# Patient Record
Sex: MALE | Race: WHITE | Employment: OTHER | URBAN - METROPOLITAN AREA
[De-identification: names, ages, dates, MRNs, and addresses within clinical notes are randomized per-mention and may not be internally consistent; named-entity substitution may affect disease eponyms.]

---

## 2017-01-04 ENCOUNTER — TELEPHONE (OUTPATIENT)
Dept: FAMILY MEDICINE CLINIC | Age: 82
End: 2017-01-04

## 2017-01-04 DIAGNOSIS — N30.00 ACUTE CYSTITIS WITHOUT HEMATURIA: Primary | ICD-10-CM

## 2017-01-04 NOTE — TELEPHONE ENCOUNTER
Patient's daughter, Mare Giang is calling. Mare Giang states that she believes that her father may have another UTI. Mare Giang states that her father is showing symptoms like before when he had a UTI. Mare Giang states that he also called this morning and stated that he thought he was somewhere else. Mare Giang states that her father currently is living in a assistant living facility ([address] [place] [address]) due to his wheel chair. aMre Giang is requesting that a urinalysis order be sent to the assistant living facility so that her father can get some antibiotics.     Best call back # for Mare Giang: [phone]

## 2017-01-05 ENCOUNTER — TELEPHONE (OUTPATIENT)
Dept: FAMILY MEDICINE CLINIC | Age: 82
End: 2017-01-05

## 2017-01-05 NOTE — TELEPHONE ENCOUNTER
Luis Manuel Rabago   -    [phone]    -  Daughter stated Assisted Living did not receive the fax for Urinalysis Order-  Please use fax number [phone]

## 2017-01-09 ENCOUNTER — OFFICE VISIT (OUTPATIENT)
Dept: FAMILY MEDICINE CLINIC | Age: 82
End: 2017-01-09

## 2017-01-09 VITALS
WEIGHT: 200 LBS | SYSTOLIC BLOOD PRESSURE: 141 MMHG | DIASTOLIC BLOOD PRESSURE: 90 MMHG | HEART RATE: 84 BPM | OXYGEN SATURATION: 96 % | HEIGHT: 74 IN | BODY MASS INDEX: 25.67 KG/M2 | RESPIRATION RATE: 16 BRPM | TEMPERATURE: 96.1 F

## 2017-01-09 DIAGNOSIS — R13.10 DYSPHAGIA, UNSPECIFIED TYPE: ICD-10-CM

## 2017-01-09 DIAGNOSIS — R05.3 CHRONIC COUGH: Primary | ICD-10-CM

## 2017-01-09 DIAGNOSIS — G20 PARKINSON'S DISEASE (HCC): ICD-10-CM

## 2017-01-09 RX ORDER — ALBUTEROL SULFATE 90 UG/1
2 AEROSOL, METERED RESPIRATORY (INHALATION)
Qty: 1 INHALER | Refills: 0 | Status: SHIPPED | OUTPATIENT
Start: 2017-01-09 | End: 2018-01-03 | Stop reason: ALTCHOICE

## 2017-01-09 NOTE — PROGRESS NOTES
Chief Complaint   Patient presents with    Cough     X 3 months     Blurred Vision     X 1 year      1. Have you been to the ER, urgent care clinic since your last visit? Hospitalized since your last visit? No    2. Have you seen or consulted any other health care providers outside of the [address] [place] [address] since your last visit? Include any pap smears or colon screening.  No

## 2017-01-09 NOTE — PATIENT INSTRUCTIONS
Chronic Cough: Care Instructions  Your Care Instructions    A cough is your body's response to something that bothers your throat or airways. Many things can cause a cough. You might cough because of a cold or the flu, bronchitis, or asthma. Smoking, postnasal drip, allergies, and stomach acid that backs up into your throat also can cause a cough. A cough can be short-term (acute) or long-term (chronic). A chronic cough lasts more than 3 weeks. A chronic cough is often caused by a long-term problem, such as asthma. Another cause might be a medicine, such as an ACE inhibitor. A cough is a symptom, not a disease. To treat a chronic cough, you may need to treat the problem that causes it. You can take a few steps at home to cough less and feel better. Some people cough or clear their throat out of habit for no clear reason. Follow-up care is a key part of your treatment and safety. Be sure to make and go to all appointments, and call your doctor if you are having problems. It's also a good idea to know your test results and keep a list of the medicines you take. How can you care for yourself at home? · Drink plenty of water and other fluids. This may help soothe a dry or sore throat. Honey or lemon juice in hot water or tea may ease a dry cough. · Prop up your head on pillows to help you breathe and ease a cough. · Do not smoke or allow others to smoke around you. Smoke can make a cough worse. If you need help quitting, talk to your doctor about stop-smoking programs and medicines. These can increase your chances of quitting for good. · Avoid exposure to smoke, dust, or other pollutants, or wear a face mask. Check with your doctor or pharmacist to find out which type of face mask will give you the most benefit. · Take cough medicine as directed by your doctor. · Try cough drops to soothe a dry or sore throat. Cough drops don't stop a cough.  Medicine-flavored cough drops are no better than candy-flavored drops or hard candy. Throat clearing  When you have a chronic cough or a disease that may cause this type of cough, you may often feel like you want to clear your throat. This helps bring up mucus. But throat clearing does not always have a cause. Throat clearing can become a habit. The more you do it, the more you feel like you need to do it. But frequent throat clearing can be hard on your vocal cords. It's like slamming them together. To help lessen throat clearing, you can try:  · Taking small sips of water. · Not clearing your throat when you feel you need to. · Swallowing hard when you want to clear your throat. You may want to ask your doctor if a medicine that thins mucus would help. When should you call for help? Call 911 anytime you think you may need emergency care. For example, call if:  · You have severe trouble breathing. Call your doctor now or seek immediate medical care if:  · You cough up blood. · You have new or worse trouble breathing. · You have a new or higher fever. Watch closely for changes in your health, and be sure to contact your doctor if:  · You cough more deeply or more often, especially if you notice more mucus or a change in the color of your mucus. · You do not get better as expected. Where can you learn more? Go to http://rodo-lukas.info/. Enter P533 in the search box to learn more about \"Chronic Cough: Care Instructions. \"  Current as of: May 23, 2016  Content Version: 11.1  © 8084-3759 [employer]. Care instructions adapted under license by [employer] (which disclaims liability or warranty for this information). If you have questions about a medical condition or this instruction, always ask your healthcare professional. [address] any warranty or liability for your use of this information.

## 2017-01-09 NOTE — MR AVS SNAPSHOT
Visit Information Date & Time Provider Department Dept. Phone Encounter #  
 1/9/2017  2:15 PM Ivette Patel  [facility] [phone] [id number] Follow-up Instructions Return if symptoms worsen or fail to improve. Your Appointments 4/6/2017  9:15 AM  
PACEMAKER with TIFFANI BURNETT [facility] ([place] SCHEDULING) Appt Note: med threshold/rc/Church 1 yr new CL; med threshold/rc/Church 1 yr new CL r/s from 06/02 to 04/06 per Dr Nagi Corral letter mailed [address]  
[phone]  
  
   
 [address]  
  
    
 4/6/2017  9:20 AM  
ESTABLISHED PATIENT with Wendy Whiting MD  
[facility] ([facility]) Appt Note: med threshold/rc/Church 1 yr new CL r/s from 06/02 to 04/06 per Dr Nagi Corral letter mailed [address] [phone]  
[address] [address]  
  
    
  
 3/27/2017  9:30 AM  
REMOTE OFFICE VISIT with Sebastian Cid [facility] ([place] SCHEDULING) Appt Note:  [address]  
[address] [address] Upcoming Health Maintenance Date Due DTaP/Tdap/Td series (1 - Tdap) 5/4/2012 Pneumococcal 65+ High/Highest Risk (2 of 2 - PPSV23) 4/20/2015 MEDICARE YEARLY EXAM 6/17/2016 INFLUENZA AGE 9 TO ADULT 8/1/2016 GLAUCOMA SCREENING Q2Y 8/26/2018 Allergies as of 1/9/2017  Review Complete On: 1/9/2017 By: Gai Castro No Known Allergies Current Immunizations  Reviewed on 7/26/2016 Name Date Influenza High Dose Vaccine PF 10/27/2015 Influenza Vaccine Split 11/17/2011 Pneumococcal Vaccine (Unspecified Type) 4/20/2010 TB Skin Test (PPD) Intradermal 3/25/2013 TD Vaccine 5/3/2012 Not reviewed this visit You Were Diagnosed With   
  
 Codes Comments Chronic cough    -  Primary ICD-10-CM: P16 ICD-9-CM: 786.2 [facility]'s disease ([facility]     ICD-10-CM: G20 
ICD-9-CM: 332.0 Dysphagia, unspecified type     ICD-10-CM: R13.10 ICD-9-CM: 787.20 Vitals BP Pulse Temp Resp Height(growth percentile) Weight(growth percentile) 141/90 (BP 1 Location: Left arm, BP Patient Position: Sitting) 84 96.1 °F (35.6 °C) (Oral) 16 6' 2\" (1.88 m) 200 lb (90.7 kg) SpO2 BMI Smoking Status 96% 25.68 kg/m2 Former Smoker Vitals History BMI and BSA Data Body Mass Index Body Surface Area  
 25.68 kg/m 2 2.18 m 2 Preferred Pharmacy Pharmacy Name Phone [facility] [address] [place] [address] [phone] Your Updated Medication List  
  
   
This list is accurate as of: 1/9/17  2:48 PM.  Always use your most recent med list.  
  
  
  
  
 acetaminophen 500 mg tablet Commonly known as:  MAPAP EXTRA STRENGTH Take 1 Tab by mouth every six (6) hours as needed for Pain. albuterol 90 mcg/actuation inhaler Commonly known as:  PROVENTIL HFA, VENTOLIN HFA, PROAIR HFA Take 2 Puffs by inhalation every six (6) hours as needed for Wheezing. aspirin 325 mg tablet Commonly known as:  ASPIRIN Take 1 Tab by mouth two (2) times a day. Indications: PREVENTION OF TRANSIENT ISCHEMIC ATTACKS * ATIVAN 0.5 mg tablet Generic drug:  LORazepam  
Take 0.5 mg by mouth every eight (8) hours as needed for Anxiety. * LORazepam 0.5 mg tablet Commonly known as:  ATIVAN Take 0.5 mg by mouth nightly. carbidopa-levodopa  mg per tablet Commonly known as:  SINEMET Take 2 Tabs by mouth four (4) times daily. (This is given at 8:00, 11:00, 14:00, and 21:00). CLARITIN 10 mg tablet Generic drug:  loratadine Take 10 mg by mouth. cloNIDine HCl 0.1 mg tablet Commonly known as:  CATAPRES Take 1 Tab by mouth every six (6) hours as needed (SBP > 180 mmHg). cycloSPORINE 0.05 % ophthalmic emulsion Commonly known as:  RESTASIS Administer 1 Drop to both eyes two (2) times a day. docusate sodium 100 mg capsule Commonly known as:  Lucetta Bruno Take 1 Cap by mouth daily as needed for Constipation. droxidopa 100 mg Cap capsule Commonly known as:  Gaamelie Flash Take 100 mg by mouth three (3) times daily. Indications: SYMPTOMATIC ORTHOSTATIC HYPOTENSION  
  
 escitalopram oxalate 20 mg tablet Commonly known as:  Sangeetha Gale Take 1 Tab by mouth daily. esomeprazole 40 mg capsule Commonly known as:  NexIUM Take 1 Cap by mouth daily. HYDROcodone-acetaminophen 5-325 mg per tablet Commonly known as:  Say Dolphin Take  by mouth. inhalational spacing device 1 Each by Does Not Apply route as needed. * pindolol 5 mg tablet Commonly known as:  VISKIN Take 0.5 Tabs by mouth daily (after lunch). 2.5 mg (1/2 tab) by mouth at 1400 * pindolol 5 mg tablet Commonly known as:  VISKIN Take 1 Tab by mouth nightly. polyethylene glycol 17 gram packet Commonly known as:  Rl Javier Take 1 Packet by mouth daily. Indications: CONSTIPATION  
  
 PYRIDIUM 200 mg tablet Generic drug:  phenazopyridine Take 200 mg by mouth three (3) times daily as needed for Pain. * QUEtiapine 50 mg tablet Commonly known as:  SEROquel Take 1 Tab by mouth nightly. * QUEtiapine 25 mg tablet Commonly known as:  SEROquel TAKE 1 TABLET NIGHTLY  
  
 rivastigmine 4.6 mg/24 hr patch Commonly known as:  EXELON  
1 Patch by TransDERmal route daily. therapeutic multivitamin tablet Commonly known as:  [facility] Take 1 Tab by mouth daily. tolterodine ER 2 mg ER capsule Commonly known as:  DETROL-LA Take 1 Cap by mouth daily. For bladder frequency  
  
 traMADol 50 mg tablet Commonly known as:  ULTRAM  
Take 1 Tab by mouth every eight (8) hours as needed for Pain. Max Daily Amount: 150 mg. VOLTAREN 1 % Gel Generic drug:  diclofenac Apply  to affected area four (4) times daily. * Notice: This list has 6 medication(s) that are the same as other medications prescribed for you. Read the directions carefully, and ask your doctor or other care provider to review them with you. Prescriptions Sent to Pharmacy Refills  
 albuterol (PROVENTIL HFA, VENTOLIN HFA, PROAIR HFA) 90 mcg/actuation inhaler 0 Sig: Take 2 Puffs by inhalation every six (6) hours as needed for Wheezing. Class: Normal  
 Pharmacy: [facility] [address] [place] [address] Ph #: [phone] Route: Inhalation  
 inhalational spacing device 0 Si Each by Does Not Apply route as needed. Class: Normal  
 Pharmacy: [facility] [address] [place] [address] Ph #: [phone] Route: Does Not Apply We Performed the Following REFERRAL TO SPEECH THERAPY [ICD774 Custom] Comments:  
 Please evaluate patient for dysphagia. Follow-up Instructions Return if symptoms worsen or fail to improve. Referral Information Referral ID Referred By Referred To  
  
 [id number] Veda NGUYEN Not Available Visits Status Start Date End Date 1 New Request 17 If your referral has a status of pending review or denied, additional information will be sent to support the outcome of this decision. Patient Instructions Chronic Cough: Care Instructions Your Care Instructions A cough is your body's response to something that bothers your throat or airways. Many things can cause a cough. You might cough because of a cold or the flu, bronchitis, or asthma.  Smoking, postnasal drip, allergies, and stomach acid that backs up into your throat also can cause a cough. A cough can be short-term (acute) or long-term (chronic). A chronic cough lasts more than 3 weeks. A chronic cough is often caused by a long-term problem, such as asthma. Another cause might be a medicine, such as an ACE inhibitor. A cough is a symptom, not a disease. To treat a chronic cough, you may need to treat the problem that causes it. You can take a few steps at home to cough less and feel better. Some people cough or clear their throat out of habit for no clear reason. Follow-up care is a key part of your treatment and safety. Be sure to make and go to all appointments, and call your doctor if you are having problems. It's also a good idea to know your test results and keep a list of the medicines you take. How can you care for yourself at home? · Drink plenty of water and other fluids. This may help soothe a dry or sore throat. Honey or lemon juice in hot water or tea may ease a dry cough. · Prop up your head on pillows to help you breathe and ease a cough. · Do not smoke or allow others to smoke around you. Smoke can make a cough worse. If you need help quitting, talk to your doctor about stop-smoking programs and medicines. These can increase your chances of quitting for good. · Avoid exposure to smoke, dust, or other pollutants, or wear a face mask. Check with your doctor or pharmacist to find out which type of face mask will give you the most benefit. · Take cough medicine as directed by your doctor. · Try cough drops to soothe a dry or sore throat. Cough drops don't stop a cough. Medicine-flavored cough drops are no better than candy-flavored drops or hard candy. Throat clearing When you have a chronic cough or a disease that may cause this type of cough, you may often feel like you want to clear your throat. This helps bring up mucus. But throat clearing does not always have a cause. Throat clearing can become a habit. The more you do it, the more you feel like you need to do it. But frequent throat clearing can be hard on your vocal cords. It's like slamming them together. To help lessen throat clearing, you can try: · Taking small sips of water. · Not clearing your throat when you feel you need to. · Swallowing hard when you want to clear your throat. You may want to ask your doctor if a medicine that thins mucus would help. When should you call for help? Call 911 anytime you think you may need emergency care. For example, call if: 
· You have severe trouble breathing. Call your doctor now or seek immediate medical care if: 
· You cough up blood. · You have new or worse trouble breathing. · You have a new or higher fever. Watch closely for changes in your health, and be sure to contact your doctor if: 
· You cough more deeply or more often, especially if you notice more mucus or a change in the color of your mucus. · You do not get better as expected. Where can you learn more? Go to http://rodo-lukas.info/. Enter E681 in the search box to learn more about \"Chronic Cough: Care Instructions. \" Current as of: May 23, 2016 Content Version: 11.1 © 7372-2065 [employer]. Care instructions adapted under license by [employer] (which disclaims liability or warranty for this information). If you have questions about a medical condition or this instruction, always ask your healthcare professional. [place] [address] any warranty or liability for your use of this information. Introducing [facility]! Dear Daria Malin: Thank you for requesting a [employer] account. Our records indicate that you already have an active [employer] account. You can access your account anytime at [email or web address] Did you know that you can access your hospital and ER discharge instructions at any time in [employer]? You can also review all of your test results from your hospital stay or ER visit. Additional Information If you have questions, please visit the Frequently Asked Questions section of the [employer] website at [email or web address]/. Remember, [employer] App is NOT to be used for urgent needs. For medical emergencies, dial 911. Now available from your iPhone and Android! Please provide this summary of care documentation to your next provider. Your primary care clinician is listed as [address] [address], [facility]/s . If you have any questions after today's visit, please call [phone].

## 2017-01-10 NOTE — PROGRESS NOTES
Subjective:      Newton Daniels is a 80 y.o. male who presents for dry cough for 3 months. He reports symptoms are daily. He denies aggravation with meals, although he admits towards coughing with meals at times. He reports phlegm in the throat. He denies associated change in appetite, weakness, fever, chills, body aches, wheezing, or shortness of breath. He denies symptoms of reflux. He generally feels in good health today. Current Outpatient Prescriptions   Medication Sig Dispense Refill    albuterol (PROVENTIL HFA, VENTOLIN HFA, PROAIR HFA) 90 mcg/actuation inhaler Take 2 Puffs by inhalation every six (6) hours as needed for Wheezing. 1 Inhaler 0    inhalational spacing device 1 Each by Does Not Apply route as needed. 1 Device 0    QUEtiapine (SEROQUEL) 25 mg tablet TAKE 1 TABLET NIGHTLY 90 Tab 1    LORazepam (ATIVAN) 0.5 mg tablet Take 0.5 mg by mouth nightly.  LORazepam (ATIVAN) 0.5 mg tablet Take 0.5 mg by mouth every eight (8) hours as needed for Anxiety.  phenazopyridine (PYRIDIUM) 200 mg tablet Take 200 mg by mouth three (3) times daily as needed for Pain.  diclofenac (VOLTAREN) 1 % gel Apply  to affected area four (4) times daily.  HYDROcodone-acetaminophen (NORCO) 5-325 mg per tablet Take  by mouth.  loratadine (CLARITIN) 10 mg tablet Take 10 mg by mouth.  aspirin (ASPIRIN) 325 mg tablet Take 1 Tab by mouth two (2) times a day. Indications: PREVENTION OF TRANSIENT ISCHEMIC ATTACKS 60 Tab 0    carbidopa-levodopa (SINEMET)  mg per tablet Take 2 Tabs by mouth four (4) times daily. (This is given at 8:00, 11:00, 14:00, and 21:00). 240 Tab 0    polyethylene glycol (MIRALAX) 17 gram packet Take 1 Packet by mouth daily. Indications: CONSTIPATION 30 Packet 0    therapeutic multivitamin (THERAGRAN) tablet Take 1 Tab by mouth daily. 30 Tab 0    tolterodine ER (DETROL-LA) 2 mg ER capsule Take 1 Cap by mouth daily.  For bladder frequency 30 Cap 0    traMADol (ULTRAM) 50 mg tablet Take 1 Tab by mouth every eight (8) hours as needed for Pain. Max Daily Amount: 150 mg. 30 Tab 1    cloNIDine HCl (CATAPRES) 0.1 mg tablet Take 1 Tab by mouth every six (6) hours as needed (SBP > 180 mmHg). 20 Tab 0    droxidopa (NORTHERA) 100 mg cap Take 100 mg by mouth three (3) times daily. Indications: SYMPTOMATIC ORTHOSTATIC HYPOTENSION 90 Tab 1    pindolol (VISKIN) 5 mg tablet Take 0.5 Tabs by mouth daily (after lunch). 2.5 mg (1/2 tab) by mouth at 1400 30 Tab 0    docusate sodium (COLACE) 100 mg capsule Take 1 Cap by mouth daily as needed for Constipation. (Patient taking differently: Take 50 mg by mouth daily as needed.) 30 Cap 0    cycloSPORINE (RESTASIS) 0.05 % ophthalmic emulsion Administer 1 Drop to both eyes two (2) times a day. 60 Each 0    escitalopram oxalate (LEXAPRO) 20 mg tablet Take 1 Tab by mouth daily. 30 Tab 0    rivastigmine (EXELON) 4.6 mg/24 hr patch 1 Patch by TransDERmal route daily. 30 Patch 0    esomeprazole (NEXIUM) 40 mg capsule Take 1 Cap by mouth daily. 30 Cap 0    QUEtiapine (SEROQUEL) 50 mg tablet Take 1 Tab by mouth nightly. 30 Tab 0    acetaminophen (MAPAP EXTRA STRENGTH) 500 mg tablet Take 1 Tab by mouth every six (6) hours as needed for Pain. 120 Tab 0    pindolol (VISKIN) 5 mg tablet Take 1 Tab by mouth nightly. 30 Tab 0     No Known Allergies    ROS:   Complete review of systems was reviewed with pertinent information listed in HPI. Objective:     Visit Vitals    /90 (BP 1 Location: Left arm, BP Patient Position: Sitting)    Pulse 84    Temp 96.1 °F (35.6 °C) (Oral)    Resp 16    Ht 6' 2\" (1.88 m)    Wt 200 lb (90.7 kg)    SpO2 96%    BMI 25.68 kg/m2       Vitals and Nurse Documentation reviewed. General:  alert, cooperative, no distress   Neck: supple, symmetrical, trachea midline, no adenopathy, thyroid: not enlarged, symmetric, no tenderness/mass/nodules, no carotid bruit and no JVD. CV S1,S2.  No murmur, gallop, or rub. RRR. Lungs: clear to auscultation bilaterally. Cough is dry and produces mild wheeze. Assessment/Plan:     Nataliya Orozco was seen today for cough and blurred vision. Diagnoses and all orders for this visit:    Chronic cough  -     albuterol (PROVENTIL HFA, VENTOLIN HFA, PROAIR HFA) 90 mcg/actuation inhaler; Take 2 Puffs by inhalation every six (6) hours as needed for Wheezing.  -     inhalational spacing device; 1 Each by Does Not Apply route as needed. -     REFERRAL TO SPEECH THERAPY  Add Albuterol at this time. Speech evaluation as above. Return if no improvement. Consider updating chest xray at that time. Parkinson's disease ([facility]  -     REFERRAL TO SPEECH THERAPY    Dysphagia, unspecified type  -     REFERRAL TO SPEECH THERAPY for evaluation. Discussed expected course/resolution/complications of diagnosis in detail with patient.    Medication risks/benefits/costs/interactions/alternatives discussed with patient.    Pt was given an after visit summary which includes diagnoses, current medications & vitals.    Pt expressed understanding with the diagnosis and plan    Follow-up Disposition:  Return if symptoms worsen or fail to improve.

## 2017-01-12 ENCOUNTER — TELEPHONE (OUTPATIENT)
Dept: FAMILY MEDICINE CLINIC | Age: 82
End: 2017-01-12

## 2017-01-12 NOTE — LETTER
2017 10:39 AM 
 
Mr. Phil Childress [address] [phone] To Whom It May Concern My patient, Ayana Long,  1933 is permanently confined to a wheelchair due to a ruptured quadricep tendon bilaterally in , leaving him unable to walk. His wheelchair is in need of repair. Please let us know if there is anything else that you need from us Sincerely, 
 
 
Ulysses Davey MD

## 2017-01-12 NOTE — TELEPHONE ENCOUNTER
Mali Cherry  [phone]      Fax # [phone]   [address] [place] [address]    Mr. Lanie Browne daughter, Zach Burch, states that he is permanently wheelchair bound and his wheelchair needs repairs. She states that in order  for Medicare to pay for the repairs they need a letter from his PCP stating that he is permanently in a wheelchair and it needs repairs. Please fax to the # above.

## 2017-01-12 NOTE — TELEPHONE ENCOUNTER
[facility] patient got wheelchair from [facility] and to fax letter to them at [phone]

## 2017-01-17 RX ORDER — LORAZEPAM 0.5 MG/1
0.5 TABLET ORAL
Qty: 90 TAB | Refills: 1 | Status: SHIPPED | OUTPATIENT
Start: 2017-01-17 | End: 2017-03-03 | Stop reason: DRUGHIGH

## 2017-01-17 NOTE — TELEPHONE ENCOUNTER
Contact # [phone]    Patient's daughter, Ayanna Ku, is requesting a refill on patient's medication:  Requested Prescriptions     Pending Prescriptions Disp Refills    LORazepam (ATIVAN) 0.5 mg tablet       Sig: Take 1 Tab by mouth nightly. Max Daily Amount: 0.5 mg. Pharmacy updated.  She is requesting a call once its been sent to [facility]

## 2017-01-23 RX ORDER — LOSARTAN POTASSIUM 25 MG/1
25 TABLET ORAL DAILY
Qty: 90 TAB | Refills: 1 | Status: SHIPPED | OUTPATIENT
Start: 2017-01-23 | End: 2017-02-11

## 2017-01-24 RX ORDER — PINDOLOL 5 MG/1
2.5 TABLET ORAL
Qty: 30 TAB | Refills: 0 | Status: SHIPPED | OUTPATIENT
Start: 2017-01-24 | End: 2017-01-25 | Stop reason: DRUGHIGH

## 2017-01-24 RX ORDER — PINDOLOL 5 MG/1
5 TABLET ORAL
Qty: 30 TAB | Refills: 0 | Status: SHIPPED | OUTPATIENT
Start: 2017-01-24 | End: 2017-01-25 | Stop reason: DRUGHIGH

## 2017-01-24 RX ORDER — PINDOLOL 5 MG/1
5 TABLET ORAL
Qty: 90 TAB | Refills: 3 | Status: SHIPPED | OUTPATIENT
Start: 2017-01-24 | End: 2017-01-25 | Stop reason: DRUGHIGH

## 2017-01-24 RX ORDER — PINDOLOL 5 MG/1
2.5 TABLET ORAL
Qty: 45 TAB | Refills: 3 | Status: SHIPPED | OUTPATIENT
Start: 2017-01-24 | End: 2017-01-25 | Stop reason: DRUGHIGH

## 2017-01-24 NOTE — TELEPHONE ENCOUNTER
Daughter request a refill on his pindolol. VO per Dr Sonia Lorenzo ok to refill. States that he is almost out.   30 day sent to [facility] and [facility] daughter request.

## 2017-01-25 ENCOUNTER — TELEPHONE (OUTPATIENT)
Dept: CARDIOLOGY CLINIC | Age: 82
End: 2017-01-25

## 2017-01-25 RX ORDER — PINDOLOL 5 MG/1
TABLET ORAL
Qty: 370 TAB | Refills: 0
Start: 2017-01-25 | End: 2017-02-11 | Stop reason: SDUPTHER

## 2017-01-25 NOTE — TELEPHONE ENCOUNTER
[facility] called stating that they do not take two orders for the same medication as the insurance will not cover the medications. Order for medication changed to one order and called [facility].

## 2017-02-11 ENCOUNTER — APPOINTMENT (OUTPATIENT)
Dept: GENERAL RADIOLOGY | Age: 82
DRG: 193 | End: 2017-02-11
Attending: EMERGENCY MEDICINE
Payer: MEDICARE

## 2017-02-11 ENCOUNTER — HOSPITAL ENCOUNTER (INPATIENT)
Age: 82
LOS: 4 days | Discharge: HOME | DRG: 193 | End: 2017-02-15
Attending: EMERGENCY MEDICINE | Admitting: FAMILY MEDICINE
Payer: MEDICARE

## 2017-02-11 DIAGNOSIS — J18.9 PNEUMONIA OF RIGHT MIDDLE LOBE DUE TO INFECTIOUS ORGANISM: Primary | ICD-10-CM

## 2017-02-11 LAB
ALBUMIN SERPL BCP-MCNC: 3.5 G/DL (ref 3.5–5)
ALBUMIN/GLOB SERPL: 1.1 {RATIO} (ref 1.1–2.2)
ALP SERPL-CCNC: 61 U/L (ref 45–117)
ALT SERPL-CCNC: 9 U/L (ref 12–78)
ANION GAP BLD CALC-SCNC: 9 MMOL/L (ref 5–15)
ARTERIAL PATENCY WRIST A: ABNORMAL
AST SERPL W P-5'-P-CCNC: 27 U/L (ref 15–37)
BASE DEFICIT BLD-SCNC: 3 MMOL/L
BASOPHILS # BLD AUTO: 0 K/UL (ref 0–0.1)
BASOPHILS # BLD: 0 % (ref 0–1)
BDY SITE: ABNORMAL
BILIRUB SERPL-MCNC: 1.1 MG/DL (ref 0.2–1)
BUN SERPL-MCNC: 31 MG/DL (ref 6–20)
BUN/CREAT SERPL: 23 (ref 12–20)
CALCIUM SERPL-MCNC: 8.7 MG/DL (ref 8.5–10.1)
CHLORIDE SERPL-SCNC: 100 MMOL/L (ref 97–108)
CK MB CFR SERPL CALC: 2.1 % (ref 0–2.5)
CK MB SERPL-MCNC: 3 NG/ML (ref 5–25)
CK SERPL-CCNC: 141 U/L (ref 39–308)
CO2 SERPL-SCNC: 24 MMOL/L (ref 21–32)
CREAT SERPL-MCNC: 1.35 MG/DL (ref 0.7–1.3)
DIFFERENTIAL METHOD BLD: ABNORMAL
EOSINOPHIL # BLD: 0 K/UL (ref 0–0.4)
EOSINOPHIL NFR BLD: 0 % (ref 0–7)
ERYTHROCYTE [DISTWIDTH] IN BLOOD BY AUTOMATED COUNT: 14.9 % (ref 11.5–14.5)
GAS FLOW.O2 O2 DELIVERY SYS: ABNORMAL L/MIN
GLOBULIN SER CALC-MCNC: 3.1 G/DL (ref 2–4)
GLUCOSE SERPL-MCNC: 125 MG/DL (ref 65–100)
HCO3 BLD-SCNC: 20.4 MMOL/L (ref 22–26)
HCT VFR BLD AUTO: 32.6 % (ref 36.6–50.3)
HGB BLD-MCNC: 10.5 G/DL (ref 12.1–17)
LACTATE SERPL-SCNC: 1.4 MMOL/L (ref 0.4–2)
LYMPHOCYTES # BLD AUTO: 9 % (ref 12–49)
LYMPHOCYTES # BLD: 0.8 K/UL (ref 0.8–3.5)
MCH RBC QN AUTO: 29.9 PG (ref 26–34)
MCHC RBC AUTO-ENTMCNC: 32.2 G/DL (ref 30–36.5)
MCV RBC AUTO: 92.9 FL (ref 80–99)
MONOCYTES # BLD: 0.6 K/UL (ref 0–1)
MONOCYTES NFR BLD AUTO: 7 % (ref 5–13)
NEUTS SEG # BLD: 7.3 K/UL (ref 1.8–8)
NEUTS SEG NFR BLD AUTO: 84 % (ref 32–75)
PCO2 BLD: 27.2 MMHG (ref 35–45)
PH BLD: 7.48 [PH] (ref 7.35–7.45)
PLATELET # BLD AUTO: 165 K/UL (ref 150–400)
PO2 BLD: 61 MMHG (ref 80–100)
POTASSIUM SERPL-SCNC: 4.4 MMOL/L (ref 3.5–5.1)
PROT SERPL-MCNC: 6.6 G/DL (ref 6.4–8.2)
RBC # BLD AUTO: 3.51 M/UL (ref 4.1–5.7)
RBC MORPH BLD: ABNORMAL
SAO2 % BLD: 93 % (ref 92–97)
SODIUM SERPL-SCNC: 133 MMOL/L (ref 136–145)
SPECIMEN TYPE: ABNORMAL
TROPONIN I SERPL-MCNC: <0.04 NG/ML
WBC # BLD AUTO: 8.7 K/UL (ref 4.1–11.1)

## 2017-02-11 PROCEDURE — 65660000000 HC RM CCU STEPDOWN

## 2017-02-11 PROCEDURE — 74011250636 HC RX REV CODE- 250/636: Performed by: FAMILY MEDICINE

## 2017-02-11 PROCEDURE — 74011250636 HC RX REV CODE- 250/636: Performed by: EMERGENCY MEDICINE

## 2017-02-11 PROCEDURE — 36415 COLL VENOUS BLD VENIPUNCTURE: CPT | Performed by: EMERGENCY MEDICINE

## 2017-02-11 PROCEDURE — 99285 EMERGENCY DEPT VISIT HI MDM: CPT

## 2017-02-11 PROCEDURE — 74011000258 HC RX REV CODE- 258: Performed by: EMERGENCY MEDICINE

## 2017-02-11 PROCEDURE — 71010 XR CHEST PORT: CPT

## 2017-02-11 PROCEDURE — 87040 BLOOD CULTURE FOR BACTERIA: CPT | Performed by: EMERGENCY MEDICINE

## 2017-02-11 PROCEDURE — 82803 BLOOD GASES ANY COMBINATION: CPT

## 2017-02-11 PROCEDURE — 80053 COMPREHEN METABOLIC PANEL: CPT | Performed by: EMERGENCY MEDICINE

## 2017-02-11 PROCEDURE — 94640 AIRWAY INHALATION TREATMENT: CPT

## 2017-02-11 PROCEDURE — 96365 THER/PROPH/DIAG IV INF INIT: CPT

## 2017-02-11 PROCEDURE — 82550 ASSAY OF CK (CPK): CPT | Performed by: FAMILY MEDICINE

## 2017-02-11 PROCEDURE — 74011000250 HC RX REV CODE- 250: Performed by: EMERGENCY MEDICINE

## 2017-02-11 PROCEDURE — 83605 ASSAY OF LACTIC ACID: CPT | Performed by: EMERGENCY MEDICINE

## 2017-02-11 PROCEDURE — 93005 ELECTROCARDIOGRAM TRACING: CPT

## 2017-02-11 PROCEDURE — 77030029684 HC NEB SM VOL KT MONA -A

## 2017-02-11 PROCEDURE — 85025 COMPLETE CBC W/AUTO DIFF WBC: CPT | Performed by: EMERGENCY MEDICINE

## 2017-02-11 PROCEDURE — 84484 ASSAY OF TROPONIN QUANT: CPT | Performed by: FAMILY MEDICINE

## 2017-02-11 PROCEDURE — 74011250637 HC RX REV CODE- 250/637: Performed by: FAMILY MEDICINE

## 2017-02-11 PROCEDURE — 36600 WITHDRAWAL OF ARTERIAL BLOOD: CPT

## 2017-02-11 RX ORDER — ASPIRIN 325 MG
325 TABLET ORAL 2 TIMES DAILY
Status: DISCONTINUED | OUTPATIENT
Start: 2017-02-12 | End: 2017-02-15 | Stop reason: HOSPADM

## 2017-02-11 RX ORDER — SODIUM CHLORIDE 0.9 % (FLUSH) 0.9 %
5-10 SYRINGE (ML) INJECTION AS NEEDED
Status: DISCONTINUED | OUTPATIENT
Start: 2017-02-11 | End: 2017-02-15 | Stop reason: HOSPADM

## 2017-02-11 RX ORDER — SODIUM CHLORIDE 0.9 % (FLUSH) 0.9 %
5-10 SYRINGE (ML) INJECTION EVERY 8 HOURS
Status: DISCONTINUED | OUTPATIENT
Start: 2017-02-12 | End: 2017-02-15 | Stop reason: HOSPADM

## 2017-02-11 RX ORDER — ESCITALOPRAM OXALATE 10 MG/1
20 TABLET ORAL DAILY
Status: DISCONTINUED | OUTPATIENT
Start: 2017-02-12 | End: 2017-02-15 | Stop reason: HOSPADM

## 2017-02-11 RX ORDER — PANTOPRAZOLE SODIUM 40 MG/1
40 TABLET, DELAYED RELEASE ORAL DAILY
Status: DISCONTINUED | OUTPATIENT
Start: 2017-02-12 | End: 2017-02-15 | Stop reason: HOSPADM

## 2017-02-11 RX ORDER — LEVOFLOXACIN 5 MG/ML
750 INJECTION, SOLUTION INTRAVENOUS
Status: DISCONTINUED | OUTPATIENT
Start: 2017-02-13 | End: 2017-02-12

## 2017-02-11 RX ORDER — QUETIAPINE FUMARATE 25 MG/1
25 TABLET, FILM COATED ORAL
Status: DISCONTINUED | OUTPATIENT
Start: 2017-02-12 | End: 2017-02-15 | Stop reason: HOSPADM

## 2017-02-11 RX ORDER — SODIUM CHLORIDE 9 MG/ML
75 INJECTION, SOLUTION INTRAVENOUS CONTINUOUS
Status: DISCONTINUED | OUTPATIENT
Start: 2017-02-12 | End: 2017-02-12

## 2017-02-11 RX ORDER — POLYETHYLENE GLYCOL 3350 17 G/17G
17 POWDER, FOR SOLUTION ORAL DAILY
Status: DISCONTINUED | OUTPATIENT
Start: 2017-02-12 | End: 2017-02-15 | Stop reason: HOSPADM

## 2017-02-11 RX ORDER — LEVOFLOXACIN 5 MG/ML
750 INJECTION, SOLUTION INTRAVENOUS EVERY 24 HOURS
Status: DISCONTINUED | OUTPATIENT
Start: 2017-02-12 | End: 2017-02-11

## 2017-02-11 RX ORDER — RIVASTIGMINE 4.6 MG/24H
1 PATCH, EXTENDED RELEASE TRANSDERMAL DAILY
Status: DISCONTINUED | OUTPATIENT
Start: 2017-02-12 | End: 2017-02-15 | Stop reason: HOSPADM

## 2017-02-11 RX ORDER — OXYBUTYNIN CHLORIDE 5 MG/1
5 TABLET, EXTENDED RELEASE ORAL DAILY
Status: DISCONTINUED | OUTPATIENT
Start: 2017-02-12 | End: 2017-02-15 | Stop reason: HOSPADM

## 2017-02-11 RX ORDER — PINDOLOL 5 MG/1
2.5 TABLET ORAL 2 TIMES DAILY
COMMUNITY

## 2017-02-11 RX ORDER — PINDOLOL 5 MG/1
5 TABLET ORAL
COMMUNITY
End: 2017-04-28 | Stop reason: SDUPTHER

## 2017-02-11 RX ORDER — CLONIDINE HYDROCHLORIDE 0.1 MG/1
0.1 TABLET ORAL
Status: DISCONTINUED | OUTPATIENT
Start: 2017-02-11 | End: 2017-02-15 | Stop reason: HOSPADM

## 2017-02-11 RX ORDER — DROXIDOPA 100 MG/1
100 CAPSULE ORAL 3 TIMES DAILY
Status: DISCONTINUED | OUTPATIENT
Start: 2017-02-12 | End: 2017-02-12

## 2017-02-11 RX ORDER — CARBIDOPA AND LEVODOPA 25; 100 MG/1; MG/1
2 TABLET ORAL 4 TIMES DAILY
Status: DISCONTINUED | OUTPATIENT
Start: 2017-02-12 | End: 2017-02-15 | Stop reason: HOSPADM

## 2017-02-11 RX ORDER — VANCOMYCIN 2 GRAM/500 ML IN 0.9 % SODIUM CHLORIDE INTRAVENOUS
2000 ONCE
Status: COMPLETED | OUTPATIENT
Start: 2017-02-11 | End: 2017-02-13

## 2017-02-11 RX ORDER — SODIUM CHLORIDE 9 MG/ML
75 INJECTION, SOLUTION INTRAVENOUS CONTINUOUS
Status: DISCONTINUED | OUTPATIENT
Start: 2017-02-11 | End: 2017-02-12

## 2017-02-11 RX ORDER — TRAMADOL HYDROCHLORIDE 50 MG/1
50 TABLET ORAL
Status: DISCONTINUED | OUTPATIENT
Start: 2017-02-11 | End: 2017-02-15 | Stop reason: HOSPADM

## 2017-02-11 RX ORDER — MECLIZINE HYDROCHLORIDE 25 MG/1
25 TABLET ORAL
COMMUNITY
End: 2017-04-30 | Stop reason: ALTCHOICE

## 2017-02-11 RX ORDER — LEVOFLOXACIN 5 MG/ML
750 INJECTION, SOLUTION INTRAVENOUS ONCE
Status: COMPLETED | OUTPATIENT
Start: 2017-02-11 | End: 2017-02-13

## 2017-02-11 RX ORDER — PINDOLOL 5 MG/1
2.5 TABLET ORAL
Status: DISCONTINUED | OUTPATIENT
Start: 2017-02-12 | End: 2017-02-15 | Stop reason: HOSPADM

## 2017-02-11 RX ORDER — DOCUSATE SODIUM 100 MG/1
100 CAPSULE, LIQUID FILLED ORAL
Status: DISCONTINUED | OUTPATIENT
Start: 2017-02-11 | End: 2017-02-15 | Stop reason: HOSPADM

## 2017-02-11 RX ADMIN — LEVOFLOXACIN 750 MG: 5 INJECTION, SOLUTION INTRAVENOUS at 21:57

## 2017-02-11 RX ADMIN — ALBUTEROL SULFATE 1 DOSE: 2.5 SOLUTION RESPIRATORY (INHALATION) at 20:37

## 2017-02-11 RX ADMIN — VANCOMYCIN HYDROCHLORIDE 2000 MG: 10 INJECTION, POWDER, LYOPHILIZED, FOR SOLUTION INTRAVENOUS at 22:43

## 2017-02-11 RX ADMIN — QUETIAPINE FUMARATE 25 MG: 25 TABLET, FILM COATED ORAL at 23:34

## 2017-02-11 RX ADMIN — SODIUM CHLORIDE 1000 ML: 900 INJECTION, SOLUTION INTRAVENOUS at 21:56

## 2017-02-11 RX ADMIN — SODIUM CHLORIDE 75 ML/HR: 900 INJECTION, SOLUTION INTRAVENOUS at 22:35

## 2017-02-11 RX ADMIN — Medication 10 ML: at 23:35

## 2017-02-11 RX ADMIN — PIPERACILLIN SODIUM,TAZOBACTAM SODIUM 3.38 G: 3; .375 INJECTION, POWDER, FOR SOLUTION INTRAVENOUS at 20:32

## 2017-02-11 NOTE — ED TRIAGE NOTES
Triage Note: [facility] for shortness of breath that began Monday and has progressively worsened. Upon EMS arrival pt's O2 Sats were 94% and tachypneic, was given a Jack-Neb en route. Pt is currently alert and oriented x3, audible wheezing noticed. Pt has also had a cough x8 days.

## 2017-02-11 NOTE — IP AVS SNAPSHOT
Current Discharge Medication List  
  
Take these medications at their scheduled times Dose & Instructions Dispensing Information Comments Morning Noon Evening Bedtime  
 amoxicillin-clavulanate 875-125 mg per tablet Commonly known as:  AUGMENTIN Your next dose is: Today, Tomorrow Other:  ____________ Dose:  1 Tab Take 1 Tab by mouth every twelve (12) hours for 6 days. Quantity:  12 Tab Refills:  0  
     
   
   
   
  
 aspirin 325 mg tablet Commonly known as:  ASPIRIN Your next dose is: Today, Tomorrow Other:  ____________ Dose:  325 mg Take 1 Tab by mouth two (2) times a day. Indications: PREVENTION OF TRANSIENT ISCHEMIC ATTACKS Quantity:  60 Tab Refills:  0  
     
   
   
   
  
 carbidopa-levodopa  mg per tablet Commonly known as:  SINEMET Your next dose is: Today, Tomorrow Other:  ____________ Dose:  2 Tab Take 2 Tabs by mouth four (4) times daily. (This is given at 8:00, 11:00, 14:00, and 21:00). Quantity:  240 Tab Refills:  0  
     
   
   
   
  
 cycloSPORINE 0.05 % ophthalmic emulsion Commonly known as:  RESTASIS Your next dose is: Today, Tomorrow Other:  ____________ Dose:  1 Drop Administer 1 Drop to both eyes two (2) times a day. Quantity:  60 Each Refills:  0  
     
   
   
   
  
 droxidopa 100 mg Cap capsule Commonly known as:  Roise Beaufort Your next dose is: Today, Tomorrow Other:  ____________ Dose:  100 mg Take 100 mg by mouth three (3) times daily. Indications: SYMPTOMATIC ORTHOSTATIC HYPOTENSION Quantity:  90 Tab Refills:  1  
     
   
   
   
  
 escitalopram oxalate 20 mg tablet Commonly known as:  Ean Emerald Your next dose is: Today, Tomorrow Other:  ____________ Dose:  20 mg Take 1 Tab by mouth daily. Quantity:  30 Tab Refills:  0 esomeprazole 40 mg capsule Commonly known as:  NexIUM Your next dose is: Today, Tomorrow Other:  ____________ Dose:  40 mg Take 1 Cap by mouth daily. Quantity:  30 Cap Refills:  0  
     
   
   
   
  
 * LORazepam 0.5 mg tablet Commonly known as:  ATIVAN Your next dose is: Today, Tomorrow Other:  ____________ Dose:  0.5 mg Take 1 Tab by mouth nightly. Max Daily Amount: 0.5 mg.  
 Quantity:  90 Tab Refills:  1  
     
   
   
   
  
 * pindolol 5 mg tablet Commonly known as:  VISKIN Your next dose is: Today, Tomorrow Other:  ____________ Dose:  5 mg Take 5 mg by mouth nightly. Refills:  0  
     
   
   
   
  
 * pindolol 5 mg tablet Commonly known as:  VISKIN Your next dose is: Today, Tomorrow Other:  ____________ Dose:  2.5 mg Take 2.5 mg by mouth daily (after lunch). Refills:  0  
     
   
   
   
  
 polyethylene glycol 17 gram packet Commonly known as:  [place] Ben Your next dose is: Today, Tomorrow Other:  ____________ Dose:  17 g Take 1 Packet by mouth daily. Indications: CONSTIPATION Quantity:  30 Packet Refills:  0  
     
   
   
   
  
 rivastigmine 4.6 mg/24 hr patch Commonly known as:  EXELON Your next dose is: Today, Tomorrow Other:  ____________ Dose:  1 Patch 1 Patch by TransDERmal route daily. Quantity:  30 Patch Refills:  0  
     
   
   
   
  
 therapeutic multivitamin tablet Commonly known as:  [facility] Your next dose is: Today, Tomorrow Other:  ____________ Dose:  1 Tab Take 1 Tab by mouth daily. Quantity:  30 Tab Refills:  0  
     
   
   
   
  
 * tolterodine ER 2 mg ER capsule Commonly known as:  DETROL-LA Your next dose is: Today, Tomorrow Other:  ____________ Dose:  2 mg Take 1 Cap by mouth daily. For bladder frequency Quantity:  30 Cap Refills:  0  
     
   
   
   
  
 * Notice: This list has 4 medication(s) that are the same as other medications prescribed for you. Read the directions carefully, and ask your doctor or other care provider to review them with you. Take these medications as needed Dose & Instructions Dispensing Information Comments Morning Noon Evening Bedtime  
 acetaminophen 500 mg tablet Commonly known as:  MAPAP EXTRA STRENGTH Your next dose is: Today, Tomorrow Other:  ____________ Dose:  500 mg Take 1 Tab by mouth every six (6) hours as needed for Pain. Quantity:  120 Tab Refills:  0  
     
   
   
   
  
 albuterol 90 mcg/actuation inhaler Commonly known as:  PROVENTIL HFA, VENTOLIN HFA, PROAIR HFA Your next dose is: Today, Tomorrow Other:  ____________ Dose:  2 Puff Take 2 Puffs by inhalation every six (6) hours as needed for Wheezing. Quantity:  1 Inhaler Refills:  0  
     
   
   
   
  
 cloNIDine HCl 0.1 mg tablet Commonly known as:  CATAPRES Your next dose is: Today, Tomorrow Other:  ____________ Dose:  0.1 mg Take 1 Tab by mouth every six (6) hours as needed (SBP > 180 mmHg). Quantity:  20 Tab Refills:  0  
     
   
   
   
  
 docusate sodium 100 mg capsule Commonly known as:  Melvenia Clipper Your next dose is: Today, Tomorrow Other:  ____________ Dose:  100 mg Take 1 Cap by mouth daily as needed for Constipation. Quantity:  30 Cap Refills:  0  
     
   
   
   
  
 * ATIVAN 0.5 mg tablet Generic drug:  LORazepam  
   
Your next dose is: Today, Tomorrow Other:  ____________ Dose:  0.5 mg Take 0.5 mg by mouth every six (6) hours as needed for Anxiety. Refills:  0  
     
   
   
   
  
 meclizine 25 mg tablet Commonly known as:  ANTIVERT Your next dose is: Today, Tomorrow Other:  ____________  Dose:  25 mg  
 Take 25 mg by mouth three (3) times daily as needed for Dizziness. Refills:  0  
     
   
   
   
  
 traMADol 50 mg tablet Commonly known as:  ULTRAM  
   
Your next dose is: Today, Tomorrow Other:  ____________ Dose:  50 mg Take 1 Tab by mouth every eight (8) hours as needed for Pain. Max Daily Amount: 150 mg.  
 Quantity:  30 Tab Refills:  1  
     
   
   
   
  
 * Notice: This list has 1 medication(s) that are the same as other medications prescribed for you. Read the directions carefully, and ask your doctor or other care provider to review them with you. Take these medications as directed Dose & Instructions Dispensing Information Comments Morning Noon Evening Bedtime QUEtiapine 25 mg tablet Commonly known as:  SEROquel Your next dose is: Today, Tomorrow Other:  ____________ TAKE 1 TABLET NIGHTLY Quantity:  90 Tab Refills:  1  
     
   
   
   
  
 * DETROL LA 2 mg ER capsule Generic drug:  tolterodine ER Your next dose is: Today, Tomorrow Other:  ____________ TAKE 1 CAPSULE DAILY FOR BLADDER FREQUENCY Quantity:  90 Cap Refills:  3  
     
   
   
   
  
 * Notice: This list has 1 medication(s) that are the same as other medications prescribed for you. Read the directions carefully, and ask your doctor or other care provider to review them with you. Where to Get Your Medications These medications were sent to [address], [address] [place] [address] Phone:  [phone] DETROL LA 2 mg ER capsule Information about where to get these medications is not yet available ! Ask your nurse or doctor about these medications  
  amoxicillin-clavulanate 875-125 mg per tablet

## 2017-02-11 NOTE — IP AVS SNAPSHOT
Summary of Care Report The Summary of Care report has been created to help improve care coordination. Users with access to [employer] or [address] (Web-based application) may access additional patient information including the Discharge Summary. If you are not currently a [address] user and need more information, please call the number listed below in the [place] [address] section and ask to be connected with Medical Records. Facility Information Name Address Phone [address] [facility] 7 [phone] [phone] Patient Information Patient Name Sex  Lanie Dial ([id number]) Male 1933 Discharge Information Admitting Provider Service Area Unit Martha Thomas MD / Yasmeen Arechiga [facility] Intensive Care / [phone] Discharge Provider Discharge Date/Time Discharge Disposition Destination (none) 2/15/2017 (Pending) DANIA (none) Patient Language Language ENGLISH [13] Problem List as of 2/15/2017  Date Reviewed: 2017 Codes Priority Class Noted - Resolved Hyperlipemia ICD-10-CM: E78.5 ICD-9-CM: 272.4   2010 - Present DU (duodenal ulcer) ICD-10-CM: K26.9 ICD-9-CM: 532.90   1990 - Present Reflux esophagitis ICD-10-CM: K21.0 ICD-9-CM: 530.11   2010 - Present DDD (degenerative disc disease), lumbar ICD-10-CM: M51.36 
ICD-9-CM: 722.52   2010 - Present DJD (degenerative joint disease) of cervical spine ICD-10-CM: U34.677 ICD-9-CM: 721.0   2010 - Present RESOLVED: Paralysis agitans ([address] ICD-10-CM: G20 
ICD-9-CM: 332.0   2010 - 10/28/2011 RESOLVED: Premature atrial beats ICD-10-CM: I49.1 ICD-9-CM: 427.61   Unknown - 2012 Parkinsonian syndrome- Dr. Martha Gustafson ICD-10-CM: G20 
ICD-9-CM: 332.0   2011 - Present Essential hypertension, difficult to control due to orthostatic hypotension ICD-10-CM: I10 
ICD-9-CM: 401.1   2011 - Present BPH (benign prostatic hyperplasia)- Dr. Steele Ksenia: N40.0 ICD-9-CM: 600.00   10/3/2011 - Present Sick sinus syndrome-pacemaker- ICD-10-CM: I49.5 ICD-9-CM: 427.81   10/28/2011 - Present RESOLVED: PAT (paroxysmal atrial tachycardia) (HCC) ICD-10-CM: I47.1 ICD-9-CM: 427.0   2012 - 2012 PAT (paroxysmal atrial tachycardia) (HCC) ICD-10-CM: I47.1 ICD-9-CM: 427.0   2012 - Present RESOLVED: Pneumonia, organism unspecified ICD-10-CM: J18.9 ICD-9-CM: 751   2012 - 2016 RESOLVED: Weakness generalized ICD-10-CM: R53.1 ICD-9-CM: 780.79   2012 - 2012 NSVT (nonsustained ventricular tachycardia) (HCC) ICD-10-CM: I47.2 ICD-9-CM: 427.1   2012 - Present Overview Signed 2012  9:56 AM by Betsy Blizzard, MD  
  2. 2. Blurry vision-chronic, no change- neg w/u ICD-10-CM: H53.8 ICD-9-CM: 368.8   2013 - Present Orthostatic hypotension ICD-10-CM: I95.1 ICD-9-CM: 458.0   2013 - Present Pacemaker ICD-10-CM: Z95.0 ICD-9-CM: V45.01   10/21/2013 - Present RESOLVED: Mental status change ICD-10-CM: R41.82 
ICD-9-CM: 780.97   10/30/2013 - 2016 Mild cognitive impairment w/o memory loss ICD-10-CM: G31.84 ICD-9-CM: 331.83   2014 - Present Major depressive disorder, single episode, unspecified ICD-10-CM: F32.9 ICD-9-CM: 296.20   2014 - Present Generalized anxiety disorder ICD-10-CM: F41.1 ICD-9-CM: 300.02   2014 - Present Rupture quadriceps tendon- bilat--2014- UNABLE TO WALK AFTER THAT. (Chronic) ICD-10-CM: Q24.626W 
ICD-9-CM: 844.8   2014 - Present Overactive bladder- Dr. Steele Ksenia: L29.89 ICD-9-CM: 596.51   2015 - Present Grief- wife of 61 years  2015 ICD-10-CM: F43.20 ICD-9-CM: 309.0   2015 - Present Primary osteoarthritis of both knees ICD-10-CM: M17.0 ICD-9-CM: 715.16   11/8/2015 - Present History of duodenal ulcer ICD-10-CM: Z87.19 ICD-9-CM: V12.79   9/1/1990 - Present RESOLVED: Catheter-associated urinary tract infection (HCC) ICD-10-CM: T83.511A, N39.0 ICD-9-CM: 996.64, 599.0   4/15/2016 - 4/18/2016 RESOLVED: Metabolic encephalopathy CGU-21-MV: G93.41 
ICD-9-CM: 348.31   4/15/2016 - 4/18/2016 RESOLVED: GABI (acute kidney injury) ([facility] ICD-10-CM: N17.9 ICD-9-CM: 584.9   4/15/2016 - 4/18/2016 RESOLVED: Hyponatremia ICD-10-CM: E87.1 ICD-9-CM: 276.1   4/15/2016 - 4/18/2016 RESOLVED: Sepsis ([facility] ICD-10-CM: A41.9 ICD-9-CM: 038.9, 995.91   6/10/2016 - 7/28/2016 Aneurysm of iliac artery (HCC)- left 3.9 cm 2016 CT scan- no change ICD-10-CM: I72.3 ICD-9-CM: 442.2   6/16/2016 - Present * (Principal)Pneumonia ICD-10-CM: J18.9 ICD-9-CM: 899   2/11/2017 - Present You are allergic to the following No active allergies Current Discharge Medication List  
  
START taking these medications Dose & Instructions Dispensing Information Comments  
 amoxicillin-clavulanate 875-125 mg per tablet Commonly known as:  AUGMENTIN Dose:  1 Tab Take 1 Tab by mouth every twelve (12) hours for 6 days. Quantity:  12 Tab Refills:  0 CONTINUE these medications which have CHANGED Dose & Instructions Dispensing Information Comments * pindolol 5 mg tablet Commonly known as:  VISKIN What changed:  Another medication with the same name was removed. Continue taking this medication, and follow the directions you see here. Dose:  5 mg Take 5 mg by mouth nightly. Refills:  0  
   
 * pindolol 5 mg tablet Commonly known as:  VISKIN What changed:  Another medication with the same name was removed. Continue taking this medication, and follow the directions you see here. Dose:  2.5 mg Take 2.5 mg by mouth daily (after lunch). Refills:  0 QUEtiapine 25 mg tablet Commonly known as:  SEROquel What changed:  Another medication with the same name was removed. Continue taking this medication, and follow the directions you see here. TAKE 1 TABLET NIGHTLY Quantity:  90 Tab Refills:  1  
   
 * tolterodine ER 2 mg ER capsule Commonly known as:  DETROL-LA What changed:  Another medication with the same name was added. Make sure you understand how and when to take each. Dose:  2 mg Take 1 Cap by mouth daily. For bladder frequency Quantity:  30 Cap Refills:  0  
   
 * DETROL LA 2 mg ER capsule Generic drug:  tolterodine ER What changed: You were already taking a medication with the same name, and this prescription was added. Make sure you understand how and when to take each. TAKE 1 CAPSULE DAILY FOR BLADDER FREQUENCY Quantity:  90 Cap Refills:  3  
   
 * Notice: This list has 4 medication(s) that are the same as other medications prescribed for you. Read the directions carefully, and ask your doctor or other care provider to review them with you. CONTINUE these medications which have NOT CHANGED Dose & Instructions Dispensing Information Comments  
 acetaminophen 500 mg tablet Commonly known as:  MAPAP EXTRA STRENGTH Dose:  500 mg Take 1 Tab by mouth every six (6) hours as needed for Pain. Quantity:  120 Tab Refills:  0  
   
 albuterol 90 mcg/actuation inhaler Commonly known as:  PROVENTIL HFA, VENTOLIN HFA, PROAIR HFA Dose:  2 Puff Take 2 Puffs by inhalation every six (6) hours as needed for Wheezing. Quantity:  1 Inhaler Refills:  0  
   
 aspirin 325 mg tablet Commonly known as:  ASPIRIN Dose:  325 mg Take 1 Tab by mouth two (2) times a day. Indications: PREVENTION OF TRANSIENT ISCHEMIC ATTACKS Quantity:  60 Tab Refills:  0  
   
 * ATIVAN 0.5 mg tablet Generic drug:  LORazepam  
 Dose:  0.5 mg Take 0.5 mg by mouth every six (6) hours as needed for Anxiety. Refills:  0  
   
 * LORazepam 0.5 mg tablet Commonly known as:  ATIVAN Dose:  0.5 mg Take 1 Tab by mouth nightly. Max Daily Amount: 0.5 mg.  
 Quantity:  90 Tab Refills:  1  
   
 carbidopa-levodopa  mg per tablet Commonly known as:  SINEMET Dose:  2 Tab Take 2 Tabs by mouth four (4) times daily. (This is given at 8:00, 11:00, 14:00, and 21:00). Quantity:  240 Tab Refills:  0  
   
 cloNIDine HCl 0.1 mg tablet Commonly known as:  CATAPRES Dose:  0.1 mg Take 1 Tab by mouth every six (6) hours as needed (SBP > 180 mmHg). Quantity:  20 Tab Refills:  0  
   
 cycloSPORINE 0.05 % ophthalmic emulsion Commonly known as:  RESTASIS Dose:  1 Drop Administer 1 Drop to both eyes two (2) times a day. Quantity:  60 Each Refills:  0  
   
 docusate sodium 100 mg capsule Commonly known as:  [place] Chris Dose:  100 mg Take 1 Cap by mouth daily as needed for Constipation. Quantity:  30 Cap Refills:  0  
   
 droxidopa 100 mg Cap capsule Commonly known as:  Roise Oats Dose:  100 mg Take 100 mg by mouth three (3) times daily. Indications: SYMPTOMATIC ORTHOSTATIC HYPOTENSION Quantity:  90 Tab Refills:  1  
   
 escitalopram oxalate 20 mg tablet Commonly known as:  Gradie Kil Dose:  20 mg Take 1 Tab by mouth daily. Quantity:  30 Tab Refills:  0  
   
 esomeprazole 40 mg capsule Commonly known as:  NexIUM Dose:  40 mg Take 1 Cap by mouth daily. Quantity:  30 Cap Refills:  0  
   
 meclizine 25 mg tablet Commonly known as:  ANTIVERT Dose:  25 mg Take 25 mg by mouth three (3) times daily as needed for Dizziness. Refills:  0  
   
 polyethylene glycol 17 gram packet Commonly known as:  Ana Valeri Dose:  17 g Take 1 Packet by mouth daily. Indications: CONSTIPATION Quantity:  30 Packet Refills:  0  
   
 rivastigmine 4.6 mg/24 hr patch Commonly known as:  EXELON Dose:  1 Patch 1 Patch by TransDERmal route daily. Quantity:  30 Patch Refills:  0  
   
 therapeutic multivitamin tablet Commonly known as:  [facility] Dose:  1 Tab Take 1 Tab by mouth daily. Quantity:  30 Tab Refills:  0  
   
 traMADol 50 mg tablet Commonly known as:  ULTRAM  
 Dose:  50 mg Take 1 Tab by mouth every eight (8) hours as needed for Pain. Max Daily Amount: 150 mg.  
 Quantity:  30 Tab Refills:  1  
   
 * Notice: This list has 2 medication(s) that are the same as other medications prescribed for you. Read the directions carefully, and ask your doctor or other care provider to review them with you. Current Immunizations Name Date Influenza High Dose Vaccine PF 10/27/2015 Influenza Vaccine Split 11/17/2011 Pneumococcal Vaccine (Unspecified Type) 4/20/2010 TB Skin Test (PPD) Intradermal 3/25/2013 TD Vaccine 5/3/2012 Follow-up Information Follow up With Details Comments Contact Info Lynette Welch MD In 1 week  [address] [facility] 
[phone] Discharge Instructions Discharge SNF/Rehab Instructions/LTAC  
 
 
PATIENT ID: Severo Leigh MRN: [id number] YOB: 1933 DATE OF ADMISSION: 2/11/2017  6:49 PM   
DATE OF DISCHARGE: 2/15/2017 PRIMARY CARE PROVIDER: Lynette Welch MD  
 
 
ATTENDING PHYSICIAN: Chilo Tyson MD 
DISCHARGING PROVIDER: Chilo Tyson MD    
To contact this individual call [phone] and ask the  to page. If unavailable ask to be transferred the Adult Hospitalist Department. CONSULTATIONS: IP CONSULT TO HOSPITALIST 
IP CONSULT TO INTENSIVIST 
 
PROCEDURES/SURGERIES: * No surgery found * [address] COURSE:  
Community Acquire Pneumonia 
-CXR 2/11 There is focal area of airspace disease right midlung and right base 
may represent pneumonia CXR 2/12 no interval change 
-Blood culture no growth 
-Sputum culture normal respiratory samuel/candida -On Vanc/Zosyn/LVQ, now Vanc discontinued 
-Continues to do fine on Zosyn/LVQ 
-Will discharge the patient today on Augmentin 
  
Acute hypoxic respiratory failure 
-Duonebs/ now on room air 
-looks much better 
-Appreciate Pulmonary 
  
S/p PM placement 
-Followed by Dr Nita Quiros note, PM needs replacement 
-Spoke to Dr Tirso Alfaro, outpatient changing of the pacemaker 
  
Hypotension 
-sec to above 
-resolved 
  
Dehydration 
-On IV fluids 
-resolved 
  
Anemia 
-follow work up 
  
Parkinson's disease 
-on home meds 
  
Generalized debility 
-PT/OT, would likely need snf. Reportedly according to the daughter the patient does not do fine when goes to SNF. Instead the family has been able to put some extra services in at [facility]. Will do 
  
Code status: DNR 
DVT prophylaxis: Lovenox 
  
Plan: Discharge today back to [facility]. Spoke to [employer] DISCHARGE DIAGNOSES / PLAN:   
 
1. Community Acquired Pneumonia PENDING TEST RESULTS:  
At the time of discharge the following test results are still pending: none FOLLOW UP APPOINTMENTS:   
Follow-up Information Follow up With Details Comments Contact Louise Aguilar MD In 1 week  [address] [facility] 
[phone] ADDITIONAL CARE RECOMMENDATIONS:  
Fall precautions Aspiration precautions DIET: Cardiac Diet OXYGEN / BiPAP SETTINGS: 2l of NC continuous, wean as tolerated ACTIVITY: Activity as tolerated DISCHARGE MEDICATIONS: 
 See Medication Reconciliation Form NOTIFY YOUR PHYSICIAN FOR ANY OF THE FOLLOWING:  
Fever over 101 degrees for 24 hours. Chest pain, shortness of breath, fever, chills, nausea, vomiting, diarrhea, change in mentation, falling, weakness, bleeding. Severe pain or pain not relieved by medications. Or, any other signs or symptoms that you may have questions about.  
 
DISPOSITION: 
  Home With: 
 OT  PT  HH  RN  
  
x SNF/Inpatient Rehab/LTAC  
 Independent/assisted living Hospice Other:  
 
 
PATIENT CONDITION AT DISCHARGE:  
 
Functional status Poor   
x Deconditioned Independent Cognition  
 x Lucid Forgetful Dementia Catheters/lines (plus indication) Vance PICC   
 PEG   
x Oxygen through nasal cannula 2l Code status Full code   
x DNR PHYSICAL EXAMINATION AT DISCHARGE: 
Please see progress note CHRONIC MEDICAL DIAGNOSES: 
Problem List as of 2/15/2017  Date Reviewed: 2017 Codes Class Noted - Resolved * (Principal)Pneumonia ICD-10-CM: J18.9 ICD-9-CM: 310  2017 - Present Aneurysm of iliac artery (HCC)- left 3.9 cm  CT scan- no change ICD-10-CM: I72.3 ICD-9-CM: 442.2  2016 - Present Grief- wife of 61 years  2015 ICD-10-CM: F43.20 ICD-9-CM: 309.0  2015 - Present Primary osteoarthritis of both knees ICD-10-CM: M17.0 ICD-9-CM: 715.16  2015 - Present Overactive bladder- Dr. Arabella Mott: E18.40 ICD-9-CM: 596.51  2015 - Present Rupture quadriceps tendon- bilat--2014- UNABLE TO WALK AFTER THAT. (Chronic) ICD-10-CM: V99.194D 
ICD-9-CM: 844.8  2014 - Present Mild cognitive impairment w/o memory loss ICD-10-CM: G31.84 ICD-9-CM: 331.83  2014 - Present Major depressive disorder, single episode, unspecified ICD-10-CM: F32.9 ICD-9-CM: 296.20  2014 - Present Generalized anxiety disorder ICD-10-CM: F41.1 ICD-9-CM: 300.02  2014 - Present Pacemaker ICD-10-CM: Z95.0 ICD-9-CM: V45.01  10/21/2013 - Present Orthostatic hypotension ICD-10-CM: I95.1 ICD-9-CM: 458.0  2013 - Present Blurry vision-chronic, no change- neg w/u ICD-10-CM: H53.8 ICD-9-CM: 368.8  2013 - Present NSVT (nonsustained ventricular tachycardia) (HCC) ICD-10-CM: I47.2 ICD-9-CM: 427.1  2012 - Present  Overview Signed 2012  9:56 AM by Bala Campbell MD  
 2. 2.2012 PAT (paroxysmal atrial tachycardia) (HCC) ICD-10-CM: I47.1 ICD-9-CM: 427.0  1/19/2012 - Present Sick sinus syndrome-pacemaker-2008 ICD-10-CM: I49.5 ICD-9-CM: 427.81  10/28/2011 - Present BPH (benign prostatic hyperplasia)- Dr. Madan Morrow: N40.0 ICD-9-CM: 600.00  10/3/2011 - Present Essential hypertension, difficult to control due to orthostatic hypotension ICD-10-CM: I10 
ICD-9-CM: 401.1  4/18/2011 - Present Parkinsonian syndrome- Dr. Singh Dacosta ICD-10-CM: G20 
ICD-9-CM: 332.0  4/8/2011 - Present Hyperlipemia ICD-10-CM: E78.5 ICD-9-CM: 272.4  2/19/2010 - Present Reflux esophagitis ICD-10-CM: K21.0 ICD-9-CM: 530.11  2/19/2010 - Present DDD (degenerative disc disease), lumbar ICD-10-CM: M51.36 
ICD-9-CM: 722.52  2/19/2010 - Present DJD (degenerative joint disease) of cervical spine ICD-10-CM: E75.699 ICD-9-CM: 721.0  2/19/2010 - Present DU (duodenal ulcer) ICD-10-CM: K26.9 ICD-9-CM: 532.90  9/1/1990 - Present History of duodenal ulcer ICD-10-CM: Z87.19 ICD-9-CM: V12.79  9/1/1990 - Present RESOLVED: Sepsis ([address] ICD-10-CM: A41.9 ICD-9-CM: 038.9, 995.91  6/10/2016 - 7/28/2016 RESOLVED: Catheter-associated urinary tract infection (HCC) ICD-10-CM: T83.511A, N39.0 ICD-9-CM: 996.64, 599.0  4/15/2016 - 4/18/2016 RESOLVED: Metabolic encephalopathy JQB-26-HM: G93.41 
ICD-9-CM: 348.31  4/15/2016 - 4/18/2016 RESOLVED: GABI (acute kidney injury) ([facility] ICD-10-CM: N17.9 ICD-9-CM: 584.9  4/15/2016 - 4/18/2016 RESOLVED: Hyponatremia ICD-10-CM: E87.1 ICD-9-CM: 276.1  4/15/2016 - 4/18/2016 RESOLVED: Mental status change ICD-10-CM: R41.82 
ICD-9-CM: 780.97  10/30/2013 - 1/5/2016 RESOLVED: Pneumonia, organism unspecified ICD-10-CM: J18.9 ICD-9-CM: 673  2/19/2012 - 1/5/2016 RESOLVED: Weakness generalized ICD-10-CM: R53.1 ICD-9-CM: 780.79  2/19/2012 - 2/22/2012 RESOLVED: PAT (paroxysmal atrial tachycardia) ([facility]) ICD-10-CM: I47.1 ICD-9-CM: 427.0  1/19/2012 - 2/29/2012 RESOLVED: Premature atrial beats ICD-10-CM: I49.1 ICD-9-CM: 427.61  Unknown - 2/29/2012 RESOLVED: Paralysis agitans ([address] ICD-10-CM: G20 
ICD-9-CM: 332.0  2/19/2010 - 10/28/2011 CDMP Checked:  
Yes x PROBLEM LIST Updated: 
Yes x Signed:  
Jodee Perea MD 
2/15/2017 
1:50 PM 
 
  
 
Chart Review Routing History Recipient Method Report Sent By Mark Suh MD  
Phone: [phone] In [facility] office visit [facility] w/hx Betsy Blizzard, MD [[id number]] 1/23/2012  8:52 AM 01/19/2012 Mitchell Hinds MD  
Phone: [phone] In [facility] office visit [facility] w/hx Betsy Blizzard, MD [[id number]] 1/23/2012  8:52 AM 01/19/2012 Richa Suh MD  
Phone: [phone] In [facility] Routed Tanja Up [[id number]] 2/27/2012  5:42 PM 02/27/2012 Richa Suh MD  
Phone: [phone] In [facility] Note Review Betsy Blizzard, MD [[id number]] 7/23/2012 10:11 PM 07/20/2012 Richa Suh MD  
Phone: [phone] In [facility] amb office visit [facility] w/hx Betsy Blizzard, MD [[id number]] 1/18/2013 11:44 AM 01/18/2013 Richa Suh MD  
Phone: [phone] In [facility] Note Review Betsy Blizzard, MD [[id number]] 3/2/2013  6:55 AM 02/15/2013 Rito Vann MD  
Phone: [phone] In [facility] Note Review Betsy Blizzard, MD [[id number]] 3/2/2013  6:55 AM 02/15/2013 Richa Suh MD  
Phone: [phone] In Basket Note Review Betsy Blizzard, MD [[id number]] 9/30/2013  6:37 PM 09/30/2013 Rito Vann MD  
Phone: [phone] In Basket Note Review Betsy Blizzard, MD [[id number]] 9/30/2013  6:37 PM 09/30/2013 Cholo Hager MD  
Phone: [phone] In Basket [facility] amb office visit [facility] w/hx Betsy Blizzard, MD [[id number]] 9/30/2013  6:38 PM 09/30/2013 Richa Suh MD  
Phone: [phone] In Basket [facility] amb office visit [facility] w/hx Hassel Collet, MD [[id number]] 10/22/2013  8:44 PM 10/21/2013 Ziggy Ge MD  
Phone: [phone] In [facility] amb office visit [facility] w/hx Hassel Collet, MD [[id number]] 10/22/2013  8:44 PM 10/21/2013 Pamela Garcia MD  
Phone: [phone] In Basket Note Review Hassel Collet, MD [[id number]] 12/2/2013  9:25 PM 12/02/2013 Sugey Park MD  
Phone: [phone] In Basket Note Review Hassel Collet, MD [[id number]] 12/2/2013  9:25 PM 12/02/2013 Pamela Garcia MD  
Phone: [phone] In Basket Note Review Hassel Collet, MD [[id number]] 1/10/2014 11:18 AM 01/10/2014 Pamela Garcia MD  
Phone: [phone] In Basket [facility] amb office visit [facility] w/hx Hassel Collet, MD [[id number]] 9/2/2015  2:46 PM 09/02/2015 Latasha Mcmahan MD  
Phone: [phone] In [facility] amb office visit [facility] w/hx Hassel Collet, MD [[id number]] 9/2/2015  2:47 PM 09/02/2015 Pamela Garcia MD  
Phone: [phone] In Basket IP Auto Routed Notes Carmen Almazan MD [[id number]] 4/15/2016 12:35 PM 04/15/2016 Pamela Garcia MD  
Phone: [phone] In Basket IP Auto Routed Notes Carmen Almazan MD [[id number]] 4/15/2016 12:36 PM 04/15/2016 Pamela Garcia MD  
Phone: [phone] In Basket IP Auto Routed Notes Sapphire Quiroz MD [[id number]] 4/18/2016  8:35 AM 04/18/2016 Pamela Garcia MD  
Phone: [phone] In Basket IP Auto Routed Notes Sapphire Quiroz MD [[id number]] 4/19/2016  8:45 AM 04/19/2016 Pamela Garcia MD  
Phone: [phone] In Basket IP Auto Routed Notes Sapphire Quiroz MD [[id number]] 4/19/2016  8:47 AM 04/19/2016 Pamela Garcia MD  
Phone: [phone] In Basket IP Auto Routed [employer], MD [[id number]] 4/20/2016  8:06 AM 04/20/2016 Pamela Garcia MD  
Phone: [phone] In Basket Note Review Hassel Collet, MD [[id number]] 5/25/2016  9:54 PM 05/20/2016 Pamela Garcia MD  
Phone: [phone]  In [employer] Routed MD Harvey [[id number]] 6/10/2016 10:43 PM 06/10/2016 Ji Hdz MD  
Phone: [phone] In Basket IP Auto Routed Notes Kerri Bruce MD [[id number]] 6/17/2016  9:42 AM 06/17/2016 Ji Hdz MD  
Phone: [phone] In Basket IP Auto Routed Notes Rey Gonzalez MD [[id number]] 7/26/2016  2:51 AM 07/26/2016 Ji Hdz MD  
Phone: [phone] In Basket IP Auto Routed Notes Wende Dakin, MD [[id number]] 8/1/2016 11:12 AM 08/01/2016 Ji Hdz MD  
Phone: [phone] In Basket Notes Report Darinel Stephens MD [[id number]] 9/9/2016  9:50 PM 9/9/2016 Lissette Gaytan MD  
Fax: [phone] Phone: [phone] Fax Notes Report Darinel Stephens MD [[id number]] 9/9/2016  9:50 PM 9/9/2016 Ji Hdz MD  
Phone: [phone] In Basket IP Auto Routed Notes Carmelina Lemons MD [[id number]] 2/11/2017 10:09 PM 02/11/2017 Ji Hdz MD  
Phone: [phone] In [employer] IP Auto Routed Eleni Yancey MD [[id number]] 2/12/2017  5:14 AM 02/12/2017 Ji Hdz MD  
Phone: [phone] In [employer] IP Auto Routed Eleni Yancey MD [[id number]] 2/12/2017  5:14 AM 02/12/2017 Ji Hdz MD  
Phone: [phone] In Basket IP Auto Routed Notes Vonda Dockery MD [[id number]] 2/15/2017  1:53 PM 02/15/2017 Ji Hdz MD  
Phone: [phone] In Basket IP Auto Routed Notes Vonda Dockery MD [[id number]] 2/15/2017  2:21 PM 02/15/2017

## 2017-02-11 NOTE — IP AVS SNAPSHOT
[facility] [address] [place] [address] 
[phone] Patient: Angeline Bergeron MRN: [id number] HNW:[id number]/[id number] You are allergic to the following No active allergies Recent Documentation Height Weight BMI Smoking Status 1.88 m 99.6 kg 28.19 kg/m2 Former Smoker Unresulted Labs Order Current Status CULTURE, BLOOD, PAIRED Preliminary result Emergency Contacts Name Discharge Info Relation Home Work Mobile Quin Lang  Child [2] [phone] About your hospitalization You were admitted on:  February 11, 2017 You last received care in the:  [facility] You were discharged on:  February 15, 2017 Unit phone number:  [phone] Why you were hospitalized Your primary diagnosis was:  Pneumonia Providers Seen During Your Hospitalizations Provider Role Specialty Primary office phone Sandra Disla. Donya Rene MD Attending Provider Emergency Medicine [phone] Carlie Barcenas MD Attending Provider [facility] [phone] Malou Tovar MD Attending Provider Internal Medicine [phone] Sabina Haddad MD Attending Provider Internal Medicine [phone] Your Primary Care Physician (PCP) Primary Care Physician Office Phone Office Fax MICHEL Trivedi [phone] ** None ** Follow-up Information Follow up With Details Comments Contact Info Jose Juan Magaña MD In 1 week  [address] [facility] 
[phone] Your Appointments Friday February 24, 2017  3:30 PM EST  
ROUTINE CARE with Jose Juan Magaña MD  
[address] [place] [address]) [address] [place] [address]  
[phone] Monday March 27, 2017  9:30 AM EDT  
REMOTE OFFICE VISIT with Rashad Griffiths [facility] ([facility] SCHEDULING) [address]  [address] [facility]  
[phone] Current Discharge Medication List  
  
START taking these medications Dose & Instructions Dispensing Information Comments Morning Noon Evening Bedtime  
 amoxicillin-clavulanate 875-125 mg per tablet Commonly known as:  AUGMENTIN Your next dose is: Today, Tomorrow Other:  _________ Dose:  1 Tab Take 1 Tab by mouth every twelve (12) hours for 6 days. Quantity:  12 Tab Refills:  0 CONTINUE these medications which have CHANGED Dose & Instructions Dispensing Information Comments Morning Noon Evening Bedtime * pindolol 5 mg tablet Commonly known as:  VISKIN What changed:  Another medication with the same name was removed. Continue taking this medication, and follow the directions you see here. Your next dose is: Today, Tomorrow Other:  _________ Dose:  5 mg Take 5 mg by mouth nightly. Refills:  0  
     
   
   
   
  
 * pindolol 5 mg tablet Commonly known as:  VISKIN What changed:  Another medication with the same name was removed. Continue taking this medication, and follow the directions you see here. Your next dose is: Today, Tomorrow Other:  _________ Dose:  2.5 mg Take 2.5 mg by mouth daily (after lunch). Refills:  0 QUEtiapine 25 mg tablet Commonly known as:  SEROquel What changed:  Another medication with the same name was removed. Continue taking this medication, and follow the directions you see here. Your next dose is: Today, Tomorrow Other:  _________ TAKE 1 TABLET NIGHTLY Quantity:  90 Tab Refills:  1  
     
   
   
   
  
 * tolterodine ER 2 mg ER capsule Commonly known as:  DETROL-LA What changed:  Another medication with the same name was added. Make sure you understand how and when to take each. Your next dose is: Today, Tomorrow Other:  _________ Dose:  2 mg Take 1 Cap by mouth daily. For bladder frequency Quantity:  30 Cap Refills:  0  
     
   
   
   
  
 * DETROL LA 2 mg ER capsule Generic drug:  tolterodine ER What changed: You were already taking a medication with the same name, and this prescription was added. Make sure you understand how and when to take each. Your next dose is: Today, Tomorrow Other:  _________ TAKE 1 CAPSULE DAILY FOR BLADDER FREQUENCY Quantity:  90 Cap Refills:  3  
     
   
   
   
  
 * Notice: This list has 4 medication(s) that are the same as other medications prescribed for you. Read the directions carefully, and ask your doctor or other care provider to review them with you. CONTINUE these medications which have NOT CHANGED Dose & Instructions Dispensing Information Comments Morning Noon Evening Bedtime  
 acetaminophen 500 mg tablet Commonly known as:  MAPAP EXTRA STRENGTH Your next dose is: Today, Tomorrow Other:  _________ Dose:  500 mg Take 1 Tab by mouth every six (6) hours as needed for Pain. Quantity:  120 Tab Refills:  0  
     
   
   
   
  
 albuterol 90 mcg/actuation inhaler Commonly known as:  PROVENTIL HFA, VENTOLIN HFA, PROAIR HFA Your next dose is: Today, Tomorrow Other:  _________ Dose:  2 Puff Take 2 Puffs by inhalation every six (6) hours as needed for Wheezing. Quantity:  1 Inhaler Refills:  0  
     
   
   
   
  
 aspirin 325 mg tablet Commonly known as:  ASPIRIN Your next dose is: Today, Tomorrow Other:  _________ Dose:  325 mg Take 1 Tab by mouth two (2) times a day. Indications: PREVENTION OF TRANSIENT ISCHEMIC ATTACKS Quantity:  60 Tab Refills:  0  
     
   
   
   
  
 * ATIVAN 0.5 mg tablet Generic drug:  LORazepam  
   
Your next dose is: Today, Tomorrow Other:  _________  Dose:  0.5 mg  
 Take 0.5 mg by mouth every six (6) hours as needed for Anxiety. Refills:  0  
     
   
   
   
  
 * LORazepam 0.5 mg tablet Commonly known as:  ATIVAN Your next dose is: Today, Tomorrow Other:  _________ Dose:  0.5 mg Take 1 Tab by mouth nightly. Max Daily Amount: 0.5 mg.  
 Quantity:  90 Tab Refills:  1  
     
   
   
   
  
 carbidopa-levodopa  mg per tablet Commonly known as:  SINEMET Your next dose is: Today, Tomorrow Other:  _________ Dose:  2 Tab Take 2 Tabs by mouth four (4) times daily. (This is given at 8:00, 11:00, 14:00, and 21:00). Quantity:  240 Tab Refills:  0  
     
   
   
   
  
 cloNIDine HCl 0.1 mg tablet Commonly known as:  CATAPRES Your next dose is: Today, Tomorrow Other:  _________ Dose:  0.1 mg Take 1 Tab by mouth every six (6) hours as needed (SBP > 180 mmHg). Quantity:  20 Tab Refills:  0  
     
   
   
   
  
 cycloSPORINE 0.05 % ophthalmic emulsion Commonly known as:  RESTASIS Your next dose is: Today, Tomorrow Other:  _________ Dose:  1 Drop Administer 1 Drop to both eyes two (2) times a day. Quantity:  60 Each Refills:  0  
     
   
   
   
  
 docusate sodium 100 mg capsule Commonly known as:  Abel Chris Your next dose is: Today, Tomorrow Other:  _________ Dose:  100 mg Take 1 Cap by mouth daily as needed for Constipation. Quantity:  30 Cap Refills:  0  
     
   
   
   
  
 droxidopa 100 mg Cap capsule Commonly known as:  Roise Oats Your next dose is: Today, Tomorrow Other:  _________ Dose:  100 mg Take 100 mg by mouth three (3) times daily. Indications: SYMPTOMATIC ORTHOSTATIC HYPOTENSION Quantity:  90 Tab Refills:  1  
     
   
   
   
  
 escitalopram oxalate 20 mg tablet Commonly known as:  Gradie Kil Your next dose is: Today, Tomorrow Other:  _________ Dose:  20 mg Take 1 Tab by mouth daily. Quantity:  30 Tab Refills:  0  
     
   
   
   
  
 esomeprazole 40 mg capsule Commonly known as:  NexIUM Your next dose is: Today, Tomorrow Other:  _________ Dose:  40 mg Take 1 Cap by mouth daily. Quantity:  30 Cap Refills:  0  
     
   
   
   
  
 meclizine 25 mg tablet Commonly known as:  ANTIVERT Your next dose is: Today, Tomorrow Other:  _________ Dose:  25 mg Take 25 mg by mouth three (3) times daily as needed for Dizziness. Refills:  0  
     
   
   
   
  
 polyethylene glycol 17 gram packet Commonly known as:  Reda [place] Your next dose is: Today, Tomorrow Other:  _________ Dose:  17 g Take 1 Packet by mouth daily. Indications: CONSTIPATION Quantity:  30 Packet Refills:  0  
     
   
   
   
  
 rivastigmine 4.6 mg/24 hr patch Commonly known as:  EXELON Your next dose is: Today, Tomorrow Other:  _________ Dose:  1 Patch 1 Patch by TransDERmal route daily. Quantity:  30 Patch Refills:  0  
     
   
   
   
  
 therapeutic multivitamin tablet Commonly known as:  [facility] Your next dose is: Today, Tomorrow Other:  _________ Dose:  1 Tab Take 1 Tab by mouth daily. Quantity:  30 Tab Refills:  0  
     
   
   
   
  
 traMADol 50 mg tablet Commonly known as:  ULTRAM  
   
Your next dose is: Today, Tomorrow Other:  _________ Dose:  50 mg Take 1 Tab by mouth every eight (8) hours as needed for Pain. Max Daily Amount: 150 mg.  
 Quantity:  30 Tab Refills:  1  
     
   
   
   
  
 * Notice: This list has 2 medication(s) that are the same as other medications prescribed for you. Read the directions carefully, and ask your doctor or other care provider to review them with you. Where to Get Your Medications These medications were sent to [address], [address] [id number] Phone:  [phone] DETROL LA 2 mg ER capsule Information on where to get these meds will be given to you by the nurse or doctor. ! Ask your nurse or doctor about these medications  
  amoxicillin-clavulanate 875-125 mg per tablet Discharge Instructions Discharge SNF/Rehab Instructions/LTAC  
 
 
PATIENT ID: Shoaib Santos MRN: [id number] YOB: 1933 DATE OF ADMISSION: 2/11/2017  6:49 PM   
DATE OF DISCHARGE: 2/15/2017 PRIMARY CARE PROVIDER: Chanelle Crawford MD  
 
 
ATTENDING PHYSICIAN: Yordan Nicholas MD 
DISCHARGING PROVIDER: Yordan Nicholas MD    
To contact this individual call [phone] and ask the  to page. If unavailable ask to be transferred the Adult Hospitalist Department. CONSULTATIONS: IP CONSULT TO HOSPITALIST 
IP CONSULT TO INTENSIVIST 
 
PROCEDURES/SURGERIES: * No surgery found * [address] COURSE:  
Community Acquire Pneumonia 
-CXR 2/11 There is focal area of airspace disease right midlung and right base 
may represent pneumonia CXR 2/12 no interval change 
-Blood culture no growth 
-Sputum culture normal respiratory samuel/candida 
-On Vanc/Zosyn/LVQ, now Vanc discontinued 
-Continues to do fine on Zosyn/LVQ 
-Will discharge the patient today on Augmentin 
  
Acute hypoxic respiratory failure 
-Duonebs/ now on room air 
-looks much better 
-Appreciate Pulmonary 
  
S/p PM placement 
-Followed by Dr Nancy Charlton note, PM needs replacement 
-Spoke to Dr Salina Sellers, outpatient changing of the pacemaker 
  
Hypotension 
-sec to above 
-resolved 
  
Dehydration 
-On IV fluids 
-resolved 
  
Anemia 
-follow work up 
  
Parkinson's disease 
-on home meds 
  
Generalized debility 
-PT/OT, would likely need snf.  Reportedly according to the daughter the patient does not do fine when goes to SNF. Instead the family has been able to put some extra services in at [facility]. Will do 
  
Code status: DNR 
DVT prophylaxis: Lovenox 
  
Plan: Discharge today back to [facility]. Spoke to [email or web address] [facility] DISCHARGE DIAGNOSES / PLAN:   
 
1. Community Acquired Pneumonia PENDING TEST RESULTS:  
At the time of discharge the following test results are still pending: none FOLLOW UP APPOINTMENTS:   
Follow-up Information Follow up With Details Comments Contact Info Chico Infante MD In 1 week  [address] 
[phone] ADDITIONAL CARE RECOMMENDATIONS:  
Fall precautions Aspiration precautions DIET: Cardiac Diet OXYGEN / BiPAP SETTINGS: 2l of NC continuous, wean as tolerated ACTIVITY: Activity as tolerated DISCHARGE MEDICATIONS: 
 See Medication Reconciliation Form NOTIFY YOUR PHYSICIAN FOR ANY OF THE FOLLOWING:  
Fever over 101 degrees for 24 hours. Chest pain, shortness of breath, fever, chills, nausea, vomiting, diarrhea, change in mentation, falling, weakness, bleeding. Severe pain or pain not relieved by medications. Or, any other signs or symptoms that you may have questions about. DISPOSITION: 
  Home With: 
 OT  PT  HH  RN  
  
x SNF/Inpatient Rehab/LTAC Independent/assisted living Hospice Other:  
 
 
PATIENT CONDITION AT DISCHARGE:  
 
Functional status Poor   
x Deconditioned Independent Cognition  
 x Lucid Forgetful Dementia Catheters/lines (plus indication) Vance PICC   
 PEG   
x Oxygen through nasal cannula 2l Code status Full code   
x DNR PHYSICAL EXAMINATION AT DISCHARGE: 
Please see progress note CHRONIC MEDICAL DIAGNOSES: 
Problem List as of 2/15/2017  Date Reviewed: 2/11/2017 Codes Class Noted - Resolved * (Principal)Pneumonia ICD-10-CM: J18.9 ICD-9-CM: 614  2/11/2017 - Present Aneurysm of iliac artery (HCC)- left 3.9 cm  CT scan- no change ICD-10-CM: I72.3 ICD-9-CM: 442.2  2016 - Present Grief- wife of 61 years  2015 ICD-10-CM: F43.20 ICD-9-CM: 309.0  2015 - Present Primary osteoarthritis of both knees ICD-10-CM: M17.0 ICD-9-CM: 715.16  2015 - Present Overactive bladder- Dr. Moraes [place]: A08.44 ICD-9-CM: 596.51  2015 - Present Rupture quadriceps tendon- bilat--2014- UNABLE TO WALK AFTER THAT. (Chronic) ICD-10-CM: Q70.944H 
ICD-9-CM: 844.8  2014 - Present Mild cognitive impairment w/o memory loss ICD-10-CM: G31.84 ICD-9-CM: 331.83  2014 - Present Major depressive disorder, single episode, unspecified ICD-10-CM: F32.9 ICD-9-CM: 296.20  2014 - Present Generalized anxiety disorder ICD-10-CM: F41.1 ICD-9-CM: 300.02  2014 - Present Pacemaker ICD-10-CM: Z95.0 ICD-9-CM: V45.01  10/21/2013 - Present Orthostatic hypotension ICD-10-CM: I95.1 ICD-9-CM: 458.0  2013 - Present Blurry vision-chronic, no change- neg w/u ICD-10-CM: H53.8 ICD-9-CM: 368.8  2013 - Present NSVT (nonsustained ventricular tachycardia) (HCC) ICD-10-CM: I47.2 ICD-9-CM: 427.1  2012 - Present Overview Signed 2012  9:56 AM by Zack Trejo MD  
  2. 2. PAT (paroxysmal atrial tachycardia) (HCC) ICD-10-CM: I47.1 ICD-9-CM: 427.0  2012 - Present Sick sinus syndrome-pacemaker- ICD-10-CM: I49.5 ICD-9-CM: 427.81  10/28/2011 - Present BPH (benign prostatic hyperplasia)- Dr. Moraes [place]: N40.0 ICD-9-CM: 600.00  10/3/2011 - Present Essential hypertension, difficult to control due to orthostatic hypotension ICD-10-CM: I10 
ICD-9-CM: 401.1  2011 - Present Parkinsonian syndrome- Dr. Norah Estrella ICD-10-CM: G20 
ICD-9-CM: 332.0  2011 - Present Hyperlipemia ICD-10-CM: E78.5 ICD-9-CM: 272.4  2/19/2010 - Present Reflux esophagitis ICD-10-CM: K21.0 ICD-9-CM: 530.11  2/19/2010 - Present DDD (degenerative disc disease), lumbar ICD-10-CM: M51.36 
ICD-9-CM: 722.52  2/19/2010 - Present DJD (degenerative joint disease) of cervical spine ICD-10-CM: S04.469 ICD-9-CM: 721.0  2/19/2010 - Present DU (duodenal ulcer) ICD-10-CM: K26.9 ICD-9-CM: 532.90  9/1/1990 - Present History of duodenal ulcer ICD-10-CM: Z87.19 ICD-9-CM: V12.79  9/1/1990 - Present RESOLVED: Sepsis ([facility] ICD-10-CM: A41.9 ICD-9-CM: 038.9, 995.91  6/10/2016 - 7/28/2016 RESOLVED: Catheter-associated urinary tract infection (HCC) ICD-10-CM: T83.511A, N39.0 ICD-9-CM: 996.64, 599.0  4/15/2016 - 4/18/2016 RESOLVED: Metabolic encephalopathy [facility]-24-WM: G93.41 
ICD-9-CM: 348.31  4/15/2016 - 4/18/2016 RESOLVED: GABI (acute kidney injury) ([facility].) ICD-10-CM: N17.9 ICD-9-CM: 584.9  4/15/2016 - 4/18/2016 RESOLVED: Hyponatremia ICD-10-CM: E87.1 ICD-9-CM: 276.1  4/15/2016 - 4/18/2016 RESOLVED: Mental status change ICD-10-CM: R41.82 
ICD-9-CM: 780.97  10/30/2013 - 1/5/2016 RESOLVED: Pneumonia, organism unspecified ICD-10-CM: J18.9 ICD-9-CM: 314  2/19/2012 - 1/5/2016 RESOLVED: Weakness generalized ICD-10-CM: R53.1 ICD-9-CM: 780.79  2/19/2012 - 2/22/2012 RESOLVED: PAT (paroxysmal atrial tachycardia) (HCC) ICD-10-CM: I47.1 ICD-9-CM: 427.0  1/19/2012 - 2/29/2012 RESOLVED: Premature atrial beats ICD-10-CM: I49.1 ICD-9-CM: 427.61  Unknown - 2/29/2012 RESOLVED: Paralysis agitans ([address] ICD-10-CM: G20 
ICD-9-CM: 332.0  2/19/2010 - 10/28/2011 CDMP Checked:  
Yes x PROBLEM LIST Updated: 
Yes x Signed:  
Yordan Nicholas MD 
2/15/2017 
1:50 PM 
 
  
 
Discharge Orders None MyChart Announcement  We are excited to announce that we are making your provider's discharge notes available to you in [employer]. You will see these notes when they are completed and signed by the physician that discharged you from your recent hospital stay. If you have any questions or concerns about any information you see in [employer], please call the Health Information Department where you were seen or reach out to your Primary Care Provider for more information about your plan of care. Introducing [facility]! Dear Kaila Ennis: Thank you for requesting a [employer] account. Our records indicate that you already have an active [employer] account. You can access your account anytime at [email or web address]. [email or web address] Did you know that you can access your hospital and ER discharge instructions at any time in [employer]? You can also review all of your test results from your hospital stay or ER visit. Additional Information If you have questions, please visit the Frequently Asked Questions section of the [employer] website at [email or web address] Remember, [employer] is NOT to be used for urgent needs. For medical emergencies, dial 911. Now available from your iPhone and Android! General Information Please provide this summary of care documentation to your next provider. Patient Signature:  ____________________________________________________________ Date:  ____________________________________________________________  
  
Belinda Vance Provider Signature:  ____________________________________________________________ Date:  ____________________________________________________________

## 2017-02-12 ENCOUNTER — APPOINTMENT (OUTPATIENT)
Dept: GENERAL RADIOLOGY | Age: 82
DRG: 193 | End: 2017-02-12
Attending: FAMILY MEDICINE
Payer: MEDICARE

## 2017-02-12 LAB
ANION GAP BLD CALC-SCNC: 13 MMOL/L (ref 5–15)
ARTERIAL PATENCY WRIST A: YES
ATRIAL RATE: 68 BPM
BASE DEFICIT BLD-SCNC: 3 MMOL/L
BASE EXCESS BLD CALC-SCNC: 1 MMOL/L
BASE EXCESS BLDV CALC-SCNC: 1 MMOL/L
BASOPHILS # BLD AUTO: 0 K/UL (ref 0–0.1)
BASOPHILS # BLD: 0 % (ref 0–1)
BDY SITE: ABNORMAL
BNP SERPL-MCNC: 785 PG/ML (ref 0–100)
BUN SERPL-MCNC: 29 MG/DL (ref 6–20)
BUN/CREAT SERPL: 25 (ref 12–20)
CALCIUM SERPL-MCNC: 8.3 MG/DL (ref 8.5–10.1)
CALCULATED R AXIS, ECG10: -66 DEGREES
CALCULATED T AXIS, ECG11: 71 DEGREES
CHLORIDE SERPL-SCNC: 102 MMOL/L (ref 97–108)
CO2 SERPL-SCNC: 20 MMOL/L (ref 21–32)
CREAT SERPL-MCNC: 1.18 MG/DL (ref 0.7–1.3)
DIAGNOSIS, 93000: NORMAL
DIFFERENTIAL METHOD BLD: ABNORMAL
EOSINOPHIL # BLD: 0 K/UL (ref 0–0.4)
EOSINOPHIL NFR BLD: 0 % (ref 0–7)
ERYTHROCYTE [DISTWIDTH] IN BLOOD BY AUTOMATED COUNT: 14.7 % (ref 11.5–14.5)
GAS FLOW.O2 O2 DELIVERY SYS: ABNORMAL L/MIN
GAS FLOW.O2 SETTING OXYMISER: 6 L/M
GAS FLOW.O2 SETTING OXYMISER: 6 L/M
GLUCOSE SERPL-MCNC: 113 MG/DL (ref 65–100)
HCO3 BLD-SCNC: 20.8 MMOL/L (ref 22–26)
HCO3 BLD-SCNC: 23.3 MMOL/L (ref 22–26)
HCO3 BLDV-SCNC: 25.2 MMOL/L (ref 23–28)
HCT VFR BLD AUTO: 30.5 % (ref 36.6–50.3)
HGB BLD-MCNC: 9.9 G/DL (ref 12.1–17)
LYMPHOCYTES # BLD AUTO: 7 % (ref 12–49)
LYMPHOCYTES # BLD: 0.7 K/UL (ref 0.8–3.5)
MCH RBC QN AUTO: 29.6 PG (ref 26–34)
MCHC RBC AUTO-ENTMCNC: 32.5 G/DL (ref 30–36.5)
MCV RBC AUTO: 91.3 FL (ref 80–99)
MONOCYTES # BLD: 0.7 K/UL (ref 0–1)
MONOCYTES NFR BLD AUTO: 7 % (ref 5–13)
NEUTS SEG # BLD: 8.4 K/UL (ref 1.8–8)
NEUTS SEG NFR BLD AUTO: 86 % (ref 32–75)
O2/TOTAL GAS SETTING VFR VENT: 44 %
O2/TOTAL GAS SETTING VFR VENT: 44 %
O2/TOTAL GAS SETTING VFR VENT: 50 %
PCO2 BLD: 28.3 MMHG (ref 35–45)
PCO2 BLD: 30.8 MMHG (ref 35–45)
PCO2 BLDV: 37.7 MMHG (ref 41–51)
PEEP RESPIRATORY: 7 CMH2O
PH BLD: 7.44 [PH] (ref 7.35–7.45)
PH BLD: 7.53 [PH] (ref 7.35–7.45)
PH BLDV: 7.43 [PH] (ref 7.32–7.42)
PIP ISTAT,IPIP: 14
PLATELET # BLD AUTO: 157 K/UL (ref 150–400)
PO2 BLD: 197 MMHG (ref 80–100)
PO2 BLDV: 39 MMHG (ref 25–40)
POTASSIUM SERPL-SCNC: 4.4 MMOL/L (ref 3.5–5.1)
PRESSURE SUPPORT SETTING VENT: 7 CMH2O
Q-T INTERVAL, ECG07: 484 MS
QRS DURATION, ECG06: 184 MS
QTC CALCULATION (BEZET), ECG08: 503 MS
RBC # BLD AUTO: 3.34 M/UL (ref 4.1–5.7)
RBC MORPH BLD: ABNORMAL
SAO2 % BLD: 100 % (ref 92–97)
SAO2 % BLDV: 75 % (ref 65–88)
SODIUM SERPL-SCNC: 135 MMOL/L (ref 136–145)
SPECIMEN TYPE: ABNORMAL
TOTAL RESP. RATE, ITRR: 19
TOTAL RESP. RATE, ITRR: 20
TOTAL RESP. RATE, ITRR: 20
VENTRICULAR RATE, ECG03: 65 BPM
WBC # BLD AUTO: 9.8 K/UL (ref 4.1–11.1)

## 2017-02-12 PROCEDURE — 74011000250 HC RX REV CODE- 250: Performed by: FAMILY MEDICINE

## 2017-02-12 PROCEDURE — 74011250637 HC RX REV CODE- 250/637: Performed by: FAMILY MEDICINE

## 2017-02-12 PROCEDURE — 80048 BASIC METABOLIC PNL TOTAL CA: CPT | Performed by: FAMILY MEDICINE

## 2017-02-12 PROCEDURE — 74011000250 HC RX REV CODE- 250: Performed by: INTERNAL MEDICINE

## 2017-02-12 PROCEDURE — 36600 WITHDRAWAL OF ARTERIAL BLOOD: CPT

## 2017-02-12 PROCEDURE — 74011000258 HC RX REV CODE- 258: Performed by: FAMILY MEDICINE

## 2017-02-12 PROCEDURE — 94640 AIRWAY INHALATION TREATMENT: CPT

## 2017-02-12 PROCEDURE — 83880 ASSAY OF NATRIURETIC PEPTIDE: CPT | Performed by: FAMILY MEDICINE

## 2017-02-12 PROCEDURE — 77030029684 HC NEB SM VOL KT MONA -A

## 2017-02-12 PROCEDURE — 82803 BLOOD GASES ANY COMBINATION: CPT

## 2017-02-12 PROCEDURE — 74011250636 HC RX REV CODE- 250/636: Performed by: INTERNAL MEDICINE

## 2017-02-12 PROCEDURE — 77030027138 HC INCENT SPIROMETER -A

## 2017-02-12 PROCEDURE — 71010 XR CHEST PORT: CPT

## 2017-02-12 PROCEDURE — 77010033678 HC OXYGEN DAILY

## 2017-02-12 PROCEDURE — 74011250636 HC RX REV CODE- 250/636: Performed by: FAMILY MEDICINE

## 2017-02-12 PROCEDURE — 85025 COMPLETE CBC W/AUTO DIFF WBC: CPT | Performed by: FAMILY MEDICINE

## 2017-02-12 PROCEDURE — 65610000006 HC RM INTENSIVE CARE

## 2017-02-12 PROCEDURE — 74011250637 HC RX REV CODE- 250/637: Performed by: INTERNAL MEDICINE

## 2017-02-12 PROCEDURE — 36415 COLL VENOUS BLD VENIPUNCTURE: CPT | Performed by: FAMILY MEDICINE

## 2017-02-12 RX ORDER — ENOXAPARIN SODIUM 100 MG/ML
40 INJECTION SUBCUTANEOUS EVERY 24 HOURS
Status: DISCONTINUED | OUTPATIENT
Start: 2017-02-12 | End: 2017-02-15 | Stop reason: HOSPADM

## 2017-02-12 RX ORDER — FUROSEMIDE 10 MG/ML
40 INJECTION INTRAMUSCULAR; INTRAVENOUS ONCE
Status: COMPLETED | OUTPATIENT
Start: 2017-02-12 | End: 2017-02-12

## 2017-02-12 RX ORDER — LEVOFLOXACIN 5 MG/ML
750 INJECTION, SOLUTION INTRAVENOUS EVERY 24 HOURS
Status: DISCONTINUED | OUTPATIENT
Start: 2017-02-12 | End: 2017-02-15 | Stop reason: HOSPADM

## 2017-02-12 RX ORDER — LABETALOL HYDROCHLORIDE 5 MG/ML
20 INJECTION, SOLUTION INTRAVENOUS ONCE
Status: COMPLETED | OUTPATIENT
Start: 2017-02-12 | End: 2017-02-12

## 2017-02-12 RX ORDER — ACETAMINOPHEN 325 MG/1
650 TABLET ORAL
Status: DISCONTINUED | OUTPATIENT
Start: 2017-02-12 | End: 2017-02-15 | Stop reason: HOSPADM

## 2017-02-12 RX ORDER — IPRATROPIUM BROMIDE AND ALBUTEROL SULFATE 2.5; .5 MG/3ML; MG/3ML
3 SOLUTION RESPIRATORY (INHALATION)
Status: DISCONTINUED | OUTPATIENT
Start: 2017-02-12 | End: 2017-02-15 | Stop reason: HOSPADM

## 2017-02-12 RX ADMIN — OXYBUTYNIN CHLORIDE 5 MG: 5 TABLET, EXTENDED RELEASE ORAL at 08:55

## 2017-02-12 RX ADMIN — IPRATROPIUM BROMIDE AND ALBUTEROL SULFATE 3 ML: .5; 3 SOLUTION RESPIRATORY (INHALATION) at 09:02

## 2017-02-12 RX ADMIN — CARBIDOPA AND LEVODOPA 2 TABLET: 25; 100 TABLET ORAL at 18:09

## 2017-02-12 RX ADMIN — LABETALOL HYDROCHLORIDE 20 MG: 5 INJECTION, SOLUTION INTRAVENOUS at 18:06

## 2017-02-12 RX ADMIN — PIPERACILLIN SODIUM,TAZOBACTAM SODIUM 3.38 G: 3; .375 INJECTION, POWDER, FOR SOLUTION INTRAVENOUS at 13:13

## 2017-02-12 RX ADMIN — IPRATROPIUM BROMIDE AND ALBUTEROL SULFATE 3 ML: .5; 3 SOLUTION RESPIRATORY (INHALATION) at 18:24

## 2017-02-12 RX ADMIN — Medication 325 MG: at 18:00

## 2017-02-12 RX ADMIN — FUROSEMIDE 40 MG: 10 INJECTION, SOLUTION INTRAMUSCULAR; INTRAVENOUS at 04:24

## 2017-02-12 RX ADMIN — CARBIDOPA AND LEVODOPA 2 TABLET: 25; 100 TABLET ORAL at 13:13

## 2017-02-12 RX ADMIN — PIPERACILLIN SODIUM,TAZOBACTAM SODIUM 3.38 G: 3; .375 INJECTION, POWDER, FOR SOLUTION INTRAVENOUS at 22:08

## 2017-02-12 RX ADMIN — PINDOLOL 2.5 MG: 5 TABLET ORAL at 13:13

## 2017-02-12 RX ADMIN — Medication 325 MG: at 08:55

## 2017-02-12 RX ADMIN — LEVOFLOXACIN 750 MG: 5 INJECTION, SOLUTION INTRAVENOUS at 20:24

## 2017-02-12 RX ADMIN — PANTOPRAZOLE SODIUM 40 MG: 40 TABLET, DELAYED RELEASE ORAL at 08:55

## 2017-02-12 RX ADMIN — CARBIDOPA AND LEVODOPA 2 TABLET: 25; 100 TABLET ORAL at 08:55

## 2017-02-12 RX ADMIN — Medication 30 ML: at 05:29

## 2017-02-12 RX ADMIN — Medication 20 ML: at 22:09

## 2017-02-12 RX ADMIN — TRAMADOL HYDROCHLORIDE 50 MG: 50 TABLET, FILM COATED ORAL at 20:22

## 2017-02-12 RX ADMIN — ACETAMINOPHEN 650 MG: 325 TABLET, FILM COATED ORAL at 18:09

## 2017-02-12 RX ADMIN — Medication 10 ML: at 13:14

## 2017-02-12 RX ADMIN — ESCITALOPRAM OXALATE 20 MG: 10 TABLET ORAL at 08:55

## 2017-02-12 RX ADMIN — IPRATROPIUM BROMIDE AND ALBUTEROL SULFATE 3 ML: .5; 3 SOLUTION RESPIRATORY (INHALATION) at 02:26

## 2017-02-12 RX ADMIN — PIPERACILLIN SODIUM,TAZOBACTAM SODIUM 3.38 G: 3; .375 INJECTION, POWDER, FOR SOLUTION INTRAVENOUS at 05:28

## 2017-02-12 RX ADMIN — IPRATROPIUM BROMIDE AND ALBUTEROL SULFATE 3 ML: .5; 3 SOLUTION RESPIRATORY (INHALATION) at 20:30

## 2017-02-12 RX ADMIN — CARBIDOPA AND LEVODOPA 2 TABLET: 25; 100 TABLET ORAL at 20:22

## 2017-02-12 RX ADMIN — ENOXAPARIN SODIUM 40 MG: 40 INJECTION SUBCUTANEOUS at 13:13

## 2017-02-12 RX ADMIN — QUETIAPINE FUMARATE 25 MG: 25 TABLET, FILM COATED ORAL at 20:22

## 2017-02-12 NOTE — PROGRESS NOTES
TRANSFER - IN REPORT:    Verbal report received from carolyn Momin(name) on Elmira Michael  being received from ED(unit) for routine progression of care      Report consisted of patients Situation, Background, Assessment and   Recommendations(SBAR). Information from the following report(s) SBAR, Kardex, Intake/Output, Recent Results, Med Rec Status and Cardiac Rhythm nsr was reviewed with the receiving nurse. Opportunity for questions and clarification was provided. Assessment completed upon patients arrival to unit and care assumed.          Primary Nurse Jesus Ramsay RN and CAROLYN naqvi performed a dual skin assessment on this patient No impairment noted

## 2017-02-12 NOTE — ROUTINE PROCESS
HR sustained tachycardia, 99.3 temp, MD aware and placing orders      6:31 PM tylenol and labetalol administered, prn treatment requested.

## 2017-02-12 NOTE — ROUTINE PROCESS
Patient saturated with urine and stool, bedding changed, prevelon boots applied, blanchable heels noted but beefy red, WOC consult placed.   Aman sacrum noted but blanchable, protective ointment applied

## 2017-02-12 NOTE — PROGRESS NOTES
Day #1 of Zosyn  Indication:  CAP  Current regimen:  3.375 gm Q6hr  Abx regimen:  zosyn  ID Following ?: NO  Recent Labs      17   1907   WBC  8.7   CREA  1.35*   BUN  31*     Est CrCl: 48 ml/min;    Temp (24hrs), Av.8 °F (36.6 °C), Min:97.8 °F (36.6 °C), Max:97.8 °F (36.6 °C)    Cultures:    bcx - pending    Plan: Change to 3.375 gm Q8hr      Opal Mcallister, PharmD

## 2017-02-12 NOTE — PROGRESS NOTES
0450-received pt into ICU 14 on bipap/cardiac monitor  0600-pt removed bipap and refuses to wear/NC 2L placed  0730-bedside report given to [facility] guest using SBAR format

## 2017-02-12 NOTE — PROGRESS NOTES
TRANSFER - IN REPORT:    Verbal report received from [facility] (name) on John Kaur  being received from [facility] (unit) for change in patient condition(needs bipap)      Report consisted of patients Situation, Background, Assessment and   Recommendations(SBAR). Information from the following report(s) SBAR, Kardex, ED Summary, Intake/Output, MAR and Recent Results was reviewed with the receiving nurse. Opportunity for questions and clarification was provided. Assessment completed upon patients arrival to unit and care assumed.

## 2017-02-12 NOTE — PROGRESS NOTES
Assessemnt/Plan:   Daily Progress Note: 2017    Admit date: 2017  6:49 PM    Hospitalist Progress Note   Lena Fonghiamelie Graf [phone]; Call physician on-call through the  7pm-7am          PCP: Ruth Hinkle MD   In Hospital Procedure:   * No surgery found *  Consultants this Hospitalization:   IP CONSULT TO HOSPITALIST  IP CONSULT TO INTENSIVIST    In Hospital Procedure:   * No surgery found *              Date of Service:  2017   NAME:  [unfilled]  :  1962  MRN:  [id number]      Admission Summary: This is an 80-year-old white male   with past medical history of Parkinson's disease, gait abnormality,   general debility, reflux esophagitis, hypertension, hyperlipidemia,   status post cardiac pacemaker implantation, degenerative disk   disease, degenerative joint disease, and duodenal ulcer.      He presented to the emergency department via EMS from [facility] with reported chief complaint of shortness of breath   and cough. Symptoms onset reportedly began approximately a week   ago. Cough is now productive of sputum. Complains of shortness of     Interval history / Subjective:   Better today and anticipated moving to [facility] but by late afternoon the bp was up and pt tachycardia 120's iv labetalol given. Assessment & Plan:     Multilobar PNA, POA,  Slowly improving; on abx. Levaquin Karolina Ohm  PD. On Sinemet  Acute hypoxic  resp failure requiring. NIVD early am . Now off stable on 2L/min nc    PPM satus,  Not on [facility]    Use of major antipsychotic and antidepressant. Con t home dose Seroquel  And Lexapro.      Code status: DNR  DVT prophylaxis: Lovenox     Care Plan discussed with: Patient/Family and Nurse  Disposition: SNF/LTC and TBD     Subjective:          Review of Systems:    ·  constitutional neg  ·  genitourinary neg    ·  eyes neg  ·  musculoskeletal neg    ·  ENT: --ears neg  ·  neurologic  neg     --nose/sinuses neg  ·  psychiatric neg     --mouth/thorat neg  ·  endocrine neg     --neck neg  ·  peripheral vascular neg    ·  respiratory neg  ·  Skin/breast neg    ·  cardiac neg  ·  Hematologic/lymph neg    ·  gastroentestinal neg  ·  Allergy/immunologic neg          Could NOT obtain due to:      Objective:     VITALS:   Last 24hrs VS reviewed since prior progress note. Most recent are:  Patient Vitals for the past 24 hrs:   Temp Pulse Resp BP SpO2   02/12/17 1718 99.3 °F (37.4 °C) (!) 120 20 (!) 144/108 -   02/12/17 1300 - 63 21 139/83 97 %   02/12/17 1242 97.8 °F (36.6 °C) - - - -   02/12/17 1000 - 65 17 114/61 93 %   02/12/17 0903 98.1 °F (36.7 °C) - - - 96 %   02/12/17 0715 - 65 17 111/66 95 %   02/12/17 0530 - 65 21 119/78 99 %   02/12/17 0515 - 65 20 119/75 100 %   02/12/17 0500 - - 20 123/84 99 %   02/12/17 0424 - 65 - - (!) 85 %   02/12/17 0311 98.7 °F (37.1 °C) 65 26 117/67 92 %   02/12/17 0256 - - - - (!) 89 %   02/12/17 0226 - - - - 93 %   02/11/17 2235 99.5 °F (37.5 °C) 70 26 (!) 146/91 98 %   02/11/17 2203 98.6 °F (37 °C) 68 25 - 98 %   02/11/17 2130 - 65 21 123/76 -   02/11/17 2100 - 65 19 124/89 90 %   02/11/17 2040 - - - - 93 %   02/11/17 2000 - 65 19 (!) 145/93 95 %   02/11/17 1930 - 65 22 (!) 135/97 93 %   02/11/17 1900 97.8 °F (36.6 °C) 65 21 (!) 133/105 95 %       Intake/Output Summary (Last 24 hours) at 02/12/17 1803  Last data filed at 02/11/17 2235   Gross per 24 hour   Intake                0 ml   Output                0 ml   Net                0 ml        PHYSICAL EXAM:  General appearance: no acute distress  ,  Alert ;  conversant ;  Eyes: anicteric sclerae, moist conjunctivae; no lid-lag; PERRLA;  HENT: Atraumatic; oropharynx clear with moist mucous membranes and no mucosal ulcerations; normal hard and soft palate;  Neck: Trachea midline; FROM, supple, no thyromegaly or lymphadenopathy;  Lungs: CTA, with normal respiratory effort and no intercostal retractions  CV: RRR, no MRGs ;   Abdomen: Soft, non-tender; Extremities: trace peripheral edema or extremity lymphadenopathy;  Skin: Normal temperature ; turgor is fair ; the texture  nl ; no rash, ulcers or subcutaneous nodules; Neuro: CN 2- 12 intact reviewed. genearlized weakness ,    Psych: Appropriate to depressed  affect, alert  and oriented to person, place and time;          Reviewed most current radiology test results   yes  Review and summation of old records today   yes  Reviewed patient's current orders and MAR   yes  PMH/SH reviewed - no change compared to H&P  ______________________________________________________________________  Medicines:    Current Facility-Administered Medications:     albuterol-ipratropium (DUO-NEB) 2.5 MG-0.5 MG/3 ML, 3 mL, Nebulization, Q4H PRN, Meka Acevedo MD, 3 mL at 02/12/17 0902    levoFLOXacin (LEVAQUIN) 750 mg in D5W IVPB, 750 mg, IntraVENous, Q24H, Meka Acevedo MD    enoxaparin (LOVENOX) injection 40 mg, 40 mg, SubCUTAneous, Q24H, Tonja Sever, MD, 40 mg at 02/12/17 1313    labetalol (NORMODYNE;TRANDATE) injection 20 mg, 20 mg, IntraVENous, ONCE, Keesha Wilson MD    acetaminophen (TYLENOL) tablet 650 mg, 650 mg, Oral, Q6H PRN, Keesha Wilson MD    aspirin (ASPIRIN) tablet 325 mg, 325 mg, Oral, BID, Meka Acevedo MD, 325 mg at 02/12/17 0855    carbidopa-levodopa (SINEMET)  mg per tablet 2 Tab, 2 Tab, Oral, QID, Meka Acevedo MD, 2 Tab at 02/12/17 1313    cloNIDine HCl (CATAPRES) tablet 0.1 mg, 0.1 mg, Oral, Q6H PRN, Meka Acevedo MD    docusate sodium (COLACE) capsule 100 mg, 100 mg, Oral, DAILY PRN, Meka Acevedo MD    escitalopram oxalate (LEXAPRO) tablet 20 mg, 20 mg, Oral, DAILY, Meka Acevedo MD, 20 mg at 02/12/17 0855    pantoprazole (PROTONIX) tablet 40 mg, 40 mg, Oral, DAILY, Meka Acevedo MD, 40 mg at 02/12/17 0855    pindolol (VISKIN) tablet 2.5 mg, 2.5 mg, Oral, PCL, Meka Acevedo MD, 2.5 mg at 02/12/17 1313    polyethylene glycol (MIRALAX) packet 17 g, 17 g, Oral, DAILY, Carlie Barcenas MD, Stopped at 02/12/17 0900    QUEtiapine (SEROquel) tablet 25 mg, 25 mg, Oral, QHS, Carlie Barcenas MD, 25 mg at 02/11/17 2334    oxybutynin chloride XL (DITROPAN XL) tablet 5 mg, 5 mg, Oral, DAILY, Carlie Barcenas MD, 5 mg at 02/12/17 0855    rivastigmine (EXELON) 4.6 mg/24 hr patch 1 Patch, 1 Patch, TransDERmal, DAILY, Carlie Barcenas MD, 1 Patch at 02/12/17 1520    traMADol (ULTRAM) tablet 50 mg, 50 mg, Oral, Q8H PRN, Carlie Barcenas MD    sodium chloride (NS) flush 5-10 mL, 5-10 mL, IntraVENous, Q8H, Carlie Barcenas MD, 10 mL at 02/12/17 1314    sodium chloride (NS) flush 5-10 mL, 5-10 mL, IntraVENous, PRN, Carlie Barcenas MD    piperacillin-tazobactam (ZOSYN) 3.375 g in 0.9% sodium chloride (MBP/ADV) 100 mL, 3.375 g, IntraVENous, Q8H, Carlie Barcenas MD, Last Rate: 25 mL/hr at 02/12/17 1313, 3.375 g at 02/12/17 1313  Procedures: see electronic medical records for all procedures/Xrays and details which were not copied into this note but were reviewed prior to creation of Plan. LABS:  Recent Labs      02/12/17   0202  02/11/17 1907   WBC  9.8  8.7   HGB  9.9*  10.5*   HCT  30.5*  32.6*   PLT  157  165     Recent Labs      02/12/17   0202  02/11/17 1907   NA  135*  133*   K  4.4  4.4   CL  102  100   CO2  20*  24   BUN  29*  31*   CREA  1.18  1. 35*   GLU  113*  125*   CA  8.3*  8.7     Recent Labs      02/11/17 1907   SGOT  27   ALT  9*   AP  61   TBILI  1.1*   TP  6.6   ALB  3.5   GLOB  3.1     No results for input(s): INR, PTP, APTT in the last 72 hours. No lab exists for component: INREXT   No results for input(s): FE, TIBC, PSAT, FERR in the last 72 hours. Lab Results   Component Value Date/Time    Folate 32.6 10/30/2013 10:30 AM      No results for input(s): PH, PCO2, PO2 in the last 72 hours.   Recent Labs      02/12/17   0433  02/12/17   0408  02/11/17   2140   PHI  7.439  7.525*  7.484*   PO2I  197*   --   61*   PCO2I  30.8*  28.3*  27.2* Recent Labs      02/11/17   1907   CPK  141   CKNDX  2.1   TROIQ  <0.04     Lab Results   Component Value Date/Time    Cholesterol, total 144 11/06/2015 12:05 PM    HDL Cholesterol 45 11/06/2015 12:05 PM    LDL, calculated 88 11/06/2015 12:05 PM    Triglyceride 57 11/06/2015 12:05 PM    CHOL/HDL Ratio 3.0 09/29/2010 11:12 AM     Lab Results   Component Value Date/Time    Glucose (POC) 97 08/23/2016 02:04 PM    Glucose (POC) 125 08/23/2016 01:20 PM    Glucose (POC) 109 06/15/2016 09:23 PM    Glucose (POC) 108 06/15/2016 05:28 PM    Glucose (POC) 116 06/15/2016 06:11 AM     Lab Results   Component Value Date/Time    Color YELLOW/STRAW 08/23/2016 11:32 AM    Appearance CLEAR 08/23/2016 11:32 AM    Specific gravity 1.008 08/23/2016 11:32 AM    pH (UA) 6.0 08/23/2016 11:32 AM    Protein NEGATIVE  08/23/2016 11:32 AM    Glucose NEGATIVE  08/23/2016 11:32 AM    Ketone NEGATIVE  08/23/2016 11:32 AM    Bilirubin NEGATIVE  08/23/2016 11:32 AM    Urobilinogen 0.2 08/23/2016 11:32 AM    Nitrites NEGATIVE  08/23/2016 11:32 AM    Leukocyte Esterase TRACE 08/23/2016 11:32 AM    Epithelial cells FEW 08/23/2016 11:32 AM    Bacteria NEGATIVE  08/23/2016 11:32 AM    WBC 0-4 08/23/2016 11:32 AM    RBC 0-5 08/23/2016 11:32 AM           CULTURES:    Lab Results   Component Value Date/Time    Specimen Description: KNEE FLUID 02/09/2014 08:56 AM    Specimen Description: BLOOD 02/19/2012 08:50 AM    Specimen Description: URINE 10/27/2010 09:25 AM    Specimen Description: URINE 10/06/2010 02:42 PM    Lab Results   Component Value Date/Time    Culture result: MRSA NOT PRESENT  AT 12 HOURS   02/11/2017 10:48 PM    Culture result: NO GROWTH AFTER 8 HOURS 02/11/2017 08:26 PM    Culture result: MRSA NOT PRESENT 07/26/2016 03:36 AM    Culture result:  07/26/2016 03:36 AM         Screening of patient nares for MRSA is for surveillance purposes and, if positive, to facilitate isolation considerations in high risk settings.  It is not intended for automatic decolonization interventions per se as regimens are not sufficiently effective to warrant routine use. Culture result: NO GROWTH 5 DAYS 07/25/2016 10:42 PM    Culture result: PSEUDOMONAS AERUGINOSA 07/25/2016 10:42 PM        CT Results (most recent):    Results from [place] encounter on 08/23/16   CT HEAD WO CONT   Narrative EXAM:  CT HEAD WO CONT    INDICATION:   headache    COMPARISON: 3/23/2015. TECHNIQUE: Unenhanced CT of the head was performed using 5 mm images. Brain and  bone windows were generated. CT dose reduction was achieved through use of a  standardized protocol tailored for this examination and automatic exposure  control for dose modulation. Adaptive statistical iterative reconstruction  (ASIR) was utilized. FINDINGS:  The ventricles and sulci are normal in size, shape and configuration and  midline. There is no significant white matter disease. There is no intracranial  hemorrhage, extra-axial collection, mass, mass effect or midline shift. The  basilar cisterns are open. No acute infarct is identified. The bone windows  demonstrate no abnormalities. The visualized portions of the paranasal sinuses  and mastoid air cells are clear.          Impression IMPRESSION: No acute process or change compared to the prior exam.          Vinicius Carrillo MD

## 2017-02-12 NOTE — PROGRESS NOTES
Admission Medication Reconciliation:    Information obtained from:  [facility] from [facility]/Patient    Significant PMH/Disease States:   Past Medical History   Diagnosis Date    Blurry vision 1/19/2012    DDD (degenerative disc disease), lumbar 2/19/2010    DJD (degenerative joint disease) of cervical spine 2/19/2010    DU (duodenal ulcer) 9/1/1990    Hypertension     Other and unspecified hyperlipidemia 2/19/2010    Pacemaker     Paralysis agitans ([facility] [address] 2/19/2010    Parkinson disease ([facility] [address]     Premature atrial beats     Reflux esophagitis 2/19/2010     Chief Complaint for this Admission:    Chief Complaint   Patient presents with    Shortness of Breath     Allergies:  Review of patient's allergies indicates no known allergies. Prior to Admission Medications:   Prior to Admission Medications   Prescriptions Last Dose Informant Patient Reported? Taking? LORazepam (ATIVAN) 0.5 mg tablet   Yes Yes   Sig: Take 0.5 mg by mouth every six (6) hours as needed for Anxiety. LORazepam (ATIVAN) 0.5 mg tablet 2/10/2017 at Unknown time  No Yes   Sig: Take 1 Tab by mouth nightly. Max Daily Amount: 0.5 mg.   QUEtiapine (SEROQUEL) 25 mg tablet 2/10/2017 at Unknown time  No Yes   Sig: TAKE 1 TABLET NIGHTLY   acetaminophen (MAPAP EXTRA STRENGTH) 500 mg tablet   No Yes   Sig: Take 1 Tab by mouth every six (6) hours as needed for Pain. albuterol (PROVENTIL HFA, VENTOLIN HFA, PROAIR HFA) 90 mcg/actuation inhaler   No Yes   Sig: Take 2 Puffs by inhalation every six (6) hours as needed for Wheezing. aspirin (ASPIRIN) 325 mg tablet 2/11/2017 at Unknown time  No Yes   Sig: Take 1 Tab by mouth two (2) times a day. Indications: PREVENTION OF TRANSIENT ISCHEMIC ATTACKS   carbidopa-levodopa (SINEMET)  mg per tablet 2/11/2017 at 1400  No Yes   Sig: Take 2 Tabs by mouth four (4) times daily. (This is given at 8:00, 11:00, 14:00, and 21:00).    cloNIDine HCl (CATAPRES) 0.1 mg tablet   No Yes   Sig: Take 1 Tab by mouth every six (6) hours as needed (SBP > 180 mmHg). cycloSPORINE (RESTASIS) 0.05 % ophthalmic emulsion 2017 at Unknown time  No Yes   Sig: Administer 1 Drop to both eyes two (2) times a day. docusate sodium (COLACE) 100 mg capsule   No Yes   Sig: Take 1 Cap by mouth daily as needed for Constipation. droxidopa (NORTHERA) 100 mg cap 2017 at 1300  No Yes   Sig: Take 100 mg by mouth three (3) times daily. Indications: SYMPTOMATIC ORTHOSTATIC HYPOTENSION   escitalopram oxalate (LEXAPRO) 20 mg tablet 2017 at Unknown time  No Yes   Sig: Take 1 Tab by mouth daily. esomeprazole (NEXIUM) 40 mg capsule 2017 at Unknown time  No Yes   Sig: Take 1 Cap by mouth daily. meclizine (ANTIVERT) 25 mg tablet   Yes Yes   Sig: Take 25 mg by mouth three (3) times daily as needed for Dizziness. pindolol (VISKIN) 5 mg tablet 2/10/2017 at Unknown time  Yes Yes   Sig: Take 5 mg by mouth nightly. pindolol (VISKIN) 5 mg tablet 2017 at 1400  Yes Yes   Sig: Take 2.5 mg by mouth daily (after lunch). polyethylene glycol (MIRALAX) 17 gram packet 2017 at Unknown time  No Yes   Sig: Take 1 Packet by mouth daily. Indications: CONSTIPATION   rivastigmine (EXELON) 4.6 mg/24 hr patch 2017 at Unknown time  No Yes   Si Patch by TransDERmal route daily. therapeutic multivitamin (THERAGRAN) tablet 2017 at Unknown time  No Yes   Sig: Take 1 Tab by mouth daily. tolterodine ER (DETROL-LA) 2 mg ER capsule 2017 at Unknown time  No Yes   Sig: Take 1 Cap by mouth daily. For bladder frequency   traMADol (ULTRAM) 50 mg tablet   No Yes   Sig: Take 1 Tab by mouth every eight (8) hours as needed for Pain. Max Daily Amount: 150 mg. Facility-Administered Medications: None     Comments/Recommendations: Updated PTA meds/reviewed patient's allergies. 1)  Removed Losartan, Pyridium, Voltaren gel, and Claritin. These do not appear on the full MAR at [facility].    2)  Last doses of medications this Afternoon per patient   3)  Split pindolol prescription into 2 orders as patient takes 0.5 tablet after lunch and one tablet at bedtime    It was my pleasure to participate in the care of this [address] who served in the [employer] during the [address] [facility].  thanked for his service to our country.

## 2017-02-12 NOTE — ED PROVIDER NOTES
HPI Comments: 80 y.o. male with past medical history significant for DJD, paralysis agitans, pacemaker, Parkinson disease, and HTN who presents via EMS with chief complaint of SOB. Patient arrives from [facility] complaining of SOB and productive cough for one week that has progressively worsened. Patient states he is only able to cough a minimal amount of \"light colored\" sputum. Patient states his voice has had worsening hoarseness. Patient also reports rib pain with coughing and wheezing. Patient denies being on home oxygen, hx of COPD/emphysema, chest pain, abdominal pain, nausea, vomiting, diarrhea. There are no other acute medical concerns at this time. Smoke hx: Non smoker  PCP: Vania Snyder MD    Note written by Imtiaz Kennedy, as dictated by Ruben Duke. Basil Wesley MD 7:59 PM      The history is provided by the patient. Past Medical History:   Diagnosis Date    Blurry vision 1/19/2012    DDD (degenerative disc disease), lumbar 2/19/2010    DJD (degenerative joint disease) of cervical spine 2/19/2010    DU (duodenal ulcer) 9/1/1990    Hypertension     Other and unspecified hyperlipidemia 2/19/2010    Pacemaker     Paralysis agitans ([address] 2/19/2010    Parkinson disease ([address]     Premature atrial beats     Reflux esophagitis 2/19/2010       Past Surgical History:   Procedure Laterality Date    Hx pacemaker  2008     bradycardia    Hx vitrectomy  11/2011    Hx cataract removal  6/2012 and 7/2012     Both eyes    Hx orthopaedic  04/2015     tendon repair both knees         Family History:   Problem Relation Age of Onset    Asthma Mother     Heart Disease Father        Social History     Social History    Marital status:      Spouse name: N/A    Number of children: N/A    Years of education: N/A     Occupational History    Not on file.      Social History Main Topics    Smoking status: Former Smoker     Types: Pipe     Quit date: 7/14/1945    Smokeless tobacco: Never Used    Alcohol use 2.0 oz/week     4 Cans of beer per week      Comment: glass of wine every now and then    Drug use: No    Sexual activity: Not on file     Other Topics Concern     Service Yes    Blood Transfusions No    Caffeine Concern No    Occupational Exposure No    Hobby Hazards No    Sleep Concern No    Stress Concern No    Weight Concern No    Special Diet No    Back Care No    Exercise No    Bike Helmet No    Seat Belt Yes    Self-Exams No     Social History Narrative         ALLERGIES: Review of patient's allergies indicates no known allergies. Review of Systems   Constitutional: Negative for chills and fever. HENT: Negative for ear pain and sore throat. Eyes: Negative for pain. Respiratory: Positive for cough, shortness of breath and wheezing. Negative for chest tightness. Cardiovascular: Negative for chest pain and leg swelling. Gastrointestinal: Negative for abdominal pain, diarrhea, nausea and vomiting. Genitourinary: Negative for dysuria and flank pain. Musculoskeletal: Negative for back pain. Skin: Negative for rash. Neurological: Negative for headaches. All other systems reviewed and are negative. Vitals:    02/11/17 1900 02/11/17 1930   BP: (!) 133/105 (!) 135/97   Pulse: 65 65   Resp: 21 22   Temp: 97.8 °F (36.6 °C)    SpO2: 95% 93%   Weight: 90.7 kg (200 lb)    Height: 6' 2\" (1.88 m)             Physical Exam   Constitutional: He appears well-developed and well-nourished. No distress. HENT:   Head: Normocephalic and atraumatic. Mouth/Throat: Oropharynx is clear and moist.   Eyes: Conjunctivae are normal. Pupils are equal, round, and reactive to light. No scleral icterus. Neck: Normal range of motion. Neck supple. No tracheal deviation present. Cardiovascular: Normal rate, regular rhythm and normal heart sounds. Exam reveals no gallop and no friction rub. No murmur heard.   Pulmonary/Chest: Effort normal. No respiratory distress. He has wheezes (on L and R). He has rhonchi in the left upper field, the left middle field and the left lower field. He has rales in the right upper field, the right middle field and the right lower field. Audible wheezing without stethoscope. Abdominal: Soft. He exhibits no distension. There is no tenderness. There is no rebound and no guarding. Musculoskeletal: He exhibits no edema. Neurological: He is alert. Radial pulse 2+ and equal.    Skin: Skin is warm and dry. Psychiatric: He has a normal mood and affect. Nursing note and vitals reviewed. MDM  Number of Diagnoses or Management Options  Pneumonia of right middle lobe due to infectious organism:   Diagnosis management comments: 80year old male p/w cough and wheeze. No history of COPD or asthma. Diff dx: pneumonia, bronchitis, new onset COPD  CXR shows RML pneumonia  Cultures sent. EKG shows Ventricular paced rhythm at 65, wide QRS pattern    Impression: Pneumonia  Plan: admit for antibiotics    ED Course       Procedures      CONSULT NOTE:  8:54 PM Kurt Mae MD spoke with Dr. Magan Granados, Consult for Hospitalist.  Discussed available diagnostic tests and clinical findings. He is in agreement with care plans as outlined. Dr. Magan Granados will see patient for admission evaluation.

## 2017-02-12 NOTE — ROUTINE PROCESS
TRANSFER - OUT REPORT:    Verbal report given to Shmuel RN (name) on [facility]  being transferred to PBSU (unit) for routine progression of care       Report consisted of patients Situation, Background, Assessment and   Recommendations(SBAR). Information from the following report(s) SBAR, ED Summary, [facility] and Recent Results was reviewed with the receiving nurse. Lines:   Peripheral IV 02/11/17 Left Antecubital (Active)   Site Assessment Clean, dry, & intact 2/11/2017  7:04 PM   Phlebitis Assessment 0 2/11/2017  7:04 PM   Infiltration Assessment 0 2/11/2017  7:04 PM   Dressing Status Clean, dry, & intact 2/11/2017  7:04 PM   Hub Color/Line Status Pink 2/11/2017  7:04 PM       Peripheral IV 02/11/17 Right Antecubital (Active)   Site Assessment Clean, dry, & intact 2/11/2017  8:38 PM   Phlebitis Assessment 0 2/11/2017  8:38 PM   Infiltration Assessment 0 2/11/2017  8:38 PM   Dressing Status Clean, dry, & intact 2/11/2017  8:38 PM   Hub Color/Line Status Pink 2/11/2017  8:38 PM        Opportunity for questions and clarification was provided. Patient transported with:   Monitor     UPDATE: Pt assessment unchanged, no acute episodes of shortness of breath, VSS, waiting on transport to inpatient unit.      Visit Vitals    /76    Pulse 68    Temp 98.6 °F (37 °C)    Resp 25    Ht 6' 2\" (1.88 m)    Wt 90.7 kg (200 lb)    SpO2 98%    BMI 25.68 kg/m2

## 2017-02-12 NOTE — CONSULTS
Pulmonology Consult - [facility]    Subjective:   Consult Note: 2/12/2017 11:59 AM    This patient has been seen and evaluated at the request of Dr. Ashwin Carter for multilobar pneumonia and respiratory failure. Patient is a 80 y.o. male   Developed non productive cough about a week ago  Progressive dyspnea gradual onset  Worse with activity  No change in appetite  No documented weight loss  No hemoptysis, chest pain   Some wheeze   Life long non smoker  No prior hx lung disease  Last pneumonia 4 years ago  Denies choking or coughing with eating or drinking    Asthma and COPD in multiple smoking family members    Admitted to telemetry last evening but developed increased respiratory distress and falling O2 sats  Transferred to ICU   Placed on BiPAP but did not tolerated well    Now on no distress on NC O2    Past Medical History   Diagnosis Date    Blurry vision 1/19/2012    DDD (degenerative disc disease), lumbar 2/19/2010    DJD (degenerative joint disease) of cervical spine 2/19/2010    DU (duodenal ulcer) 9/1/1990    Hypertension     Other and unspecified hyperlipidemia 2/19/2010    Pacemaker     Paralysis agitans ([address] 2/19/2010    Parkinson disease ([address]     Premature atrial beats     Reflux esophagitis 2/19/2010      Past Surgical History   Procedure Laterality Date    Hx pacemaker  2008     bradycardia    Hx vitrectomy  11/2011    Hx cataract removal  6/2012 and 7/2012     Both eyes    Hx orthopaedic  04/2015     tendon repair both knees      Prior to Admission medications    Medication Sig Start Date End Date Taking? Authorizing Provider   pindolol (VISKIN) 5 mg tablet Take 5 mg by mouth nightly. Yes Historical Provider   pindolol (VISKIN) 5 mg tablet Take 2.5 mg by mouth daily (after lunch). Yes Historical Provider   meclizine (ANTIVERT) 25 mg tablet Take 25 mg by mouth three (3) times daily as needed for Dizziness.    Yes Historical Provider   LORazepam (ATIVAN) 0.5 mg tablet Take 1 Tab by mouth nightly. Max Daily Amount: 0.5 mg. 1/17/17  Yes Sunitha Virgen MD   albuterol (PROVENTIL HFA, VENTOLIN HFA, PROAIR HFA) 90 mcg/actuation inhaler Take 2 Puffs by inhalation every six (6) hours as needed for Wheezing. 1/9/17  Yes Phylicia Chatman NP   QUEtiapine (SEROQUEL) 25 mg tablet TAKE 1 TABLET NIGHTLY 11/25/16  Yes Sunitha Virgen MD   LORazepam (ATIVAN) 0.5 mg tablet Take 0.5 mg by mouth every six (6) hours as needed for Anxiety. Yes Historical Provider   aspirin (ASPIRIN) 325 mg tablet Take 1 Tab by mouth two (2) times a day. Indications: PREVENTION OF TRANSIENT ISCHEMIC ATTACKS 7/28/16  Yes Slava Jarquin NP   carbidopa-levodopa (SINEMET)  mg per tablet Take 2 Tabs by mouth four (4) times daily. (This is given at 8:00, 11:00, 14:00, and 21:00). 7/28/16  Yes Slava Jarquin NP   polyethylene glycol (MIRALAX) 17 gram packet Take 1 Packet by mouth daily. Indications: CONSTIPATION 7/28/16  Yes Slava Jarquin NP   therapeutic multivitamin [facility]) tablet Take 1 Tab by mouth daily. 7/28/16  Yes Slava Jarquin NP   tolterodine ER (DETROL-LA) 2 mg ER capsule Take 1 Cap by mouth daily. For bladder frequency 7/28/16  Yes Slava Jarquin NP   traMADol (ULTRAM) 50 mg tablet Take 1 Tab by mouth every eight (8) hours as needed for Pain. Max Daily Amount: 150 mg. 7/28/16  Yes Slava Jarquin NP   cloNIDine HCl (CATAPRES) 0.1 mg tablet Take 1 Tab by mouth every six (6) hours as needed (SBP > 180 mmHg). 7/28/16  Yes Slava Jarquin NP   droxidopa (NORTHERA) 100 mg cap Take 100 mg by mouth three (3) times daily. Indications: SYMPTOMATIC ORTHOSTATIC HYPOTENSION 7/28/16  Yes Slava Jarquin NP   docusate sodium (COLACE) 100 mg capsule Take 1 Cap by mouth daily as needed for Constipation. 7/28/16  Yes Slava Jarquin NP   cycloSPORINE (RESTASIS) 0.05 % ophthalmic emulsion Administer 1 Drop to both eyes two (2) times a day.  7/28/16  Yes Slava Jarquin NP   escitalopram oxalate (LEXAPRO) 20 mg tablet Take 1 Tab by mouth daily. 7/28/16  Yes Tejas San NP   rivastigmine (EXELON) 4.6 mg/24 hr patch 1 Patch by TransDERmal route daily. 7/28/16  Yes Tejas San NP   esomeprazole (NEXIUM) 40 mg capsule Take 1 Cap by mouth daily. 7/28/16  Yes Tejas San NP   acetaminophen (MAPAP EXTRA STRENGTH) 500 mg tablet Take 1 Tab by mouth every six (6) hours as needed for Pain.  7/28/16  Yes Tejas San NP     No Known Allergies   Social History   Substance Use Topics    Smoking status: Former Smoker     Types: Pipe     Quit date: 7/14/1945    Smokeless tobacco: Never Used    Alcohol use 2.0 oz/week     4 Cans of beer per week      Comment: glass of wine every now and then      Family History   Problem Relation Age of Onset    Asthma Mother     Heart Disease Father         Current Facility-Administered Medications   Medication Dose Route Frequency    albuterol-ipratropium (DUO-NEB) 2.5 MG-0.5 MG/3 ML  3 mL Nebulization Q4H PRN    levoFLOXacin (LEVAQUIN) 750 mg in D5W IVPB  750 mg IntraVENous Q24H    aspirin (ASPIRIN) tablet 325 mg  325 mg Oral BID    carbidopa-levodopa (SINEMET)  mg per tablet 2 Tab  2 Tab Oral QID    cloNIDine HCl (CATAPRES) tablet 0.1 mg  0.1 mg Oral Q6H PRN    docusate sodium (COLACE) capsule 100 mg  100 mg Oral DAILY PRN    escitalopram oxalate (LEXAPRO) tablet 20 mg  20 mg Oral DAILY    pantoprazole (PROTONIX) tablet 40 mg  40 mg Oral DAILY    pindolol (VISKIN) tablet 2.5 mg  2.5 mg Oral PCL    polyethylene glycol (MIRALAX) packet 17 g  17 g Oral DAILY    QUEtiapine (SEROquel) tablet 25 mg  25 mg Oral QHS    oxybutynin chloride XL (DITROPAN XL) tablet 5 mg  5 mg Oral DAILY    rivastigmine (EXELON) 4.6 mg/24 hr patch 1 Patch  1 Patch TransDERmal DAILY    traMADol (ULTRAM) tablet 50 mg  50 mg Oral Q8H PRN    sodium chloride (NS) flush 5-10 mL  5-10 mL IntraVENous Q8H    sodium chloride (NS) flush 5-10 mL  5-10 mL IntraVENous PRN    piperacillin-tazobactam (ZOSYN) 3.375 g in 0.9% sodium chloride (MBP/ADV) 100 mL  3.375 g IntraVENous Q8H       Review of Systems:  Constitutional HEENT Cardiovascular Pulmonary Gastrointestinal Genitourinary Musculoskeletal Integument Neurologic Endocrinologic Hematologic ROS unremarkable except for above    Objective:   Vital Signs:    Visit Vitals    /66    Pulse 65    Temp 98.7 °F (37.1 °C)    Resp 17    Ht 6' 2\" (1.88 m)    Wt 98.5 kg (217 lb 3.2 oz)    SpO2 96%    BMI 27.89 kg/m2       O2 Device: Nasal cannula   O2 Flow Rate (L/min): 2 l/min   Temp (24hrs), Av.7 °F (37.1 °C), Min:97.8 °F (36.6 °C), Max:99.5 °F (37.5 °C)       Intake/Output:   Last shift:         Last 3 shifts:      Intake/Output Summary (Last 24 hours) at 17 1159  Last data filed at 17 2235   Gross per 24 hour   Intake                0 ml   Output                0 ml   Net                0 ml       Physical Exam:   General:  Alert, cooperative, no distress, . Head:  Normocephalic, atraumatic without obvious abnormality   Eyes:  Sclera non injected Conjunctivae. PERRL, EOMs intact. Nose: Nares normal. Septum midline. Mucosa normal. No drainage or sinus tenderness. Mouth: Moist mucus membranes  Good Dentition             Neck: Supple, symmetrical, trachea midline, no adenopathy,   No stridor   Back:   Without CVA or spinal tenderness   Lungs:   No acessory muscle use, + wheeze and  Rhonchi right greater than left occ rales       Heart:  Regular rate and rhythm, S1, S2 normal, no murmur, click, rub or gallop. Abdomen:   Soft, non-tender. Bowel sounds normal. No masses, tenderness, guarding rebound No organomegaly. Extremities: no cyanosis  edema clubbing       Skin: Warm dry.  No rashes or lesions   Lymph nodes: Cervical, supraclavicular, and axillary nodes normal.   Neurologic: Alert and oriented x 3 moves all extremities       Data Review     Recent Results (from the past 24 hour(s))   EKG, 12 LEAD, INITIAL    Collection Time: 02/11/17  6:58 PM   Result Value Ref Range    Ventricular Rate 65 BPM    Atrial Rate 68 BPM    QRS Duration 184 ms    Q-T Interval 484 ms    QTC Calculation (Bezet) 503 ms    Calculated R Axis -66 degrees    Calculated T Axis 71 degrees    Diagnosis       Ventricular-paced rhythm  When compared with ECG of 23-AUG-2016 11:32,  Electronic ventricular pacemaker has replaced Electronic atrial pacemaker     CBC WITH AUTOMATED DIFF    Collection Time: 02/11/17  7:07 PM   Result Value Ref Range    WBC 8.7 4.1 - 11.1 K/uL    RBC 3.51 (L) 4.10 - 5.70 M/uL    HGB 10.5 (L) 12.1 - 17.0 g/dL    HCT 32.6 (L) 36.6 - 50.3 %    MCV 92.9 80.0 - 99.0 FL    MCH 29.9 26.0 - 34.0 PG    MCHC 32.2 30.0 - 36.5 g/dL    RDW 14.9 (H) 11.5 - 14.5 %    PLATELET 221 185 - 512 K/uL    NEUTROPHILS 84 (H) 32 - 75 %    LYMPHOCYTES 9 (L) 12 - 49 %    MONOCYTES 7 5 - 13 %    EOSINOPHILS 0 0 - 7 %    BASOPHILS 0 0 - 1 %    ABS. NEUTROPHILS 7.3 1.8 - 8.0 K/UL    ABS. LYMPHOCYTES 0.8 0.8 - 3.5 K/UL    ABS. MONOCYTES 0.6 0.0 - 1.0 K/UL    ABS. EOSINOPHILS 0.0 0.0 - 0.4 K/UL    ABS. BASOPHILS 0.0 0.0 - 0.1 K/UL    DF SMEAR SCANNED      RBC COMMENTS PEYTON CELLS  PRESENT        RBC COMMENTS OVALOCYTES  PRESENT        RBC COMMENTS POLYCHROMASIA  PRESENT       METABOLIC PANEL, COMPREHENSIVE    Collection Time: 02/11/17  7:07 PM   Result Value Ref Range    Sodium 133 (L) 136 - 145 mmol/L    Potassium 4.4 3.5 - 5.1 mmol/L    Chloride 100 97 - 108 mmol/L    CO2 24 21 - 32 mmol/L    Anion gap 9 5 - 15 mmol/L    Glucose 125 (H) 65 - 100 mg/dL    BUN 31 (H) 6 - 20 MG/DL    Creatinine 1.35 (H) 0.70 - 1.30 MG/DL    BUN/Creatinine ratio 23 (H) 12 - 20      GFR est AA >60 >60 ml/min/1.73m2    GFR est non-AA 50 (L) >60 ml/min/1.73m2    Calcium 8.7 8.5 - 10.1 MG/DL    Bilirubin, total 1.1 (H) 0.2 - 1.0 MG/DL    ALT (SGPT) 9 (L) 12 - 78 U/L    AST (SGOT) 27 15 - 37 U/L    Alk.  phosphatase 61 45 - 117 U/L    Protein, total 6.6 6.4 - 8.2 g/dL    Albumin 3.5 3.5 - 5.0 g/dL Globulin 3.1 2.0 - 4.0 g/dL    A-G Ratio 1.1 1.1 - 2.2     TROPONIN I    Collection Time: 02/11/17  7:07 PM   Result Value Ref Range    Troponin-I, Qt. <0.04 <0.05 ng/mL   CK W/ CKMB & INDEX    Collection Time: 02/11/17  7:07 PM   Result Value Ref Range     39 - 308 U/L    CK - MB 3.0 <3.6 NG/ML    CK-MB Index 2.1 0 - 2.5     CULTURE, BLOOD, PAIRED    Collection Time: 02/11/17  8:26 PM   Result Value Ref Range    Special Requests: NO SPECIAL REQUESTS      Culture result: NO GROWTH AFTER 8 HOURS     LACTIC ACID, PLASMA    Collection Time: 02/11/17  8:26 PM   Result Value Ref Range    Lactic acid 1.4 0.4 - 2.0 MMOL/L   POC G3 - PUL    Collection Time: 02/11/17  9:40 PM   Result Value Ref Range    pH (POC) 7.484 (H) 7.35 - 7.45      pCO2 (POC) 27.2 (L) 35.0 - 45.0 MMHG    pO2 (POC) 61 (L) 80 - 100 MMHG    HCO3 (POC) 20.4 (L) 22 - 26 MMOL/L    sO2 (POC) 93 92 - 97 %    Base deficit (POC) 3 mmol/L    Site LEFT BRACHIAL      Device: ROOM AIR      Allens test (POC) N/A      Specimen type (POC) ARTERIAL     CULTURE, MRSA    Collection Time: 02/11/17 10:48 PM   Result Value Ref Range    Special Requests: NO SPECIAL REQUESTS      Culture result: MRSA NOT PRESENT  AT 12 HOURS       METABOLIC PANEL, BASIC    Collection Time: 02/12/17  2:02 AM   Result Value Ref Range    Sodium 135 (L) 136 - 145 mmol/L    Potassium 4.4 3.5 - 5.1 mmol/L    Chloride 102 97 - 108 mmol/L    CO2 20 (L) 21 - 32 mmol/L    Anion gap 13 5 - 15 mmol/L    Glucose 113 (H) 65 - 100 mg/dL    BUN 29 (H) 6 - 20 MG/DL    Creatinine 1.18 0.70 - 1.30 MG/DL    BUN/Creatinine ratio 25 (H) 12 - 20      GFR est AA >60 >60 ml/min/1.73m2    GFR est non-AA 59 (L) >60 ml/min/1.73m2    Calcium 8.3 (L) 8.5 - 10.1 MG/DL   CBC WITH AUTOMATED DIFF    Collection Time: 02/12/17  2:02 AM   Result Value Ref Range    WBC 9.8 4.1 - 11.1 K/uL    RBC 3.34 (L) 4.10 - 5.70 M/uL    HGB 9.9 (L) 12.1 - 17.0 g/dL    HCT 30.5 (L) 36.6 - 50.3 %    MCV 91.3 80.0 - 99.0 FL    MCH 29.6 26.0 - 34.0 PG    MCHC 32.5 30.0 - 36.5 g/dL    RDW 14.7 (H) 11.5 - 14.5 %    PLATELET 298 742 - 468 K/uL    NEUTROPHILS 86 (H) 32 - 75 %    LYMPHOCYTES 7 (L) 12 - 49 %    MONOCYTES 7 5 - 13 %    EOSINOPHILS 0 0 - 7 %    BASOPHILS 0 0 - 1 %    ABS. NEUTROPHILS 8.4 (H) 1.8 - 8.0 K/UL    ABS. LYMPHOCYTES 0.7 (L) 0.8 - 3.5 K/UL    ABS. MONOCYTES 0.7 0.0 - 1.0 K/UL    ABS. EOSINOPHILS 0.0 0.0 - 0.4 K/UL    ABS. BASOPHILS 0.0 0.0 - 0.1 K/UL    DF SMEAR SCANNED      RBC COMMENTS POLYCHROMASIA  PRESENT        RBC COMMENTS OVALOCYTES  PRESENT        RBC COMMENTS PEYTON CELLS  PRESENT       BNP    Collection Time: 02/12/17  2:14 AM   Result Value Ref Range     (H) 0 - 100 pg/mL   POC G3 - PUL    Collection Time: 02/12/17  4:08 AM   Result Value Ref Range    FIO2 (POC) 44 %    pH (POC) 7.525 (H) 7.35 - 7.45      pCO2 (POC) 28.3 (L) 35.0 - 45.0 MMHG    HCO3 (POC) 23.3 22 - 26 MMOL/L    Base excess (POC) 1 mmol/L    Site LEFT RADIAL      Device: NASAL CANNULA      Flow rate (POC) 6 L/M    Allens test (POC) YES      Specimen type (POC) ARTERIAL      Total resp. rate 20     POC G3 - PUL    Collection Time: 02/12/17  4:33 AM   Result Value Ref Range    FIO2 (POC) 44 %    pH (POC) 7.439 7.35 - 7.45      pCO2 (POC) 30.8 (L) 35.0 - 45.0 MMHG    pO2 (POC) 197 (H) 80 - 100 MMHG    HCO3 (POC) 20.8 (L) 22 - 26 MMOL/L    sO2 (POC) 100 (H) 92 - 97 %    Base deficit (POC) 3 mmol/L    Site RIGHT RADIAL      Device: NASAL CANNULA      Flow rate (POC) 6 L/M    Allens test (POC) YES      Specimen type (POC) ARTERIAL      Total resp.  rate 20     POC VENOUS BLOOD GAS    Collection Time: 02/12/17  5:57 AM   Result Value Ref Range    Device: BIPAP      FIO2 (POC) 50 %    pH, venous (POC) 7.434 (H) 7.32 - 7.42      pCO2, venous (POC) 37.7 (L) 41 - 51 MMHG    pO2, venous (POC) 39 25 - 40 mmHg    HCO3, venous (POC) 25.2 23.0 - 28.0 MMOL/L    sO2, venous (POC) 75 65 - 88 %    Base excess, venous (POC) 1 mmol/L    PEEP/CPAP (POC) 7 cmH2O    PIP (POC) 14      Pressure support 7 cmH2O    Allens test (POC) YES      Total resp. rate 19      Site LEFT RADIAL      Specimen type (POC) VENOUS BLOOD         Chest X-Ray:film and report reviewed  RUL and right base infiltrate    Assessment:   Multilobar pneumonia  Respiratory failure  Brief NIPPV support  Medical problems as above        ·     Recommendations:     1. Continue Zosyn and Levaquin  2. Bronchodilators  3. O2  4. Prn BiPAP  5. Follow up cultures  6. DVT ppx  7. Mobilize  8.  Low suspicion aspiration but consider if recurrent infection or failure to resolve    Transfer back to telemetry    Dave Cash MD

## 2017-02-12 NOTE — PROGRESS NOTES
Pt seen and re-evaluated. Nurse had called reporting pt being hypoxic refractory to even increased supplemental oxygen; increased from 4, now to 6 liters with O2sats still in the 80s. On arrival at bedside, pt notes no difference/ worsening of breathing. Airway patent but breathing labored and circulation intact. GCS 15. VS:  BP= 117/67 HR= 65  RR=30   O2sat= 80% on 6 liters O2 NC  PE:  GEN- elderly male in acute respiratory distress with audible rales  PSYCH-A&O  NEURO-GCS 15. Moves all extremities x 4. No slurred speech. No facial droop. Sensation grossly intact. HEENT-NCAT/pupils 2 mm reactive bilateral. Oropharynx clear. NECK-supple, trachea midline  LUNGS- B rales  HEART-RRR  ABD-soft, NT,ND,  +BS. No R/G/R  VASCULAR-2+ radial/1+DP pulses bilateral.   SKIN-warm/dry  MS-no calf tenderness    A/P:  Acute hypoxic respiratory failure. Continue O2 via NC, pending transfer to ICU and once there, then placed on BIPAP. Ordered stat ABG and still awaiting portable chest xray to evaluate for possible volume overload/ acute pulmonary edema. In interim, discontinued IV fluids and ordered Lasix 40 mg IV x 1 dose stat. Continue pulse oximetry monitoring.

## 2017-02-12 NOTE — PROGRESS NOTES
Day #1 of Levaquin  Indication:  CAP  Current regimen:  750 mg Q24hr  Abx regimen:  levaquin, zosyn  ID Following ?: NO  Recent Labs      17   1907   WBC  8.7   CREA  1.35*   BUN  31*     Est CrCl: 48 ml/min;   Temp (24hrs), Av.8 °F (36.6 °C), Min:97.8 °F (36.6 °C), Max:97.8 °F (36.6 °C)    Cultures:    bcx - pending    Plan: Change to 750 mg Q48hr      Norm Age, PharmD

## 2017-02-12 NOTE — H&P
[address] [facility] [address]   HISTORY AND PHYSICAL       Name:  Seven Lacy   MR#:  [id number]   :  1933   Account #:  [de-identified]        Date of Adm:  2017       CHIEF COMPLAINT: Shortness of breath. HISTORY OF PRESENT ILLNESS: This is an 80-year-old white male   with past medical history of Parkinson's disease, gait abnormality,   general debility, reflux esophagitis, hypertension, hyperlipidemia,   status post cardiac pacemaker implantation, degenerative disk   disease, degenerative joint disease, and duodenal ulcer. He presented to the emergency department via EMS from [facility]   senior living facility with reported chief complaint of shortness of breath   and cough. Symptoms onset reportedly began approximately a week   ago. Cough is now productive of sputum. Complains of shortness of   breath and has had some chest pain associated with the same. His symptoms notably have been constant without specific alleviating   factors, present at rest, and worsened with exertion. At baseline, the   patient uses a wheelchair. He notes he is nonambulatory. He is a   limited historian. He also reportedly has hoarseness that worsened,   along rib pain and wheezing. He reportedly is not on any supplemental   oxygen at home. He has no prior history of COPD, asthma, or other   chronic lung conditions. On arrival in the emergency department, initial recorded vital signs   were blood pressure 133/105, heart rate 65, respiratory rate 21,   O2 saturation 95% on room air. O2 saturations decreased to 93%,   according to the chart records. Workup in the emergency department   included chest x-ray, portable, which showed a focal area of airspace   disease in the right mid lung and right base breath, representing   pneumonia with slight atelectasis of the left base. A 12-lead EKG   showed a ventricular paced rhythm at 65 beats per minute.  His labs   showed elevated BUN of 31, a creatinine of 1.35. Per the ED, the patient was started on Zosyn 3.375 grams IV,   Levaquin 750 mg IV, vancomycin 2000 grams IV, and was given   DuoNeb nebulizer treatments. He is now seen for admission to our   hospitalist service for continued evaluation and treatments. At the time of arrival in the patient's room, he is actively receiving   nebulizer treatment. He complains of urinary or bowel incontinence. He   does not complain of any dizziness, lightheadedness, focal weakness,   numbness, paresthesias, slurred speech, facial droop, neck pain, back   pain, abdominal pain, nausea, vomiting, diarrhea, melena, hematuria,   calf pain, swelling, or edema. He complains of hip pain. He also   complains of a headache. PAST MEDICAL HISTORY    1. Degenerative disk disease. 2. Degenerative joint disease. 3. Duodenal ulcer. 4. Hypertension. 5. Hyperlipidemia. 6. Status post cardiac pacemaker implantation. 7. Parkinson's disease. 8. Paralysis agitans. 9. Premature atrial beats, per chart record. 10. Sepsis. He was hospitalized 07/26 to 07/28/2016. 11. UTI. 12. Reflux esophagitis. 13. Gait abnormality. 14. General debility. PAST SURGICAL HISTORY:   1. Status post cardiac pacemaker implantation in 2008. 2. Vitrectomy, 11/2011.   4. Bilateral cataract extraction with intraocular lens implant, 07/2012.   5. Bilateral tendon repair, 04/2015. MEDICATIONS    1. Aspirin Extra Strength 500 mg 1 tablet p.o. q.6h. p.r.n.   2. Albuterol HFA 90 mcg per actuation 2 puffs q.6h. p.r.n.   3. Aspirin 325 mg p.o. b.i.d.   4. Carbidopa/levodopa 25/100 mg 2 tablets p.o. q.i.d.   5. Clonidine 0.1 mg p.o. q.6h. p.r.n.   6. Restasis 0.005% ophthalmic emulsion 1 drop both eyes b.i.d.   7. Colace 100 mg p.o. daily p.r.n.   8. Droxidopa 100 mg 1 tablet p.o. t.i.d.   9. Lexapro 20 mg p.o. daily. 10. Nexium 40 mg p.o. daily. 11. Ativan 0.5 mg p.o. q.6h. p.r.n.    12. Ativan 0.5 mg p.o. at bedtime. 13. Meclizine 25 mg p.o. t.i.d.   14. Pindolol 5 mg p.o. at bedtime. 15. Pindolol 2.5 mg p.o. after lunch daily. 16. MiraLAX 17 grams 1 packet p.o. daily. 17. Seroquel 25 mg p.o. at bedtime. 18. Exelon 4.6 mg per 24 hour patch 1 patch transdermal daily. 19. Therapeutic multivitamin 1 tablet p.o. daily. 20. Detrol-LA 2 mg 1 capsule p.o. daily. 21. Tramadol 50 mg p.o. q.8h. p.r.n. ALLERGIES: NO KNOWN DRUG ALLERGIES. SOCIAL HISTORY: Former smoker of cigarettes, reportedly quit in   [address] [place] [address]. Positive occasional alcohol. Denies daily consumption. Negative   for illicit drugs. Uses a wheelchair. Lives at [facility]. FAMILY HISTORY: Asthma in mother. Heart disease in father. REVIEW OF SYSTEMS: Eleven systems reviewed. Pertinent positives   as in HPI; otherwise, negative. PHYSICAL EXAMINATION   VITAL SIGNS: Temperature of 97.8 degrees Fahrenheit, blood   pressure 124/89, heart rate 65, respiratory rate 18, O2 saturation 90%   on room air. Recorded weight of 200 pounds (90.7 kg), recorded height   of 6 feet 2 inches tall. GENERAL: Elderly male in acute respiratory distress. PSYCHIATRIC: The patient is awake, alert, oriented x3, and slightly   anxious. NEUROLOGIC: GCS of 15. Moves extremities x4 with generalized   weakness and chronic flexion contracture, bilateral lower extremities. Sensation is grossly intact, withdraws to tactile stimuli. Without slurred   speech or facial droop. HEENT: Normocephalic, atraumatic. PERRLA. EOM's intact. Sclerae   are anicteric. Conjunctivae clear. Nares are patent. Oropharynx clear. Tongue is midline, nonedematous. Oral mucosa is dry. NECK: Supple, without lymphadenopathy, JVD, carotid bruits,   thyromegaly. LYMPH: Negative for cervical, supraclavicular adenopathy. RESPIRATORY: Lungs with bilateral rales with scattered wheezes. CARDIOVASCULAR: Heart is a regular rate and rhythm.  Normal S1,   S2, without murmurs, rubs or gallops. GI: Abdomen soft, nontender, nondistended. Normoactive bowel   sounds. No rebound, guarding or rigidity. No auscultated abdominal   bruits. No palpable mass. BACK: No CVA tenderness. No step-off deformity. MUSCULOSKELETAL: No acute palpable bony deformity. Negative for   calf tenderness and flexion contracture of bilateral lower extremities. SKIN: Warm and dry. Some erythema noted on the right lateral   malleolus. Slight erythema on bilateral heels. LABORATORY DATA: I reviewed as follows: Sodium 133, potassium   4.4, chloride 100, CO2 24, BUN 31, creatinine 1.35, glucose 125. Anion gap of 9. Calcium 8.7. GFR 50. Total bilirubin is 1.1, total protein   6.6, albumin is 3.5, ALT of 9, AST of 27, alkaline phosphatase 61. Lactic acid 1.4. CK total 141, CK-MB of 3.0, troponin I less than 0.04. WBC of 8.7, hemoglobin 10.5, hematocrit 32.6, and platelets are 957,   neutrophils 84%. CHEST X-RAY: Chest x-ray portable results noted in HPI. ELECTROCARDIOGRAM: The 12-lead EKG results are as noted in   HPI. REVIEW OF ECHOCARDIOGRAM: Last echocardiogram from   07/26/2016 showed left ventricular ejection fraction of 60% to 65%. IMPRESSION AND PLAN: An 80-year-old male with noted past   medical history, now admitted with community-acquired pneumonia,   acute hypoxic respiratory failure, and dehydration. 1. Community-acquired pneumonia: Admit the patient to telemetry. Blood cultures already drawn. The patient has already been started on   IV antibiotics of Zosyn, Levaquin and vancomycin. The patient will be   continued on vancomycin and Levaquin. Concern for possible   aspiration, given the location of airspace disease noted on chest x-ray. Will order dysphagia screen. Place the patient on oxygen therapy,   pulse oximetry, have ordered a stat ABG. 2. Acute hypoxic respiratory failure: As mentioned, awaiting results   of ABG.  The patient will be placed on continuous pulse oximetry. Continue with nebulizer treatments. 3. Dehydration with notably elevated BUN and creatinine: Will order   0.9% normal saline--1 liter IV fluid bolus, and then 0.9% normal saline   IV fluid infusion. Repeat renal panel in the morning, order urinalysis   microscopy. Placed on strict I's and O's and daily weights. 4. Anemia: Repeat CBC. Check stool occult blood test.   5. Parkinson's disease: Continue home medicines for the same. 6. Acute chest pain: May be associated with noted coughing. Possible   acute on underlying bronchitis. Will order repeat troponin levels. Continue telemetry monitoring. 7. Status post cardiac pacemaker: Continue telemetry monitoring. 8. General debility/gait abnormality: Placed on fall precautions. 9. Hypertension: Continue home medications for the same. VENOUS THROMBOEMBOLISM PROPHYLAXIS: SCD's, bilateral   lower extremities. CODE STATUS: I discussed code status with the patient. At this time,   he has the medical capacity to make his own decisions. The patient   requests DO NOT RESUSCITATE / NO CODE STATUS. The patient   has also signed durable DNR (DDNR) tonight, which I have also discussed with   the patient's daughter, Erin Tamez via telephone, regarding the same. SANDRINE Junior MD MP / [facility]   D:  02/11/2017   21:50   T:  02/12/2017   00:08   Job #:  [id number]

## 2017-02-12 NOTE — PROGRESS NOTES
TRANSFER - OUT REPORT:    Verbal report given to carolyn ramírez(name) on Telma Sy  being transferred to ICU(unit) for change in patient condition(low sats and increasing SOB)       Report consisted of patients Situation, Background, Assessment and   Recommendations(SBAR). Information from the following report(s) SBAR, Kardex, Intake/Output, Recent Results, Med Rec Status and Cardiac Rhythm paced was reviewed with the receiving nurse. Lines:   Peripheral IV 02/11/17 Left Antecubital (Active)   Site Assessment Clean, dry, & intact 2/11/2017  7:04 PM   Phlebitis Assessment 0 2/11/2017  7:04 PM   Infiltration Assessment 0 2/11/2017  7:04 PM   Dressing Status Clean, dry, & intact 2/11/2017  7:04 PM   Hub Color/Line Status Pink 2/11/2017  7:04 PM       Peripheral IV 02/11/17 Right Antecubital (Active)   Site Assessment Clean, dry, & intact 2/11/2017  8:38 PM   Phlebitis Assessment 0 2/11/2017  8:38 PM   Infiltration Assessment 0 2/11/2017  8:38 PM   Dressing Status Clean, dry, & intact 2/11/2017  8:38 PM   Hub Color/Line Status Pink 2/11/2017  8:38 PM        Opportunity for questions and clarification was provided.       Patient transported with:   bipesvin  Registered nurse

## 2017-02-13 ENCOUNTER — DOCUMENTATION ONLY (OUTPATIENT)
Dept: CARDIOLOGY CLINIC | Age: 82
End: 2017-02-13

## 2017-02-13 DIAGNOSIS — N32.81 OVERACTIVE BLADDER: ICD-10-CM

## 2017-02-13 LAB
APPEARANCE UR: CLEAR
BACTERIA SPEC CULT: ABNORMAL
BACTERIA SPEC CULT: ABNORMAL
BACTERIA URNS QL MICRO: NEGATIVE /HPF
BILIRUB UR QL CFM: NEGATIVE
COLOR UR: ABNORMAL
EPITH CASTS URNS QL MICRO: ABNORMAL /LPF
GLUCOSE UR STRIP.AUTO-MCNC: NEGATIVE MG/DL
HGB UR QL STRIP: NEGATIVE
KETONES UR QL STRIP.AUTO: ABNORMAL MG/DL
LEUKOCYTE ESTERASE UR QL STRIP.AUTO: NEGATIVE
NITRITE UR QL STRIP.AUTO: NEGATIVE
PH UR STRIP: 5.5 [PH] (ref 5–8)
PROT UR STRIP-MCNC: ABNORMAL MG/DL
RBC #/AREA URNS HPF: ABNORMAL /HPF (ref 0–5)
SERVICE CMNT-IMP: ABNORMAL
SP GR UR REFRACTOMETRY: 1.03 (ref 1–1.03)
UA: UC IF INDICATED,UAUC: ABNORMAL
UROBILINOGEN UR QL STRIP.AUTO: 1 EU/DL (ref 0.2–1)
WBC URNS QL MICRO: ABNORMAL /HPF (ref 0–4)

## 2017-02-13 PROCEDURE — 94640 AIRWAY INHALATION TREATMENT: CPT

## 2017-02-13 PROCEDURE — 74011250637 HC RX REV CODE- 250/637: Performed by: HOSPITALIST

## 2017-02-13 PROCEDURE — 74011000258 HC RX REV CODE- 258: Performed by: FAMILY MEDICINE

## 2017-02-13 PROCEDURE — 74011250636 HC RX REV CODE- 250/636: Performed by: INTERNAL MEDICINE

## 2017-02-13 PROCEDURE — 74011250637 HC RX REV CODE- 250/637: Performed by: INTERNAL MEDICINE

## 2017-02-13 PROCEDURE — 74011000250 HC RX REV CODE- 250: Performed by: PHYSICIAN ASSISTANT

## 2017-02-13 PROCEDURE — 77030029684 HC NEB SM VOL KT MONA -A

## 2017-02-13 PROCEDURE — 51798 US URINE CAPACITY MEASURE: CPT

## 2017-02-13 PROCEDURE — 65660000000 HC RM CCU STEPDOWN

## 2017-02-13 PROCEDURE — 81001 URINALYSIS AUTO W/SCOPE: CPT | Performed by: HOSPITALIST

## 2017-02-13 PROCEDURE — 87070 CULTURE OTHR SPECIMN AEROBIC: CPT | Performed by: PHYSICIAN ASSISTANT

## 2017-02-13 PROCEDURE — 74011250637 HC RX REV CODE- 250/637: Performed by: FAMILY MEDICINE

## 2017-02-13 PROCEDURE — 74011000250 HC RX REV CODE- 250: Performed by: FAMILY MEDICINE

## 2017-02-13 PROCEDURE — 74011250636 HC RX REV CODE- 250/636: Performed by: FAMILY MEDICINE

## 2017-02-13 RX ORDER — ACETYLCYSTEINE 200 MG/ML
200 SOLUTION ORAL; RESPIRATORY (INHALATION)
Status: DISCONTINUED | OUTPATIENT
Start: 2017-02-13 | End: 2017-02-15 | Stop reason: HOSPADM

## 2017-02-13 RX ORDER — TOLTERODINE TARTRATE 2 MG/1
CAPSULE, EXTENDED RELEASE ORAL
Qty: 90 CAP | Refills: 3 | Status: SHIPPED | OUTPATIENT
Start: 2017-02-13 | End: 2018-05-09 | Stop reason: SDUPTHER

## 2017-02-13 RX ORDER — IPRATROPIUM BROMIDE AND ALBUTEROL SULFATE 2.5; .5 MG/3ML; MG/3ML
3 SOLUTION RESPIRATORY (INHALATION)
Status: COMPLETED | OUTPATIENT
Start: 2017-02-13 | End: 2017-02-13

## 2017-02-13 RX ADMIN — CARBIDOPA AND LEVODOPA 2 TABLET: 25; 100 TABLET ORAL at 21:41

## 2017-02-13 RX ADMIN — ACETYLCYSTEINE 200 MG: 200 SOLUTION ORAL; RESPIRATORY (INHALATION) at 19:18

## 2017-02-13 RX ADMIN — IPRATROPIUM BROMIDE AND ALBUTEROL SULFATE 3 ML: .5; 3 SOLUTION RESPIRATORY (INHALATION) at 11:53

## 2017-02-13 RX ADMIN — CARBIDOPA AND LEVODOPA 2 TABLET: 25; 100 TABLET ORAL at 17:13

## 2017-02-13 RX ADMIN — CARBIDOPA AND LEVODOPA 2 TABLET: 25; 100 TABLET ORAL at 12:30

## 2017-02-13 RX ADMIN — PIPERACILLIN SODIUM,TAZOBACTAM SODIUM 3.38 G: 3; .375 INJECTION, POWDER, FOR SOLUTION INTRAVENOUS at 05:35

## 2017-02-13 RX ADMIN — Medication 20 ML: at 05:37

## 2017-02-13 RX ADMIN — QUETIAPINE FUMARATE 25 MG: 25 TABLET, FILM COATED ORAL at 21:41

## 2017-02-13 RX ADMIN — PANTOPRAZOLE SODIUM 40 MG: 40 TABLET, DELAYED RELEASE ORAL at 08:07

## 2017-02-13 RX ADMIN — LEVOFLOXACIN 750 MG: 5 INJECTION, SOLUTION INTRAVENOUS at 20:51

## 2017-02-13 RX ADMIN — ACETYLCYSTEINE 200 MG: 200 SOLUTION ORAL; RESPIRATORY (INHALATION) at 11:53

## 2017-02-13 RX ADMIN — POLYETHYLENE GLYCOL 3350 17 G: 17 POWDER, FOR SOLUTION ORAL at 08:08

## 2017-02-13 RX ADMIN — IPRATROPIUM BROMIDE AND ALBUTEROL SULFATE 3 ML: .5; 3 SOLUTION RESPIRATORY (INHALATION) at 05:45

## 2017-02-13 RX ADMIN — PINDOLOL 2.5 MG: 5 TABLET ORAL at 12:30

## 2017-02-13 RX ADMIN — CASTOR OIL AND BALSAM, PERU: 788; 87 OINTMENT TOPICAL at 17:14

## 2017-02-13 RX ADMIN — IPRATROPIUM BROMIDE AND ALBUTEROL SULFATE 3 ML: .5; 3 SOLUTION RESPIRATORY (INHALATION) at 17:57

## 2017-02-13 RX ADMIN — CASTOR OIL AND BALSAM, PERU: 788; 87 OINTMENT TOPICAL at 11:00

## 2017-02-13 RX ADMIN — ACETAMINOPHEN 650 MG: 325 TABLET, FILM COATED ORAL at 20:43

## 2017-02-13 RX ADMIN — Medication 325 MG: at 08:07

## 2017-02-13 RX ADMIN — PIPERACILLIN SODIUM,TAZOBACTAM SODIUM 3.38 G: 3; .375 INJECTION, POWDER, FOR SOLUTION INTRAVENOUS at 21:42

## 2017-02-13 RX ADMIN — CARBIDOPA AND LEVODOPA 2 TABLET: 25; 100 TABLET ORAL at 08:07

## 2017-02-13 RX ADMIN — IPRATROPIUM BROMIDE AND ALBUTEROL SULFATE 3 ML: .5; 3 SOLUTION RESPIRATORY (INHALATION) at 19:18

## 2017-02-13 RX ADMIN — PIPERACILLIN SODIUM,TAZOBACTAM SODIUM 3.38 G: 3; .375 INJECTION, POWDER, FOR SOLUTION INTRAVENOUS at 14:26

## 2017-02-13 RX ADMIN — ESCITALOPRAM OXALATE 20 MG: 10 TABLET ORAL at 08:07

## 2017-02-13 RX ADMIN — Medication 325 MG: at 17:14

## 2017-02-13 RX ADMIN — ENOXAPARIN SODIUM 40 MG: 40 INJECTION SUBCUTANEOUS at 12:30

## 2017-02-13 RX ADMIN — OXYBUTYNIN CHLORIDE 5 MG: 5 TABLET, EXTENDED RELEASE ORAL at 08:07

## 2017-02-13 RX ADMIN — Medication 10 ML: at 21:42

## 2017-02-13 NOTE — PROGRESS NOTES
Hospitalist Progress Note  Macrina Morris MD  Office: [phone]  Cell: [phone]      Date of Service:  2017  NAME:  Elmo Ch  :  1933  MRN:  [id number]      Admission Summary:   70-year-old white male   with past medical history of Parkinson's disease, gait abnormality,   general debility, reflux esophagitis, hypertension, hyperlipidemia,   status post cardiac pacemaker implantation, degenerative disk   disease, degenerative joint disease, and duodenal ulcer comes over here with SOB    Interval history / Subjective:     No new issues  Looks better     Assessment & Plan:     Community Acquire Pneumonia  -CXR  There is focal area of airspace disease right midlung and right base  may represent pneumonia  CXR  no interval change  -Follow cultures, negative so far  -On Vanc/Zosyn/LVQ, now Vanc discontinued    Acute hypoxic respiratory failure  -Duonebs/NC  -looks much better  -Appreciate Pulmonary    S/p PM placement  -Followed by Dr Melissa Bates  Hypotension  -sec to above    Dehydration  -On IV fluids  -resolved    Anemia  -follow work up    Parkinson's disease  -on home meds    Generalized debility  -PT/OT, would likely need snf    Code status: DNR  DVT prophylaxis: Lovenox    Plan: Follow cultures    Care Plan discussed with: Patient/Family  Disposition: TBD     Hospital Problems  Date Reviewed: 2017          Codes Class Noted POA    * (Principal)Pneumonia ICD-10-CM: J18.9  ICD-9-CM: 654  2017 Unknown                Review of Systems:   A comprehensive review of systems was negative except for that written in the HPI. Vital Signs:    Last 24hrs VS reviewed since prior progress note.  Most recent are:  Visit Vitals    /73    Pulse 65    Temp 98.5 °F (36.9 °C)    Resp 19    Ht 6' 2\" (1.88 m)    Wt 100 kg (220 lb 7.4 oz)    SpO2 93%    BMI 28.31 kg/m2         Intake/Output Summary (Last 24 hours) at 02/13/17 1442  Last data filed at 02/13/17 1200   Gross per 24 hour   Intake              570 ml   Output                0 ml   Net              570 ml        Physical Examination:             Constitutional:  No acute distress, cooperative, pleasant    ENT:  Oral mucous moist, oropharynx benign. Neck supple,    Resp:  CTA bilaterally. No wheezing/rhonchi/rales. No accessory muscle use   CV:  Regular rhythm, normal rate, no murmurs, gallops, rubs    GI:  Soft, non distended, non tender. normoactive bowel sounds, no hepatosplenomegaly     Musculoskeletal:  No edema, warm, 2+ pulses throughout    Neurologic:  Moves all extremities. AAOx3, CN II-XII reviewed     Skin:  Good turgor, no rashes or ulcers       Data Review:    Review and/or order of clinical lab test      Labs:     Recent Labs      02/12/17 0202 02/11/17 1907   WBC  9.8  8.7   HGB  9.9*  10.5*   HCT  30.5*  32.6*   PLT  157  165     Recent Labs      02/12/17 0202 02/11/17 1907   NA  135*  133*   K  4.4  4.4   CL  102  100   CO2  20*  24   BUN  29*  31*   CREA  1.18  1. 35*   GLU  113*  125*   CA  8.3*  8.7     Recent Labs      02/11/17 1907   SGOT  27   ALT  9*   AP  61   TBILI  1.1*   TP  6.6   ALB  3.5   GLOB  3.1     No results for input(s): INR, PTP, APTT in the last 72 hours. No lab exists for component: INREXT   No results for input(s): FE, TIBC, PSAT, FERR in the last 72 hours. Lab Results   Component Value Date/Time    Folate 32.6 10/30/2013 10:30 AM      No results for input(s): PH, PCO2, PO2 in the last 72 hours.   Recent Labs      02/11/17 1907   CPK  141   CKNDX  2.1   TROIQ  <0.04     Lab Results   Component Value Date/Time    Cholesterol, total 144 11/06/2015 12:05 PM    HDL Cholesterol 45 11/06/2015 12:05 PM    LDL, calculated 88 11/06/2015 12:05 PM    Triglyceride 57 11/06/2015 12:05 PM    CHOL/HDL Ratio 3.0 09/29/2010 11:12 AM     Lab Results   Component Value Date/Time    Glucose (POC) 97 08/23/2016 02:04 PM    Glucose (POC) 125 08/23/2016 01:20 PM    Glucose (POC) 109 06/15/2016 09:23 PM    Glucose (POC) 108 06/15/2016 05:28 PM    Glucose (POC) 116 06/15/2016 06:11 AM     Lab Results   Component Value Date/Time    Color YELLOW/STRAW 08/23/2016 11:32 AM    Appearance CLEAR 08/23/2016 11:32 AM    Specific gravity 1.008 08/23/2016 11:32 AM    pH (UA) 6.0 08/23/2016 11:32 AM    Protein NEGATIVE  08/23/2016 11:32 AM    Glucose NEGATIVE  08/23/2016 11:32 AM    Ketone NEGATIVE  08/23/2016 11:32 AM    Bilirubin NEGATIVE  08/23/2016 11:32 AM    Urobilinogen 0.2 08/23/2016 11:32 AM    Nitrites NEGATIVE  08/23/2016 11:32 AM    Leukocyte Esterase TRACE 08/23/2016 11:32 AM    Epithelial cells FEW 08/23/2016 11:32 AM    Bacteria NEGATIVE  08/23/2016 11:32 AM    WBC 0-4 08/23/2016 11:32 AM    RBC 0-5 08/23/2016 11:32 AM         Medications Reviewed:     Current Facility-Administered Medications   Medication Dose Route Frequency    albuterol-ipratropium (DUO-NEB) 2.5 MG-0.5 MG/3 ML  3 mL Nebulization QID RT    acetylcysteine (MUCOMYST) 200 mg/mL (20 %) solution 200 mg  200 mg Nebulization TID RT    balsam peru-castor oil (VENELEX)  mg/gram ointment   Topical BID    albuterol-ipratropium (DUO-NEB) 2.5 MG-0.5 MG/3 ML  3 mL Nebulization Q4H PRN    levoFLOXacin (LEVAQUIN) 750 mg in D5W IVPB  750 mg IntraVENous Q24H    enoxaparin (LOVENOX) injection 40 mg  40 mg SubCUTAneous Q24H    acetaminophen (TYLENOL) tablet 650 mg  650 mg Oral Q6H PRN    aspirin (ASPIRIN) tablet 325 mg  325 mg Oral BID    carbidopa-levodopa (SINEMET)  mg per tablet 2 Tab  2 Tab Oral QID    cloNIDine HCl (CATAPRES) tablet 0.1 mg  0.1 mg Oral Q6H PRN    docusate sodium (COLACE) capsule 100 mg  100 mg Oral DAILY PRN    escitalopram oxalate (LEXAPRO) tablet 20 mg  20 mg Oral DAILY    pantoprazole (PROTONIX) tablet 40 mg  40 mg Oral DAILY    pindolol (VISKIN) tablet 2.5 mg  2.5 mg Oral PCL    polyethylene glycol (MIRALAX) packet 17 g  17 g Oral DAILY    QUEtiapine (SEROquel) tablet 25 mg  25 mg Oral QHS    oxybutynin chloride XL (DITROPAN XL) tablet 5 mg  5 mg Oral DAILY    rivastigmine (EXELON) 4.6 mg/24 hr patch 1 Patch  1 Patch TransDERmal DAILY    traMADol (ULTRAM) tablet 50 mg  50 mg Oral Q8H PRN    sodium chloride (NS) flush 5-10 mL  5-10 mL IntraVENous Q8H    sodium chloride (NS) flush 5-10 mL  5-10 mL IntraVENous PRN    piperacillin-tazobactam (ZOSYN) 3.375 g in 0.9% sodium chloride (MBP/ADV) 100 mL  3.375 g IntraVENous Q8H     ______________________________________________________________________  EXPECTED LENGTH OF STAY: 4d 14h  ACTUAL LENGTH OF STAY:          2                 Saundra Mederos MD

## 2017-02-13 NOTE — PROGRESS NOTES
Pulmonary, Critical Care, and Sleep Medicine~Progress Note    Name: Shoaib Santos MRN: [id number]   : 1933 Hospital: [facility]   Date: 2017 8:49 AM Admission: 2017     IMPRESSION:   · Acute hypoxic resp failure secondary to PNA; off NIV   · Multilobar PNA  · Parkinson's   · S/ PPM placement; followed by Dr Salina Sellers  · Hx of GERD   · Cannot move LE more that 45 degrees. Had tendon surgery several years ago and is now wheelchair bound  · DNR      PLAN:   · Vanc/zosyn/levaquin  · Lovenox/protonix   · Narrow abx with cultures  · O2 titration above 90%  · PT/OT   · Nebs and bronchial hygiene   · Can move out of ICU now off bipap  · PRN on Tuesday, will check back on wed     Daily Progression:    Breathing better, + congestion     OBJECTIVE:     Vital Signs:       Visit Vitals    BP (!) 111/95    Pulse 65    Temp 98.5 °F (36.9 °C)    Resp 16    Ht 6' 2\" (1.88 m)    Wt 100 kg (220 lb 7.4 oz)    SpO2 94%    BMI 28.31 kg/m2      Temp (24hrs), Av.5 °F (36.9 °C), Min:97.8 °F (36.6 °C), Max:99.3 °F (37.4 °C)     Intake/Output:     Last shift:      Last 3 shifts:  1901 -  0700  In: 470 [P.O.:220;  I.V.:250]  Out: -         Intake/Output Summary (Last 24 hours) at 17 0849  Last data filed at 17 0208   Gross per 24 hour   Intake              470 ml   Output                0 ml   Net              470 ml       Imaging:  I have personally reviewed these radiographic films and reports:     I have personally reviewed PFTs:   n/a       Ventilation:   Mode Rate Tidal Volume Pressure Suppport FiO2/LPM PEEP Other   NC            Peak airway pressure:      Minute ventilation:      Trilogy:        Physical Exam:                                        Exam Findings Other   General: No resp distress noted, appears stated age    HEENT:  No ulcers, JVD not elevated, no cervical LAD    Chest: No pectus deformity, normal chest rise b/l    HEART:  RRR, no murmurs/rubs/gallops    Lungs:  Mild faint wheeze     ABD: Soft/NT, non rigid mildly distended    EXT: No cyanosis/clubbing/edema, normal peripheral pulses    Skin: No rashes or ulcers, no mottling    Neuro: A/O x 3        Medications:  Current Facility-Administered Medications   Medication Dose Route Frequency    albuterol-ipratropium (DUO-NEB) 2.5 MG-0.5 MG/3 ML  3 mL Nebulization QID RT    acetylcysteine (MUCOMYST) 100 mg/mL (10 %) nebulizer solution 200 mg  2 mL Nebulization TID RT    albuterol-ipratropium (DUO-NEB) 2.5 MG-0.5 MG/3 ML  3 mL Nebulization Q4H PRN    levoFLOXacin (LEVAQUIN) 750 mg in D5W IVPB  750 mg IntraVENous Q24H    enoxaparin (LOVENOX) injection 40 mg  40 mg SubCUTAneous Q24H    acetaminophen (TYLENOL) tablet 650 mg  650 mg Oral Q6H PRN    aspirin (ASPIRIN) tablet 325 mg  325 mg Oral BID    carbidopa-levodopa (SINEMET)  mg per tablet 2 Tab  2 Tab Oral QID    cloNIDine HCl (CATAPRES) tablet 0.1 mg  0.1 mg Oral Q6H PRN    docusate sodium (COLACE) capsule 100 mg  100 mg Oral DAILY PRN    escitalopram oxalate (LEXAPRO) tablet 20 mg  20 mg Oral DAILY    pantoprazole (PROTONIX) tablet 40 mg  40 mg Oral DAILY    pindolol (VISKIN) tablet 2.5 mg  2.5 mg Oral PCL    polyethylene glycol (MIRALAX) packet 17 g  17 g Oral DAILY    QUEtiapine (SEROquel) tablet 25 mg  25 mg Oral QHS    oxybutynin chloride XL (DITROPAN XL) tablet 5 mg  5 mg Oral DAILY    rivastigmine (EXELON) 4.6 mg/24 hr patch 1 Patch  1 Patch TransDERmal DAILY    traMADol (ULTRAM) tablet 50 mg  50 mg Oral Q8H PRN    sodium chloride (NS) flush 5-10 mL  5-10 mL IntraVENous Q8H    sodium chloride (NS) flush 5-10 mL  5-10 mL IntraVENous PRN    piperacillin-tazobactam (ZOSYN) 3.375 g in 0.9% sodium chloride (MBP/ADV) 100 mL  3.375 g IntraVENous Q8H       Labs:  ABG Recent Labs      02/12/17   0557  02/12/17   0433  02/12/17   0408  02/11/17   2140   PHI   --   7.439  7.525*  7.484* PCO2I   --   30.8*  28.3*  27.2*   PO2I   --   197*   --   61*   HCO3I   --   20.8*  23.3  20.4*   SO2I   --   100*   --   93   FIO2I  50  44  44   --         CBC Recent Labs      02/12/17   0202  02/11/17 1907   WBC  9.8  8.7   HGB  9.9*  10.5*   HCT  30.5*  32.6*   PLT  157  165   MCV  91.3  92.9   MCH  29.6  38.8        Metabolic  Panel Recent Labs      02/12/17   0202  02/11/17 1907   NA  135*  133*   K  4.4  4.4   CL  102  100   CO2  20*  24   GLU  113*  125*   BUN  29*  31*   CREA  1.18  1.35*   CA  8.3*  8.7   ALB   --   3.5   SGOT   --   27   ALT   --   9*        Pertinent Labs n/a               Mario Alberto [place]  2/13/2017

## 2017-02-13 NOTE — PROGRESS NOTES
1930-Bedside and Verbal shift change report given to Gayle (oncoming nurse) by Benji Henriquez (offgoing nurse). Report included the following information Kardex, Procedure Summary, Intake/Output, MAR, Med Rec Status and Cardiac Rhythm Afib/remains hypertensive( PAT/NSVT/SSS,supine hypertension/orthostatic hypotension with difficult management per cardiology notes by Dr. Markus Seals)    [phone] transferred to room ICU 10 with all belongings  2030-JN given for wheezing  2052-remains in afib with rate ranging form 101-120/pt asymptomatic/no pacing-strip posted -hospitalist paged to update  2057-Leon NP returned page/updated- per NP Fariha Mayo continue to watch rhythm and rate and have pt's pacemaker interrogated in the am-NP to place order/pt no longer wheezing  2315-rhythm now paced/strip posted      0730-Bedside and Verbal shift change report given to [address] (oncoming nurse) by Darlin Villar RN (offgoing nurse). Report included the following information Kardex, ED Summary, Intake/Output, MAR, Recent Results and Cardiac Rhythm paced.

## 2017-02-13 NOTE — WOUND CARE
Wound and Skin Consult / New Consult for bilateral heel assessment and reddened sacrum- reviewed chart, assessed patient and spoke with nurse, Marino Felton. Patient alert and oriented just finished breakfast. Assisted with turning ( minimally) and has no complaints of pain. Dr. Federico Stuart approving wound recommendations and redness to skin POA. Assessment-supine in bed, turning side to side. 1. Removed Prevalon Boots and both heels very pink and blanching slowly. No opened areas and ankles remain clear. Recommend venelex ointment BID and continue with Prevalon Boots. 2. Loosened clean brief to assess sacrum and buttocks. Patient is incontinent urine and stool. Sacrum is pink and       Blanches, buttocks and gluteal cleft are pink secondary to moisture and incontinence. Recommendations-    1. Patient is on a [address] [facility] Bed for pressure redistribution. Use only Air Flow chuxs and draw sheet under patient. 2. Reposition:  Continue to turn every 1-2hrs to reposition for pressure relief, with pillows to protect bony prominences/float heels/ 2 assist needed for turning and moving up in bed. 3.Continue every 2hr continence checks; keep clean and dry and apply ointments/creams as appropriate for prevention of incontinence dermatitis; barrier cream q8h and PRN incontinence. ( Sensicare Barrier Cream). 4. Continue to monitor pain, nutrition and skin assessment. 5. Wound Care-      - venelex ointment BID to heels , then apply dry 4x4 and Prevalon Boots. 4x4 keeping Venelex ointment from getting         All over Vinnie Bro. - Sensicare Barrier Cream q8h and PRN incontinence.     Kirsten Castillo RN/WOCN

## 2017-02-13 NOTE — PROGRESS NOTES
Bedside, Verbal and Written shift change report given to Audra (oncoming nurse) by Mariano Gao (offgoing nurse). Report included the following information SBAR, Kardex, MAR and Recent Results. Shift Summary- VSS. Uneventful shift. Awaiting Remote tele bed. Bedside, Verbal and Written shift change report given to Sadie Mcintosh (oncoming nurse) by Hiren Fried (offgoing nurse). Report included the following information SBAR, Kardex and MAR.

## 2017-02-14 PROCEDURE — 74011000250 HC RX REV CODE- 250: Performed by: FAMILY MEDICINE

## 2017-02-14 PROCEDURE — 77010033678 HC OXYGEN DAILY

## 2017-02-14 PROCEDURE — 74011250637 HC RX REV CODE- 250/637: Performed by: FAMILY MEDICINE

## 2017-02-14 PROCEDURE — 65660000000 HC RM CCU STEPDOWN

## 2017-02-14 PROCEDURE — 74011000250 HC RX REV CODE- 250: Performed by: PHYSICIAN ASSISTANT

## 2017-02-14 PROCEDURE — 74011000258 HC RX REV CODE- 258: Performed by: FAMILY MEDICINE

## 2017-02-14 PROCEDURE — 74011250636 HC RX REV CODE- 250/636: Performed by: FAMILY MEDICINE

## 2017-02-14 PROCEDURE — 94640 AIRWAY INHALATION TREATMENT: CPT

## 2017-02-14 PROCEDURE — 74011250636 HC RX REV CODE- 250/636: Performed by: INTERNAL MEDICINE

## 2017-02-14 PROCEDURE — 74011250637 HC RX REV CODE- 250/637: Performed by: INTERNAL MEDICINE

## 2017-02-14 RX ADMIN — Medication 10 ML: at 23:00

## 2017-02-14 RX ADMIN — CARBIDOPA AND LEVODOPA 2 TABLET: 25; 100 TABLET ORAL at 17:13

## 2017-02-14 RX ADMIN — CASTOR OIL AND BALSAM, PERU: 788; 87 OINTMENT TOPICAL at 08:42

## 2017-02-14 RX ADMIN — PIPERACILLIN SODIUM,TAZOBACTAM SODIUM 3.38 G: 3; .375 INJECTION, POWDER, FOR SOLUTION INTRAVENOUS at 13:20

## 2017-02-14 RX ADMIN — IPRATROPIUM BROMIDE AND ALBUTEROL SULFATE 3 ML: .5; 3 SOLUTION RESPIRATORY (INHALATION) at 12:16

## 2017-02-14 RX ADMIN — CASTOR OIL AND BALSAM, PERU: 788; 87 OINTMENT TOPICAL at 17:13

## 2017-02-14 RX ADMIN — CARBIDOPA AND LEVODOPA 2 TABLET: 25; 100 TABLET ORAL at 21:43

## 2017-02-14 RX ADMIN — PIPERACILLIN SODIUM,TAZOBACTAM SODIUM 3.38 G: 3; .375 INJECTION, POWDER, FOR SOLUTION INTRAVENOUS at 23:00

## 2017-02-14 RX ADMIN — PINDOLOL 2.5 MG: 5 TABLET ORAL at 12:00

## 2017-02-14 RX ADMIN — PIPERACILLIN SODIUM,TAZOBACTAM SODIUM 3.38 G: 3; .375 INJECTION, POWDER, FOR SOLUTION INTRAVENOUS at 05:37

## 2017-02-14 RX ADMIN — LEVOFLOXACIN 750 MG: 5 INJECTION, SOLUTION INTRAVENOUS at 21:42

## 2017-02-14 RX ADMIN — Medication 10 ML: at 05:39

## 2017-02-14 RX ADMIN — ENOXAPARIN SODIUM 40 MG: 40 INJECTION SUBCUTANEOUS at 12:00

## 2017-02-14 RX ADMIN — ACETYLCYSTEINE 200 MG: 200 SOLUTION ORAL; RESPIRATORY (INHALATION) at 08:45

## 2017-02-14 RX ADMIN — TRAMADOL HYDROCHLORIDE 50 MG: 50 TABLET, FILM COATED ORAL at 17:13

## 2017-02-14 RX ADMIN — CARBIDOPA AND LEVODOPA 2 TABLET: 25; 100 TABLET ORAL at 12:00

## 2017-02-14 RX ADMIN — ACETYLCYSTEINE 200 MG: 200 SOLUTION ORAL; RESPIRATORY (INHALATION) at 16:22

## 2017-02-14 RX ADMIN — CARBIDOPA AND LEVODOPA 2 TABLET: 25; 100 TABLET ORAL at 08:43

## 2017-02-14 RX ADMIN — ACETYLCYSTEINE 200 MG: 200 SOLUTION ORAL; RESPIRATORY (INHALATION) at 20:42

## 2017-02-14 RX ADMIN — OXYBUTYNIN CHLORIDE 5 MG: 5 TABLET, EXTENDED RELEASE ORAL at 08:44

## 2017-02-14 RX ADMIN — TRAMADOL HYDROCHLORIDE 50 MG: 50 TABLET, FILM COATED ORAL at 08:43

## 2017-02-14 RX ADMIN — IPRATROPIUM BROMIDE AND ALBUTEROL SULFATE 3 ML: .5; 3 SOLUTION RESPIRATORY (INHALATION) at 20:42

## 2017-02-14 RX ADMIN — IPRATROPIUM BROMIDE AND ALBUTEROL SULFATE 3 ML: .5; 3 SOLUTION RESPIRATORY (INHALATION) at 08:45

## 2017-02-14 RX ADMIN — Medication 325 MG: at 08:43

## 2017-02-14 RX ADMIN — POLYETHYLENE GLYCOL 3350 17 G: 17 POWDER, FOR SOLUTION ORAL at 08:43

## 2017-02-14 RX ADMIN — ESCITALOPRAM OXALATE 20 MG: 10 TABLET ORAL at 08:44

## 2017-02-14 RX ADMIN — Medication 325 MG: at 17:13

## 2017-02-14 RX ADMIN — IPRATROPIUM BROMIDE AND ALBUTEROL SULFATE 3 ML: .5; 3 SOLUTION RESPIRATORY (INHALATION) at 16:23

## 2017-02-14 RX ADMIN — QUETIAPINE FUMARATE 25 MG: 25 TABLET, FILM COATED ORAL at 21:43

## 2017-02-14 RX ADMIN — Medication 10 ML: at 13:20

## 2017-02-14 RX ADMIN — PANTOPRAZOLE SODIUM 40 MG: 40 TABLET, DELAYED RELEASE ORAL at 08:44

## 2017-02-14 RX ADMIN — ACETAMINOPHEN 650 MG: 325 TABLET, FILM COATED ORAL at 03:29

## 2017-02-14 NOTE — PROGRESS NOTES
Hospitalist Progress Note  Jcarlos Caban MD  Office: [phone]  Cell: [phone]      Date of Service:  2017  NAME:  Alee Odonnell  :  1933  MRN:  [id number]      Admission Summary:   80-year-old white male   with past medical history of Parkinson's disease, gait abnormality,   general debility, reflux esophagitis, hypertension, hyperlipidemia,   status post cardiac pacemaker implantation, degenerative disk   disease, degenerative joint disease, and duodenal ulcer comes over here with SOB    Interval history / Subjective:     No new issues  Looks better     Assessment & Plan:     Community Acquire Pneumonia  -CXR  There is focal area of airspace disease right midlung and right base  may represent pneumonia  CXR  no interval change  -Follow cultures, negative so far  -On Vanc/Zosyn/LVQ, now Vanc discontinued  -Continues to do fine on Zosyn/LVQ  -Will consider discharge tomorrow     Acute hypoxic respiratory failure  -Duonebs/ now on room air  -looks much better  -Appreciate Pulmonary    S/p PM placement  -Followed by Dr Vinita Lara note, PM needs replacement  -Spoke to Dr Nagi Corral, outpatient changing of the pacemaker    Hypotension  -sec to above  -resolved    Dehydration  -On IV fluids  -resolved    Anemia  -follow work up    Parkinson's disease  -on home meds    Generalized debility  -PT/OT, would likely need snf    Code status: DNR  DVT prophylaxis: Lovenox    Plan: If ok with Dr Nagi Corral, will discharge the patient today     Care Plan discussed with: Patient/Family  Disposition: as above     Hospital Problems  Date Reviewed: 2017          Codes Class Noted POA    * (Principal)Pneumonia ICD-10-CM: J18.9  ICD-9-CM: 358  2017 Unknown                Review of Systems:   A comprehensive review of systems was negative except for that written in the HPI. Vital Signs:    Last 24hrs VS reviewed since prior progress note. Most recent are:  Visit Vitals    BP (!) 145/95 (BP 1 Location: Right arm, BP Patient Position: At rest)    Pulse 67    Temp 98.1 °F (36.7 °C)    Resp 18    Ht 6' 2\" (1.88 m)    Wt 96.9 kg (213 lb 11.2 oz)    SpO2 95%    BMI 27.44 kg/m2         Intake/Output Summary (Last 24 hours) at 02/14/17 1235  Last data filed at 02/14/17 1200   Gross per 24 hour   Intake              735 ml   Output              350 ml   Net              385 ml        Physical Examination:             Constitutional:  No acute distress, cooperative, pleasant    ENT:  Oral mucous moist, oropharynx benign. Neck supple,    Resp:  CTA bilaterally. No wheezing/rhonchi/rales. No accessory muscle use   CV:  Regular rhythm, normal rate, no murmurs, gallops, rubs    GI:  Soft, non distended, non tender. normoactive bowel sounds, no hepatosplenomegaly     Musculoskeletal:  No edema, warm, 2+ pulses throughout    Neurologic:  Moves all extremities. AAOx3, CN II-XII reviewed     Skin:  Good turgor, no rashes or ulcers       Data Review:    Review and/or order of clinical lab test      Labs:     Recent Labs      02/12/17   0202  02/11/17 1907   WBC  9.8  8.7   HGB  9.9*  10.5*   HCT  30.5*  32.6*   PLT  157  165     Recent Labs      02/12/17   0202  02/11/17 1907   NA  135*  133*   K  4.4  4.4   CL  102  100   CO2  20*  24   BUN  29*  31*   CREA  1.18  1. 35*   GLU  113*  125*   CA  8.3*  8.7     Recent Labs      02/11/17 1907   SGOT  27   ALT  9*   AP  61   TBILI  1.1*   TP  6.6   ALB  3.5   GLOB  3.1     No results for input(s): INR, PTP, APTT in the last 72 hours. No lab exists for component: INREXT, INREXT   No results for input(s): FE, TIBC, PSAT, FERR in the last 72 hours. Lab Results   Component Value Date/Time    Folate 32.6 10/30/2013 10:30 AM      No results for input(s): PH, PCO2, PO2 in the last 72 hours.   Recent Labs      02/11/17   1907   CPK  141   CKNDX  2.1   TROIQ  <0.04     Lab Results   Component Value Date/Time Cholesterol, total 144 11/06/2015 12:05 PM    HDL Cholesterol 45 11/06/2015 12:05 PM    LDL, calculated 88 11/06/2015 12:05 PM    Triglyceride 57 11/06/2015 12:05 PM    CHOL/HDL Ratio 3.0 09/29/2010 11:12 AM     Lab Results   Component Value Date/Time    Glucose (POC) 97 08/23/2016 02:04 PM    Glucose (POC) 125 08/23/2016 01:20 PM    Glucose (POC) 109 06/15/2016 09:23 PM    Glucose (POC) 108 06/15/2016 05:28 PM    Glucose (POC) 116 06/15/2016 06:11 AM     Lab Results   Component Value Date/Time    Color YELLOW/STRAW 02/13/2017 05:19 PM    Appearance CLEAR 02/13/2017 05:19 PM    Specific gravity 1.029 02/13/2017 05:19 PM    pH (UA) 5.5 02/13/2017 05:19 PM    Protein TRACE 02/13/2017 05:19 PM    Glucose NEGATIVE  02/13/2017 05:19 PM    Ketone TRACE 02/13/2017 05:19 PM    Bilirubin NEGATIVE  08/23/2016 11:32 AM    Urobilinogen 1.0 02/13/2017 05:19 PM    Nitrites NEGATIVE  02/13/2017 05:19 PM    Leukocyte Esterase NEGATIVE  02/13/2017 05:19 PM    Epithelial cells FEW 02/13/2017 05:19 PM    Bacteria NEGATIVE  02/13/2017 05:19 PM    WBC 0-4 02/13/2017 05:19 PM    RBC 0-5 02/13/2017 05:19 PM         Medications Reviewed:     Current Facility-Administered Medications   Medication Dose Route Frequency    acetylcysteine (MUCOMYST) 200 mg/mL (20 %) solution 200 mg  200 mg Nebulization TID RT    balsam peru-castor oil (VENELEX)  mg/gram ointment   Topical BID    albuterol-ipratropium (DUO-NEB) 2.5 MG-0.5 MG/3 ML  3 mL Nebulization Q4H PRN    levoFLOXacin (LEVAQUIN) 750 mg in D5W IVPB  750 mg IntraVENous Q24H    enoxaparin (LOVENOX) injection 40 mg  40 mg SubCUTAneous Q24H    acetaminophen (TYLENOL) tablet 650 mg  650 mg Oral Q6H PRN    aspirin (ASPIRIN) tablet 325 mg  325 mg Oral BID    carbidopa-levodopa (SINEMET)  mg per tablet 2 Tab  2 Tab Oral QID    cloNIDine HCl (CATAPRES) tablet 0.1 mg  0.1 mg Oral Q6H PRN    docusate sodium (COLACE) capsule 100 mg  100 mg Oral DAILY PRN    escitalopram oxalate (LEXAPRO) tablet 20 mg  20 mg Oral DAILY    pantoprazole (PROTONIX) tablet 40 mg  40 mg Oral DAILY    pindolol (VISKIN) tablet 2.5 mg  2.5 mg Oral PCL    polyethylene glycol (MIRALAX) packet 17 g  17 g Oral DAILY    QUEtiapine (SEROquel) tablet 25 mg  25 mg Oral QHS    oxybutynin chloride XL (DITROPAN XL) tablet 5 mg  5 mg Oral DAILY    rivastigmine (EXELON) 4.6 mg/24 hr patch 1 Patch  1 Patch TransDERmal DAILY    traMADol (ULTRAM) tablet 50 mg  50 mg Oral Q8H PRN    sodium chloride (NS) flush 5-10 mL  5-10 mL IntraVENous Q8H    sodium chloride (NS) flush 5-10 mL  5-10 mL IntraVENous PRN    piperacillin-tazobactam (ZOSYN) 3.375 g in 0.9% sodium chloride (MBP/ADV) 100 mL  3.375 g IntraVENous Q8H     ______________________________________________________________________  EXPECTED LENGTH OF STAY: 4d 14h  ACTUAL LENGTH OF STAY:          Yfn Oneal MD

## 2017-02-14 NOTE — PROGRESS NOTES
Care Management: Chart reviewed. Patient admitted from [facility] via EMS for shortness of breath. Admitted for Community Acquired Pneumonia to ICU on BIPAP. PMH: Pacemaker, DJD, paralysis agitans, Parkinson's, HTN, esophagitis, duodenal ulcer, sepsis. I spoke to patient at bedside & to his daughter Alberto Max via phone (Work Cell: [phone] & Home Cell: [phone]) and explained role of Care Management in transitions of care. Confirmed address, phone, PCP, & insurance. Patient has an Advanced Directive, naming his 3 children as medical POA, noting his daughter Alberto Max is the only in-town child. Medications are provided by the facility. Living Situation/Disposition Planning: Patient lives in a 2 bedroom apartment at [facility], where they provide assistance with medications, bathing, meals, toileting, dressing for the day & for bed, etc.  The family has also hired a Caregiver 3 times/week for 4-5 hours to assist patient as well. Note that the patient is wheelchair bound. DME also includes a shower chair & chair lift. Patient's daughter, Ralph Nava, shared that she is reluctant to agree to SNF placement, as patient has been in a SNF and he seems to worsen in that enviroment, because he is away from his routine. Per his daughter, they use non-emergent stretcher ambulance or a wheelchair [facility] with it's own wheelchair. Await PT/OT recommendations for level of care. Care Management will continue to follow for disposition planning. LIZABETH PillaiN CCM     Addendum at 2:38 PM: Notified Polina Swartz, Nurse Navigator, regarding patient's admission. LIZABETH Pillai CCM     Care Management Interventions  PCP Verified by CM:  Yes (PCP: Dr. Christopher Cheung at [phone].)  Last Visit to PCP: 01/31/17  Palliative Care Consult (Criteria: CHF and RRAT>21): No  Reason for No Palliative Care Consult:  (Does not meet medical criteria. )  Mode of Transport at Discharge:  ([facility] vs. wheelchair [place].  )  Transition of Care Consult (CM Consult):  (TBD.)  Dawoodt Signup: Yes  Discharge Durable Medical Equipment: No  Physical Therapy Consult: Yes  Occupational Therapy Consult: Yes  Speech Therapy Consult: Yes  Current Support Network: Assisted Living (Patient lives in 2 bedroom apartment at [facility].  )  Confirm Follow Up Transport:  (TBD.)  Discharge Location  Discharge Placement:  (TBD.)

## 2017-02-14 NOTE — PROGRESS NOTES
Occupational Therapy Screening:  Services maybe indicated at this time. [facility] for [facility] therapy based on results obtained during the nursing admission assessment. The patients chart was reviewed . Please order a consult for occupational therapy if patient has had a decline in function from baseline and you would like an evaluation to be completed. Thank you.    7/2016 Prior Level of Function/Home Situation: Pt reports he resides at [facility], has been at [facility]. Set-up to Gina for UB ADLs, maxA for LB ADLs, has assistance for lateral/sliding board transfers to wheelchair<>BSC<>shower.   Home Situation  Home Environment: [address] [place] [address] Name: [facility], was living at [facility] prior  One/Two Story Residence: One story  Living Alone: No  Support Systems: Assisted living, Skilled nursing facility, Family member(s)  Patient Expects to be Discharged to[de-identified] Skilled nursing facility  Current DME Used/Available at Home: Wheelchair, [address] [facility] chair, Grab bars  Tub or Shower Type: Shower

## 2017-02-14 NOTE — PROGRESS NOTES
0730- Bedside report received from Jayce Virgen RN, using [employer]. Pt resting quietly at this time. Pt oriented to self, year, and hospital.  Unsure of what month and exactly which hospital.  Confused conversation noted at times. PERRL. SAMUEL with BLE weakness. VSS. Awaiting bed on Remote tele unit. 1930- Bedside report given to Yumiko Whalen RN, using [employer]. Pt resting quietly at this time. Pain controlled with Tramadol PRN. Pt still has periods of confusion at times.

## 2017-02-14 NOTE — PROGRESS NOTES
2000 Bedside and Verbal shift change report given to Carmella Lundberg RN (oncoming nurse) by Demetris Contreras RN (offgoing nurse). Report included the following information SBAR, Kardex, ED Summary, Procedure Summary, Intake/Output, MAR and Recent Results. 0800 Bedside and Verbal shift change report given to Samreen Cervantes RN (oncoming nurse) by Carmella Lundberg RN (offgoing nurse). Report included the following information SBAR, Kardex, ED Summary, Procedure Summary, Intake/Output, MAR and Recent Results.      Problem: Pneumonia: Day 4  Goal: Activity/Safety  Outcome: Not Progressing Towards Goal  Limited mobility in bed; wheelchair bound baseline  Goal: Nutrition/Diet  Outcome: Progressing Towards Goal  Tolerating cardiac diet  Goal: Respiratory  Outcome: Progressing Towards Goal  RA

## 2017-02-14 NOTE — PROGRESS NOTES
Attended interdisciplinary rounds in ICU. : Rev. Shannan Brito.  Dominga Arriaza; to contact [phone] [address] call: [phone]

## 2017-02-15 ENCOUNTER — TELEPHONE (OUTPATIENT)
Dept: FAMILY MEDICINE CLINIC | Age: 82
End: 2017-02-15

## 2017-02-15 VITALS
TEMPERATURE: 98.2 F | HEART RATE: 65 BPM | WEIGHT: 219.58 LBS | HEIGHT: 74 IN | BODY MASS INDEX: 28.18 KG/M2 | OXYGEN SATURATION: 98 % | SYSTOLIC BLOOD PRESSURE: 150 MMHG | RESPIRATION RATE: 20 BRPM | DIASTOLIC BLOOD PRESSURE: 94 MMHG

## 2017-02-15 LAB
ANION GAP BLD CALC-SCNC: 11 MMOL/L (ref 5–15)
BACTERIA SPEC CULT: NORMAL
BACTERIA SPEC CULT: NORMAL
BUN SERPL-MCNC: 16 MG/DL (ref 6–20)
BUN/CREAT SERPL: 15 (ref 12–20)
CALCIUM SERPL-MCNC: 8.4 MG/DL (ref 8.5–10.1)
CHLORIDE SERPL-SCNC: 102 MMOL/L (ref 97–108)
CO2 SERPL-SCNC: 22 MMOL/L (ref 21–32)
CREAT SERPL-MCNC: 1.08 MG/DL (ref 0.7–1.3)
ERYTHROCYTE [DISTWIDTH] IN BLOOD BY AUTOMATED COUNT: 14.4 % (ref 11.5–14.5)
GLUCOSE SERPL-MCNC: 97 MG/DL (ref 65–100)
GRAM STN SPEC: NORMAL
HCT VFR BLD AUTO: 32.2 % (ref 36.6–50.3)
HGB BLD-MCNC: 10.5 G/DL (ref 12.1–17)
MCH RBC QN AUTO: 30.2 PG (ref 26–34)
MCHC RBC AUTO-ENTMCNC: 32.6 G/DL (ref 30–36.5)
MCV RBC AUTO: 92.5 FL (ref 80–99)
PLATELET # BLD AUTO: 169 K/UL (ref 150–400)
POTASSIUM SERPL-SCNC: 4.1 MMOL/L (ref 3.5–5.1)
RBC # BLD AUTO: 3.48 M/UL (ref 4.1–5.7)
SERVICE CMNT-IMP: NORMAL
SODIUM SERPL-SCNC: 135 MMOL/L (ref 136–145)
WBC # BLD AUTO: 6.1 K/UL (ref 4.1–11.1)

## 2017-02-15 PROCEDURE — 74011000250 HC RX REV CODE- 250: Performed by: PHYSICIAN ASSISTANT

## 2017-02-15 PROCEDURE — 74011250636 HC RX REV CODE- 250/636: Performed by: FAMILY MEDICINE

## 2017-02-15 PROCEDURE — 74011250636 HC RX REV CODE- 250/636: Performed by: INTERNAL MEDICINE

## 2017-02-15 PROCEDURE — 97165 OT EVAL LOW COMPLEX 30 MIN: CPT

## 2017-02-15 PROCEDURE — 74011000250 HC RX REV CODE- 250: Performed by: FAMILY MEDICINE

## 2017-02-15 PROCEDURE — 94640 AIRWAY INHALATION TREATMENT: CPT

## 2017-02-15 PROCEDURE — 77010033678 HC OXYGEN DAILY

## 2017-02-15 PROCEDURE — 36415 COLL VENOUS BLD VENIPUNCTURE: CPT | Performed by: HOSPITALIST

## 2017-02-15 PROCEDURE — 80048 BASIC METABOLIC PNL TOTAL CA: CPT | Performed by: HOSPITALIST

## 2017-02-15 PROCEDURE — G8987 SELF CARE CURRENT STATUS: HCPCS

## 2017-02-15 PROCEDURE — 85027 COMPLETE CBC AUTOMATED: CPT | Performed by: HOSPITALIST

## 2017-02-15 PROCEDURE — G8988 SELF CARE GOAL STATUS: HCPCS

## 2017-02-15 PROCEDURE — 74011250637 HC RX REV CODE- 250/637: Performed by: FAMILY MEDICINE

## 2017-02-15 PROCEDURE — 74011000258 HC RX REV CODE- 258: Performed by: FAMILY MEDICINE

## 2017-02-15 RX ORDER — AMOXICILLIN AND CLAVULANATE POTASSIUM 875; 125 MG/1; MG/1
1 TABLET, FILM COATED ORAL EVERY 12 HOURS
Qty: 12 TAB | Refills: 0 | Status: SHIPPED | OUTPATIENT
Start: 2017-02-15 | End: 2017-02-21

## 2017-02-15 RX ORDER — AMOXICILLIN AND CLAVULANATE POTASSIUM 875; 125 MG/1; MG/1
1 TABLET, FILM COATED ORAL EVERY 12 HOURS
Qty: 12 TAB | Refills: 0 | Status: SHIPPED | OUTPATIENT
Start: 2017-02-15 | End: 2017-02-15

## 2017-02-15 RX ADMIN — CASTOR OIL AND BALSAM, PERU: 788; 87 OINTMENT TOPICAL at 09:05

## 2017-02-15 RX ADMIN — Medication 10 ML: at 13:26

## 2017-02-15 RX ADMIN — CARBIDOPA AND LEVODOPA 2 TABLET: 25; 100 TABLET ORAL at 18:21

## 2017-02-15 RX ADMIN — PINDOLOL 2.5 MG: 5 TABLET ORAL at 13:25

## 2017-02-15 RX ADMIN — POLYETHYLENE GLYCOL 3350 17 G: 17 POWDER, FOR SOLUTION ORAL at 08:57

## 2017-02-15 RX ADMIN — ACETYLCYSTEINE 200 MG: 200 SOLUTION ORAL; RESPIRATORY (INHALATION) at 08:10

## 2017-02-15 RX ADMIN — ACETYLCYSTEINE 200 MG: 200 SOLUTION ORAL; RESPIRATORY (INHALATION) at 15:00

## 2017-02-15 RX ADMIN — CARBIDOPA AND LEVODOPA 2 TABLET: 25; 100 TABLET ORAL at 13:25

## 2017-02-15 RX ADMIN — OXYBUTYNIN CHLORIDE 5 MG: 5 TABLET, EXTENDED RELEASE ORAL at 08:57

## 2017-02-15 RX ADMIN — CARBIDOPA AND LEVODOPA 2 TABLET: 25; 100 TABLET ORAL at 08:57

## 2017-02-15 RX ADMIN — TRAMADOL HYDROCHLORIDE 50 MG: 50 TABLET, FILM COATED ORAL at 07:07

## 2017-02-15 RX ADMIN — Medication 10 ML: at 06:08

## 2017-02-15 RX ADMIN — Medication 325 MG: at 18:21

## 2017-02-15 RX ADMIN — IPRATROPIUM BROMIDE AND ALBUTEROL SULFATE 3 ML: .5; 3 SOLUTION RESPIRATORY (INHALATION) at 15:00

## 2017-02-15 RX ADMIN — ESCITALOPRAM OXALATE 20 MG: 10 TABLET ORAL at 08:57

## 2017-02-15 RX ADMIN — ENOXAPARIN SODIUM 40 MG: 40 INJECTION SUBCUTANEOUS at 13:25

## 2017-02-15 RX ADMIN — PANTOPRAZOLE SODIUM 40 MG: 40 TABLET, DELAYED RELEASE ORAL at 08:57

## 2017-02-15 RX ADMIN — PIPERACILLIN SODIUM,TAZOBACTAM SODIUM 3.38 G: 3; .375 INJECTION, POWDER, FOR SOLUTION INTRAVENOUS at 06:08

## 2017-02-15 RX ADMIN — IPRATROPIUM BROMIDE AND ALBUTEROL SULFATE 3 ML: .5; 3 SOLUTION RESPIRATORY (INHALATION) at 08:10

## 2017-02-15 RX ADMIN — Medication 325 MG: at 08:57

## 2017-02-15 NOTE — DISCHARGE SUMMARY
Discharge Summary       PATIENT ID: Newton Daniels  MRN: [id number]   YOB: 1933    DATE OF ADMISSION: 2/11/2017  6:49 PM    DATE OF DISCHARGE: 2/15/2017   PRIMARY CARE PROVIDER: Kay Oneal MD     ATTENDING PHYSICIAN: Dr Ruth Li  DISCHARGING PROVIDER: Ruth Li MD    To contact this individual call [phone] and ask the  to page. If unavailable ask to be transferred the Adult Hospitalist Department. CONSULTATIONS: IP CONSULT TO HOSPITALIST  IP CONSULT TO INTENSIVIST    PROCEDURES/SURGERIES: * No surgery found *    ADMITTING DIAGNOSES & HOSPITAL COURSE:   Community Acquire Pneumonia  -CXR 2/11 There is focal area of airspace disease right midlung and right base  may represent pneumonia  CXR 2/12 no interval change  -Blood culture no growth  -Sputum culture normal respiratory samuel/candida  -On Vanc/Zosyn/LVQ, now Vanc discontinued  -Continues to do fine on Zosyn/LVQ  -Will discharge the patient today on Augmentin     Acute hypoxic respiratory failure  -Duonebs/ now back on 2l of oxygen. The patient is hypoxic to 86% on room air and needs 2l of oxygen continuous for now  -looks much better  -Appreciate Pulmonary     S/p PM placement  -Followed by Dr Davida Griffiths note, PM needs replacement  -Spoke to Dr Shantell Ham, outpatient changing of the pacemaker     Hypotension  -sec to above  -resolved     Dehydration  -On IV fluids  -resolved     Anemia  -follow work up     Parkinson's disease  -on home meds     Generalized debility  -PT/OT, would likely need snf. Reportedly according to the daughter the patient does not do fine when goes to SNF. Instead the family has been able to put some extra services in at [facility]. Will do     Code status: DNR  DVT prophylaxis: Lovenox     Plan: Discharge today back to [facility]. Spoke to         DISCHARGE DIAGNOSES / PLAN:      1.   Community Acquired Pneumonia       PENDING TEST RESULTS:   At the time of discharge the following test results are still pending: none    FOLLOW UP APPOINTMENTS:    Follow-up Information     Follow up With Details Comments Contact Karley Rachel MD In 1 week  [address] [facility] [phone]             ADDITIONAL CARE RECOMMENDATIONS:   Fall precautions  Aspiration precautions    DIET: Cardiac Diet      OXYGEN / BiPAP SETTINGS: 2l of NC continuous, wean as tolerated    ACTIVITY: Activity as tolerated      DISCHARGE MEDICATIONS:   See Medication Reconciliation Form      NOTIFY YOUR PHYSICIAN FOR ANY OF THE FOLLOWING:   Fever over 101 degrees for 24 hours. Chest pain, shortness of breath, fever, chills, nausea, vomiting, diarrhea, change in mentation, falling, weakness, bleeding. Severe pain or pain not relieved by medications. Or, any other signs or symptoms that you may have questions about.     DISPOSITION:    Home With:   OT  PT  HH  RN      x SNF/Inpatient Rehab/LTAC    Independent/assisted living    Hospice    Other:       PATIENT CONDITION AT DISCHARGE:     Functional status    Poor    x Deconditioned     Independent      Cognition    x Lucid     Forgetful     Dementia      Catheters/lines (plus indication)    Vance     PICC     PEG    x Oxygen through nasal cannula 2l     Code status     Full code    x DNR      PHYSICAL EXAMINATION AT DISCHARGE:  Please see progress note        CHRONIC MEDICAL DIAGNOSES:  Problem List as of 2/15/2017  Date Reviewed: 2017          Codes Class Noted - Resolved    * (Principal)Pneumonia ICD-10-CM: J18.9  ICD-9-CM: 010  2017 - Present        Aneurysm of iliac artery ([address]- left 3.9 cm  CT scan- no change ICD-10-CM: I72.3  ICD-9-CM: 442.2  2016 - Present        Grief- wife of 61 years  2015 ICD-10-CM: F43.20  ICD-9-CM: 309.0  2015 - Present        Primary osteoarthritis of both knees ICD-10-CM: M17.0  ICD-9-CM: 715.16  2015 - Present        Overactive bladder- Dr. 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 (Chronic) ICD-10-CM: R17.777H  ICD-9-CM: 844.8  12/31/2014 - Present        Mild cognitive impairment w/o memory loss ICD-10-CM: G31.84  ICD-9-CM: 331.83  11/19/2014 - Present        Major depressive disorder, single episode, unspecified ICD-10-CM: F32.9  ICD-9-CM: 296.20  11/19/2014 - Present        Generalized anxiety disorder ICD-10-CM: F41.1  ICD-9-CM: 300.02  11/19/2014 - Present        Pacemaker ICD-10-CM: Z95.0  ICD-9-CM: V45.01  10/21/2013 - Present        Orthostatic hypotension ICD-10-CM: I95.1  ICD-9-CM: 458.0  9/28/2013 - Present        Blurry vision-chronic, no change- neg w/u ICD-10-CM: H53.8  ICD-9-CM: 368.8  7/9/2013 - Present        NSVT (nonsustained ventricular tachycardia) (HCC) ICD-10-CM: I47.2  ICD-9-CM: 427.1  7/20/2012 - Present    Overview Signed 7/20/2012  9:56 AM by Edward Contreras MD     2. 2.2012             PAT (paroxysmal atrial tachycardia) (HCC) ICD-10-CM: I47.1  ICD-9-CM: 427.0  1/19/2012 - Present        Sick sinus syndrome-pacemaker-2008 ICD-10-CM: I49.5  ICD-9-CM: 427.81  10/28/2011 - Present        BPH (benign prostatic hyperplasia)- Dr. Faustino Santo: N40.0  ICD-9-CM: 600.00  10/3/2011 - Present        Essential hypertension, difficult to control due to orthostatic hypotension ICD-10-CM: I10  ICD-9-CM: 401.1  4/18/2011 - Present        Parkinsonian syndrome- Dr. Chris Miguel ICD-10-CM: Lavella Metro  ICD-9-CM: 332.0  4/8/2011 - Present        Hyperlipemia ICD-10-CM: E78.5  ICD-9-CM: 272.4  2/19/2010 - Present        Reflux esophagitis ICD-10-CM: K21.0  ICD-9-CM: 530.11  2/19/2010 - Present        DDD (degenerative disc disease), lumbar ICD-10-CM: M51.36  ICD-9-CM: 722.52  2/19/2010 - Present        DJD (degenerative joint disease) of cervical spine ICD-10-CM: M47.812  ICD-9-CM: 721.0  2/19/2010 - Present        DU (duodenal ulcer) ICD-10-CM: K26.9  ICD-9-CM: 532.90  9/1/1990 - Present History of duodenal ulcer ICD-10-CM: Z87.19  ICD-9-CM: V12.79  9/1/1990 - Present        RESOLVED: Sepsis ([facility] ICD-10-CM: A41.9  ICD-9-CM: 038.9, 995.91  6/10/2016 - 7/28/2016        RESOLVED: Catheter-associated urinary tract infection ([facility] ICD-10-CM: T83.511A, N39.0  ICD-9-CM: 996.64, 599.0  4/15/2016 - 4/18/2016        RESOLVED: Metabolic encephalopathy DVS-22-OU: G93.41  ICD-9-CM: 348.31  4/15/2016 - 4/18/2016        RESOLVED: GABI (acute kidney injury) ([facility] ICD-10-CM: N17.9  ICD-9-CM: 584.9  4/15/2016 - 4/18/2016        RESOLVED: Hyponatremia ICD-10-CM: E87.1  ICD-9-CM: 276.1  4/15/2016 - 4/18/2016        RESOLVED: Mental status change ICD-10-CM: R41.82  ICD-9-CM: 780.97  10/30/2013 - 1/5/2016        RESOLVED: Pneumonia, organism unspecified ICD-10-CM: J18.9  ICD-9-CM: 529  2/19/2012 - 1/5/2016        RESOLVED: Weakness generalized ICD-10-CM: R53.1  ICD-9-CM: 780.79  2/19/2012 - 2/22/2012        RESOLVED: PAT (paroxysmal atrial tachycardia) ([facility] ICD-10-CM: I47.1  ICD-9-CM: 427.0  1/19/2012 - 2/29/2012        RESOLVED: Premature atrial beats ICD-10-CM: I49.1  ICD-9-CM: 427.61  Unknown - 2/29/2012        RESOLVED: Paralysis agitans ([facility] ICD-10-CM: Opal Langley  ICD-9-CM: 332.0  2/19/2010 - 10/28/2011              Greater than 39 minutes were spent with the patient on counseling and coordination of care    Signed:   Chilo Tyson MD  2/15/2017  1:52 PM

## 2017-02-15 NOTE — PROGRESS NOTES
Bedside shift change report received from Nina Ewing, [address] [place] [address]. Report included the following information SBAR, Kardex, Procedure Summary, Intake/Output, MAR and Recent Results. 2000: VSS, on RA. CAM negative. 0400: No changes. Shift Summary: Uneventful shift, VSS, on RA. CAM negative.

## 2017-02-15 NOTE — DISCHARGE INSTRUCTIONS
Discharge SNF/Rehab Instructions/LTAC       PATIENT ID: Viktoria Hernandez  MRN: [id number]   YOB: 1933    DATE OF ADMISSION: 2/11/2017  6:49 PM    DATE OF DISCHARGE: 2/15/2017    PRIMARY CARE PROVIDER: David Wilson MD       ATTENDING PHYSICIAN: Kentrell Spain MD  DISCHARGING PROVIDER: Kentrell Spain MD     To contact this individual call [phone] and ask the  to page. If unavailable ask to be transferred the Adult Hospitalist Department. CONSULTATIONS: IP CONSULT TO HOSPITALIST  IP CONSULT TO INTENSIVIST    PROCEDURES/SURGERIES: * No surgery found *    ADMITTING [address] [place] [address] COURSE:   Community Acquire Pneumonia  -CXR 2/11 There is focal area of airspace disease right midlung and right base  may represent pneumonia  CXR 2/12 no interval change  -Blood culture no growth  -Sputum culture normal respiratory samuel/candida  -On Vanc/Zosyn/LVQ, now Vanc discontinued  -Continues to do fine on Zosyn/LVQ  -Will discharge the patient today on Augmentin     Acute hypoxic respiratory failure  -Duonebs/ now on room air  -looks much better  -Appreciate Pulmonary     S/p PM placement  -Followed by Dr Fausto Gardner note, PM needs replacement  -Spoke to Dr Bridgette Aguilar, outpatient changing of the pacemaker     Hypotension  -sec to above  -resolved     Dehydration  -On IV fluids  -resolved     Anemia  -follow work up     Parkinson's disease  -on home meds     Generalized debility  -PT/OT, would likely need snf. Reportedly according to the daughter the patient does not do fine when goes to SNF. Instead the family has been able to put some extra services in at [facility]. Will do     Code status: DNR  DVT prophylaxis: Lovenox     Plan: Discharge today back to [facility]. Spoke to         DISCHARGE DIAGNOSES / PLAN:      1.   Community Acquired Pneumonia       PENDING TEST RESULTS:   At the time of discharge the following test results are still pending: none    FOLLOW UP APPOINTMENTS:    Follow-up Information     Follow up With Details Comments Contact Inderjit Llanos MD In 1 week  [address]  [phone]             ADDITIONAL CARE RECOMMENDATIONS:   Fall precautions  Aspiration precautions    DIET: Cardiac Diet      OXYGEN / BiPAP SETTINGS: 2l of NC continuous, wean as tolerated    ACTIVITY: Activity as tolerated      DISCHARGE MEDICATIONS:   See Medication Reconciliation Form      NOTIFY YOUR PHYSICIAN FOR ANY OF THE FOLLOWING:   Fever over 101 degrees for 24 hours. Chest pain, shortness of breath, fever, chills, nausea, vomiting, diarrhea, change in mentation, falling, weakness, bleeding. Severe pain or pain not relieved by medications. Or, any other signs or symptoms that you may have questions about.     DISPOSITION:    Home With:   OT  PT  HH  RN      x SNF/Inpatient Rehab/LTAC    Independent/assisted living    Hospice    Other:       PATIENT CONDITION AT DISCHARGE:     Functional status    Poor    x Deconditioned     Independent      Cognition    x Lucid     Forgetful     Dementia      Catheters/lines (plus indication)    Vance     PICC     PEG    x Oxygen through nasal cannula 2l     Code status     Full code    x DNR      PHYSICAL EXAMINATION AT DISCHARGE:  Please see progress note      CHRONIC MEDICAL DIAGNOSES:  Problem List as of 2/15/2017  Date Reviewed: 2017          Codes Class Noted - Resolved    * (Principal)Pneumonia ICD-10-CM: J18.9  ICD-9-CM: 148  2017 - Present        Aneurysm of iliac artery ([address]- left 3.9 cm  CT scan- no change ICD-10-CM: I72.3  ICD-9-CM: 442.2  2016 - Present        Grief- wife of 61 years  2015 ICD-10-CM: F43.20  ICD-9-CM: 309.0  2015 - Present        Primary osteoarthritis of both knees ICD-10-CM: M17.0  ICD-9-CM: 715.16  2015 - Present        Overactive bladder- Dr. 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) ICD-10-CM: I33.001A  ICD-9-CM: 844.8  12/31/2014 - Present        Mild cognitive impairment w/o memory loss ICD-10-CM: G31.84  ICD-9-CM: 331.83  11/19/2014 - Present        Major depressive disorder, single episode, unspecified ICD-10-CM: F32.9  ICD-9-CM: 296.20  11/19/2014 - Present        Generalized anxiety disorder ICD-10-CM: F41.1  ICD-9-CM: 300.02  11/19/2014 - Present        Pacemaker ICD-10-CM: Z95.0  ICD-9-CM: V45.01  10/21/2013 - Present        Orthostatic hypotension ICD-10-CM: I95.1  ICD-9-CM: 458.0  9/28/2013 - Present        Blurry vision-chronic, no change- neg w/u ICD-10-CM: H53.8  ICD-9-CM: 368.8  7/9/2013 - Present        NSVT (nonsustained ventricular tachycardia) (HCC) ICD-10-CM: I47.2  ICD-9-CM: 427.1  7/20/2012 - Present    Overview Signed 7/20/2012  9:56 AM by Michelle Quintana MD     2. 2.2012             PAT (paroxysmal atrial tachycardia) (HCC) ICD-10-CM: I47.1  ICD-9-CM: 427.0  1/19/2012 - Present        Sick sinus syndrome-pacemaker-2008 ICD-10-CM: I49.5  ICD-9-CM: 427.81  10/28/2011 - Present        BPH (benign prostatic hyperplasia)- Dr. Radha Alcaraz: N40.0  ICD-9-CM: 600.00  10/3/2011 - Present        Essential hypertension, difficult to control due to orthostatic hypotension ICD-10-CM: I10  ICD-9-CM: 401.1  4/18/2011 - Present        Parkinsonian syndrome- Dr. Ofelia Day ICD-10-CM: Sydnie Noriega  ICD-9-CM: 332.0  4/8/2011 - Present        Hyperlipemia ICD-10-CM: E78.5  ICD-9-CM: 272.4  2/19/2010 - Present        Reflux esophagitis ICD-10-CM: K21.0  ICD-9-CM: 530.11  2/19/2010 - Present        DDD (degenerative disc disease), lumbar ICD-10-CM: M51.36  ICD-9-CM: 722.52  2/19/2010 - Present        DJD (degenerative joint disease) of cervical spine ICD-10-CM: M47.812  ICD-9-CM: 721.0  2/19/2010 - Present        DU (duodenal ulcer) ICD-10-CM: K26.9  ICD-9-CM: 532.90  9/1/1990 - Present        History of duodenal ulcer ICD-10-CM: Z87.19  ICD-9-CM: V12.79  9/1/1990 - Present RESOLVED: Sepsis ([facility] ICD-10-CM: A41.9  ICD-9-CM: 038.9, 995.91  6/10/2016 - 7/28/2016        RESOLVED: Catheter-associated urinary tract infection ([facility] ICD-10-CM: T83.511A, N39.0  ICD-9-CM: 996.64, 599.0  4/15/2016 - 4/18/2016        RESOLVED: Metabolic encephalopathy QHI-70-[place]: G93.41  ICD-9-CM: 348.31  4/15/2016 - 4/18/2016        RESOLVED: GABI (acute kidney injury) ([facility] ICD-10-CM: N17.9  ICD-9-CM: 584.9  4/15/2016 - 4/18/2016        RESOLVED: Hyponatremia ICD-10-CM: E87.1  ICD-9-CM: 276.1  4/15/2016 - 4/18/2016        RESOLVED: Mental status change ICD-10-CM: R41.82  ICD-9-CM: 780.97  10/30/2013 - 1/5/2016        RESOLVED: Pneumonia, organism unspecified ICD-10-CM: J18.9  ICD-9-CM: 486  2/19/2012 - 1/5/2016        RESOLVED: Weakness generalized ICD-10-CM: R53.1  ICD-9-CM: 780.79  2/19/2012 - 2/22/2012        RESOLVED: PAT (paroxysmal atrial tachycardia) ([facility] ICD-10-CM: I47.1  ICD-9-CM: 427.0  1/19/2012 - 2/29/2012        RESOLVED: Premature atrial beats ICD-10-CM: I49.1  ICD-9-CM: 427.61  Unknown - 2/29/2012        RESOLVED: Paralysis agitans ([facility] ICD-10-CM: Yancy Campanile  ICD-9-CM: 332.0  2/19/2010 - 10/28/2011                CDMP Checked:   Yes x     PROBLEM LIST Updated:  Yes x         Signed:   Ping Wilson MD  2/15/2017  1:50 PM

## 2017-02-15 NOTE — PROGRESS NOTES
0800: Bedside report received from Elvis Pearce RN.    0900: Primary Nurse Flores Almazan, LIZABETH and Jcarlos Pham, RN performed a dual skin assessment on this patient No impairment noted  Joey score is 15    1330: Hospitalist at bedside to determine patient eligibility for going back to [facility]. 1400: Dr. Mohini Chacko discharging patient. Case management arranging discharge with [facility]. Patient must qualify for oxygen prior to discharge - nasal cannula removed, will wait for qualifying criteria of 88% on RA while awake. 1410: Patient dropped to 86% on room air while at rest. Case management informed. Oxygen reapplied, recovered oxygen saturations 97%. 1745: Telephone report given to Jcarlos Pham LPN at [facility]. 1825: [facility] transport arrived to take patient back to [address] IV's removed, patient given PM medications. Discharge packet given to transport team. Patient's belongings sent back to facility with patient.

## 2017-02-15 NOTE — PROGRESS NOTES
Hospitalist Progress Note  Maxine Lowe MD  Office: [phone]  Cell: [phone]      Date of Service:  2/15/2017  NAME:  Alexandra Ewing  :  1933  MRN:  [id number]      Admission Summary:   28-year-old white male   with past medical history of Parkinson's disease, gait abnormality,   general debility, reflux esophagitis, hypertension, hyperlipidemia,   status post cardiac pacemaker implantation, degenerative disk   disease, degenerative joint disease, and duodenal ulcer comes over here with SOB    Interval history / Subjective:     No new issues  Looks better     Assessment & Plan:     Community Acquire Pneumonia  -CXR  There is focal area of airspace disease right midlung and right base  may represent pneumonia  CXR  no interval change  -Blood culture no growth  -Sputum culture normal respiratory samuel/candida  -On Vanc/Zosyn/LVQ, now Vanc discontinued  -Continues to do fine on Zosyn/LVQ  -Will discharge the patient today on Augmentin    Acute hypoxic respiratory failure  -Duonebs/ now back on 2l of oxygen. The patient is hypoxic to 86% on room air and needs 2l of oxygen continuous for now  -looks much better  -Appreciate Pulmonary    S/p PM placement  -Followed by Dr Rafael Diaz note, PM needs replacement  -Spoke to Dr Adriel Rodriguez, outpatient changing of the pacemaker    Hypotension  -sec to above  -resolved    Dehydration  -On IV fluids  -resolved    Anemia  -follow work up    Parkinson's disease  -on home meds    Generalized debility  -PT/OT, would likely need snf. Reportedly according to the daughter the patient does not do fine when goes to SNF. Instead the family has been able to put some extra services in at [facility]. Will do    Code status: DNR  DVT prophylaxis: Lovenox    Plan: Discharge today back to [facility].  Spoke to Courtney Noble discussed with: Patient/Family  Disposition: as above     Hospital Problems Date Reviewed: 2/11/2017          Codes Class Noted POA    * (Principal)Pneumonia ICD-10-CM: J18.9  ICD-9-CM: 567  2/11/2017 Unknown                Review of Systems:   A comprehensive review of systems was negative except for that written in the HPI. Vital Signs:    Last 24hrs VS reviewed since prior progress note. Most recent are:  Visit Vitals    BP (!) 149/94    Pulse 65    Temp 98.1 °F (36.7 °C)    Resp 19    Ht 6' 2\" (1.88 m)    Wt 99.6 kg (219 lb 9.3 oz)    SpO2 93%    BMI 28.19 kg/m2         Intake/Output Summary (Last 24 hours) at 02/15/17 1342  Last data filed at 02/15/17 0908   Gross per 24 hour   Intake              910 ml   Output               50 ml   Net              860 ml        Physical Examination:             Constitutional:  No acute distress, cooperative, pleasant    ENT:  Oral mucous moist, oropharynx benign. Neck supple,    Resp:  CTA bilaterally. No wheezing/rhonchi/rales. No accessory muscle use   CV:  Regular rhythm, normal rate, no murmurs, gallops, rubs    GI:  Soft, non distended, non tender. normoactive bowel sounds, no hepatosplenomegaly     Musculoskeletal:  No edema, warm, 2+ pulses throughout    Neurologic:  Moves all extremities. AAOx3, CN II-XII reviewed     Skin:  Good turgor, no rashes or ulcers       Data Review:    Review and/or order of clinical lab test      Labs:     Recent Labs      02/15/17   0448   WBC  6.1   HGB  10.5*   HCT  32.2*   PLT  169     Recent Labs      02/15/17   0448   NA  135*   K  4.1   CL  102   CO2  22   BUN  16   CREA  1.08   GLU  97   CA  8.4*     No results for input(s): SGOT, GPT, ALT, AP, TBIL, TBILI, TP, ALB, GLOB, GGT, AML, LPSE in the last 72 hours. No lab exists for component: AMYP, HLPSE  No results for input(s): INR, PTP, APTT in the last 72 hours. No lab exists for component: INREXT, INREXT   No results for input(s): FE, TIBC, PSAT, FERR in the last 72 hours.    Lab Results   Component Value Date/Time    Folate 32.6 10/30/2013 10:30 AM      No results for input(s): PH, PCO2, PO2 in the last 72 hours. No results for input(s): CPK, CKNDX, TROIQ in the last 72 hours.     No lab exists for component: CPKMB  Lab Results   Component Value Date/Time    Cholesterol, total 144 11/06/2015 12:05 PM    HDL Cholesterol 45 11/06/2015 12:05 PM    LDL, calculated 88 11/06/2015 12:05 PM    Triglyceride 57 11/06/2015 12:05 PM    CHOL/HDL Ratio 3.0 09/29/2010 11:12 AM     Lab Results   Component Value Date/Time    Glucose (POC) 97 08/23/2016 02:04 PM    Glucose (POC) 125 08/23/2016 01:20 PM    Glucose (POC) 109 06/15/2016 09:23 PM    Glucose (POC) 108 06/15/2016 05:28 PM    Glucose (POC) 116 06/15/2016 06:11 AM     Lab Results   Component Value Date/Time    Color YELLOW/STRAW 02/13/2017 05:19 PM    Appearance CLEAR 02/13/2017 05:19 PM    Specific gravity 1.029 02/13/2017 05:19 PM    pH (UA) 5.5 02/13/2017 05:19 PM    Protein TRACE 02/13/2017 05:19 PM    Glucose NEGATIVE  02/13/2017 05:19 PM    Ketone TRACE 02/13/2017 05:19 PM    Bilirubin NEGATIVE  08/23/2016 11:32 AM    Urobilinogen 1.0 02/13/2017 05:19 PM    Nitrites NEGATIVE  02/13/2017 05:19 PM    Leukocyte Esterase NEGATIVE  02/13/2017 05:19 PM    Epithelial cells FEW 02/13/2017 05:19 PM    Bacteria NEGATIVE  02/13/2017 05:19 PM    WBC 0-4 02/13/2017 05:19 PM    RBC 0-5 02/13/2017 05:19 PM         Medications Reviewed:     Current Facility-Administered Medications   Medication Dose Route Frequency    acetylcysteine (MUCOMYST) 200 mg/mL (20 %) solution 200 mg  200 mg Nebulization TID RT    balsam peru-castor oil (VENELEX)  mg/gram ointment   Topical BID    albuterol-ipratropium (DUO-NEB) 2.5 MG-0.5 MG/3 ML  3 mL Nebulization Q4H PRN    levoFLOXacin (LEVAQUIN) 750 mg in D5W IVPB  750 mg IntraVENous Q24H    enoxaparin (LOVENOX) injection 40 mg  40 mg SubCUTAneous Q24H    acetaminophen (TYLENOL) tablet 650 mg  650 mg Oral Q6H PRN    aspirin (ASPIRIN) tablet 325 mg  325 mg Oral BID  carbidopa-levodopa (SINEMET)  mg per tablet 2 Tab  2 Tab Oral QID    cloNIDine HCl (CATAPRES) tablet 0.1 mg  0.1 mg Oral Q6H PRN    docusate sodium (COLACE) capsule 100 mg  100 mg Oral DAILY PRN    escitalopram oxalate (LEXAPRO) tablet 20 mg  20 mg Oral DAILY    pantoprazole (PROTONIX) tablet 40 mg  40 mg Oral DAILY    pindolol (VISKIN) tablet 2.5 mg  2.5 mg Oral PCL    polyethylene glycol (MIRALAX) packet 17 g  17 g Oral DAILY    QUEtiapine (SEROquel) tablet 25 mg  25 mg Oral QHS    oxybutynin chloride XL (DITROPAN XL) tablet 5 mg  5 mg Oral DAILY    rivastigmine (EXELON) 4.6 mg/24 hr patch 1 Patch  1 Patch TransDERmal DAILY    traMADol (ULTRAM) tablet 50 mg  50 mg Oral Q8H PRN    sodium chloride (NS) flush 5-10 mL  5-10 mL IntraVENous Q8H    sodium chloride (NS) flush 5-10 mL  5-10 mL IntraVENous PRN     ______________________________________________________________________  EXPECTED LENGTH OF STAY: 4d 14h  ACTUAL LENGTH OF STAY:          4                 Colt Knapp MD

## 2017-02-15 NOTE — PROGRESS NOTES
Pulmonary, Critical Care, and Sleep Medicine~Progress Note    Name: Poncho Langford MRN: [id number]   : 1933 Hospital: [facility]   Date: 2/15/2017 8:49 AM Admission: 2017     IMPRESSION:   · Acute hypoxic resp failure secondary to PNA; off NIV   · Multilobar PNA  · Parkinson's   · S/ PPM placement; followed by Dr Rosalba Jules  · Hx of GERD   · Cannot move LE more that 45 degrees.  Had tendon surgery several years ago and is now wheelchair bound  · DNR      PLAN:   · Narrow to Levaquin  · Lovenox/protonix   · Cx ngtd  · O2 as needed for SpO2 above 90%; currently on RA  · PT/OT   · Continue nebs and bronchial hygiene   · Awaiting tele bed   · CM following for discharge planning  · Will be available as needed moving forward     Daily Progression:    Breathing better remains the same,  On RA with continued congestion and dry cough    OBJECTIVE:     Vital Signs:       Visit Vitals    /88    Pulse 65    Temp 97.6 °F (36.4 °C)    Resp 21    Ht 6' 2\" (1.88 m)    Wt 98.2 kg (216 lb 8 oz)    SpO2 100%    BMI 27.8 kg/m2      Temp (24hrs), Av °F (36.7 °C), Min:97.6 °F (36.4 °C), Max:98.4 °F (36.9 °C)     Intake/Output:     Last shift:      Last 3 shifts:  1901 - 02/15 0700  In: 8202 [P.O.:1080; I.V.:705]  Out: 200 [Urine:200]          Intake/Output Summary (Last 24 hours) at 02/15/17 5673  Last data filed at 02/15/17 6603   Gross per 24 hour   Intake             1630 ml   Output                0 ml   Net             1630 ml       Imaging:  I have personally reviewed these radiographic films and reports:     I have personally reviewed PFTs:   n/a       Ventilation:   Mode Rate Tidal Volume Pressure Suppport FiO2/LPM PEEP Other   RA            Peak airway pressure:      Minute ventilation:      Trilogy:        Physical Exam:                                        Exam Findings Other   General: No resp distress noted, appears stated age    [de-identified]:  No ulcers, JVD not elevated, no cervical LAD    Chest: No pectus deformity, normal chest rise b/l    HEART:  RRR, no murmurs/rubs/gallops    Lungs:  Coarse BS bilaterally, diminished bibasalar    ABD: Soft/NT, non rigid mildly distended    EXT: No cyanosis/clubbing/edema, normal peripheral pulses    Skin: No rashes or ulcers, no mottling    Neuro: A/O x 3        Medications:  Current Facility-Administered Medications   Medication Dose Route Frequency    acetylcysteine (MUCOMYST) 200 mg/mL (20 %) solution 200 mg  200 mg Nebulization TID RT    balsam peru-castor oil (VENELEX)  mg/gram ointment   Topical BID    albuterol-ipratropium (DUO-NEB) 2.5 MG-0.5 MG/3 ML  3 mL Nebulization Q4H PRN    levoFLOXacin (LEVAQUIN) 750 mg in D5W IVPB  750 mg IntraVENous Q24H    enoxaparin (LOVENOX) injection 40 mg  40 mg SubCUTAneous Q24H    acetaminophen (TYLENOL) tablet 650 mg  650 mg Oral Q6H PRN    aspirin (ASPIRIN) tablet 325 mg  325 mg Oral BID    carbidopa-levodopa (SINEMET)  mg per tablet 2 Tab  2 Tab Oral QID    cloNIDine HCl (CATAPRES) tablet 0.1 mg  0.1 mg Oral Q6H PRN    docusate sodium (COLACE) capsule 100 mg  100 mg Oral DAILY PRN    escitalopram oxalate (LEXAPRO) tablet 20 mg  20 mg Oral DAILY    pantoprazole (PROTONIX) tablet 40 mg  40 mg Oral DAILY    pindolol (VISKIN) tablet 2.5 mg  2.5 mg Oral PCL    polyethylene glycol (MIRALAX) packet 17 g  17 g Oral DAILY    QUEtiapine (SEROquel) tablet 25 mg  25 mg Oral QHS    oxybutynin chloride XL (DITROPAN XL) tablet 5 mg  5 mg Oral DAILY    rivastigmine (EXELON) 4.6 mg/24 hr patch 1 Patch  1 Patch TransDERmal DAILY    traMADol (ULTRAM) tablet 50 mg  50 mg Oral Q8H PRN    sodium chloride (NS) flush 5-10 mL  5-10 mL IntraVENous Q8H    sodium chloride (NS) flush 5-10 mL  5-10 mL IntraVENous PRN    piperacillin-tazobactam (ZOSYN) 3.375 g in 0.9% sodium chloride (MBP/ADV) 100 mL  3.375 g IntraVENous Q8H       Labs:  ABG No results for input(s): PHI, PCO2I, PO2I, HCO3I, SO2I, FIO2I in the last 72 hours. CBC Recent Labs      02/15/17   0448   WBC  6.1   HGB  10.5*   HCT  32.2*   PLT  169   MCV  92.5   MCH  15.4        Metabolic  Panel Recent Labs      02/15/17   0448   NA  135*   K  4.1   CL  102   CO2  22   GLU  97   BUN  16   CREA  1.08   CA  8.4*        Pertinent Labs n/a               Amelie Serna, NP  2/15/2017

## 2017-02-15 NOTE — PROGRESS NOTES
Physical Therapy  2/15/17    Orders received. Patient with d/c orders for today. Ambulance tx scheduled for 1800. Patient with script for PT eval and treat to be complete upon return to facility. Emma Connell, PT, DPT

## 2017-02-15 NOTE — CONSULTS
Cardiac Electrophysiology Consultation Note     Subjective:   I spoke with Dr Alyssa Rose, his attending   Denny Homans is a 80 y.o. patient who is seen for evaluation of pacemaker that reached ANSHU  His daughter Ayanna Ku is with him  I had placed it for sick sinus syndrome  While he is here for pneumonia treatment he had pacer checked by [employer]  He is weak, not able to walk or stand and his daughter is considering SNF placement first    Patient Active Problem List   Diagnosis Code    Hyperlipemia E78.5    DU (duodenal ulcer) K26.9    Reflux esophagitis K21.0    DDD (degenerative disc disease), lumbar M51.36    DJD (degenerative joint disease) of cervical spine M47.812    Parkinsonian syndrome- Dr. Nelli House hypertension, difficult to control due to orthostatic hypotension I10    BPH (benign prostatic hyperplasia)- Dr. Mayra Chavez N40.0    Sick sinus syndrome-pacemaker- I49.5    PAT (paroxysmal atrial tachycardia) ([facility]) I47.1    NSVT (nonsustained ventricular tachycardia) ([facility] I47.2    Blurry vision-chronic, no change- neg w/u H53.8    Orthostatic hypotension I95.1    Pacemaker Z95.0    Mild cognitive impairment w/o memory loss G31.84    Major depressive disorder, single episode, unspecified F32.9    Generalized anxiety disorder F41.1    Rupture quadriceps tendon- bilat--2014- UNABLE TO WALK AFTER THAT.  A10.994W    Overactive bladder- Dr. Mayra Chavez N32.81    Grief- wife of 61 years  2015 F43.20    Primary osteoarthritis of both knees M17.0    History of duodenal ulcer Z87.19    Aneurysm of iliac artery ([facility]- left 3.9 cm  CT scan- no change I72.3    Pneumonia J18.9     Current Facility-Administered Medications   Medication Dose Route Frequency Provider Last Rate Last Dose    acetylcysteine (MUCOMYST) 200 mg/mL (20 %) solution 200 mg  200 mg Nebulization TID RT CARRIE Perkins   200 mg at 02/15/17 0810    balsam peru-castor oil (VENELEX)  mg/gram ointment   Topical BID Chilo , MD        albuterol-ipratropium (DUO-NEB) 2.5 MG-0.5 MG/3 ML  3 mL Nebulization Q4H PRN Pradip Ramirez MD   3 mL at 02/15/17 0810    levoFLOXacin (LEVAQUIN) 750 mg in D5W IVPB  750 mg IntraVENous Q24H Pradip Ramirez  mL/hr at 02/14/17 2142 750 mg at 02/14/17 2142    enoxaparin (LOVENOX) injection 40 mg  40 mg SubCUTAneous Q24H Therese Del Real MD   40 mg at 02/14/17 1200    acetaminophen (TYLENOL) tablet 650 mg  650 mg Oral Q6H PRN Swapna Salas MD   650 mg at 02/14/17 0329    aspirin (ASPIRIN) tablet 325 mg  325 mg Oral BID Pradip Ramirez MD   325 mg at 02/15/17 0857    carbidopa-levodopa (SINEMET)  mg per tablet 2 Tab  2 Tab Oral QID Pradip Ramirez MD   2 Tab at 02/15/17 0857    cloNIDine HCl (CATAPRES) tablet 0.1 mg  0.1 mg Oral Q6H PRN Pradip Ramirez MD        docusate sodium (COLACE) capsule 100 mg  100 mg Oral DAILY PRN Pradip Ramirez MD        escitalopram oxalate (LEXAPRO) tablet 20 mg  20 mg Oral DAILY Pradip Ramirez MD   20 mg at 02/15/17 0857    pantoprazole (PROTONIX) tablet 40 mg  40 mg Oral DAILY Pradip Ramirez MD   40 mg at 02/15/17 0857    pindolol (VISKIN) tablet 2.5 mg  2.5 mg Oral PCL Pradip Ramirez MD   2.5 mg at 02/14/17 1200    polyethylene glycol (MIRALAX) packet 17 g  17 g Oral DAILY Pradip Ramirez MD   17 g at 02/15/17 0857    QUEtiapine (SEROquel) tablet 25 mg  25 mg Oral QHS Pradip Ramirez MD   25 mg at 02/14/17 2143    oxybutynin chloride XL (DITROPAN XL) tablet 5 mg  5 mg Oral DAILY Pradip Ramirez MD   5 mg at 02/15/17 0857    rivastigmine (EXELON) 4.6 mg/24 hr patch 1 Patch  1 Patch TransDERmal DAILY Pradip Ramirez MD   1 Patch at 02/14/17 0847    traMADol (ULTRAM) tablet 50 mg  50 mg Oral Q8H PRN Pradip Ramirez MD   50 mg at 02/15/17 0707    sodium chloride (NS) flush 5-10 mL  5-10 mL IntraVENous Q8H Pradip Ramirez MD   10 mL at 02/15/17 0608    sodium chloride (NS) flush 5-10 mL  5-10 mL IntraVENous PRN Phuc Johnson MD         No Known Allergies  Past Medical History   Diagnosis Date    Blurry vision 1/19/2012    DDD (degenerative disc disease), lumbar 2/19/2010    DJD (degenerative joint disease) of cervical spine 2/19/2010    DU (duodenal ulcer) 9/1/1990    Hypertension     Other and unspecified hyperlipidemia 2/19/2010    Pacemaker     Paralysis agitans ([address] 2/19/2010    Parkinson disease ([address]     Premature atrial beats     Reflux esophagitis 2/19/2010     Past Surgical History   Procedure Laterality Date    Hx pacemaker  2008     bradycardia    Hx vitrectomy  11/2011    Hx cataract removal  6/2012 and 7/2012     Both eyes    Hx orthopaedic  04/2015     tendon repair both knees     Family History   Problem Relation Age of Onset    Asthma Mother     Heart Disease Father      Social History   Substance Use Topics    Smoking status: Former Smoker     Types: Pipe     Quit date: 7/14/1945    Smokeless tobacco: Never Used    Alcohol use 2.0 oz/week     4 Cans of beer per week      Comment: glass of wine every now and then        Review of Systems:   Constitutional: Negative for fever, chills, weight loss, + malaise/fatigue. HEENT: Negative for nosebleeds, vision changes. Respiratory: Negative for cough, hemoptysis  Cardiovascular: Negative for chest pain, palpitations, orthopnea, claudication, leg swelling, syncope, and PND. Gastrointestinal: Negative for nausea, vomiting, diarrhea, blood in stool and melena. Genitourinary: Negative for dysuria, and hematuria. Musculoskeletal: Negative for myalgias, + arthralgia. Skin: Negative for rash. Heme: Does not bleed or bruise easily.    Neurological: Negative for speech change and focal weakness  + moment of confusion in ICU     Objective:     Visit Vitals    BP (!) 138/103 (BP 1 Location: Right arm, BP Patient Position: At rest)    Pulse 67    Temp 98.1 °F (36.7 °C)    Resp 18    Ht 6' 2\" (1.88 m)    Wt 216 lb 8 oz (98.2 kg)    SpO2 100%    BMI 27.8 kg/m2      Physical Exam:   Constitutional: well-developed and well-nourished. No respiratory distress. Head: Normocephalic and atraumatic. Eyes: Pupils are equal, round  ENT: hearing normal  Neck: supple. No JVD present. Cardiovascular: Normal rate, regular rhythm. Exam reveals no gallop and no friction rub. No murmur heard. Pulmonary/Chest: Effort normal No wheezes. Abdominal: Soft, no tenderness. Musculoskeletal: no edema. Neurological: alert  Skin: Skin is warm and dry left sided pacer site  Psychiatric: normal mood           Assessment/Plan:   I came to see him today since his daughter Lana Rawls is present and she would like to know how long we can wait before generator change  I told her that he is pacing mainly in atrium and not dependent  We might have till end of March  If he cannot be discharged by Friday, we agree that I will replace generator Friday  Otherwise her brother will fly in and we do outpatient gen change end of March     Thank you for involving me in this patient's care and please call with further concerns or questions. Shyanne Mendez M.D.   Electrophysiology/Cardiology  [facility] [place]  [address] , [address]  [phone]

## 2017-02-15 NOTE — PROGRESS NOTES
Problem: Self Care Deficits Care Plan (Adult)  Goal: *Acute Goals and Plan of Care (Insert Text)  Occupational Therapy Goals  Initiated 2/15/2017  1. Patient will perform toilet transfer to drop arm BSC with minimal assistance/contact guard assist within 7 day(s). 2. Patient will perform all aspects of toileting with maximal assistance within 7 day(s). 6. Patient will participate in upper extremity therapeutic exercise/activities with supervision/set-up within 7 day(s). OCCUPATIONAL THERAPY EVALUATION  Patient: Radha Ureña (8 y.o. male)  Date: 2/15/2017  Primary Diagnosis: Pneumonia        Precautions:    (hx of Parkinsons and LEs contractures; BP < 140)      ASSESSMENT :  Based on the objective data described below, the patient presents with hx of parkinsons and bilateral LE contractures with baseline need for assist to complete LB dressing/bathing and toileting tasks. Pt is able to complete feeding/grooming tasks with setup but it is quite difficult to obtain optimal setup in bed even with use of the chair position given pain and severity of LE contractures. Pt reported he is normally MOD IND with a lateral squat type pivot transfer for bed <-> manual wc and prior notes indicate he has a drop arm BSC and shower chair. Prior notes stated he used a sliding board however pt denied needing one now. Pt left in chair position to finish his lunch. Verified his current level of function with daughter who also stated she feels he is more confused than usual but this may due to the unfamiliar environment. Did not assess transfers today; recommend trying a lateral transfer to wc or drop arm BSC next session. .     Patient will benefit from skilled intervention to address the above impairments.   Patients rehabilitation potential is considered to be Good  Factors which may influence rehabilitation potential include:   [ ]             None noted  [ ]             Mental ability/status  [X]             Medical condition  [ ]             Home/family situation and support systems  [ ]             Safety awareness  [ ]             Pain tolerance/management  [ ]             Other:        PLAN :  Recommendations and Planned Interventions:  [X]               Self Care Training                  [X]        Therapeutic Activities  [X]               Functional Mobility Training    [ ]        Cognitive Retraining  [X]               Therapeutic Exercises           [X]        Endurance Activities  [ ]               Balance Training                   [ ]        Neuromuscular Re-Education  [ ]               Visual/Perceptual Training     [X]   Home Safety Training  [X]               Patient Education                 [X]        Family Training/Education  [ ]               Other (comment):     Frequency/Duration: Patient will be followed by occupational therapy 3 times a week to address goals. Discharge Recommendations: anticipate back home to [address] [place] [address] with increased assist for transfers (daughter reports pt can call for any assist needed) and [facility] PT/OT  Further Equipment Recommendations for Discharge: none; has all DME needed       SUBJECTIVE:   Patient stated I have a certain maneuver I do (when transferring).       OBJECTIVE DATA SUMMARY:   HISTORY:   Past Medical History   Diagnosis Date    Blurry vision 1/19/2012    DDD (degenerative disc disease), lumbar 2/19/2010    DJD (degenerative joint disease) of cervical spine 2/19/2010    DU (duodenal ulcer) 9/1/1990    Hypertension      Other and unspecified hyperlipidemia 2/19/2010    Pacemaker      Paralysis agitans ([address] 2/19/2010    Parkinson disease ([address]      Premature atrial beats      Reflux esophagitis 2/19/2010     Past Surgical History   Procedure Laterality Date    Hx pacemaker   2008       bradycardia    Hx vitrectomy   11/2011    Hx cataract removal   6/2012 and 7/2012       Both eyes    Hx orthopaedic   04/2015       tendon repair both knees        Prior Level of Function/Home Situation: hx obtained from pt, chart and daughter. Pt lives in [facility] and has assist for bathing/dressing tasks. PT is typically MOD IND for a lateral squat/scoot pivot type transfer for bed to  and wc to lift chair/EZ chair. Pt can call for assist whenever needed and daughter report pt has been needing more assistance the past few weeks. Pt [facility] seen by OT at [facility] in July of 2017 and was completing sliding board transfers with assist and assist for LB ADLs  Expanded or extensive additional review of patient history: expanded; hx of Parkinsons and LE contractures/COPD. [facility] [address] [facility]  One/Two Story Residence: One story  Living Alone: No  Support Systems: Skilled nursing facility  Patient Expects to be Discharged to[de-identified] Skilled nursing facility  Current DME Used/Available at Home: Commode, bedside, Lift chair, Shower chair, Wheelchair  [X]  Right hand dominant             [ ]  Left hand dominant     EXAMINATION OF PERFORMANCE DEFICITS:  Cognitive/Behavioral Status:  Neurologic State: Alert;Confused  Orientation Level: Oriented to person;Disoriented to place; Disoriented to situation  Cognition: Follows commands  Perception: Appears intact  Perseveration: No perseveration noted  Safety/Judgement: Fall prevention  Skin: intact  Edema: None noted   Vision/Perceptual:         Range of Motion:  AROM: Generally decreased, functional (UEs; B LEs contracted)     Strength:  Strength: Generally decreased, functional (UEs; LEs contracted)        Coordination:  Coordination: Generally decreased, functional (UEs)  Fine Motor Skills-Upper: Left Intact; Right Intact    Gross Motor Skills-Upper: Left Intact; Right Intact  Tone & Sensation:  Tone: Abnormal        Functional Mobility and Transfers for ADLs:  Bed Mobility:        Transfers:        ADL Assessment:  Feeding: Setup     Oral Facial Hygiene/Grooming: Setup     Bathing: Moderate assistance     Upper Body Dressing: Minimum assistance     Lower Body Dressing: Total assistance     Toileting: Total assistance     ADL Intervention and task modifications:  Feeding  Feeding Assistance: Supervision/set-up  Container Management: Supervision/set-up  Food to Mouth: Supervision/set-up  Drink to Mouth: Supervision/set-up     Cognitive Retraining  Safety/Judgement: Fall prevention     Functional Measure:  Barthel Index:      Bathin  Bladder: 5  Bowels: 5  Groomin  Dressin  Feedin  Mobility: 0  Stairs: 0  Toilet Use: 0  Transfer (Bed to Chair and Back): 0  Total: 20         Barthel and G-code impairment scale:  Percentage of impairment CH  0% CI  1-19% CJ  20-39% CK  40-59% CL  60-79% CM  80-99% CN  100%   Barthel Score 0-100 100 99-80 79-60 59-40 20-39 1-19    0   Barthel Score 0-20 20 17-19 13-16 9-12 5-8 1-4 0      The Barthel ADL Index: Guidelines  1. The index should be used as a record of what a patient does, not as a record of what a patient could do. 2. The main aim is to establish degree of independence from any help, physical or verbal, however minor and for whatever reason. 3. The need for supervision renders the patient not independent. 4. A patient's performance should be established using the best available evidence. Asking the patient, friends/relatives and nurses are the usual sources, but direct observation and common sense are also important. However direct testing is not needed. 5. Usually the patient's performance over the preceding 24-48 hours is important, but occasionally longer periods will be relevant. 6. Middle categories imply that the patient supplies over 50 per cent of the effort. 7. Use of aids to be independent is allowed. Belle Douglas., Barthel, D.W. ([id number]). Functional evaluation: the Barthel Index. [address] [facility] (14)2. JULIAN Archer, Angeles Art., Stephen Keaau., Elli, [address] ().  Measuring the change indisability after inpatient rehabilitation; comparison of the responsiveness of the Barthel Index and Functional [place] Measure. Journal of Neurology, Neurosurgery, and Psychiatry, 66(4), 673-607. PRAVEEN Mock, RANDY Rodriguez, & Ekaterina Cherry M.A. (2004.) Assessment of post-stroke quality of life in cost-effectiveness studies: The usefulness of the Barthel Index and the EuroQoL-5D. Quality of Life Research, 13, 230-35         G codes: In compliance with CMSs Claims Based Outcome Reporting, the following G-code set was chosen for this patient based on their primary functional limitation being treated: The outcome measure chosen to determine the severity of the functional limitation was the Barthel index with a score of 20/100 which was correlated with the impairment scale. · Self Care:               - CURRENT STATUS:    CL - 60%-79% impaired, limited or restricted               - GOAL STATUS:           CJ - 20%-39% impaired, limited or restricted               - D/C STATUS:                       ---------------To be determined---------------      Occupational Therapy Evaluation Charge Determination   History Examination Decision-Making   MEDIUM Complexity : Expanded review of history including physical, cognitive and psychosocial  history  MEDIUM Complexity : 3-5 performance deficits relating to physical, cognitive , or psychosocial skils that result in activity limitations and / or participation restrictions MEDIUM Complexity : Patient may present with comorbidities that affect occupational performnce.  Miniml to moderate modification of tasks or assistance (eg, physical or verbal ) with assesment(s) is necessary to enable patient to complete evaluation       Based on the above components, the patient evaluation is determined to be of the following complexity level: MEDIUM  Pain:  Pain Scale 1: Numeric (0 - 10)  Pain Intensity 1: 0  Pain Location 1: Hip  Pain Orientation 1: Left  Pain Description 1: Aching  Pain Intervention(s) 1: Medication (see MAR)  Activity Tolerance:   No s/s of distress  Please refer to the flowsheet for vital signs taken during this treatment. After treatment:   [ ] Patient left in no apparent distress sitting up in chair  [X] Patient left in no apparent distress in bed/chair position (partial)  [X] Call bell left within reach  [X] Nursing notified  [X] Caregiver present  [ ] Bed alarm activated      COMMUNICATION/EDUCATION:   The patients plan of care was discussed with: Physical Therapist and Registered Nurse.  [X] Home safety education was provided and the patient/caregiver indicated understanding. [X] Patient/family have participated as able in goal setting and plan of care. [X] Patient/family agree to work toward stated goals and plan of care. [ ] Patient understands intent and goals of therapy, but is neutral about his/her participation. [ ] Patient is unable to participate in goal setting and plan of care. This patients plan of care is appropriate for delegation to [facility].      Thank you for this referral.  Barak Hampton OT  Time Calculation: 10 mins

## 2017-02-15 NOTE — PROGRESS NOTES
Care Management: Orders received for Home PT, which were faxed to [facility], where they will provide in-house PT for patient. Home O2 orders received & documentation sent to [address] via [employer]. Kadie Randall from [address] reviewed the information & had everything needed to set up O2 for patient at [facility] O2 orders, PT orders, D/c Summary, List of Medications sent to [facility] to the [facility] for continuation of care via fax at [phone]. Nini Peters in the [facility] has accepted patient back to [facility] and noted that information was received, in order to order meds. Aftercare Summary & MAR will be included in envelope that will be sent with patient. I called patient's daughter & she was agreeable with discharge plan back to [facility] today. Ambulance transportation set up with [facility] for 6 PM transport via ALS, with O2.  RN Shobha Almendarez advised of implementation of orders & plan for patient to return to [facility] RN to call report to [phone]. Original DNR to accompany patient. Care Management available to assist, if any further questions.   LIZABETH Pillai BSN CCM

## 2017-02-15 NOTE — TELEPHONE ENCOUNTER
Sonja Salasde     -     [phone]     -  Patient dis-charged from [facility] today and needs a Follow Up w/in a week-  Patient takes public transportation which runs 9am to 4:30pm can come in on Monday, Tuesday, or Friday-  Need to know a couple of days in advance -

## 2017-02-16 ENCOUNTER — PATIENT OUTREACH (OUTPATIENT)
Dept: FAMILY MEDICINE CLINIC | Age: 82
End: 2017-02-16

## 2017-02-16 LAB
BACTERIA SPEC CULT: NORMAL
SERVICE CMNT-IMP: NORMAL

## 2017-02-16 NOTE — PROGRESS NOTES
NNTOCIP    [facility]. Ad[facility] to [facility] 2/11 and discharged 2/15 dx-pneumonia and acute hypoxic respiratory failure. Pacemaker due for replacement- not urgent,  aware. Returned to [facility] with Home Health- PT and OT. Also has Christine taking care of his O2. NN called [facility], spoke to nurse Gema Members- she reports he is making a slow adjustment- weaker than before hospitalization. But says OT and PT are both working with him on strengthening. O2 on continuously. Says meds are same as in hospital. Urged to call if any needs  Or any change in condition. She agreed-NN contact information provided.  Has JAMI appt with  on 2/24 at 3:30pm.

## 2017-02-16 NOTE — PATIENT INSTRUCTIONS
Pneumonia: Care Instructions  Your Care Instructions    Pneumonia is an infection of the lungs. Most cases are caused by infections from bacteria or viruses. Pneumonia may be mild or very severe. If it is caused by bacteria, you will be treated with antibiotics. It may take a few weeks to a few months to recover fully from pneumonia, depending on how sick you were and whether your overall health is good. Follow-up care is a key part of your treatment and safety. Be sure to make and go to all appointments, and call your doctor if you are having problems. Its also a good idea to know your test results and keep a list of the medicines you take. How can you care for yourself at home? · Take your antibiotics exactly as directed. Do not stop taking the medicine just because you are feeling better. You need to take the full course of antibiotics. · Take your medicines exactly as prescribed. Call your doctor if you think you are having a problem with your medicine. · Get plenty of rest and sleep. You may feel weak and tired for a while, but your energy level will improve with time. · To prevent dehydration, drink plenty of fluids, enough so that your urine is light yellow or clear like water. Choose water and other caffeine-free clear liquids until you feel better. If you have kidney, heart, or liver disease and have to limit fluids, talk with your doctor before you increase the amount of fluids you drink. · Take care of your cough so you can rest. A cough that brings up mucus from your lungs is common with pneumonia. It is one way your body gets rid of the infection. But if coughing keeps you from resting or causes severe fatigue and chest-wall pain, talk to your doctor. He or she may suggest that you take a medicine to reduce the cough. · Use a vaporizer or humidifier to add moisture to your bedroom. Follow the directions for cleaning the machine. · Do not smoke or allow others to smoke around you.  Smoke will make your cough last longer. If you need help quitting, talk to your doctor about stop-smoking programs and medicines. These can increase your chances of quitting for good. · Take an over-the-counter pain medicine, such as acetaminophen (Tylenol), ibuprofen (Advil, Motrin), or naproxen (Aleve). Read and follow all instructions on the label. · Do not take two or more pain medicines at the same time unless the doctor told you to. Many pain medicines have acetaminophen, which is Tylenol. Too much acetaminophen (Tylenol) can be harmful. · If you were given a spirometer to measure how well your lungs are working, use it as instructed. This can help your doctor tell how your recovery is going. · To prevent pneumonia in the future, talk to your doctor about getting a flu vaccine (once a year) and a pneumococcal vaccine (one time only for most people). When should you call for help? Call 911 anytime you think you may need emergency care. For example, call if:  · You have severe trouble breathing. Call your doctor now or seek immediate medical care if:  · You cough up dark brown or bloody mucus (sputum). · You have new or worse trouble breathing. · You are dizzy or lightheaded, or you feel like you may faint. Watch closely for changes in your health, and be sure to contact your doctor if:  · You have a new or higher fever. · You are coughing more deeply or more often. · You are not getting better after 2 days (48 hours). · You do not get better as expected. Where can you learn more? Go to http://rodo-lukas.info/. Enter [id number] in the search box to learn more about \"Pneumonia: Care Instructions. \"  Current as of: May 23, 2016  Content Version: 11.1  © 6674-0980 for[MD], Incorporated. Care instructions adapted under license by [employer] (which disclaims liability or warranty for this information).  If you have questions about a medical condition or this instruction, always ask your healthcare professional. [place] [address] any warranty or liability for your use of this information.

## 2017-02-16 NOTE — PROGRESS NOTES
Care Management (Late Entry for 2/15/17): I spoke to patient's daughter & requested that she scheduled follow-up appointment with Dr. Leeanna Snow. She advised that she routinely schedules his follow-up appointments & arranges for his transportation in conjunction with [facility].   Freya RN BSN CCM

## 2017-02-17 ENCOUNTER — DOCUMENTATION ONLY (OUTPATIENT)
Dept: CARDIOLOGY CLINIC | Age: 82
End: 2017-02-17

## 2017-02-17 NOTE — PROGRESS NOTES
Spoke with the patient's daughter, [place]. She was advised of date/time for upcoming pacemaker generator change. The following instructions were shared with the patient's daughter:    Your pacemaker generator (battery) change procedure has been scheduled for 3/13/2017 at 10:45am, at [facility].    Please report to Admitting Department by 8:45am, or 2 hours prior to your scheduled procedure. Please bring a list of your current medications and medication bottles, if able, to the hospital on this day. You will need to have nothing to eat or drink after midnight, the night prior to your procedure. You may have small sips of water, if needed, to take with your medication. You should not stop your medications prior to your scheduled procedure. After your procedure, you will need to follow up with Dr. Shantell Ham. Your follow-up appointment has been scheduled for 3/27/2017 at 2:00pm.     She was advised a letter to be mailed to patient's address with this information. She requests mailed to: [employer] [place] [address]. The patient's daughter verbalized understanding and will call our office with any further questions or concerns.

## 2017-02-17 NOTE — PROGRESS NOTES
Patient was discharged to [facility] with Home Health- PT and OT  Need to be scheduled for [employer] pacer replacement in 2-3 weeks  Daughter can be reached

## 2017-02-21 ENCOUNTER — TELEPHONE (OUTPATIENT)
Dept: FAMILY MEDICINE CLINIC | Age: 82
End: 2017-02-21

## 2017-02-21 NOTE — TELEPHONE ENCOUNTER
[address] [facility]        [phone]   -   Need to discuss Plan of Care and Inhalers for patient-

## 2017-02-21 NOTE — TELEPHONE ENCOUNTER
Home health started today. Now on oxygen at 2 liters. On amox. And is doing better.  Will fax orders

## 2017-02-24 ENCOUNTER — OFFICE VISIT (OUTPATIENT)
Dept: FAMILY MEDICINE CLINIC | Age: 82
End: 2017-02-24

## 2017-02-24 VITALS
OXYGEN SATURATION: 98 % | SYSTOLIC BLOOD PRESSURE: 130 MMHG | DIASTOLIC BLOOD PRESSURE: 88 MMHG | HEART RATE: 65 BPM | RESPIRATION RATE: 18 BRPM | TEMPERATURE: 98 F | HEIGHT: 74 IN

## 2017-02-24 DIAGNOSIS — K21.00 REFLUX ESOPHAGITIS: ICD-10-CM

## 2017-02-24 DIAGNOSIS — E78.2 MIXED HYPERLIPIDEMIA: ICD-10-CM

## 2017-02-24 DIAGNOSIS — F32.9 MAJOR DEPRESSIVE DISORDER, SINGLE EPISODE, UNSPECIFIED: ICD-10-CM

## 2017-02-24 DIAGNOSIS — M47.812 SPONDYLOSIS OF CERVICAL REGION WITHOUT MYELOPATHY OR RADICULOPATHY: ICD-10-CM

## 2017-02-24 DIAGNOSIS — J15.9 BACTERIAL PNEUMONIA: Primary | ICD-10-CM

## 2017-02-24 DIAGNOSIS — F43.21 GRIEF: ICD-10-CM

## 2017-02-24 DIAGNOSIS — M51.36 DDD (DEGENERATIVE DISC DISEASE), LUMBAR: ICD-10-CM

## 2017-02-24 DIAGNOSIS — I10 ESSENTIAL HYPERTENSION, BENIGN: ICD-10-CM

## 2017-02-24 DIAGNOSIS — M17.0 PRIMARY OSTEOARTHRITIS OF BOTH KNEES: ICD-10-CM

## 2017-02-24 DIAGNOSIS — S76.119S: Chronic | ICD-10-CM

## 2017-02-24 DIAGNOSIS — F41.1 GENERALIZED ANXIETY DISORDER: ICD-10-CM

## 2017-02-24 DIAGNOSIS — H53.8 BLURRY VISION: ICD-10-CM

## 2017-02-24 DIAGNOSIS — Z87.19 HISTORY OF DUODENAL ULCER: ICD-10-CM

## 2017-02-24 DIAGNOSIS — G20 PARKINSON'S DISEASE (HCC): ICD-10-CM

## 2017-02-24 NOTE — MR AVS SNAPSHOT
Visit Information Date & Time Provider Department Dept. Phone Encounter #  
 2/24/2017  3:30 PM Jose Alberto Carr  [facility] [address] [phone] [id number] Your Appointments 3/27/2017  2:00 PM  
PACEMAKER with TIFFANI BURNETT [facility] ([facility] SCHEDULING) Appt Note: 2 wk f/u pacer gen change wound check [address] [place]  
[address] [facility]  
  
    
 3/27/2017  2:20 PM  
ESTABLISHED PATIENT with Jesusita Chase MD  
[facility] ([facility]) Appt Note: 2 wk f/u pacer gen change wound check [address] [place]  
[address] [facility] [address]  
  
    
 4/6/2017  9:15 AM  
PACEMAKER with Julianne Damon [facility] ([facility] SCHEDULING) Appt Note: med threshold/rc/Church 1 yr new CL; med threshold/rc/Church 1 yr new CL r/s from 06/02 to 04/06 per Dr Radha Langford letter mailed [address] [address]  
[phone]  
  
    
 4/6/2017  9:20 AM  
ESTABLISHED PATIENT with Jesusita Chase MD  
[facility] ([facility]) Appt Note: med threshold/rc/Church 1 yr new CL r/s from 06/02 to 04/06 per Dr Radha Langford letter mailed [address] [place]  
[phone]  
  
    
  
 3/27/2017  9:30 AM  
REMOTE OFFICE VISIT with Ronald Morales [facility] ([facility] SCHEDULING) Appt Note:  [address] [place]  
[address] [phone] Upcoming Health Maintenance Date Due DTaP/Tdap/Td series (1 - Tdap) 5/4/2012 Pneumococcal 65+ High/Highest Risk (2 of 2 - PPSV23) 4/20/2015 MEDICARE YEARLY EXAM 6/17/2016 GLAUCOMA SCREENING Q2Y 8/26/2018 Allergies as of 2/24/2017  Review Complete On: 2/24/2017 By: Audra Hernandez LPN No Known Allergies Current Immunizations  Reviewed on 7/26/2016 Name Date Influenza High Dose Vaccine PF 10/27/2015 Influenza Vaccine Split 11/17/2011 Pneumococcal Vaccine (Unspecified Type) 4/20/2010 TB Skin Test (PPD) Intradermal 3/25/2013 TD Vaccine 5/3/2012 Not reviewed this visit Vitals BP  
  
  
  
  
  
 130/88 (BP 1 Location: Right arm, BP Patient Position: Sitting) Preferred Pharmacy Pharmacy Name Phone [address] [facility] [phone] Your Updated Medication List  
  
   
This list is accurate as of: 2/24/17  4:31 PM.  Always use your most recent med list.  
  
  
  
  
 acetaminophen 500 mg tablet Commonly known as:  MAPAP EXTRA STRENGTH Take 1 Tab by mouth every six (6) hours as needed for Pain. albuterol 90 mcg/actuation inhaler Commonly known as:  PROVENTIL HFA, VENTOLIN HFA, PROAIR HFA Take 2 Puffs by inhalation every six (6) hours as needed for Wheezing. aspirin 325 mg tablet Commonly known as:  ASPIRIN Take 1 Tab by mouth two (2) times a day. Indications: PREVENTION OF TRANSIENT ISCHEMIC ATTACKS  
  
 carbidopa-levodopa  mg per tablet Commonly known as:  SINEMET Take 2 Tabs by mouth four (4) times daily. (This is given at 8:00, 11:00, 14:00, and 21:00). cloNIDine HCl 0.1 mg tablet Commonly known as:  CATAPRES Take 1 Tab by mouth every six (6) hours as needed (SBP > 180 mmHg). cycloSPORINE 0.05 % ophthalmic emulsion Commonly known as:  RESTASIS Administer 1 Drop to both eyes two (2) times a day. docusate sodium 100 mg capsule Commonly known as:  Joey Pata Take 1 Cap by mouth daily as needed for Constipation. droxidopa 100 mg Cap capsule Commonly known as:  Madelon Stair Take 100 mg by mouth three (3) times daily.  Indications: SYMPTOMATIC ORTHOSTATIC HYPOTENSION  
  
 escitalopram oxalate 20 mg tablet Commonly known as:  Arther Putty Take 1 Tab by mouth daily. esomeprazole 40 mg capsule Commonly known as:  NexIUM Take 1 Cap by mouth daily. LORazepam 0.5 mg tablet Commonly known as:  ATIVAN Take 1 Tab by mouth nightly. Max Daily Amount: 0.5 mg.  
  
 meclizine 25 mg tablet Commonly known as:  ANTIVERT Take 25 mg by mouth three (3) times daily as needed for Dizziness. * pindolol 5 mg tablet Commonly known as:  VISKIN Take 5 mg by mouth nightly. * pindolol 5 mg tablet Commonly known as:  VISKIN Take 2.5 mg by mouth daily (after lunch). polyethylene glycol 17 gram packet Commonly known as:  Beola Wibaux Take 1 Packet by mouth daily. Indications: CONSTIPATION  
  
 QUEtiapine 25 mg tablet Commonly known as:  SEROquel TAKE 1 TABLET NIGHTLY  
  
 rivastigmine 4.6 mg/24 hr patch Commonly known as:  EXELON  
1 Patch by TransDERmal route daily. therapeutic multivitamin tablet Commonly known as:  [facility] Take 1 Tab by mouth daily. * tolterodine ER 2 mg ER capsule Commonly known as:  DETROL-LA Take 1 Cap by mouth daily. For bladder frequency * DETROL LA 2 mg ER capsule Generic drug:  tolterodine ER  
TAKE 1 CAPSULE DAILY FOR BLADDER FREQUENCY  
  
 traMADol 50 mg tablet Commonly known as:  ULTRAM  
Take 1 Tab by mouth every eight (8) hours as needed for Pain. Max Daily Amount: 150 mg.  
  
 * Notice: This list has 4 medication(s) that are the same as other medications prescribed for you. Read the directions carefully, and ask your doctor or other care provider to review them with you. To-Do List   
 03/13/2017 10:45 AM  
  Appointment with [facility] ROOM 3 [address] [place] [address] at [address] [place] [address] ([phone]) NPO AFTER MIDNIGHT! ROUTINE CASES:  Please arrive 2 hour prior to your scheduled appointment time.   If your scheduled appointment is for 0730, 0800, 0815, please arrive by 0645. NON ROUTINE CASES:  PATIENTS WHO REQUIRE LABS, X-RAY, EKG, or MEDS:  PLEASE ARRIVE 3 HOURS PRIOR TO YOUR SCHEDULED APPOINTMENT. If you require hydration prior to your procedure, PLEASE ARRIVE 4 HOURS PRIOR TO YOUR APPOINTMENT  **** IT IS THE OFFICE SCHEDULARS RESPONSBILITY TO NOTIFY THE CATH LAB SCHEDULAR IF THE PATIENT WILL REQUIRE ANY ADDITIONAL TIME FOR PREP FROM ROUTINE CASE ***** Patient Instructions Learning About COPD and How to Prevent Lung Infections How do lung infections affect COPD? Lung infections like pneumonia and acute bronchitis are common causes of COPD flare-ups. And people who have COPD are more likely to get these lung infections, especially if they smoke. When you have COPD, it is important to know the symptoms of pneumonia and acute bronchitis and call your doctor if you have them. Symptoms include: · A cough that brings up more mucus than usual. 
· Fever. · Shortness of breath. What can you do to prevent these infections? Stay healthy · Get a flu shot every year. · Get a pneumococcal vaccine shot. If you have had one before, ask your doctor whether you need another dose. Two different types of pneumococcal vaccines are recommended for people ages 72 and older. · If you must be around people with colds or the flu, wash your hands often. · Do not smoke. This is the most important step you can take to prevent more damage to your lungs. If you need help quitting, talk to your doctor about stop-smoking programs and medicines. These can increase your chances of quitting for good. · Avoid secondhand smoke, air pollution, and high altitudes. Also avoid cold, dry air and hot, humid air. Stay at home with your windows closed when air pollution is bad. Exercise and eat well · If your doctor recommends it, get more exercise. Walking is a good choice. Bit by bit, increase the amount you walk every day.  Try for at least 30 minutes on most days of the week. · Eat regular, well-balanced meals. Eating right keeps your energy levels up and helps your body fight infection. · Get plenty of rest and sleep. Follow-up care is a key part of your treatment and safety. Be sure to make and go to all appointments, and call your doctor if you are having problems. It's also a good idea to know your test results and keep a list of the medicines you take. Where can you learn more? Go to http://rodo-lukas.info/. Enter I282 in the search box to learn more about \"Learning About COPD and How to Prevent Lung Infections. \" Current as of: May 23, 2016 Content Version: 11.1 © 8787-4232 [employer] Incorporated. Care instructions adapted under license by [employer] (which disclaims liability or warranty for this information). If you have questions about a medical condition or this instruction, always ask your healthcare professional. [place] [address] any warranty or liability for your use of this information. Introducing [facility]! Dear Whitney Hong: Thank you for requesting a Level account. Our records indicate that you already have an active Level account. You can access your account anytime at [email or web address]. Notice Kiosk/[employer] Did you know that you can access your hospital and ER discharge instructions at any time in Level? You can also review all of your test results from your hospital stay or ER visit. Additional Information If you have questions, please visit the Frequently Asked Questions section of the Level website at [email or web address] Remember, Level is NOT to be used for urgent needs. For medical emergencies, dial 911. Now available from your iPhone and Android! Please provide this summary of care documentation to your next provider. Your primary care clinician is listed as [place] [address], [facility]/s   If you have any questions after today's visit, please call [phone].

## 2017-02-24 NOTE — PROGRESS NOTES
HISTORY OF PRESENT ILLNESS  HPI- Mr. Apoorva Hampton is a 80y.o. year old male, he is seen today for Transition of Care services following a hospital discharge for right sided pneumonia on 2/15/2017. Our office Nurse Navigator performed an outreach to Mr. Negrito Mary on 2/16/2017 (within 2 business days of discharge) to complete medication reconciliation and a telephonic assessment of his condition. From Ronda Eisenberg 2/16 note: Patient on [facility] report. Admitted to [facility] 2/11 and discharged 2/15 dx-pneumonia and acute hypoxic respiratory failure. Pacemaker due for replacement- not urgent, Dr.Ngo lowe. Returned to [facility] with Home Health- PT and OT. Also has [facility] taking care of his O2. NN called [facility], spoke to nurse Prakash Menchaca- she reports he is making a slow adjustment- weaker than before hospitalization. But says OT and PT are both working with him on strengthening. O2 on continuously. Westley Leblanc is a 80 y.o. Male with history of hyperlipidemia, HTN, depression, and anxiety who presents to office today in a wheelchair with his daughter for hospital follow-up. Pt was admitted to [facility] from 2/11/17 - 2/15/17 for pneumonia in the right lung and prescribed Augmentin; he has not yet finished the regimen. Pt was also given oxygen upon discharge; he states that he had briefly taken oxygen before but has not taken it for the past few years. Pt states that he believes his symptoms to have slightly resolved since being discharged, but notes that he still has a productive cough. Pt denies fever, chills, and any other new symptoms since being discharged. Pt complains of pain and stiffness in his knees; he continues to receive treatment from an orthopedist without relief as he has bilateral knee contractures related to bilat Quad tendon repair surgery x2 that was unsuccessful due to pt being unable to cooperate much during rehab. Pt also complains of vision problems.  He has visited several opthamologists, none of whom have been able to determine a possible cause. Past Medical History:   Diagnosis Date    Blurry vision 1/19/2012    DDD (degenerative disc disease), lumbar 2/19/2010    DJD (degenerative joint disease) of cervical spine 2/19/2010    DU (duodenal ulcer) 9/1/1990    Hypertension     Other and unspecified hyperlipidemia 2/19/2010    Pacemaker     Paralysis agitans ([address] 2/19/2010    Parkinson disease ([address]     Premature atrial beats     Reflux esophagitis 2/19/2010     Past Surgical History:   Procedure Laterality Date    HX CATARACT REMOVAL  6/2012 and 7/2012    Both eyes    HX ORTHOPAEDIC  04/2015    tendon repair both knees    HX PACEMAKER  2008    bradycardia    HX VITRECTOMY  11/2011     Current Outpatient Prescriptions on File Prior to Visit   Medication Sig Dispense Refill    DETROL LA 2 mg ER capsule TAKE 1 CAPSULE DAILY FOR BLADDER FREQUENCY 90 Cap 3    pindolol (VISKIN) 5 mg tablet Take 5 mg by mouth nightly.  pindolol (VISKIN) 5 mg tablet Take 2.5 mg by mouth daily (after lunch).  meclizine (ANTIVERT) 25 mg tablet Take 25 mg by mouth three (3) times daily as needed for Dizziness.  LORazepam (ATIVAN) 0.5 mg tablet Take 1 Tab by mouth nightly. Max Daily Amount: 0.5 mg. 90 Tab 1    albuterol (PROVENTIL HFA, VENTOLIN HFA, PROAIR HFA) 90 mcg/actuation inhaler Take 2 Puffs by inhalation every six (6) hours as needed for Wheezing. 1 Inhaler 0    aspirin (ASPIRIN) 325 mg tablet Take 1 Tab by mouth two (2) times a day. Indications: PREVENTION OF TRANSIENT ISCHEMIC ATTACKS 60 Tab 0    carbidopa-levodopa (SINEMET)  mg per tablet Take 2 Tabs by mouth four (4) times daily. (This is given at 8:00, 11:00, 14:00, and 21:00). 240 Tab 0    polyethylene glycol (MIRALAX) 17 gram packet Take 1 Packet by mouth daily. Indications: CONSTIPATION 30 Packet 0    therapeutic multivitamin (THERAGRAN) tablet Take 1 Tab by mouth daily.  30 Tab 0    tolterodine ER (DETROL-LA) 2 mg ER capsule Take 1 Cap by mouth daily. For bladder frequency 30 Cap 0    traMADol (ULTRAM) 50 mg tablet Take 1 Tab by mouth every eight (8) hours as needed for Pain. Max Daily Amount: 150 mg. 30 Tab 1    cloNIDine HCl (CATAPRES) 0.1 mg tablet Take 1 Tab by mouth every six (6) hours as needed (SBP > 180 mmHg). 20 Tab 0    droxidopa (NORTHERA) 100 mg cap Take 100 mg by mouth three (3) times daily. Indications: SYMPTOMATIC ORTHOSTATIC HYPOTENSION 90 Tab 1    docusate sodium (COLACE) 100 mg capsule Take 1 Cap by mouth daily as needed for Constipation. 30 Cap 0    cycloSPORINE (RESTASIS) 0.05 % ophthalmic emulsion Administer 1 Drop to both eyes two (2) times a day. 60 Each 0    escitalopram oxalate (LEXAPRO) 20 mg tablet Take 1 Tab by mouth daily. 30 Tab 0    rivastigmine (EXELON) 4.6 mg/24 hr patch 1 Patch by TransDERmal route daily. 30 Patch 0    esomeprazole (NEXIUM) 40 mg capsule Take 1 Cap by mouth daily. 30 Cap 0    acetaminophen (MAPAP EXTRA STRENGTH) 500 mg tablet Take 1 Tab by mouth every six (6) hours as needed for Pain. 120 Tab 0     No current facility-administered medications on file prior to visit.       No Known Allergies  Family History   Problem Relation Age of Onset    Asthma Mother     Heart Disease Father      Social History     Social History    Marital status:      Spouse name: N/A    Number of children: N/A    Years of education: N/A     Social History Main Topics    Smoking status: Former Smoker     Types: Pipe     Quit date: 7/14/1945    Smokeless tobacco: Never Used    Alcohol use 2.0 oz/week     4 Cans of beer per week      Comment: glass of wine every now and then    Drug use: No    Sexual activity: Not Asked     Other Topics Concern     Service Yes    Blood Transfusions No    Caffeine Concern No    Occupational Exposure No    Hobby Hazards No    Sleep Concern No    Stress Concern No    Weight Concern No    Special Diet No    Back Care No    Exercise No    Bike Helmet No    Seat Belt Yes    Self-Exams No     Social History Narrative           Review of Systems   Constitutional: Negative for chills, diaphoresis, fever, malaise/fatigue and weight loss. Eyes: Negative for blurred vision, double vision, pain and redness. Respiratory: Positive for cough and sputum production. Negative for shortness of breath and wheezing. Cardiovascular: Negative for chest pain, palpitations, orthopnea, claudication, leg swelling and PND. Musculoskeletal: Positive for joint pain (knee pain & stiffness). Skin: Negative for itching and rash. Neurological: Negative for dizziness, tingling, tremors, sensory change, speech change, focal weakness, seizures, loss of consciousness, weakness and headaches. Psychiatric/Behavioral: Positive for depression. Results for orders placed or performed during the hospital encounter of 02/11/17   CULTURE, BLOOD, PAIRED   Result Value Ref Range    Special Requests: NO SPECIAL REQUESTS      Culture result: NO GROWTH 5 DAYS     CULTURE, MRSA   Result Value Ref Range    Special Requests: NO SPECIAL REQUESTS      Culture result: MRSA PRESENT (A)      Culture result:            Screening of patient nares for MRSA is for surveillance purposes and, if positive, to facilitate isolation considerations in high risk settings. It is not intended for automatic decolonization interventions per se as regimens are not sufficiently effective to warrant routine use.    CULTURE, RESPIRATORY/SPUTUM/BRONCH W GRAM STAIN   Result Value Ref Range    Special Requests: NO SPECIAL REQUESTS      GRAM STAIN 1+  WBCS SEEN        GRAM STAIN 1+  EPITHELIAL CELLS SEEN        GRAM STAIN 1+  GRAM NEGATIVE DIPLOCOCCI        GRAM STAIN FEW  GRAM POSITIVE RODS        GRAM STAIN OCCASIONAL  GRAM NEGATIVE RODS        GRAM STAIN OCCASIONAL  PSEUDOHYPHAE        GRAM STAIN OCCASIONAL  GRAM POSITIVE COCCI  IN CHAINS        GRAM STAIN OCCASIONAL  GRAM POSITIVE COCCI  IN CLUSTERS        Culture result: MODERATE  APPARENT ELENA ALBICANS        Culture result: MODERATE  NORMAL RESPIRATORY JUAN JOSÉ       CBC WITH AUTOMATED DIFF   Result Value Ref Range    WBC 8.7 4.1 - 11.1 K/uL    RBC 3.51 (L) 4.10 - 5.70 M/uL    HGB 10.5 (L) 12.1 - 17.0 g/dL    HCT 32.6 (L) 36.6 - 50.3 %    MCV 92.9 80.0 - 99.0 FL    MCH 29.9 26.0 - 34.0 PG    MCHC 32.2 30.0 - 36.5 g/dL    RDW 14.9 (H) 11.5 - 14.5 %    PLATELET 968 156 - 843 K/uL    NEUTROPHILS 84 (H) 32 - 75 %    LYMPHOCYTES 9 (L) 12 - 49 %    MONOCYTES 7 5 - 13 %    EOSINOPHILS 0 0 - 7 %    BASOPHILS 0 0 - 1 %    ABS. NEUTROPHILS 7.3 1.8 - 8.0 K/UL    ABS. LYMPHOCYTES 0.8 0.8 - 3.5 K/UL    ABS. MONOCYTES 0.6 0.0 - 1.0 K/UL    ABS. EOSINOPHILS 0.0 0.0 - 0.4 K/UL    ABS. BASOPHILS 0.0 0.0 - 0.1 K/UL    DF SMEAR SCANNED      RBC COMMENTS PEYTON CELLS  PRESENT        RBC COMMENTS OVALOCYTES  PRESENT        RBC COMMENTS POLYCHROMASIA  PRESENT       METABOLIC PANEL, COMPREHENSIVE   Result Value Ref Range    Sodium 133 (L) 136 - 145 mmol/L    Potassium 4.4 3.5 - 5.1 mmol/L    Chloride 100 97 - 108 mmol/L    CO2 24 21 - 32 mmol/L    Anion gap 9 5 - 15 mmol/L    Glucose 125 (H) 65 - 100 mg/dL    BUN 31 (H) 6 - 20 MG/DL    Creatinine 1.35 (H) 0.70 - 1.30 MG/DL    BUN/Creatinine ratio 23 (H) 12 - 20      GFR est AA >60 >60 ml/min/1.73m2    GFR est non-AA 50 (L) >60 ml/min/1.73m2    Calcium 8.7 8.5 - 10.1 MG/DL    Bilirubin, total 1.1 (H) 0.2 - 1.0 MG/DL    ALT (SGPT) 9 (L) 12 - 78 U/L    AST (SGOT) 27 15 - 37 U/L    Alk.  phosphatase 61 45 - 117 U/L    Protein, total 6.6 6.4 - 8.2 g/dL    Albumin 3.5 3.5 - 5.0 g/dL    Globulin 3.1 2.0 - 4.0 g/dL    A-G Ratio 1.1 1.1 - 2.2     LACTIC ACID, PLASMA   Result Value Ref Range    Lactic acid 1.4 0.4 - 2.0 MMOL/L   TROPONIN I   Result Value Ref Range    Troponin-I, Qt. <0.04 <0.05 ng/mL   CK W/ CKMB & INDEX   Result Value Ref Range     39 - 308 U/L    CK - MB 3.0 <3.6 NG/ML CK-MB Index 2.1 0 - 2.5     POC G3 - PUL   Result Value Ref Range    pH (POC) 7.484 (H) 7.35 - 7.45      pCO2 (POC) 27.2 (L) 35.0 - 45.0 MMHG    pO2 (POC) 61 (L) 80 - 100 MMHG    HCO3 (POC) 20.4 (L) 22 - 26 MMOL/L    sO2 (POC) 93 92 - 97 %    Base deficit (POC) 3 mmol/L    Site LEFT BRACHIAL      Device: ROOM AIR      Allens test (POC) N/A      Specimen type (POC) ARTERIAL     METABOLIC PANEL, BASIC   Result Value Ref Range    Sodium 135 (L) 136 - 145 mmol/L    Potassium 4.4 3.5 - 5.1 mmol/L    Chloride 102 97 - 108 mmol/L    CO2 20 (L) 21 - 32 mmol/L    Anion gap 13 5 - 15 mmol/L    Glucose 113 (H) 65 - 100 mg/dL    BUN 29 (H) 6 - 20 MG/DL    Creatinine 1.18 0.70 - 1.30 MG/DL    BUN/Creatinine ratio 25 (H) 12 - 20      GFR est AA >60 >60 ml/min/1.73m2    GFR est non-AA 59 (L) >60 ml/min/1.73m2    Calcium 8.3 (L) 8.5 - 10.1 MG/DL   CBC WITH AUTOMATED DIFF   Result Value Ref Range    WBC 9.8 4.1 - 11.1 K/uL    RBC 3.34 (L) 4.10 - 5.70 M/uL    HGB 9.9 (L) 12.1 - 17.0 g/dL    HCT 30.5 (L) 36.6 - 50.3 %    MCV 91.3 80.0 - 99.0 FL    MCH 29.6 26.0 - 34.0 PG    MCHC 32.5 30.0 - 36.5 g/dL    RDW 14.7 (H) 11.5 - 14.5 %    PLATELET 607 700 - 103 K/uL    NEUTROPHILS 86 (H) 32 - 75 %    LYMPHOCYTES 7 (L) 12 - 49 %    MONOCYTES 7 5 - 13 %    EOSINOPHILS 0 0 - 7 %    BASOPHILS 0 0 - 1 %    ABS. NEUTROPHILS 8.4 (H) 1.8 - 8.0 K/UL    ABS. LYMPHOCYTES 0.7 (L) 0.8 - 3.5 K/UL    ABS. MONOCYTES 0.7 0.0 - 1.0 K/UL    ABS. EOSINOPHILS 0.0 0.0 - 0.4 K/UL    ABS.  BASOPHILS 0.0 0.0 - 0.1 K/UL    DF SMEAR SCANNED      RBC COMMENTS POLYCHROMASIA  PRESENT        RBC COMMENTS OVALOCYTES  PRESENT        RBC COMMENTS PEYTON CELLS  PRESENT       BNP   Result Value Ref Range     (H) 0 - 100 pg/mL   POC G3 - PUL   Result Value Ref Range    FIO2 (POC) 44 %    pH (POC) 7.525 (H) 7.35 - 7.45      pCO2 (POC) 28.3 (L) 35.0 - 45.0 MMHG    HCO3 (POC) 23.3 22 - 26 MMOL/L    Base excess (POC) 1 mmol/L    Site LEFT RADIAL      Device: NASAL CANNULA Flow rate (POC) 6 L/M    Allens test (POC) YES      Specimen type (POC) ARTERIAL      Total resp. rate 20     POC G3 - PUL   Result Value Ref Range    FIO2 (POC) 44 %    pH (POC) 7.439 7.35 - 7.45      pCO2 (POC) 30.8 (L) 35.0 - 45.0 MMHG    pO2 (POC) 197 (H) 80 - 100 MMHG    HCO3 (POC) 20.8 (L) 22 - 26 MMOL/L    sO2 (POC) 100 (H) 92 - 97 %    Base deficit (POC) 3 mmol/L    Site RIGHT RADIAL      Device: NASAL CANNULA      Flow rate (POC) 6 L/M    Allens test (POC) YES      Specimen type (POC) ARTERIAL      Total resp.  rate 20     URINALYSIS W/ REFLEX CULTURE   Result Value Ref Range    Color YELLOW/STRAW      Appearance CLEAR CLEAR      Specific gravity 1.029 1.003 - 1.030      pH (UA) 5.5 5.0 - 8.0      Protein TRACE (A) NEG mg/dL    Glucose NEGATIVE  NEG mg/dL    Ketone TRACE (A) NEG mg/dL    Blood NEGATIVE  NEG      Urobilinogen 1.0 0.2 - 1.0 EU/dL    Nitrites NEGATIVE  NEG      Leukocyte Esterase NEGATIVE  NEG      WBC 0-4 0 - 4 /hpf    RBC 0-5 0 - 5 /hpf    Epithelial cells FEW FEW /lpf    Bacteria NEGATIVE  NEG /hpf    UA:UC IF INDICATED CULTURE NOT INDICATED BY UA RESULT CNI     BILIRUBIN, CONFIRM   Result Value Ref Range    Bilirubin UA, confirm NEGATIVE  NEG     CBC W/O DIFF   Result Value Ref Range    WBC 6.1 4.1 - 11.1 K/uL    RBC 3.48 (L) 4.10 - 5.70 M/uL    HGB 10.5 (L) 12.1 - 17.0 g/dL    HCT 32.2 (L) 36.6 - 50.3 %    MCV 92.5 80.0 - 99.0 FL    MCH 30.2 26.0 - 34.0 PG    MCHC 32.6 30.0 - 36.5 g/dL    RDW 14.4 11.5 - 14.5 %    PLATELET 165 550 - 033 K/uL   METABOLIC PANEL, BASIC   Result Value Ref Range    Sodium 135 (L) 136 - 145 mmol/L    Potassium 4.1 3.5 - 5.1 mmol/L    Chloride 102 97 - 108 mmol/L    CO2 22 21 - 32 mmol/L    Anion gap 11 5 - 15 mmol/L    Glucose 97 65 - 100 mg/dL    BUN 16 6 - 20 MG/DL    Creatinine 1.08 0.70 - 1.30 MG/DL    BUN/Creatinine ratio 15 12 - 20      GFR est AA >60 >60 ml/min/1.73m2    GFR est non-AA >60 >60 ml/min/1.73m2    Calcium 8.4 (L) 8.5 - 10.1 MG/DL   POC VENOUS BLOOD GAS   Result Value Ref Range    Device: BIPAP      FIO2 (POC) 50 %    pH, venous (POC) 7.434 (H) 7.32 - 7.42      pCO2, venous (POC) 37.7 (L) 41 - 51 MMHG    pO2, venous (POC) 39 25 - 40 mmHg    HCO3, venous (POC) 25.2 23.0 - 28.0 MMOL/L    sO2, venous (POC) 75 65 - 88 %    Base excess, venous (POC) 1 mmol/L    PEEP/CPAP (POC) 7 cmH2O    PIP (POC) 14      Pressure support 7 cmH2O    Allens test (POC) YES      Total resp. rate 19      Site LEFT RADIAL      Specimen type (POC) VENOUS BLOOD     EKG, 12 LEAD, INITIAL   Result Value Ref Range    Ventricular Rate 65 BPM    Atrial Rate 68 BPM    QRS Duration 184 ms    Q-T Interval 484 ms    QTC Calculation (Bezet) 503 ms    Calculated R Axis -66 degrees    Calculated T Axis 71 degrees    Diagnosis       Ventricular-paced rhythm  When compared with ECG of 23-AUG-2016 11:32,  Electronic ventricular pacemaker has replaced Electronic atrial pacemaker  Confirmed by Nancy Cartagena MD, Ana Maria Perez ([id number]) on 2/12/2017 3:15:08 PM             Physical Exam  Visit Vitals    /88 (BP 1 Location: Right arm, BP Patient Position: Sitting)    Pulse 65    Temp 98 °F (36.7 °C) (Oral)    Resp 18    Ht 6' 2\" (1.88 m)    SpO2 98%      Head: Normocephalic, without obvious abnormality, atraumatic  Eyes: conjunctivae/corneas clear. PERRL, EOM's intact.    Neck: supple, symmetrical, trachea midline, no adenopathy, thyroid: not enlarged, symmetric, no tenderness/mass/nodules, no carotid bruit and no JVD  Lungs: few expiratory wheezes heard with normal respiration but accentuated with forced expiration, no rales but occasional rhonchi with coughing  Heart: regular rate and rhythm, S1, S2 normal, no murmur, click, rub or gallop  Extremities:  chronic contractures unchanged, pt is in a wheelchair but is able to put weight on his feet and shift his weight in the chair  Pulses: 2+ and symmetric  Lymph nodes: Cervical, supraclavicular, and axillary nodes normal.  Neurologic: Grossly normal          ASSESSMENT and PLAN    ICD-10-CM ICD-9-CM    1. Bacterial pneumonia J15.9 482.9    2. Parkinson's disease ([facility] G20 332.0    3. Grief- wife of 61 years  2015 F43.20 309.0    4. Rupture quadriceps tendon, unspecified laterality, sequela- 2014 S76.119S 905.8    5. Primary osteoarthritis of both knees M17.0 715.16    6. Major depressive disorder, single episode, unspecified F32.9 296.20 QUEtiapine (SEROQUEL) 25 mg tablet   7. Generalized anxiety disorder F41.1 300.02 QUEtiapine (SEROQUEL) 25 mg tablet   8. Blurry vision-chronic, no change- neg w/u H53.8 368.8    9. Essential hypertension, difficult to control due to orthostatic hypotension I10 401.1    10. Mixed hyperlipidemia E78.2 272.2    11. Reflux esophagitis K21.0 530.11    12. DDD (degenerative disc disease), lumbar M51.36 722.52    13. Spondylosis of cervical region without myelopathy or radiculopathy M47.812 721.0    14. History of duodenal ulcer Z87.19 V12.79      Ashley Deleon was seen today for follow-up. Diagnoses and all orders for this visit:    Bacterial pneumonia    Parkinson's disease ([facility]    Grief- wife of 61 years  2015    Rupture quadriceps tendon, unspecified laterality, sequela- 2014    Primary osteoarthritis of both knees    Major depressive disorder, single episode, unspecified  -     QUEtiapine (SEROQUEL) 25 mg tablet; Take 1 Tab by mouth two (2) times a day. Generalized anxiety disorder  -     QUEtiapine (SEROQUEL) 25 mg tablet; Take 1 Tab by mouth two (2) times a day.     Blurry vision-chronic, no change- neg w/u    Essential hypertension, difficult to control due to orthostatic hypotension    Mixed hyperlipidemia    Reflux esophagitis    DDD (degenerative disc disease), lumbar    Spondylosis of cervical region without myelopathy or radiculopathy    History of duodenal ulcer    Follow-up Disposition:  Return in about 1 month (around 3/24/2017), or if symptoms worsen or fail to improve, for f/u ondepression, increased dose of Seroquel and F/U Chest x ray for RML pneumonia. reviewed medications and side effects in detail  Please call my office if there are any questions- [phone]. Discussed expected course/resolution/complications of diagnosis in detail with patient. Medication risks/benefits/costs/interactions/alternatives discussed with patient. Pt was given an after visit summary which includes diagnoses, current medications & vitals. Pt expressed understanding with the diagnosis and plan. Patient to call if no better in 3 -4 days and prn new problems. We will have him follow-up if he has any worsening of his symptoms, but as long as he is improving each day we will see him back in 3 weeks for so for chest xray. In the meantime, we will call out to [facility] where he lives and have them check his oxygen level in the nighttime; here in the office it was 98% saturation, which probably means he'll be able to get off the oxygen at least in the daytime hours, which he is hoping to do due to the nuisance of transporting it. We talked about his depression; he notes that it has gotten a little bit worse since hospitalization, so we decided to increase his Seroquel. He is on Lexapro 20mg and has been on that for several years; he had no history of depression or anxiety until the past few years. Reviewed in detail the proper technique of checking the blood pressure- check it on an average day only, not on a stressful day, sitting, no exercise for at least 1 hour and not experiencing any new pain( chronic pain is OK). Patient encouraged to check BP sitting and standing at least once a month and to report these readings to me if > 140/ 90 on average , or if the standing BP is >  15 points lower than the sitting.    Encouraged aggressive PT and OT to return to his pre hospital function, etc       This document was written by Kamryn Jack, as dictated by Howard Che MD.  I have reviewed and agree with the above note and have made corrections where appropriate To Biggs M.D.

## 2017-02-24 NOTE — PATIENT INSTRUCTIONS
Learning About COPD and How to Prevent Lung Infections  How do lung infections affect COPD? Lung infections like pneumonia and acute bronchitis are common causes of COPD flare-ups. And people who have COPD are more likely to get these lung infections, especially if they smoke. When you have COPD, it is important to know the symptoms of pneumonia and acute bronchitis and call your doctor if you have them. Symptoms include:  · A cough that brings up more mucus than usual.  · Fever. · Shortness of breath. What can you do to prevent these infections? Stay healthy  · Get a flu shot every year. · Get a pneumococcal vaccine shot. If you have had one before, ask your doctor whether you need another dose. Two different types of pneumococcal vaccines are recommended for people ages 72 and older. · If you must be around people with colds or the flu, wash your hands often. · Do not smoke. This is the most important step you can take to prevent more damage to your lungs. If you need help quitting, talk to your doctor about stop-smoking programs and medicines. These can increase your chances of quitting for good. · Avoid secondhand smoke, air pollution, and high altitudes. Also avoid cold, dry air and hot, humid air. Stay at home with your windows closed when air pollution is bad. Exercise and eat well  · If your doctor recommends it, get more exercise. Walking is a good choice. Bit by bit, increase the amount you walk every day. Try for at least 30 minutes on most days of the week. · Eat regular, well-balanced meals. Eating right keeps your energy levels up and helps your body fight infection. · Get plenty of rest and sleep. Follow-up care is a key part of your treatment and safety. Be sure to make and go to all appointments, and call your doctor if you are having problems. It's also a good idea to know your test results and keep a list of the medicines you take. Where can you learn more?   Go to http://gabriel.info/. Enter I625 in the search box to learn more about \"Learning About COPD and How to Prevent Lung Infections. \"  Current as of: May 23, 2016  Content Version: 11.1  © 9146-1519 [employer]. Care instructions adapted under license by [employer] (which disclaims liability or warranty for this information). If you have questions about a medical condition or this instruction, always ask your healthcare professional. [address] any warranty or liability for your use of this information.

## 2017-02-24 NOTE — PROGRESS NOTES
Chief Complaint   Patient presents with    Follow-up     Follow up from er visit to [facility] on 2/15/17     1. Have you been to the ER, urgent care clinic since your last visit? Hospitalized since your last visit? Yes, See Encounter    2. Have you seen or consulted any other health care providers outside of the [address] [place] [address] since your last visit? Include any pap smears or colon screening.  No

## 2017-02-25 RX ORDER — QUETIAPINE FUMARATE 25 MG/1
25 TABLET, FILM COATED ORAL 2 TIMES DAILY
Qty: 180 TAB | Refills: 3 | Status: SHIPPED | OUTPATIENT
Start: 2017-02-25 | End: 2017-03-03 | Stop reason: DRUGHIGH

## 2017-03-03 ENCOUNTER — HOSPITAL ENCOUNTER (EMERGENCY)
Age: 82
Discharge: OTHER INSTITUTION | End: 2017-03-03
Attending: EMERGENCY MEDICINE
Payer: MEDICARE

## 2017-03-03 ENCOUNTER — APPOINTMENT (OUTPATIENT)
Dept: GENERAL RADIOLOGY | Age: 82
End: 2017-03-03
Attending: NURSE PRACTITIONER
Payer: MEDICARE

## 2017-03-03 VITALS
HEART RATE: 65 BPM | WEIGHT: 200 LBS | BODY MASS INDEX: 25.67 KG/M2 | RESPIRATION RATE: 16 BRPM | TEMPERATURE: 97.9 F | OXYGEN SATURATION: 97 % | DIASTOLIC BLOOD PRESSURE: 90 MMHG | SYSTOLIC BLOOD PRESSURE: 130 MMHG | HEIGHT: 74 IN
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
## 2017-03-03 DIAGNOSIS — M25.551 RIGHT HIP PAIN: Primary | ICD-10-CM

## 2017-03-03 PROCEDURE — 99283 EMERGENCY DEPT VISIT LOW MDM: CPT

## 2017-03-03 PROCEDURE — 73502 X-RAY EXAM HIP UNI 2-3 VIEWS: CPT

## 2017-03-03 RX ORDER — LORAZEPAM 0.5 MG/1
TABLET ORAL
COMMUNITY
End: 2017-09-18 | Stop reason: SDUPTHER

## 2017-03-03 RX ORDER — LORAZEPAM 0.5 MG/1
0.5 TABLET ORAL EVERY EVENING
COMMUNITY
End: 2017-04-28 | Stop reason: SDUPTHER

## 2017-03-03 RX ORDER — QUETIAPINE FUMARATE 25 MG/1
25 TABLET, FILM COATED ORAL EVERY EVENING
COMMUNITY
End: 2018-02-23

## 2017-03-04 NOTE — ED TRIAGE NOTES
Pt states that he has had the right hip pain off and on prior to a fall today, pt fell around 3pm at [address] where he lives, pt states that he was transitioning from bed to wheelchair and forgot to lock the wheelchair causing him to fall to the ground. Pt denies hitting his head and LOC. Pt states that he landed on his right shoulder along with his left hip. Pt is A&Ox4.

## 2017-03-04 NOTE — ED PROVIDER NOTES
HPI Comments: Alee Odonnell is a 80 y.o. male with Hx of Parkison disease, HLD, HTN, DDD, DJD who presents via EMS to [facility] ED with cc of R hip pain. Pt reports that he had R hip pain intermittently for the last 2 weeks. He reports that he fell today transferring from bed to [facility] and landed on his bottom and his R hip pain became worse. He states that he also had R shoulder pain at time of the injury but this has since resolved. He reports no hip pain currently. He thinks that he was provided with a Tramadol PTA which relieved his pain. He normally ambulates with the assistance of a WC. He states he has been able to bear weight after the fall. He resides at [facility]. He denies any neck/back pain. He denies LOC or hitting his head. No concern for a syncopal episode and no seizure like activity. No recent history of F/C, N/V/D, cough, congestion, CP, SOB, dysuria, urinary frequency/hesitancy, flank pain. PCP: Ishaan Quintanilla MD    There are no other complaints, changes or physical findings at this time. The history is provided by the patient and the EMS personnel.         Past Medical History:   Diagnosis Date    Blurry vision 1/19/2012    BPH (benign prostatic hyperplasia)     DDD (degenerative disc disease), lumbar 2/19/2010    DJD (degenerative joint disease) of cervical spine 2/19/2010    DU (duodenal ulcer) 9/1/1990    Hyperlipidemia     Hypertension     Other and unspecified hyperlipidemia 2/19/2010    Pacemaker     Paralysis agitans ([address] 2/19/2010    Parkinson disease ([address]     PAT [facility]ox[facility]     Premature atrial beats     Reflux esophagitis 2/19/2010    Sick sinus syndrome [facility])        Past Surgical History:   Procedure Laterality Date    HX CATARACT REMOVAL  6/2012 and 7/2012    Both eyes    HX ORTHOPAEDIC  04/2015    tendon repair both knees    HX PACEMAKER  2008    bradycardia    HX VITRECTOMY  11/2011         Family History:   Problem Relation Age of Onset    Asthma Mother     Heart Disease Father        Social History     Social History    Marital status:      Spouse name: N/A    Number of children: N/A    Years of education: N/A     Occupational History    Not on file. Social History Main Topics    Smoking status: Former Smoker     Types: Pipe     Quit date: 7/14/1945    Smokeless tobacco: Never Used    Alcohol use 2.0 oz/week     4 Cans of beer per week      Comment: glass of wine every now and then    Drug use: No    Sexual activity: Not on file     Other Topics Concern     Service Yes    Blood Transfusions No    Caffeine Concern No    Occupational Exposure No    Hobby Hazards No    Sleep Concern No    Stress Concern No    Weight Concern No    Special Diet No    Back Care No    Exercise No    Bike Helmet No    Seat Belt Yes    Self-Exams No     Social History Narrative         ALLERGIES: Review of patient's allergies indicates no known allergies. Review of Systems   Constitutional: Negative for activity change, appetite change, chills and fever. HENT: Negative for congestion, rhinorrhea, sinus pressure, sneezing and sore throat. Eyes: Negative for pain, discharge and visual disturbance. Respiratory: Negative for cough and shortness of breath. Cardiovascular: Negative for chest pain. Gastrointestinal: Negative for abdominal pain, diarrhea, nausea and vomiting. Genitourinary: Negative for dysuria, flank pain, frequency and urgency. Musculoskeletal: Positive for arthralgias. Negative for back pain, gait problem, joint swelling, myalgias and neck pain. Skin: Negative for color change and rash. Neurological: Negative for dizziness, speech difficulty, weakness, light-headedness, numbness and headaches. Psychiatric/Behavioral: Negative for agitation, behavioral problems and confusion. All other systems reviewed and are negative.       Vitals:    03/03/17 2023 03/03/17 2210   BP: 142/89 130/90 Pulse: 68 65   Resp: 16 16   Temp: 98 °F (36.7 °C) 97.9 °F (36.6 °C)   SpO2: 97% 97%   Weight: 90.7 kg (200 lb)    Height: 6' 2\" (1.88 m)             Physical Exam   Constitutional: He is oriented to person, place, and time. He appears well-developed and well-nourished. No distress. HENT:   Head: Normocephalic and atraumatic. Right Ear: External ear normal.   Left Ear: External ear normal.   Nose: Nose normal.   Mouth/Throat: Oropharynx is clear and moist. No oropharyngeal exudate. Eyes: Conjunctivae and EOM are normal. Pupils are equal, round, and reactive to light. Neck: Normal range of motion. Neck supple. Cardiovascular: Normal rate, regular rhythm, normal heart sounds and intact distal pulses. Pulmonary/Chest: Effort normal and breath sounds normal.   Abdominal: Soft. Bowel sounds are normal. There is no tenderness. There is no rebound and no guarding. Musculoskeletal:        Right shoulder: He exhibits normal range of motion, no tenderness, no bony tenderness, no swelling, no crepitus, no deformity, no pain and no spasm. Right hip: He exhibits normal range of motion, normal strength, no tenderness, no bony tenderness, no swelling and no deformity. Neurological: He is alert and oriented to person, place, and time. Skin: Skin is warm and dry. Psychiatric: He has a normal mood and affect. His behavior is normal. Judgment and thought content normal.   Nursing note and vitals reviewed. MDM  Number of Diagnoses or Management Options  Fall, initial encounter:   Right hip pain:   Diagnosis management comments: DDx: Sprain/Strain/Contusion/Fx     79 yo M presents with R hip pain post fall. (-) xray. The patient was provided with education on the use of NSAIDS/ ice Therapy/ avoidance of strenuous activities to assist with relief of current symptoms. Patient has been instructed to f/u with PCP for further tx of symptoms.  Reasons to return to the ED have been reviewed at time of discharge. Amount and/or Complexity of Data Reviewed  Tests in the radiology section of CPT®: ordered and reviewed  Review and summarize past medical records: yes  Discuss the patient with other providers: yes      ED Course       Procedures      LABORATORY TESTS:  No results found for this or any previous visit (from the past 12 hour(s)). IMAGING RESULTS:  XR HIP RT W OR WO PELV 2-3 VWS   Final Result      EXAM: XR HIP RT W OR WO PELV 2-3 VWS     INDICATION: Worsening right hip pain after fall.     COMPARISON: 12/12/2013.     FINDINGS: An AP view of the pelvis and a frogleg lateral view of the right hip  demonstrate no fracture, dislocation or other acute abnormality. There is some  heterotopic ossification adjacent to the lesser trochanter.     IMPRESSION  IMPRESSION: No acute abnormality.       MEDICATIONS GIVEN:  Medications - No data to display    IMPRESSION:  1. Right hip pain    2. Fall, initial encounter        PLAN:  1. Discharge Medication List as of 3/3/2017  9:42 PM      CONTINUE these medications which have NOT CHANGED    Details   !! LORazepam (ATIVAN) 0.5 mg tablet Take  by mouth every six (6) hours as needed for Anxiety. , Historical Med      !! LORazepam (ATIVAN) 0.5 mg tablet Take 0.5 mg by mouth every evening., Historical Med      QUEtiapine (SEROQUEL) 25 mg tablet Take 25 mg by mouth every evening., Historical Med      !! DETROL LA 2 mg ER capsule TAKE 1 CAPSULE DAILY FOR BLADDER FREQUENCY, Normal, Disp-90 Cap, R-3      !! pindolol (VISKIN) 5 mg tablet Take 5 mg by mouth nightly., Historical Med      !! pindolol (VISKIN) 5 mg tablet Take 2.5 mg by mouth daily (after lunch). , Historical Med      meclizine (ANTIVERT) 25 mg tablet Take 25 mg by mouth three (3) times daily as needed for Dizziness. , Historical Med      albuterol (PROVENTIL HFA, VENTOLIN HFA, PROAIR HFA) 90 mcg/actuation inhaler Take 2 Puffs by inhalation every six (6) hours as needed for Wheezing., Normal, Disp-1 Inhaler, R-0      aspirin (ASPIRIN) 325 mg tablet Take 1 Tab by mouth two (2) times a day. Indications: PREVENTION OF TRANSIENT ISCHEMIC ATTACKS, Print, Disp-60 Tab, R-0      carbidopa-levodopa (SINEMET)  mg per tablet Take 2 Tabs by mouth four (4) times daily. (This is given at 8:00, 11:00, 14:00, and 21:00). , Print, Disp-240 Tab, R-0      polyethylene glycol (MIRALAX) 17 gram packet Take 1 Packet by mouth daily. Indications: CONSTIPATION, Print, Disp-30 Packet, R-0      therapeutic multivitamin (THERAGRAN) tablet Take 1 Tab by mouth daily. , Print, Disp-30 Tab, R-0      !! tolterodine ER (DETROL-LA) 2 mg ER capsule Take 1 Cap by mouth daily. For bladder frequency, Print, Disp-30 Cap, R-0      traMADol (ULTRAM) 50 mg tablet Take 1 Tab by mouth every eight (8) hours as needed for Pain. Max Daily Amount: 150 mg., Print, Disp-30 Tab, R-1      cloNIDine HCl (CATAPRES) 0.1 mg tablet Take 1 Tab by mouth every six (6) hours as needed (SBP > 180 mmHg). , Print, Disp-20 Tab, R-0      droxidopa (NORTHERA) 100 mg cap Take 100 mg by mouth three (3) times daily. Indications: SYMPTOMATIC ORTHOSTATIC HYPOTENSION, Print, Disp-90 Tab, R-1      docusate sodium (COLACE) 100 mg capsule Take 1 Cap by mouth daily as needed for Constipation. , Print, Disp-30 Cap, R-0      cycloSPORINE (RESTASIS) 0.05 % ophthalmic emulsion Administer 1 Drop to both eyes two (2) times a day., Print, Disp-60 Each, R-0      escitalopram oxalate (LEXAPRO) 20 mg tablet Take 1 Tab by mouth daily. , Print, Disp-30 Tab, R-0      rivastigmine (EXELON) 4.6 mg/24 hr patch 1 Patch by TransDERmal route daily. , Print, Disp-30 Patch, R-0      esomeprazole (NEXIUM) 40 mg capsule Take 1 Cap by mouth daily. , Print, Disp-30 Cap, R-0      acetaminophen (MAPAP EXTRA STRENGTH) 500 mg tablet Take 1 Tab by mouth every six (6) hours as needed for Pain., Print, Disp-120 Tab, R-0       !! - Potential duplicate medications found. Please discuss with provider.         2.   Follow-up Information     Follow up With Details Comments Αμαλίας MD Edward Schedule an appointment as soon as possible for a visit for ongoing management of hip pain  [address] [id number] [phone]      [address] DEP Go to As needed, If symptoms worsen [address] [facility]  [phone]        3. Return to ED if worse     Discharge Note:    The patient is ready for discharge. The patient's signs, symptoms, diagnosis, and discharge instruction have been discussed and the patient has conveyed their understanding. The patient is to follow up as recommended or return to the ER should their symptoms worsen. Plan has been discussed and the patient is in agreement.     Delia Kay NP

## 2017-03-04 NOTE — PROGRESS NOTES
Admission Medication Reconciliation:    Information obtained from: [facility]don[facility]    Significant PMH/Disease States:   Past Medical History:   Diagnosis Date    Blurry vision 1/19/2012    BPH (benign prostatic hyperplasia)     DDD (degenerative disc disease), lumbar 2/19/2010    DJD (degenerative joint disease) of cervical spine 2/19/2010    DU (duodenal ulcer) 9/1/1990    Hyperlipidemia     Hypertension     Other and unspecified hyperlipidemia 2/19/2010    Pacemaker     [facility] agitans [facility] [address] 2/19/2010    Parkinson disease ([facility]     PAT [facility]ox[facility]     Premature atrial beats     Reflux esophagitis 2/19/2010    Sick sinus syndrome [facility])        Chief Complaint for this Admission:    Chief Complaint   Patient presents with    Hip Pain         Allergies:  Review of patient's allergies indicates no known allergies. Prior to Admission Medications:   Prior to Admission Medications   Prescriptions Last Dose Informant Patient Reported? Taking? DETROL LA 2 mg ER capsule   No Yes   Sig: TAKE 1 CAPSULE DAILY FOR BLADDER FREQUENCY   LORazepam (ATIVAN) 0.5 mg tablet   Yes Yes   Sig: Take  by mouth every six (6) hours as needed for Anxiety. LORazepam (ATIVAN) 0.5 mg tablet   Yes Yes   Sig: Take 0.5 mg by mouth every evening. QUEtiapine (SEROQUEL) 25 mg tablet   Yes Yes   Sig: Take 25 mg by mouth every evening. acetaminophen (MAPAP EXTRA STRENGTH) 500 mg tablet   No Yes   Sig: Take 1 Tab by mouth every six (6) hours as needed for Pain. albuterol (PROVENTIL HFA, VENTOLIN HFA, PROAIR HFA) 90 mcg/actuation inhaler   No Yes   Sig: Take 2 Puffs by inhalation every six (6) hours as needed for Wheezing. aspirin (ASPIRIN) 325 mg tablet   No Yes   Sig: Take 1 Tab by mouth two (2) times a day. Indications: PREVENTION OF TRANSIENT ISCHEMIC ATTACKS   carbidopa-levodopa (SINEMET)  mg per tablet   No Yes   Sig: Take 2 Tabs by mouth four (4) times daily.  (This is given at 8:00, 11:00, 14:00, and 21:00). cloNIDine HCl (CATAPRES) 0.1 mg tablet   No Yes   Sig: Take 1 Tab by mouth every six (6) hours as needed (SBP > 180 mmHg). cycloSPORINE (RESTASIS) 0.05 % ophthalmic emulsion   No Yes   Sig: Administer 1 Drop to both eyes two (2) times a day. docusate sodium (COLACE) 100 mg capsule   No Yes   Sig: Take 1 Cap by mouth daily as needed for Constipation. droxidopa (NORTHERA) 100 mg cap   No Yes   Sig: Take 100 mg by mouth three (3) times daily. Indications: SYMPTOMATIC ORTHOSTATIC HYPOTENSION   escitalopram oxalate (LEXAPRO) 20 mg tablet   No Yes   Sig: Take 1 Tab by mouth daily. esomeprazole (NEXIUM) 40 mg capsule   No Yes   Sig: Take 1 Cap by mouth daily. meclizine (ANTIVERT) 25 mg tablet   Yes Yes   Sig: Take 25 mg by mouth three (3) times daily as needed for Dizziness. pindolol (VISKIN) 5 mg tablet   Yes Yes   Sig: Take 5 mg by mouth nightly. pindolol (VISKIN) 5 mg tablet   Yes Yes   Sig: Take 2.5 mg by mouth daily (after lunch). polyethylene glycol (MIRALAX) 17 gram packet   No Yes   Sig: Take 1 Packet by mouth daily. Indications: CONSTIPATION   rivastigmine (EXELON) 4.6 mg/24 hr patch   No Yes   Si Patch by TransDERmal route daily. therapeutic multivitamin (THERAGRAN) tablet   No Yes   Sig: Take 1 Tab by mouth daily. tolterodine ER (DETROL-LA) 2 mg ER capsule   No Yes   Sig: Take 1 Cap by mouth daily. For bladder frequency   traMADol (ULTRAM) 50 mg tablet   No Yes   Sig: Take 1 Tab by mouth every eight (8) hours as needed for Pain. Max Daily Amount: 150 mg. Facility-Administered Medications: None         Comments/Recommendations: Reviewed MAR from facility.  Changes made to previous list include:    -Added PRN Ativan  -Updated dose of Kathy Fernandez, PharmD

## 2017-03-04 NOTE — DISCHARGE INSTRUCTIONS
We hope that we have addressed all of your medical concerns. The examination and treatment you received in the Emergency Department were for an emergent problem and were not intended as complete care. It is important that you follow up with your healthcare provider(s) for ongoing care. If your symptoms worsen or do not improve as expected, and you are unable to reach your usual health care provider(s), you should return to the Emergency Department. Today's healthcare is undergoing tremendous change, and patient satisfaction surveys are one of the many tools to assess the quality of medical care. You may receive a survey from the [facility] regarding your experience in the Emergency Department. I hope that your experience has been completely positive, particularly the medical care that I provided. As such, please participate in the survey; anything less than excellent does not meet my expectations or intentions. [facility] and [address] [place] [address] participate in nationally recognized quality of care measures. If your blood pressure is greater than 120/80, as reported below, we urge that you seek medical care to address the potential of high blood pressure, commonly known as hypertension. Hypertension can be hereditary or can be caused by certain medical conditions, pain, stress, or \"white coat syndrome. \"       Please make an appointment with your health care provider(s) for follow up of your Emergency Department visit. VITALS:   Patient Vitals for the past 8 hrs:   Temp Pulse Resp BP SpO2   03/03/17 2023 98 °F (36.7 °C) 68 16 142/89 97 %          Thank you for allowing us to provide you with medical care today. We realize that you have many choices for your emergency care needs. Please choose us in the future for any continued health care needs. Berna Thomas, [facility].   Office: [phone]            No results found for this or any previous visit (from the past 24 hour(s)). Xr Hip Rt W Or Wo Pelv 2-3 Vws    Result Date: 3/3/2017  EXAM:  XR HIP RT W OR WO PELV 2-3 VWS INDICATION:   Worsening right hip pain after fall. COMPARISON: 12/12/2013. FINDINGS: An AP view of the pelvis and a frogleg lateral view of the right hip demonstrate no fracture, dislocation or other acute abnormality. There is some heterotopic ossification adjacent to the lesser trochanter. IMPRESSION:  No acute abnormality. Hip Pain: Care Instructions  Your Care Instructions  Hip pain may be caused by many things, including overuse, a fall, or a twisting movement. Another cause of hip pain is arthritis. Your pain may increase when you stand up, walk, or squat. The pain may come and go or may be constant. Home treatment can help relieve hip pain, swelling, and stiffness. If your pain is ongoing, you may need more tests and treatment. Follow-up care is a key part of your treatment and safety. Be sure to make and go to all appointments, and call your doctor if you are having problems. Its also a good idea to know your test results and keep a list of the medicines you take. How can you care for yourself at home? · Take pain medicines exactly as directed. ¨ If the doctor gave you a prescription medicine for pain, take it as prescribed. ¨ If you are not taking a prescription pain medicine, ask your doctor if you can take an over-the-counter medicine. · Rest and protect your hip. Take a break from any activity, including standing or walking, that may cause pain. · Put ice or a cold pack against your hip for 10 to 20 minutes at a time. Try to do this every 1 to 2 hours for the next 3 days (when you are awake) or until the swelling goes down. Put a thin cloth between the ice and your skin.   · Sleep on your healthy side with a pillow between your knees, or sleep on your back with pillows under your knees.  · If there is no swelling, you can put moist heat, a heating pad, or a warm cloth on your hip. Do gentle stretching exercises to help keep your hip flexible. · Learn how to prevent falls. Have your vision and hearing checked regularly. Wear slippers or shoes with a nonskid sole. · Stay at a healthy weight. · Wear comfortable shoes. When should you call for help? Call 911 anytime you think you may need emergency care. For example, call if:  · You have sudden chest pain and shortness of breath, or you cough up blood. · You are not able to stand or walk or bear weight. · Your buttocks, legs, or feet feel numb or tingly. · Your leg or foot is cool or pale or changes color. · You have severe pain. Call your doctor now or seek immediate medical care if:  · You have signs of infection, such as:  ¨ Increased pain, swelling, warmth, or redness in the hip area. ¨ Red streaks leading from the hip area. ¨ Pus draining from the hip area. ¨ A fever. · You have signs of a blood clot, such as:  ¨ Pain in your calf, back of the knee, thigh, or groin. ¨ Redness and swelling in your leg or groin. · You are not able to bend, straighten, or move your leg normally. · You have trouble urinating or having bowel movements. Watch closely for changes in your health, and be sure to contact your doctor if:  · You do not get better as expected. Where can you learn more? Go to http://rodo-lukas.info/. Enter P722 in the search box to learn more about \"Hip Pain: Care Instructions. \"  Current as of: May 27, 2016  Content Version: 11.1  © 1218-5554 [employer]. Care instructions adapted under license by [employer] (which disclaims liability or warranty for this information).  If you have questions about a medical condition or this instruction, always ask your healthcare professional. [address] any warranty or liability for your use of this information. Preventing Falls: Care Instructions  Your Care Instructions  Getting around your home safely can be a challenge if you have injuries or health problems that make it easy for you to fall. Loose rugs and furniture in walkways are among the dangers for many older people who have problems walking or who have poor eyesight. People who have conditions such as arthritis, osteoporosis, or dementia also have to be careful not to fall. You can make your home safer with a few simple measures. Follow-up care is a key part of your treatment and safety. Be sure to make and go to all appointments, and call your doctor if you are having problems. It's also a good idea to know your test results and keep a list of the medicines you take. How can you care for yourself at home? Taking care of yourself  · You may get dizzy if you do not drink enough water. To prevent dehydration, drink plenty of fluids, enough so that your urine is light yellow or clear like water. Choose water and other caffeine-free clear liquids. If you have kidney, heart, or liver disease and have to limit fluids, talk with your doctor before you increase the amount of fluids you drink. · Exercise regularly to improve your strength, muscle tone, and balance. Walk if you can. Swimming may be a good choice if you cannot walk easily. · Have your vision and hearing checked each year or any time you notice a change. If you have trouble seeing and hearing, you might not be able to avoid objects and could lose your balance. · Know the side effects of the medicines you take. Ask your doctor or pharmacist whether the medicines you take can affect your balance. Sleeping pills or sedatives can affect your balance. · Limit the amount of alcohol you drink. Alcohol can impair your balance and other senses. · Ask your doctor whether calluses or corns on your feet need to be removed.  If you wear loose-fitting shoes because of calluses or corns, you can lose your balance and fall. · Talk to your doctor if you have numbness in your feet. Preventing falls at home  · Remove raised doorway thresholds, throw rugs, and clutter. Repair loose carpet or raised areas in the floor. · Move furniture and electrical cords to keep them out of walking paths. · Use nonskid floor wax, and wipe up spills right away, especially on ceramic tile floors. · If you use a walker or cane, put rubber tips on it. If you use crutches, clean the bottoms of them regularly with an abrasive pad, such as steel wool. · Keep your house well lit, especially [place], and outside walkways. Use night-lights in areas such as hallways and bathrooms. Add extra light switches or use remote switches (such as switches that go on or off when you clap your hands) to make it easier to turn lights on if you have to get up during the night. · Install sturdy handrails on stairways. · Move items in your cabinets so that the things you use a lot are on the lower shelves (about waist level). · Keep a cordless phone and a flashlight with new batteries by your bed. If possible, put a phone in each of the main rooms of your house, or carry a cell phone in case you fall and cannot reach a phone. Or, you can wear a device around your neck or wrist. You push a button that sends a signal for help. · Wear low-heeled shoes that fit well and give your feet good support. Use footwear with nonskid soles. Check the heels and soles of your shoes for wear. Repair or replace worn heels or soles. · Do not wear socks without shoes on wood floors. · Walk on the grass when the sidewalks are slippery. If you live in an area that gets snow and ice in the winter, sprinkle salt on slippery steps and sidewalks. Preventing falls in the bath  · Install grab bars and nonskid mats inside and outside your shower or tub and near the toilet and sinks. · Use shower chairs and bath benches.   · Use a hand-held shower head that will allow you to sit while showering. · Get into a tub or shower by putting the weaker leg in first. Get out of a tub or shower with your strong side first.  · Repair loose toilet seats and consider installing a raised toilet seat to make getting on and off the toilet easier. · Keep your bathroom door unlocked while you are in the shower. Where can you learn more? Go to http://rodo-lukas.info/. Enter 0476 [id number] in the search box to learn more about \"Preventing Falls: Care Instructions. \"  Current as of: August 4, 2016  Content Version: 11.1  © 1915-9856 [employer]. Care instructions adapted under license by [employer] (which disclaims liability or warranty for this information). If you have questions about a medical condition or this instruction, always ask your healthcare professional. [address] any warranty or liability for your use of this information.

## 2017-03-04 NOTE — ED NOTES
[facility] contacted for patients ride home eta 30-45min, [facility] contacted and is aware of patients expected arrival.

## 2017-03-05 ENCOUNTER — TELEPHONE (OUTPATIENT)
Dept: FAMILY MEDICINE CLINIC | Age: 82
End: 2017-03-05

## 2017-03-05 NOTE — TELEPHONE ENCOUNTER
Nurse from [place] called informing me that pt has a urinary tract infection  Pt has E coli and is sensitive to bactrim DS    Advised bactrim DS 1 tab BID x 10 days    If symptoms progress, has been advised to go to the ER

## 2017-03-06 ENCOUNTER — PATIENT OUTREACH (OUTPATIENT)
Dept: FAMILY MEDICINE CLINIC | Age: 82
End: 2017-03-06

## 2017-03-06 NOTE — PROGRESS NOTES
NNTOCIP     Patient on [place] report. Seen in [facility] on 3/3/17 for right hip pain. and discharged. Reference note by:   by Eloisa Marin RN at 17      Pt states that he has had the right hip pain off and on prior to a fall today, pt fell around 3pm at  where he lives, pt states that he was transitioning from bed to wheelchair and forgot to lock the wheelchair causing him to fall to the ground. Pt denies hitting his head and LOC. Pt states that he landed on his right shoulder along with his left hip. Pt is A&Ox4. Patient has history of:  Sick sinus syndrome-pacemaker-2008 10/28/2011   Rupture quadriceps tendon- bilat--2014- UNABLE TO WALK AFTER THAT. 2014   Reflux esophagitis 2010   Primary osteoarthritis of both knees 2015   Pneumonia 2017   PAT (paroxysmal atrial tachycardia) ([facility] [address] 2012   Parkinsonian syndrome- Dr. Singh Dacosta 2011   Pacemaker 10/21/2013   Overactive bladder- Dr. Danay Menendez 2015   Orthostatic hypotension 2013   NSVT (nonsustained ventricular tachycardia) ([facility] [address] 2012   Overview   2. 2.2012   Mild cognitive impairment w/o memory loss 2014   Major depressive disorder, single episode, unspecified 2014   Hyperlipemia 2010   History of duodenal ulcer 1990   Grief- wife of 61 years  2015   Generalized anxiety disorder 2014   Essential hypertension, difficult to control due to orthostatic hypotension 2011   DU (duodenal ulcer) 1990   DJD (degenerative joint disease) of cervical spine 2010   DDD (degenerative disc disease), lumbar 2010   BPH (benign prostatic hyperplasia)- Dr. Danay Menendez 10/3/2011   Blurry vision-chronic, no change- neg w/u 2013   Aneurysm of iliac artery ([facility] [address]- left 3.9 cm  CT scan- no change        Patient is a resident of [facility] with [facility]- PT and OT. Also has Christine taking care of his O2.    NN called [facility], spoke to nurse GISSEL Ray today- she reports the patient has been up for breakfast today and had no problems. She will call our office if she notices any changes. Raj Duarte says all meds are same as before visit. She was unable to do a medication reconciliation at this time. Urged to call if any needs, or any change in condition. She agreed-NN contact information provided. Patient has JAMI appt with  on 3/29/17 at 4:00 pm.     ED course:  Patient had multiple testing and X-Rays done. EXAM: XR HIP RT W OR WO PELV 2-3 VWS      INDICATION: Worsening right hip pain after fall.      COMPARISON: 12/12/2013.      FINDINGS: An AP view of the pelvis and a frogleg lateral view of the right hip  demonstrate no fracture, dislocation or other acute abnormality. There is some  heterotopic ossification adjacent to the lesser trochanter.      IMPRESSION  IMPRESSION: No acute abnormality. Facility given NN contact information for any questions or concerns.

## 2017-03-07 ENCOUNTER — TELEPHONE (OUTPATIENT)
Dept: CARDIOLOGY CLINIC | Age: 82
End: 2017-03-07

## 2017-03-07 NOTE — TELEPHONE ENCOUNTER
Patient's daughter Lana Rawls calling in regards to patient's procedure next week. States that the patient is currently taking an antibiotic for a uti. Wants to be sure that it is still ok to proceed with surgery. Unaware of whether it is a 7 or 10 day antibiotic. Requests a call back at [phone]. Thanks!

## 2017-03-07 NOTE — TELEPHONE ENCOUNTER
Spoke with patient's daughter, Ginger Styles. She was advised if patient tolerating antibiotic and afebrile, then may proceed with generator change. If develops fever, or symptoms worsen, to call our office back. Patient began 10 day Bactrim DS on 3/5 per Dr. Thiago Silva. The patient's daughter verbalized understanding and will call our office with any further questions or concerns.

## 2017-03-09 ENCOUNTER — PATIENT OUTREACH (OUTPATIENT)
Dept: FAMILY MEDICINE CLINIC | Age: 82
End: 2017-03-09

## 2017-03-10 RX ORDER — SODIUM CHLORIDE 0.9 % (FLUSH) 0.9 %
5-10 SYRINGE (ML) INJECTION AS NEEDED
Status: CANCELLED | OUTPATIENT
Start: 2017-03-10

## 2017-03-10 RX ORDER — SODIUM CHLORIDE 0.9 % (FLUSH) 0.9 %
5-10 SYRINGE (ML) INJECTION EVERY 8 HOURS
Status: CANCELLED | OUTPATIENT
Start: 2017-03-10

## 2017-03-10 RX ORDER — CEFAZOLIN SODIUM IN 0.9 % NACL 2 G/50 ML
2 INTRAVENOUS SOLUTION, PIGGYBACK (ML) INTRAVENOUS ONCE
Status: CANCELLED | OUTPATIENT
Start: 2017-03-10 | End: 2017-03-10

## 2017-03-13 ENCOUNTER — HOSPITAL ENCOUNTER (OUTPATIENT)
Dept: NON INVASIVE DIAGNOSTICS | Age: 82
Setting detail: OBSERVATION
Discharge: HOME | End: 2017-03-14
Attending: INTERNAL MEDICINE | Admitting: INTERNAL MEDICINE
Payer: MEDICARE

## 2017-03-13 PROBLEM — Z45.018 PACEMAKER END OF LIFE: Status: ACTIVE | Noted: 2017-03-13

## 2017-03-13 PROCEDURE — 94640 AIRWAY INHALATION TREATMENT: CPT

## 2017-03-13 PROCEDURE — 74011250636 HC RX REV CODE- 250/636: Performed by: INTERNAL MEDICINE

## 2017-03-13 PROCEDURE — 77010033678 HC OXYGEN DAILY

## 2017-03-13 PROCEDURE — 74011250637 HC RX REV CODE- 250/637: Performed by: INTERNAL MEDICINE

## 2017-03-13 PROCEDURE — 33228 REMV&REPLC PM GEN DUAL LEAD: CPT

## 2017-03-13 PROCEDURE — 77030018729 HC ELECTRD DEFIB PAD CARD -B

## 2017-03-13 PROCEDURE — 77030018547 HC SUT ETHBND1 J&J -B

## 2017-03-13 PROCEDURE — 77030031139 HC SUT VCRL2 J&J -A

## 2017-03-13 PROCEDURE — 74011000250 HC RX REV CODE- 250: Performed by: INTERNAL MEDICINE

## 2017-03-13 PROCEDURE — 99218 HC RM OBSERVATION: CPT

## 2017-03-13 PROCEDURE — 77030012935 HC DRSG AQUACEL BMS -B

## 2017-03-13 PROCEDURE — 74011250637 HC RX REV CODE- 250/637: Performed by: NURSE PRACTITIONER

## 2017-03-13 PROCEDURE — 74011000272 HC RX REV CODE- 272: Performed by: INTERNAL MEDICINE

## 2017-03-13 PROCEDURE — C1785 PMKR, DUAL, RATE-RESP: HCPCS

## 2017-03-13 RX ORDER — ESCITALOPRAM OXALATE 10 MG/1
20 TABLET ORAL DAILY
Status: DISCONTINUED | OUTPATIENT
Start: 2017-03-14 | End: 2017-03-14 | Stop reason: HOSPADM

## 2017-03-13 RX ORDER — SODIUM CHLORIDE 0.9 % (FLUSH) 0.9 %
5-10 SYRINGE (ML) INJECTION EVERY 8 HOURS
Status: DISCONTINUED | OUTPATIENT
Start: 2017-03-13 | End: 2017-03-14 | Stop reason: HOSPADM

## 2017-03-13 RX ORDER — SODIUM CHLORIDE 9 MG/ML
500 INJECTION, SOLUTION INTRAVENOUS ONCE
Status: COMPLETED | OUTPATIENT
Start: 2017-03-13 | End: 2017-03-13

## 2017-03-13 RX ORDER — SODIUM CHLORIDE 0.9 % (FLUSH) 0.9 %
5-10 SYRINGE (ML) INJECTION AS NEEDED
Status: DISCONTINUED | OUTPATIENT
Start: 2017-03-13 | End: 2017-03-14 | Stop reason: HOSPADM

## 2017-03-13 RX ORDER — TOLTERODINE 2 MG/1
2 CAPSULE, EXTENDED RELEASE ORAL DAILY
Status: DISCONTINUED | OUTPATIENT
Start: 2017-03-14 | End: 2017-03-13 | Stop reason: SDUPTHER

## 2017-03-13 RX ORDER — VANCOMYCIN HYDROCHLORIDE 1 G/20ML
1000 INJECTION, POWDER, LYOPHILIZED, FOR SOLUTION INTRAVENOUS ONCE
Status: COMPLETED | OUTPATIENT
Start: 2017-03-13 | End: 2017-03-13

## 2017-03-13 RX ORDER — MIDAZOLAM HYDROCHLORIDE 1 MG/ML
1-10 INJECTION, SOLUTION INTRAMUSCULAR; INTRAVENOUS
Status: DISCONTINUED | OUTPATIENT
Start: 2017-03-13 | End: 2017-03-13

## 2017-03-13 RX ORDER — CARBIDOPA AND LEVODOPA 25; 100 MG/1; MG/1
2 TABLET ORAL 4 TIMES DAILY
Status: DISCONTINUED | OUTPATIENT
Start: 2017-03-14 | End: 2017-03-14 | Stop reason: HOSPADM

## 2017-03-13 RX ORDER — PINDOLOL 5 MG/1
5 TABLET ORAL
Status: COMPLETED | OUTPATIENT
Start: 2017-03-13 | End: 2017-03-13

## 2017-03-13 RX ORDER — ATROPINE SULFATE 0.1 MG/ML
1 INJECTION INTRAVENOUS ONCE
Status: DISCONTINUED | OUTPATIENT
Start: 2017-03-13 | End: 2017-03-13

## 2017-03-13 RX ORDER — TOLTERODINE 2 MG/1
2 CAPSULE, EXTENDED RELEASE ORAL DAILY
Status: DISCONTINUED | OUTPATIENT
Start: 2017-03-14 | End: 2017-03-14 | Stop reason: CLARIF

## 2017-03-13 RX ORDER — DROXIDOPA 100 MG/1
100 CAPSULE ORAL 3 TIMES DAILY
Status: DISCONTINUED | OUTPATIENT
Start: 2017-03-14 | End: 2017-03-14 | Stop reason: HOSPADM

## 2017-03-13 RX ORDER — ESOMEPRAZOLE MAGNESIUM 40 MG/1
40 CAPSULE, DELAYED RELEASE ORAL DAILY
Status: DISCONTINUED | OUTPATIENT
Start: 2017-03-14 | End: 2017-03-13 | Stop reason: CLARIF

## 2017-03-13 RX ORDER — TRAMADOL HYDROCHLORIDE 50 MG/1
50 TABLET ORAL
Status: DISCONTINUED | OUTPATIENT
Start: 2017-03-13 | End: 2017-03-14 | Stop reason: HOSPADM

## 2017-03-13 RX ORDER — LABETALOL HYDROCHLORIDE 5 MG/ML
20 INJECTION, SOLUTION INTRAVENOUS ONCE
Status: DISCONTINUED | OUTPATIENT
Start: 2017-03-13 | End: 2017-03-13

## 2017-03-13 RX ORDER — PANTOPRAZOLE SODIUM 40 MG/1
40 TABLET, DELAYED RELEASE ORAL DAILY
Status: DISCONTINUED | OUTPATIENT
Start: 2017-03-14 | End: 2017-03-14 | Stop reason: HOSPADM

## 2017-03-13 RX ORDER — ACETAMINOPHEN 325 MG/1
650 TABLET ORAL
Status: DISCONTINUED | OUTPATIENT
Start: 2017-03-13 | End: 2017-03-14 | Stop reason: HOSPADM

## 2017-03-13 RX ORDER — PINDOLOL 5 MG/1
2.5 TABLET ORAL
Status: DISCONTINUED | OUTPATIENT
Start: 2017-03-14 | End: 2017-03-14 | Stop reason: HOSPADM

## 2017-03-13 RX ORDER — QUETIAPINE FUMARATE 25 MG/1
25 TABLET, FILM COATED ORAL EVERY EVENING
Status: DISCONTINUED | OUTPATIENT
Start: 2017-03-14 | End: 2017-03-14 | Stop reason: HOSPADM

## 2017-03-13 RX ORDER — HYDRALAZINE HYDROCHLORIDE 20 MG/ML
20 INJECTION INTRAMUSCULAR; INTRAVENOUS
Status: DISCONTINUED | OUTPATIENT
Start: 2017-03-13 | End: 2017-03-14 | Stop reason: HOSPADM

## 2017-03-13 RX ORDER — CEFAZOLIN SODIUM IN 0.9 % NACL 2 G/50 ML
2 INTRAVENOUS SOLUTION, PIGGYBACK (ML) INTRAVENOUS ONCE
Status: COMPLETED | OUTPATIENT
Start: 2017-03-13 | End: 2017-03-13

## 2017-03-13 RX ORDER — DOCUSATE SODIUM 100 MG/1
100 CAPSULE, LIQUID FILLED ORAL
Status: DISCONTINUED | OUTPATIENT
Start: 2017-03-13 | End: 2017-03-14 | Stop reason: HOSPADM

## 2017-03-13 RX ORDER — POLYETHYLENE GLYCOL 3350 17 G/17G
17 POWDER, FOR SOLUTION ORAL DAILY
Status: DISCONTINUED | OUTPATIENT
Start: 2017-03-14 | End: 2017-03-14 | Stop reason: HOSPADM

## 2017-03-13 RX ORDER — PINDOLOL 5 MG/1
5 TABLET ORAL
Status: DISCONTINUED | OUTPATIENT
Start: 2017-03-14 | End: 2017-03-14 | Stop reason: HOSPADM

## 2017-03-13 RX ORDER — LORAZEPAM 0.5 MG/1
0.5 TABLET ORAL EVERY EVENING
Status: DISCONTINUED | OUTPATIENT
Start: 2017-03-14 | End: 2017-03-14 | Stop reason: HOSPADM

## 2017-03-13 RX ORDER — HYDRALAZINE HYDROCHLORIDE 20 MG/ML
20 INJECTION INTRAMUSCULAR; INTRAVENOUS ONCE
Status: ACTIVE | OUTPATIENT
Start: 2017-03-13 | End: 2017-03-14

## 2017-03-13 RX ORDER — ASPIRIN 325 MG
325 TABLET ORAL 2 TIMES DAILY
Status: DISCONTINUED | OUTPATIENT
Start: 2017-03-14 | End: 2017-03-14 | Stop reason: HOSPADM

## 2017-03-13 RX ORDER — FENTANYL CITRATE 50 UG/ML
25-50 INJECTION, SOLUTION INTRAMUSCULAR; INTRAVENOUS
Status: DISCONTINUED | OUTPATIENT
Start: 2017-03-13 | End: 2017-03-13

## 2017-03-13 RX ORDER — CEPHALEXIN 250 MG/1
250 CAPSULE ORAL 3 TIMES DAILY
Qty: 15 CAP | Refills: 0 | Status: SHIPPED | OUTPATIENT
Start: 2017-03-13 | End: 2017-03-21

## 2017-03-13 RX ORDER — RIVASTIGMINE 4.6 MG/24H
1 PATCH, EXTENDED RELEASE TRANSDERMAL DAILY
Status: DISCONTINUED | OUTPATIENT
Start: 2017-03-14 | End: 2017-03-14 | Stop reason: HOSPADM

## 2017-03-13 RX ORDER — THERA TABS 400 MCG
1 TAB ORAL DAILY
Status: DISCONTINUED | OUTPATIENT
Start: 2017-03-14 | End: 2017-03-14 | Stop reason: HOSPADM

## 2017-03-13 RX ORDER — NALOXONE HYDROCHLORIDE 1 MG/ML
0.4 INJECTION INTRAMUSCULAR; INTRAVENOUS; SUBCUTANEOUS AS NEEDED
Status: DISCONTINUED | OUTPATIENT
Start: 2017-03-13 | End: 2017-03-14 | Stop reason: HOSPADM

## 2017-03-13 RX ORDER — CEPHALEXIN 250 MG/1
250 CAPSULE ORAL 3 TIMES DAILY
Qty: 15 CAP | Refills: 0 | Status: SHIPPED | OUTPATIENT
Start: 2017-03-13 | End: 2017-03-13

## 2017-03-13 RX ORDER — LIDOCAINE HYDROCHLORIDE 10 MG/ML
30 INJECTION INFILTRATION; PERINEURAL
Status: DISCONTINUED | OUTPATIENT
Start: 2017-03-13 | End: 2017-03-13

## 2017-03-13 RX ORDER — MECLIZINE HCL 12.5 MG 12.5 MG/1
25 TABLET ORAL
Status: DISCONTINUED | OUTPATIENT
Start: 2017-03-13 | End: 2017-03-14 | Stop reason: HOSPADM

## 2017-03-13 RX ORDER — IPRATROPIUM BROMIDE AND ALBUTEROL SULFATE 2.5; .5 MG/3ML; MG/3ML
3 SOLUTION RESPIRATORY (INHALATION)
Status: DISCONTINUED | OUTPATIENT
Start: 2017-03-13 | End: 2017-03-14 | Stop reason: HOSPADM

## 2017-03-13 RX ORDER — SODIUM CHLORIDE 0.9 % (FLUSH) 0.9 %
5-10 SYRINGE (ML) INJECTION AS NEEDED
Status: DISCONTINUED | OUTPATIENT
Start: 2017-03-13 | End: 2017-03-13

## 2017-03-13 RX ORDER — CEFAZOLIN SODIUM IN 0.9 % NACL 2 G/50 ML
2 INTRAVENOUS SOLUTION, PIGGYBACK (ML) INTRAVENOUS ONCE
Status: DISCONTINUED | OUTPATIENT
Start: 2017-03-13 | End: 2017-03-13

## 2017-03-13 RX ADMIN — SODIUM CHLORIDE 500 ML: 900 INJECTION, SOLUTION INTRAVENOUS at 11:57

## 2017-03-13 RX ADMIN — Medication 10 ML: at 20:53

## 2017-03-13 RX ADMIN — TRAMADOL HYDROCHLORIDE 50 MG: 50 TABLET, FILM COATED ORAL at 18:00

## 2017-03-13 RX ADMIN — MIDAZOLAM HYDROCHLORIDE 1 MG: 1 INJECTION, SOLUTION INTRAMUSCULAR; INTRAVENOUS at 12:24

## 2017-03-13 RX ADMIN — PINDOLOL 5 MG: 5 TABLET ORAL at 15:03

## 2017-03-13 RX ADMIN — VANCOMYCIN HYDROCHLORIDE 1000 MG: 1 INJECTION, POWDER, LYOPHILIZED, FOR SOLUTION INTRAVENOUS at 12:38

## 2017-03-13 RX ADMIN — IPRATROPIUM BROMIDE AND ALBUTEROL SULFATE 3 ML: .5; 3 SOLUTION RESPIRATORY (INHALATION) at 23:10

## 2017-03-13 RX ADMIN — LORAZEPAM 0.5 MG: 0.5 TABLET ORAL at 23:48

## 2017-03-13 RX ADMIN — FENTANYL CITRATE 25 MCG: 50 INJECTION, SOLUTION INTRAMUSCULAR; INTRAVENOUS at 12:25

## 2017-03-13 RX ADMIN — LIDOCAINE HYDROCHLORIDE 25 ML: 10 INJECTION, SOLUTION INFILTRATION; PERINEURAL at 12:23

## 2017-03-13 RX ADMIN — Medication 10 ML: at 11:56

## 2017-03-13 RX ADMIN — HYDRALAZINE HYDROCHLORIDE 20 MG: 20 INJECTION INTRAMUSCULAR; INTRAVENOUS at 20:53

## 2017-03-13 RX ADMIN — CEFAZOLIN 2 G: 1 INJECTION, POWDER, FOR SOLUTION INTRAMUSCULAR; INTRAVENOUS; PARENTERAL at 11:56

## 2017-03-13 RX ADMIN — QUETIAPINE FUMARATE 25 MG: 25 TABLET, FILM COATED ORAL at 23:48

## 2017-03-13 RX ADMIN — IPRATROPIUM BROMIDE AND ALBUTEROL SULFATE 3 ML: .5; 3 SOLUTION RESPIRATORY (INHALATION) at 16:24

## 2017-03-13 RX ADMIN — MIDAZOLAM HYDROCHLORIDE 2 MG: 1 INJECTION, SOLUTION INTRAMUSCULAR; INTRAVENOUS at 12:23

## 2017-03-13 RX ADMIN — FENTANYL CITRATE 25 MCG: 50 INJECTION, SOLUTION INTRAMUSCULAR; INTRAVENOUS at 12:23

## 2017-03-13 RX ADMIN — SODIUM CHLORIDE: 900 IRRIGANT IRRIGATION at 12:04

## 2017-03-13 NOTE — IP AVS SNAPSHOT
[address] 
[phone] Patient: Severo Leigh MRN: [id number] [facility]:7[birth year]70 You are allergic to the following No active allergies Recent Documentation Height Weight BMI Smoking Status 1.88 m 90.4 kg 25.6 kg/m2 Former Smoker Emergency Contacts Name Discharge Info Relation Home Work Mobile Radha Oneil  Child [2] [phone] Allison Romero  Child [2] [phone] About your hospitalization You were admitted on:  March 13, 2017 You last received care in the:  [facility] 4 IMCU You were discharged on:  March 14, 2017 Unit phone number:  [phone] Why you were hospitalized Your primary diagnosis was:  Pacemaker End Of Life Providers Seen During Your Hospitalizations Provider Role Specialty Primary office phone Grayson Sheldon MD Attending Provider Cardiology [phone] Your Primary Care Physician (PCP) Primary Care Physician Office Phone Office Fax MICHEL Bowden [phone] ** None ** Follow-up Information Follow up With Details Comments Contact Info Grayson Sheldon MD On 3/27/2017 [address] [facility] [address] [place] 
[phone] Lynette Welch MD On 3/29/2017 3/29/17 at [address] [place] 
[phone] Your Appointments Monday March 27, 2017  9:30 AM EDT  
REMOTE OFFICE VISIT with Dipika Castro [facility] ([facility] SCHEDULING) [address] [place]  
[phone] Monday March 27, 2017  2:00 PM EDT  
PACEMAKER with Geni Vilchis [facility] ([facility] SCHEDULING) [address] [place]  
[phone]  Monday March 27, 2017  2:20 PM EDT  
ESTABLISHED PATIENT with Grayson Sheldon MD  
 [facility] ([facility] SCHEDULING) [address] [facility] 57  
[phone] Wednesday March 29, 2017  4:00 PM EDT ROUTINE CARE with Dora Owens MD  
[facility]) [address] [facility]  
[phone] Thursday April 06, 2017  9:15 AM EDT  
PACEMAKER with HUSSEINQ0, Odetta Schirmer [facility] ([facility]KS SCHEDULING) [address] [facility] 57  
[phone] Thursday April 06, 2017  9:20 AM EDT  
ESTABLISHED PATIENT with Kamille Felipe MD  
[facility] ([address]) [address] [facility] 57  
[phone] Current Discharge Medication List  
  
START taking these medications Dose & Instructions Dispensing Information Comments Morning Noon Evening Bedtime  
 cephALEXin 250 mg capsule Commonly known as:  Molly Slot Your last dose was: Your next dose is:    
   
   
 Dose:  250 mg Take 1 Cap by mouth three (3) times daily. Quantity:  15 Cap Refills:  0 CONTINUE these medications which have NOT CHANGED Dose & Instructions Dispensing Information Comments Morning Noon Evening Bedtime  
 acetaminophen 500 mg tablet Commonly known as:  MAPAP EXTRA STRENGTH Your last dose was: Your next dose is:    
   
   
 Dose:  500 mg Take 1 Tab by mouth every six (6) hours as needed for Pain. Quantity:  120 Tab Refills:  0  
     
   
   
   
  
 albuterol 90 mcg/actuation inhaler Commonly known as:  PROVENTIL HFA, VENTOLIN HFA, PROAIR HFA Your last dose was: Your next dose is:    
   
   
 Dose:  2 Puff Take 2 Puffs by inhalation every six (6) hours as needed for Wheezing. Quantity:  1 Inhaler Refills:  0 carbidopa-levodopa  mg per tablet Commonly known as:  SINEMET Your last dose was: Your next dose is:    
   
   
 Dose:  2 Tab Take 2 Tabs by mouth four (4) times daily. (This is given at 8:00, 11:00, 14:00, and 21:00). Quantity:  240 Tab Refills:  0  
     
   
   
   
  
 cloNIDine HCl 0.1 mg tablet Commonly known as:  CATAPRES Your last dose was: Your next dose is:    
   
   
 Dose:  0.1 mg Take 1 Tab by mouth every six (6) hours as needed (SBP > 180 mmHg). Quantity:  20 Tab Refills:  0  
     
   
   
   
  
 cycloSPORINE 0.05 % ophthalmic emulsion Commonly known as:  RESTASIS Your last dose was: Your next dose is:    
   
   
 Dose:  1 Drop Administer 1 Drop to both eyes two (2) times a day. Quantity:  60 Each Refills:  0  
     
   
   
   
  
 docusate sodium 100 mg capsule Commonly known as:  Charline Antis Your last dose was: Your next dose is:    
   
   
 Dose:  100 mg Take 1 Cap by mouth daily as needed for Constipation. Quantity:  30 Cap Refills:  0  
     
   
   
   
  
 droxidopa 100 mg Cap capsule Commonly known as:  Orville Bosworth Your last dose was: Your next dose is:    
   
   
 Dose:  100 mg Take 100 mg by mouth three (3) times daily. Indications: SYMPTOMATIC ORTHOSTATIC HYPOTENSION Quantity:  90 Tab Refills:  1  
     
   
   
   
  
 escitalopram oxalate 20 mg tablet Commonly known as:  Gerald Fabian Your last dose was: Your next dose is:    
   
   
 Dose:  20 mg Take 1 Tab by mouth daily. Quantity:  30 Tab Refills:  0  
     
   
   
   
  
 esomeprazole 40 mg capsule Commonly known as:  NexIUM Your last dose was: Your next dose is:    
   
   
 Dose:  40 mg Take 1 Cap by mouth daily. Quantity:  30 Cap Refills:  0  
     
   
   
   
  
 * LORazepam 0.5 mg tablet Commonly known as:  ATIVAN Your last dose was: Your next dose is: Take  by mouth every six (6) hours as needed for Anxiety. Refills:  0  
     
   
   
   
  
 * LORazepam 0.5 mg tablet Commonly known as:  ATIVAN Your last dose was: Your next dose is:    
   
   
 Dose:  0.5 mg Take 0.5 mg by mouth every evening. Refills:  0  
     
   
   
   
  
 meclizine 25 mg tablet Commonly known as:  ANTIVERT Your last dose was: Your next dose is:    
   
   
 Dose:  25 mg Take 25 mg by mouth three (3) times daily as needed for Dizziness. Refills:  0  
     
   
   
   
  
 * pindolol 5 mg tablet Commonly known as:  VISKIN Your last dose was: Your next dose is:    
   
   
 Dose:  5 mg Take 5 mg by mouth nightly. Refills:  0  
     
   
   
   
  
 * pindolol 5 mg tablet Commonly known as:  VISKIN Your last dose was: Your next dose is:    
   
   
 Dose:  2.5 mg Take 2.5 mg by mouth daily (after lunch). Refills:  0  
     
   
   
   
  
 polyethylene glycol 17 gram packet Commonly known as:  Kreg Patron Your last dose was: Your next dose is:    
   
   
 Dose:  17 g Take 1 Packet by mouth daily. Indications: CONSTIPATION Quantity:  30 Packet Refills:  0 QUEtiapine 25 mg tablet Commonly known as:  SEROquel Your last dose was: Your next dose is:    
   
   
 Dose:  25 mg Take 25 mg by mouth every evening. Refills:  0  
     
   
   
   
  
 rivastigmine 4.6 mg/24 hr patch Commonly known as:  EXELON Your last dose was: Your next dose is:    
   
   
 Dose:  1 Patch 1 Patch by TransDERmal route daily. Quantity:  30 Patch Refills:  0  
     
   
   
   
  
 therapeutic multivitamin tablet Commonly known as:  [facility] Your last dose was: Your next dose is:    
   
   
 Dose:  1 Tab Take 1 Tab by mouth daily. Quantity:  30 Tab Refills:  0 * tolterodine ER 2 mg ER capsule Commonly known as:  DETROL-LA Your last dose was: Your next dose is:    
   
   
 Dose:  2 mg Take 1 Cap by mouth daily. For bladder frequency Quantity:  30 Cap Refills:  0  
     
   
   
   
  
 * DETROL LA 2 mg ER capsule Generic drug:  tolterodine ER Your last dose was: Your next dose is: TAKE 1 CAPSULE DAILY FOR BLADDER FREQUENCY Quantity:  90 Cap Refills:  3  
     
   
   
   
  
 traMADol 50 mg tablet Commonly known as:  ULTRAM  
   
Your last dose was: Your next dose is:    
   
   
 Dose:  50 mg Take 1 Tab by mouth every eight (8) hours as needed for Pain. Max Daily Amount: 150 mg.  
 Quantity:  30 Tab Refills:  1  
     
   
   
   
  
 * Notice: This list has 6 medication(s) that are the same as other medications prescribed for you. Read the directions carefully, and ask your doctor or other care provider to review them with you. STOP taking these medications   
 aspirin 325 mg tablet Commonly known as:  ASPIRIN Where to Get Your Medications Information on where to get these meds will be given to you by the nurse or doctor. ! Ask your nurse or doctor about these medications  
  cephALEXin 250 mg capsule Discharge Instructions PATIENT INSTRUCTIONS POST-PACEMAKER GENERATOR CHANGE 1. Keep dressing on for 7 days and remove 3/20/17 May shower or clean around Remove dressing in a week and do not need another dressing No more aspirin until follow up due to risk of bleeding inside pocket 2. Call Dr. Luis Dunham at [phone] if you experience any of the following symptoms: 1. Redness at the pacemaker site 2. Swelling at or around the pacemaker 3. Pain around the pacemaker 4. Dizziness, lightheadedness, fainting spells 5. Lack of energy 6. Shortness of breath 7. Rapid heart rate 8. Chest or muscle twitches 3.  Follow-up with Dr. Joe Moore as scheduled Future Appointments Date Time Provider Clementine Roca 3/27/2017 9:30 AM REMOTE1, [address]  
3/27/2017 2:00 PM PACEMAKER3, [address]  
3/27/2017 2:20 PM Grant Metcalf  [address]  
3/29/2017 4:00 PM Maria Victoria Scales MD [facility] [address] [facility] [address]  
4/6/2017 9:15 AM Phill Johnson [facility] SCHED  
4/6/2017 9:20 AM Grant Metcalf  [address]  
 
 
4. You may use extra strength Tylenol and ice pack for pain relief as needed. Felicia Dc M.D. [facility] Electrophysiology/Cardiology [facility] [place] [address], [facility] [address] [place] [address] 
[phone]                                        [phone] Discharge Orders None ACO Transitions of Care Introducing [place] [address] Yvonne Funes offers a voluntary care coordination program to provide high quality service and care to [facility] fee-for-service beneficiaries. Bayron Iqbal was designed to help you enhance your health and well-being through the following services: ? Transitions of Care  support for individuals who are transitioning from one care setting to another (example: Hospital to home). ? Chronic and Complex Care Coordination  support for individuals and caregivers of those with serious or chronic illnesses or with more than one chronic (ongoing) condition and those who take a number of different medications. If you meet specific medical criteria, a [address] [place] [address] Rd may call you directly to coordinate your care with your primary care physician and your other care providers. For questions about the [facility] programs, please, contact your physicians office. For general questions or additional information about Accountable Care Organizations: 
Please visit www.medicare.gov/acos. html or call 1-800-MEDICARE (2-268.245.2350) TTY users should call 9-601.268.8271. [employer] Announcement We are excited to announce that we are making your provider's discharge notes available to you in [employer]. You will see these notes when they are completed and signed by the physician that discharged you from your recent hospital stay. If you have any questions or concerns about any information you see in [employer], please call the Health Information Department where you were seen or reach out to your Primary Care Provider for more information about your plan of care. Introducing [facility] & HEALTH SERVICES! Dear Mark Gaonas: Thank you for requesting a [employer] account. Our records indicate that you already have an active [employer] account. You can access your account anytime at [email or web address] Did you know that you can access your hospital and ER discharge instructions at any time in [employer]? You can also review all of your test results from your hospital stay or ER visit. Additional Information If you have questions, please visit the Frequently Asked Questions section of the [employer] website at [email or web address] Remember, [employer] is NOT to be used for urgent needs. For medical emergencies, dial 911. Now available from your iPhone and Android! General Information Please provide this summary of care documentation to your next provider. Patient Signature:  ____________________________________________________________ Date:  ____________________________________________________________  
  
Stefanie Cheema Provider Signature:  ____________________________________________________________ Date:  ____________________________________________________________

## 2017-03-13 NOTE — ROUTINE PROCESS
Patient's blood pressure is elevated and patient pulled out PIV. Norma Aranda NP made aware. Orders to be received. JOY Bello also made aware of the patient having audible crackles. Patient is confused and the daughter is not in the hospital.  1537: The patient is about to be discharged but is having lots of audible wheezing and crackles noted. JOY Bello made aware. Orders to be received.

## 2017-03-13 NOTE — H&P
Cardiac Electrophysiology H/P Note     Subjective:      Liliane Trinidad is a 80 y.o. patient who is seen for pacemaker generator change  He had this for sick sinus syndrome  It has reached ANSHU when he was in hospital last month  He has been at [facility]    Patient Active Problem List   Diagnosis Code    Hyperlipemia E78.5    DU (duodenal ulcer) K26.9    Reflux esophagitis K21.0    DDD (degenerative disc disease), lumbar M51.36    DJD (degenerative joint disease) of cervical spine M47.812    Parkinsonian syndrome- Dr. Emmie Good hypertension, difficult to control due to orthostatic hypotension I10    BPH (benign prostatic hyperplasia)- Dr. Gabriel Or N40.0    Sick sinus syndrome-pacemaker- I49.5    PAT (paroxysmal atrial tachycardia) ([facility]) I47.1    NSVT (nonsustained ventricular tachycardia) ([facility]) I47.2    Blurry vision-chronic, no change- neg w/u H53.8    Orthostatic hypotension I95.1    Pacemaker Z95.0    Mild cognitive impairment w/o memory loss G31.84    Major depressive disorder, single episode, unspecified F32.9    Generalized anxiety disorder F41.1    Rupture quadriceps tendon- bilat--2014- UNABLE TO WALK AFTER THAT.  H20.219P    Overactive bladder- Dr. Gabriel Or N32.81    Grief- wife of 61 years  2015 F43.20    Primary osteoarthritis of both knees M17.0    History of duodenal ulcer Z87.19    Aneurysm of iliac artery ([facility]- left 3.9 cm  CT scan- no change I72.3    Pneumonia J18.9    Pacemaker end of life Z45.018       No Known Allergies  Past Medical History:   Diagnosis Date    Blurry vision 2012    BPH (benign prostatic hyperplasia)     DDD (degenerative disc disease), lumbar 2010    DJD (degenerative joint disease) of cervical spine 2010    DU (duodenal ulcer) 1990    Hyperlipidemia     Hypertension     Other and unspecified hyperlipidemia 2010    Pacemaker     Paralysis agitans ([address] 2010    Parkinson disease ([facility]     PAT (paroxysmal atrial tachycardia) (HCC)     Premature atrial beats     Reflux esophagitis 2/19/2010    Sick sinus syndrome [facility])      Past Surgical History:   Procedure Laterality Date    HX CATARACT REMOVAL  6/2012 and 7/2012    Both eyes    HX ORTHOPAEDIC  04/2015    tendon repair both knees    HX PACEMAKER  2008    bradycardia    HX VITRECTOMY  11/2011     Family History   Problem Relation Age of Onset    Asthma Mother     Heart Disease Father    No Known Allergies  No current facility-administered medications on file prior to encounter. Current Outpatient Prescriptions on File Prior to Encounter   Medication Sig Dispense Refill    LORazepam (ATIVAN) 0.5 mg tablet Take  by mouth every six (6) hours as needed for Anxiety.  LORazepam (ATIVAN) 0.5 mg tablet Take 0.5 mg by mouth every evening.  QUEtiapine (SEROQUEL) 25 mg tablet Take 25 mg by mouth every evening.  DETROL LA 2 mg ER capsule TAKE 1 CAPSULE DAILY FOR BLADDER FREQUENCY 90 Cap 3    pindolol (VISKIN) 5 mg tablet Take 5 mg by mouth nightly.  pindolol (VISKIN) 5 mg tablet Take 2.5 mg by mouth daily (after lunch).  meclizine (ANTIVERT) 25 mg tablet Take 25 mg by mouth three (3) times daily as needed for Dizziness.  albuterol (PROVENTIL HFA, VENTOLIN HFA, PROAIR HFA) 90 mcg/actuation inhaler Take 2 Puffs by inhalation every six (6) hours as needed for Wheezing. 1 Inhaler 0    aspirin (ASPIRIN) 325 mg tablet Take 1 Tab by mouth two (2) times a day. Indications: PREVENTION OF TRANSIENT ISCHEMIC ATTACKS 60 Tab 0    carbidopa-levodopa (SINEMET)  mg per tablet Take 2 Tabs by mouth four (4) times daily. (This is given at 8:00, 11:00, 14:00, and 21:00). 240 Tab 0    polyethylene glycol (MIRALAX) 17 gram packet Take 1 Packet by mouth daily. Indications: CONSTIPATION 30 Packet 0    therapeutic multivitamin (THERAGRAN) tablet Take 1 Tab by mouth daily.  30 Tab 0    tolterodine ER (DETROL-LA) 2 mg ER capsule Take 1 Cap by mouth daily. For bladder frequency 30 Cap 0    traMADol (ULTRAM) 50 mg tablet Take 1 Tab by mouth every eight (8) hours as needed for Pain. Max Daily Amount: 150 mg. 30 Tab 1    cloNIDine HCl (CATAPRES) 0.1 mg tablet Take 1 Tab by mouth every six (6) hours as needed (SBP > 180 mmHg). 20 Tab 0    droxidopa (NORTHERA) 100 mg cap Take 100 mg by mouth three (3) times daily. Indications: SYMPTOMATIC ORTHOSTATIC HYPOTENSION 90 Tab 1    docusate sodium (COLACE) 100 mg capsule Take 1 Cap by mouth daily as needed for Constipation. 30 Cap 0    cycloSPORINE (RESTASIS) 0.05 % ophthalmic emulsion Administer 1 Drop to both eyes two (2) times a day. 60 Each 0    escitalopram oxalate (LEXAPRO) 20 mg tablet Take 1 Tab by mouth daily. 30 Tab 0    rivastigmine (EXELON) 4.6 mg/24 hr patch 1 Patch by TransDERmal route daily. 30 Patch 0    esomeprazole (NEXIUM) 40 mg capsule Take 1 Cap by mouth daily. 30 Cap 0    acetaminophen (MAPAP EXTRA STRENGTH) 500 mg tablet Take 1 Tab by mouth every six (6) hours as needed for Pain. 120 Tab 0       Social History   Substance Use Topics    Smoking status: Former Smoker     Types: Pipe     Quit date: 7/14/1945    Smokeless tobacco: Never Used    Alcohol use 2.0 oz/week     4 Cans of beer per week      Comment: glass of wine every now and then        Review of Systems:   Constitutional: Negative for fever, chills, weight loss, + malaise/fatigue. HEENT: Negative for nosebleeds, vision changes. Respiratory: Negative for cough, hemoptysis  Cardiovascular: Negative for chest pain, palpitations, orthopnea, claudication, leg swelling, syncope, and PND. Gastrointestinal: Negative for nausea, vomiting, diarrhea, blood in stool and melena. Genitourinary: Negative for dysuria, and hematuria. Musculoskeletal: Negative for myalgias, + arthralgia. Skin: Negative for rash. Heme: Does not bleed or bruise easily.    Neurological: Negative for speech change and focal weakness     Objective:        Physical Exam:   Constitutional: well-developed and well-nourished. No respiratory distress. Head: Normocephalic and atraumatic. Eyes: Pupils are equal, round  ENT: hearing normal  Neck: supple. No JVD present. Cardiovascular: Normal rate, regular rhythm. Exam reveals no gallop and no friction rub. No murmur heard. Pulmonary/Chest: Effort normal and breath sounds normal. No wheezes. Abdominal: Soft, no tenderness. Musculoskeletal: no edema. Neurological: alert,oriented. Skin: Skin is warm and dry left sided pacemaker  Psychiatric: normal mood and affect. Behavior is normal. Judgment and thought content normal.           Assessment/Plan:   Pacemaker at end of life  Sick sinus syndrome   Orthostatic hypotension  Supine hypertension    He and his daughter agree with pacemaker generator change  Risks involve device implant include but are not limited to bleeding, infection death. Thank you for involving me in this patient's care and please call with further concerns or questions. Reddy Calvert M.D.   Electrophysiology/Cardiology  [facility] [place]  [facility], [place] [address] [place] [address], [address] [place] [address] Avenue                             [address] [place] [address]  [phone]

## 2017-03-13 NOTE — PROGRESS NOTES
Transfer to [facility] RR from Procedure Area    Verbal report given to [facility] RN on Oden Cheese being transferred to Cardiac Cath Lab RR for routine post - op   Patient is post Pacemaker gen change procedure. Patient stable upon transfer to . Report consisted of patients Situation, Background, Assessment and   Recommendations(SBAR). Information from the following report(s) Procedure Summary, Intake/Output, MAR and Cardiac Rhythm Paced was reviewed with the receiving nurse. Opportunity for questions and clarification was provided. Patient medicated during procedure with orders obtained and verified by Dr. Tirso Alfaro. Refer to patient PROCEDURE REPORT for vital signs, assessment, status, and response during procedure.

## 2017-03-13 NOTE — DISCHARGE INSTRUCTIONS
PATIENT INSTRUCTIONS POST-PACEMAKER GENERATOR CHANGE    1. Keep dressing on for 7 days and remove 3/20/17  May shower or clean around  Remove dressing in a week and do not need another dressing  No more aspirin until follow up due to risk of bleeding inside pocket    2. Call Dr. Markus Seals at [phone] if you experience any of the following symptoms:  1. Redness at the pacemaker site  2. Swelling at or around the pacemaker    3. Pain around the pacemaker  4. Dizziness, lightheadedness, fainting spells  5. Lack of energy  6. Shortness of breath  7. Rapid heart rate  8. Chest or muscle twitches    3. Follow-up with Dr. Markus Seals as scheduled  Future Appointments  Date Time Provider Clementine Chanelle   3/27/2017 9:30 AM REMOTE1, [address]   3/27/2017 2:00  [facility], [address]   3/27/2017 2:20 PM Barbra Cárdenas  [address]   3/29/2017 4:00 PM Myla Faria MD [address]   4/6/2017 9:15  [place], [address]   4/6/2017 9:20 AM Barbra Cárdenas  [address]       4. You may use extra strength Tylenol and ice pack for pain relief as needed. Peace Carlin M.D.  [facility]  Electrophysiology/Cardiology  [facility] [place]  [address] [address], [place] [address], Kash Zuleta [address] [place] [address]  [phone]

## 2017-03-13 NOTE — IP AVS SNAPSHOT
Current Discharge Medication List  
  
START taking these medications Dose & Instructions Dispensing Information Comments Morning Noon Evening Bedtime  
 cephALEXin 250 mg capsule Commonly known as:  Molly Slot Your last dose was: Your next dose is:    
   
   
 Dose:  250 mg Take 1 Cap by mouth three (3) times daily. Quantity:  15 Cap Refills:  0 CONTINUE these medications which have NOT CHANGED Dose & Instructions Dispensing Information Comments Morning Noon Evening Bedtime  
 acetaminophen 500 mg tablet Commonly known as:  MAPAP EXTRA STRENGTH Your last dose was: Your next dose is:    
   
   
 Dose:  500 mg Take 1 Tab by mouth every six (6) hours as needed for Pain. Quantity:  120 Tab Refills:  0  
     
   
   
   
  
 albuterol 90 mcg/actuation inhaler Commonly known as:  PROVENTIL HFA, VENTOLIN HFA, PROAIR HFA Your last dose was: Your next dose is:    
   
   
 Dose:  2 Puff Take 2 Puffs by inhalation every six (6) hours as needed for Wheezing. Quantity:  1 Inhaler Refills:  0  
     
   
   
   
  
 carbidopa-levodopa  mg per tablet Commonly known as:  SINEMET Your last dose was: Your next dose is:    
   
   
 Dose:  2 Tab Take 2 Tabs by mouth four (4) times daily. (This is given at 8:00, 11:00, 14:00, and 21:00). Quantity:  240 Tab Refills:  0  
     
   
   
   
  
 cloNIDine HCl 0.1 mg tablet Commonly known as:  CATAPRES Your last dose was: Your next dose is:    
   
   
 Dose:  0.1 mg Take 1 Tab by mouth every six (6) hours as needed (SBP > 180 mmHg). Quantity:  20 Tab Refills:  0  
     
   
   
   
  
 cycloSPORINE 0.05 % ophthalmic emulsion Commonly known as:  RESTASIS Your last dose was: Your next dose is:    
   
   
 Dose:  1 Drop Administer 1 Drop to both eyes two (2) times a day. Quantity:  60 Each Refills:  0  
     
   
   
   
  
 docusate sodium 100 mg capsule Commonly known as:  Danya Garcia Your last dose was: Your next dose is:    
   
   
 Dose:  100 mg Take 1 Cap by mouth daily as needed for Constipation. Quantity:  30 Cap Refills:  0  
     
   
   
   
  
 droxidopa 100 mg Cap capsule Commonly known as:  Grazyna Calderon Your last dose was: Your next dose is:    
   
   
 Dose:  100 mg Take 100 mg by mouth three (3) times daily. Indications: SYMPTOMATIC ORTHOSTATIC HYPOTENSION Quantity:  90 Tab Refills:  1  
     
   
   
   
  
 escitalopram oxalate 20 mg tablet Commonly known as:  Iva Levo Your last dose was: Your next dose is:    
   
   
 Dose:  20 mg Take 1 Tab by mouth daily. Quantity:  30 Tab Refills:  0  
     
   
   
   
  
 esomeprazole 40 mg capsule Commonly known as:  NexIUM Your last dose was: Your next dose is:    
   
   
 Dose:  40 mg Take 1 Cap by mouth daily. Quantity:  30 Cap Refills:  0  
     
   
   
   
  
 * LORazepam 0.5 mg tablet Commonly known as:  ATIVAN Your last dose was: Your next dose is: Take  by mouth every six (6) hours as needed for Anxiety. Refills:  0  
     
   
   
   
  
 * LORazepam 0.5 mg tablet Commonly known as:  ATIVAN Your last dose was: Your next dose is:    
   
   
 Dose:  0.5 mg Take 0.5 mg by mouth every evening. Refills:  0  
     
   
   
   
  
 meclizine 25 mg tablet Commonly known as:  ANTIVERT Your last dose was: Your next dose is:    
   
   
 Dose:  25 mg Take 25 mg by mouth three (3) times daily as needed for Dizziness. Refills:  0  
     
   
   
   
  
 * pindolol 5 mg tablet Commonly known as:  VISKIN Your last dose was: Your next dose is:    
   
   
 Dose:  5 mg Take 5 mg by mouth nightly. Refills:  0  
     
   
   
   
  
 * pindolol 5 mg tablet Commonly known as:  VISKIN Your last dose was: Your next dose is:    
   
   
 Dose:  2.5 mg Take 2.5 mg by mouth daily (after lunch). Refills:  0  
     
   
   
   
  
 polyethylene glycol 17 gram packet Commonly known as:  [place] Armor Your last dose was: Your next dose is:    
   
   
 Dose:  17 g Take 1 Packet by mouth daily. Indications: CONSTIPATION Quantity:  30 Packet Refills:  0 QUEtiapine 25 mg tablet Commonly known as:  SEROquel Your last dose was: Your next dose is:    
   
   
 Dose:  25 mg Take 25 mg by mouth every evening. Refills:  0  
     
   
   
   
  
 rivastigmine 4.6 mg/24 hr patch Commonly known as:  EXELON Your last dose was: Your next dose is:    
   
   
 Dose:  1 Patch 1 Patch by TransDERmal route daily. Quantity:  30 Patch Refills:  0  
     
   
   
   
  
 therapeutic multivitamin tablet Commonly known as:  [facility] Your last dose was: Your next dose is:    
   
   
 Dose:  1 Tab Take 1 Tab by mouth daily. Quantity:  30 Tab Refills:  0  
     
   
   
   
  
 * tolterodine ER 2 mg ER capsule Commonly known as:  DETROL-LA Your last dose was: Your next dose is:    
   
   
 Dose:  2 mg Take 1 Cap by mouth daily. For bladder frequency Quantity:  30 Cap Refills:  0  
     
   
   
   
  
 * DETROL LA 2 mg ER capsule Generic drug:  tolterodine ER Your last dose was: Your next dose is: TAKE 1 CAPSULE DAILY FOR BLADDER FREQUENCY Quantity:  90 Cap Refills:  3  
     
   
   
   
  
 traMADol 50 mg tablet Commonly known as:  ULTRAM  
   
Your last dose was: Your next dose is:    
   
   
 Dose:  50 mg Take 1 Tab by mouth every eight (8) hours as needed for Pain. Max Daily Amount: 150 mg.  
 Quantity:  30 Tab Refills:  1 * Notice: This list has 6 medication(s) that are the same as other medications prescribed for you. Read the directions carefully, and ask your doctor or other care provider to review them with you. STOP taking these medications   
 aspirin 325 mg tablet Commonly known as:  ASPIRIN Where to Get Your Medications Information on where to get these meds will be given to you by the nurse or doctor. ! Ask your nurse or doctor about these medications  
  cephALEXin 250 mg capsule

## 2017-03-13 NOTE — ROUTINE PROCESS
Cardiac Cath Lab Procedure Area Arrival Note:    Judson Knowles arrived to Cardiac Cath Lab, Procedure Area. Patient identifiers verified with NAME and DATE OF BIRTH. Procedure verified with patient. Consent forms verified. Allergies verified. Patient informed of procedure and plan of care. Questions answered with review. Patient voiced understanding of procedure and plan of care. Patient on cardiac monitor, non-invasive blood pressure, SPO2 monitor. On O2 @ 2 lpm via nasal cannula. IV of normal saline on pump at 25 ml/hr. Patient status doing well without problems. Patient is A&Ox 4. Patient reports no pain. Patient medicated during procedure with orders obtained and verified by Dr. Xiomara Bradshaw. Refer to patients Cardiac Cath Lab PROCEDURE REPORT for vital signs, assessment, status, and response during procedure, printed at end of case. Printed report on chart or scanned into chart.

## 2017-03-13 NOTE — ROUTINE PROCESS
1257:  TRANSFER - IN REPORT:    Verbal report received from Woo Malone on Jose Restrepo , from the Cardiac Cath lab, for routine progression of care. Report consisted of patients Situation, Background, Assessment and Recommendations(SBAR). Information from the following report(s) Procedure Summary, Intake/Output, MAR and Recent Results was reviewed with the receiving clinician. Opportunity for questions and clarification was provided. Assessment completed upon patients arrival to [address] [place] [address] and care assumed. Cardiac Cath Lab Recovery Arrival Note:     Jose Restrepo arrived to [facility] recovery area. Patient procedure= Generator change. Patient on cardiac monitor, non-invasive blood pressure, Patient status doing well without problems. Patient is A&Ox 4. Patient reports no complaints. Procedure site without any bleeding and no hematoma.   Ice pack placed over left chest.

## 2017-03-13 NOTE — PROGRESS NOTES
TRANSFER - IN REPORT:    Verbal report received from [address], RN (name) on Faisal Hogan  being received from Cath lab (unit) for routine post - op      Report consisted of patients Situation, Background, Assessment and   Recommendations(SBAR). Information from the following report(s) SBAR, ED Summary, Procedure Summary, Intake/Output, MAR, Recent Results and Cardiac Rhythm NSR was reviewed with the receiving nurse. Opportunity for questions and clarification was provided. Assessment completed upon patients arrival to unit and care assumed. Primary Nurse Alexei Stephens RN and Conrad Bach RN performed a dual skin assessment on this patient Impairment noted- see wound doc flow sheet  Joey score is noted on doc flow sheet. Bedside, Verbal and Written shift change report given to LIZABETH Smith (oncoming nurse) by Romel Hodge (offgoing nurse). Report included the following information SBAR, ED Summary, OR Summary, Procedure Summary, Intake/Output, MAR, Recent Results and Cardiac Rhythm NSR.

## 2017-03-13 NOTE — ROUTINE PROCESS
Cardiac Cath Lab Recovery Arrival Note:      Newton Daniels arrived to Cardiac Cath Lab, Recovery Area. Staff introduced to patient. Patient identifiers verified with NAME and DATE OF BIRTH. Procedure verified with patient. Consent forms reviewed and signed by patient or authorized representative and verified. Allergies verified. Patient informed of procedure and plan of care. Questions answered with review. Patient prepped for procedure, per orders from physician, prior to arrival.    Patient on cardiac monitor, non-invasive blood pressure, SPO2 monitor. Patient is A&Ox 4. Patient reports no complaints. Patient in stretcher, in low position, with side rails up, call bell within reach, patient instructed to call of assistance as needed. Patient prep in: [facility] Recovery Area, Bed# 7. Family in: waiting room. Prep by: ZIGGY Rodriguez RN  The patient nor the patient's daughter has a medication list.  The patient lives at [facility]. RN attempted to call [facility], but unable to speak to a nurse at this time. Daughter states that the local pharmacy the patient uses is [facility] but she is unsure which one.

## 2017-03-13 NOTE — PROCEDURES
Cardiac Procedure Note   Patient: Jesse Mcfarland  MRN: [id number]  SSN: [id number]   YOB: 1933 Age: 80 y.o.   Sex: male    Date of Procedure: 3/13/2017   Pre-procedure Diagnosis: pacer end of life, sick sinus syndrome  Post-procedure Diagnosis: same  Procedure: pacemaker generator change  :  Dr. Sharmin Trinidad MD    Assistant(s):  None  Anesthesia: Moderate Sedation   Estimated Blood Loss: Less than 10 mL   Specimens Removed:  Medtronic pacer   Findings: sinus bradycardia  Complications: None   Implants:  Dual chamber Medtronic pacer  Signed by:  Sharmin Trinidad MD  3/13/2017  12:49 PM

## 2017-03-13 NOTE — PROGRESS NOTES
Patient's nurse called that he lost IV and he wheezing, high Bp  He is from nursing home  Will keep overnight for bronchodilator and BP control

## 2017-03-13 NOTE — PROGRESS NOTES
Outbound call [facility]  [facility] to follow up on status of patient seen in Ed on 3/2/17 for a fall. NN unable to speak with patient. Spoke to staff, John Hernandez LPN, regarding patient's status. According to staff the patient has a follow-up visit scheduled with cardiology on 3/13/17 for a Medtronic pacer replacement. Patient also scheduled to follow up with Dr. Sintia Wilder, PCP on 3/29/17. NN contact information given for questions and concerns.

## 2017-03-14 VITALS
OXYGEN SATURATION: 97 % | HEIGHT: 74 IN | DIASTOLIC BLOOD PRESSURE: 91 MMHG | SYSTOLIC BLOOD PRESSURE: 160 MMHG | HEART RATE: 85 BPM | WEIGHT: 199.4 LBS | BODY MASS INDEX: 25.59 KG/M2 | RESPIRATION RATE: 16 BRPM | TEMPERATURE: 98.1 F

## 2017-03-14 PROCEDURE — 74011250637 HC RX REV CODE- 250/637: Performed by: INTERNAL MEDICINE

## 2017-03-14 PROCEDURE — 74011000250 HC RX REV CODE- 250: Performed by: INTERNAL MEDICINE

## 2017-03-14 PROCEDURE — 94640 AIRWAY INHALATION TREATMENT: CPT

## 2017-03-14 PROCEDURE — 77010033678 HC OXYGEN DAILY

## 2017-03-14 PROCEDURE — 74011250636 HC RX REV CODE- 250/636: Performed by: INTERNAL MEDICINE

## 2017-03-14 PROCEDURE — 99218 HC RM OBSERVATION: CPT

## 2017-03-14 RX ORDER — OXYBUTYNIN CHLORIDE 5 MG/1
5 TABLET, EXTENDED RELEASE ORAL DAILY
Status: DISCONTINUED | OUTPATIENT
Start: 2017-03-14 | End: 2017-03-14 | Stop reason: HOSPADM

## 2017-03-14 RX ADMIN — POLYETHYLENE GLYCOL 3350 17 G: 17 POWDER, FOR SOLUTION ORAL at 09:14

## 2017-03-14 RX ADMIN — THERA TABS 1 TABLET: TAB at 09:14

## 2017-03-14 RX ADMIN — IPRATROPIUM BROMIDE AND ALBUTEROL SULFATE 3 ML: .5; 3 SOLUTION RESPIRATORY (INHALATION) at 09:22

## 2017-03-14 RX ADMIN — DROXIDOPA 100 MG: 100 CAPSULE ORAL at 09:15

## 2017-03-14 RX ADMIN — CARBIDOPA AND LEVODOPA 2 TABLET: 25; 100 TABLET ORAL at 09:14

## 2017-03-14 RX ADMIN — PANTOPRAZOLE SODIUM 40 MG: 40 TABLET, DELAYED RELEASE ORAL at 09:14

## 2017-03-14 RX ADMIN — DROXIDOPA 100 MG: 100 CAPSULE ORAL at 11:38

## 2017-03-14 RX ADMIN — ASPIRIN 325 MG: 325 TABLET ORAL at 09:14

## 2017-03-14 RX ADMIN — OXYBUTYNIN CHLORIDE 5 MG: 5 TABLET, EXTENDED RELEASE ORAL at 09:14

## 2017-03-14 RX ADMIN — CARBIDOPA AND LEVODOPA 2 TABLET: 25; 100 TABLET ORAL at 11:38

## 2017-03-14 RX ADMIN — PINDOLOL 5 MG: 5 TABLET ORAL at 00:46

## 2017-03-14 RX ADMIN — TRAMADOL HYDROCHLORIDE 50 MG: 50 TABLET, FILM COATED ORAL at 09:19

## 2017-03-14 RX ADMIN — HYDRALAZINE HYDROCHLORIDE 20 MG: 20 INJECTION INTRAMUSCULAR; INTRAVENOUS at 09:14

## 2017-03-14 RX ADMIN — IPRATROPIUM BROMIDE AND ALBUTEROL SULFATE 3 ML: .5; 3 SOLUTION RESPIRATORY (INHALATION) at 01:14

## 2017-03-14 RX ADMIN — ESCITALOPRAM OXALATE 20 MG: 10 TABLET ORAL at 09:14

## 2017-03-14 NOTE — PROCEDURES
PERMANENT PACEMAKER GENERATOR CHANGE    DATE OF PROCEDURE: 3/13/2017     PROCEDURE:   Replacement of dual-chamber pacemaker. HISTORY:  This is an 80 y. o.man with chronic pacemaker that has reached [facility]. The patient has sinus node dysfunction. The risks and benefits were discussed with the patient and his daughter and he agreed to proceed. PROCEDURE IN DETAIL:  After the informed written consent had been obtained, the patient was brought to the Electrophysiology Suite where she was prepped and draped in the usual sterile fashion. Conscious sedation was initiated and maintain with intravenous Versed and fentanyl. Lidocaine was given in the left infraclavicular area over the preexisting scar. The #10 blade scalpel was then used to make a 3 cm incision below the scar. The incision was taken down to the capsule layer using Bovie cautery and blunt dissection. Careful dissection of the leads and of the device were performed and the leads were detached from the device and then connected to the new pacemaker generator after the leads were analyzed. The pocket is now irrigated with antibiotic solution and the leads with the generator were inserted inside the pocket. Vancomycin powder is placed inside the pocket  The pocket is now closed in three consecutive layers using 2-0 Vicryl sutures in continuous fashion. Steri strip is now applied over the surgical wound. COMPLICATIONS:  None. ESTIMATED BLOOD LOSS: less than 10 mL. Explanted pacemaker is Medtronic model number [id number] serial number PWB [id number], DOI 2/ 28/ 2008    IMPLANTED HARDWARE:  The pacemaker is a Medtronic Adapta  model [id number], serial # [id number]. ATRIAL LEAD:  Medtronic model # [id number], serial # [id number] DOI 2/ 28/ 2008    VENTRICULAR LEAD:  Medtronic model # [id number] and serial # [id number] DOI 2/ 28/ 2008    DATA:  The P wave is 1.4 mV, pacing threshold 0.75 V at 0.4 ms pacing impedance 436 ohms.      The ventricular lead had the R wave sensing is more than 11 mV and the pacing impedance 577 ohms and pacing threshold 1.25 volts at 0.4 ms. PROGRAMMED PARAMETER:  The device is programmed to MVPR pacing mode with mode switch with the low rate of 60 beats per minute and upper tracking rate of 120 beats per minute.         ASSESSMENT AND PLAN:  The patient will be discharged home and see me in office 2 weeks

## 2017-03-14 NOTE — DISCHARGE SUMMARY
Cardiology Discharge Summary     Patient ID:  Arabella Mejía  [id number]  55 y.o.  5/18/1933    Admit Date: 3/13/2017    Discharge Date: 3/14/2017     Admitting Physician: Zack Trejo MD     Discharge Physician: Zack Trejo MD/ Westley Briceno NP    Admission Diagnoses: cc  bronchospasm    Discharge Diagnoses: Principal Problem:    Pacemaker end of life (3/13/2017)      Overview: Generator change 3/13/2017        Discharge Condition: Good    Cardiology Procedures this Admission:  pacemaker generator change     Consults: None    Discharge Exam:     Visit Vitals    BP (!) 160/91 (BP 1 Location: Right arm, BP Patient Position: At rest)    Pulse 85    Temp 98.1 °F (36.7 °C)    Resp 16    Ht 6' 2\" (1.88 m)    Wt 199 lb 6.4 oz (90.4 kg)    SpO2 97%    BMI 25.6 kg/m2     General Appearance:  Well nourished,alert and oriented x 3, and individual in no acute distress. Ears/Nose/Mouth/Throat:   Hearing grossly normal.         Neck: Supple. Chest:   Lungs clear to auscultation bilaterally. Cardiovascular:  Regular rate and rhythm, no murmur. Abdomen:   Soft, non-tender, bowel sounds are active. Extremities: No edema bilaterally. Knees contracted. Skin: Warm and dry. Left sided pacemaker incision covered aquacel dressing- clean and dry. Unable to visualize incision covered with bandage. HOSPITAL COURSE: Arabella Mejía is 80 y.o. who came [facility] for pacemaker generator. He was kept over night for observation d/t wheezing and hypertension (hx of liable BP d/t Parkinson's & recent PNA).    Discussed with the patient and family:  - Post procedure wound care   - 2 week follow up - wound and device check   - Prophylactic antibiotic for 5 days post procedure  - Tylenol extra strength and ice pack for pain relief         Disposition: home    Patient Instructions:   Current Discharge Medication List      START taking these medications    Details   cephALEXin (KEFLEX) 250 mg capsule Take 1 Cap by mouth three (3) times daily. Qty: 15 Cap, Refills: 0         CONTINUE these medications which have NOT CHANGED    Details   !! LORazepam (ATIVAN) 0.5 mg tablet Take  by mouth every six (6) hours as needed for Anxiety.      !! LORazepam (ATIVAN) 0.5 mg tablet Take 0.5 mg by mouth every evening. QUEtiapine (SEROQUEL) 25 mg tablet Take 25 mg by mouth every evening. !! DETROL LA 2 mg ER capsule TAKE 1 CAPSULE DAILY FOR BLADDER FREQUENCY  Qty: 90 Cap, Refills: 3    Associated Diagnoses: Overactive bladder      !! pindolol (VISKIN) 5 mg tablet Take 5 mg by mouth nightly. !! pindolol (VISKIN) 5 mg tablet Take 2.5 mg by mouth daily (after lunch). meclizine (ANTIVERT) 25 mg tablet Take 25 mg by mouth three (3) times daily as needed for Dizziness. albuterol (PROVENTIL HFA, VENTOLIN HFA, PROAIR HFA) 90 mcg/actuation inhaler Take 2 Puffs by inhalation every six (6) hours as needed for Wheezing. Qty: 1 Inhaler, Refills: 0    Associated Diagnoses: Chronic cough      carbidopa-levodopa (SINEMET)  mg per tablet Take 2 Tabs by mouth four (4) times daily. (This is given at 8:00, 11:00, 14:00, and 21:00). Qty: 240 Tab, Refills: 0      polyethylene glycol (MIRALAX) 17 gram packet Take 1 Packet by mouth daily. Indications: CONSTIPATION  Qty: 30 Packet, Refills: 0      therapeutic multivitamin (THERAGRAN) tablet Take 1 Tab by mouth daily. Qty: 30 Tab, Refills: 0      !! tolterodine ER (DETROL-LA) 2 mg ER capsule Take 1 Cap by mouth daily. For bladder frequency  Qty: 30 Cap, Refills: 0    Associated Diagnoses: Overactive bladder      traMADol (ULTRAM) 50 mg tablet Take 1 Tab by mouth every eight (8) hours as needed for Pain. Max Daily Amount: 150 mg.  Qty: 30 Tab, Refills: 1      cloNIDine HCl (CATAPRES) 0.1 mg tablet Take 1 Tab by mouth every six (6) hours as needed (SBP > 180 mmHg). Qty: 20 Tab, Refills: 0      droxidopa (NORTHERA) 100 mg cap Take 100 mg by mouth three (3) times daily.  Indications: SYMPTOMATIC ORTHOSTATIC HYPOTENSION  Qty: 90 Tab, Refills: 1      docusate sodium (COLACE) 100 mg capsule Take 1 Cap by mouth daily as needed for Constipation. Qty: 30 Cap, Refills: 0      cycloSPORINE (RESTASIS) 0.05 % ophthalmic emulsion Administer 1 Drop to both eyes two (2) times a day. Qty: 60 Each, Refills: 0      escitalopram oxalate (LEXAPRO) 20 mg tablet Take 1 Tab by mouth daily. Qty: 30 Tab, Refills: 0      rivastigmine (EXELON) 4.6 mg/24 hr patch 1 Patch by TransDERmal route daily. Qty: 30 Patch, Refills: 0    Associated Diagnoses: Mild cognitive impairment      esomeprazole (NEXIUM) 40 mg capsule Take 1 Cap by mouth daily. Qty: 30 Cap, Refills: 0      acetaminophen (MAPAP EXTRA STRENGTH) 500 mg tablet Take 1 Tab by mouth every six (6) hours as needed for Pain. Qty: 120 Tab, Refills: 0       !! - Potential duplicate medications found. Please discuss with provider. STOP taking these medications       aspirin (ASPIRIN) 325 mg tablet Comments:   Reason for Stopping:               Referenced discharge instructions provided by nursing for diet and activity. Follow-up with   Future Appointments  Date Time Provider Clementine Roca   3/27/2017 9:30 AM [facility], [address] [facility]   3/27/2017 2:00 PM [address] [place] [address] [facility]   3/27/2017 2:20 PM Patric Baumgarten,  [address]   3/29/2017 4:00 PM Xiomara Saldana MD [facility] 10.   4/6/2017 9:15 AM [address] [place] [address], [address] [facility]   4/6/2017 9:20 AM Patric Baumgarten,  [address]          Signed:  Ramonia Holstein, NP  3/14/2017  8:55 AM      Addendum from EP attending:  I have seen, examined patient, and discussed with nurse practitioner, registered nurse, reviewed, updated note and agree with the assessment and plan    I have talked to him and his daughter  Vital signs are stable  Exam shows regular rhythm and no rub  Chest wall without swelling in pacer pocket.   There is no redness  Assessment and Plan:  Continue to monitor in office 2 weeks  Daughter will remove dressing in a week  His BP is a little high but he has problem with dizziness and orthostatic hypotension so will avoid tight BP control

## 2017-03-14 NOTE — PROGRESS NOTES
1200: TRANSFER - OUT REPORT:    Verbal report given to GISSEL Carlos(name) on Angeline Bergeron  being transferred to [facility]) for routine progression of care       Report consisted of patients Situation, Background, Assessment and   Recommendations(SBAR). Information from the following report(s) SBAR, Kardex, Procedure Summary, Intake/Output, MAR, Accordion, Recent Results, Med Rec Status and Cardiac Rhythm Paced was reviewed with the receiving nurse. Lines:   Peripheral IV 03/13/17 Left Antecubital (Active)   Site Assessment Clean, dry, & intact 3/14/2017  9:03 AM   Phlebitis Assessment 0 3/14/2017  9:03 AM   Infiltration Assessment 0 3/14/2017  9:03 AM   Dressing Status Clean, dry, & intact 3/14/2017  9:03 AM   Dressing Type Transparent 3/14/2017  9:03 AM   Hub Color/Line Status Blue;Capped 3/14/2017  9:03 AM   Action Taken Open ports on tubing capped 3/14/2017  9:03 AM   Alcohol Cap Used Yes 3/14/2017  9:03 AM        Opportunity for questions and clarification was provided.       Patient transported with:  2L O2 (Pt home oxygen), pt wheelchair, belongings via wheelchair van    Hourly rounds completed throughout shift

## 2017-03-14 NOTE — PROGRESS NOTES
Problem: Falls - Risk of  Goal: *Absence of falls  Outcome: Progressing Towards Goal  Patient in bed with wheels locked and bed in low position. Patient instructed not to get out of bed without assistance. Call bell and frequently used items within reach. Non slip footwear available at bedside. Door remains open when unattended by family and staff. Frequent purposeful rounding performed by staff. Goal: *Knowledge of fall prevention  Outcome: Not Progressing Towards Goal  Mental status prevents appropriate knowledge of fall prevention. Bed alarm in place and turned on and frequent purposeful rounding initiated by staff.          0700  Purposeful hourly rounds performed throughout shift      Bedside shift change report given to Miguel Mead (oncoming nurse) by Jason Mcmanus (offgoing nurse). Report included the following information SBAR, Kardex and Cardiac Rhythm paced.

## 2017-03-15 ENCOUNTER — PATIENT OUTREACH (OUTPATIENT)
Dept: CARDIOLOGY CLINIC | Age: 82
End: 2017-03-15

## 2017-03-15 NOTE — PROGRESS NOTES
Transition of Care Cardiology NN note: scheduled admission-3/13-3/14/17 at [facility]. 80 y. o.man with chronic pacemaker that has reached ANSHU. The patient has sinus node dysfunction. 3/13/17: Replacement of dual-chamber pacemaker- [employer] Adapta. Spoke with Mr. Raphael Cooper- he said that he is a \"little sore\" but had a good night's sleep. He asked me what things he was allowed to do- asked him to avoid carrying-lifting with arms until he sees Whitney Done in office in two weeks. Asked him not to raise left arm above head for next two weeks as well. He does not give his meds- he asked me to check with the nurses. He said that his daughter Dago Briscoe takes him to his appointments. Later spoke with Edwina Ryan, nurse- she checked with the med passing staff and he has his antibiotic and pain med- he has not taken any pain med at this time. Started his antibiotic this morning. Relayed above restrictions and that he has follow up appointment in two weeks and I would be checking in with daughter as well. Called and left daughterDago VM relaying below appointments: told her that they most likely will not be doing the remote check 3/27 am since he being seen in the clinic same day. I updated pacer clinic staff at [facility]. Future Appointments  Date Time Provider Clementine Roca   [facility]27[facility]2017 9:30 [facility]   3/27/2017 2:00  [facility], [address] [facility]   3/27/2017 2:20 PM Beth Mcneli  [address]   3/29/2017 4:00 PM Ashley Haywood MD [facility] ALEKS SCHED   4/6/2017 9:15  [facility], [address] [facility]   4/6/2017 9:20 AM Beth Mcneil  [address]     Later received phone call from daughter, Dago Briscoe- she said she did the transmission of the pacemaker this morning per Dr. Quoc Godinez request.  She understands that she will not have to do the remote check scheduled for that same morning on 3/27- they will give her a new schedule thereafter.   She knows about appointment and has scheduled his transportation. Clarified her last name- recently  so removed Александр Ames. , she is Dieudonne.

## 2017-03-16 ENCOUNTER — TELEPHONE (OUTPATIENT)
Dept: FAMILY MEDICINE CLINIC | Age: 82
End: 2017-03-16

## 2017-03-16 NOTE — TELEPHONE ENCOUNTER
[address]    [phone]    Paola Rios is reporting Mr. Bala Archer oxygen levels as requested by Dr. Ciara Almazan. His oxygen saturation on 2 liters of oxygen is at 95%     When the oxygen is removed he stays at 95%     When he transfers from his wheelchair to his recliner without     Oxygen it drops to 86%.

## 2017-03-18 ENCOUNTER — APPOINTMENT (OUTPATIENT)
Dept: GENERAL RADIOLOGY | Age: 82
End: 2017-03-18
Attending: EMERGENCY MEDICINE
Payer: MEDICARE

## 2017-03-18 LAB
ALBUMIN SERPL BCP-MCNC: 3.6 G/DL (ref 3.5–5)
ALBUMIN/GLOB SERPL: 1 {RATIO} (ref 1.1–2.2)
ALP SERPL-CCNC: 70 U/L (ref 45–117)
ALT SERPL-CCNC: 10 U/L (ref 12–78)
ANION GAP BLD CALC-SCNC: 8 MMOL/L (ref 5–15)
APPEARANCE UR: CLEAR
APPEARANCE UR: CLEAR
AST SERPL W P-5'-P-CCNC: 22 U/L (ref 15–37)
BACTERIA URNS QL MICRO: NEGATIVE /HPF
BACTERIA URNS QL MICRO: NEGATIVE /HPF
BASOPHILS # BLD AUTO: 0 K/UL (ref 0–0.1)
BASOPHILS # BLD: 0 % (ref 0–1)
BILIRUB SERPL-MCNC: 1 MG/DL (ref 0.2–1)
BILIRUB UR QL CFM: NEGATIVE
BILIRUB UR QL: NEGATIVE
BUN SERPL-MCNC: 23 MG/DL (ref 6–20)
BUN/CREAT SERPL: 21 (ref 12–20)
CALCIUM SERPL-MCNC: 8.6 MG/DL (ref 8.5–10.1)
CHLORIDE SERPL-SCNC: 97 MMOL/L (ref 97–108)
CO2 SERPL-SCNC: 26 MMOL/L (ref 21–32)
COLOR UR: ABNORMAL
COLOR UR: ABNORMAL
CREAT SERPL-MCNC: 1.1 MG/DL (ref 0.7–1.3)
DIFFERENTIAL METHOD BLD: ABNORMAL
EOSINOPHIL # BLD: 0.2 K/UL (ref 0–0.4)
EOSINOPHIL NFR BLD: 3 % (ref 0–7)
EPITH CASTS URNS QL MICRO: ABNORMAL /LPF
EPITH CASTS URNS QL MICRO: ABNORMAL /LPF
ERYTHROCYTE [DISTWIDTH] IN BLOOD BY AUTOMATED COUNT: 14.8 % (ref 11.5–14.5)
FLUAV AG NPH QL IA: POSITIVE
FLUBV AG NOSE QL IA: NEGATIVE
GLOBULIN SER CALC-MCNC: 3.5 G/DL (ref 2–4)
GLUCOSE SERPL-MCNC: 108 MG/DL (ref 65–100)
GLUCOSE UR STRIP.AUTO-MCNC: NEGATIVE MG/DL
GLUCOSE UR STRIP.AUTO-MCNC: NEGATIVE MG/DL
HCT VFR BLD AUTO: 33.3 % (ref 36.6–50.3)
HGB BLD-MCNC: 10.7 G/DL (ref 12.1–17)
HGB UR QL STRIP: ABNORMAL
HGB UR QL STRIP: ABNORMAL
KETONES UR QL STRIP.AUTO: ABNORMAL MG/DL
KETONES UR QL STRIP.AUTO: ABNORMAL MG/DL
LACTATE SERPL-SCNC: 0.8 MMOL/L (ref 0.4–2)
LEUKOCYTE ESTERASE UR QL STRIP.AUTO: ABNORMAL
LEUKOCYTE ESTERASE UR QL STRIP.AUTO: ABNORMAL
LYMPHOCYTES # BLD AUTO: 9 % (ref 12–49)
LYMPHOCYTES # BLD: 0.5 K/UL (ref 0.8–3.5)
MCH RBC QN AUTO: 29.6 PG (ref 26–34)
MCHC RBC AUTO-ENTMCNC: 32.1 G/DL (ref 30–36.5)
MCV RBC AUTO: 92 FL (ref 80–99)
MONOCYTES # BLD: 0.5 K/UL (ref 0–1)
MONOCYTES NFR BLD AUTO: 8 % (ref 5–13)
NEUTS SEG # BLD: 4.8 K/UL (ref 1.8–8)
NEUTS SEG NFR BLD AUTO: 80 % (ref 32–75)
NITRITE UR QL STRIP.AUTO: NEGATIVE
NITRITE UR QL STRIP.AUTO: NEGATIVE
PH UR STRIP: 5.5 [PH] (ref 5–8)
PH UR STRIP: 5.5 [PH] (ref 5–8)
PLATELET # BLD AUTO: 175 K/UL (ref 150–400)
POTASSIUM SERPL-SCNC: 4.2 MMOL/L (ref 3.5–5.1)
PROT SERPL-MCNC: 7.1 G/DL (ref 6.4–8.2)
PROT UR STRIP-MCNC: ABNORMAL MG/DL
PROT UR STRIP-MCNC: ABNORMAL MG/DL
RBC # BLD AUTO: 3.62 M/UL (ref 4.1–5.7)
RBC #/AREA URNS HPF: ABNORMAL /HPF (ref 0–5)
RBC #/AREA URNS HPF: ABNORMAL /HPF (ref 0–5)
RBC MORPH BLD: ABNORMAL
SODIUM SERPL-SCNC: 131 MMOL/L (ref 136–145)
SP GR UR REFRACTOMETRY: 1.02 (ref 1–1.03)
SP GR UR REFRACTOMETRY: 1.03 (ref 1–1.03)
UA: UC IF INDICATED,UAUC: ABNORMAL
UROBILINOGEN UR QL STRIP.AUTO: 1 EU/DL (ref 0.2–1)
UROBILINOGEN UR QL STRIP.AUTO: 1 EU/DL (ref 0.2–1)
WBC # BLD AUTO: 6 K/UL (ref 4.1–11.1)
WBC URNS QL MICRO: ABNORMAL /HPF (ref 0–4)
WBC URNS QL MICRO: ABNORMAL /HPF (ref 0–4)

## 2017-03-18 PROCEDURE — 81001 URINALYSIS AUTO W/SCOPE: CPT | Performed by: EMERGENCY MEDICINE

## 2017-03-18 PROCEDURE — 71010 XR CHEST PORT: CPT

## 2017-03-18 PROCEDURE — 85025 COMPLETE CBC W/AUTO DIFF WBC: CPT | Performed by: EMERGENCY MEDICINE

## 2017-03-18 PROCEDURE — 87804 INFLUENZA ASSAY W/OPTIC: CPT | Performed by: EMERGENCY MEDICINE

## 2017-03-18 PROCEDURE — 36415 COLL VENOUS BLD VENIPUNCTURE: CPT | Performed by: EMERGENCY MEDICINE

## 2017-03-18 PROCEDURE — 80053 COMPREHEN METABOLIC PANEL: CPT | Performed by: EMERGENCY MEDICINE

## 2017-03-18 PROCEDURE — 93005 ELECTROCARDIOGRAM TRACING: CPT

## 2017-03-18 PROCEDURE — 99285 EMERGENCY DEPT VISIT HI MDM: CPT

## 2017-03-18 PROCEDURE — 87040 BLOOD CULTURE FOR BACTERIA: CPT | Performed by: EMERGENCY MEDICINE

## 2017-03-18 PROCEDURE — 83605 ASSAY OF LACTIC ACID: CPT | Performed by: EMERGENCY MEDICINE

## 2017-03-19 ENCOUNTER — HOSPITAL ENCOUNTER (OUTPATIENT)
Age: 82
Setting detail: OBSERVATION
Discharge: NURSING, REHAB OR LONG-TERM CARE | End: 2017-03-21
Attending: EMERGENCY MEDICINE | Admitting: HOSPITALIST
Payer: MEDICARE

## 2017-03-19 DIAGNOSIS — J10.1 INFLUENZA A: ICD-10-CM

## 2017-03-19 DIAGNOSIS — R41.82 ALTERED MENTAL STATUS, UNSPECIFIED ALTERED MENTAL STATUS TYPE: Primary | ICD-10-CM

## 2017-03-19 LAB
ATRIAL RATE: 81 BPM
CALCULATED P AXIS, ECG09: -56 DEGREES
CALCULATED R AXIS, ECG10: -4 DEGREES
CALCULATED T AXIS, ECG11: 17 DEGREES
DIAGNOSIS, 93000: NORMAL
P-R INTERVAL, ECG05: 258 MS
Q-T INTERVAL, ECG07: 366 MS
QRS DURATION, ECG06: 86 MS
QTC CALCULATION (BEZET), ECG08: 425 MS
VENTRICULAR RATE, ECG03: 81 BPM

## 2017-03-19 PROCEDURE — 74011000250 HC RX REV CODE- 250: Performed by: INTERNAL MEDICINE

## 2017-03-19 PROCEDURE — 74011250637 HC RX REV CODE- 250/637: Performed by: INTERNAL MEDICINE

## 2017-03-19 PROCEDURE — 74011250636 HC RX REV CODE- 250/636: Performed by: INTERNAL MEDICINE

## 2017-03-19 PROCEDURE — 94640 AIRWAY INHALATION TREATMENT: CPT

## 2017-03-19 PROCEDURE — 77010033678 HC OXYGEN DAILY

## 2017-03-19 PROCEDURE — 77030029684 HC NEB SM VOL KT MONA -A

## 2017-03-19 PROCEDURE — 65390000012 HC CONDITION CODE 44 OBSERVATION

## 2017-03-19 PROCEDURE — 74011250637 HC RX REV CODE- 250/637: Performed by: HOSPITALIST

## 2017-03-19 PROCEDURE — 99218 HC RM OBSERVATION: CPT

## 2017-03-19 PROCEDURE — 96372 THER/PROPH/DIAG INJ SC/IM: CPT

## 2017-03-19 RX ORDER — OXYBUTYNIN CHLORIDE 5 MG/1
5 TABLET, EXTENDED RELEASE ORAL DAILY
Status: DISCONTINUED | OUTPATIENT
Start: 2017-03-19 | End: 2017-03-20 | Stop reason: ALTCHOICE

## 2017-03-19 RX ORDER — SODIUM CHLORIDE 0.9 % (FLUSH) 0.9 %
5-10 SYRINGE (ML) INJECTION AS NEEDED
Status: DISCONTINUED | OUTPATIENT
Start: 2017-03-19 | End: 2017-03-21 | Stop reason: HOSPADM

## 2017-03-19 RX ORDER — ACETAMINOPHEN 325 MG/1
650 TABLET ORAL
Status: DISCONTINUED | OUTPATIENT
Start: 2017-03-19 | End: 2017-03-21 | Stop reason: HOSPADM

## 2017-03-19 RX ORDER — ENOXAPARIN SODIUM 100 MG/ML
40 INJECTION SUBCUTANEOUS EVERY 24 HOURS
Status: DISCONTINUED | OUTPATIENT
Start: 2017-03-19 | End: 2017-03-21 | Stop reason: HOSPADM

## 2017-03-19 RX ORDER — CARBIDOPA AND LEVODOPA 25; 100 MG/1; MG/1
2 TABLET ORAL 4 TIMES DAILY
Status: DISCONTINUED | OUTPATIENT
Start: 2017-03-19 | End: 2017-03-21 | Stop reason: HOSPADM

## 2017-03-19 RX ORDER — OSELTAMIVIR PHOSPHATE 75 MG/1
75 CAPSULE ORAL 2 TIMES DAILY
Status: DISCONTINUED | OUTPATIENT
Start: 2017-03-19 | End: 2017-03-21 | Stop reason: HOSPADM

## 2017-03-19 RX ORDER — CEPHALEXIN 250 MG/1
250 CAPSULE ORAL EVERY 8 HOURS
Status: COMPLETED | OUTPATIENT
Start: 2017-03-19 | End: 2017-03-20

## 2017-03-19 RX ORDER — THERA TABS 400 MCG
1 TAB ORAL DAILY
Status: DISCONTINUED | OUTPATIENT
Start: 2017-03-19 | End: 2017-03-21 | Stop reason: HOSPADM

## 2017-03-19 RX ORDER — SODIUM CHLORIDE 0.9 % (FLUSH) 0.9 %
5-10 SYRINGE (ML) INJECTION EVERY 8 HOURS
Status: DISCONTINUED | OUTPATIENT
Start: 2017-03-19 | End: 2017-03-21 | Stop reason: HOSPADM

## 2017-03-19 RX ORDER — CEPHALEXIN 500 MG/1
500 CAPSULE ORAL EVERY 8 HOURS
Status: DISCONTINUED | OUTPATIENT
Start: 2017-03-19 | End: 2017-03-19

## 2017-03-19 RX ORDER — PANTOPRAZOLE SODIUM 40 MG/1
40 TABLET, DELAYED RELEASE ORAL
Status: DISCONTINUED | OUTPATIENT
Start: 2017-03-19 | End: 2017-03-21 | Stop reason: HOSPADM

## 2017-03-19 RX ORDER — IPRATROPIUM BROMIDE AND ALBUTEROL SULFATE 2.5; .5 MG/3ML; MG/3ML
3 SOLUTION RESPIRATORY (INHALATION)
Status: DISCONTINUED | OUTPATIENT
Start: 2017-03-19 | End: 2017-03-20

## 2017-03-19 RX ORDER — RIVASTIGMINE 4.6 MG/24H
1 PATCH, EXTENDED RELEASE TRANSDERMAL DAILY
Status: DISCONTINUED | OUTPATIENT
Start: 2017-03-19 | End: 2017-03-21 | Stop reason: HOSPADM

## 2017-03-19 RX ORDER — LORAZEPAM 0.5 MG/1
0.5 TABLET ORAL
Status: DISCONTINUED | OUTPATIENT
Start: 2017-03-19 | End: 2017-03-21 | Stop reason: HOSPADM

## 2017-03-19 RX ORDER — QUETIAPINE FUMARATE 25 MG/1
25 TABLET, FILM COATED ORAL EVERY EVENING
Status: DISCONTINUED | OUTPATIENT
Start: 2017-03-19 | End: 2017-03-21 | Stop reason: HOSPADM

## 2017-03-19 RX ORDER — DOCUSATE SODIUM 100 MG/1
100 CAPSULE, LIQUID FILLED ORAL
Status: DISCONTINUED | OUTPATIENT
Start: 2017-03-19 | End: 2017-03-21 | Stop reason: HOSPADM

## 2017-03-19 RX ORDER — PINDOLOL 5 MG/1
5 TABLET ORAL
Status: DISCONTINUED | OUTPATIENT
Start: 2017-03-19 | End: 2017-03-21 | Stop reason: HOSPADM

## 2017-03-19 RX ORDER — ESCITALOPRAM OXALATE 10 MG/1
20 TABLET ORAL DAILY
Status: DISCONTINUED | OUTPATIENT
Start: 2017-03-19 | End: 2017-03-21 | Stop reason: HOSPADM

## 2017-03-19 RX ORDER — DROXIDOPA 100 MG/1
100 CAPSULE ORAL 3 TIMES DAILY
Status: DISCONTINUED | OUTPATIENT
Start: 2017-03-19 | End: 2017-03-20

## 2017-03-19 RX ORDER — PINDOLOL 5 MG/1
2.5 TABLET ORAL
Status: DISCONTINUED | OUTPATIENT
Start: 2017-03-19 | End: 2017-03-21 | Stop reason: HOSPADM

## 2017-03-19 RX ORDER — TRAMADOL HYDROCHLORIDE 50 MG/1
50 TABLET ORAL
Status: DISCONTINUED | OUTPATIENT
Start: 2017-03-19 | End: 2017-03-21 | Stop reason: HOSPADM

## 2017-03-19 RX ORDER — ONDANSETRON 2 MG/ML
4 INJECTION INTRAMUSCULAR; INTRAVENOUS
Status: DISCONTINUED | OUTPATIENT
Start: 2017-03-19 | End: 2017-03-21 | Stop reason: HOSPADM

## 2017-03-19 RX ADMIN — ENOXAPARIN SODIUM 40 MG: 40 INJECTION SUBCUTANEOUS at 10:03

## 2017-03-19 RX ADMIN — IPRATROPIUM BROMIDE AND ALBUTEROL SULFATE 3 ML: .5; 3 SOLUTION RESPIRATORY (INHALATION) at 22:28

## 2017-03-19 RX ADMIN — CARBIDOPA AND LEVODOPA 2 TABLET: 25; 100 TABLET ORAL at 12:24

## 2017-03-19 RX ADMIN — ESCITALOPRAM OXALATE 20 MG: 10 TABLET ORAL at 10:03

## 2017-03-19 RX ADMIN — THERA TABS 1 TABLET: TAB at 10:03

## 2017-03-19 RX ADMIN — PANTOPRAZOLE SODIUM 40 MG: 40 TABLET, DELAYED RELEASE ORAL at 07:10

## 2017-03-19 RX ADMIN — Medication 10 ML: at 07:10

## 2017-03-19 RX ADMIN — Medication 10 ML: at 15:20

## 2017-03-19 RX ADMIN — CARBIDOPA AND LEVODOPA 2 TABLET: 25; 100 TABLET ORAL at 10:03

## 2017-03-19 RX ADMIN — OSELTAMIVIR PHOSPHATE 75 MG: 75 CAPSULE ORAL at 10:05

## 2017-03-19 RX ADMIN — Medication 10 ML: at 03:00

## 2017-03-19 RX ADMIN — PINDOLOL 5 MG: 5 TABLET ORAL at 23:26

## 2017-03-19 RX ADMIN — IPRATROPIUM BROMIDE AND ALBUTEROL SULFATE 3 ML: .5; 3 SOLUTION RESPIRATORY (INHALATION) at 16:12

## 2017-03-19 RX ADMIN — OSELTAMIVIR PHOSPHATE 75 MG: 75 CAPSULE ORAL at 18:18

## 2017-03-19 RX ADMIN — CARBIDOPA AND LEVODOPA 2 TABLET: 25; 100 TABLET ORAL at 18:18

## 2017-03-19 RX ADMIN — CARBIDOPA AND LEVODOPA 2 TABLET: 25; 100 TABLET ORAL at 23:26

## 2017-03-19 RX ADMIN — ACETAMINOPHEN 650 MG: 325 TABLET, FILM COATED ORAL at 01:18

## 2017-03-19 RX ADMIN — PINDOLOL 5 MG: 5 TABLET ORAL at 03:00

## 2017-03-19 RX ADMIN — DROXIDOPA 100 MG: 100 CAPSULE ORAL at 22:27

## 2017-03-19 RX ADMIN — Medication 10 ML: at 22:27

## 2017-03-19 RX ADMIN — QUETIAPINE FUMARATE 25 MG: 25 TABLET, FILM COATED ORAL at 18:26

## 2017-03-19 RX ADMIN — DROXIDOPA 100 MG: 100 CAPSULE ORAL at 10:05

## 2017-03-19 RX ADMIN — QUETIAPINE FUMARATE 25 MG: 25 TABLET, FILM COATED ORAL at 03:00

## 2017-03-19 RX ADMIN — CEPHALEXIN 250 MG: 250 CAPSULE ORAL at 22:26

## 2017-03-19 NOTE — PROGRESS NOTES
TRANSFER - IN REPORT:    Verbal report received from [facility], [facility] (name) on Severo Leigh  being received from ED (unit) for routine progression of care      Report consisted of patients Situation, Background, Assessment and   Recommendations(SBAR). Information from the following report(s) SBAR, MAR and Recent Results was reviewed with the receiving nurse. Opportunity for questions and clarification was provided. Assessment completed upon patients arrival to unit and care assumed.

## 2017-03-19 NOTE — H&P
Subjective:       Patient is a 80-year-old white male who presents with complaints of congestion, cough productive with small amounts of white phlegm for the last few days. Patient reports increasing shortness of breath with any kind of exertion. While emergency department records indicate that the nursing home where he is a resident noticed altered mental status on date of admission, this is not appreciated on interview and exam. Although he does have a history of dementia, patient is verbal and a fairly good historian. He does have a history of COPD, and is noted to have wheezing on exam. Patient has tested positive for influenza A.    1.       Past Medical History:   Diagnosis Date    Neurogenic orthostatic hypotension 1/19/2012    BPH (benign prostatic hyperplasia)     DDD (degenerative disc disease), lumbar 2/19/2010    DJD (degenerative joint disease) of cervical spine 2/19/2010    DU (duodenal ulcer) 9/1/1990    Hyperlipidemia     Essential Hypertension     Paralysis agitans [facility] [address] 2/19/2010    Parkinson disease ([facility] [address]     [facility]parox[facility]cardia[facility]     Premature atrial beats     Reflux esophagitis 2/19/2010    Sick sinus syndrome [facility])       Past Surgical History:   Procedure Laterality Date    HX CATARACT REMOVAL  6/2012 and 7/2012    tendon repair both knees    HX PACEMAKER  2008    HX VITRECTOMY  11/2011             Social History Main Topics    Smoking status: Former Smoker     Types: Pipe     Quit date: 7/14/1945    Smokeless tobacco: Never Used    Alcohol use 2.0 oz/week     4 Cans of beer per week      Comment: glass of wine every now and then   [employer]cre[employer] use: Never          Family History   Problem Relation Age of Onset    COPD, cigarette smoker Mother     Heart Disease, COPD, cigarette smoker Father       Prior to Admission medications    Medication Sig Start Date End Date Taking?  Authorizing Provider   cephALEXin (KEFLEX) 250 mg capsule Take 1 Cap by mouth three (3) times daily. 3/13/17   Jn Noland NP   LORazepam (ATIVAN) 0.5 mg tablet Take  by mouth every six (6) hours as needed for Anxiety. Historical Provider   LORazepam (ATIVAN) 0.5 mg tablet Take 0.5 mg by mouth every evening. Historical Provider   QUEtiapine (SEROQUEL) 25 mg tablet Take 25 mg by mouth every evening. Historical Provider   DETROL LA 2 mg ER capsule TAKE 1 CAPSULE DAILY FOR BLADDER FREQUENCY 2/13/17   Violeta Patel MD   pindolol (VISKIN) 5 mg tablet Take 5 mg by mouth nightly. Historical Provider   pindolol (VISKIN) 5 mg tablet Take 2.5 mg by mouth daily (after lunch). Historical Provider   meclizine (ANTIVERT) 25 mg tablet Take 25 mg by mouth three (3) times daily as needed for Dizziness. Historical Provider   albuterol (PROVENTIL HFA, VENTOLIN HFA, PROAIR HFA) 90 mcg/actuation inhaler Take 2 Puffs by inhalation every six (6) hours as needed for Wheezing. 1/9/17   Kiara Chatman NP   carbidopa-levodopa (SINEMET)  mg per tablet Take 2 Tabs by mouth four (4) times daily. (This is given at 8:00, 11:00, 14:00, and 21:00). 7/28/16   Hung Carlos NP   polyethylene glycol (MIRALAX) 17 gram packet Take 1 Packet by mouth daily. Indications: CONSTIPATION 7/28/16   Hung Carlos NP   therapeutic multivitamin [facility]) tablet Take 1 Tab by mouth daily. 7/28/16   Hung Carlos NP   tolterodine ER (DETROL-LA) 2 mg ER capsule Take 1 Cap by mouth daily. For bladder frequency 7/28/16   Hung Carlos NP   traMADol (ULTRAM) 50 mg tablet Take 1 Tab by mouth every eight (8) hours as needed for Pain. Max Daily Amount: 150 mg. 7/28/16   Hung Carlos NP   cloNIDine HCl (CATAPRES) 0.1 mg tablet Take 1 Tab by mouth every six (6) hours as needed (SBP > 180 mmHg). 7/28/16   Hung Carlos NP   droxidopa (NORTHERA) 100 mg cap Take 100 mg by mouth three (3) times daily.  Indications: SYMPTOMATIC ORTHOSTATIC HYPOTENSION 7/28/16   Hung Carlos NP   docusate sodium (COLACE) 100 mg capsule Take 1 Cap by mouth daily as needed for Constipation. 16   Luann Prima, NP   cycloSPORINE (RESTASIS) 0.05 % ophthalmic emulsion Administer 1 Drop to both eyes two (2) times a day. 16   Luann Prima NP   escitalopram oxalate (LEXAPRO) 20 mg tablet Take 1 Tab by mouth daily. 16   Luann Prima, NP   rivastigmine (EXELON) 4.6 mg/24 hr patch 1 Patch by TransDERmal route daily. 16   Luann Prima, NP   esomeprazole (NEXIUM) 40 mg capsule Take 1 Cap by mouth daily. 16   Luann Prima, NP   acetaminophen (MAPAP EXTRA STRENGTH) 500 mg tablet Take 1 Tab by mouth every six (6) hours as needed for Pain. 16   Luann Schaffer NP     No Known Allergies    Review of Systems  A comprehensive review of systems was negative except for that written in the HPI. Objective:     Blood pressure 164/80, pulse 82, temperature 99.8 °F (37.7 °C), resp. rate 19, height 6' 2\" (1.88 m), weight 90.3 kg (199 lb), SpO2 95 %. Temp (24hrs), Av.1 °F (37.8 °C), Min:99.8 °F (37.7 °C), Max:100.3 °F (37.9 °C)      Visit Vitals    /78 (BP 1 Location: Left arm, BP Patient Position: At rest)    Pulse 83    Temp 99.3 °F (37.4 °C)    Resp 20    Ht 6' 2\" (1.88 m)    Wt 90.3 kg (199 lb)    SpO2 94%    BMI 25.55 kg/m2     General:  Alert, cooperative, no distress, appears stated age. Head:  Normocephalic, without obvious abnormality, atraumatic. Eyes:  Conjunctivae/corneas clear. PERRL, EOMs intact. Ears:  Normal TMs and external ear canals both ears. Nose: Nares normal. Septum midline. Mucosa boggy. Throat: Lips, tongue normal. Oral mucosa moist.   Neck: Supple, symmetrical, trachea midline, no adenopathy, thyroid: no enlargement/tenderness/nodules, no carotid bruit and no JVD. Back:   Symmetric, no curvature. ROM normal. No CVA tenderness. Lungs:   Clear to auscultation bilaterally. Chest wall:  No tenderness or deformity.    Heart:  Regular rate and rhythm, S1, S2 normal, no murmur, click, rub or gallop. Abdomen:   Soft, non-tender. Bowel sounds normal.            Extremities: Extremities normal, atraumatic, no cyanosis or edema. Pulses: 2+ and symmetric all extremities. Skin: Skin color, texture, turgor normal. No rashes or lesions   Lymph nodes: Cervical, supraclavicular, and axillary nodes normal.   Neurologic: CNII-XII intact. 5/5 strength in all 4 extremities. Mild tremor in upper extremities. Chest X-Ray: normal. No acute cardiopulmonary process. EKG: paced rhythm. Data Review    Recent Results (from the past 24 hour(s))   CBC WITH AUTOMATED DIFF    Collection Time: 03/18/17  9:35 PM   Result Value Ref Range    WBC 6.0 4.1 - 11.1 K/uL    RBC 3.62 (L) 4.10 - 5.70 M/uL    HGB 10.7 (L) 12.1 - 17.0 g/dL    HCT 33.3 (L) 36.6 - 50.3 %    MCV 92.0 80.0 - 99.0 FL    MCH 29.6 26.0 - 34.0 PG    MCHC 32.1 30.0 - 36.5 g/dL    RDW 14.8 (H) 11.5 - 14.5 %    PLATELET 077 375 - 853 K/uL    NEUTROPHILS 80 (H) 32 - 75 %    LYMPHOCYTES 9 (L) 12 - 49 %    MONOCYTES 8 5 - 13 %    EOSINOPHILS 3 0 - 7 %    BASOPHILS 0 0 - 1 %    ABS. NEUTROPHILS 4.8 1.8 - 8.0 K/UL    ABS. LYMPHOCYTES 0.5 (L) 0.8 - 3.5 K/UL    ABS. MONOCYTES 0.5 0.0 - 1.0 K/UL    ABS. EOSINOPHILS 0.2 0.0 - 0.4 K/UL    ABS. BASOPHILS 0.0 0.0 - 0.1 K/UL    DF SMEAR SCANNED      RBC COMMENTS NORMOCYTIC, NORMOCHROMIC     METABOLIC PANEL, COMPREHENSIVE    Collection Time: 03/18/17  9:35 PM   Result Value Ref Range    Sodium 131 (L) 136 - 145 mmol/L    Potassium 4.2 3.5 - 5.1 mmol/L    Chloride 97 97 - 108 mmol/L    CO2 26 21 - 32 mmol/L    Anion gap 8 5 - 15 mmol/L    Glucose 108 (H) 65 - 100 mg/dL    BUN 23 (H) 6 - 20 MG/DL    Creatinine 1.10 0.70 - 1.30 MG/DL    BUN/Creatinine ratio 21 (H) 12 - 20      GFR est AA >60 >60 ml/min/1.73m2    GFR est non-AA >60 >60 ml/min/1.73m2    Calcium 8.6 8.5 - 10.1 MG/DL    Bilirubin, total 1.0 0.2 - 1.0 MG/DL    ALT (SGPT) 10 (L) 12 - 78 U/L    AST (SGOT) 22 15 - 37 U/L    Alk. phosphatase 70 45 - 117 U/L    Protein, total 7.1 6.4 - 8.2 g/dL    Albumin 3.6 3.5 - 5.0 g/dL    Globulin 3.5 2.0 - 4.0 g/dL    A-G Ratio 1.0 (L) 1.1 - 2.2     URINALYSIS W/ REFLEX CULTURE    Collection Time: 03/18/17  9:45 PM   Result Value Ref Range    Color DARK YELLOW      Appearance CLEAR CLEAR      Specific gravity 1.026 1.003 - 1.030      pH (UA) 5.5 5.0 - 8.0      Protein TRACE (A) NEG mg/dL    Glucose NEGATIVE  NEG mg/dL    Ketone TRACE (A) NEG mg/dL    Blood MODERATE (A) NEG      Urobilinogen 1.0 0.2 - 1.0 EU/dL    Nitrites NEGATIVE  NEG      Leukocyte Esterase TRACE (A) NEG      WBC 5-10 0 - 4 /hpf    RBC 5-10 0 - 5 /hpf    Epithelial cells FEW FEW /lpf    Bacteria NEGATIVE  NEG /hpf    UA:UC IF INDICATED URINE CULTURE ORDERED (A) CNI     BILIRUBIN, CONFIRM    Collection Time: 03/18/17  9:45 PM   Result Value Ref Range    Bilirubin UA, confirm NEGATIVE  NEG     LACTIC ACID, PLASMA    Collection Time: 03/18/17 10:09 PM   Result Value Ref Range    Lactic acid 0.8 0.4 - 2.0 MMOL/L   URINALYSIS W/ RFLX MICROSCOPIC    Collection Time: 03/18/17 10:09 PM   Result Value Ref Range    Color DARK YELLOW      Appearance CLEAR CLEAR      Specific gravity 1.025 1.003 - 1.030      pH (UA) 5.5 5.0 - 8.0      Protein TRACE (A) NEG mg/dL    Glucose NEGATIVE  NEG mg/dL    Ketone TRACE (A) NEG mg/dL    Bilirubin NEGATIVE  NEG      Blood MODERATE (A) NEG      Urobilinogen 1.0 0.2 - 1.0 EU/dL    Nitrites NEGATIVE  NEG      Leukocyte Esterase TRACE (A) NEG      WBC 5-10 0 - 4 /hpf    RBC 0-5 0 - 5 /hpf    Epithelial cells FEW FEW /lpf    Bacteria NEGATIVE  NEG /hpf   INFLUENZA A & B AG (RAPID TEST)    Collection Time: 03/18/17 11:18 PM   Result Value Ref Range    Influenza A Antigen POSITIVE (A) NEG      Influenza B Antigen NEGATIVE  NEG           Assessment:     Active Problems:    Influenza A (3/19/2017)        Port Score (calculated):      Plan:     Assessment/plan:  1. Influenza A. Tamiflu. 2. COPD exacerbation.  Alin Garden Q4 hours scheduled x 18 doses. 3. Dementia. Continue Exelon patch. 4. Parkinsons disease. Continue carbidopa-levodopa. Continue pindolol for tremor. 5. Sick sinus syndrome. S/p pacemaker. 6. GERD history of reflux esophagitis. Protonix 40 mg PO daily. 7. Essential hypertension. Continue home medication of clonidine.   Patient also has a history of neurogenic orthostatic hypotension for which hes on droxidopa

## 2017-03-19 NOTE — IP AVS SNAPSHOT
Current Discharge Medication List  
  
START taking these medications Dose & Instructions Dispensing Information Comments Morning Noon Evening Bedtime  
 oseltamivir 75 mg capsule Commonly known as:  TAMIFLU Your last dose was: Your next dose is:    
   
   
 Dose:  75 mg Take 1 Cap by mouth two (2) times a day for 5 doses. Quantity:  5 Cap Refills:  0 CONTINUE these medications which have CHANGED Dose & Instructions Dispensing Information Comments Morning Noon Evening Bedtime DETROL LA 2 mg ER capsule Generic drug:  tolterodine ER What changed:  Another medication with the same name was removed. Continue taking this medication, and follow the directions you see here. Your last dose was: Your next dose is: TAKE 1 CAPSULE DAILY FOR BLADDER FREQUENCY Quantity:  90 Cap Refills:  3 CONTINUE these medications which have NOT CHANGED Dose & Instructions Dispensing Information Comments Morning Noon Evening Bedtime  
 acetaminophen 500 mg tablet Commonly known as:  MAPAP EXTRA STRENGTH Your last dose was: Your next dose is:    
   
   
 Dose:  500 mg Take 1 Tab by mouth every six (6) hours as needed for Pain. Quantity:  120 Tab Refills:  0  
     
   
   
   
  
 albuterol 90 mcg/actuation inhaler Commonly known as:  PROVENTIL HFA, VENTOLIN HFA, PROAIR HFA Your last dose was: Your next dose is:    
   
   
 Dose:  2 Puff Take 2 Puffs by inhalation every six (6) hours as needed for Wheezing. Quantity:  1 Inhaler Refills:  0  
     
   
   
   
  
 carbidopa-levodopa  mg per tablet Commonly known as:  SINEMET Your last dose was: Your next dose is:    
   
   
 Dose:  2 Tab Take 2 Tabs by mouth four (4) times daily. (This is given at 8:00, 11:00, 14:00, and 21:00). Quantity:  240 Tab Refills:  0 cloNIDine HCl 0.1 mg tablet Commonly known as:  CATAPRES Your last dose was: Your next dose is:    
   
   
 Dose:  0.1 mg Take 1 Tab by mouth every six (6) hours as needed (SBP > 180 mmHg). Quantity:  20 Tab Refills:  0  
     
   
   
   
  
 cycloSPORINE 0.05 % ophthalmic emulsion Commonly known as:  RESTASIS Your last dose was: Your next dose is:    
   
   
 Dose:  1 Drop Administer 1 Drop to both eyes two (2) times a day. Quantity:  60 Each Refills:  0  
     
   
   
   
  
 docusate sodium 100 mg capsule Commonly known as:  Carson Matsneisha Your last dose was: Your next dose is:    
   
   
 Dose:  100 mg Take 1 Cap by mouth daily as needed for Constipation. Quantity:  30 Cap Refills:  0  
     
   
   
   
  
 droxidopa 100 mg Cap capsule Commonly known as:  Faye Fitzgerald Your last dose was: Your next dose is:    
   
   
 Dose:  100 mg Take 100 mg by mouth three (3) times daily. Indications: SYMPTOMATIC ORTHOSTATIC HYPOTENSION Quantity:  90 Tab Refills:  1  
     
   
   
   
  
 escitalopram oxalate 20 mg tablet Commonly known as:  Félix Jim Your last dose was: Your next dose is:    
   
   
 Dose:  20 mg Take 1 Tab by mouth daily. Quantity:  30 Tab Refills:  0  
     
   
   
   
  
 esomeprazole 40 mg capsule Commonly known as:  NexIUM Your last dose was: Your next dose is:    
   
   
 Dose:  40 mg Take 1 Cap by mouth daily. Quantity:  30 Cap Refills:  0  
     
   
   
   
  
 * LORazepam 0.5 mg tablet Commonly known as:  ATIVAN Your last dose was: Your next dose is: Take  by mouth every six (6) hours as needed for Anxiety. Refills:  0  
     
   
   
   
  
 * LORazepam 0.5 mg tablet Commonly known as:  ATIVAN Your last dose was: Your next dose is:    
   
   
 Dose:  0.5 mg Take 0.5 mg by mouth every evening. Refills:  0  
     
   
   
   
  
 meclizine 25 mg tablet Commonly known as:  ANTIVERT Your last dose was: Your next dose is:    
   
   
 Dose:  25 mg Take 25 mg by mouth three (3) times daily as needed for Dizziness. Refills:  0  
     
   
   
   
  
 * pindolol 5 mg tablet Commonly known as:  VISKIN Your last dose was: Your next dose is:    
   
   
 Dose:  5 mg Take 5 mg by mouth nightly. Refills:  0  
     
   
   
   
  
 * pindolol 5 mg tablet Commonly known as:  VISKIN Your last dose was: Your next dose is:    
   
   
 Dose:  2.5 mg Take 2.5 mg by mouth daily (after lunch). Refills:  0  
     
   
   
   
  
 polyethylene glycol 17 gram packet Commonly known as:  Maxine Clos Your last dose was: Your next dose is:    
   
   
 Dose:  17 g Take 1 Packet by mouth daily. Indications: CONSTIPATION Quantity:  30 Packet Refills:  0 QUEtiapine 25 mg tablet Commonly known as:  SEROquel Your last dose was: Your next dose is:    
   
   
 Dose:  25 mg Take 25 mg by mouth every evening. Refills:  0  
     
   
   
   
  
 rivastigmine 4.6 mg/24 hr patch Commonly known as:  EXELON Your last dose was: Your next dose is:    
   
   
 Dose:  1 Patch 1 Patch by TransDERmal route daily. Quantity:  30 Patch Refills:  0  
     
   
   
   
  
 therapeutic multivitamin tablet Commonly known as:  [facility] Your last dose was: Your next dose is:    
   
   
 Dose:  1 Tab Take 1 Tab by mouth daily. Quantity:  30 Tab Refills:  0  
     
   
   
   
  
 traMADol 50 mg tablet Commonly known as:  ULTRAM  
   
Your last dose was: Your next dose is:    
   
   
 Dose:  50 mg Take 1 Tab by mouth every eight (8) hours as needed for Pain. Max Daily Amount: 150 mg.  
 Quantity:  30 Tab Refills:  1 * Notice: This list has 4 medication(s) that are the same as other medications prescribed for you. Read the directions carefully, and ask your doctor or other care provider to review them with you. STOP taking these medications   
 cephALEXin 250 mg capsule Commonly known as:  Fran Fiore Where to Get Your Medications Information on where to get these meds will be given to you by the nurse or doctor. ! Ask your nurse or doctor about these medications  
  oseltamivir 75 mg capsule

## 2017-03-19 NOTE — PROGRESS NOTES
Bedside shift change report given to Gerardo Cody (oncoming nurse) by Richad Riedel (offgoing nurse). Report included the following information SBAR, Kardex and MAR.

## 2017-03-19 NOTE — ED PROVIDER NOTES
HPI Comments: 80 y.o. male with past medical history significant for hyperlipidemia, duodenal ulcer, acid reflux, DDD, DJD, paralysis agitans, blurry vision, HTN, benign prostatic hyperplasia, paroxysmal atrial tachycardia, and Parkinson's disease who presents from [facility] via EMS with chief complaint of AMS. Pt presents from assisted living for altered mental status that began around 1500 today, per EMS. Pt is disoriented to place and time. He states that he is in no pain but complains of some SOB. He denies any diarrhea or dysuria. Pt denies appetite change. Full history, physical exam, and ROS unable to be obtained due to:  Confusion. No family is here with patient. There are no other acute medical concerns at this time. Social hx: former smoker, EtOH use (2 oz/wk), no drug use   PCP: Jose Juan Magaña MD    Note written by Imtiaz Saucedo, as dictated by Victoriano Valdez MD 9:32 PM      The history is provided by the patient and the EMS personnel. No  was used.         Past Medical History:   Diagnosis Date    Blurry vision 1/19/2012    BPH (benign prostatic hyperplasia)     DDD (degenerative disc disease), lumbar 2/19/2010    DJD (degenerative joint disease) of cervical spine 2/19/2010    DU (duodenal ulcer) 9/1/1990    Hyperlipidemia     Hypertension     Other and unspecified hyperlipidemia 2/19/2010    Pacemaker     Paralysis agitans ([facility] [address] 2/19/2010    Parkinson disease ([facility] [address]     [facility]     Premature atrial beats     Reflux esophagitis 2/19/2010    Sick sinus syndrome [facility])        Past Surgical History:   Procedure Laterality Date    HX CATARACT REMOVAL  6/2012 and 7/2012    Both eyes    HX ORTHOPAEDIC  04/2015    tendon repair both knees    HX PACEMAKER  2008    bradycardia    HX VITRECTOMY  11/2011    WA REMVL PERM PM PLS GEN W/REPL PLSE GEN 2 LEAD SYS  3/13/2017              Family History:   Problem Relation Age of Onset    Asthma Mother     Heart Disease Father        Social History     Social History    Marital status:      Spouse name: N/A    Number of children: N/A    Years of education: N/A     Occupational History    Not on file. Social History Main Topics    Smoking status: Former Smoker     Types: Pipe     Quit date: 7/14/1945    Smokeless tobacco: Never Used    Alcohol use 2.0 oz/week     4 Cans of beer per week      Comment: glass of wine every now and then    Drug use: No    Sexual activity: Not on file     Other Topics Concern     Service Yes    Blood Transfusions No    Caffeine Concern No    Occupational Exposure No    Hobby Hazards No    Sleep Concern No    Stress Concern No    Weight Concern No    Special Diet No    Back Care No    Exercise No    Bike Helmet No    Seat Belt Yes    Self-Exams No     Social History Narrative         ALLERGIES: Review of patient's allergies indicates no known allergies. Review of Systems   Unable to perform ROS: Mental status change       Vitals:    03/18/17 2110   BP: 153/79   Pulse: 84   Resp: 18   Temp: 100.3 °F (37.9 °C)   SpO2: 97%   Weight: 90.3 kg (199 lb)   Height: 6' 2\" (1.88 m)            Physical Exam   Constitutional: He appears well-developed and well-nourished. No distress. HENT:   Head: Normocephalic and atraumatic. Nose: Nose normal.   Mouth/Throat: Oropharynx is clear and moist. Mucous membranes are pale. Eyes: Conjunctivae and EOM are normal. Pupils are equal, round, and reactive to light. No scleral icterus. Neck: Normal range of motion. Neck supple. No JVD present. No tracheal deviation present. No thyromegaly present. No carotid bruits noted. Cardiovascular: Normal rate, regular rhythm, normal heart sounds and intact distal pulses. Exam reveals no gallop and no friction rub. No murmur heard. Pulmonary/Chest: Effort normal. No respiratory distress. He has no wheezes. He has rhonchi. He has no rales. He exhibits no tenderness. Frequent cough   Abdominal: Soft. Bowel sounds are normal. He exhibits no distension and no mass. There is no tenderness. There is no rebound and no guarding. Musculoskeletal: Normal range of motion. He exhibits no edema or tenderness. Lymphadenopathy:     He has no cervical adenopathy. Neurological: He is alert. He has normal reflexes. He is disoriented. No cranial nerve deficit. Coordination normal.   Skin: Skin is warm and dry. No rash noted. No erythema. There is pallor. Psychiatric: He has a normal mood and affect. His behavior is normal.   Nursing note and vitals reviewed. Note written by Imtiaz Peraza, as dictated by Ronna Wang MD 9:32 PM      MDM  Number of Diagnoses or Management Options  Altered mental status, unspecified altered mental status type: new and requires workup  Influenza A: new and requires workup     Amount and/or Complexity of Data Reviewed  Clinical lab tests: ordered and reviewed  Tests in the radiology section of CPT®: ordered and reviewed  Decide to obtain previous medical records or to obtain history from someone other than the patient: yes  Review and summarize past medical records: yes  Discuss the patient with other providers: yes  Independent visualization of images, tracings, or specimens: yes    Risk of Complications, Morbidity, and/or Mortality  Presenting problems: high  Diagnostic procedures: high  Management options: high    Patient Progress  Patient progress: stable    ED Course       Procedures   ED MD EKG interpretation: Normal sinus room with the rate 81 beats a minute. There is a leftward axis. Intervals are normal. Poor or wave progression is noted. No active P is noted. No significant acute changes are noted. Ronna Wang MD      The patient's labs are unremarkable. His temp was 100. 3. XR chest is clear. Urine is clear. No definitive source of infection. The flu swab was noted to be positive.  Patient will be treated for influenza. I have discussed the patient with the hospitalist and they will see and admit for further evaluation and treatment. Given this patient's age, altered mental status, and report temperature, it is felt most appropriate to admit.

## 2017-03-19 NOTE — PROGRESS NOTES
Primary Nurse Jamel Brown and Evette Kelly, LIZABETH performed a dual skin assessment on this patient No impairment noted  Joey score is 15    Pt has abrasion on R mid back.

## 2017-03-19 NOTE — ED TRIAGE NOTES
Pt presents from [facility] for altered mental status that began at approximately 3pm today. Pt disoriented to place and time.

## 2017-03-20 PROCEDURE — 74011250637 HC RX REV CODE- 250/637: Performed by: HOSPITALIST

## 2017-03-20 PROCEDURE — 96372 THER/PROPH/DIAG INJ SC/IM: CPT

## 2017-03-20 PROCEDURE — 94640 AIRWAY INHALATION TREATMENT: CPT

## 2017-03-20 PROCEDURE — 99218 HC RM OBSERVATION: CPT

## 2017-03-20 PROCEDURE — 74011000250 HC RX REV CODE- 250: Performed by: INTERNAL MEDICINE

## 2017-03-20 PROCEDURE — 74011250637 HC RX REV CODE- 250/637: Performed by: INTERNAL MEDICINE

## 2017-03-20 PROCEDURE — 74011250636 HC RX REV CODE- 250/636: Performed by: INTERNAL MEDICINE

## 2017-03-20 RX ORDER — POLYVINYL ALCOHOL 14 MG/ML
1 SOLUTION/ DROPS OPHTHALMIC AS NEEDED
Status: DISCONTINUED | OUTPATIENT
Start: 2017-03-20 | End: 2017-03-21 | Stop reason: HOSPADM

## 2017-03-20 RX ORDER — TOLTERODINE 2 MG/1
2 CAPSULE, EXTENDED RELEASE ORAL DAILY
Status: DISCONTINUED | OUTPATIENT
Start: 2017-03-21 | End: 2017-03-20

## 2017-03-20 RX ORDER — TOLTERODINE 2 MG/1
2 CAPSULE, EXTENDED RELEASE ORAL DAILY
Status: DISCONTINUED | OUTPATIENT
Start: 2017-03-20 | End: 2017-03-21 | Stop reason: HOSPADM

## 2017-03-20 RX ORDER — IPRATROPIUM BROMIDE AND ALBUTEROL SULFATE 2.5; .5 MG/3ML; MG/3ML
3 SOLUTION RESPIRATORY (INHALATION)
Status: DISCONTINUED | OUTPATIENT
Start: 2017-03-21 | End: 2017-03-21 | Stop reason: HOSPADM

## 2017-03-20 RX ORDER — ALBUTEROL SULFATE 0.83 MG/ML
2.5 SOLUTION RESPIRATORY (INHALATION)
Status: DISCONTINUED | OUTPATIENT
Start: 2017-03-20 | End: 2017-03-21 | Stop reason: HOSPADM

## 2017-03-20 RX ADMIN — TOLTERODINE 2 MG: 2 CAPSULE, EXTENDED RELEASE ORAL at 14:55

## 2017-03-20 RX ADMIN — Medication 10 ML: at 09:53

## 2017-03-20 RX ADMIN — ESCITALOPRAM OXALATE 20 MG: 10 TABLET ORAL at 09:41

## 2017-03-20 RX ADMIN — PINDOLOL 5 MG: 5 TABLET ORAL at 22:36

## 2017-03-20 RX ADMIN — OSELTAMIVIR PHOSPHATE 75 MG: 75 CAPSULE ORAL at 09:42

## 2017-03-20 RX ADMIN — IPRATROPIUM BROMIDE AND ALBUTEROL SULFATE 3 ML: .5; 3 SOLUTION RESPIRATORY (INHALATION) at 20:17

## 2017-03-20 RX ADMIN — CEPHALEXIN 250 MG: 250 CAPSULE ORAL at 06:00

## 2017-03-20 RX ADMIN — CEPHALEXIN 250 MG: 250 CAPSULE ORAL at 12:23

## 2017-03-20 RX ADMIN — OSELTAMIVIR PHOSPHATE 75 MG: 75 CAPSULE ORAL at 17:37

## 2017-03-20 RX ADMIN — CARBIDOPA AND LEVODOPA 2 TABLET: 25; 100 TABLET ORAL at 12:19

## 2017-03-20 RX ADMIN — PINDOLOL 2.5 MG: 5 TABLET ORAL at 12:20

## 2017-03-20 RX ADMIN — QUETIAPINE FUMARATE 25 MG: 25 TABLET, FILM COATED ORAL at 17:37

## 2017-03-20 RX ADMIN — ENOXAPARIN SODIUM 40 MG: 40 INJECTION SUBCUTANEOUS at 09:51

## 2017-03-20 RX ADMIN — Medication 10 ML: at 22:36

## 2017-03-20 RX ADMIN — THERA TABS 1 TABLET: TAB at 09:41

## 2017-03-20 RX ADMIN — IPRATROPIUM BROMIDE AND ALBUTEROL SULFATE 3 ML: .5; 3 SOLUTION RESPIRATORY (INHALATION) at 08:34

## 2017-03-20 RX ADMIN — CARBIDOPA AND LEVODOPA 2 TABLET: 25; 100 TABLET ORAL at 09:41

## 2017-03-20 RX ADMIN — TRAMADOL HYDROCHLORIDE 50 MG: 50 TABLET, FILM COATED ORAL at 09:52

## 2017-03-20 RX ADMIN — CARBIDOPA AND LEVODOPA 2 TABLET: 25; 100 TABLET ORAL at 17:37

## 2017-03-20 RX ADMIN — PANTOPRAZOLE SODIUM 40 MG: 40 TABLET, DELAYED RELEASE ORAL at 07:30

## 2017-03-20 RX ADMIN — CARBIDOPA AND LEVODOPA 2 TABLET: 25; 100 TABLET ORAL at 22:35

## 2017-03-20 RX ADMIN — IPRATROPIUM BROMIDE AND ALBUTEROL SULFATE 3 ML: .5; 3 SOLUTION RESPIRATORY (INHALATION) at 16:26

## 2017-03-20 NOTE — PROGRESS NOTES
Bedside shift change report given to Juanita Friedman RN (oncoming nurse) by LIZABETH Moreira (offgoing nurse). Report included the following information SBAR, Kardex and MAR.

## 2017-03-20 NOTE — PROGRESS NOTES
03/20/17 1505   Vital Signs   BP (!) 154/98   MAP (Calculated) 117   pt bp 151/101 at 1215. BP med given. Currently 154/98. MD informed. NNO.

## 2017-03-20 NOTE — PROGRESS NOTES
Hospitalist Progress Note  Solo Cast MD  Office: [phone]        Date of Service:  3/20/2017  NAME:  Liliane Trinidad  :  1933  MRN:  [id number]      Admission Summary:   80-year-old white male who presents with complaints of congestion, cough productive with small amounts of white phlegm for the last few days. Patient reports increasing shortness of breath with any kind of exertion. While emergency department records indicate that the nursing home where he is a resident noticed altered mental status on date of admission, this is not appreciated on interview and exam. Although he does have a history of dementia, patient is verbal and a fairly good historian. He does have a history of COPD, and is noted to have wheezing on exam. Patient has tested positive for influenza A. Interval history / Subjective:   Pt seen and examined this am. Slightly confused. States he has no complaints today. Assessment & Plan:     1. Influenza A:   -Stable. Continue Tamiflu  -Continue symptomatic mgt. 2. COPD exacerbation:   -Improving. Continue Duonebs     3. Dementia:   -Continue Exelon patch. 4. Parkinsons disease:   -Continue carbidopa-levodopa. Continue pindolol for tremor    5. Sick sinus syndrome:   -S/p pacemaker. Stable    6. GERD history of reflux esophagitis:   -Continue Protonix     7. Essential hypertension:   -Continue home medication of clonidine. Patient also has a history of neurogenic orthostatic hypotension for which hes on droxidopa    Code status: Full    DVT prophylaxis: Lovenox    Care Plan discussed with: Patient/Family, Nurse and   Disposition: SNF/LTC and TBD     Hospital Problems  Date Reviewed: 3/13/2017          Codes Class Noted POA    Influenza A ICD-10-CM: J10.1  ICD-9-CM: 487.1  3/19/2017 Unknown                Review of Systems:   Pertinent items are noted in HPI.        Vital Signs: Last 24hrs VS reviewed since prior progress note. Most recent are:  Visit Vitals    /81 (BP 1 Location: Right arm, BP Patient Position: At rest)    Pulse 80    Temp 97.7 °F (36.5 °C)    Resp 18    Ht 6' 2\" (1.88 m)    Wt 90.3 kg (199 lb)    SpO2 94%    BMI 25.55 kg/m2         Intake/Output Summary (Last 24 hours) at 03/20/17 1941  Last data filed at 03/20/17 1118   Gross per 24 hour   Intake              360 ml   Output                1 ml   Net              359 ml        Physical Examination:      General:  Alert, cooperative, no distress, appears stated age. Head:  Normocephalic, without obvious abnormality, atraumatic. Eyes:  Conjunctivae/corneas clear. PERRL, EOMs intact. Ears:  Normal TMs and external ear canals both ears. Nose: Nares normal. Septum midline. Mucosa boggy. Throat: Lips, tongue normal. Oral mucosa moist.   Neck: Supple, symmetrical, trachea midline, no adenopathy, thyroid: no enlargement/tenderness/nodules, no carotid bruit and no JVD. Back:  Symmetric, no curvature. ROM normal. No CVA tenderness. Lungs:  Clear to auscultation bilaterally. Chest wall:  No tenderness or deformity. Heart:  Regular rate and rhythm, S1, S2 normal, no murmur, click, rub or gallop. Abdomen:  Soft, non-tender. Bowel sounds normal.                Extremities: Extremities normal, atraumatic, no cyanosis or edema. Pulses: 2+ and symmetric all extremities. Skin: Skin color, texture, turgor normal. No rashes or lesions   Lymph nodes: Cervical, supraclavicular, and axillary nodes normal.   Neurologic: CNII-XII intact. 5/5 strength in all 4 extremities. Mild tremor in upper extremities.             Data Review:    Review and/or order of clinical lab test      Labs:     Recent Labs      03/18/17   2135   WBC  6.0   HGB  10.7*   HCT  33.3*   PLT  175     Recent Labs      03/18/17 2135   NA  131*   K  4.2   CL  97   CO2  26   BUN  23*   CREA  1.10   GLU  108*   CA  8.6     Recent Labs 03/18/17   2135   SGOT  22   ALT  10*   AP  70   TBILI  1.0   TP  7.1   ALB  3.6   GLOB  3.5        Lab Results   Component Value Date/Time    Folate 32.6 10/30/2013 10:30 AM          Lab Results   Component Value Date/Time    Cholesterol, total 144 11/06/2015 12:05 PM    HDL Cholesterol 45 11/06/2015 12:05 PM    LDL, calculated 88 11/06/2015 12:05 PM    Triglyceride 57 11/06/2015 12:05 PM    CHOL/HDL Ratio 3.0 09/29/2010 11:12 AM     Lab Results   Component Value Date/Time    Glucose (POC) 97 08/23/2016 02:04 PM    Glucose (POC) 125 08/23/2016 01:20 PM    Glucose (POC) 109 06/15/2016 09:23 PM    Glucose (POC) 108 06/15/2016 05:28 PM    Glucose (POC) 116 06/15/2016 06:11 AM     Lab Results   Component Value Date/Time    Color DARK YELLOW 03/18/2017 10:09 PM    Appearance CLEAR 03/18/2017 10:09 PM    Specific gravity 1.025 03/18/2017 10:09 PM    pH (UA) 5.5 03/18/2017 10:09 PM    Protein TRACE 03/18/2017 10:09 PM    Glucose NEGATIVE  03/18/2017 10:09 PM    Ketone TRACE 03/18/2017 10:09 PM    Bilirubin NEGATIVE  03/18/2017 10:09 PM    Urobilinogen 1.0 03/18/2017 10:09 PM    Nitrites NEGATIVE  03/18/2017 10:09 PM    Leukocyte Esterase TRACE 03/18/2017 10:09 PM    Epithelial cells FEW 03/18/2017 10:09 PM    Bacteria NEGATIVE  03/18/2017 10:09 PM    WBC 5-10 03/18/2017 10:09 PM    RBC 0-5 03/18/2017 10:09 PM         ______________________________________________________________________  EXPECTED LENGTH OF STAY: - - -  ACTUAL LENGTH OF STAY:   1 Day               Solo Cast MD

## 2017-03-20 NOTE — PROGRESS NOTES
Bedside shift change report given to Piedad Fyre (oncoming nurse) by Bouchra Felix (offgoing nurse). Report included the following information SBAR, Kardex and MAR.

## 2017-03-21 VITALS
TEMPERATURE: 97.4 F | WEIGHT: 195.33 LBS | HEIGHT: 74 IN | RESPIRATION RATE: 18 BRPM | DIASTOLIC BLOOD PRESSURE: 79 MMHG | BODY MASS INDEX: 25.07 KG/M2 | SYSTOLIC BLOOD PRESSURE: 112 MMHG | OXYGEN SATURATION: 94 % | HEART RATE: 81 BPM

## 2017-03-21 LAB
ANION GAP BLD CALC-SCNC: 9 MMOL/L (ref 5–15)
BUN SERPL-MCNC: 19 MG/DL (ref 6–20)
BUN/CREAT SERPL: 24 (ref 12–20)
CALCIUM SERPL-MCNC: 8.6 MG/DL (ref 8.5–10.1)
CHLORIDE SERPL-SCNC: 97 MMOL/L (ref 97–108)
CO2 SERPL-SCNC: 24 MMOL/L (ref 21–32)
CREAT SERPL-MCNC: 0.8 MG/DL (ref 0.7–1.3)
ERYTHROCYTE [DISTWIDTH] IN BLOOD BY AUTOMATED COUNT: 14.4 % (ref 11.5–14.5)
GLUCOSE SERPL-MCNC: 85 MG/DL (ref 65–100)
HCT VFR BLD AUTO: 36.3 % (ref 36.6–50.3)
HGB BLD-MCNC: 11.8 G/DL (ref 12.1–17)
MCH RBC QN AUTO: 28.8 PG (ref 26–34)
MCHC RBC AUTO-ENTMCNC: 32.5 G/DL (ref 30–36.5)
MCV RBC AUTO: 88.5 FL (ref 80–99)
PLATELET # BLD AUTO: 182 K/UL (ref 150–400)
POTASSIUM SERPL-SCNC: 4.2 MMOL/L (ref 3.5–5.1)
RBC # BLD AUTO: 4.1 M/UL (ref 4.1–5.7)
SODIUM SERPL-SCNC: 130 MMOL/L (ref 136–145)
WBC # BLD AUTO: 3.4 K/UL (ref 4.1–11.1)

## 2017-03-21 PROCEDURE — 74011000250 HC RX REV CODE- 250: Performed by: INTERNAL MEDICINE

## 2017-03-21 PROCEDURE — 36415 COLL VENOUS BLD VENIPUNCTURE: CPT | Performed by: INTERNAL MEDICINE

## 2017-03-21 PROCEDURE — 96372 THER/PROPH/DIAG INJ SC/IM: CPT

## 2017-03-21 PROCEDURE — 74011250636 HC RX REV CODE- 250/636: Performed by: INTERNAL MEDICINE

## 2017-03-21 PROCEDURE — 94640 AIRWAY INHALATION TREATMENT: CPT

## 2017-03-21 PROCEDURE — 85027 COMPLETE CBC AUTOMATED: CPT | Performed by: INTERNAL MEDICINE

## 2017-03-21 PROCEDURE — 99218 HC RM OBSERVATION: CPT

## 2017-03-21 PROCEDURE — 74011250637 HC RX REV CODE- 250/637: Performed by: INTERNAL MEDICINE

## 2017-03-21 PROCEDURE — 80048 BASIC METABOLIC PNL TOTAL CA: CPT | Performed by: INTERNAL MEDICINE

## 2017-03-21 RX ORDER — OSELTAMIVIR PHOSPHATE 75 MG/1
75 CAPSULE ORAL 2 TIMES DAILY
Qty: 5 CAP | Refills: 0 | Status: SHIPPED | OUTPATIENT
Start: 2017-03-21 | End: 2017-03-24

## 2017-03-21 RX ADMIN — OSELTAMIVIR PHOSPHATE 75 MG: 75 CAPSULE ORAL at 11:19

## 2017-03-21 RX ADMIN — THERA TABS 1 TABLET: TAB at 09:43

## 2017-03-21 RX ADMIN — ENOXAPARIN SODIUM 40 MG: 40 INJECTION SUBCUTANEOUS at 09:43

## 2017-03-21 RX ADMIN — PANTOPRAZOLE SODIUM 40 MG: 40 TABLET, DELAYED RELEASE ORAL at 06:31

## 2017-03-21 RX ADMIN — TRAMADOL HYDROCHLORIDE 50 MG: 50 TABLET, FILM COATED ORAL at 09:56

## 2017-03-21 RX ADMIN — TOLTERODINE 2 MG: 2 CAPSULE, EXTENDED RELEASE ORAL at 09:41

## 2017-03-21 RX ADMIN — Medication 10 ML: at 06:32

## 2017-03-21 RX ADMIN — ACETAMINOPHEN 650 MG: 325 TABLET, FILM COATED ORAL at 12:10

## 2017-03-21 RX ADMIN — IPRATROPIUM BROMIDE AND ALBUTEROL SULFATE 3 ML: .5; 3 SOLUTION RESPIRATORY (INHALATION) at 07:34

## 2017-03-21 RX ADMIN — CARBIDOPA AND LEVODOPA 2 TABLET: 25; 100 TABLET ORAL at 09:41

## 2017-03-21 RX ADMIN — ESCITALOPRAM OXALATE 20 MG: 10 TABLET ORAL at 09:42

## 2017-03-21 NOTE — PROGRESS NOTES
ADULT PROTOCOL: JET AEROSOL ASSESSMENT    Patient  Miguel Collier     80 y.o.   male     3/20/2017  10:44 PM    Breath Sounds Pre Procedure: Right Breath Sounds: Diminished                               Left Breath Sounds: Diminished    Breath Sounds Post Procedure: Right Breath Sounds: Diminished                                 Left Breath Sounds: Diminished    Breathing pattern: Pre procedure Breathing Pattern: Regular          Post procedure Breathing Pattern: Regular    Heart Rate: Pre procedure Pulse: 84           Post procedure Pulse: 82    Resp Rate: Pre procedure Respirations: 17           Post procedure Respirations: 18    Peak Flow: Pre bronchodilator             Post bronchodilator       FVC/FEV1:  n/a    Incentive Spirometry:             Cough: Pre procedure Cough: Non-productive               Post procedure      Suctioned: NO    Sputum: Pre procedure                   Post procedure      Oxygen: O2 Device: Room air   FiO2 (%) 21     Changed: NO    SpO2: Pre procedure SpO2: 94 %   without oxygen              Post procedure SpO2: 91 %  without oxygen    Nebulizer Therapy: Current medications Aerosolized Medications: DuoNeb      Changed: YES    Smoking History:     Problem List:   Patient Active Problem List   Diagnosis Code    Hyperlipemia E78.5    DU (duodenal ulcer) K26.9    Reflux esophagitis K21.0    DDD (degenerative disc disease), lumbar M51.36    DJD (degenerative joint disease) of cervical spine M47.812    Parkinsonian syndrome- Dr. Sophia Hernandez hypertension, difficult to control due to orthostatic hypotension I10    BPH (benign prostatic hyperplasia)- Dr. Saman Claudio N40.0    Sick sinus syndrome-pacemaker-2008 I49.5    PAT [facility]paroxysmal [facility] tachycardia) ([facility] I47.1    NSVT (nonsustained ventricular tachycardia) ([facility]) I47.2    Blurry vision-chronic, no change- neg w/u H53.8    Orthostatic hypotension I95.1    Pacemaker Z95.0    Mild cognitive impairment w/o memory loss G31.84    Major depressive disorder, single episode, unspecified F32.9    Generalized anxiety disorder F41.1    Rupture quadriceps tendon- bilat--2014- UNABLE TO WALK AFTER THAT.  D80.505P    Overactive bladder- Dr. Elaine Kiser N32.81    Grief- wife of 61 years  2015 F43.20    Primary osteoarthritis of both knees M17.0    History of duodenal ulcer Z87.19    Aneurysm of iliac artery ([address]- left 3.9 cm 2016 CT scan- no change I72.3    Pneumonia J18.9    Pacemaker end of life Z38.26    Influenza A J10.1       Respiratory Therapist: Debby Santiago RT

## 2017-03-21 NOTE — DISCHARGE SUMMARY
Discharge Summary       PATIENT ID: Elmira Michael  MRN: [id number]   YOB: 1933    DATE OF ADMISSION: 3/19/2017 12:30 AM    DATE OF DISCHARGE: 03/21/2017  PRIMARY CARE PROVIDER: Chan Veronica MD     ATTENDING PHYSICIAN: Curtis Lucero. MD Serene  DISCHARGING PROVIDER: Curtis Cast MD    To contact this individual call [phone] and ask the  to page. If unavailable ask to be transferred the Adult Hospitalist Department. CONSULTATIONS: IP CONSULT TO HOSPITALIST    PROCEDURES/SURGERIES: * No surgery found *    ADMITTING 01 [facility] COURSE:   Patient is a 25-year-old white male who presents with complaints of congestion, cough productive with small amounts of white phlegm for the last few days. Patient reports increasing shortness of breath with any kind of exertion. While emergency department records indicate that the nursing home where he is a resident noticed altered mental status on date of admission, this is not appreciated on interview and exam. Although he does have a history of dementia, patient is verbal and a fairly good historian. He does have a history of COPD, and is noted to have wheezing on exam. Patient has tested positive for influenza A. DISCHARGE DIAGNOSES / PLAN:      1. Influenza A:   -Pt has remained clinically and hemodynamically stable. Afebrile.   -Pt was treated with Tamiflu. Received 5 doses in the hospital. Will complete 5 more doses on discharge to complete 10 doses. -   2. COPD exacerbation:   -Very mild. Pt was treated with Duonebs prn.     3. Dementia:   -Pt was continued on Exelon patch.     4. Parkinsons disease:   -Stable. Pt was continued on carbidopa-levodopa. -Pindolol was continued for tremor     5. Sick sinus syndrome:   -S/p pacemaker. Stable     6.  GERD history of reflux esophagitis:   -Continued on Protonix      7. Essential hypertension:   -BP was well controlled on Clonidine.   -Patient also has a history of neurogenic orthostatic hypotension for which hes on droxidopa             PENDING TEST RESULTS:   At the time of discharge the following test results are still pending: None    FOLLOW UP APPOINTMENTS:    Follow-up Information     Follow up With Details Comments Αμαλίαcolleen Leon MD   [address] [place] [address] Avenue  [phone]             ADDITIONAL CARE RECOMMENDATIONS: None    DIET: Cardiac Diet    ACTIVITY: Activity as tolerated    WOUND CARE: None    EQUIPMENT needed: None      DISCHARGE MEDICATIONS:  Current Discharge Medication List      START taking these medications    Details   oseltamivir (TAMIFLU) 75 mg capsule Take 1 Cap by mouth two (2) times a day for 5 doses. Qty: 5 Cap, Refills: 0         CONTINUE these medications which have NOT CHANGED    Details   !! LORazepam (ATIVAN) 0.5 mg tablet Take  by mouth every six (6) hours as needed for Anxiety.      !! LORazepam (ATIVAN) 0.5 mg tablet Take 0.5 mg by mouth every evening. QUEtiapine (SEROQUEL) 25 mg tablet Take 25 mg by mouth every evening. DETROL LA 2 mg ER capsule TAKE 1 CAPSULE DAILY FOR BLADDER FREQUENCY  Qty: 90 Cap, Refills: 3    Associated Diagnoses: Overactive bladder      !! pindolol (VISKIN) 5 mg tablet Take 5 mg by mouth nightly. !! pindolol (VISKIN) 5 mg tablet Take 2.5 mg by mouth daily (after lunch). meclizine (ANTIVERT) 25 mg tablet Take 25 mg by mouth three (3) times daily as needed for Dizziness. albuterol (PROVENTIL HFA, VENTOLIN HFA, PROAIR HFA) 90 mcg/actuation inhaler Take 2 Puffs by inhalation every six (6) hours as needed for Wheezing. Qty: 1 Inhaler, Refills: 0    Associated Diagnoses: Chronic cough      carbidopa-levodopa (SINEMET)  mg per tablet Take 2 Tabs by mouth four (4) times daily. (This is given at 8:00, 11:00, 14:00, and 21:00). Qty: 240 Tab, Refills: 0      polyethylene glycol (MIRALAX) 17 gram packet Take 1 Packet by mouth daily.  Indications: CONSTIPATION  Qty: 30 Packet, Refills: 0      therapeutic multivitamin (THERAGRAN) tablet Take 1 Tab by mouth daily. Qty: 30 Tab, Refills: 0      traMADol (ULTRAM) 50 mg tablet Take 1 Tab by mouth every eight (8) hours as needed for Pain. Max Daily Amount: 150 mg.  Qty: 30 Tab, Refills: 1      cloNIDine HCl (CATAPRES) 0.1 mg tablet Take 1 Tab by mouth every six (6) hours as needed (SBP > 180 mmHg). Qty: 20 Tab, Refills: 0      droxidopa (NORTHERA) 100 mg cap Take 100 mg by mouth three (3) times daily. Indications: SYMPTOMATIC ORTHOSTATIC HYPOTENSION  Qty: 90 Tab, Refills: 1      docusate sodium (COLACE) 100 mg capsule Take 1 Cap by mouth daily as needed for Constipation. Qty: 30 Cap, Refills: 0      cycloSPORINE (RESTASIS) 0.05 % ophthalmic emulsion Administer 1 Drop to both eyes two (2) times a day. Qty: 60 Each, Refills: 0      escitalopram oxalate (LEXAPRO) 20 mg tablet Take 1 Tab by mouth daily. Qty: 30 Tab, Refills: 0      rivastigmine (EXELON) 4.6 mg/24 hr patch 1 Patch by TransDERmal route daily. Qty: 30 Patch, Refills: 0    Associated Diagnoses: Mild cognitive impairment      esomeprazole (NEXIUM) 40 mg capsule Take 1 Cap by mouth daily. Qty: 30 Cap, Refills: 0      acetaminophen (MAPAP EXTRA STRENGTH) 500 mg tablet Take 1 Tab by mouth every six (6) hours as needed for Pain. Qty: 120 Tab, Refills: 0       !! - Potential duplicate medications found. Please discuss with provider. STOP taking these medications       cephALEXin (KEFLEX) 250 mg capsule Comments:   Reason for Stopping:                 NOTIFY YOUR PHYSICIAN FOR ANY OF THE FOLLOWING:   Fever over 101 degrees for 24 hours. Chest pain, shortness of breath, fever, chills, nausea, vomiting, diarrhea, change in mentation, falling, weakness, bleeding. Severe pain or pain not relieved by medications. Or, any other signs or symptoms that you may have questions about.     DISPOSITION:    Home With:   OT  PT  HH  RN       Long term SNF/Inpatient Rehab   X Independent/assisted living    Hospice    Other:       PATIENT CONDITION AT DISCHARGE:     Functional status    Poor    X Deconditioned     Independent      Cognition     Lucid     Forgetful    X Dementia      Catheters/lines (plus indication)    Vance     PICC     PEG    X None      Code status   X  Full code     DNR      PHYSICAL EXAMINATION AT DISCHARGE:    General:  Alert, cooperative, no distress, appears stated age. Head:  Normocephalic, without obvious abnormality, atraumatic. Eyes:  Conjunctivae/corneas clear. PERRL, EOMs intact. Ears:  Normal TMs and external ear canals both ears. Nose: Nares normal. Septum midline. Mucosa boggy. Throat: Lips, tongue normal. Oral mucosa moist.   Neck: Supple, symmetrical, trachea midline, no adenopathy, thyroid: no enlargement/tenderness/nodules, no carotid bruit and no JVD. Back:  Symmetric, no curvature. ROM normal. No CVA tenderness. Lungs:  Clear to auscultation bilaterally. Chest wall:  No tenderness or deformity. Heart:  Regular rate and rhythm, S1, S2 normal, no murmur, click, rub or gallop. Abdomen:  Soft, non-tender. Bowel sounds normal.                    Extremities: Extremities normal, atraumatic, no cyanosis or edema. Pulses: 2+ and symmetric all extremities. Skin: Skin color, texture, turgor normal. No rashes or lesions   Lymph nodes: Cervical, supraclavicular, and axillary nodes normal.   Neurologic: CNII-XII intact. 5/5 strength in all 4 extremities. Mild tremor in upper extremities.                      CHRONIC MEDICAL DIAGNOSES:  Problem List as of 3/21/2017  Date Reviewed: 3/13/2017          Codes Class Noted - Resolved    Influenza A ICD-10-CM: J10.1  ICD-9-CM: 487.1  3/19/2017 - Present        Pacemaker end of life ICD-10-CM: O28.089  ICD-9-CM: V53.31  3/13/2017 - Present    Overview Signed 3/13/2017  9:49 AM by Ada Kang MD     Generator change 3/13/2017             Pneumonia ICD-10-CM: Z10.3  ICD-9-CM: 789  2/11/2017 - Present        Aneurysm of iliac artery (HCC)- left 3.9 cm  CT scan- no change ICD-10-CM: I72.3  ICD-9-CM: 442.2  2016 - Present        Grief- wife of 61 years  2015 ICD-10-CM: F43.20  ICD-9-CM: 309.0  2015 - Present        Primary osteoarthritis of both knees ICD-10-CM: M17.0  ICD-9-CM: 715.16  2015 - Present        Overactive bladder- Dr. Maryellen Schaumann: E34.75  ICD-9-CM: 596.51  2015 - Present        Rupture quadriceps tendon- bilat--2014- UNABLE TO WALK AFTER THAT. (Chronic) ICD-10-CM: C26.745S  ICD-9-CM: 844.8  2014 - Present        Mild cognitive impairment w/o memory loss ICD-10-CM: G31.84  ICD-9-CM: 331.83  2014 - Present        Major depressive disorder, single episode, unspecified ICD-10-CM: F32.9  ICD-9-CM: 296.20  2014 - Present        Generalized anxiety disorder ICD-10-CM: F41.1  ICD-9-CM: 300.02  2014 - Present        Pacemaker ICD-10-CM: Z95.0  ICD-9-CM: V45.01  10/21/2013 - Present        Orthostatic hypotension ICD-10-CM: I95.1  ICD-9-CM: 458.0  2013 - Present        Blurry vision-chronic, no change- neg w/u ICD-10-CM: H53.8  ICD-9-CM: 368.8  2013 - Present        NSVT (nonsustained ventricular tachycardia) (HCC) ICD-10-CM: I47.2  ICD-9-CM: 427.1  2012 - Present    Overview Signed 2012  9:56 AM by Pippa Amezcua MD     2. 2.             PAT (paroxysmal atrial tachycardia) (HCC) ICD-10-CM: I47.1  ICD-9-CM: 427.0  2012 - Present        Sick sinus syndrome-pacemaker- ICD-10-CM: I49.5  ICD-9-CM: 427.81  10/28/2011 - Present        BPH (benign prostatic hyperplasia)- Dr. Maryellen Schaumann: N40.0  ICD-9-CM: 600.00  10/3/2011 - Present        Essential hypertension, difficult to control due to orthostatic hypotension ICD-10-CM: I10  ICD-9-CM: 401.1  2011 - Present        Parkinsonian syndrome- Dr. Roderick Patricia ICD-10-CM: Kennedi Johnson  ICD-9-CM: 332.0  2011 - Present        Hyperlipemia ICD-10-CM: E78.5  ICD-9-CM: 272.4  2/19/2010 - Present        Reflux esophagitis ICD-10-CM: K21.0  ICD-9-CM: 530.11  2/19/2010 - Present        DDD (degenerative disc disease), lumbar ICD-10-CM: M51.36  ICD-9-CM: 722.52  2/19/2010 - Present        DJD (degenerative joint disease) of cervical spine ICD-10-CM: M47.812  ICD-9-CM: 721.0  2/19/2010 - Present        DU (duodenal ulcer) ICD-10-CM: K26.9  ICD-9-CM: 532.90  9/1/1990 - Present        History of duodenal ulcer ICD-10-CM: Z87.19  ICD-9-CM: V12.79  9/1/1990 - Present        RESOLVED: Sepsis ([facility] ICD-10-CM: A41.9  ICD-9-CM: 038.9, 995.91  6/10/2016 - 7/28/2016        RESOLVED: Catheter-associated urinary tract infection ([facility].) ICD-10-CM: T83.511A, N39.0  ICD-9-CM: 996.64, 599.0  4/15/2016 - 4/18/2016        RESOLVED: Metabolic encephalopathy GYE-25-IT: G93.41  ICD-9-CM: 348.31  4/15/2016 - 4/18/2016        RESOLVED: GABI (acute kidney injury) ([facility] ICD-10-CM: N17.9  ICD-9-CM: 584.9  4/15/2016 - 4/18/2016        RESOLVED: Hyponatremia ICD-10-CM: E87.1  ICD-9-CM: 276.1  4/15/2016 - 4/18/2016        RESOLVED: Mental status change ICD-10-CM: R41.82  ICD-9-CM: 780.97  10/30/2013 - 1/5/2016        RESOLVED: Pneumonia, organism unspecified ICD-10-CM: J18.9  ICD-9-CM: 961  2/19/2012 - 1/5/2016        RESOLVED: Weakness generalized ICD-10-CM: R53.1  ICD-9-CM: 780.79  2/19/2012 - 2/22/2012        RESOLVED: PAT (paroxysmal atrial tachycardia) ([facility] ICD-10-CM: I47.1  ICD-9-CM: 427.0  1/19/2012 - 2/29/2012        RESOLVED: Premature atrial beats ICD-10-CM: I49.1  ICD-9-CM: 427.61  Unknown - 2/29/2012        RESOLVED: Paralysis agitans ([facility] ICD-10-CM: G20  ICD-9-CM: 332.0  2/19/2010 - 10/28/2011              Less than 30 minutes were spent with the patient on counseling and coordination of care    Signed:   Moe Anderson.  MD Serene  3/21/2017  11:45 AM

## 2017-03-21 NOTE — PROGRESS NOTES
Bedside and Verbal shift change report given to Christiane (oncoming nurse) by Arminda Corral (offgoing nurse). Report included the following information SBAR, Kardex and MAR.

## 2017-03-21 NOTE — DISCHARGE INSTRUCTIONS
Discharge Instructions       PATIENT ID: Inez Galindo  MRN: [id number]   YOB: 1933    DATE OF ADMISSION: 3/19/2017 12:30 AM    DATE OF DISCHARGE: 3/21/2017    PRIMARY CARE PROVIDER: Richa Suh MD     ATTENDING PHYSICIAN: Rafael Bonilla. MD Serene  DISCHARGING PROVIDER: Rafael Cast MD    To contact this individual call [phone] and ask the  to page. If unavailable ask to be transferred the Adult Hospitalist Department. DISCHARGE DIAGNOSES:  1. Influenza A  2. COPD exacerbation    3. Dementia  4. Parkinsons disease  5. Sick sinus syndrome    6. GERD history of reflux esophagitis    7. Essential hypertension        CONSULTATIONS: IP CONSULT TO HOSPITALIST    PROCEDURES/SURGERIES: * No surgery found *    PENDING TEST RESULTS:   At the time of discharge the following test results are still pending: None    FOLLOW UP APPOINTMENTS:   Follow-up Information     Follow up With Details Comments Αμαλίας MD Edward   [address] [facility] [phone]       In 1 week             ADDITIONAL CARE RECOMMENDATIONS: Pt was on home oxygen prior to admission but has done very well without oxygen in the hospital. Will recommend pt to continue home oxygen only as needed. DIET: Cardiac Diet    ACTIVITY: Activity as tolerated    WOUND CARE: None    EQUIPMENT needed: None      DISCHARGE MEDICATIONS:   See Medication Reconciliation Form    · It is important that you take the medication exactly as they are prescribed. · Keep your medication in the bottles provided by the pharmacist and keep a list of the medication names, dosages, and times to be taken in your wallet. · Do not take other medications without consulting your doctor. NOTIFY YOUR PHYSICIAN FOR ANY OF THE FOLLOWING:   Fever over 101 degrees for 24 hours. Chest pain, shortness of breath, fever, chills, nausea, vomiting, diarrhea, change in mentation, falling, weakness, bleeding.  Severe pain or pain not relieved by medications. Or, any other signs or symptoms that you may have questions about. DISPOSITION:    Home With:   OT  PT  HH  RN       SNF/Inpatient Rehab/LTAC   X Independent/assisted living    Hospice    Other:     CDMP Checked: Yes X     PROBLEM LIST Updated: Yes X       Signed:   Gabriela Cast MD  3/21/2017  12:07 PM

## 2017-03-21 NOTE — PROGRESS NOTES
Patient discharged from [facility] to [facility], no report needed to be called, patient's belongings and medication packed and returned with patient. IV removed. No questions at this time.

## 2017-03-21 NOTE — PROGRESS NOTES
Bedside shift change report given to [address] (oncoming nurse) by Ronda Kaur RN (offgoing nurse). Report included the following information SBAR, Kardex, MAR and Recent Results.

## 2017-03-21 NOTE — PROGRESS NOTES
Care Management Interventions  Mode of Transport at Discharge: BLS ([facility])  Transition of Care Consult (CM Consult): Assisted Living  Discharge Durable Medical Equipment: No  Physical Therapy Consult: No  Occupational Therapy Consult: No  Speech Therapy Consult: No  Current Support Network: Assisted Living [facility])  Confirm Follow Up Transport: [address] discussed with Pt/Family/Caregiver: Yes  Freedom of Choice Offered: Yes  Discharge Location  Discharge Placement: Assisted Living [facility])    CM reviewed chart and received discharge orders. CM called and spoke with [facility] who confirmed that pt is a resident in their [place] [address] and can accept pt back today. CM met with pt and called and to speak with daughter, to confirm discharge plan back to [facility]. Pt usually transports by wheelchair Sherry Leek when he is at his baseline, however he usually transports by non-emergency ambulance when he is discharged from the hospital due to weakness. CM set up ambulance with [facility] for 1pm.  CM faxed [facility] After Visit Summary. No need to call report. Told Hospitalist that daughter would like for him to call. Daughter would also like to make sure that all of patient belongings are packed up and sent with him, including prescription.   Joel Hogan, BSW, ACM

## 2017-03-23 ENCOUNTER — PATIENT OUTREACH (OUTPATIENT)
Dept: FAMILY MEDICINE CLINIC | Age: 82
End: 2017-03-23

## 2017-03-23 ENCOUNTER — TELEPHONE (OUTPATIENT)
Dept: FAMILY MEDICINE CLINIC | Age: 82
End: 2017-03-23

## 2017-03-23 LAB
BACTERIA SPEC CULT: NORMAL
SERVICE CMNT-IMP: NORMAL

## 2017-03-23 NOTE — TELEPHONE ENCOUNTER
Basil Jain @ [facility] Replaced by [facility]  [phone]    Basil Jain states that she needs to speak to Dr. Soy ambrose before they can provide any further home health for the patient.

## 2017-03-23 NOTE — PROGRESS NOTES
NNTOCIP    Patient admitted to [facility] 3/19 with complaints of congestion, productive cough and sob with exertion. Wheezing noted on admission and tested postive to Influenza A. Treated with Tamiflu for 5 days and will continue 5 more days after discharge. COPD-mild exacerbation- treated with Duonebs. Recent pacemaker, stable during hospitalization. NN called nurse at [facility] today, spoke with Francesca December- she reports he has been very confused since discharge from hospital. Speaks of his wife and mother living with him(both are .)  Continues with cough and congestion. Aides have also noted loose stools-odorous. No fever. Continues on the Tamiflu. Advised will report to  and check with him for recs.

## 2017-03-24 NOTE — TELEPHONE ENCOUNTER
In hospital the oxygen was changed to prn. Doing well but does seem more confused.  Home health will monitor

## 2017-03-27 ENCOUNTER — OFFICE VISIT (OUTPATIENT)
Dept: CARDIOLOGY CLINIC | Age: 82
End: 2017-03-27

## 2017-03-27 ENCOUNTER — CLINICAL SUPPORT (OUTPATIENT)
Dept: CARDIOLOGY CLINIC | Age: 82
End: 2017-03-27

## 2017-03-27 DIAGNOSIS — Z95.0 CARDIAC PACEMAKER IN SITU: Primary | ICD-10-CM

## 2017-03-27 DIAGNOSIS — Z95.0 CARDIAC PACEMAKER IN SITU: ICD-10-CM

## 2017-03-27 DIAGNOSIS — Z51.89 VISIT FOR WOUND CHECK: Primary | ICD-10-CM

## 2017-03-27 NOTE — MR AVS SNAPSHOT
Visit Information Date & Time Provider Department Dept. Phone Encounter #  
 3/27/2017  2:20 PM Beth Mcneil MD [facility] [phone] [id number] Your Appointments 4/28/2017  2:00 PM  
ROUTINE CARE with Ashley Haywood MD  
[facility]) Appt Note: FOLLOW UP VISIT  
 [address] [facility] [address]  
[phone]  
  
   
 [address] [address]  
  
    
 4/23/2018  1:45 PM  
PACEMAKER with Haley Kim [facility] ([facility] SCHEDUL[facility]) Appt Note: lachelle callaway, annual thresh/CL, see gilman  
 [address] [place] [address]  
[address] [facility]  
  
    
 4/23/2018  2:00 PM  
ESTABLISHED PATIENT with Beth Mcneil MD  
[facility] ([address]) Appt Note: lachelle callaway, annual thresh/CL, see gilman  
 [address] [place]  
[address] [address]  
  
    
  
 7/3/2017 10:45 AM  
REMOTE OFFICE VISIT with Paloma Crowell [facility] ([facility] SCHEDULING) Appt Note: lachelle callaway b 3/27/17  
 [address] [place]  
[address] [address]  
  
    
 10/9/2017 10:00 AM  
REMOTE OFFICE VISIT with Paloma [facility] ([facility] SCHEDULING) Appt Note: lachelle callaway  
 [address] [facility] [address] [place]  
[phone]  
  
    
 1/15/2018  9:45 AM  
REMOTE OFFICE VISIT with Paloma [facility] ([facility] SCHEDULING) Appt Note: lachelle callaway  
 [address] [facility] [address] [place]  
[phone] Upcoming Health Maintenance Date Due DTaP/Tdap/Td series (1 - Tdap) 5/4/2012 MEDICARE YEARLY EXAM 6/17/2016 GLAUCOMA SCREENING Q2Y 8/26/2018 Allergies as of 3/27/2017  Review Complete On: 3/27/2017 By: Jaret Jaquez MD  
 No Known Allergies Current Immunizations  Reviewed on 3/14/2017 Name Date Influenza High Dose Vaccine PF 10/27/2015 Influenza Vaccine Split 11/17/2011 Pneumococcal Vaccine (Unspecified Type) 4/20/2010 TB Skin Test (PPD) Intradermal 3/25/2013 TD Vaccine 5/3/2012 Not reviewed this visit You Were Diagnosed With   
  
 Codes Comments Cardiac pacemaker in situ    -  Primary ICD-10-CM: Z95.0 ICD-9-CM: V45.01 Vitals Smoking Status Former Smoker Preferred Pharmacy Pharmacy Name Phone [address] [facility] [phone] Your Updated Medication List  
  
   
This list is accurate as of: 3/27/17  2:31 PM.  Always use your most recent med list.  
  
  
  
  
 acetaminophen 500 mg tablet Commonly known as:  MAPAP EXTRA STRENGTH Take 1 Tab by mouth every six (6) hours as needed for Pain. albuterol 90 mcg/actuation inhaler Commonly known as:  PROVENTIL HFA, VENTOLIN HFA, PROAIR HFA Take 2 Puffs by inhalation every six (6) hours as needed for Wheezing. carbidopa-levodopa  mg per tablet Commonly known as:  SINEMET Take 2 Tabs by mouth four (4) times daily. (This is given at 8:00, 11:00, 14:00, and 21:00). cloNIDine HCl 0.1 mg tablet Commonly known as:  CATAPRES Take 1 Tab by mouth every six (6) hours as needed (SBP > 180 mmHg). cycloSPORINE 0.05 % ophthalmic emulsion Commonly known as:  RESTASIS Administer 1 Drop to both eyes two (2) times a day. DETROL LA 2 mg ER capsule Generic drug:  tolterodine ER  
TAKE 1 CAPSULE DAILY FOR BLADDER FREQUENCY  
  
 docusate sodium 100 mg capsule Commonly known as:  [place] Take 1 Cap by mouth daily as needed for Constipation. droxidopa 100 mg Cap capsule Commonly known as:  [place]  Take 100 mg by mouth three (3) times daily. Indications: SYMPTOMATIC ORTHOSTATIC HYPOTENSION  
  
 escitalopram oxalate 20 mg tablet Commonly known as:  Colan Big Take 1 Tab by mouth daily. esomeprazole 40 mg capsule Commonly known as:  NexIUM Take 1 Cap by mouth daily. * LORazepam 0.5 mg tablet Commonly known as:  ATIVAN Take  by mouth every six (6) hours as needed for Anxiety. * LORazepam 0.5 mg tablet Commonly known as:  ATIVAN Take 0.5 mg by mouth every evening. meclizine 25 mg tablet Commonly known as:  ANTIVERT Take 25 mg by mouth three (3) times daily as needed for Dizziness. * pindolol 5 mg tablet Commonly known as:  VISKIN Take 5 mg by mouth nightly. * pindolol 5 mg tablet Commonly known as:  VISKIN Take 2.5 mg by mouth daily (after lunch). polyethylene glycol 17 gram packet Commonly known as:  Mari Oniel Take 1 Packet by mouth daily. Indications: CONSTIPATION  
  
 QUEtiapine 25 mg tablet Commonly known as:  SEROquel Take 25 mg by mouth every evening. rivastigmine 4.6 mg/24 hr patch Commonly known as:  EXELON  
1 Patch by TransDERmal route daily. therapeutic multivitamin tablet Commonly known as:  [facility] Take 1 Tab by mouth daily. traMADol 50 mg tablet Commonly known as:  ULTRAM  
Take 1 Tab by mouth every eight (8) hours as needed for Pain. Max Daily Amount: 150 mg.  
  
 * Notice: This list has 4 medication(s) that are the same as other medications prescribed for you. Read the directions carefully, and ask your doctor or other care provider to review them with you. Patient Instructions Keep follow up appt. Future Appointments Date Time Provider Clementine Roca 4/28/2017 2:00 PM MD ELISSA Escalera  
7/3/2017 10:45 AM Saman Coronel, [address] [facility] [place]  
 10/9/2017 10:00 AM [email or web address] SCHED  
1/15/2018 9:45 AM REMOTE1, [address]  
4/23/2018 1:45 PM PACEMAKER3, [address]  
4/23/2018 2:00 PM Felipe Castro  [address] Introducing [address]! Dear Cameron Springer: Thank you for requesting a [employer] account. Our records indicate that you already have an active [employer] account. You can access your account anytime at [email or web address] Did you know that you can access your hospital and ER discharge instructions at any time in [employer]? You can also review all of your test results from your hospital stay or ER visit. Additional Information If you have questions, please visit the Frequently Asked Questions section of the [employer] website at [email or web address] Remember, [employer] is NOT to be used for urgent needs. For medical emergencies, dial 911. Now available from your iPhone and Android! Please provide this summary of care documentation to your next provider. Your primary care clinician is listed as [address] [place] [address], [facility]/s . If you have any questions after today's visit, please call [phone].

## 2017-03-27 NOTE — PROGRESS NOTES
Chief Complaint   Patient presents with    Wound Check     Verified patient with two types of identifiers.

## 2017-03-27 NOTE — PATIENT INSTRUCTIONS
Keep follow up appt.    Future Appointments  Date Time Provider Clementine Jarretti   4/28/2017 2:00 PM Loly Aguilar MD [facility] SCHED   7/3/2017 10:45 AM REMOTE1, [address]   10/9/2017 10:00 AM Sander Cowan [facility] SCHED   1/15/2018 9:45 AM REMOTE1, [facility] [facility] SCHED   4/23/2018 1:45 PM PACEMAKER3, [address]   4/23/2018 2:00 PM Almaz Haq  [address]

## 2017-03-27 NOTE — PROGRESS NOTES
Wound Check   Patient is here for wound check. No fever, drainage   Physical Exam   Constitutional: well-developed and well-nourished. Skin: Left side pocket is healed without redness, drainage, hematoma       ASSESSMENT and PLAN       ICD-10-CM ICD-9-CM    1. Visit for wound check Z51.89 V58.89    2.  Cardiac pacemaker in situ Z95.0 V45.01        current treatment plan is effective, no change in therapy   Device check shows proper lead and generator functions  Follow-up Disposition:  Return 3 months I will check via device clinic or remote monitoring in the future

## 2017-03-28 ENCOUNTER — TELEPHONE (OUTPATIENT)
Dept: FAMILY MEDICINE CLINIC | Age: 82
End: 2017-03-28

## 2017-03-28 NOTE — TELEPHONE ENCOUNTER
Contact # is [phone]    Reginal Sacks, with [facility], is calling to report patient's abnormal blood pressure reading of 154/98. She states patient is complaining of headaches.

## 2017-03-29 ENCOUNTER — PATIENT OUTREACH (OUTPATIENT)
Dept: FAMILY MEDICINE CLINIC | Age: 82
End: 2017-03-29

## 2017-03-29 NOTE — PROGRESS NOTES
[facility] Note For:  Emma Alston, 80 y.o., 05/18/1933    Patient Lauro Michelle is a 80 y.o. male who is a resident of [address] [place] [address]. This patient was received as a referral from [facility] for  risk levet of #4. Summary of patients top  medical problems:    Problem 1: COPD- treated with Duonebs. This is a chronic problem and patient is stable with nebs. O2 PRN    Problem 2: Depression - Depression screening not done with patient, but this NN spoke to daughter today and was able to perform. PHQ9 score=14. Patient lost his wife of 61 years on 11/5/15 has major depressive disorder as a result, taking ativan and Seroquel. Problem 3: HTN - Patient is currently taking Clonidine as directed by PCP. Essential hypertension is difficult to control due to orthostatic hypotension which patient is taking Northera to control. Problem  4: Parkinson's Syndrome: On [place]. Received  Home Health- PT and OT., also, has [address] taking care of his O2, which he uses only PRN    Patient's challenges to self management identified: depression, Patient has some vision problems and is limited to wheelchair. Unable to walk or be transported by car. Dependent on wheelchair transportation. Daughter says she often has to schedule appointment around her ability to set up transportation. Patients motivational level on a scale of 0-10: Is at level 5, NN spoke to daughter Shelley Every) today who says her father is not combative, just very depresses at times and do not talk much. Medication Management:  Good adherence, medication management by facility. Advance Care Planning: Patient is a DNR, form on file. NN spoke to daughter today and this is known at the hospital and has been updated in EMR. Does patient have an Advance Directive: On file     [employer], Referrals, and Durable Medical Equipment:  Patient has O2 set-up at facility and is managed by [address].     Follow up appointments:   Future Appointments      Provider Clementine Roca   4/28/2017 2:00 PM Maria Victoria Scales MD [address]   7/3/2017 10:45 AM REMOTE1, [facility]   10/9/2017 10:00 AM REMOTE1, [facility]   1/15/2018 9:45 AM REMOTE1, [facility]   4/23/2018 1:45 PM PACEMAKER3, [facility]   4/23/2018 2:00 PM Grant Metcalf MD [facility]IOV[facility]ULAR [facility]     . Goals      Identification of barriers to adherence to a plan of care such as inability to afford medications, lack of insurance, lack of transportation, etc. (pt-stated)            3/17/17- Family/patient verbalized understanding of plan of care, when to contact NN or provider, for assistance with providing resources. PK       Knowledge and adherence of prescribed medication (ie. action, side effects, missed dose, etc.).  Prevent complications post hospitalization. 3/39/17-will attend follow-ups as scheduled and notify providers if changes. PK       Supportive resources in place to maintain patient in the community (ie. Home Health, DME equipment, refer to, medication assistant plan, etc.)            3/29/17-patient/family verbalizes understanding of treatment plan and agrees to compliance. PK            Case management Plan:  NN will continue to attempt contacts with patient by telephone or during office visit . Will continue to follow as necessary. NN will reassess for case management needs on or about 4/5/17. Patient/family has this Nurse Navigators contact information for any further questions, concerns, or needs.     After review of chart and patients chronic conditions, this patient will benefit by CCM monitoring through communication and available resources to promote quality cost effective outcomes. .    A written copy of this care plan was mailed to the patient/family.

## 2017-03-29 NOTE — TELEPHONE ENCOUNTER
Sariah Pappas are his last few BP readings with dates and times; Extra Strength Tylenol 500 mg 1-2 every 6 hours as needed for his headache.  (Routing comment)

## 2017-03-29 NOTE — PATIENT INSTRUCTIONS
Parkinson's Disease: Care Instructions  Your Care Instructions  Parkinson's disease can cause tremors, stiffness, and problems with movement. Severe or advanced cases can also cause problems with thinking. In Parkinson's disease, part of the brain cannot make enough dopamine, a chemical that helps control movement. Taking your medicines correctly and getting regular exercise may help you maintain your quality of life. There are many things that can cause Parkinson's disease symptoms, including some medicine, some toxins, and trauma to the head. The cause in most cases is not known. Follow-up care is a key part of your treatment and safety. Be sure to make and go to all appointments, and call your doctor if you are having problems. Its also a good idea to know your test results and keep a list of the medicines you take. How can you care for yourself at home? General care  · Take your medicines exactly as prescribed. Call your doctor if you think you are having a problem with your medicine. · Make sure your home is safe:  ¨ Place furniture so that you have something to hold on to as you walk around the house. ¨ Use chairs that make it easier to sit down and stand up. ¨ Group the things you use most, such as reading glasses, keys, and the telephone, in one easy-to-reach place. ¨ Tack down rugs so that you do not trip. ¨ Put no-slip tape and handrails in the tub to prevent falls. · Use a cane, walker, or scooter if your doctor suggests it. · Keep up your normal activities as much as you can. · Find ways to manage stress, which can make symptoms worse. · Spend time with family and friends. Join a support group for people with Parkinson's disease if you want extra help. · Depression is common with this condition. Tell your doctor if you have trouble sleeping, are eating too much or are not hungry, or feel sad or tearful all the time. Depression can be treated with medicine and counseling.   Diet and exercise  · Eat a balanced diet. · If you are taking levodopa, do not eat protein at the same time you take your medicine. Levodopa may not work as well if you take it at the same time you eat protein. You can eat normal amounts of protein. Talk to your doctor if you have questions. · If you have problems swallowing, change how and what you eat:  ¨ Try thick drinks, such as milk shakes. They are easier to swallow than other fluids. ¨ Do not eat foods that crumble easily. These can cause choking. ¨ Use a  to prepare food. Soft foods need less chewing. ¨ Eat small meals often so that you do not get tired from eating heavy meals. · Drink plenty of water and eat a high-fiber diet to prevent constipation. Parkinson'sand the medicines that treat itmay slow your intestines. · Get exercise on most days. Work with your doctor to set up a program of walking, swimming, or other exercise you are able to do. When should you call for help? Call your doctor now or seek immediate medical care if:  · You have a change in your symptoms. · You develop other problems from your condition, such as:  ¨ Injury from a fall. ¨ Thinking or memory problems. ¨ A urinary tract infection (burning pain when urinating). Watch closely for changes in your health, and be sure to contact your doctor if:  · You lose weight because of problems with eating. · You want more information about your condition or your medicines. Where can you learn more? Go to http://rodo-lukas.info/. Enter U117 in the search box to learn more about \"Parkinson's Disease: Care Instructions. \"  Current as of: October 14, 2016  Content Version: 11.2  © 6924-5522 [employer]. Care instructions adapted under license by [employer] (which disclaims liability or warranty for this information).  If you have questions about a medical condition or this instruction, always ask your healthcare professional. Carly Calle, [facility] disclaims any warranty or liability for your use of this information. Learning About Depression Screening  What is depression screening? Depression screening is a way to see if you have depression symptoms. It may be done by a doctor or counselor. This screening is often part of a routine checkup. That's because your mental health is just as important as your physical health. Depression is a medical illness that affects how you feel, think, and act. You may:  · Have less energy. · Lose interest in your daily activities. · Feel sad and grouchy for a long time. Depression is very common. It affects men and women of all ages. Many things can trigger depression. Some people become depressed after they have a stroke or find out they have a major illness like cancer or heart disease. The death of a loved one, a breakup, or changes in the natural brain chemicals may lead to depression. It can run in families. Most experts believe that a combination of family history (a person's genes) and stressful life events can cause depression. What happens during screening? Your doctor may ask about your feelings, any changes in eating habits, your energy level, and your interest in your daily tasks. He or she may ask other questions, such as how well you are sleeping and if you can focus on the things you do. This may be an informal talk between the two of you. Or your doctor may ask you to fill out a quick form and then talk about your answers. Some diseases or changes in your body can cause symptoms that look like depression. So your doctor may do blood tests to help rule out other problems, such as hormone changes, a low thyroid level, or anemia. What happens after screening? If you have signs of depression, your doctor will talk to you about your options. Doctors usually treat depression with medicines or counseling.  Often, combining the two works best. Many people don't get help because they think that they'll get over the depression on their own. But people with depression may not get better unless they get treatment. Many people feel embarrassed or ashamed about having depression. But it isn't a sign of personal weakness. It's not a character flaw. A person who is depressed is not \"crazy. \" Depression is caused by changes in the brain. A serious symptom of depression is thinking about death or suicide. If you or someone you care about talks about this or about feeling hopeless, get help right away. It's important to know that depression can be treated. The first step toward feeling better is often just seeing that the problem exists. Where can you learn more? Go to http://rodo-lukas.info/. Enter T185 in the search box to learn more about \"Learning About Depression Screening. \"  Current as of: October 28, 2016  Content Version: 11.2  © 1758-4203 [employer], [employer]. Care instructions adapted under license by [employer] (which disclaims liability or warranty for this information). If you have questions about a medical condition or this instruction, always ask your healthcare professional. [place] [address] any warranty or liability for your use of this information.

## 2017-03-31 ENCOUNTER — HOSPITAL ENCOUNTER (EMERGENCY)
Age: 82
Discharge: NURSING, REHAB OR LONG-TERM CARE | End: 2017-03-31
Attending: EMERGENCY MEDICINE
Payer: MEDICARE

## 2017-03-31 ENCOUNTER — APPOINTMENT (OUTPATIENT)
Dept: CT IMAGING | Age: 82
End: 2017-03-31
Attending: EMERGENCY MEDICINE
Payer: MEDICARE

## 2017-03-31 VITALS
HEIGHT: 74 IN | DIASTOLIC BLOOD PRESSURE: 86 MMHG | OXYGEN SATURATION: 95 % | TEMPERATURE: 97.8 F | RESPIRATION RATE: 15 BRPM | SYSTOLIC BLOOD PRESSURE: 143 MMHG | HEART RATE: 66 BPM | WEIGHT: 205 LBS | BODY MASS INDEX: 26.31 KG/M2

## 2017-03-31 DIAGNOSIS — R10.32 ABDOMINAL PAIN, LLQ (LEFT LOWER QUADRANT): ICD-10-CM

## 2017-03-31 DIAGNOSIS — N30.00 ACUTE CYSTITIS WITHOUT HEMATURIA: Primary | ICD-10-CM

## 2017-03-31 LAB
ALBUMIN SERPL BCP-MCNC: 3.4 G/DL (ref 3.5–5)
ALBUMIN/GLOB SERPL: 0.9 {RATIO} (ref 1.1–2.2)
ALP SERPL-CCNC: 74 U/L (ref 45–117)
ALT SERPL-CCNC: 8 U/L (ref 12–78)
ANION GAP BLD CALC-SCNC: 8 MMOL/L (ref 5–15)
APPEARANCE UR: CLEAR
AST SERPL W P-5'-P-CCNC: 24 U/L (ref 15–37)
BACTERIA URNS QL MICRO: NEGATIVE /HPF
BASOPHILS # BLD AUTO: 0 K/UL (ref 0–0.1)
BASOPHILS # BLD: 0 % (ref 0–1)
BILIRUB SERPL-MCNC: 0.6 MG/DL (ref 0.2–1)
BILIRUB UR QL: NEGATIVE
BUN SERPL-MCNC: 22 MG/DL (ref 6–20)
BUN/CREAT SERPL: 24 (ref 12–20)
CALCIUM SERPL-MCNC: 8.9 MG/DL (ref 8.5–10.1)
CHLORIDE SERPL-SCNC: 101 MMOL/L (ref 97–108)
CO2 SERPL-SCNC: 29 MMOL/L (ref 21–32)
COLOR UR: ABNORMAL
CREAT SERPL-MCNC: 0.91 MG/DL (ref 0.7–1.3)
EOSINOPHIL # BLD: 0.4 K/UL (ref 0–0.4)
EOSINOPHIL NFR BLD: 7 % (ref 0–7)
EPITH CASTS URNS QL MICRO: ABNORMAL /LPF
ERYTHROCYTE [DISTWIDTH] IN BLOOD BY AUTOMATED COUNT: 14.2 % (ref 11.5–14.5)
GLOBULIN SER CALC-MCNC: 3.8 G/DL (ref 2–4)
GLUCOSE SERPL-MCNC: 97 MG/DL (ref 65–100)
GLUCOSE UR STRIP.AUTO-MCNC: NEGATIVE MG/DL
HCT VFR BLD AUTO: 37.4 % (ref 36.6–50.3)
HGB BLD-MCNC: 12.1 G/DL (ref 12.1–17)
HGB UR QL STRIP: NEGATIVE
HYALINE CASTS URNS QL MICRO: ABNORMAL /LPF (ref 0–5)
KETONES UR QL STRIP.AUTO: NEGATIVE MG/DL
LACTATE SERPL-SCNC: 0.6 MMOL/L (ref 0.4–2)
LEUKOCYTE ESTERASE UR QL STRIP.AUTO: ABNORMAL
LIPASE SERPL-CCNC: 285 U/L (ref 73–393)
LYMPHOCYTES # BLD AUTO: 27 % (ref 12–49)
LYMPHOCYTES # BLD: 1.5 K/UL (ref 0.8–3.5)
MCH RBC QN AUTO: 29.1 PG (ref 26–34)
MCHC RBC AUTO-ENTMCNC: 32.4 G/DL (ref 30–36.5)
MCV RBC AUTO: 89.9 FL (ref 80–99)
MONOCYTES # BLD: 0.5 K/UL (ref 0–1)
MONOCYTES NFR BLD AUTO: 9 % (ref 5–13)
NEUTS SEG # BLD: 3.2 K/UL (ref 1.8–8)
NEUTS SEG NFR BLD AUTO: 57 % (ref 32–75)
NITRITE UR QL STRIP.AUTO: NEGATIVE
PH UR STRIP: 6 [PH] (ref 5–8)
PLATELET # BLD AUTO: 255 K/UL (ref 150–400)
POTASSIUM SERPL-SCNC: 4.4 MMOL/L (ref 3.5–5.1)
PROT SERPL-MCNC: 7.2 G/DL (ref 6.4–8.2)
PROT UR STRIP-MCNC: NEGATIVE MG/DL
RBC # BLD AUTO: 4.16 M/UL (ref 4.1–5.7)
RBC #/AREA URNS HPF: ABNORMAL /HPF (ref 0–5)
SODIUM SERPL-SCNC: 138 MMOL/L (ref 136–145)
SP GR UR REFRACTOMETRY: 1.01 (ref 1–1.03)
UROBILINOGEN UR QL STRIP.AUTO: 1 EU/DL (ref 0.2–1)
WBC # BLD AUTO: 5.6 K/UL (ref 4.1–11.1)
WBC URNS QL MICRO: ABNORMAL /HPF (ref 0–4)

## 2017-03-31 PROCEDURE — 74011000258 HC RX REV CODE- 258: Performed by: EMERGENCY MEDICINE

## 2017-03-31 PROCEDURE — 99285 EMERGENCY DEPT VISIT HI MDM: CPT

## 2017-03-31 PROCEDURE — 74011250637 HC RX REV CODE- 250/637: Performed by: EMERGENCY MEDICINE

## 2017-03-31 PROCEDURE — 81001 URINALYSIS AUTO W/SCOPE: CPT | Performed by: EMERGENCY MEDICINE

## 2017-03-31 PROCEDURE — 80053 COMPREHEN METABOLIC PANEL: CPT | Performed by: EMERGENCY MEDICINE

## 2017-03-31 PROCEDURE — 36415 COLL VENOUS BLD VENIPUNCTURE: CPT | Performed by: EMERGENCY MEDICINE

## 2017-03-31 PROCEDURE — 85025 COMPLETE CBC W/AUTO DIFF WBC: CPT | Performed by: EMERGENCY MEDICINE

## 2017-03-31 PROCEDURE — 83690 ASSAY OF LIPASE: CPT | Performed by: EMERGENCY MEDICINE

## 2017-03-31 PROCEDURE — 87086 URINE CULTURE/COLONY COUNT: CPT | Performed by: EMERGENCY MEDICINE

## 2017-03-31 PROCEDURE — 74011636320 HC RX REV CODE- 636/320: Performed by: EMERGENCY MEDICINE

## 2017-03-31 PROCEDURE — 83605 ASSAY OF LACTIC ACID: CPT | Performed by: EMERGENCY MEDICINE

## 2017-03-31 PROCEDURE — 74177 CT ABD & PELVIS W/CONTRAST: CPT

## 2017-03-31 RX ORDER — LEVOFLOXACIN 750 MG/1
750 TABLET ORAL DAILY
Qty: 5 TAB | Refills: 0 | Status: SHIPPED | OUTPATIENT
Start: 2017-03-31 | End: 2017-04-05

## 2017-03-31 RX ORDER — SODIUM CHLORIDE 0.9 % (FLUSH) 0.9 %
10 SYRINGE (ML) INJECTION
Status: COMPLETED | OUTPATIENT
Start: 2017-03-31 | End: 2017-03-31

## 2017-03-31 RX ORDER — LEVOFLOXACIN 500 MG/1
500 TABLET, FILM COATED ORAL
Status: COMPLETED | OUTPATIENT
Start: 2017-03-31 | End: 2017-03-31

## 2017-03-31 RX ADMIN — LEVOFLOXACIN 500 MG: 500 TABLET, FILM COATED ORAL at 03:51

## 2017-03-31 RX ADMIN — IOPAMIDOL 100 ML: 755 INJECTION, SOLUTION INTRAVENOUS at 02:02

## 2017-03-31 RX ADMIN — Medication 10 ML: at 02:02

## 2017-03-31 RX ADMIN — SODIUM CHLORIDE 100 ML: 900 INJECTION, SOLUTION INTRAVENOUS at 02:02

## 2017-03-31 NOTE — ED NOTES
Multiple attempts have been made to contact receiving facility. RN will continue to attempt calling report to transfer care.

## 2017-03-31 NOTE — DISCHARGE INSTRUCTIONS
We hope that we have addressed all of your medical concerns. The examination and treatment you received in the Emergency Department were for an emergent problem and were not intended as complete care. It is important that you follow up with your healthcare provider(s) for ongoing care. If your symptoms worsen or do not improve as expected, and you are unable to reach your usual health care provider(s), you should return to the Emergency Department. Today's healthcare is undergoing tremendous change, and patient satisfaction surveys are one of the many tools to assess the quality of medical care. You may receive a survey from the [employer] regarding your experience in the Emergency Department. I hope that your experience has been completely positive, particularly the medical care that I provided. As such, please participate in the survey; anything less than excellent does not meet my expectations or intentions. [facility] and 8 [facility] participate in nationally recognized quality of care measures. If your blood pressure is greater than 120/80, as reported below, we urge that you seek medical care to address the potential of high blood pressure, commonly known as hypertension. Hypertension can be hereditary or can be caused by certain medical conditions, pain, stress, or \"white coat syndrome. \"       Please make an appointment with your health care provider(s) for follow up of your Emergency Department visit.        VITALS:   Patient Vitals for the past 8 hrs:   Temp Pulse Resp BP SpO2   03/31/17 0345 - - - 116/75 94 %   03/31/17 0330 - - - 116/71 94 %   03/31/17 0315 - - - 104/90 94 %   03/31/17 0300 - - - 154/81 94 %   03/31/17 0245 - - - (!) 144/95 94 %   03/31/17 0130 - - - 129/85 94 %   03/31/17 0115 - - - 119/76 92 %   03/31/17 0100 - - - 113/84 94 %   03/31/17 0045 - - - 105/74 93 %   03/31/17 0030 - - - 117/87 94 %   03/31/17 0013 97.6 °F (36.4 °C) 66 15 126/68 96 %          Thank you for allowing us to provide you with medical care today. We realize that you have many choices for your emergency care needs. Please choose us in the future for any continued health care needs. Falguni Newsome Gaurang, [address] [place], [place] [address].   Office: [phone]            Recent Results (from the past 24 hour(s))   CBC WITH AUTOMATED DIFF    Collection Time: 03/31/17 12:25 AM   Result Value Ref Range    WBC 5.6 4.1 - 11.1 K/uL    RBC 4.16 4.10 - 5.70 M/uL    HGB 12.1 12.1 - 17.0 g/dL    HCT 37.4 36.6 - 50.3 %    MCV 89.9 80.0 - 99.0 FL    MCH 29.1 26.0 - 34.0 PG    MCHC 32.4 30.0 - 36.5 g/dL    RDW 14.2 11.5 - 14.5 %    PLATELET 579 239 - 031 K/uL    NEUTROPHILS 57 32 - 75 %    LYMPHOCYTES 27 12 - 49 %    MONOCYTES 9 5 - 13 %    EOSINOPHILS 7 0 - 7 %    BASOPHILS 0 0 - 1 %    ABS. NEUTROPHILS 3.2 1.8 - 8.0 K/UL    ABS. LYMPHOCYTES 1.5 0.8 - 3.5 K/UL    ABS. MONOCYTES 0.5 0.0 - 1.0 K/UL    ABS. EOSINOPHILS 0.4 0.0 - 0.4 K/UL    ABS. BASOPHILS 0.0 0.0 - 0.1 K/UL   METABOLIC PANEL, COMPREHENSIVE    Collection Time: 03/31/17 12:25 AM   Result Value Ref Range    Sodium 138 136 - 145 mmol/L    Potassium 4.4 3.5 - 5.1 mmol/L    Chloride 101 97 - 108 mmol/L    CO2 29 21 - 32 mmol/L    Anion gap 8 5 - 15 mmol/L    Glucose 97 65 - 100 mg/dL    BUN 22 (H) 6 - 20 MG/DL    Creatinine 0.91 0.70 - 1.30 MG/DL    BUN/Creatinine ratio 24 (H) 12 - 20      GFR est AA >60 >60 ml/min/1.73m2    GFR est non-AA >60 >60 ml/min/1.73m2    Calcium 8.9 8.5 - 10.1 MG/DL    Bilirubin, total 0.6 0.2 - 1.0 MG/DL    ALT (SGPT) 8 (L) 12 - 78 U/L    AST (SGOT) 24 15 - 37 U/L    Alk.  phosphatase 74 45 - 117 U/L    Protein, total 7.2 6.4 - 8.2 g/dL    Albumin 3.4 (L) 3.5 - 5.0 g/dL    Globulin 3.8 2.0 - 4.0 g/dL    A-G Ratio 0.9 (L) 1.1 - 2.2     LIPASE    Collection Time: 03/31/17 12:25 AM   Result Value Ref Range    Lipase 285 73 - 393 U/L   LACTIC ACID, PLASMA    Collection Time: 03/31/17 12:37 AM   Result Value Ref Range    Lactic acid 0.6 0.4 - 2.0 MMOL/L   URINALYSIS W/ RFLX MICROSCOPIC    Collection Time: 03/31/17  1:11 AM   Result Value Ref Range    Color YELLOW/STRAW      Appearance CLEAR CLEAR      Specific gravity 1.013 1.003 - 1.030      pH (UA) 6.0 5.0 - 8.0      Protein NEGATIVE  NEG mg/dL    Glucose NEGATIVE  NEG mg/dL    Ketone NEGATIVE  NEG mg/dL    Bilirubin NEGATIVE  NEG      Blood NEGATIVE  NEG      Urobilinogen 1.0 0.2 - 1.0 EU/dL    Nitrites NEGATIVE  NEG      Leukocyte Esterase MODERATE (A) NEG      WBC 20-50 0 - 4 /hpf    RBC 0-5 0 - 5 /hpf    Epithelial cells FEW FEW /lpf    Bacteria NEGATIVE  NEG /hpf    Hyaline cast 0-2 0 - 5 /lpf       Ct Abd Pelv W Cont    Result Date: 3/31/2017  INDICATION: L sided abd pain, hx aneurysm  left lower abdominal pain for about an hour. COMPARISON: 7/25/2016 TECHNIQUE: Following the uneventful intravenous administration of 100 cc Isovue-370, thin axial images were obtained through the abdomen and pelvis. Coronal and sagittal reconstructions were generated. Oral contrast was not administered. CT dose reduction was achieved through use of a standardized protocol tailored for this examination and automatic exposure control for dose modulation. FINDINGS: LUNG BASES: There is a moderately thick walled bullous lesion in the base of the right lower lobe posteriorly which is similar in appearance to a previous study of 6/10/2016. This does not represent a new acute finding. Ratna Scherer LIVER: No mass or biliary dilatation. GALLBLADDER: Unremarkable. SPLEEN: No mass. PANCREAS: No mass or ductal dilatation. ADRENALS: Unremarkable. KIDNEYS: No mass, calculus, or hydronephrosis. GI: Although there is gaseous distention of the long gated loop of sigmoid colon, there is no obstruction or definite evidence of volvulus. The appearance is similar to the previous exam. There is no inflammatory change of the bowel.  APPENDIX: Not identified PERITONEUM: No ascites or pneumoperitoneum. RETROPERITONEUM: No adenopathy is identified. The abdominal aorta demonstrates ectasia distally similar to the previous exam. 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 It is mostly filled with bronchus. The size measurements are 4.3 x 4.2 cm. This is similar to the previous exam. There is no evidence of leak. URINARY BLADDER: No mass or calculus. PELVIS: There is no adenopathy. Prostatic enlargement is seen. There is a small right inguinal hernia containing fat only. BONES: Degenerative changes of the lumbar spine similar to the previous examination noted. ADDITIONAL COMMENTS: N/A     IMPRESSION: 1. No significant change in the size of the left iliac artery aneurysm. There is no evidence of leak. 2. Elongated gaseous distended sigmoid colon without obstruction or volvulus and similar to the previous exam. 3. Prostatic enlargement             Abdominal Pain: Care Instructions  Your Care Instructions    Abdominal pain has many possible causes. Some aren't serious and get better on their own in a few days. Others need more testing and treatment. If your pain continues or gets worse, you need to be rechecked and may need more tests to find out what is wrong. You may need surgery to correct the problem. Don't ignore new symptoms, such as fever, nausea and vomiting, urination problems, pain that gets worse, and dizziness. These may be signs of a more serious problem. Your doctor may have recommended a follow-up visit in the next 8 to 12 hours. If you are not getting better, you may need more tests or treatment. The doctor has checked you carefully, but problems can develop later. If you notice any problems or new symptoms, get medical treatment right away. Follow-up care is a key part of your treatment and safety. Be sure to make and go to all appointments, and call your doctor if you are having problems.  It's also a good idea to know your test results and keep a list of the medicines you take. How can you care for yourself at home? · Rest until you feel better. · To prevent dehydration, drink plenty of fluids, enough so that your urine is light yellow or clear like water. Choose water and other caffeine-free clear liquids until you feel better. If you have kidney, heart, or liver disease and have to limit fluids, talk with your doctor before you increase the amount of fluids you drink. · If your stomach is upset, eat mild foods, such as rice, dry toast or crackers, bananas, and applesauce. Try eating several small meals instead of two or three large ones. · Wait until 48 hours after all symptoms have gone away before you have spicy foods, alcohol, and drinks that contain caffeine. · Do not eat foods that are high in fat. · Avoid anti-inflammatory medicines such as aspirin, ibuprofen (Advil, Motrin), and naproxen (Aleve). These can cause stomach upset. Talk to your doctor if you take daily aspirin for another health problem. When should you call for help? Call 911 anytime you think you may need emergency care. For example, call if:  · You passed out (lost consciousness). · You pass maroon or very bloody stools. · You vomit blood or what looks like coffee grounds. · You have new, severe belly pain. Call your doctor now or seek immediate medical care if:  · Your pain gets worse, especially if it becomes focused in one area of your belly. · You have a new or higher fever. · Your stools are black and look like tar, or they have streaks of blood. · You have unexpected vaginal bleeding. · You have symptoms of a urinary tract infection. These may include:  ¨ Pain when you urinate. ¨ Urinating more often than usual.  ¨ Blood in your urine. · You are dizzy or lightheaded, or you feel like you may faint. Watch closely for changes in your health, and be sure to contact your doctor if:  · You are not getting better after 1 day (24 hours).   Where can you learn more?  Go to http://rodo-lukas.info/. Enter F660 in the search box to learn more about \"Abdominal Pain: Care Instructions. \"  Current as of: May 27, 2016  Content Version: 11.2  © 7386-2337 [employer]. Care instructions adapted under license by [employer] (which disclaims liability or warranty for this information). If you have questions about a medical condition or this instruction, always ask your healthcare professional. [address] any warranty or liability for your use of this information. Urinary Tract Infections in Men: Care Instructions  Your Care Instructions    A urinary tract infection, or UTI, is a general term for an infection anywhere between the kidneys and the tip of the penis. UTIs can also be a result of a prostate problem. Most cause pain or burning when you urinate. Most UTIs are caused by bacteria and can be cured with antibiotics. It is important to complete your treatment so that the infection does not get worse. Follow-up care is a key part of your treatment and safety. Be sure to make and go to all appointments, and call your doctor if you are having problems. It's also a good idea to know your test results and keep a list of the medicines you take. How can you care for yourself at home? · Take your antibiotics as prescribed. Do not stop taking them just because you feel better. You need to take the full course of antibiotics. · Take your medicines exactly as prescribed. Your doctor may have prescribed a medicine, such as phenazopyridine (Pyridium), to help relieve pain when you urinate. This turns your urine orange. You may stop taking it when your symptoms get better. But be sure to take all of your antibiotics, which treat the infection. · Drink extra water for the next day or two. This will help make the urine less concentrated and help wash out the bacteria causing the infection.  (If you have kidney, heart, or liver disease and have to limit your fluids, talk with your doctor before you increase your fluid intake.)  · Avoid drinks that are carbonated or have caffeine. They can irritate the bladder. · Urinate often. Try to empty your bladder each time. · To relieve pain, take a hot bath or lay a heating pad (set on low) over your lower belly or genital area. Never go to sleep with a heating pad in place. To help prevent UTIs  · Drink plenty of fluids, enough so that your urine is light yellow or clear like water. If you have kidney, heart, or liver disease and have to limit fluids, talk with your doctor before you increase the amount of fluids you drink. · Urinate when you have the urge. Do not hold your urine for a long time. Urinate before you go to sleep. · Keep your penis clean. Catheter care  If you have a drainage tube (catheter) in place, the following steps will help you care for it. · Always wash your hands before and after touching your catheter. · Check the area around the urethra for inflammation or signs of infection. Signs of infection include irritated, swollen, red, or tender skin, or pus around the catheter. · Clean the area around the catheter with soap and water two times a day. Dry with a clean towel afterward. · Do not apply powder or lotion to the skin around the catheter. To empty the urine collection bag  · Wash your hands with soap and water. · Without touching the drain spout, remove the spout from its sleeve at the bottom of the collection bag. Open the valve on the spout. · Let the urine flow out of the bag and into the toilet or a container. Do not let the tubing or drain spout touch anything. · After you empty the bag, clean the end of the drain spout with tissue and water. Close the valve and put the drain spout back into its sleeve at the bottom of the collection bag. · Wash your hands with soap and water. When should you call for help?   Call your doctor now or seek immediate medical care if:  · Symptoms such as a fever, chills, nausea, or vomiting get worse or happen for the first time. · You have new pain in your back just below your rib cage. This is called flank pain. · There is new blood or pus in your urine. · You are not able to take or keep down your antibiotics. Watch closely for changes in your health, and be sure to contact your doctor if:  · You are not getting better after taking an antibiotic for 2 days. · Your symptoms go away but then come back. Where can you learn more? Go to http://rodo-lukas.info/. Enter O854 in the search box to learn more about \"Urinary Tract Infections in Men: Care Instructions. \"  Current as of: November 28, 2016  Content Version: 11.2  © 9709-9265 [employer]. Care instructions adapted under license by [employer] (which disclaims liability or warranty for this information). If you have questions about a medical condition or this instruction, always ask your healthcare professional. [place] [address] any warranty or liability for your use of this information. [employer] Activation    Thank you for requesting access to [employer]. Please follow the instructions below to securely access and download your online medical record. [employer] allows you to send messages to your doctor, view your test results, renew your prescriptions, schedule appointments, and more. How Do I Sign Up? 1. In your internet browser, go to [email or web address]  2. Click on the First Time User? Click Here link in the Sign In box. You will be redirect to the New Member Sign Up page. 3. Enter your [employer] Access Code exactly as it appears below. You will not need to use this code after youve completed the sign-up process. If you do not sign up before the expiration date, you must request a new code. [employer] Access Code:  Activation code not generated  Current [employer] Status: Active (This is the date your GoN[employer] access code will )    4. Enter the last four digits of your Social Security Number (xxxx) and Date of Birth (mm/dd/yyyy) as indicated and click Submit. You will be taken to the next sign-up page. 5. Create a [employer] Inct ID. This will be your GoNabit login ID and cannot be changed, so think of one that is secure and easy to remember. 6. Create a GoNabit password. You can change your password at any time. 7. Enter your Password Reset Question and Answer. This can be used at a later time if you forget your password. 8. Enter your e-mail address. You will receive e-mail notification when new information is available in [address] 9. Click Sign Up. You can now view and download portions of your medical record. 10. Click the Download Summary menu link to download a portable copy of your medical information. Additional Information    If you have questions, please visit the Frequently Asked Questions section of the Go[employer] website at [email or web address] Remember, GoNabit is NOT to be used for urgent needs. For medical emergencies, dial 911.

## 2017-03-31 NOTE — ED NOTES
TRANSFER - OUT REPORT:    Verbal report given to nurse on Renita Garza  being transferred to [facility] (unit) for routine progression of care       Report consisted of patients Situation, Background, Assessment and   Recommendations(SBAR). Information from the following report(s) ED Summary was reviewed with the receiving nurse. Lines:       Opportunity for questions and clarification was provided.       Patient transported with:  SARA

## 2017-03-31 NOTE — ED PROVIDER NOTES
HPI Comments: 80 y.o. male with extensive past medical history, please see list, significant for hyperlipidemia, DU, DDD, DJD, pacemaker, premature atrial beats, parkinson disease, HTN, PAT, and vitrectomy who presents from [facility] with chief complaint of abdominal pain. Pt complains of sharp LLQ abdominal pain that started at 11AM yesterday. Pt also complains of \"a little\" dysuria and a headache currently. Pt states that his last bowel movement was yesterday afternoon. Pt had his battery replaced for his pacemaker recently. Pt denies any history of kidney stones. There are no other acute medical concerns at this time. He declines any pain meds at this time. PCP: Malorie Talamantes MD    Note written by Husam Das, as dictated by Bob Robledo MD 1:05 AM        The history is provided by the patient. No  was used.         Past Medical History:   Diagnosis Date    Blurry vision 1/19/2012    BPH (benign prostatic hyperplasia)     DDD (degenerative disc disease), lumbar 2/19/2010    DJD (degenerative joint disease) of cervical spine 2/19/2010    DU (duodenal ulcer) 9/1/1990    Hyperlipidemia     Hypertension     Other and unspecified hyperlipidemia 2/19/2010    Pacemaker     Paralysis agitans ([address] 2/19/2010    Parkinson disease ([address]     PAT [facility]oxysmal atrial tachycardia) [facility])     Premature atrial beats     Reflux esophagitis 2/19/2010    Sick sinus syndrome [facility])        Past Surgical History:   Procedure Laterality Date    HX CATARACT REMOVAL  6/2012 and 7/2012    Both eyes    HX ORTHOPAEDIC  04/2015    tendon repair both knees    HX PACEMAKER  2008    bradycardia    HX VITRECTOMY  11/2011    KS REMVL PERM PM PLS GEN W/REPL PLSE GEN 2 LEAD SYS  3/13/2017              Family History:   Problem Relation Age of Onset    Asthma Mother     Heart Disease Father        Social History     Social History    Marital status:      Spouse name: N/A   Marcos Chakraborty Number of children: N/A    Years of education: N/A     Occupational History    Not on file. Social History Main Topics    Smoking status: Former Smoker     Types: Pipe     Quit date: 7/14/1945    Smokeless tobacco: Never Used    Alcohol use 2.0 oz/week     4 Cans of beer per week      Comment: glass of wine every now and then    Drug use: No    Sexual activity: Not on file     Other Topics Concern     Service Yes    Blood Transfusions No    Caffeine Concern No    Occupational Exposure No    Hobby Hazards No    Sleep Concern No    Stress Concern No    Weight Concern No    Special Diet No    Back Care No    Exercise No    Bike Helmet No    Seat Belt Yes    Self-Exams No     Social History Narrative         ALLERGIES: Review of patient's allergies indicates no known allergies. Review of Systems   Constitutional: Negative for appetite change and fever. HENT: Negative for congestion, nosebleeds and sore throat. Eyes: Negative for discharge. Respiratory: Negative for cough and shortness of breath. Cardiovascular: Negative for chest pain. Gastrointestinal: Positive for abdominal pain. Negative for diarrhea, nausea and vomiting. Genitourinary: Positive for dysuria. Musculoskeletal: Negative. Skin: Negative for rash. Neurological: Positive for headaches. Negative for weakness. Hematological: Negative for adenopathy. Psychiatric/Behavioral: Negative. All other systems reviewed and are negative. Vitals:    03/31/17 0013   BP: 126/68   Pulse: 66   Resp: 15   Temp: 97.6 °F (36.4 °C)   SpO2: 96%   Weight: 93 kg (205 lb)   Height: 6' 2\" (1.88 m)            Physical Exam   Constitutional: He is oriented to person, place, and time. He appears well-developed and well-nourished. HENT:   Head: Normocephalic and atraumatic. Mouth/Throat: Oropharynx is clear and moist.   Eyes: Conjunctivae are normal.   Neck: Normal range of motion. Neck supple.    Cardiovascular: Normal rate, regular rhythm and normal heart sounds. Pulmonary/Chest: Effort normal and breath sounds normal.   Abdominal: Soft. Bowel sounds are normal. There is tenderness (mild LLQ tenderness). Musculoskeletal: Normal range of motion. He exhibits no edema or tenderness. Neurological: He is alert and oriented to person, place, and time. Skin: Skin is warm and dry. Nearly healed incision in left chest from pacemaker surgery. Psychiatric: He has a normal mood and affect. His behavior is normal.   Nursing note and vitals reviewed. Note written by Husam Enriquez, as dictated by Ronni Tracey MD 1:05 AM        [facility]  ED Course       Procedures      A/P:  1. UTI - previous culture sensitive to Levofloxacin. Will treat with levofloxacin. 2. LLQ abd pain - improved. 3. HA - improved. Patient's results have been reviewed with them. Patient and/or family have verbally conveyed their understanding and agreement of the patient's signs, symptoms, diagnosis, treatment and prognosis and additionally agree to follow up as recommended or return to the Emergency Room should their condition change prior to follow-up. Discharge instructions have also been provided to the patient with some educational information regarding their diagnosis as well a list of reasons why they would want to return to the ER prior to their follow-up appointment should their condition change.

## 2017-03-31 NOTE — ED NOTES
SARA called for transportation back to [facility]. A one hour ETA was provided.

## 2017-03-31 NOTE — ED TRIAGE NOTES
Patient arrived via EMS from [facility] reporting left lower abdominal pain for about an hour. Patient reports pain intensifies and weakens. Upon arrival his pain is 2/10. Patient reports a normal bowel movement today.

## 2017-03-31 NOTE — ED NOTES
Many attempts have been made to reach staff at [facility]. RN will continue to attempt to contact staff.

## 2017-04-01 LAB
BACTERIA SPEC CULT: NORMAL
CC UR VC: NORMAL
SERVICE CMNT-IMP: NORMAL

## 2017-04-03 ENCOUNTER — PATIENT OUTREACH (OUTPATIENT)
Dept: FAMILY MEDICINE CLINIC | Age: 82
End: 2017-04-03

## 2017-04-03 NOTE — TELEPHONE ENCOUNTER
Went to the ER 3/31 with complaint of .abd pain and almost all BP readings- about 10 total, were normal.

## 2017-04-03 NOTE — PROGRESS NOTES
NNTO-ED    Patient listed on discharge (MSSP) report dated 4/2/17. Patient discharged from [facility] on 3/31/17 for abdominal pain. Attempted to contact patient to perform post ED/UC discharge assessment. Unable to reach patient. This writer left message on voicemail with direct contact information. Will attempt to contact again. Need to complete post-discharge assessment.  Patient has the following appointment(s) scheduled: Future Appointments  Date Time Provider Clementine Roca   4/28/2017 2:00 PM Harley Anderson MD PA[facility] ALEKS SCHED   7/3/2017 10:45 AM REMOTE1, [address]   10/9/2017 10:00 AM REMOTE1, TIFFANI REYNOLDS ALEKS SCHED   1/15/2018 9:45 AM REMOTE1, TIFFANI REYNOLDS ALEKS SCHED   4/23/2018 1:45 PM PACEMAKER3, [address]   4/23/2018 2:00 PM Pipe Lopez  [address]

## 2017-04-10 ENCOUNTER — PATIENT OUTREACH (OUTPATIENT)
Dept: FAMILY MEDICINE CLINIC | Age: 82
End: 2017-04-10

## 2017-04-10 NOTE — PROGRESS NOTES
NNCCM Note for:  Jair Carlos, 80 y.o., 05/18/1933    Patient had recent Ed visit on 3/31/17 For:   Acute cystitis without hematuria    Abdominal pain, LLQ (left lower quadrant)      Patient has follow up scheduled visit with PCP on 4/28/17 with Dr. Sonya North. Patient is listed on the MSSP list as a patient with a Risk Score of 4, and has 2 or more chronic conditions that qualifies him for Complex Case Management. NN will continue to observe and reach out to patient for needs. Patient has NN contact information. Patient is an  80 y.o. male with extensive past medical history, please see list, significant for hyperlipidemia, DU, DDD, DJD, pacemaker, premature atrial beats, parkinson disease, HTN, PAT, and vitrectomy. Presented from [facility] where he is presently a resident.     Future Appointments  Date Time Provider Clementine Roca   4/28/2017 2:00 PM Vega Garcia MD PA[facility] ALEKS SCHED   7/3/2017 10:45 AM REMOTE1, [address]   10/9/2017 10:00 AM Jessica Gonzalez ALEKS SCHED   1/15/2018 9:45 AM REMOTE1, TIFFANI REYNOLDS ALEKS SCHED   4/23/2018 1:45 PM [email or web address], [address]   4/23/2018 2:00 PM Nita Gooden  [address]

## 2017-04-11 ENCOUNTER — TELEPHONE (OUTPATIENT)
Dept: FAMILY MEDICINE CLINIC | Age: 82
End: 2017-04-11

## 2017-04-14 ENCOUNTER — TELEPHONE (OUTPATIENT)
Dept: FAMILY MEDICINE CLINIC | Age: 82
End: 2017-04-14

## 2017-04-19 ENCOUNTER — APPOINTMENT (OUTPATIENT)
Dept: CT IMAGING | Age: 82
End: 2017-04-19
Attending: EMERGENCY MEDICINE
Payer: MEDICARE

## 2017-04-19 ENCOUNTER — HOSPITAL ENCOUNTER (EMERGENCY)
Age: 82
Discharge: HOME | End: 2017-04-19
Attending: EMERGENCY MEDICINE
Payer: MEDICARE

## 2017-04-19 VITALS
HEIGHT: 74 IN | HEART RATE: 91 BPM | DIASTOLIC BLOOD PRESSURE: 100 MMHG | TEMPERATURE: 98 F | SYSTOLIC BLOOD PRESSURE: 182 MMHG | OXYGEN SATURATION: 93 % | BODY MASS INDEX: 26.31 KG/M2 | RESPIRATION RATE: 16 BRPM | WEIGHT: 205 LBS

## 2017-04-19 DIAGNOSIS — R10.32 ABDOMINAL PAIN, LLQ (LEFT LOWER QUADRANT): Primary | ICD-10-CM

## 2017-04-19 DIAGNOSIS — I72.3 ILIAC ARTERY ANEURYSM (HCC): ICD-10-CM

## 2017-04-19 DIAGNOSIS — N30.00 ACUTE CYSTITIS WITHOUT HEMATURIA: ICD-10-CM

## 2017-04-19 LAB
ALBUMIN SERPL BCP-MCNC: 3.5 G/DL (ref 3.5–5)
ALBUMIN/GLOB SERPL: 0.9 {RATIO} (ref 1.1–2.2)
ALP SERPL-CCNC: 80 U/L (ref 45–117)
ALT SERPL-CCNC: 9 U/L (ref 12–78)
ANION GAP BLD CALC-SCNC: 5 MMOL/L (ref 5–15)
APPEARANCE UR: ABNORMAL
AST SERPL W P-5'-P-CCNC: 15 U/L (ref 15–37)
BACTERIA URNS QL MICRO: ABNORMAL /HPF
BASOPHILS # BLD AUTO: 0 K/UL (ref 0–0.1)
BASOPHILS # BLD: 0 % (ref 0–1)
BILIRUB SERPL-MCNC: 0.5 MG/DL (ref 0.2–1)
BILIRUB UR QL: NEGATIVE
BUN SERPL-MCNC: 19 MG/DL (ref 6–20)
BUN/CREAT SERPL: 19 (ref 12–20)
CALCIUM SERPL-MCNC: 8.3 MG/DL (ref 8.5–10.1)
CHLORIDE SERPL-SCNC: 100 MMOL/L (ref 97–108)
CO2 SERPL-SCNC: 32 MMOL/L (ref 21–32)
COLOR UR: ABNORMAL
CREAT SERPL-MCNC: 0.98 MG/DL (ref 0.7–1.3)
EOSINOPHIL # BLD: 0.4 K/UL (ref 0–0.4)
EOSINOPHIL NFR BLD: 4 % (ref 0–7)
EPITH CASTS URNS QL MICRO: ABNORMAL /LPF
ERYTHROCYTE [DISTWIDTH] IN BLOOD BY AUTOMATED COUNT: 15 % (ref 11.5–14.5)
GLOBULIN SER CALC-MCNC: 4 G/DL (ref 2–4)
GLUCOSE SERPL-MCNC: 102 MG/DL (ref 65–100)
GLUCOSE UR STRIP.AUTO-MCNC: NEGATIVE MG/DL
HCT VFR BLD AUTO: 37.3 % (ref 36.6–50.3)
HGB BLD-MCNC: 11.9 G/DL (ref 12.1–17)
HGB UR QL STRIP: ABNORMAL
KETONES UR QL STRIP.AUTO: NEGATIVE MG/DL
LEUKOCYTE ESTERASE UR QL STRIP.AUTO: ABNORMAL
LYMPHOCYTES # BLD AUTO: 18 % (ref 12–49)
LYMPHOCYTES # BLD: 1.5 K/UL (ref 0.8–3.5)
MCH RBC QN AUTO: 28.7 PG (ref 26–34)
MCHC RBC AUTO-ENTMCNC: 31.9 G/DL (ref 30–36.5)
MCV RBC AUTO: 90.1 FL (ref 80–99)
MONOCYTES # BLD: 0.5 K/UL (ref 0–1)
MONOCYTES NFR BLD AUTO: 6 % (ref 5–13)
NEUTS SEG # BLD: 6.2 K/UL (ref 1.8–8)
NEUTS SEG NFR BLD AUTO: 72 % (ref 32–75)
NITRITE UR QL STRIP.AUTO: POSITIVE
PH UR STRIP: 5.5 [PH] (ref 5–8)
PLATELET # BLD AUTO: 265 K/UL (ref 150–400)
POTASSIUM SERPL-SCNC: 3.9 MMOL/L (ref 3.5–5.1)
PROT SERPL-MCNC: 7.5 G/DL (ref 6.4–8.2)
PROT UR STRIP-MCNC: 30 MG/DL
RBC # BLD AUTO: 4.14 M/UL (ref 4.1–5.7)
RBC #/AREA URNS HPF: ABNORMAL /HPF (ref 0–5)
SODIUM SERPL-SCNC: 137 MMOL/L (ref 136–145)
SP GR UR REFRACTOMETRY: 1.02 (ref 1–1.03)
UROBILINOGEN UR QL STRIP.AUTO: 0.2 EU/DL (ref 0.2–1)
WBC # BLD AUTO: 8.6 K/UL (ref 4.1–11.1)
WBC URNS QL MICRO: >100 /HPF (ref 0–4)

## 2017-04-19 PROCEDURE — 99284 EMERGENCY DEPT VISIT MOD MDM: CPT

## 2017-04-19 PROCEDURE — 81001 URINALYSIS AUTO W/SCOPE: CPT | Performed by: EMERGENCY MEDICINE

## 2017-04-19 PROCEDURE — 87186 SC STD MICRODIL/AGAR DIL: CPT | Performed by: EMERGENCY MEDICINE

## 2017-04-19 PROCEDURE — 74174 CTA ABD&PLVS W/CONTRAST: CPT

## 2017-04-19 PROCEDURE — 74011636320 HC RX REV CODE- 636/320: Performed by: EMERGENCY MEDICINE

## 2017-04-19 PROCEDURE — 36415 COLL VENOUS BLD VENIPUNCTURE: CPT | Performed by: EMERGENCY MEDICINE

## 2017-04-19 PROCEDURE — 87077 CULTURE AEROBIC IDENTIFY: CPT | Performed by: EMERGENCY MEDICINE

## 2017-04-19 PROCEDURE — 85025 COMPLETE CBC W/AUTO DIFF WBC: CPT | Performed by: EMERGENCY MEDICINE

## 2017-04-19 PROCEDURE — 80053 COMPREHEN METABOLIC PANEL: CPT | Performed by: EMERGENCY MEDICINE

## 2017-04-19 PROCEDURE — 87086 URINE CULTURE/COLONY COUNT: CPT | Performed by: EMERGENCY MEDICINE

## 2017-04-19 PROCEDURE — 74011250637 HC RX REV CODE- 250/637: Performed by: EMERGENCY MEDICINE

## 2017-04-19 PROCEDURE — 74011000258 HC RX REV CODE- 258: Performed by: EMERGENCY MEDICINE

## 2017-04-19 RX ORDER — CEFDINIR 300 MG/1
300 CAPSULE ORAL
Status: COMPLETED | OUTPATIENT
Start: 2017-04-19 | End: 2017-04-19

## 2017-04-19 RX ORDER — SODIUM CHLORIDE 0.9 % (FLUSH) 0.9 %
10 SYRINGE (ML) INJECTION
Status: COMPLETED | OUTPATIENT
Start: 2017-04-19 | End: 2017-04-19

## 2017-04-19 RX ORDER — CEPHALEXIN 500 MG/1
500 CAPSULE ORAL 4 TIMES DAILY
Qty: 28 CAP | Refills: 0 | Status: SHIPPED | OUTPATIENT
Start: 2017-04-19 | End: 2017-04-26

## 2017-04-19 RX ADMIN — CEFDINIR 300 MG: 300 CAPSULE ORAL at 21:22

## 2017-04-19 RX ADMIN — Medication 10 ML: at 19:23

## 2017-04-19 RX ADMIN — SODIUM CHLORIDE 100 ML: 900 INJECTION, SOLUTION INTRAVENOUS at 19:23

## 2017-04-19 RX ADMIN — IOPAMIDOL 100 ML: 755 INJECTION, SOLUTION INTRAVENOUS at 19:23

## 2017-04-19 NOTE — ED TRIAGE NOTES
Triage Note: pt arrives via EMS from [facility] assisted living for lower abd pain. Pt was seen today at the dentist office, pain started after the appointment. Denies N/V, SOB or chest pain.

## 2017-04-19 NOTE — ED PROVIDER NOTES
HPI Comments: 80 y.o. male with extensive past medical history, please see list, significant for parkison disease, HTN, DDD, BPH, pacemaker, PAT, and vitrectomy who presents via EMS with chief complaint of abd. pain. Pt c/o LLQ abd pain that comes and goes that feels like he was punched which onset 3 hours ago. Pt claims that the pain alsts for several minutes at a time when it comes, and goes away for a short period of time. Pt denies he's in much pain currently, stating it's mild. Pt had a similar pain 3 weeks ago at night that lasted about an hour, went away, and didn't return. Pt did take tylenol for the pain earlier with no relief. Pt had last BM 16 hours ago. Pt reports that he is incontinent nightly in bed. Pt denies any dysuria, nausea, vomiting, fever, or chills. Pt was seen at the dentist prior to abd. Pain onset, denies taking abx prior to dental visit, and states it was routine check up with no cavities. There are no other acute medical concerns at this time. PCP: Darnell Knapp MD    Note written by Imtiaz Andrade, as dictated by Chante Bridges DO 6:11 PM     Chart review: 3/31/17 treated for UTI and LLQ abd pain. CT showed L iliac artery aneursym, 4.3X4.2cm with no leak. Elongated distended sigmoid colon without obstruction. Pt had mixed urogenital samuel. The history is provided by the patient. No  was used.         Past Medical History:   Diagnosis Date    Blurry vision 1/19/2012    BPH (benign prostatic hyperplasia)     DDD (degenerative disc disease), lumbar 2/19/2010    DJD (degenerative joint disease) of cervical spine 2/19/2010    DU (duodenal ulcer) 9/1/1990    Hyperlipidemia     Hypertension     Other and unspecified hyperlipidemia 2/19/2010    Pacemaker     Paralysis agitans ([address] 2/19/2010    Parkinson disease ([address]     PAT (paroxysmal atrial tachycardia) (HCC)     Premature atrial beats     Reflux esophagitis 2/19/2010    Sick sinus syndrome ([address] Past Surgical History:   Procedure Laterality Date    HX CATARACT REMOVAL  6/2012 and 7/2012    Both eyes    HX ORTHOPAEDIC  04/2015    tendon repair both knees    HX PACEMAKER  2008    bradycardia    HX VITRECTOMY  11/2011    [place] REMVL PERM PM PLS GEN W/REPL PLSE GEN 2 LEAD SYS  3/13/2017              Family History:   Problem Relation Age of Onset    Asthma Mother     Heart Disease Father        Social History     Social History    Marital status:      Spouse name: N/A    Number of children: N/A    Years of education: N/A     Occupational History    Not on file. Social History Main Topics    Smoking status: Former Smoker     Types: Pipe     Quit date: 7/14/1945    Smokeless tobacco: Never Used    Alcohol use 2.0 oz/week     4 Cans of beer per week      Comment: glass of wine every now and then    Drug use: No    Sexual activity: Not on file     Other Topics Concern     Service Yes    Blood Transfusions No    Caffeine Concern No    Occupational Exposure No    Hobby Hazards No    Sleep Concern No    Stress Concern No    Weight Concern No    Special Diet No    Back Care No    Exercise No    Bike Helmet No    Seat Belt Yes    Self-Exams No     Social History Narrative         ALLERGIES: Review of patient's allergies indicates no known allergies. Review of Systems   Constitutional: Negative for chills and fever. Gastrointestinal: Positive for abdominal pain (LLQ). Negative for nausea and vomiting. Genitourinary: Negative for dysuria. All other systems reviewed and are negative. Vitals:    04/19/17 1721   BP: (!) 159/103   Pulse: 83   Resp: 16   Temp: 98.3 °F (36.8 °C)   SpO2: 98%   Weight: 93 kg (205 lb)   Height: 6' 2\" (1.88 m)            Physical Exam   Nursing note and vitals reviewed. Constitutional: Pt is awake and alert. Pt appears well-developed and well-nourished. NAD. HENT:   Head: Normocephalic and atraumatic.    Nose: Nose normal. Mouth/Throat: Oropharynx is clear and moist. No oropharyngeal exudate. Eyes: Conjunctivae and extraocular motions are normal. Pupils are equal, round, and reactive to light. Right eye exhibits no discharge. Left eye exhibits no discharge. No scleral icterus. Neck: No tracheal deviation present. Supple neck. Cardiovascular: Normal rate, regular rhythm, normal heart sounds and intact distal pulses. Exam reveals no gallop and no friction rub. No murmur heard. Pulmonary/Chest: Effort normal and breath sounds normal.  Pt  has no wheezes. Pt  has no rales. Abdominal: Soft. Pt  exhibits no distension and no mass. Mild LLQ tenderness . Pt  has no rebound and no guarding. Musculoskeletal:  Pt  exhibits no edema and no tenderness. Ext: Normal ROM in all four extremities; not tender to palpation; distal pulses are normal, no edema. Neurological:  Pt is alert. nonfocal neuro exam.  Skin: Skin is warm and dry. Pt  is not diaphoretic. Psychiatric:  Pt  has a normal mood and affect.  Behavior is normal.   Note written by Imtiaz Vu, as dictated by Zulema Guerra DO 6:17 PM              [facility]  ED Course       Procedures         CT noted    Labs Reviewed   CULTURE, URINE - Abnormal; Notable for the following:        Result Value    Culture result: GRAM NEGATIVE RODS (*)     All other components within normal limits   CBC WITH AUTOMATED DIFF - Abnormal; Notable for the following:     HGB 11.9 (*)     RDW 15.0 (*)     All other components within normal limits   METABOLIC PANEL, COMPREHENSIVE - Abnormal; Notable for the following:     Glucose 102 (*)     Calcium 8.3 (*)     ALT (SGPT) 9 (*)     A-G Ratio 0.9 (*)     All other components within normal limits   URINALYSIS W/MICROSCOPIC - Abnormal; Notable for the following:     Appearance TURBID (*)     Protein 30 (*)     Blood MODERATE (*)     Nitrites POSITIVE (*)     Leukocyte Esterase LARGE (*)     WBC >100 (*)     Bacteria 4+ (*)     All other components within normal limits   SAMPLES BEING HELD     Plan  Dc home  abx

## 2017-04-20 ENCOUNTER — PATIENT OUTREACH (OUTPATIENT)
Dept: FAMILY MEDICINE CLINIC | Age: 82
End: 2017-04-20

## 2017-04-20 NOTE — CALL BACK NOTE
[facility] Emergency Department Follow Up Call Record    Discharged to : Home/Family Home/Home Health/Skilled Facility/Rehab/Assisted Living/Other__[facility]_____  1) Did you receive your discharge instructions? Yes        2) Do you understand them? Yes    Spoke with nursing in Wellness who report they have received the discharge instructions and have no further concerns or questions. 3) Are you able to follow them? Yes          If NO, what can I clarify for you? 4) Do you understand your diagnosis? Yes         5) Do you know which symptoms should prompt you to call the doctor? No     6) Were you able to fill and  any medications that were prescribed? Yes     7) You were prescribed __keflex_________for ___cystitis_________________. Common side effects of this medication are___rash_________________. This is not a complete list so please review the forms given from the pharmacy for a complete list.      8) Are there any questions about your medications? No            Have you scheduled any recommended doctors appointments (specialty, PCP) YES  If NO, what barriers are you encountering (transportation/lost contact info/cost/  didnt think necessary/no PCP  9) If discharged with Home Health, has the agency contacted you to schedule visit? Not applicable  10) Is there anyone available to help you at home (meals, errands, transportation    monitoring) (adult children, neighbors, private duty companions) Yes    11) Are you on a special diet? No         If YES, do you understand the requirements for this diet? Education provided? 12) If presented with cough, bronchitis, COPD, asthma, is it ok to ask that the   respiratory disease management educator call you? Not applicable      13)  A) If presented with fall, were you issued an assistive device in the ED    Are you using? Not applicable  B) If given RX for device, have you obtained? Not applicable       If NO, barriers? C) Therapist recommended:   Are you able to implement the suggestions? Not applicable        If NO, barriers to implementation? D) Are you having any difficulties with mobility inside your home?     (steps, bed, tub)No   If YES, ask if the [facility] PT can contact patient and good time and number?  14)  At the end of your discharge instructions, there is information about accessing [facility], have you had a chance to review those? No         Do you have any questions about signing up for this service? NO   We encourage our patients to be active participants in their healthcare and this site is one of the ways to do that. It will allow you to access parts of your medical record, email your doctors office, schedule appointments, and request medications refills . 15) Are there any other questions that I can answer for you regarding    your Emergency department visit?  NO             Estimated Call Time:____11:19 AM  _______________ Date/Time:_______________

## 2017-04-20 NOTE — DISCHARGE INSTRUCTIONS
We hope that we have addressed all of your medical concerns. The examination and treatment you received in the Emergency Department were for an emergent problem and were not intended as complete care. It is important that you follow up with your healthcare provider(s) for ongoing care. If your symptoms worsen or do not improve as expected, and you are unable to reach your usual health care provider(s), you should return to the Emergency Department. Today's healthcare is undergoing tremendous change, and patient satisfaction surveys are one of the many tools to assess the quality of medical care. You may receive a survey from the [employer] regarding your experience in the Emergency Department. I hope that your experience has been completely positive, particularly the medical care that I provided. As such, please participate in the survey; anything less than excellent does not meet my expectations or intentions. Formerly [facility] and [facility] participate in nationally recognized quality of care measures. If your blood pressure is greater than 120/80, as reported below, we urge that you seek medical care to address the potential of high blood pressure, commonly known as hypertension. Hypertension can be hereditary or can be caused by certain medical conditions, pain, stress, or \"white coat syndrome. \"       Please make an appointment with your health care provider(s) for follow up of your Emergency Department visit. VITALS:   Patient Vitals for the past 8 hrs:   Temp Pulse Resp BP SpO2   04/19/17 2000 - - - (!) 166/103 96 %   04/19/17 1845 - - - 139/74 98 %   04/19/17 1815 - - - 122/87 97 %   04/19/17 1721 98.3 °F (36.8 °C) 83 16 (!) 159/103 98 %          Thank you for allowing us to provide you with medical care today. We realize that you have many choices for your emergency care needs.   Please choose us in the future for any continued health care needs. Regards,           Saravanan Rodriguez DO    [facility] [address]   Office: [phone]            Recent Results (from the past 24 hour(s))   CBC WITH AUTOMATED DIFF    Collection Time: 04/19/17  5:23 PM   Result Value Ref Range    WBC 8.6 4.1 - 11.1 K/uL    RBC 4.14 4.10 - 5.70 M/uL    HGB 11.9 (L) 12.1 - 17.0 g/dL    HCT 37.3 36.6 - 50.3 %    MCV 90.1 80.0 - 99.0 FL    MCH 28.7 26.0 - 34.0 PG    MCHC 31.9 30.0 - 36.5 g/dL    RDW 15.0 (H) 11.5 - 14.5 %    PLATELET 558 400 - 152 K/uL    NEUTROPHILS 72 32 - 75 %    LYMPHOCYTES 18 12 - 49 %    MONOCYTES 6 5 - 13 %    EOSINOPHILS 4 0 - 7 %    BASOPHILS 0 0 - 1 %    ABS. NEUTROPHILS 6.2 1.8 - 8.0 K/UL    ABS. LYMPHOCYTES 1.5 0.8 - 3.5 K/UL    ABS. MONOCYTES 0.5 0.0 - 1.0 K/UL    ABS. EOSINOPHILS 0.4 0.0 - 0.4 K/UL    ABS. BASOPHILS 0.0 0.0 - 0.1 K/UL   METABOLIC PANEL, COMPREHENSIVE    Collection Time: 04/19/17  5:23 PM   Result Value Ref Range    Sodium 137 136 - 145 mmol/L    Potassium 3.9 3.5 - 5.1 mmol/L    Chloride 100 97 - 108 mmol/L    CO2 32 21 - 32 mmol/L    Anion gap 5 5 - 15 mmol/L    Glucose 102 (H) 65 - 100 mg/dL    BUN 19 6 - 20 MG/DL    Creatinine 0.98 0.70 - 1.30 MG/DL    BUN/Creatinine ratio 19 12 - 20      GFR est AA >60 >60 ml/min/1.73m2    GFR est non-AA >60 >60 ml/min/1.73m2    Calcium 8.3 (L) 8.5 - 10.1 MG/DL    Bilirubin, total 0.5 0.2 - 1.0 MG/DL    ALT (SGPT) 9 (L) 12 - 78 U/L    AST (SGOT) 15 15 - 37 U/L    Alk.  phosphatase 80 45 - 117 U/L    Protein, total 7.5 6.4 - 8.2 g/dL    Albumin 3.5 3.5 - 5.0 g/dL    Globulin 4.0 2.0 - 4.0 g/dL    A-G Ratio 0.9 (L) 1.1 - 2.2     URINALYSIS W/MICROSCOPIC    Collection Time: 04/19/17  5:23 PM   Result Value Ref Range    Color YELLOW/STRAW      Appearance TURBID (A) CLEAR      Specific gravity 1.022 1.003 - 1.030      pH (UA) 5.5 5.0 - 8.0      Protein 30 (A) NEG mg/dL    Glucose NEGATIVE  NEG mg/dL    Ketone NEGATIVE  NEG mg/dL    Bilirubin NEGATIVE  NEG      Blood MODERATE (A) NEG      Urobilinogen 0.2 0.2 - 1.0 EU/dL    Nitrites POSITIVE (A) NEG      Leukocyte Esterase LARGE (A) NEG      WBC >100 (H) 0 - 4 /hpf    RBC 0-5 0 - 5 /hpf    Epithelial cells FEW FEW /lpf    Bacteria 4+ (A) NEG /hpf       Cta Abdomen Pelv W Cont    Result Date: 4/19/2017  INDICATION: Left lower quadrant abdominal pain. Iliac artery aneurysm. COMPARISON: March 31, 2017 TECHNIQUE: Following the uneventful intravenous administration of 100 cc Isovue-370, 2.5 mm axial images were obtained through the abdomen and pelvis. Delayed images were also obtained through the abdomen and pelvis. 3D image post processing was performed. CT dose reduction was achieved through use of a standardized protocol tailored for this examination and automatic exposure control for dose modulation. Adaptive statistical iterative reconstruction (ASIR) was utilized. FINDINGS: The abdominal aorta is normal in caliber proximally. There is ectasia of the distal abdominal aorta to a diameter of 2.9 cm. The mesenteric and renal arteries are patent. There is a 4.3 x 4.2 cm left common iliac artery aneurysm, which is unchanged in appearance since the prior study. . A bleb is again noted in the right lower lobe, with adjacent atelectasis; the bladder contains a small amount of fluid. The heart size is normal. The liver is normal. The spleen demonstrates no abnormality. The pancreas has a normal appearance. The gallbladder demonstrates no evidence of wall thickening or cholelithiasis. The kidneys are normal in size and demonstrate no evidence of mass or hydronephrosis. The adrenal glands are normal. There is no retroperitoneal or mesenteric lymphadenopathy. No intraperitoneal free air or free fluid is seen. The stomach, small bowel, and colon are unremarkable. The appendix is not visualized. Imaging of the pelvis demonstrates an enlarged prostate gland. The urinary bladder demonstrates no filling defect.  No pelvic mass or free fluid is seen. There are small bilateral fat-containing inguinal hernias. Review of the bone windows demonstrates no evidence of destructive bone lesion. There is an old L1 compression deformity. 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 Urinary Tract Infections in Men: Care Instructions  Your Care Instructions    A urinary tract infection, or UTI, is a general term for an infection anywhere between the kidneys and the tip of the penis. UTIs can also be a result of a prostate problem. Most cause pain or burning when you urinate. Most UTIs are caused by bacteria and can be cured with antibiotics. It is important to complete your treatment so that the infection does not get worse. Follow-up care is a key part of your treatment and safety. Be sure to make and go to all appointments, and call your doctor if you are having problems. It's also a good idea to know your test results and keep a list of the medicines you take. How can you care for yourself at home? · Take your antibiotics as prescribed. Do not stop taking them just because you feel better. You need to take the full course of antibiotics. · Take your medicines exactly as prescribed. Your doctor may have prescribed a medicine, such as phenazopyridine (Pyridium), to help relieve pain when you urinate. This turns your urine orange. You may stop taking it when your symptoms get better. But be sure to take all of your antibiotics, which treat the infection. · Drink extra water for the next day or two. This will help make the urine less concentrated and help wash out the bacteria causing the infection. (If you have kidney, heart, or liver disease and have to limit your fluids, talk with your doctor before you increase your fluid intake.)  · Avoid drinks that are carbonated or have caffeine. They can irritate the bladder. · Urinate often. Try to empty your bladder each time.   · To relieve pain, take a hot bath or lay a heating pad (set on low) over your lower belly or genital area. Never go to sleep with a heating pad in place. To help prevent UTIs  · Drink plenty of fluids, enough so that your urine is light yellow or clear like water. If you have kidney, heart, or liver disease and have to limit fluids, talk with your doctor before you increase the amount of fluids you drink. · Urinate when you have the urge. Do not hold your urine for a long time. Urinate before you go to sleep. · Keep your penis clean. Catheter care  If you have a drainage tube (catheter) in place, the following steps will help you care for it. · Always wash your hands before and after touching your catheter. · Check the area around the urethra for inflammation or signs of infection. Signs of infection include irritated, swollen, red, or tender skin, or pus around the catheter. · Clean the area around the catheter with soap and water two times a day. Dry with a clean towel afterward. · Do not apply powder or lotion to the skin around the catheter. To empty the urine collection bag  · Wash your hands with soap and water. · Without touching the drain spout, remove the spout from its sleeve at the bottom of the collection bag. Open the valve on the spout. · Let the urine flow out of the bag and into the toilet or a container. Do not let the tubing or drain spout touch anything. · After you empty the bag, clean the end of the drain spout with tissue and water. Close the valve and put the drain spout back into its sleeve at the bottom of the collection bag. · Wash your hands with soap and water. When should you call for help? Call your doctor now or seek immediate medical care if:  · Symptoms such as a fever, chills, nausea, or vomiting get worse or happen for the first time. · You have new pain in your back just below your rib cage. This is called flank pain. · There is new blood or pus in your urine.   · You are not able to take or keep down your antibiotics. Watch closely for changes in your health, and be sure to contact your doctor if:  · You are not getting better after taking an antibiotic for 2 days. · Your symptoms go away but then come back. Where can you learn more? Go to http://rodo-lukas.info/. Enter Y763 in the search box to learn more about \"Urinary Tract Infections in Men: Care Instructions. \"  Current as of: November 28, 2016  Content Version: 11.2  © 3760-6532 [employer]. Care instructions adapted under license by [employer] (which disclaims liability or warranty for this information). If you have questions about a medical condition or this instruction, always ask your healthcare professional. [address] any warranty or liability for your use of this information.

## 2017-04-21 ENCOUNTER — TELEPHONE (OUTPATIENT)
Dept: FAMILY MEDICINE CLINIC | Age: 82
End: 2017-04-21

## 2017-04-21 NOTE — TELEPHONE ENCOUNTER
[facility]  [phone]    -  Need an order today for continued Home Health-  Really needs to speak with the nurse today-

## 2017-04-21 NOTE — TELEPHONE ENCOUNTER
[address] [facility]     -       [phone]    -   Need an order for Neftaly Valero is also requesting to speak with the nurse again-

## 2017-04-21 NOTE — TELEPHONE ENCOUNTER
O2 prn at 2 liter ok given.  Order was not renewed with discharge from hospital  Order faxed [phone]

## 2017-04-23 LAB
BACTERIA SPEC CULT: ABNORMAL
CC UR VC: ABNORMAL
SERVICE CMNT-IMP: ABNORMAL

## 2017-04-28 ENCOUNTER — OFFICE VISIT (OUTPATIENT)
Dept: FAMILY MEDICINE CLINIC | Age: 82
End: 2017-04-28

## 2017-04-28 VITALS
OXYGEN SATURATION: 93 % | DIASTOLIC BLOOD PRESSURE: 80 MMHG | TEMPERATURE: 97.9 F | HEART RATE: 81 BPM | HEIGHT: 74 IN | RESPIRATION RATE: 18 BRPM | SYSTOLIC BLOOD PRESSURE: 108 MMHG

## 2017-04-28 DIAGNOSIS — Z00.00 MEDICARE ANNUAL WELLNESS VISIT, SUBSEQUENT: ICD-10-CM

## 2017-04-28 DIAGNOSIS — Z87.19 HISTORY OF DUODENAL ULCER: ICD-10-CM

## 2017-04-28 DIAGNOSIS — S76.119S: Chronic | ICD-10-CM

## 2017-04-28 DIAGNOSIS — H53.8 BLURRY VISION: ICD-10-CM

## 2017-04-28 DIAGNOSIS — I10 ESSENTIAL HYPERTENSION, BENIGN: ICD-10-CM

## 2017-04-28 DIAGNOSIS — E78.5 HYPERLIPIDEMIA WITH TARGET LDL LESS THAN 130: ICD-10-CM

## 2017-04-28 DIAGNOSIS — K21.00 REFLUX ESOPHAGITIS: ICD-10-CM

## 2017-04-28 DIAGNOSIS — N30.01 ACUTE CYSTITIS WITH HEMATURIA: Primary | ICD-10-CM

## 2017-04-28 DIAGNOSIS — M17.0 PRIMARY OSTEOARTHRITIS OF BOTH KNEES: ICD-10-CM

## 2017-04-28 DIAGNOSIS — Z71.89 ACP (ADVANCE CARE PLANNING): ICD-10-CM

## 2017-04-28 DIAGNOSIS — F41.1 GENERALIZED ANXIETY DISORDER: ICD-10-CM

## 2017-04-28 DIAGNOSIS — F43.21 GRIEF: ICD-10-CM

## 2017-04-28 NOTE — MR AVS SNAPSHOT
Visit Information Date & Time Provider Department Dept. Phone Encounter #  
 4/28/2017  2:00 PM Gi Eduardo  W [facility] [phone] [id number] Your Appointments 4/23/2018  1:45 PM  
PACEMAKER with [id number]) Appt Note: lachelle callaway, annual thresh/CL, see gilman  
 [address] [place]  
[address] [place]  
  
    
 4/23/2018  2:00 PM  
ESTABLISHED PATIENT with Eliseo Wilson MD  
[facility] ([facility]) Appt Note: lachelle callaway, annual thresh/CL, see gilman  
 [address] [place]  
[address] [facility] [address]  
  
    
  
 7/3/2017 10:45 AM  
REMOTE OFFICE VISIT with Seth Bath [facility] ([facility] SCHEDULING) Appt Note: lachelle callaway b 3/27/17  
 [address] [place]  
[address] [facility] [address]  
  
    
 10/9/2017 10:00 AM  
REMOTE OFFICE VISIT with Seth Bath [facility] ([facility] SCHEDULING) Appt Note: lachelle callaway  
 [address] [facility] [address] [place]  
[phone]  
  
    
 1/15/2018  9:45 AM  
REMOTE OFFICE VISIT with Seth Bath [facility] ([facility] SCHEDULING) Appt Note: lachelle callaway  
 [address] [facility] [address] [place]  
[phone] Upcoming Health Maintenance Date Due DTaP/Tdap/Td series (1 - Tdap) 5/4/2012 Pneumococcal 65+ Low/Medium Risk (2 of 2 - PPSV23) 4/20/2015 MEDICARE YEARLY EXAM 6/17/2016 GLAUCOMA SCREENING Q2Y 8/26/2018 Allergies as of 4/28/2017  Review Complete On: 4/28/2017 By: Gregory Barker LPN No Known Allergies Current Immunizations  Reviewed on 3/14/2017 Name Date Influenza High Dose Vaccine PF 10/27/2015 Influenza Vaccine Split 11/17/2011 Pneumococcal Vaccine (Unspecified Type) 4/20/2010 TB Skin Test (PPD) Intradermal 3/25/2013 TD Vaccine 5/3/2012 Not reviewed this visit You Were Diagnosed With   
  
 Codes Comments Acute cystitis with hematuria    -  Primary ICD-10-CM: N30.01 
ICD-9-CM: 595.0 Vitals BP Pulse Temp Resp Height(growth percentile) SpO2  
 108/80 (BP 1 Location: Right arm, BP Patient Position: Sitting) 81 97.9 °F (36.6 °C) (Oral) 18 6' 2\" (1.88 m) 93% Smoking Status Former Smoker Preferred Pharmacy Pharmacy Name Phone [address], [facility] [phone] Your Updated Medication List  
  
   
This list is accurate as of: 4/28/17  2:56 PM.  Always use your most recent med list.  
  
  
  
  
 acetaminophen 500 mg tablet Commonly known as:  MAPAP EXTRA STRENGTH Take 1 Tab by mouth every six (6) hours as needed for Pain. albuterol 90 mcg/actuation inhaler Commonly known as:  PROVENTIL HFA, VENTOLIN HFA, PROAIR HFA Take 2 Puffs by inhalation every six (6) hours as needed for Wheezing. carbidopa-levodopa  mg per tablet Commonly known as:  SINEMET Take 2 Tabs by mouth four (4) times daily. (This is given at 8:00, 11:00, 14:00, and 21:00). cloNIDine HCl 0.1 mg tablet Commonly known as:  CATAPRES Take 1 Tab by mouth every six (6) hours as needed (SBP > 180 mmHg). cycloSPORINE 0.05 % ophthalmic emulsion Commonly known as:  RESTASIS Administer 1 Drop to both eyes two (2) times a day. DETROL LA 2 mg ER capsule Generic drug:  tolterodine ER  
TAKE 1 CAPSULE DAILY FOR BLADDER FREQUENCY  
  
 docusate sodium 100 mg capsule Commonly known as:  [place] Take 1 Cap by mouth daily as needed for Constipation. droxidopa 100 mg Cap capsule Commonly known as:  Aicha Sow  
 Take 100 mg by mouth three (3) times daily. Indications: SYMPTOMATIC ORTHOSTATIC HYPOTENSION  
  
 escitalopram oxalate 20 mg tablet Commonly known as:  Jade Triana Take 1 Tab by mouth daily. esomeprazole 40 mg capsule Commonly known as:  NexIUM Take 1 Cap by mouth daily. LORazepam 0.5 mg tablet Commonly known as:  ATIVAN Take  by mouth every six (6) hours as needed for Anxiety. meclizine 25 mg tablet Commonly known as:  ANTIVERT Take 25 mg by mouth three (3) times daily as needed for Dizziness. pindolol 5 mg tablet Commonly known as:  VISKIN Take 2.5 mg by mouth daily (after lunch). polyethylene glycol 17 gram packet Commonly known as:  Meridee Rouge Take 1 Packet by mouth daily. Indications: CONSTIPATION  
  
 QUEtiapine 25 mg tablet Commonly known as:  SEROquel Take 25 mg by mouth every evening. rivastigmine 4.6 mg/24 hr patch Commonly known as:  EXELON  
1 Patch by TransDERmal route daily. therapeutic multivitamin tablet Commonly known as:  [facility] Take 1 Tab by mouth daily. traMADol 50 mg tablet Commonly known as:  ULTRAM  
Take 1 Tab by mouth every eight (8) hours as needed for Pain. Max Daily Amount: 150 mg. We Performed the Following CULTURE, URINE [id number] CPT(R)] URINALYSIS W/ RFLX MICROSCOPIC [98021 CPT(R)] Patient Instructions Urinary Tract Infections in Men: Care Instructions Your Care Instructions A urinary tract infection, or UTI, is a general term for an infection anywhere between the kidneys and the tip of the penis. UTIs can also be a result of a prostate problem. Most cause pain or burning when you urinate. Most UTIs are caused by bacteria and can be cured with antibiotics. It is important to complete your treatment so that the infection does not get worse. Follow-up care is a key part of your treatment and safety.  Be sure to make and go to all appointments, and call your doctor if you are having problems. It's also a good idea to know your test results and keep a list of the medicines you take. How can you care for yourself at home? · Take your antibiotics as prescribed. Do not stop taking them just because you feel better. You need to take the full course of antibiotics. · Take your medicines exactly as prescribed. Your doctor may have prescribed a medicine, such as phenazopyridine (Pyridium), to help relieve pain when you urinate. This turns your urine orange. You may stop taking it when your symptoms get better. But be sure to take all of your antibiotics, which treat the infection. · Drink extra water for the next day or two. This will help make the urine less concentrated and help wash out the bacteria causing the infection. (If you have kidney, heart, or liver disease and have to limit your fluids, talk with your doctor before you increase your fluid intake.) · Avoid drinks that are carbonated or have caffeine. They can irritate the bladder. · Urinate often. Try to empty your bladder each time. · To relieve pain, take a hot bath or lay a heating pad (set on low) over your lower belly or genital area. Never go to sleep with a heating pad in place. To help prevent UTIs · Drink plenty of fluids, enough so that your urine is light yellow or clear like water. If you have kidney, heart, or liver disease and have to limit fluids, talk with your doctor before you increase the amount of fluids you drink. · Urinate when you have the urge. Do not hold your urine for a long time. Urinate before you go to sleep. · Keep your penis clean. Catheter care If you have a drainage tube (catheter) in place, the following steps will help you care for it. · Always wash your hands before and after touching your catheter.  
· Check the area around the urethra for inflammation or signs of infection. Signs of infection include irritated, swollen, red, or tender skin, or pus around the catheter. · Clean the area around the catheter with soap and water two times a day. Dry with a clean towel afterward. · Do not apply powder or lotion to the skin around the catheter. To empty the urine collection bag · Wash your hands with soap and water. · Without touching the drain spout, remove the spout from its sleeve at the bottom of the collection bag. Open the valve on the spout. · Let the urine flow out of the bag and into the toilet or a container. Do not let the tubing or drain spout touch anything. · After you empty the bag, clean the end of the drain spout with tissue and water. Close the valve and put the drain spout back into its sleeve at the bottom of the collection bag. · Wash your hands with soap and water. When should you call for help? Call your doctor now or seek immediate medical care if: · Symptoms such as a fever, chills, nausea, or vomiting get worse or happen for the first time. · You have new pain in your back just below your rib cage. This is called flank pain. · There is new blood or pus in your urine. · You are not able to take or keep down your antibiotics. Watch closely for changes in your health, and be sure to contact your doctor if: 
· You are not getting better after taking an antibiotic for 2 days. · Your symptoms go away but then come back. Where can you learn more? Go to http://rodo-lukas.info/. Enter A383 in the search box to learn more about \"Urinary Tract Infections in Men: Care Instructions. \" Current as of: November 28, 2016 Content Version: 11.2 © 6413-5357 [employer]. Care instructions adapted under license by [employer] (which disclaims liability or warranty for this information).  If you have questions about a medical condition or this instruction, always ask your healthcare professional. [employer]ia [employer] disclaims any warranty or liability for your use of this information. Introducing [facility]! Dear Soledad Aguilar: Thank you for requesting a [employer] account. Our records indicate that you already have an active Squ[employer]ka account. You can access your account anytime at [email or web address]. [email or web address] Did you know that you can access your hospital and ER discharge instructions at any time in [employer]ka? You can also review all of your test results from your hospital stay or ER visit. Additional Information If you have questions, please visit the Frequently Asked Questions section of the [employer] website at [email or web address] Remember, [employer]ka is NOT to be used for urgent needs. For medical emergencies, dial 911. Now available from your iPhone and Android! Please provide this summary of care documentation to your next provider. Your primary care clinician is listed as [address] [address], [facility]/s . If you have any questions after today's visit, please call [phone].

## 2017-04-28 NOTE — PROGRESS NOTES
Chief Complaint   Patient presents with   [facility] Follow Up     went to ER for UTI    Annual Wellness Visit     Eligible for        1. Have you been to the ER, urgent care clinic since your last visit? Hospitalized since your last visit? Yes When: [facility] ER, couple weeks ago for UTI    2. Have you seen or consulted any other health care providers outside of the [address] [place] [address] since your last visit? Include any pap smears or colon screening.  No

## 2017-04-28 NOTE — PROGRESS NOTES
HISTORY OF PRESENT ILLNESS  HPI  Arnulfo Gallagher is a 80 y.o. Male with history of HTN, hyperlipidemia, Parkinson's, HEAVEN, and depression who presents to office today in a wheelchair for follow-up. Pt went to [facility] on 4/19/17 for abdominal pain. Pt was given Keflex, and a urine culture revealed a UTI of E coli. Pt states that his abdominal pain has resolved. Pt states that he fell one week ago and injured his right upper arm, which still hurts. He has been taking Tylenol and has not seen a doctor. Pt notes that he was recently prescribed new glasses but is still having issues reading. Past Medical History:   Diagnosis Date    Blurry vision 1/19/2012    BPH (benign prostatic hyperplasia)     DDD (degenerative disc disease), lumbar 2/19/2010    DJD (degenerative joint disease) of cervical spine 2/19/2010    DU (duodenal ulcer) 9/1/1990    Hyperlipidemia     Hypertension     Other and unspecified hyperlipidemia 2/19/2010    Pacemaker     Paralysis agitans ([address] 2/19/2010    Parkinson disease ([address]     PAT [facility])     Premature atrial beats     Reflux esophagitis 2/19/2010    Sick sinus syndrome [facility])      Past Surgical History:   Procedure Laterality Date    HX CATARACT REMOVAL  6/2012 and 7/2012    Both eyes    HX ORTHOPAEDIC  04/2015    tendon repair both knees    HX PACEMAKER  2008    bradycardia    HX VITRECTOMY  11/2011    SD REMVL PERM PM PLS GEN W/REPL PLSE GEN 2 LEAD SYS  3/13/2017          Current Outpatient Prescriptions on File Prior to Visit   Medication Sig Dispense Refill    LORazepam (ATIVAN) 0.5 mg tablet Take  by mouth every six (6) hours as needed for Anxiety.  QUEtiapine (SEROQUEL) 25 mg tablet Take 25 mg by mouth every evening.  pindolol (VISKIN) 5 mg tablet Take 2.5 mg by mouth daily (after lunch).       albuterol (PROVENTIL HFA, VENTOLIN HFA, PROAIR HFA) 90 mcg/actuation inhaler Take 2 Puffs by inhalation every six (6) hours as needed for Wheezing. 1 Inhaler 0    carbidopa-levodopa (SINEMET)  mg per tablet Take 2 Tabs by mouth four (4) times daily. (This is given at 8:00, 11:00, 14:00, and 21:00). 240 Tab 0    polyethylene glycol (MIRALAX) 17 gram packet Take 1 Packet by mouth daily. Indications: CONSTIPATION 30 Packet 0    therapeutic multivitamin (THERAGRAN) tablet Take 1 Tab by mouth daily. 30 Tab 0    traMADol (ULTRAM) 50 mg tablet Take 1 Tab by mouth every eight (8) hours as needed for Pain. Max Daily Amount: 150 mg. 30 Tab 1    cloNIDine HCl (CATAPRES) 0.1 mg tablet Take 1 Tab by mouth every six (6) hours as needed (SBP > 180 mmHg). 20 Tab 0    droxidopa (NORTHERA) 100 mg cap Take 100 mg by mouth three (3) times daily. Indications: SYMPTOMATIC ORTHOSTATIC HYPOTENSION 90 Tab 1    docusate sodium (COLACE) 100 mg capsule Take 1 Cap by mouth daily as needed for Constipation. 30 Cap 0    cycloSPORINE (RESTASIS) 0.05 % ophthalmic emulsion Administer 1 Drop to both eyes two (2) times a day. 60 Each 0    escitalopram oxalate (LEXAPRO) 20 mg tablet Take 1 Tab by mouth daily. 30 Tab 0    rivastigmine (EXELON) 4.6 mg/24 hr patch 1 Patch by TransDERmal route daily. 30 Patch 0    esomeprazole (NEXIUM) 40 mg capsule Take 1 Cap by mouth daily. 30 Cap 0    acetaminophen (MAPAP EXTRA STRENGTH) 500 mg tablet Take 1 Tab by mouth every six (6) hours as needed for Pain. 120 Tab 0    DETROL LA 2 mg ER capsule TAKE 1 CAPSULE DAILY FOR BLADDER FREQUENCY 90 Cap 3     No current facility-administered medications on file prior to visit.       No Known Allergies  Family History   Problem Relation Age of Onset    Asthma Mother     Heart Disease Father      Social History     Social History    Marital status:      Spouse name: N/A    Number of children: N/A    Years of education: N/A     Social History Main Topics    Smoking status: Former Smoker     Types: Pipe     Quit date: 7/14/1945    Smokeless tobacco: Never Used    Alcohol use 2.0 oz/week     4 Cans of beer per week      Comment: glass of wine every now and then    Drug use: No    Sexual activity: Not Asked     Other Topics Concern     Service Yes    Blood Transfusions No    Caffeine Concern No    Occupational Exposure No    Hobby Hazards No    Sleep Concern No    Stress Concern No    Weight Concern No    Special Diet No    Back Care No    Exercise No    Bike Helmet No    Seat Belt Yes    Self-Exams No     Social History Narrative                 Review of Systems   Constitutional: Negative for chills, diaphoresis, fever, malaise/fatigue and weight loss. Eyes: Negative for blurred vision, double vision, pain and redness. Respiratory: Negative for cough, shortness of breath and wheezing. Cardiovascular: Negative for chest pain, palpitations, orthopnea, claudication, leg swelling and PND. Musculoskeletal: Positive for myalgias (right upper arm). Skin: Negative for itching and rash. Neurological: Negative for dizziness, tingling, tremors, sensory change, speech change, focal weakness, seizures, loss of consciousness, weakness and headaches.      Results for orders placed or performed during the hospital encounter of 04/19/17   CULTURE, URINE   Result Value Ref Range    Special Requests: NO SPECIAL REQUESTS      [place] Count >100,000  COLONIES/mL        Culture result: ESCHERICHIA COLI (A)         Susceptibility    Escherichia coli - ROSALBA     Amikacin ($) <=16 Susceptible ug/mL     Ampicillin ($) >16 Resistant ug/mL     Ampicillin/sulbactam ($) >16/8 Resistant ug/mL     Aztreonam ($$$$) <=4 Susceptible ug/mL     Cefazolin ($) <=8 Susceptible ug/mL     Cefepime ($$) <=4 Susceptible ug/mL     Cefotaxime <=2 Susceptible ug/mL     Ceftazidime ($) <=1 Susceptible ug/mL     Ceftriaxone ($) <=1 Susceptible ug/mL     Cefuroxime ($) 8 Susceptible ug/mL     Ciprofloxacin ($) >2 Resistant ug/mL     Gentamicin ($) <=4 Susceptible ug/mL     Imipenem <=1 Susceptible ug/mL     Levofloxacin ($) >4 Resistant ug/mL     Meropenem ($$) <=1 Susceptible ug/mL     Nitrofurantoin <=32 Susceptible ug/mL     Piperacillin/Tazobac ($) <=16 Susceptible ug/mL     Tobramycin ($) <=4 Susceptible ug/mL     Trimeth-Sulfamethoxa <=2/38 Susceptible ug/mL   CBC WITH AUTOMATED DIFF   Result Value Ref Range    WBC 8.6 4.1 - 11.1 K/uL    RBC 4.14 4.10 - 5.70 M/uL    HGB 11.9 (L) 12.1 - 17.0 g/dL    HCT 37.3 36.6 - 50.3 %    MCV 90.1 80.0 - 99.0 FL    MCH 28.7 26.0 - 34.0 PG    MCHC 31.9 30.0 - 36.5 g/dL    RDW 15.0 (H) 11.5 - 14.5 %    PLATELET 060 731 - 258 K/uL    NEUTROPHILS 72 32 - 75 %    LYMPHOCYTES 18 12 - 49 %    MONOCYTES 6 5 - 13 %    EOSINOPHILS 4 0 - 7 %    BASOPHILS 0 0 - 1 %    ABS. NEUTROPHILS 6.2 1.8 - 8.0 K/UL    ABS. LYMPHOCYTES 1.5 0.8 - 3.5 K/UL    ABS. MONOCYTES 0.5 0.0 - 1.0 K/UL    ABS. EOSINOPHILS 0.4 0.0 - 0.4 K/UL    ABS. BASOPHILS 0.0 0.0 - 0.1 K/UL   METABOLIC PANEL, COMPREHENSIVE   Result Value Ref Range    Sodium 137 136 - 145 mmol/L    Potassium 3.9 3.5 - 5.1 mmol/L    Chloride 100 97 - 108 mmol/L    CO2 32 21 - 32 mmol/L    Anion gap 5 5 - 15 mmol/L    Glucose 102 (H) 65 - 100 mg/dL    BUN 19 6 - 20 MG/DL    Creatinine 0.98 0.70 - 1.30 MG/DL    BUN/Creatinine ratio 19 12 - 20      GFR est AA >60 >60 ml/min/1.73m2    GFR est non-AA >60 >60 ml/min/1.73m2    Calcium 8.3 (L) 8.5 - 10.1 MG/DL    Bilirubin, total 0.5 0.2 - 1.0 MG/DL    ALT (SGPT) 9 (L) 12 - 78 U/L    AST (SGOT) 15 15 - 37 U/L    Alk.  phosphatase 80 45 - 117 U/L    Protein, total 7.5 6.4 - 8.2 g/dL    Albumin 3.5 3.5 - 5.0 g/dL    Globulin 4.0 2.0 - 4.0 g/dL    A-G Ratio 0.9 (L) 1.1 - 2.2     URINALYSIS W/MICROSCOPIC   Result Value Ref Range    Color YELLOW/STRAW      Appearance TURBID (A) CLEAR      Specific gravity 1.022 1.003 - 1.030      pH (UA) 5.5 5.0 - 8.0      Protein 30 (A) NEG mg/dL    Glucose NEGATIVE  NEG mg/dL    Ketone NEGATIVE  NEG mg/dL    Bilirubin NEGATIVE  NEG      Blood MODERATE (A) NEG Urobilinogen 0.2 0.2 - 1.0 EU/dL    Nitrites POSITIVE (A) NEG      Leukocyte Esterase LARGE (A) NEG      WBC >100 (H) 0 - 4 /hpf    RBC 0-5 0 - 5 /hpf    Epithelial cells FEW FEW /lpf    Bacteria 4+ (A) NEG /hpf                 Physical Exam  Visit Vitals    /80 (BP 1 Location: Right arm, BP Patient Position: Sitting)    Pulse 81    Temp 97.9 °F (36.6 °C) (Oral)    Resp 18    Ht 6' 2\" (1.88 m)    SpO2 93%     Lungs: clear to auscultation bilaterally  Heart: regular rate and rhythm, S1, S2 normal, no murmur, click, rub or gallop  Abdomen: very mild suprapubic tenderness to palpation, no rebound or guarding                ASSESSMENT and PLAN    ICD-10-CM ICD-9-CM    1. Acute cystitis with hematuria N30.01 595.0 URINALYSIS W/ RFLX MICROSCOPIC      CULTURE, URINE   2. Essential hypertension, difficult to control due to orthostatic hypotension I10 401.1    3. Reflux esophagitis K21.0 530.11    4. Hyperlipidemia with target LDL less than 130 E78.5 272.4    5. Blurry vision-chronic, no change- neg w/u H53.8 368.8    6. Generalized anxiety disorder F41.1 300.02    7. Grief- wife of 61 years  2015 F43.20 309.0    8. History of duodenal ulcer Z87.19 V12.79    9. Primary osteoarthritis of both knees M17.0 715.16    10. Rupture quadriceps tendon bilateral, sequela- bilateral knee contractures S76.119S 905.8      Andrea Parks was seen today for hospital follow up and annual wellness visit.     Diagnoses and all orders for this visit:    Acute cystitis with hematuria  -     URINALYSIS W/ RFLX MICROSCOPIC  -     CULTURE, URINE    Essential hypertension, difficult to control due to orthostatic hypotension    Reflux esophagitis    Hyperlipidemia with target LDL less than 130    Blurry vision-chronic, no change- neg w/u    Generalized anxiety disorder    Grief- wife of 61 years  2015    History of duodenal ulcer    Primary osteoarthritis of both knees    Rupture quadriceps tendon bilateral, sequela- bilateral knee contractures      Follow-up Disposition: Not on File     lab results and schedule of future lab studies reviewed with patient  reviewed diet, exercise and weight control  cardiovascular risk and specific lipid/LDL goals reviewed  reviewed medications and side effects in detail  Please call my office if there are any questions- [phone]. I will arrange for follow up after the lab tests done from today return  Recommended a weekly \"heart check. \" I went into detail how to do this. Call for refills on medications as needed. Discussed expected course/resolution/complications of diagnosis in detail with patient. Medication risks/benefits/costs/interactions/alternatives discussed with patient. Pt was given an after visit summary which includes diagnoses, current medications & vitals. Pt expressed understanding with the diagnosis and plan. Total 25 minutes,60 % counseling re: We discussed advance directive information; he's already placed this in the chart, and he wishes no changes in it. He's aware that he can call back for changes at any time. He brought up his ongoing blurred vision and inability of ophth. To help improve that, the bilateral knee contracture, etc; I informed him I wa aware of these chronic problems for which I have no further suggestions and that he needs to clarify with the specialist if there is any other treatment available, etc.  After the urine culture and urinalysis return, we'll make further recommendations. It does sound like he's much better, but the culture may indicate we need to treat further. We will order a [place] cath to help out with the nighttime incontinence. He has a follow-up with the urologist, but I believe it's a month off or so. Reviewed in detail the proper technique of checking the blood pressure- check it on an average day only, not on a stressful day, sitting, no exercise for at least 1 hour and not experiencing any new pain( chronic pain is OK).  Patient encouraged to check BP sitting and standing at least once a month and to report these readings to me if > 140/ 90 on average , or if the standing BP is >  15 points lower than the sitting. Also, discussed symptoms of concern that were noted today in the note above, treatment options( including doing nothing), when to follow up before recommended time frame. Also, answered all questions. This document was written by Saima Acosta, as dictated by Sun Wyane MD.  I have reviewed and agree with the above note and have made corrections where appropriate To Hernandez M.D.

## 2017-04-28 NOTE — PATIENT INSTRUCTIONS

## 2017-04-30 PROBLEM — Z71.89 ACP (ADVANCE CARE PLANNING): Status: ACTIVE | Noted: 2017-04-30

## 2017-04-30 NOTE — PROGRESS NOTES
Shaheed Leal is a 80 y.o. male and presents for annual Medicare Wellness Visit. Problem List: Reviewed with patient and discussed risk factors. Patient Active Problem List   Diagnosis Code    Hyperlipemia E78.5    DU (duodenal ulcer) K26.9    Reflux esophagitis K21.0    DDD (degenerative disc disease), lumbar M51.36    DJD (degenerative joint disease) of cervical spine M47.812    Parkinsonian syndrome- Dr. Everette Ohara hypertension, difficult to control due to orthostatic hypotension I10    BPH (benign prostatic hyperplasia)- Dr. Cora Horne N40.0    Sick sinus syndrome-pacemaker- I49.5    PAT (paroxysmal atrial tachycardia) ([facility]) I47.1    NSVT (nonsustained ventricular tachycardia) ([facility]) I47.2    Blurry vision-chronic, no change- neg w/u H53.8    Orthostatic hypotension I95.1    Pacemaker Z95.0    Mild cognitive impairment w/o memory loss G31.84    Major depressive disorder, single episode, unspecified F32.9    Generalized anxiety disorder F41.1    Rupture quadriceps tendon- bilat--2014- UNABLE TO WALK AFTER THAT.  O47.025C    Overactive bladder- Dr. Cora Horne N32.81    Grief- wife of 61 years  2015 F43.20    Primary osteoarthritis of both knees M17.0    History of duodenal ulcer Z87.19    Aneurysm of iliac artery ([facility]- left 3.9 cm  CT scan- no change I72.3    Pneumonia J18.9    Pacemaker end of life Z38.26    Influenza A J10.1    ACP (advance care planning) Z71.89       Current medical providers:  Patient Care Team:  Gwendolyn Sanford MD as PCP - Merry Obrien MD as Consulting Provider (Neurology)  Raz Gonzales, RN as Ambulatory Care Navigator (Cardiology)  Timbo Oviedo MD as Physician (Cardiology)  Piper Sanches, RN as Ambulatory Care Navigator  Taras Alcantara, LIZABETH as Ambulatory Care Navigator    Lincoln Brunson: Reviewed with patient  Past Surgical History:   Procedure Laterality Date    HX CATARACT REMOVAL  2012 and 2012    Both eyes    HX ORTHOPAEDIC  04/2015    tendon repair both knees    HX PACEMAKER  2008    bradycardia    HX VITRECTOMY  11/2011    [place] REMVL PERM PM PLS GEN W/REPL PLSE GEN 2 LEAD SYS  3/13/2017             SH: Reviewed with patient  Social History   Substance Use Topics    Smoking status: Former Smoker     Types: Pipe     Quit date: 7/14/1945    Smokeless tobacco: Never Used    Alcohol use 2.0 oz/week     4 Cans of beer per week      Comment: glass of wine every now and then       FH: Reviewed with patient  Family History   Problem Relation Age of Onset    Asthma Mother     Heart Disease Father        Medications/Allergies: Reviewed with patient  Current Outpatient Prescriptions on File Prior to Visit   Medication Sig Dispense Refill    LORazepam (ATIVAN) 0.5 mg tablet Take  by mouth every six (6) hours as needed for Anxiety.  QUEtiapine (SEROQUEL) 25 mg tablet Take 25 mg by mouth every evening.  pindolol (VISKIN) 5 mg tablet Take 2.5 mg by mouth daily (after lunch).  albuterol (PROVENTIL HFA, VENTOLIN HFA, PROAIR HFA) 90 mcg/actuation inhaler Take 2 Puffs by inhalation every six (6) hours as needed for Wheezing. 1 Inhaler 0    carbidopa-levodopa (SINEMET)  mg per tablet Take 2 Tabs by mouth four (4) times daily. (This is given at 8:00, 11:00, 14:00, and 21:00). 240 Tab 0    polyethylene glycol (MIRALAX) 17 gram packet Take 1 Packet by mouth daily. Indications: CONSTIPATION 30 Packet 0    therapeutic multivitamin (THERAGRAN) tablet Take 1 Tab by mouth daily. 30 Tab 0    traMADol (ULTRAM) 50 mg tablet Take 1 Tab by mouth every eight (8) hours as needed for Pain. Max Daily Amount: 150 mg. 30 Tab 1    cloNIDine HCl (CATAPRES) 0.1 mg tablet Take 1 Tab by mouth every six (6) hours as needed (SBP > 180 mmHg). 20 Tab 0    droxidopa (NORTHERA) 100 mg cap Take 100 mg by mouth three (3) times daily.  Indications: SYMPTOMATIC ORTHOSTATIC HYPOTENSION 90 Tab 1    docusate sodium (COLACE) 100 mg capsule Take 1 Cap by mouth daily as needed for Constipation. 30 Cap 0    cycloSPORINE (RESTASIS) 0.05 % ophthalmic emulsion Administer 1 Drop to both eyes two (2) times a day. 60 Each 0    escitalopram oxalate (LEXAPRO) 20 mg tablet Take 1 Tab by mouth daily. 30 Tab 0    rivastigmine (EXELON) 4.6 mg/24 hr patch 1 Patch by TransDERmal route daily. 30 Patch 0    esomeprazole (NEXIUM) 40 mg capsule Take 1 Cap by mouth daily. 30 Cap 0    acetaminophen (MAPAP EXTRA STRENGTH) 500 mg tablet Take 1 Tab by mouth every six (6) hours as needed for Pain. 120 Tab 0    DETROL LA 2 mg ER capsule TAKE 1 CAPSULE DAILY FOR BLADDER FREQUENCY 90 Cap 3     No current facility-administered medications on file prior to visit. No Known Allergies    Objective:  Visit Vitals    /80 (BP 1 Location: Right arm, BP Patient Position: Sitting)    Pulse 81    Temp 97.9 °F (36.6 °C) (Oral)    Resp 18    Ht 6' 2\" (1.88 m)    SpO2 93%    There is no height or weight on file to calculate BMI.     Assessment of cognitive impairment: Alert and oriented x 3    Depression Screen:   PHQ 2 / 9, over the last two weeks 4/30/2017   Little interest or pleasure in doing things Several days   Feeling down, depressed or hopeless Several days   Total Score PHQ 2 2   Trouble falling or staying asleep, or sleeping too much Not at all   Feeling tired or having little energy Not at all   Poor appetite or overeating Not at all   Feeling bad about yourself - or that you are a failure or have let yourself or your family down Not at all   Trouble concentrating on things such as school, work, reading or watching TV Not at all   Moving or speaking so slowly that other people could have noticed; or the opposite being so fidgety that others notice Not at all   Thoughts of being better off dead, or hurting yourself in some way Not at all   PHQ 9 Score 2   How difficult have these problems made it for you to do your work, take care of your home and get along with others Not difficult at all       Fall Risk Assessment:    Fall Risk Assessment, last 12 mths 3/29/2017   Able to walk? No   Fall in past 12 months? Yes   Number of falls in past 12 months 2       Functional Ability:   Does the patient exhibit a steady gait?  no   How long did it take the patient to get up and walk from a sitting position? Wheelchair bound due to bilateral knee contractures   Is the patient self reliant?  (ie can do own laundry, meals, household chores)  no     Does the patient handle his/her own medications?  no     Does the patient handle his/her own money? no     Is the patients home safe (ie good lighting, handrails on stairs and bath, etc.)? Yes-  In assisted living facility     Did you notice or did patient express any hearing difficulties? Yes, has hearing aids     Did you notice or did patient express any vision difficulties? Yes, sees ophth for this regularly     Were distance and reading eye charts used? yes       Advance Care Planning:   Patient was offered the opportunity to discuss advance care planning:  yes     Does patient have an Advance Directive:  Yes, copy is in the chart. If no, did you provide information on Caring Connections? yes       Plan:    Advance Care Planning (ACP) Provider Note - Comprehensive     Date of ACP Conversation: 04/30/17  Persons included in Conversation:  patient  Length of ACP Conversation in minutes:  <16 minutes (Non-Billable)    Authorized Decision Maker (if patient is incapable of making informed decisions):    This person is:  Healthcare Agent/Medical Power of  under Advance Directive          General ACP for ALL Patients with Decision Making Capacity:   Importance of advance care planning, including choosing a healthcare agent to communicate patient's healthcare decisions if patient lost the ability to make decisions, such as after a sudden illness or accident    Review of Existing Advance Directive:  no changes desired    For Serious or Chronic Illness:  No known illness    Interventions Provided:  Recommended communicating the plan and making copies for the healthcare agent, personal physician, and others as appropriate (e.g., health system)  Recommended review of completed ACP document annually or upon change in health status    Orders Placed This Encounter    CULTURE, URINE    URINALYSIS W/ [address] Maintenance   Topic Date Due    DTaP/Tdap/Td series (1 - Tdap) 05/04/2012    MEDICARE YEARLY EXAM  06/17/2016    GLAUCOMA SCREENING Q2Y  08/26/2018    ZOSTER VACCINE AGE 60>  Addressed    Pneumococcal 65+ Low/Medium Risk  Addressed    INFLUENZA AGE 9 TO ADULT  Completed       *Patient verbalized understanding and agreement with the plan. A copy of the After Visit Summary with personalized health plan was given to the patient today.

## 2017-05-05 ENCOUNTER — TELEPHONE (OUTPATIENT)
Dept: FAMILY MEDICINE CLINIC | Age: 82
End: 2017-05-05

## 2017-05-05 NOTE — TELEPHONE ENCOUNTER
[facility]    -       [phone]     -  Ordered a repeat UA and un-able to receive- Requesting to speak with nurse and also need to discuss some other issues that she stated she would speak to the nurse-

## 2017-05-12 ENCOUNTER — TELEPHONE (OUTPATIENT)
Dept: FAMILY MEDICINE CLINIC | Age: 82
End: 2017-05-12

## 2017-05-12 DIAGNOSIS — R41.0 CONFUSION: Primary | ICD-10-CM

## 2017-05-12 RX ORDER — NITROFURANTOIN 25; 75 MG/1; MG/1
100 CAPSULE ORAL 2 TIMES DAILY
Qty: 14 CAP | Refills: 0 | Status: SHIPPED | OUTPATIENT
Start: 2017-05-12 | End: 2017-05-25

## 2017-05-12 NOTE — TELEPHONE ENCOUNTER
MD Radha Antoine LPN       Caller: Unspecified (Today,  2:40 PM)                     OK UC&S; start Macrobid 100 mg BID x 7 days after urine sample obtained

## 2017-05-12 NOTE — TELEPHONE ENCOUNTER
Patients daughter, Debbie Matta is calling, Debbie Matta states that she has spoken to the nurses at [facility] (were her father is living) because the patient has been very confused lately (thinks they are going to pick her mom up from the airport and she has been  for 1 year), and Debbie Matta thinks that the patient has a UTI and has had symptoms of this in the past when he was diagnosed with this. The nurse at [facility] states that a new order needs to be sent to there fax for a urinalysis so that they can diagnose this and Debbie Matta needs to request this to be done.      Best call back # for [phone]

## 2017-05-19 ENCOUNTER — TELEPHONE (OUTPATIENT)
Dept: FAMILY MEDICINE CLINIC | Age: 82
End: 2017-05-19

## 2017-05-19 NOTE — TELEPHONE ENCOUNTER
[facility] health request verbal order for PT and psych consult. Facility reports patient more unsteady and aggressive at times.  [id number] [address] for both

## 2017-05-22 ENCOUNTER — TELEPHONE (OUTPATIENT)
Dept: FAMILY MEDICINE CLINIC | Age: 82
End: 2017-05-22

## 2017-05-22 NOTE — TELEPHONE ENCOUNTER
----- Message from Chandana Posada sent at 5/22/2017  8:29 AM EDT -----  Regarding: Dr. Josefina Walters  Pt's daughter Jules Melissa stated that she would like a call back in regards to scheduling an appointment for the pt to be seen sooner. There is no availability for the the doctor in the upcoming weeks and she states that he is not eating and is suffering from disorientation. Her best contact number is [phone].

## 2017-05-22 NOTE — TELEPHONE ENCOUNTER
Appointment made for patient for Thursday @ 12:45pm with Dr. Eze Tello (due to Dr. Ninfa Garay absence this week) & appointment made for 6/2/2017 @ 10:15am with Dr. Tarah Lebron to address this issue with PCP

## 2017-05-23 ENCOUNTER — TELEPHONE (OUTPATIENT)
Dept: FAMILY MEDICINE CLINIC | Age: 82
End: 2017-05-23

## 2017-05-23 NOTE — TELEPHONE ENCOUNTER
Returned call to Luzmaria and verfied that message was received in reference to the [address] for Edwige Juan Ashley.

## 2017-05-23 NOTE — TELEPHONE ENCOUNTER
Cherie/ LAZARO vinson/[facility]  [phone]    Norman Lewis states that the facility where Mr. Rex Krueger lives initially stated that a harmony lift was needed for transfers. Norman Lewis states that she has no problems with transfers with Mr. Rex Krueger and she does not think that a harmony life is appropriate for him. She also states that she doubts that medicare would cover it. Norman Lewis is asking for a return call to verify that this message was received.

## 2017-05-25 ENCOUNTER — OFFICE VISIT (OUTPATIENT)
Dept: FAMILY MEDICINE CLINIC | Age: 82
End: 2017-05-25

## 2017-05-25 VITALS
TEMPERATURE: 97.5 F | OXYGEN SATURATION: 96 % | HEIGHT: 74 IN | RESPIRATION RATE: 15 BRPM | SYSTOLIC BLOOD PRESSURE: 96 MMHG | DIASTOLIC BLOOD PRESSURE: 63 MMHG | HEART RATE: 80 BPM

## 2017-05-25 DIAGNOSIS — R19.8 ALTERNATING CONSTIPATION AND DIARRHEA: ICD-10-CM

## 2017-05-25 DIAGNOSIS — N39.0 RECURRENT UTI: Primary | ICD-10-CM

## 2017-05-25 DIAGNOSIS — M25.511 ACUTE PAIN OF RIGHT SHOULDER: ICD-10-CM

## 2017-05-25 DIAGNOSIS — G20 PARKINSON'S DISEASE (HCC): ICD-10-CM

## 2017-05-25 DIAGNOSIS — F02.80 DEMENTIA DUE TO PARKINSON'S DISEASE WITHOUT BEHAVIORAL DISTURBANCE (HCC): ICD-10-CM

## 2017-05-25 DIAGNOSIS — R33.9 URINARY RETENTION: ICD-10-CM

## 2017-05-25 DIAGNOSIS — G20 DEMENTIA DUE TO PARKINSON'S DISEASE WITHOUT BEHAVIORAL DISTURBANCE (HCC): ICD-10-CM

## 2017-05-25 NOTE — PATIENT INSTRUCTIONS

## 2017-05-25 NOTE — LETTER
5/25/2017 1:43 PM 
 
Mr. Leon Camacho [address] [place] [phone] To whom it may concern at [facility], 1. Please write down when he has a bowel movement, as it is hard to keep track of his constipation versus diarrhea and get the miralax dose right. Please fax us the record once weekly to [phone]. 
 
2. Please encourage him to urinate every 2 hours (during the day) to prevent the bladder from getting too full. 3.  Please collect a urine sample for urinalysis and culture. 4. Please help him soak left ear with half warm water and half peroxide daily for the next week. Sincerely, Janine Bruno MD

## 2017-05-25 NOTE — LETTER
5/25/2017 1:30 PM 
 
Mr. Chelsea Dickens [address] [place] [phone] To whom it may concern at [facility] 1. Please write down when he has a bowel movement, as it is hard to keep track of his constipation versus diarrhea and get the miralax dose right. Please fax us the record once weekly to [phone]. 
 
2. Please encourage him to urinate every 2 hours (during the day) to prevent the bladder from getting too full. 3.  Please collect a urine sample for urinalysis and culture. Sincerely, Shree Leal MD

## 2017-05-25 NOTE — PROGRESS NOTES
Leonidas Comer [address] [place], [address]  [phone]             Date of visit: 2017   Subjective:      History obtained from:  Patient, daughter. Arnulfo Gallagher is a 80 y.o. male who presents today for worried about uti due to confusion  Often has urinary symptoms as he has chronic retention    For a while had a catheter for about a month  Actually got more UTIs and went septic, that was about a year ago  Can't stand up  When he has to go uses urinal or wears depends or calls for help to get to toilet  Will need orders to assisted living. Had a UTI 1 mo ago, treated with keflex  Then had neg urinary culture 2 weeks ago but was treated with macrobid     Was badly confused again for a few days, forgetting his routine, forgetting his wife had passed  Little better today  Didn't get calls yesterday  Has caregiver that comes 3x per week    Also having quite a bit of diarrhea    Appetite is down. When diarrhea started was 2x per day for 2 days, then stopped, then once in middle of the night.   Daughter says on Tuesday he had several bouts in the night  Diarrhea seems to have resolved in past 24 hours  Can't feel the urge to go    Also c/o right shoulder pain since a fall last week  Getting better, ROM is better  Just still hurts some    Patient Active Problem List    Diagnosis Date Noted    ACP (advance care planning) 2017    Influenza A 2017    Pacemaker end of life 2017    Pneumonia 2017    Aneurysm of iliac artery ([facility]- left 3.9 cm  CT scan- no change 2016    Grief- wife of 61 years  2015    Primary osteoarthritis of both knees 2015    Overactive bladder- Dr. Adonis Aguirre 2015    Rupture quadriceps tendon- bilat--2014- UNABLE TO WALK AFTER THAT. 2014    Mild cognitive impairment w/o memory loss 2014    Major depressive disorder, single episode, unspecified 2014  Generalized anxiety disorder 11/19/2014    Pacemaker 10/21/2013    Orthostatic hypotension 09/28/2013    Blurry vision-chronic, no change- neg w/u 07/09/2013    NSVT (nonsustained ventricular tachycardia) ([facility]) 07/20/2012    PAT (paroxysmal atrial tachycardia) ([facility]) 01/19/2012    Sick sinus syndrome-pacemaker-2008 10/28/2011    BPH (benign prostatic hyperplasia)- Dr. Olvin Philip 10/03/2011    Essential hypertension, difficult to control due to orthostatic hypotension 04/18/2011    Parkinsonian syndrome- Dr. Keyshawn Eng 04/08/2011    Hyperlipemia 02/19/2010    Reflux esophagitis 02/19/2010    DDD (degenerative disc disease), lumbar 02/19/2010    DJD (degenerative joint disease) of cervical spine 02/19/2010    Paralysis agitans ([address] 02/19/2010    DU (duodenal ulcer) 09/01/1990    History of duodenal ulcer 09/01/1990     Current Outpatient Prescriptions   Medication Sig Dispense Refill    LORazepam (ATIVAN) 0.5 mg tablet Take  by mouth every six (6) hours as needed for Anxiety.  QUEtiapine (SEROQUEL) 25 mg tablet Take 25 mg by mouth every evening.  DETROL LA 2 mg ER capsule TAKE 1 CAPSULE DAILY FOR BLADDER FREQUENCY 90 Cap 3    pindolol (VISKIN) 5 mg tablet Take 2.5 mg by mouth daily (after lunch).  albuterol (PROVENTIL HFA, VENTOLIN HFA, PROAIR HFA) 90 mcg/actuation inhaler Take 2 Puffs by inhalation every six (6) hours as needed for Wheezing. 1 Inhaler 0    carbidopa-levodopa (SINEMET)  mg per tablet Take 2 Tabs by mouth four (4) times daily. (This is given at 8:00, 11:00, 14:00, and 21:00). 240 Tab 0    polyethylene glycol (MIRALAX) 17 gram packet Take 1 Packet by mouth daily. Indications: CONSTIPATION 30 Packet 0    therapeutic multivitamin (THERAGRAN) tablet Take 1 Tab by mouth daily. 30 Tab 0    traMADol (ULTRAM) 50 mg tablet Take 1 Tab by mouth every eight (8) hours as needed for Pain.  Max Daily Amount: 150 mg. 30 Tab 1    cloNIDine HCl (CATAPRES) 0.1 mg tablet Take 1 Tab by mouth every six (6) hours as needed (SBP > 180 mmHg). 20 Tab 0    droxidopa (NORTHERA) 100 mg cap Take 100 mg by mouth three (3) times daily. Indications: SYMPTOMATIC ORTHOSTATIC HYPOTENSION 90 Tab 1    docusate sodium (COLACE) 100 mg capsule Take 1 Cap by mouth daily as needed for Constipation. 30 Cap 0    cycloSPORINE (RESTASIS) 0.05 % ophthalmic emulsion Administer 1 Drop to both eyes two (2) times a day. 60 Each 0    escitalopram oxalate (LEXAPRO) 20 mg tablet Take 1 Tab by mouth daily. 30 Tab 0    rivastigmine (EXELON) 4.6 mg/24 hr patch 1 Patch by TransDERmal route daily. 30 Patch 0    esomeprazole (NEXIUM) 40 mg capsule Take 1 Cap by mouth daily. 30 Cap 0    acetaminophen (MAPAP EXTRA STRENGTH) 500 mg tablet Take 1 Tab by mouth every six (6) hours as needed for Pain.  120 Tab 0     No Known Allergies  Past Medical History:   Diagnosis Date    Blurry vision 1/19/2012    BPH (benign prostatic hyperplasia)     DDD (degenerative disc disease), lumbar 2/19/2010    DJD (degenerative joint disease) of cervical spine 2/19/2010    DU (duodenal ulcer) 9/1/1990    Hyperlipidemia     Hypertension     Other and unspecified hyperlipidemia 2/19/2010    Pacemaker     Paralysis agitans ([address] 2/19/2010    Parkinson disease ([address]     PAT [facility]ox[facility]mal [facility]cardia) [facility])     Premature atrial beats     Reflux esophagitis 2/19/2010    Sick sinus syndrome [facility])      Past Surgical History:   Procedure Laterality Date    HX CATARACT REMOVAL  6/2012 and 7/2012    Both eyes    HX ORTHOPAEDIC  04/2015    tendon repair both knees    HX PACEMAKER  2008    bradycardia    HX VITRECTOMY  11/2011    CT REMVL PERM PM PLS GEN W/REPL PLSE GEN 2 LEAD SYS  3/13/2017          Family History   Problem Relation Age of Onset    Asthma Mother     Heart Disease Father      Social History   Substance Use Topics    Smoking status: Former Smoker     Types: Pipe     Quit date: 7/14/1945    Smokeless tobacco: Never Used  Alcohol use 2.0 oz/week     4 Cans of beer per week      Comment: glass of wine every now and then      Social History     Social History Narrative        Review of Systems  Gen: denies fever  GI denies vomiting     Objective:     Vitals:    05/25/17 1251   BP: 96/63   Pulse: 80   Resp: 15   Temp: 97.5 °F (36.4 °C)   TempSrc: Oral   SpO2: 96%   Height: 6' 2\" (1.88 m)     There is no height or weight on file to calculate BMI. General: stated age, well nourished and in NAD, in wheelchair  Neck: supple, symmetrical, trachea midline, no adenopathy and thyroid: not enlarged, symmetric, no tenderness/mass/nodules  Lungs:  clear to auscultation w/o rales, rhonchi, wheezes w/normal effort and no use of accessory muscles of respiration   Heart: regular rate and rhythm, S1, S2 normal, no murmur, click, rub or gallop  Abdomen: soft, nontender, no masses, BS normal  Ext:  No edema noted. Lymph: no cervical adenopathy appreciated  Skin:  Normal. and no rash or abnormalities   Psych: alert, able to answer questions appropriately with only  Mild confusion apparent  MSK: right shoulder without bony tenderness, has 150 deg active abduction    Assessment/Plan:       ICD-10-CM ICD-9-CM    1. Recurrent UTI N39.0 599.0 URINALYSIS W/ RFLX MICROSCOPIC      CULTURE, URINE   2. Urinary retention R33.9 788.20    3. Parkinson's disease ([facility] G20 332.0    4. Dementia due to Parkinson's disease without behavioral disturbance ([facility] G20 332.0     F02.80 294.10    5. Alternating constipation and diarrhea R19.8 787.99    6. Acute pain of right shoulder M25.511 719.41     fell 1 week ago, will call if wanting PT but try to keep it moving on its own for now        Orders Placed This Encounter    CULTURE, URINE    URINALYSIS W/ RFLX MICROSCOPIC     Recurrent UTI and urinary retention but not much else to do.   Will check urine, treat if he gets symptoms again  However, it seems his disease is progressing  Recommended considering hospice, I think their services may be very helpful for him  He and daughter will discuss if he is at the point where he doesn't want to go back in the hospital (daughter said today she didn't want to take him to the ER for this, and I agree that is a wise decision)  May be at the point where he needs a higher level of care, but daughter thinks they are managing with this assisted facility living for now, along with an extra caregiver to check on him 3 days per week. The diarrhea doesn't sound severe enough to be c diff and is actually getting better  Usually constipated  May be a tough balance between the 2  Asked the facility to keep a record of when he has BM    Discussed the diagnosis and plan and he expressed understanding. Follow-up Disposition:  Return if symptoms worsen or fail to improve and as planned with PCP.     Irina Hawk MD

## 2017-05-25 NOTE — PROGRESS NOTES
Chief Complaint   Patient presents with    Urinary Hesitancy       1. Have you been to the ER, urgent care clinic since your last visit? Hospitalized since your last visit? No    2. Have you seen or consulted any other health care providers outside of the [address] [place] [address] since your last visit? Include any pap smears or colon screening. No    There is no height or weight on file to calculate BMI.

## 2017-05-25 NOTE — MR AVS SNAPSHOT
Visit Information Date & Time Provider Department Dept. Phone Encounter #  
 5/25/2017 12:45 PM Michelle Odonnell, [address] [facility] [address] [phone] [id number] Your Appointments 6/2/2017 10:15 AM  
ROUTINE CARE with Apoorva Shrestha MD  
[facility]) Appt Note: patient still very confused/0cp 0bp clg 5/22/2017  
 [address] [facility] [address]  
[phone]  
  
   
 [address] [id number]  
  
    
 4/23/2018  1:45 PM  
PACEMAKER with Marcelyn Goodpasture [facility] ([facility] SCHEDUL[facility]) Appt Note: lachelle callaway, annual thresh/CL, see gilman  
 [address]  [address] [place] [address]  
[address] [facility]  
  
    
 4/23/2018  2:00 PM  
ESTABLISHED PATIENT with Montey Schirmer, MD  
[facility] ([address]) Appt Note: lachelle callaway, annual thresh/CL, see gilman  
 [address]  [address] [place]  
[address] [address]  
  
    
  
 7/3/2017 10:45 AM  
REMOTE OFFICE VISIT with Jinnylinda Jay [facility] ([facility] SCHEDUL[facility]) Appt Note: lachelle callaway b 3/27/17  
 [address] [place]  
[address] [address]  
  
    
 10/9/2017 10:00 AM  
[facility]) Appt Note: lachelle callaway  
 [address] [facility] [address] [place]  
[phone]  
  
    
 1/15/2018  9:45 AM  
REMOTE OFFICE VISIT [facility] ([facility]) Appt Note: lachelle callaway  
 [address] [facility] [address] [place]  
[phone] Upcoming Health Maintenance  Date Due  
 DTaP/Tdap/Td series (1 - Tdap) 5/4/2012 INFLUENZA AGE 9 TO ADULT 8/1/2017 MEDICARE YEARLY EXAM 4/29/2018 GLAUCOMA SCREENING Q2Y 8/26/2018 Allergies as of 5/25/2017  Review Complete On: 5/25/2017 By: Keegan Schultz MD  
 No Known Allergies Current Immunizations  Reviewed on 3/14/2017 Name Date Influenza High Dose Vaccine PF 10/27/2015 Influenza Vaccine Split 11/17/2011 Pneumococcal Vaccine (Unspecified Type) 4/20/2010 TB Skin Test (PPD) Intradermal 3/25/2013 TD Vaccine 5/3/2012 Not reviewed this visit You Were Diagnosed With   
  
 Codes Comments Recurrent UTI    -  Primary ICD-10-CM: N39.0 ICD-9-CM: 599.0 Urinary retention     ICD-10-CM: R33.9 ICD-9-CM: 788.20 Parkinson's disease ([facility]     ICD-10-CM: G20 
ICD-9-CM: 332.0 Dementia due to Parkinson's disease without behavioral disturbance ([facility]     ICD-10-CM: G20, F02.80 ICD-9-CM: 332.0, 294.10 Alternating constipation and diarrhea     ICD-10-CM: R19.8 ICD-9-CM: 787.99 Vitals BP Pulse Temp Resp Height(growth percentile) SpO2  
 96/63 (BP 1 Location: Left arm, BP Patient Position: Sitting) 80 97.5 °F (36.4 °C) (Oral) 15 6' 2\" (1.88 m) 96% Smoking Status Former Smoker Vitals History Preferred Pharmacy Pharmacy Name Phone [address] [facility] [phone] Your Updated Medication List  
  
   
This list is accurate as of: 5/25/17  1:41 PM.  Always use your most recent med list.  
  
  
  
  
 acetaminophen 500 mg tablet Commonly known as:  MAPAP EXTRA STRENGTH Take 1 Tab by mouth every six (6) hours as needed for Pain. albuterol 90 mcg/actuation inhaler Commonly known as:  PROVENTIL HFA, VENTOLIN HFA, PROAIR HFA Take 2 Puffs by inhalation every six (6) hours as needed for Wheezing. carbidopa-levodopa  mg per tablet Commonly known as:  SINEMET  
 Take 2 Tabs by mouth four (4) times daily. (This is given at 8:00, 11:00, 14:00, and 21:00). cloNIDine HCl 0.1 mg tablet Commonly known as:  CATAPRES Take 1 Tab by mouth every six (6) hours as needed (SBP > 180 mmHg). cycloSPORINE 0.05 % ophthalmic emulsion Commonly known as:  RESTASIS Administer 1 Drop to both eyes two (2) times a day. DETROL LA 2 mg ER capsule Generic drug:  tolterodine ER  
TAKE 1 CAPSULE DAILY FOR BLADDER FREQUENCY  
  
 docusate sodium 100 mg capsule Commonly known as:  Gemma Batters Take 1 Cap by mouth daily as needed for Constipation. droxidopa 100 mg Cap capsule Commonly known as:  Delbert Shave Take 100 mg by mouth three (3) times daily. Indications: SYMPTOMATIC ORTHOSTATIC HYPOTENSION  
  
 escitalopram oxalate 20 mg tablet Commonly known as:  Marifer Peak Take 1 Tab by mouth daily. esomeprazole 40 mg capsule Commonly known as:  NexIUM Take 1 Cap by mouth daily. LORazepam 0.5 mg tablet Commonly known as:  ATIVAN Take  by mouth every six (6) hours as needed for Anxiety. pindolol 5 mg tablet Commonly known as:  VISKIN Take 2.5 mg by mouth daily (after lunch). polyethylene glycol 17 gram packet Commonly known as:  Ulysses Jamarcus Take 1 Packet by mouth daily. Indications: CONSTIPATION  
  
 QUEtiapine 25 mg tablet Commonly known as:  SEROquel Take 25 mg by mouth every evening. rivastigmine 4.6 mg/24 hr patch Commonly known as:  EXELON  
1 Patch by TransDERmal route daily. therapeutic multivitamin tablet Commonly known as:  [facility] Take 1 Tab by mouth daily. traMADol 50 mg tablet Commonly known as:  ULTRAM  
Take 1 Tab by mouth every eight (8) hours as needed for Pain. Max Daily Amount: 150 mg. To-Do List   
 05/25/2017 Microbiology:  CULTURE, URINE   
  
 05/25/2017 Lab:  URINALYSIS W/ RFLX MICROSCOPIC Patient Instructions Urinary Tract Infections in Men: Care Instructions Your Care Instructions A urinary tract infection, or UTI, is a general term for an infection anywhere between the kidneys and the tip of the penis. UTIs can also be a result of a prostate problem. Most cause pain or burning when you urinate. Most UTIs are caused by bacteria and can be cured with antibiotics. It is important to complete your treatment so that the infection does not get worse. Follow-up care is a key part of your treatment and safety. Be sure to make and go to all appointments, and call your doctor if you are having problems. It's also a good idea to know your test results and keep a list of the medicines you take. How can you care for yourself at home? · Take your antibiotics as prescribed. Do not stop taking them just because you feel better. You need to take the full course of antibiotics. · Take your medicines exactly as prescribed. Your doctor may have prescribed a medicine, such as phenazopyridine (Pyridium), to help relieve pain when you urinate. This turns your urine orange. You may stop taking it when your symptoms get better. But be sure to take all of your antibiotics, which treat the infection. · Drink extra water for the next day or two. This will help make the urine less concentrated and help wash out the bacteria causing the infection. (If you have kidney, heart, or liver disease and have to limit your fluids, talk with your doctor before you increase your fluid intake.) · Avoid drinks that are carbonated or have caffeine. They can irritate the bladder. · Urinate often. Try to empty your bladder each time. · To relieve pain, take a hot bath or lay a heating pad (set on low) over your lower belly or genital area. Never go to sleep with a heating pad in place. To help prevent UTIs · Drink plenty of fluids, enough so that your urine is light yellow or clear like water.  If you have kidney, heart, or liver disease and have to limit fluids, talk with your doctor before you increase the amount of fluids you drink. · Urinate when you have the urge. Do not hold your urine for a long time. Urinate before you go to sleep. · Keep your penis clean. Catheter care If you have a drainage tube (catheter) in place, the following steps will help you care for it. · Always wash your hands before and after touching your catheter. · Check the area around the urethra for inflammation or signs of infection. Signs of infection include irritated, swollen, red, or tender skin, or pus around the catheter. · Clean the area around the catheter with soap and water two times a day. Dry with a clean towel afterward. · Do not apply powder or lotion to the skin around the catheter. To empty the urine collection bag · Wash your hands with soap and water. · Without touching the drain spout, remove the spout from its sleeve at the bottom of the collection bag. Open the valve on the spout. · Let the urine flow out of the bag and into the toilet or a container. Do not let the tubing or drain spout touch anything. · After you empty the bag, clean the end of the drain spout with tissue and water. Close the valve and put the drain spout back into its sleeve at the bottom of the collection bag. · Wash your hands with soap and water. When should you call for help? Call your doctor now or seek immediate medical care if: · Symptoms such as a fever, chills, nausea, or vomiting get worse or happen for the first time. · You have new pain in your back just below your rib cage. This is called flank pain. · There is new blood or pus in your urine. · You are not able to take or keep down your antibiotics. Watch closely for changes in your health, and be sure to contact your doctor if: 
· You are not getting better after taking an antibiotic for 2 days. · Your symptoms go away but then come back. Where can you learn more? Go to http://rodo-lukas.info/. Enter B037 in the search box to learn more about \"Urinary Tract Infections in Men: Care Instructions. \" Current as of: November 28, 2016 Content Version: 11.2 © 3404-3188 [employer]. Care instructions adapted under license by [employer] (which disclaims liability or warranty for this information). If you have questions about a medical condition or this instruction, always ask your healthcare professional. [address] any warranty or liability for your use of this information. Introducing [facility]! Dear Alanna Fearing: Thank you for requesting a [employer] account. Our records indicate that you already have an active [employer] account. You can access your account anytime at [email or web address]. [email or web address] Did you know that you can access your hospital and ER discharge instructions at any time in [employer]? You can also review all of your test results from your hospital stay or ER visit. Additional Information If you have questions, please visit the Frequently Asked Questions section of the [employer] website at [email or web address] Remember, [employer] is NOT to be used for urgent needs. For medical emergencies, dial 911. Now available from your iPhone and Android! Please provide this summary of care documentation to your next provider. Your primary care clinician is listed as [address] [address], [facility]/s . If you have any questions after today's visit, please call [phone].

## 2017-05-31 ENCOUNTER — TELEPHONE (OUTPATIENT)
Dept: CARDIOLOGY CLINIC | Age: 82
End: 2017-05-31

## 2017-05-31 ENCOUNTER — TELEPHONE (OUTPATIENT)
Dept: FAMILY MEDICINE CLINIC | Age: 82
End: 2017-05-31

## 2017-05-31 NOTE — TELEPHONE ENCOUNTER
Received fax refill request for [facility]. Dr Luz Osullivan filled out and signed. Faxed back to [phone]. Fax confirmation received.

## 2017-05-31 NOTE — TELEPHONE ENCOUNTER
Brisa phone [phone]  Fax [phone] culture on urine which is positive, rx for amox 500mg tid for 10 days faxed

## 2017-06-01 ENCOUNTER — PATIENT OUTREACH (OUTPATIENT)
Dept: FAMILY MEDICINE CLINIC | Age: 82
End: 2017-06-01

## 2017-06-01 NOTE — PROGRESS NOTES
[facility] [place] For:  Jelena Leung, 80 y.o., 1933      Outbound call to patient with chronic conditions,to check on patient's status and do a follow-up visit. Unable to reach at number listed. Patient has followed up with appointment as scheduled. No ED or hospital visits listed in last 30 days. Patient had PCP visit on 17. According to daughter Kimberly Brown), who verified patient by , name and address. Patient is currently at [facility] where he is presently a resident. PCP spoke to daughter about patient possibly needing a more skilled level of care. Patient has history of recurrent UTIs. Reference note by Dr. Kylah Luis MD:  Colleen Almazan seems his disease is progressing  Recommended considering hospice, I think their services may be very helpful for him  He and daughter will discuss if he is at the point where he doesn't want to go back in the hospital (daughter said today she didn't want to take him to the ER for this, and I agree that is a wise decision)  May be at the point where he needs a higher level of care, but daughter thinks they are managing with this assisted facility living for now, along with an extra caregiver to check on him 3 days per week. \"    Patient has history of frequent UTIs, Sent culture on urine which is positive, rx for amox 500mg tid for 10 days faxed to [facility] Patient had a catheter for about a month, but he  got more UTIs and went septic, that was about a year ago, the patient can't stand up. When he has to go uses urinal or wears depends or calls for help to get to toilet    Case management Plan:  NN will continue to attempt contacts with patient by telephone or during office visit within next 60 days. NN will continue to follow as necessary for observation of barriers and to evaluate any goals. NN will reassess for any needed resources prior to discharge.

## 2017-06-02 DIAGNOSIS — N39.0 RECURRENT UTI: ICD-10-CM

## 2017-06-09 ENCOUNTER — TELEPHONE (OUTPATIENT)
Dept: FAMILY MEDICINE CLINIC | Age: 82
End: 2017-06-09

## 2017-06-09 NOTE — TELEPHONE ENCOUNTER
Contact # is [phone]    Laurel Tai, with [facility], is calling to get a verbal order to take nursing off of doctor's order and just have PT and OT.  Please advise

## 2017-06-12 ENCOUNTER — TELEPHONE (OUTPATIENT)
Dept: FAMILY MEDICINE CLINIC | Age: 82
End: 2017-06-12

## 2017-06-13 ENCOUNTER — TELEPHONE (OUTPATIENT)
Dept: FAMILY MEDICINE CLINIC | Age: 82
End: 2017-06-13

## 2017-06-13 NOTE — TELEPHONE ENCOUNTER
Spoke with med nurse who states bp has been stable this week. bp 110-72 range.  No additional medicine has been given

## 2017-06-13 NOTE — TELEPHONE ENCOUNTER
Sebastián Kenney OT therapist from [facility] called stating patient BP is low this am. 80/62 then after 30 minutes 88/64. Patient voices no complaints.  They have to notify PCP per protocol

## 2017-06-13 NOTE — TELEPHONE ENCOUNTER
MD Marcelo Walker LPN        Caller: Unspecified (Yesterday,  4:54 PM)                     OK if not already done.

## 2017-06-13 NOTE — TELEPHONE ENCOUNTER
MD Bc Mcginnis LPN        Caller: Unspecified (Today, 10:12 AM)                     Recent changes in BP meds?  Has prn clonidine- any in past few days- also, need BP readings the past week. Thanks!

## 2017-06-20 ENCOUNTER — TELEPHONE (OUTPATIENT)
Dept: FAMILY MEDICINE CLINIC | Age: 82
End: 2017-06-20

## 2017-06-20 DIAGNOSIS — N30.00 ACUTE CYSTITIS WITHOUT HEMATURIA: Primary | ICD-10-CM

## 2017-06-20 NOTE — TELEPHONE ENCOUNTER
MD Emanuel Briggs LPN        Caller: Unspecified (Today, 10:17 AM)                     There are many causes of confusion; no office visit needed, but need U/A w reflex to [facility] to determine if he needs antibiotic.

## 2017-06-20 NOTE — TELEPHONE ENCOUNTER
Patients daughter, Sarah Cruz is calling, Sarah Cruz states at the last appointment with Dr. Eze Tello she was instructed since the patient is in assisted living [facility]) and has frequent UTI's the patient should not have to come in for an appointment each time he gets one, she states that she was told by Dr. Eze Tello to just call and have the patients PCP call in a prescription for the patient. Sarah Cruz states that her father is having another UTI, Sarah Cruz states that the patient is very confused, she states that he does not know whether it is morning or night, he thinks that his wife is still alive, he keeps stating that he is in the middle of moving and/or going on a vacation. Sarah Cruz is requesting that a antibiotic be faxed to [address] instead of to a pharmacy as they have a preferred pharmacy inside of the assisted living facility. Jessica Steinberg I would send back a message to Dr. Maral Victor nurse and if there were any issues Gume Alba would be calling her back, if not, the antibiotic will be sent over.      Best call back # for patient: [phone]

## 2017-06-22 ENCOUNTER — TELEPHONE (OUTPATIENT)
Dept: FAMILY MEDICINE CLINIC | Age: 82
End: 2017-06-22

## 2017-06-22 RX ORDER — SULFAMETHOXAZOLE AND TRIMETHOPRIM 800; 160 MG/1; MG/1
1 TABLET ORAL 2 TIMES DAILY
Qty: 20 TAB | Refills: 0 | Status: SHIPPED | OUTPATIENT
Start: 2017-06-22 | End: 2017-07-02

## 2017-06-23 ENCOUNTER — TELEPHONE (OUTPATIENT)
Dept: FAMILY MEDICINE CLINIC | Age: 82
End: 2017-06-23

## 2017-06-23 NOTE — TELEPHONE ENCOUNTER
Galdino Gonzlaez, Physical Therapy Assistant from [facility] is calling, Galdino Gonzalez states that she would like to let Dr. Too Perez know that the patient fell yesterday.  Galdino Gonzalez states it appeared to the nurses that he may have been trying to get out of bed, she states that she saw the patient today and he stated that he was fine, no bruising, no sore spots, etc.    Best call back # for Galdino Gonzalez if needed: [phone]

## 2017-06-26 ENCOUNTER — APPOINTMENT (OUTPATIENT)
Dept: CT IMAGING | Age: 82
DRG: 092 | End: 2017-06-26
Attending: EMERGENCY MEDICINE
Payer: MEDICARE

## 2017-06-26 ENCOUNTER — HOSPITAL ENCOUNTER (INPATIENT)
Age: 82
LOS: 2 days | Discharge: HOME HEALTH | DRG: 092 | End: 2017-06-28
Attending: EMERGENCY MEDICINE | Admitting: HOSPITALIST
Payer: MEDICARE

## 2017-06-26 ENCOUNTER — APPOINTMENT (OUTPATIENT)
Dept: GENERAL RADIOLOGY | Age: 82
DRG: 092 | End: 2017-06-26
Attending: FAMILY MEDICINE
Payer: MEDICARE

## 2017-06-26 DIAGNOSIS — G45.1 HEMISPHERIC CAROTID ARTERY SYNDROME: ICD-10-CM

## 2017-06-26 DIAGNOSIS — R47.89 WORD FINDING DIFFICULTY: ICD-10-CM

## 2017-06-26 PROBLEM — R41.82 ALTERED MENTAL STATUS: Status: ACTIVE | Noted: 2017-06-26

## 2017-06-26 PROBLEM — R47.81 SLURRED SPEECH: Status: ACTIVE | Noted: 2017-06-26

## 2017-06-26 PROBLEM — G45.9 TIA (TRANSIENT ISCHEMIC ATTACK): Status: ACTIVE | Noted: 2017-06-26

## 2017-06-26 LAB
ALBUMIN SERPL BCP-MCNC: 3.9 G/DL (ref 3.5–5)
ALBUMIN/GLOB SERPL: 1 {RATIO} (ref 1.1–2.2)
ALP SERPL-CCNC: 76 U/L (ref 45–117)
ALT SERPL-CCNC: 10 U/L (ref 12–78)
ANION GAP BLD CALC-SCNC: 9 MMOL/L (ref 5–15)
APAP SERPL-MCNC: <2 UG/ML (ref 10–30)
APPEARANCE UR: CLEAR
AST SERPL W P-5'-P-CCNC: 23 U/L (ref 15–37)
BACTERIA URNS QL MICRO: NEGATIVE /HPF
BASOPHILS # BLD AUTO: 0 K/UL (ref 0–0.1)
BASOPHILS # BLD: 0 % (ref 0–1)
BILIRUB SERPL-MCNC: 0.6 MG/DL (ref 0.2–1)
BILIRUB UR QL: NEGATIVE
BNP SERPL-MCNC: 141 PG/ML (ref 0–100)
BUN SERPL-MCNC: 21 MG/DL (ref 6–20)
BUN/CREAT SERPL: 18 (ref 12–20)
CALCIUM SERPL-MCNC: 9 MG/DL (ref 8.5–10.1)
CHLORIDE SERPL-SCNC: 100 MMOL/L (ref 97–108)
CK SERPL-CCNC: 169 U/L (ref 39–308)
CO2 SERPL-SCNC: 25 MMOL/L (ref 21–32)
COLOR UR: ABNORMAL
CREAT SERPL-MCNC: 1.18 MG/DL (ref 0.7–1.3)
EOSINOPHIL # BLD: 0.3 K/UL (ref 0–0.4)
EOSINOPHIL NFR BLD: 4 % (ref 0–7)
EPITH CASTS URNS QL MICRO: ABNORMAL /LPF
ERYTHROCYTE [DISTWIDTH] IN BLOOD BY AUTOMATED COUNT: 15.9 % (ref 11.5–14.5)
GLOBULIN SER CALC-MCNC: 4.1 G/DL (ref 2–4)
GLUCOSE BLD STRIP.AUTO-MCNC: 102 MG/DL (ref 65–100)
GLUCOSE SERPL-MCNC: 96 MG/DL (ref 65–100)
GLUCOSE UR STRIP.AUTO-MCNC: NEGATIVE MG/DL
HCT VFR BLD AUTO: 36.2 % (ref 36.6–50.3)
HGB BLD-MCNC: 12 G/DL (ref 12.1–17)
HGB UR QL STRIP: NEGATIVE
HYALINE CASTS URNS QL MICRO: ABNORMAL /LPF (ref 0–5)
INR BLD: <0.9 (ref 0.9–1.2)
KETONES UR QL STRIP.AUTO: NEGATIVE MG/DL
LACTATE SERPL-SCNC: 0.8 MMOL/L (ref 0.4–2)
LEUKOCYTE ESTERASE UR QL STRIP.AUTO: ABNORMAL
LYMPHOCYTES # BLD AUTO: 27 % (ref 12–49)
LYMPHOCYTES # BLD: 1.9 K/UL (ref 0.8–3.5)
MCH RBC QN AUTO: 29.5 PG (ref 26–34)
MCHC RBC AUTO-ENTMCNC: 33.1 G/DL (ref 30–36.5)
MCV RBC AUTO: 88.9 FL (ref 80–99)
MONOCYTES # BLD: 0.4 K/UL (ref 0–1)
MONOCYTES NFR BLD AUTO: 6 % (ref 5–13)
NEUTS SEG # BLD: 4.5 K/UL (ref 1.8–8)
NEUTS SEG NFR BLD AUTO: 63 % (ref 32–75)
NITRITE UR QL STRIP.AUTO: NEGATIVE
PH UR STRIP: 6 [PH] (ref 5–8)
PLATELET # BLD AUTO: 270 K/UL (ref 150–400)
POTASSIUM SERPL-SCNC: 4.2 MMOL/L (ref 3.5–5.1)
PROT SERPL-MCNC: 8 G/DL (ref 6.4–8.2)
PROT UR STRIP-MCNC: NEGATIVE MG/DL
RBC # BLD AUTO: 4.07 M/UL (ref 4.1–5.7)
RBC #/AREA URNS HPF: ABNORMAL /HPF (ref 0–5)
SALICYLATES SERPL-MCNC: <1.7 MG/DL (ref 2.8–20)
SERVICE CMNT-IMP: ABNORMAL
SODIUM SERPL-SCNC: 134 MMOL/L (ref 136–145)
SP GR UR REFRACTOMETRY: 1.02 (ref 1–1.03)
TROPONIN I SERPL-MCNC: <0.04 NG/ML
UROBILINOGEN UR QL STRIP.AUTO: 0.2 EU/DL (ref 0.2–1)
WBC # BLD AUTO: 7.1 K/UL (ref 4.1–11.1)
WBC URNS QL MICRO: ABNORMAL /HPF (ref 0–4)

## 2017-06-26 PROCEDURE — 36415 COLL VENOUS BLD VENIPUNCTURE: CPT | Performed by: EMERGENCY MEDICINE

## 2017-06-26 PROCEDURE — 83880 ASSAY OF NATRIURETIC PEPTIDE: CPT | Performed by: EMERGENCY MEDICINE

## 2017-06-26 PROCEDURE — 80053 COMPREHEN METABOLIC PANEL: CPT | Performed by: EMERGENCY MEDICINE

## 2017-06-26 PROCEDURE — 65270000029 HC RM PRIVATE

## 2017-06-26 PROCEDURE — 84484 ASSAY OF TROPONIN QUANT: CPT | Performed by: EMERGENCY MEDICINE

## 2017-06-26 PROCEDURE — 85610 PROTHROMBIN TIME: CPT

## 2017-06-26 PROCEDURE — 80307 DRUG TEST PRSMV CHEM ANLYZR: CPT | Performed by: FAMILY MEDICINE

## 2017-06-26 PROCEDURE — 71020 XR CHEST PA LAT: CPT

## 2017-06-26 PROCEDURE — 96361 HYDRATE IV INFUSION ADD-ON: CPT

## 2017-06-26 PROCEDURE — 74011250636 HC RX REV CODE- 250/636: Performed by: EMERGENCY MEDICINE

## 2017-06-26 PROCEDURE — 74011000258 HC RX REV CODE- 258: Performed by: EMERGENCY MEDICINE

## 2017-06-26 PROCEDURE — 81001 URINALYSIS AUTO W/SCOPE: CPT | Performed by: EMERGENCY MEDICINE

## 2017-06-26 PROCEDURE — 82550 ASSAY OF CK (CPK): CPT | Performed by: EMERGENCY MEDICINE

## 2017-06-26 PROCEDURE — 83605 ASSAY OF LACTIC ACID: CPT | Performed by: EMERGENCY MEDICINE

## 2017-06-26 PROCEDURE — 99285 EMERGENCY DEPT VISIT HI MDM: CPT

## 2017-06-26 PROCEDURE — 70450 CT HEAD/BRAIN W/O DYE: CPT

## 2017-06-26 PROCEDURE — 96365 THER/PROPH/DIAG IV INF INIT: CPT

## 2017-06-26 PROCEDURE — 72170 X-RAY EXAM OF PELVIS: CPT

## 2017-06-26 PROCEDURE — 82962 GLUCOSE BLOOD TEST: CPT

## 2017-06-26 PROCEDURE — 73560 X-RAY EXAM OF KNEE 1 OR 2: CPT

## 2017-06-26 PROCEDURE — 85025 COMPLETE CBC W/AUTO DIFF WBC: CPT | Performed by: EMERGENCY MEDICINE

## 2017-06-26 RX ORDER — SODIUM CHLORIDE 9 MG/ML
75 INJECTION, SOLUTION INTRAVENOUS CONTINUOUS
Status: DISCONTINUED | OUTPATIENT
Start: 2017-06-26 | End: 2017-06-28

## 2017-06-26 RX ADMIN — SODIUM CHLORIDE 1000 ML: 900 INJECTION, SOLUTION INTRAVENOUS at 20:00

## 2017-06-26 RX ADMIN — CEFTRIAXONE 1 G: 1 INJECTION, POWDER, FOR SOLUTION INTRAMUSCULAR; INTRAVENOUS at 21:01

## 2017-06-26 NOTE — ED TRIAGE NOTES
Triage note:  Pt arrived via EMS from [facility] where the staff say he fell at [phone] and developed slurred speech at 1645. Code S activated and pt to CT.

## 2017-06-26 NOTE — ED PROVIDER NOTES
HPI Comments: 80 y.o. male with past medical history significant for Parksinson's disease, pacemaker, HTN, SSS who presents from [facility] via EMS for evaluation of dysarthria. EMS reports patient fell around [phone] then nursing home noticed that his speech was slurred around 1745. Patient's speech has since cleared and patient states he feels fine. Patient states he did not hit his head when he fell though he complains of some left ankle pain. Patient believes he may have hit the ankle on something. Patient denies any weakness or numbness.  in the ED. Patient has a h/o frequent UTI's. Patient is unsure if he is on an anticoagulant, none listed. There are no other acute medical concerns at this time. Social hx: former smoker, current alcohol drinker  PCP: Annika Padilla MD    Note written by Imtiaz Bradley, as dictated by Hattie Frank MD 6:54 PM     The history is provided by the EMS personnel and the patient. No  was used.         Past Medical History:   Diagnosis Date    Blurry vision 1/19/2012    BPH (benign prostatic hyperplasia)     DDD (degenerative disc disease), lumbar 2/19/2010    DJD (degenerative joint disease) of cervical spine 2/19/2010    DU (duodenal ulcer) 9/1/1990    Hyperlipidemia     Hypertension     Other and unspecified hyperlipidemia 2/19/2010    Pacemaker     Paralysis agitans ([facility] 2/19/2010    Parkinson disease ([facility]     [facility]     Premature atrial beats     Reflux esophagitis 2/19/2010    Sick sinus syndrome [facility])        Past Surgical History:   Procedure Laterality Date    HX CATARACT REMOVAL  6/2012 and 7/2012    Both eyes    HX ORTHOPAEDIC  04/2015    tendon repair both knees    HX PACEMAKER  2008    bradycardia    HX VITRECTOMY  11/2011    RI REMVL PERM PM PLS GEN W/REPL PLSE GEN 2 LEAD SYS  3/13/2017              Family History:   Problem Relation Age of Onset    Asthma Mother     Heart Disease Father        Social History     Social History    Marital status:      Spouse name: N/A    Number of children: N/A    Years of education: N/A     Occupational History    Not on file. Social History Main Topics    Smoking status: Former Smoker     Types: Pipe     Quit date: 7/14/1945    Smokeless tobacco: Never Used    Alcohol use 2.0 oz/week     4 Cans of beer per week      Comment: glass of wine every now and then    Drug use: No    Sexual activity: Not on file     Other Topics Concern     Service Yes    Blood Transfusions No    Caffeine Concern No    Occupational Exposure No    Hobby Hazards No    Sleep Concern No    Stress Concern No    Weight Concern No    Special Diet No    Back Care No    Exercise No    Bike Helmet No    Seat Belt Yes    Self-Exams No     Social History Narrative         ALLERGIES: Review of patient's allergies indicates no known allergies. Review of Systems   Constitutional: Negative for appetite change, chills, fatigue and fever. HENT: Negative for congestion, rhinorrhea and sore throat. Respiratory: Negative for cough and shortness of breath. Cardiovascular: Negative for chest pain and leg swelling. Gastrointestinal: Negative for abdominal pain, constipation, nausea and vomiting. Genitourinary: Negative for difficulty urinating and dysuria. Musculoskeletal: Positive for arthralgias. Negative for back pain and neck pain. Skin: Negative for rash and wound. Neurological: Negative for speech difficulty, weakness, numbness and headaches. All other systems reviewed and are negative. Vitals:    06/26/17 1902   BP: (!) 139/93   Pulse: 87   Resp: 18   SpO2: 95%   Weight: 92.5 kg (204 lb)   Height: 6' 2\" (1.88 m)            Physical Exam   Constitutional: He is oriented to person, place, and time. He appears well-developed and well-nourished. No distress. HENT:   Head: Normocephalic and atraumatic.    Eyes: Conjunctivae are normal. No scleral icterus. Neck: Neck supple. No tracheal deviation present. Cardiovascular: Normal rate, regular rhythm, normal heart sounds and intact distal pulses. Exam reveals no gallop and no friction rub. No murmur heard. Pulmonary/Chest: Effort normal and breath sounds normal. He has no wheezes. He has no rales. Abdominal: Soft. He exhibits no distension. There is no tenderness. There is no rebound and no guarding. Musculoskeletal: He exhibits no edema. Neurological: He is alert and oriented to person, place, and time. No pronator drift   Skin: Skin is warm and dry. No rash noted. Psychiatric: He has a normal mood and affect. Nursing note and vitals reviewed. Note written by Imtiaz Pedraza, as dictated by Lauren Munoz MD 7:17 PM      MDM  Number of Diagnoses or Management Options     Amount and/or Complexity of Data Reviewed  Clinical lab tests: ordered and reviewed  Tests in the radiology section of CPT®: ordered and reviewed  Tests in the medicine section of CPT®: ordered and reviewed  Discussion of test results with the performing providers: yes  Obtain history from someone other than the patient: yes  Discuss the patient with other providers: yes      ED Course       Procedures    PROGRESS NOTE:  8:17 PM  Patient had altered mental status upon arrival, most likely secondary to UTI. Patient has recurrent UTI's. No sign of CVA per lab results or imaging.  Will admi fopr  patient had altered mental status upon arrival, most likely seconary to uti, recurrent uti, no sign cva, will admit for observation and iv abx

## 2017-06-27 ENCOUNTER — APPOINTMENT (OUTPATIENT)
Dept: CT IMAGING | Age: 82
DRG: 092 | End: 2017-06-27
Attending: HOSPITALIST
Payer: MEDICARE

## 2017-06-27 PROBLEM — G45.1 HEMISPHERIC CAROTID ARTERY SYNDROME: Status: ACTIVE | Noted: 2017-06-26

## 2017-06-27 LAB
APTT PPP: 27.6 SEC (ref 22.1–32.5)
CHOLEST SERPL-MCNC: 122 MG/DL
EST. AVERAGE GLUCOSE BLD GHB EST-MCNC: 105 MG/DL
HBA1C MFR BLD: 5.3 % (ref 4.2–6.3)
HDLC SERPL-MCNC: 47 MG/DL
HDLC SERPL: 2.6 {RATIO} (ref 0–5)
INR PPP: 1.1 (ref 0.9–1.1)
LDLC SERPL CALC-MCNC: 65.4 MG/DL (ref 0–100)
LIPID PROFILE,FLP: NORMAL
PROTHROMBIN TIME: 11 SEC (ref 9–11.1)
THERAPEUTIC RANGE,PTTT: NORMAL SECS (ref 58–77)
TRIGL SERPL-MCNC: 48 MG/DL (ref ?–150)
VLDLC SERPL CALC-MCNC: 9.6 MG/DL

## 2017-06-27 PROCEDURE — 74011250637 HC RX REV CODE- 250/637: Performed by: HOSPITALIST

## 2017-06-27 PROCEDURE — 97165 OT EVAL LOW COMPLEX 30 MIN: CPT

## 2017-06-27 PROCEDURE — 80061 LIPID PANEL: CPT | Performed by: FAMILY MEDICINE

## 2017-06-27 PROCEDURE — 83036 HEMOGLOBIN GLYCOSYLATED A1C: CPT | Performed by: FAMILY MEDICINE

## 2017-06-27 PROCEDURE — G8987 SELF CARE CURRENT STATUS: HCPCS

## 2017-06-27 PROCEDURE — 65270000029 HC RM PRIVATE

## 2017-06-27 PROCEDURE — 74011636320 HC RX REV CODE- 636/320: Performed by: HOSPITALIST

## 2017-06-27 PROCEDURE — 74011250636 HC RX REV CODE- 250/636: Performed by: FAMILY MEDICINE

## 2017-06-27 PROCEDURE — 99218 HC RM OBSERVATION: CPT

## 2017-06-27 PROCEDURE — 97530 THERAPEUTIC ACTIVITIES: CPT

## 2017-06-27 PROCEDURE — 97162 PT EVAL MOD COMPLEX 30 MIN: CPT

## 2017-06-27 PROCEDURE — G8988 SELF CARE GOAL STATUS: HCPCS

## 2017-06-27 PROCEDURE — 92610 EVALUATE SWALLOWING FUNCTION: CPT

## 2017-06-27 PROCEDURE — 85610 PROTHROMBIN TIME: CPT | Performed by: FAMILY MEDICINE

## 2017-06-27 PROCEDURE — 70498 CT ANGIOGRAPHY NECK: CPT

## 2017-06-27 PROCEDURE — 74011000258 HC RX REV CODE- 258: Performed by: HOSPITALIST

## 2017-06-27 PROCEDURE — G8979 MOBILITY GOAL STATUS: HCPCS

## 2017-06-27 PROCEDURE — 70496 CT ANGIOGRAPHY HEAD: CPT

## 2017-06-27 PROCEDURE — G8978 MOBILITY CURRENT STATUS: HCPCS

## 2017-06-27 PROCEDURE — C1758 CATHETER, URETERAL: HCPCS

## 2017-06-27 PROCEDURE — 36415 COLL VENOUS BLD VENIPUNCTURE: CPT | Performed by: FAMILY MEDICINE

## 2017-06-27 PROCEDURE — 85730 THROMBOPLASTIN TIME PARTIAL: CPT | Performed by: FAMILY MEDICINE

## 2017-06-27 PROCEDURE — 97161 PT EVAL LOW COMPLEX 20 MIN: CPT

## 2017-06-27 RX ORDER — RIVASTIGMINE 4.6 MG/24H
1 PATCH, EXTENDED RELEASE TRANSDERMAL DAILY
Status: DISCONTINUED | OUTPATIENT
Start: 2017-06-28 | End: 2017-06-28 | Stop reason: HOSPADM

## 2017-06-27 RX ORDER — ACETAMINOPHEN 325 MG/1
650 TABLET ORAL
Status: DISCONTINUED | OUTPATIENT
Start: 2017-06-27 | End: 2017-06-28 | Stop reason: HOSPADM

## 2017-06-27 RX ORDER — SULFAMETHOXAZOLE AND TRIMETHOPRIM 800; 160 MG/1; MG/1
1 TABLET ORAL EVERY 12 HOURS
Status: DISCONTINUED | OUTPATIENT
Start: 2017-06-27 | End: 2017-06-28 | Stop reason: HOSPADM

## 2017-06-27 RX ORDER — LORAZEPAM 0.5 MG/1
0.5 TABLET ORAL
Status: DISCONTINUED | OUTPATIENT
Start: 2017-06-27 | End: 2017-06-28 | Stop reason: HOSPADM

## 2017-06-27 RX ORDER — SODIUM CHLORIDE 0.9 % (FLUSH) 0.9 %
10 SYRINGE (ML) INJECTION
Status: COMPLETED | OUTPATIENT
Start: 2017-06-27 | End: 2017-06-27

## 2017-06-27 RX ORDER — OXYBUTYNIN CHLORIDE 5 MG/1
5 TABLET, EXTENDED RELEASE ORAL DAILY
Status: DISCONTINUED | OUTPATIENT
Start: 2017-06-28 | End: 2017-06-28 | Stop reason: HOSPADM

## 2017-06-27 RX ORDER — TOLTERODINE 2 MG/1
2 CAPSULE, EXTENDED RELEASE ORAL DAILY
Status: DISCONTINUED | OUTPATIENT
Start: 2017-06-28 | End: 2017-06-27 | Stop reason: CLARIF

## 2017-06-27 RX ORDER — QUETIAPINE FUMARATE 25 MG/1
25 TABLET, FILM COATED ORAL EVERY EVENING
Status: DISCONTINUED | OUTPATIENT
Start: 2017-06-27 | End: 2017-06-28 | Stop reason: HOSPADM

## 2017-06-27 RX ORDER — CLONIDINE HYDROCHLORIDE 0.1 MG/1
0.1 TABLET ORAL
Status: DISCONTINUED | OUTPATIENT
Start: 2017-06-27 | End: 2017-06-28 | Stop reason: HOSPADM

## 2017-06-27 RX ORDER — SODIUM CHLORIDE 0.9 % (FLUSH) 0.9 %
5-10 SYRINGE (ML) INJECTION EVERY 8 HOURS
Status: DISCONTINUED | OUTPATIENT
Start: 2017-06-27 | End: 2017-06-28 | Stop reason: HOSPADM

## 2017-06-27 RX ORDER — ASPIRIN 81 MG/1
81 TABLET ORAL DAILY
Status: DISCONTINUED | OUTPATIENT
Start: 2017-06-28 | End: 2017-06-28 | Stop reason: HOSPADM

## 2017-06-27 RX ORDER — ESCITALOPRAM OXALATE 10 MG/1
20 TABLET ORAL DAILY
Status: DISCONTINUED | OUTPATIENT
Start: 2017-06-28 | End: 2017-06-28 | Stop reason: HOSPADM

## 2017-06-27 RX ORDER — SODIUM CHLORIDE 0.9 % (FLUSH) 0.9 %
5-10 SYRINGE (ML) INJECTION AS NEEDED
Status: DISCONTINUED | OUTPATIENT
Start: 2017-06-27 | End: 2017-06-28 | Stop reason: HOSPADM

## 2017-06-27 RX ORDER — CARBIDOPA AND LEVODOPA 25; 100 MG/1; MG/1
2 TABLET ORAL 4 TIMES DAILY
Status: DISCONTINUED | OUTPATIENT
Start: 2017-06-27 | End: 2017-06-28 | Stop reason: HOSPADM

## 2017-06-27 RX ORDER — PINDOLOL 5 MG/1
2.5 TABLET ORAL
Status: DISCONTINUED | OUTPATIENT
Start: 2017-06-28 | End: 2017-06-28 | Stop reason: HOSPADM

## 2017-06-27 RX ADMIN — CLONIDINE HYDROCHLORIDE 0.1 MG: 0.1 TABLET ORAL at 17:42

## 2017-06-27 RX ADMIN — SODIUM CHLORIDE 75 ML/HR: 900 INJECTION, SOLUTION INTRAVENOUS at 21:36

## 2017-06-27 RX ADMIN — SODIUM CHLORIDE 100 ML: 900 INJECTION, SOLUTION INTRAVENOUS at 16:00

## 2017-06-27 RX ADMIN — SULFAMETHOXAZOLE AND TRIMETHOPRIM 1 TABLET: 800; 160 TABLET ORAL at 21:39

## 2017-06-27 RX ADMIN — Medication 10 ML: at 16:00

## 2017-06-27 RX ADMIN — Medication 5 ML: at 02:00

## 2017-06-27 RX ADMIN — Medication 5 ML: at 06:00

## 2017-06-27 RX ADMIN — Medication 10 ML: at 14:00

## 2017-06-27 RX ADMIN — CARBIDOPA AND LEVODOPA 2 TABLET: 25; 100 TABLET ORAL at 21:38

## 2017-06-27 RX ADMIN — QUETIAPINE FUMARATE 25 MG: 25 TABLET, FILM COATED ORAL at 17:42

## 2017-06-27 RX ADMIN — LORAZEPAM 0.5 MG: 0.5 TABLET ORAL at 21:51

## 2017-06-27 RX ADMIN — Medication 10 ML: at 21:38

## 2017-06-27 RX ADMIN — SODIUM CHLORIDE 75 ML/HR: 900 INJECTION, SOLUTION INTRAVENOUS at 01:00

## 2017-06-27 RX ADMIN — IOPAMIDOL 100 ML: 755 INJECTION, SOLUTION INTRAVENOUS at 16:00

## 2017-06-27 NOTE — PROGRESS NOTES
Problem: Mobility Impaired (Adult and Pediatric)  Goal: *Acute Goals and Plan of Care (Insert Text)  Physical Therapy Goals  Initiated 6/27/2017  1. Patient will move from supine to sit and sit to supine and roll side to side in bed with modified independence within 7 day(s). 2. Patient will transfer from bed to chair and chair to bed with supervision/set-up using the least restrictive device within 7 day(s). 3. Patient will be able to sit EOB with supervision without loss of balance for 10 minutes within 7 days. 4. Patient will increased knee extension to -80 bilaterally within 7 days. PHYSICAL THERAPY EVALUATION  Patient: Tameka Henderson (52 y.o. male)  Date: 6/27/2017  Primary Diagnosis: TIA (transient ischemic attack)  Altered mental status  Slurred speech  slurred speech        Precautions: contact isol         ASSESSMENT :  Based on the objective data described below, the patient presents with very limited mobility and endurance related to long standing and recently progressive immobility. He reports that he has been wheelchair bound for several years, but has been living in an assisted living facility where he was mostly able to manage his own transfers to his wheelchair and propel it and working with therapy. Over the last several weeks, however, he has needed more and more assist to get to the chair and even uses a sliding board at times. Yesterday, he slid down out of the chair/bed during a transfer. On exam, note his severely contracted LEs, with hip and knee ROM limited to -90 extension. He states that his knees are tight because he had tendon issues in his knees and has never regained ROM after surgery. He was able to sit EOB for a short period before starting to lose his balance posteriorly. He did transfer to a wheelchair with max assist of 2 using a squat pivot. He does have difficulty assisting in concert with the therapist rather than working against the help.   Recommend that he have assist with all transfers even upon D/C and may need to move to a higher level of care until he is able to manage his transfers more safely. We will continue to work with him on transfers and stretching his knees to enable more comfortable positioning in the bed and facilitate transfer safety. Patient will benefit from skilled intervention to address the above impairments. Patients rehabilitation potential is considered to be Fair  Factors which may influence rehabilitation potential include:   [ ]         None noted  [ ]         Mental ability/status  [X]         Medical condition  [ ]         Home/family situation and support systems  [ ]         Safety awareness  [ ]         Pain tolerance/management  [X]         Other: LE contractures       PLAN :  Recommendations and Planned Interventions:  [X]           Bed Mobility Training             [ ]    Neuromuscular Re-Education  [X]           Transfer Training                   [ ]    Orthotic/Prosthetic Training  [ ]           Gait Training                         [ ]    Modalities  [X]           Therapeutic Exercises           [ ]    Edema Management/Control  [X]           Therapeutic Activities            [X]    Patient and Family Training/Education  [ ]           Other (comment):     Frequency/Duration: Patient will be followed by physical therapy  5 times a week to address goals. Discharge Recommendations: Skilled Nursing Facility  Further Equipment Recommendations for Discharge: to be determined       SUBJECTIVE:   Patient stated I didn't fall, I just slid down.       OBJECTIVE DATA SUMMARY:   HISTORY:    Past Medical History:   Diagnosis Date    Blurry vision 1/19/2012    BPH (benign prostatic hyperplasia)      DDD (degenerative disc disease), lumbar 2/19/2010    DJD (degenerative joint disease) of cervical spine 2/19/2010    DU (duodenal ulcer) 9/1/1990    Hyperlipidemia      Hypertension      Other and unspecified hyperlipidemia 2/19/2010    Pacemaker      Paralysis agitans ([facility] [address] 2/19/2010    Parkinson disease ([facility] [address]      PAT [facility]paroxysmal atrial tachycardia) [facility]      Premature atrial beats      Reflux esophagitis 2/19/2010    Sick sinus syndrome [facility])       Past Surgical History:   Procedure Laterality Date    HX CATARACT REMOVAL   6/2012 and 7/2012     Both eyes    HX ORTHOPAEDIC   04/2015     tendon repair both knees    HX PACEMAKER   2008     bradycardia    HX VITRECTOMY   11/2011    [place] REMVL PERM PM PLS GEN W/REPL PLSE GEN 2 LEAD SYS   3/13/2017           Prior Level of Function/Home Situation: transfer to wheelchair only, reports he does not use the leg rests on the chair and that he does get assistance pushing his chair lately  Personal factors and/or comorbidities impacting plan of care: LE contractures, tremor, kyphotic posture, Parkinson's Disease, pacemaker     Home Situation  Home Environment: [address] [place] [address] Road Name: Georgana Burkitt  One/Two Story Residence: One story  Living Alone: Yes (but has aids avaialble at [facility])  Support Systems: Assisted living  Patient Expects to be Discharged to[de-identified] Assisted living  Current DME Used/Available at Home: Wheelchair, Commode, bedside, Oxygen, portable, Adaptive feeding aides  Tub or Shower Type: Shower     EXAMINATION/PRESENTATION/DECISION MAKING:   Critical Behavior:  Neurologic State: Alert  Orientation Level: Oriented X4  Cognition: Appropriate decision making, Appropriate for age attention/concentration, Appropriate safety awareness, Follows commands  Safety/Judgement: Awareness of environment, Lack of insight into deficits, Good awareness of safety precautions  Hearing: Auditory  Auditory Impairment: Hard of hearing, bilateral  Skin:  Grossly intact  Edema: none noted  Range Of Motion:  AROM: Generally decreased, functional           PROM:  (bam hip and knee flexion contractures of 90)           Strength:    Strength:  Within functional limits                    Tone & Sensation: Tone: Normal              Sensation: Intact               Coordination:  Coordination: Generally decreased, functional  Vision:   Tracking: Able to track stimulus in all quadrants w/o difficulty  Corrective Lenses: Glasses  Functional Mobility:  Bed Mobility:  Rolling: Contact guard assistance  Supine to Sit: Contact guard assistance  Sit to Supine: Contact guard assistance     Transfers:  Sit to Stand:  (unable to stand for several years)           Bed to Chair: Maximum assistance;Assist x2; Additional time (to wc)              Balance:   Sitting: Impaired  Sitting - Static: Fair (occasional)  Sitting - Dynamic: Fair (occasional)  Standing:  (NT- wc bound)  Ambulation/Gait Training:                                                                   Functional Measure:  Barthel Index:      Bathin  Bladder: 5  Bowels: 5  Groomin  Dressin  Feedin  Mobility: 0  Stairs: 0  Toilet Use: 0  Transfer (Bed to Chair and Back): 5  Total: 20         Barthel and G-code impairment scale:  Percentage of impairment CH  0% CI  1-19% CJ  20-39% CK  40-59% CL  60-79% CM  80-99% CN  100%   Barthel Score 0-100 100 99-80 79-60 59-40 20-39 1-19    0   Barthel Score 0-20 20 17-19 13-16 9-12 5-8 1-4 0      The Barthel ADL Index: Guidelines  1. The index should be used as a record of what a patient does, not as a record of what a patient could do. 2. The main aim is to establish degree of independence from any help, physical or verbal, however minor and for whatever reason. 3. The need for supervision renders the patient not independent. 4. A patient's performance should be established using the best available evidence. Asking the patient, friends/relatives and nurses are the usual sources, but direct observation and common sense are also important. However direct testing is not needed. 5. Usually the patient's performance over the preceding 24-48 hours is important, but occasionally longer periods will be relevant.   6. Middle categories imply that the patient supplies over 50 per cent of the effort. 7. Use of aids to be independent is allowed. Kindra Noriega., Barthel, D.W. (2132). Functional evaluation: the Barthel Index. [address] (14)2. Eileen Press walter JULIAN Callaway, Andrew Cifuentes., Michele Evans., [place], [address] (1999). Measuring the change indisability after inpatient rehabilitation; comparison of the responsiveness of the Barthel Index and Functional [place] Measure. Journal of Neurology, Neurosurgery, and Psychiatry, 66(4), 999-661. PRAVEEN England, RANDY Rodriguez, & Shannan Handy M.A. (2004.) Assessment of post-stroke quality of life in cost-effectiveness studies: The usefulness of the Barthel Index and the EuroQoL-5D. Quality of Life Research, 13, 592-79         G codes: In compliance with CMSs Claims Based Outcome Reporting, the following G-code set was chosen for this patient based on their primary functional limitation being treated: The outcome measure chosen to determine the severity of the functional limitation was the Barthel with a score of 20/100 which was correlated with the impairment scale.       · Mobility - Walking and Moving Around:               - CURRENT STATUS:    CL - 60%-79% impaired, limited or restricted               - GOAL STATUS:           CJ - 20%-39% impaired, limited or restricted               - D/C STATUS:                       ---------------To be determined---------------      Physical Therapy Evaluation Charge Determination   History Examination Presentation Decision-Making   HIGH Complexity :3+ comorbidities / personal factors will impact the outcome/ POC  MEDIUM Complexity : 3 Standardized tests and measures addressing body structure, function, activity limitation and / or participation in recreation  MEDIUM Complexity : Evolving with changing characteristics  MEDIUM Complexity : FOTO score of 26-74      Based on the above components, the patient evaluation is determined to be of the following complexity level: MEDIUM     Pain:  Pain Scale 1: Numeric (0 - 10)  Pain Intensity 1: 0              Activity Tolerance:   Fair, fatigues quickly  Please refer to the flowsheet for vital signs taken during this treatment. After treatment:   [ ]         Patient left in no apparent distress sitting up in chair  [X]         Patient left in no apparent distress in bed  [X]         Call bell left within reach  [X]         Nursing notified  [ ]         Caregiver present  [ ]         Bed alarm activated      COMMUNICATION/EDUCATION:   The patients plan of care was discussed with: Occupational Therapist and Registered Nurse.  [X]         Fall prevention education was provided and the patient/caregiver indicated understanding. [X]         Patient/family have participated as able in goal setting and plan of care. [X]         Patient/family agree to work toward stated goals and plan of care. [ ]         Patient understands intent and goals of therapy, but is neutral about his/her participation. [ ]         Patient is unable to participate in goal setting and plan of care.      Thank you for this referral.  Royce Mckeon, PT   Time Calculation: 47 mins

## 2017-06-27 NOTE — PROGRESS NOTES
Speech Pathology bedside swallow evaluation/discharge  Patient: Deon Shamra (12 y.o. male)  Date: 6/27/2017  Primary Diagnosis: TIA (transient ischemic attack)  Altered mental status  Slurred speech        Precautions:        ASSESSMENT :  Based on the objective data described below, the patient presents with no oral or pharyngeal dysphagia. Patient with timely and complete mastication of solids. Pharyngeal swallow initiation was timely and hyolaryngeal elevation/excursion functional via palpation. No overt s/s aspiration with successive straw sips of thin, puree or solid. He was on a [facility] diet at [facility]. His speech is at baseline. He reports that over the last month or so he has had occasional word retrieval deficits. Today this was not noticeable and patient able to carry on a fluent conversation. Skilled therapy provided by a speech-language pathologist is not indicated at this time. PLAN :  Recommendations:  -- Regular diet/ thin liquids. Straws ok  -- Meds as tolerated  -- careful positioning for meals given contractures. Will likely benefit from being up in chair position. Discharge Recommendations: None     SUBJECTIVE:   Patient stated My knees don't straighten.     OBJECTIVE:     Past Medical History:   Diagnosis Date    Blurry vision 1/19/2012    BPH (benign prostatic hyperplasia)     DDD (degenerative disc disease), lumbar 2/19/2010    DJD (degenerative joint disease) of cervical spine 2/19/2010    DU (duodenal ulcer) 9/1/1990    Hyperlipidemia     Hypertension     Other and unspecified hyperlipidemia 2/19/2010    Pacemaker     Paralysis agitans ([address] 2/19/2010    Parkinson disease ([address]     PAT [facility]) ([facility]     Premature atrial beats     Reflux esophagitis 2/19/2010    Sick sinus syndrome [facility])      Past Surgical History:   Procedure Laterality Date    HX CATARACT REMOVAL  6/2012 and 7/2012    Both eyes    HX ORTHOPAEDIC  04/2015 tendon repair both knees    HX PACEMAKER  2008    bradycardia    HX VITRECTOMY  11/2011    [place] REMVL PERM PM PLS GEN W/REPL PLSE GEN 2 LEAD SYS  3/13/2017          Prior Level of Function/Home Situation:   Home Situation  Home Environment: [address] [place] [address] Name: [facility]  One/Two Story Residence: One story  Living Alone: No  Support Systems: Skilled nursing facility  Patient Expects to be Discharged to[de-identified] Skilled nursing facility  Current DME Used/Available at Home: Wheelchair  Diet prior to admission: regular/thin at [facility]  Current Diet:  Soft solids/thin    Cognitive and Communication Status:  Neurologic State: Alert  Orientation Level: Appropriate for age  Cognition: Appropriate decision making  Perception: Appears intact  Perseveration: No perseveration noted  Safety/Judgement: Awareness of environment  Oral Assessment:  Oral Assessment  Labial: No impairment  Dentition: Natural  Oral Hygiene:  (clean, moist)  Lingual: No impairment  Velum: Unable to visualize  Mandible: No impairment  P.O. Trials:  Patient Position:  (semi upright in bed)  Vocal quality prior to P.O.: No impairment  Consistency Presented: Thin liquid; Solid;Puree  How Presented: Successive swallows;Spoon;Straw;Self-fed/presented;Cup/sip     Bolus Acceptance: No impairment  Bolus Formation/Control: No impairment     Propulsion: No impairment  Oral Residue: None  Initiation of Swallow: No impairment  Laryngeal Elevation: Functional  Aspiration Signs/Symptoms: None  Pharyngeal Phase Characteristics: No impairment, issues, or problems   Effective Modifications: None  Cues for Modifications: None       Oral Phase Severity: No impairment  Pharyngeal Phase Severity : No impairment  NOMS:   The NOMS functional outcome measure was used to quantify this patient's level of swallowing impairment. Based on the NOMS, the patient was determined to be at level 7 for swallow function     G Codes:   In compliance with CMSs Claims Based Outcome Reporting, the following G-code set was chosen for this patient based the use of the NOMS functional outcome to quantify this patient's level of swallowing impairment. Using the NOMS, the patient was determined to be at level 7 for swallow function which correlates with the CH= 0% level of severity. Based on the objective assessment provided within this note, the current, goal, and discharge g-codes are as follows:    Swallow  Swallowing:   Swallow Current Status CH= 0%   Swallow Goal Status CH= 0%   Swallow D/C Status CH= 0%        NOMS Swallowing Levels:  Level 1 (CN): NPO  Level 2 (CM): NPO but takes consistency in therapy  Level 3 (CL): Takes less than 50% of nutrition p.o. and continues with nonoral feedings; and/or safe with mod cues; and/or max diet restriction  Level 4 (CK): Safe swallow but needs mod cues; and/or mod diet restriction; and/or still requires some nonoral feeding/supplements  Level 5 (CJ): Safe swallow with min diet restriction; and/or needs min cues  Level 6 (CI): Independent with p.o.; rare cues; usually self cues; may need to avoid some foods or needs extra time  Level 7 ([address] [place] [address]): Independent for all p.o.  KASANDRA. (2003). National Outcomes Measurement System (NOMS): Adult Speech-Language Pathology User's Guide. Pain:Pain Scale 1: Numeric (0 - 10)  Pain Intensity 1: 0     After treatment:   [] Patient left in no apparent distress sitting up in chair  [x] Patient left in no apparent distress in bed  [x] Call bell left within reach  [x] Nursing notified  [] Caregiver present  [] Bed alarm activated    COMMUNICATION/EDUCATION:   The patients plan of care including findings, recommendations, and recommended diet changes were discussed with: Registered Nurse.  [] Posted safety precautions in patient's room. [x] Patient/family have participated as able and agree with findings and recommendations.   [] Patient is unable to participate in plan of care at this time.     Thank you for this referral.  Mily Gonzales M.S. CCC-SLP  Time Calculation: 13 mins

## 2017-06-27 NOTE — PROGRESS NOTES
Bedside shift change report given to Tay Rothman (oncoming nurse) by Byron Rhodes (offgoing nurse). Report included the following information SBAR, Kardex, Procedure Summary, Intake/Output, MAR, Accordion, Recent Results and Med Rec Status.

## 2017-06-27 NOTE — PROGRESS NOTES
Patient is an 81 y/o   male insured by [facility] A/B (Carvel Dears secondary), admitted to [facility] from [facility] on 6/26/17 for c/c AMS. PT/OT evaluations completed today recommending discharge to SNF. CM visited patient bedside. Patient A&Ox4. Confirmed residence at [facility]. Per patient, [facility] assists with bathing, dressing, transfers, med mgmt, all IADL's. Patient wheelchair-bound 2/2 \"I have two legs that don't work. \" Patient confirmed emergency contact is daughter Chantale Mcfadden [phone]. This writer s/w Arpita Headings regarding therapy recommendations. Arpita Headings states that [facility] provides assist x2 for all transfers and able to meet patient's care needs. She states that patient with history of decompensating during previous SNF stays, thus prefers return to [facility] with continued services and HHPT/OT and declines any alternative placement at this time. CM s/w Aly Gooden confirms residence and that patient was receiving [facility] through [facility]. Jus Gambino aware of potential discharge back to [facility] tomorrow if medically clear. ROGER contact for discharge: Shaan Chilel [phone]. [facility] orders may be sent with patient at discharge, [facility] will arrange upon patient's arrival home. [facility] unable to provide discharge transport. Expect will require stretcher 2/2 max assist x2 for all transfers. [facility] in-basket message sent to PCP STEFANY Tellez. Also noted DDNR on chart, patient listed in [facility] as full code, daughter Arpita Headings confirmed patient should be DNR status. CM s/w Dr. James Higgins, pointed out [facility] and requested code status correction. Nurse Calles Sample also aware of need for code status change. Remi Scott MSW/CRM    Care Management Interventions  PCP Verified by CM:  Yes  Last Visit to PCP: 04/28/17  MyChart Signup: No  Discharge Durable Medical Equipment: No  Physical Therapy Consult: Yes  Occupational Therapy Consult: Yes  Speech Therapy Consult: Yes  Current Support Network: Assisted Living  Plan discussed with Pt/Family/Caregiver: Yes  Discharge Location  Discharge Placement: Assisted Living

## 2017-06-27 NOTE — PROGRESS NOTES
Problem: Self Care Deficits Care Plan (Adult)  Goal: *Acute Goals and Plan of Care (Insert Text)  Occupational Therapy Goals  Initiated 6/27/2017  1. Patient will perform toilet transfer to [facility] with moderate assistance within 7 day(s). 2. Patient will perform toileting with moderate assistance within 7 day(s). 3. Patient will perform upper body dressing with supervision/set-up within 7 day(s). 4. Patient will perform lower body dressing with moderate assistance within 7 day(s). 5. Patient will perform all bathing with moderate assistance within 7 day(s). 6. Patient will utilize energy conservation techniques during functional activities with min verbal cues within 7 day(s). OCCUPATIONAL THERAPY EVALUATION  Patient: Dariela Saunders (36 y.o. male)  Date: 6/27/2017  Primary Diagnosis: TIA (transient ischemic attack)  Altered mental status  Slurred speech        Precautions: fall, seizure         ASSESSMENT :  Based on the objective data described below, the patient presents with decreased UE strength, decreased fine/gross motor skills, decreased distal functional reach, impaired sitting balance, decreased coordination, decreased activity tolerance, knee flexion contractures, and tremors d/t Parkinson's, s/p TIA preventing him from engaging in ADLs and IADLs independently and safely. Pt received EOB w/ PT stepping out of room to get wc and agreeable to OT, requiring OT to stand behind for support while EOB d/t fatigue and poor sitting balance. Pt is currently Gina-totalA for all ADLs, maxA-totalA for functional transfers. Pt transfer maxAx2 from bed<>wc, requiring to return to bed d/t to fatigue. Recommend skilled nursing at [place]. Next session, recommend education on AE for ADLs and toilet transfer to [facility] w/ sliding board and drop arm. Patient will benefit from skilled intervention to address the above impairments.   Patients rehabilitation potential is considered to be Good  Factors which may influence rehabilitation potential include:   [ ]             None noted  [ ]             Mental ability/status  [X]             Medical condition  [ ]             Home/family situation and support systems  [ ]             Safety awareness  [ ]             Pain tolerance/management  [ ]             Other:        PLAN :  Recommendations and Planned Interventions:  [X]               Self Care Training                  [X]        Therapeutic Activities  [X]               Functional Mobility Training    [ ]        Cognitive Retraining  [X]               Therapeutic Exercises           [X]        Endurance Activities  [X]               Balance Training                   [ ]        Neuromuscular Re-Education  [ ]               Visual/Perceptual Training     [X]   Home Safety Training  [X]               Patient Education                 [X]        Family Training/Education  [ ]               Other (comment):     Frequency/Duration: Patient will be followed by occupational therapy 5 times a week to address goals. Discharge Recommendations: Danilo Banks  Further Equipment Recommendations for Discharge: tbd       SUBJECTIVE:   Patient stated Dio Goes help me with whatever I need at my facility, as long as I pay more.       OBJECTIVE DATA SUMMARY:   HISTORY:   Past Medical History:   Diagnosis Date    Blurry vision 1/19/2012    BPH (benign prostatic hyperplasia)      DDD (degenerative disc disease), lumbar 2/19/2010    DJD (degenerative joint disease) of cervical spine 2/19/2010    DU (duodenal ulcer) 9/1/1990    Hyperlipidemia      Hypertension      Other and unspecified hyperlipidemia 2/19/2010    Pacemaker      Paralysis agitans ([address] 2/19/2010    Parkinson disease ([address]      [address] [facility] [address]      Premature atrial beats      Reflux esophagitis 2/19/2010    Sick sinus syndrome [facility])       Past Surgical History:   Procedure Laterality Date    HX CATARACT REMOVAL   6/2012 and 7/2012 Both eyes    HX ORTHOPAEDIC   2015     tendon repair both knees    HX PACEMAKER        bradycardia    HX VITRECTOMY   2011    AL REMVL PERM PM PLS GEN W/REPL PLSE GEN 2 LEAD SYS   3/13/2017              Prior Level of Function/Home Situation: pt is wc bound, living in assisted living and states he has help with LB dressing, showers x2 a week, medication management, toileting, and pushing his w/c to dining ospina/events. Pt states he can get help with whatever else he may need if he \"pays extra\". Pt also states he is able to push himself in wc and was using both stand/pivot and sliding board for transfers and aids would sometimes help  Expanded or extensive additional review of patient history:      Home Situation  [address] Name: Alferd Apgar  One/Two Story Residence: One story  Living Alone: Yes (but has aids available at AL)  Support Systems: Assisted living  Patient Expects to be Discharged to[de-identified] Assisted living  Current DME Used/Available at Home: Wheelchair, Commode, bedside, Oxygen, portable, Adaptive feeding aides, urinal  Tub or Shower Type: Shower  [X]  Right hand dominant             [ ]  Left hand dominant     EXAMINATION OF PERFORMANCE DEFICITS:  Cognitive/Behavioral Status:  Neurologic State: Alert  Orientation Level: Oriented X4  Cognition: Appropriate decision making; Appropriate for age attention/concentration; Appropriate safety awareness; Follows commands  Perception: Appears intact  Perseveration: No perseveration noted  Safety/Judgement: Awareness of environment;Lack of insight into deficits;Good awareness of safety precautions     Skin: appears intact     Edema: no UE edema noted     Hearing:   Auditory  Auditory Impairment: Hard of hearing, bilateral     Vision/Perceptual:    Tracking: Able to track stimulus in all quadrants w/o difficulty                           Corrective Lenses: Glasses     Range of Motion:  AROM: Generally decreased, functional  PROM: Generally decreased, functional                       Strength:  Strength: Within functional limits                 Coordination:  Coordination: Generally decreased, functional  Fine Motor Skills-Upper: Left Impaired;Right Impaired (due to tremors)    Gross Motor Skills-Upper: Left Impaired;Right Impaired (due to tremors)     Tone & Sensation:  Tone: Normal  Sensation: Intact                       Balance:  Sitting: Impaired  Sitting - Static: Fair (occasional)  Sitting - Dynamic: Guarded (occasional)  Standing:  (NT- wc bound)     Functional Mobility and Transfers for ADLs:  Bed Mobility:  Rolling: Contact guard assistance  Supine to Sit: Contact guard assistance  Sit to Supine: Contact guard assistance     Transfers:  Bed to Chair: Maximum assistance;Assist x2; Additional time (to wc)  Toilet Transfer : Total assistance     ADL Assessment:  Feeding: Additional time;Minimum assistance     Oral Facial Hygiene/Grooming: Minimum assistance; Additional time     Bathing: Maximum assistance     Upper Body Dressing: Minimum assistance     Lower Body Dressing: Total assistance     Toileting: Maximum assistance                 ADL Intervention and task modifications:                                Lower Body Dressing Assistance  Shoes with Velcro: Total assistance (dependent)           Cognitive Retraining  Safety/Judgement: Awareness of environment;Lack of insight into deficits;Good awareness of safety precautions     Functional Measure:  Barthel Index:      Bathin  Bladder: 5  Bowels: 5  Groomin  Dressin  Feedin  Mobility: 0  Stairs: 0  Toilet Use: 0  Transfer (Bed to Chair and Back): 5  Total: 20         Barthel and G-code impairment scale:  Percentage of impairment CH  0% CI  1-19% CJ  20-39% CK  40-59% CL  60-79% CM  80-99% CN  100%   Barthel Score 0-100 100 99-80 79-60 59-40 20-39 1-19    0   Barthel Score 0-20 20 17-19 13-16 9-12 5-8 1-4 0      The Barthel ADL Index: Guidelines  1.  The index should be used as a record of what a patient does, not as a record of what a patient could do. 2. The main aim is to establish degree of independence from any help, physical or verbal, however minor and for whatever reason. 3. The need for supervision renders the patient not independent. 4. A patient's performance should be established using the best available evidence. Asking the patient, friends/relatives and nurses are the usual sources, but direct observation and common sense are also important. However direct testing is not needed. 5. Usually the patient's performance over the preceding 24-48 hours is important, but occasionally longer periods will be relevant. 6. Middle categories imply that the patient supplies over 50 per cent of the effort. 7. Use of aids to be independent is allowed. Matthew Seaman., Barthel, D.W. (0235). Functional evaluation: the Barthel Index. [address] [facility] (14)2. Saul Jaquez walter JULIAN Callaway, Live Jarrett., Felisha Reynolds., [place], [address] [place] [address] (1999). Measuring the change indisability after inpatient rehabilitation; comparison of the responsiveness of the Barthel Index and Functional Raymond Measure. Journal of Neurology, Neurosurgery, and Psychiatry, 66(4), 128-991. Chante Fuentes, N.J.A, NELI RodriguezJ.BERONICA, & Gayle Andre, M.ANA. (2004.) Assessment of post-stroke quality of life in cost-effectiveness studies: The usefulness of the Barthel Index and the EuroQoL-5D. Quality of Life Research, 13, 730-29            G codes: In compliance with CMSs Claims Based Outcome Reporting, the following G-code set was chosen for this patient based on their primary functional limitation being treated: The outcome measure chosen to determine the severity of the functional limitation was the Barthel with a score of 20/100 which was correlated with the impairment scale.       · Self Care:               - CURRENT STATUS:    CL - 60%-79% impaired, limited or restricted  - GOAL STATUS:           CK - 40%-59% impaired, limited or restricted               - D/C STATUS:                       ---------------To be determined---------------      Occupational Therapy Evaluation Charge Determination   History Examination Decision-Making   LOW Complexity : Brief history review  MEDIUM Complexity : 3-5 performance deficits relating to physical, cognitive , or psychosocial skils that result in activity limitations and / or participation restrictions LOW Complexity : No comorbidities that affect functional and no verbal or physical assistance needed to complete eval tasks       Based on the above components, the patient evaluation is determined to be of the following complexity level: LOW   Activity Tolerance:   VSS tolerated activity well  Please refer to the flowsheet for vital signs taken during this treatment. After treatment:   [ ] Patient left in no apparent distress sitting up in chair  [X] Patient left in no apparent distress in bed  [X] Call bell left within reach  [X] Nursing notified  [ ] Caregiver present  [ ] Bed alarm activated      COMMUNICATION/EDUCATION:   The patients plan of care was discussed with: Physical Therapist and Registered Nurse.  [X] Home safety education was provided and the patient/caregiver indicated understanding. [X] Patient/family have participated as able in goal setting and plan of care. [X] Patient/family agree to work toward stated goals and plan of care. [ ] Patient understands intent and goals of therapy, but is neutral about his/her participation. [ ] Patient is unable to participate in goal setting and plan of care. This patients plan of care is appropriate for delegation to [facility]. Thank you for this referral.  Garret Dean        Regarding student involvement in patient care:  A student participated in this treatment session. Per CMS Medicare statements and AOTA guidelines I certify that the following was true:  1.  I was present and directly observed the entire session. 2. I made all skilled judgments and clinical decisions regarding care. 3. I am the practitioner responsible for assessment, treatment, and documentation.          Alexey Rodriguez, OT

## 2017-06-27 NOTE — PROGRESS NOTES
Problem: TIA: First 24 hours  Goal: Activity/Safety  Outcome: Progressing Towards Goal  Hourly rounds, q4 hours neuro checks and vital signs, side rails x3, bed locked/low position, personal belongings w/in reach, family at bedside  Goal: Diagnostic Test/Procedures  Outcome: Progressing Towards Goal  Neuro consult, PT/OT, other tests TBD  Goal: Medications  Outcome: Progressing Towards Goal  Rocephin - indication and side effects    Transition RN to the Bedside to assist Primary RN with patient education, medication educations, discharge instructions, and stroke core measure compliance. Discharge concerns initiated and discussed with patient, including clarification on \"who\" assists the patient at their home and instructions for when the home going patient should call their provider after discharge. Opportunity for questions and clarification was provided. Patient receptive to education: YES  Patient stated:   Barriers to Education: intermittent confusion  Diagnosis Education given:  YES    Length of stay: 1  Expected Day of Discharge: tbd  Ask if they have \"Help at Home\" & add to white board?   NO, pt from [facility]    Education Day #: 1    Medication Education Given:  YES  M in the box Medication name: ceftriaxone      Stroke Education documented in Patient Education: YES  Core Measures Documented in Connect [facility]:  Risk Factors: YES  Warning signs of stroke: YES  When to Activate 911: YES  Medication Education for Risk Factors: YES  Smoking cessation if applicable: YES  Written Education Given:  YES    Discharge NIH Completed: NO  Score: tbd    BRAINS: YES    Follow Up Appointment Made: NO  Date/Time if applicable: tbd

## 2017-06-27 NOTE — CONSULTS
NEUROLOGY  6/27/2017     Consulted by: Mary Langley MD        Patient ID:  Bora Baez  [id number]  80 y.o.  5/18/1933    Chief Complaint   Patient presents with    Dysarthria       HPI    Mr. Mila Mcgee is an 27-year-old gentleman here from the nursing home. He tells me that yesterday he slid out of his chair to the ground. He did not lose consciousness or strike his head. There was concern for slurred speech so he was brought to the hospital.  He is baseline nonambulatory. He has concomitant Parkinson's, hypertension, hyperlipidemia, and pacemaker placement. When I talk to him today he tells me that in recent weeks he has had some word finding difficulty. He denies any other acute changes from his rather disabled baseline. Head CT was unrevealing. No vascular imaging has been done. Review of Systems   Constitutional: Positive for malaise/fatigue. Neurological: Positive for speech change and weakness. All other systems reviewed and are negative. Past Medical History:   Diagnosis Date    Blurry vision 1/19/2012    BPH (benign prostatic hyperplasia)     DDD (degenerative disc disease), lumbar 2/19/2010    DJD (degenerative joint disease) of cervical spine 2/19/2010    DU (duodenal ulcer) 9/1/1990    Hyperlipidemia     Hypertension     Other and unspecified hyperlipidemia 2/19/2010    Pacemaker     Paralysis agitans ([address] 2/19/2010    Parkinson disease ([address]     PAT [facility]     Premature atrial beats     Reflux esophagitis 2/19/2010    Sick sinus syndrome ([facility]      Family History   Problem Relation Age of Onset    Asthma Mother     Heart Disease Father      Social History     Social History    Marital status:      Spouse name: N/A    Number of children: N/A    Years of education: N/A     Occupational History    Not on file.      Social History Main Topics    Smoking status: Former Smoker     Types: Pipe     Quit date: 7/14/1945    Smokeless tobacco: Never Used    Alcohol use 2.0 oz/week     4 Cans of beer per week      Comment: glass of wine every now and then    Drug use: No    Sexual activity: Not on file     Other Topics Concern     Service Yes    Blood Transfusions No    Caffeine Concern No    Occupational Exposure No    Hobby Hazards No    Sleep Concern No    Stress Concern No    Weight Concern No    Special Diet No    Back Care No    Exercise No    Bike Helmet No    Seat Belt Yes    Self-Exams No     Social History Narrative     Current Facility-Administered Medications   Medication Dose Route Frequency    sodium chloride (NS) flush 5-10 mL  5-10 mL IntraVENous Q8H    sodium chloride (NS) flush 5-10 mL  5-10 mL IntraVENous PRN    0.9% sodium chloride infusion  75 mL/hr IntraVENous CONTINUOUS    cefTRIAXone (ROCEPHIN) 1 g in 0.9% sodium chloride (MBP/ADV) 50 mL  1 g IntraVENous Q24H     No Known Allergies    Visit Vitals    /84 (BP 1 Location: Right arm, BP Patient Position: At rest)    Pulse 83    Temp 98.3 °F (36.8 °C)    Resp 16    Ht 6' 2\" (1.88 m)    Wt 92.5 kg (204 lb)    SpO2 94%    BMI 26.19 kg/m2     Physical Exam   Constitutional: He appears well-developed and well-nourished. Eyes: EOM are normal. Pupils are equal, round, and reactive to light. Cardiovascular: Normal rate. Pulmonary/Chest: Effort normal.   Skin: Skin is warm and dry. Psychiatric: His speech is normal.   Vitals reviewed. Neurologic Exam     Mental Status   Disoriented to place. (Says he is at [facility], but then corrects himself with repeat questioning)  Oriented to time. Attention: normal.   Speech: speech is normal   Level of consciousness: alert    Cranial Nerves     CN III, IV, VI   Pupils are equal, round, and reactive to light. Extraocular motions are normal.     CN VII   Facial expression full, symmetric.      CN VIII   Hearing: intact    CN XII   Tongue deviation: none    Motor Exam Muscle bulk: decreased  Overall muscle tone: increased       BLE contractures     Sensory Exam   Light touch normal.     Gait, Coordination, and Reflexes     Gait  Gait: (nonambulatory)    Tremor   Resting tremor: present           Lab Results  Component Value Date/Time   WBC 7.1 06/26/2017 07:07 PM   HGB 12.0 06/26/2017 07:07 PM   HCT 36.2 06/26/2017 07:07 PM   PLATELET 669 83/71/5590 07:07 PM   MCV 88.9 06/26/2017 07:07 PM     Lab Results  Component Value Date/Time   Hemoglobin A1c 5.3 06/27/2017 02:25 AM   Glucose 96 06/26/2017 07:07 PM   Glucose (POC) 102 06/26/2017 07:02 PM   LDL, calculated 65.4 06/27/2017 02:25 AM   Creatinine 1.18 06/26/2017 07:07 PM      Lab Results  Component Value Date/Time   Cholesterol, total 122 06/27/2017 02:25 AM   HDL Cholesterol 47 06/27/2017 02:25 AM   LDL, calculated 65.4 06/27/2017 02:25 AM   Triglyceride 48 06/27/2017 02:25 AM   CHOL/HDL Ratio 2.6 06/27/2017 02:25 AM   Lab Results  Component Value Date/Time   ALT (SGPT) 10 06/26/2017 07:07 PM   AST (SGOT) 23 06/26/2017 07:07 PM   Alk. phosphatase 76 06/26/2017 07:07 PM   Bilirubin, total 0.6 06/26/2017 07:07 PM   Albumin 3.9 06/26/2017 07:07 PM   Protein, total 8.0 06/26/2017 07:07 PM   INR 1.1 06/27/2017 02:25 AM   Prothrombin time 11.0 06/27/2017 02:25 AM   PLATELET 649 06/28/8306 07:07 PM       Lab Results  Component Value Date/Time   TSH 0.57 10/30/2013 10:30 AM   T4, Free 1.06 01/25/2012 12:48 PM                     CT Results (maximum last 3): Results from Hospital Encounter encounter on 06/26/17   CT CODE NEURO HEAD WO CONTRAST   Narrative HEAD CT WITHOUT CONTRAST: 6/26/2017 6:59 PM    INDICATION: Slurred speech. HISTORY (per electronic medical record): Fall at 1445 hours, developing slurred  speech at 1645 hours. COMPARISON: 8/23/2016, 3/23/2015. PROCEDURE: Axial images of the head were obtained without contrast. Coronal and  sagittal reformats were performed.  CT dose reduction was achieved through use of  a standardized protocol tailored for this examination and automatic exposure  control for dose modulation. Adaptive statistical iterative reconstruction  (ASIR) was utilized. FINDINGS: The ventricles and sulci are appropriate in size and configuration for  age. No loss of gray-white differentiation to suggest late acute or early  subacute infarction. No mass effect or intracranial hemorrhage. Impression IMPRESSION: No acute intracranial abnormality. Results from [place] encounter on 04/19/17   CTA ABDOMEN PELV W CONT   Narrative INDICATION: Left lower quadrant abdominal pain. Iliac artery aneurysm. COMPARISON: March 31, 2017    TECHNIQUE: Following the uneventful intravenous administration of 100 cc  Isovue-370, 2.5 mm axial images were obtained through the abdomen and pelvis. Delayed images were also obtained through the abdomen and pelvis. 3D image post  processing was performed. CT dose reduction was achieved through use of a  standardized protocol tailored for this examination and automatic exposure  control for dose modulation. Adaptive statistical iterative reconstruction  (ASIR) was utilized. FINDINGS:   The abdominal aorta is normal in caliber proximally. There is ectasia of the  distal abdominal aorta to a diameter of 2.9 cm. The mesenteric and renal  arteries are patent. There is a 4.3 x 4.2 cm left common iliac artery aneurysm,  which is unchanged in appearance since the prior study. .    A bleb is again noted in the right lower lobe, with adjacent atelectasis; the  bladder contains a small amount of fluid. The heart size is normal.    The liver is normal. The spleen demonstrates no abnormality. The pancreas has a  normal appearance. The gallbladder demonstrates no evidence of wall thickening  or cholelithiasis. The kidneys are normal in size and demonstrate no evidence of  mass or hydronephrosis.  The adrenal glands are normal.     There is no retroperitoneal or mesenteric lymphadenopathy. No intraperitoneal  free air or free fluid is seen. The stomach, small bowel, and colon are  unremarkable. The appendix is not visualized. Imaging of the pelvis demonstrates an enlarged prostate gland. The urinary  bladder demonstrates no filling defect. No pelvic mass or free fluid is seen. There are small bilateral fat-containing inguinal hernias. Review of the bone windows demonstrates no evidence of destructive bone lesion. There is an old L1 compression deformity. 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 Assessment and Plan        80-year-old gentleman with multiple stroke factors who apparently became suddenly weak and fell out of his chair but in a very controlled motion. What is concerning is that he tells me in the recent weeks he has had more word finding difficulty which is new for him and sounds suggestive for TIAs. He does have a pacemaker so I am not sure if that is MRI compatible. If it is compatible I would recommend obtaining an MRI brain noncontrast study. He also needs vascular imaging in the form of an MRA or CTA. Agree with aspirin that has been initiated. Continue telemetry looking for any dysrhythmias. Formal speech evaluation. We will follow-up once the above imaging has been completed. During this evaluation, we also discussed stroke education to include signs and symptoms of stroke and TIA.        [address] [facility],   NEUROLOGIST  Diplomate DONTE  6/27/2017

## 2017-06-27 NOTE — ROUTINE PROCESS
TRANSFER - OUT REPORT:    Verbal report given to LIZABETH Montelongo(name) on Deon Sharma  being transferred to [facility](unit) for routine progression of care       Report consisted of patients Situation, Background, Assessment and   Recommendations(SBAR). Information from the following report(s) SBAR, ED Summary, [facility] and Recent Results was reviewed with the receiving nurse. Lines:   Peripheral IV 06/26/17 Left Antecubital (Active)   Site Assessment Clean, dry, & intact 6/26/2017  7:07 PM   Phlebitis Assessment 0 6/26/2017  7:07 PM   Infiltration Assessment 0 6/26/2017  7:07 PM   Dressing Status Clean, dry, & intact 6/26/2017  7:07 PM   Hub Color/Line Status Pink 6/26/2017  7:07 PM        Opportunity for questions and clarification was provided.       Patient transported with:   Monitor  Lexis Knight RN

## 2017-06-27 NOTE — ED NOTES
Assumed care of patient from [facility]. Patient resting on stretcher, NAD noted at this time; Denies complaints. Bed low and locked, call bell in reach. Will continue to monitor.

## 2017-06-27 NOTE — H&P
History & Physical    Date of admission: 6/26/2017    Patient name: Reno Quinn  MRN: [id number]  YOB: 1933  Age: 80 y.o. Primary care provider:  Suzi Valderrama MD     Source of Information: patient, ED and medical records                          Chief complaint:  Patient does not provide    History of present illness  Reno Quinn is a an 80-year-old white male with past medical history of Parkinson's disease, gait abnormality, general debility, reflux esophagitis, hypertension, hyperlipidemia,   status post cardiac pacemaker implantation, degenerative disk disease, degenerative joint disease, and duodenal ulcer presented to the ED from nursing home with reported slurred speech s/p fall. Patient reportedly had fell at 14:45 hours and then had acute onset of symptoms at 17:45 hours, witnessed by the nursing home staff. Patient denies head/ neck trauma or loss of consciousness. Patient notedly complained of left ankle pain. There were no reports of headache, neck pain, visual disturbance, numbness, paresthesias, focal weakness, facial droop, seizure activity, chest pain, shortness of breath, palpitations, abdominal pain, nausea, vomiting, diarrhea, melena, dysuria, hematuria, calf pain, increased leg swelling/ edema, fever, chills. EMS was called and transported patient to the ED. On arrival in the emergency department, initial recorded vital signs were blood pressure = 139/93, heart rate = 87, respiratory rate= 18,   O2 saturation 95% on room air. Workup in the emergency department included CT code neuro head without contrast showing no acute intracranial process. Per the ED on arrival, patient had altered mental status (AMS). UA was abnormal with small leucocyte esterase and WBC= 10-20 but bacteria negative. Per ED note, AMS was thought secondary to the same.   Patient is now seen for admission to the hospitalist service for continued evaluation and treatments. Past Medical History:   Diagnosis Date    Blurry vision 1/19/2012    BPH (benign prostatic hyperplasia)     DDD (degenerative disc disease), lumbar 2/19/2010    DJD (degenerative joint disease) of cervical spine 2/19/2010    DU (duodenal ulcer) 9/1/1990    Hyperlipidemia     Hypertension     Other and unspecified hyperlipidemia 2/19/2010    Pacemaker     Paralysis agitans ([address] 2/19/2010    Parkinson disease ([address]     PAT [facility]paroxysmal atrial tachycardia) ([facility]     Premature atrial beats     Reflux esophagitis 2/19/2010    Sick sinus syndrome [facility])       Past Surgical History:   Procedure Laterality Date    HX CATARACT REMOVAL  6/2012 and 7/2012    Both eyes    HX ORTHOPAEDIC  04/2015    tendon repair both knees    HX PACEMAKER  2008    bradycardia    HX VITRECTOMY  11/2011    OR REMVL PERM PM PLS GEN W/REPL PLSE GEN 2 LEAD SYS  3/13/2017          Prior to Admission medications    Medication Sig Start Date End Date Taking? Authorizing Provider   trimethoprim-sulfamethoxazole (BACTRIM DS, SEPTRA DS) 160-800 mg per tablet Take 1 Tab by mouth two (2) times a day for 10 days. 6/22/17 7/2/17  Patito Burciaag MD   LORazepam (ATIVAN) 0.5 mg tablet Take  by mouth every six (6) hours as needed for Anxiety. Historical Provider   QUEtiapine (SEROQUEL) 25 mg tablet Take 25 mg by mouth every evening. Historical Provider   DETROL LA 2 mg ER capsule TAKE 1 CAPSULE DAILY FOR BLADDER FREQUENCY 2/13/17   Patito Burciaga MD   pindolol (VISKIN) 5 mg tablet Take 2.5 mg by mouth daily (after lunch). Historical Provider   albuterol (PROVENTIL HFA, VENTOLIN HFA, PROAIR HFA) 90 mcg/actuation inhaler Take 2 Puffs by inhalation every six (6) hours as needed for Wheezing. 1/9/17   Zachery Chatman NP   carbidopa-levodopa (SINEMET)  mg per tablet Take 2 Tabs by mouth four (4) times daily.  (This is given at 8:00, 11:00, 14:00, and 21:00). 7/28/16   [facility] Jah High NP   polyethylene glycol (MIRALAX) 17 gram packet Take 1 Packet by mouth daily. Indications: CONSTIPATION 7/28/16   Telma Velarde NP   therapeutic multivitamin [facility]) tablet Take 1 Tab by mouth daily. 7/28/16   Telma Velarde NP   traMADol (ULTRAM) 50 mg tablet Take 1 Tab by mouth every eight (8) hours as needed for Pain. Max Daily Amount: 150 mg. 7/28/16   Telma Velarde NP   cloNIDine HCl (CATAPRES) 0.1 mg tablet Take 1 Tab by mouth every six (6) hours as needed (SBP > 180 mmHg). 7/28/16   Telma Velarde NP   droxidopa (NORTHERA) 100 mg cap Take 100 mg by mouth three (3) times daily. Indications: SYMPTOMATIC ORTHOSTATIC HYPOTENSION 7/28/16   Telma Velarde NP   docusate sodium (COLACE) 100 mg capsule Take 1 Cap by mouth daily as needed for Constipation. 7/28/16   Telma Velarde NP   cycloSPORINE (RESTASIS) 0.05 % ophthalmic emulsion Administer 1 Drop to both eyes two (2) times a day. 7/28/16   Telma Velarde NP   escitalopram oxalate (LEXAPRO) 20 mg tablet Take 1 Tab by mouth daily. 7/28/16   Telma Velarde NP   rivastigmine (EXELON) 4.6 mg/24 hr patch 1 Patch by TransDERmal route daily. 7/28/16   Telma Velarde NP   esomeprazole (NEXIUM) 40 mg capsule Take 1 Cap by mouth daily. 7/28/16   Telma Velarde NP   acetaminophen (MAPAP EXTRA STRENGTH) 500 mg tablet Take 1 Tab by mouth every six (6) hours as needed for Pain.  7/28/16   Telma Velarde NP     ALLERGIES:  No Known Drug Allergies     FAMILY HISTORY:  Family History   Problem Relation Age of Onset    Asthma Mother     Heart Disease Father         Social history  Patient resides  x  SNF    Ambulates  x   Non-ambulatory     Alcohol history   x  occasional beer ~ 1 x per week           Smoking history      X  Former smoker         History   Smoking Status    Former Smoker    Types: Pipe    Quit date: 7/14/1945   Smokeless Tobacco    Never Used       Code status      x  DNR/DNI        Patient has DDNR documented on chart from 2/11/2017. Review of systems  I performed an 11 systems review; pertinent positives were as noted in HPI, otherwise negative. Physical Examination   Visit Vitals    BP (!) 139/93 (BP 1 Location: Right arm, BP Patient Position: At rest)    Pulse 87    Temp 97.8 °F (36.6 °C)    Resp 18    Ht 6' 2\" (1.88 m)    Wt 92.5 kg (204 lb)    SpO2 95%    BMI 26.19 kg/m2          O2 Device: Room air    General:  Elderly male in no acute respiratory distress   Head:  Normocephalic, without obvious abnormality, atraumatic   Eyes:  Conjunctivae/corneas clear. PERRL, EOMs intact   E/N/M/T: Nares normal. Septum midline. No nasal drainage or sinus tenderness  Tongue midline/ non-edematous  Clear oropharynx   Neck: Normal appearance and movements, symmetrical, trachea midline  No palpable adenopathy  No thyroid enlargement, tenderness or nodules  No carotid bruit   Normal JVD   Lungs:   Symmetrical chest expansion and respiratory effort  Clear to auscultation bilaterally   Chest wall:  No tenderness or deformity   Heart:  Regular rate and rhythm   Sounds normal; no murmur, click, rub or gallop   Abdomen:   Soft, no tenderness  No rebound, guarding, or rigidity  Non-distended  Bowel sounds normal  No masses or hepatosplenomegaly  No hernias present   Back: No CVA tenderness   Extremities: No evidence of trauma or acute bony abnormality   Chronic flexion contracture with chronic joint deformity of BLE/BUE  Cyanosis of forefoot both feet  No clubbing   Non-pitting edema     Pulses 2+ radial/ 1+ DP bilateral pulses   Skin: Erythema of both feet  Warm/ dry   Musculo-      skeletal: Gait not tested  Limited range of motion/ strength /and tone BLE > BUE  Chronic flexion contracture both knees  No calf tenderness   Neuro: GCS 15. Moves all extremities x 4. No slurred speech. No facial droop. Sensation grossly intact.   No pronator drift   Psych: Alert, oriented x 3 but anxious     Data Review    24 Hour Results:  Recent Results (from the past 24 hour(s))   GLUCOSE, POC    Collection Time: 06/26/17  7:02 PM   Result Value Ref Range    Glucose (POC) 102 (H) 65 - 100 mg/dL    Performed by Alma Blair    POC INR    Collection Time: 06/26/17  7:03 PM   Result Value Ref Range    INR (POC) <0.9 <1.2   CBC WITH AUTOMATED DIFF    Collection Time: 06/26/17  7:07 PM   Result Value Ref Range    WBC 7.1 4.1 - 11.1 K/uL    RBC 4.07 (L) 4.10 - 5.70 M/uL    HGB 12.0 (L) 12.1 - 17.0 g/dL    HCT 36.2 (L) 36.6 - 50.3 %    MCV 88.9 80.0 - 99.0 FL    MCH 29.5 26.0 - 34.0 PG    MCHC 33.1 30.0 - 36.5 g/dL    RDW 15.9 (H) 11.5 - 14.5 %    PLATELET 070 624 - 417 K/uL    NEUTROPHILS 63 32 - 75 %    LYMPHOCYTES 27 12 - 49 %    MONOCYTES 6 5 - 13 %    EOSINOPHILS 4 0 - 7 %    BASOPHILS 0 0 - 1 %    ABS. NEUTROPHILS 4.5 1.8 - 8.0 K/UL    ABS. LYMPHOCYTES 1.9 0.8 - 3.5 K/UL    ABS. MONOCYTES 0.4 0.0 - 1.0 K/UL    ABS. EOSINOPHILS 0.3 0.0 - 0.4 K/UL    ABS. BASOPHILS 0.0 0.0 - 0.1 K/UL   METABOLIC PANEL, COMPREHENSIVE    Collection Time: 06/26/17  7:07 PM   Result Value Ref Range    Sodium 134 (L) 136 - 145 mmol/L    Potassium 4.2 3.5 - 5.1 mmol/L    Chloride 100 97 - 108 mmol/L    CO2 25 21 - 32 mmol/L    Anion gap 9 5 - 15 mmol/L    Glucose 96 65 - 100 mg/dL    BUN 21 (H) 6 - 20 MG/DL    Creatinine 1.18 0.70 - 1.30 MG/DL    BUN/Creatinine ratio 18 12 - 20      GFR est AA >60 >60 ml/min/1.73m2    GFR est non-AA 59 (L) >60 ml/min/1.73m2    Calcium 9.0 8.5 - 10.1 MG/DL    Bilirubin, total 0.6 0.2 - 1.0 MG/DL    ALT (SGPT) 10 (L) 12 - 78 U/L    AST (SGOT) 23 15 - 37 U/L    Alk.  phosphatase 76 45 - 117 U/L    Protein, total 8.0 6.4 - 8.2 g/dL    Albumin 3.9 3.5 - 5.0 g/dL    Globulin 4.1 (H) 2.0 - 4.0 g/dL    A-G Ratio 1.0 (L) 1.1 - 2.2     TROPONIN I    Collection Time: 06/26/17  7:07 PM   Result Value Ref Range    Troponin-I, Qt. <0.04 <0.05 ng/mL   CK W/ REFLX CKMB    Collection Time: 06/26/17  7:07 PM   Result Value Ref Range     39 - 308 U/L   LACTIC ACID, PLASMA    Collection Time: 06/26/17  7:07 PM   Result Value Ref Range    Lactic acid 0.8 0.4 - 2.0 MMOL/L   URINALYSIS W/ RFLX MICROSCOPIC    Collection Time: 06/26/17  7:07 PM   Result Value Ref Range    Color YELLOW/STRAW      Appearance CLEAR CLEAR      Specific gravity 1.020 1.003 - 1.030      pH (UA) 6.0 5.0 - 8.0      Protein NEGATIVE  NEG mg/dL    Glucose NEGATIVE  NEG mg/dL    Ketone NEGATIVE  NEG mg/dL    Bilirubin NEGATIVE  NEG      Blood NEGATIVE  NEG      Urobilinogen 0.2 0.2 - 1.0 EU/dL    Nitrites NEGATIVE  NEG      Leukocyte Esterase SMALL (A) NEG      WBC 10-20 0 - 4 /hpf    RBC 0-5 0 - 5 /hpf    Epithelial cells FEW FEW /lpf    Bacteria NEGATIVE  NEG /hpf    Hyaline cast 0-2 0 - 5 /lpf   BNP    Collection Time: 06/26/17  7:07 PM   Result Value Ref Range     (H) 0 - 100 pg/mL     Recent Labs      06/26/17 1907   WBC  7.1   HGB  12.0*   HCT  36.2*   PLT  270     Recent Labs      06/26/17 1907 06/26/17 1903   NA  134*   --    K  4.2   --    CL  100   --    CO2  25   --    GLU  96   --    BUN  21*   --    CREA  1.18   --    CA  9.0   --    ALB  3.9   --    TBILI  0.6   --    SGOT  23   --    ALT  10*   --    INR   --   <0.9       Imaging  CT code neuro head without contrast:  COMPARISON: 8/23/2016, 3/23/2015.     PROCEDURE: Axial images of the head were obtained without contrast. Coronal and  sagittal reformats were performed. CT dose reduction was achieved through use of  a standardized protocol tailored for this examination and automatic exposure  control for dose modulation. Adaptive statistical iterative reconstruction  (ASIR) was utilized.     FINDINGS: The ventricles and sulci are appropriate in size and configuration for  age. No loss of gray-white differentiation to suggest late acute or early  subacute infarction. No mass effect or intracranial hemorrhage.     IMPRESSION: No acute intracranial abnormality. Assessment and Plan   1.   TIA (transient ischemic attack)       - Admit to neuro/stroke floor       -  Place on neurovascular checks       -  Fall precautions       -  Order MRI brain/ bilateral carotid doppler/ 2d echocardiogram for the a.m.       -  Consult neurologist in a.m.       -  Keep NPO until either passes dysphagia screen or speech pathologist evaluation       -  ASA therapy    2. Slurred speech         -  Plan as above        3. Altered mental status - per ED report       -  Continue plan as noted    4. S/p fall.       -  Order chest xray and pelvis AP xray         5. Right knee pain. No trauma noted on exam but patient has subjective complaints of pain. - order right knee xray to rule out acute fracture    6. Anemia       -  Repeat CBC    7. Elevated BNP       -  Check chest xray to rule out edema and 2d echocardiogram in the a.m.    8.  Hypertension        -  Monitor BP closely and provide anti-hypertensive therapy accordingly    9. Parkinsons disease       - resume on Sinemet once patient passes dysphagia screen    10. Hyperlipidemia         -  Check lipid panel          -  May resume statin therapy but watch CK level closely as notedly elevated. If it continues             to trend upward, then consider holding statin. 11.  H/o paroxysmal atrial tachycardia/ s/p cardiac pacemaker with sick sinus syndrome         -  Ordered 12 lead EKG         -  Continue telemetry    12.   VTE prophylaxis        -  SCDs to BLEs                   Signed by: Julissa Cabral MD    June 26, 2017 at 9:19 PM

## 2017-06-27 NOTE — PROGRESS NOTES
Patient tech recorded patient's BP while attending nurse was off unit. Patient's BP was 203/43 then reassessed for 164/105. Writer returned back to the unit at 1622. Patient's vitals were reassessed. Patient's BP still elevated. MD notified. MD will place new BP medication order.

## 2017-06-28 VITALS
OXYGEN SATURATION: 94 % | SYSTOLIC BLOOD PRESSURE: 150 MMHG | HEART RATE: 80 BPM | TEMPERATURE: 98.2 F | RESPIRATION RATE: 18 BRPM | WEIGHT: 188.71 LBS | BODY MASS INDEX: 24.22 KG/M2 | HEIGHT: 74 IN | DIASTOLIC BLOOD PRESSURE: 102 MMHG

## 2017-06-28 PROCEDURE — 74011250637 HC RX REV CODE- 250/637: Performed by: HOSPITALIST

## 2017-06-28 PROCEDURE — 99218 HC RM OBSERVATION: CPT

## 2017-06-28 RX ORDER — ASPIRIN 81 MG/1
81 TABLET ORAL DAILY
Qty: 30 TAB | Refills: 0 | Status: SHIPPED
Start: 2017-06-28 | End: 2017-07-07

## 2017-06-28 RX ADMIN — CARBIDOPA AND LEVODOPA 2 TABLET: 25; 100 TABLET ORAL at 14:27

## 2017-06-28 RX ADMIN — SULFAMETHOXAZOLE AND TRIMETHOPRIM 1 TABLET: 800; 160 TABLET ORAL at 09:05

## 2017-06-28 RX ADMIN — CARBIDOPA AND LEVODOPA 2 TABLET: 25; 100 TABLET ORAL at 09:05

## 2017-06-28 RX ADMIN — CARBIDOPA AND LEVODOPA 2 TABLET: 25; 100 TABLET ORAL at 12:04

## 2017-06-28 RX ADMIN — Medication 10 ML: at 14:27

## 2017-06-28 RX ADMIN — OXYBUTYNIN CHLORIDE 5 MG: 5 TABLET, EXTENDED RELEASE ORAL at 09:05

## 2017-06-28 RX ADMIN — ESCITALOPRAM OXALATE 20 MG: 10 TABLET ORAL at 09:05

## 2017-06-28 RX ADMIN — ASPIRIN 81 MG: 81 TABLET, COATED ORAL at 09:05

## 2017-06-28 RX ADMIN — PINDOLOL 2.5 MG: 5 TABLET ORAL at 14:31

## 2017-06-28 RX ADMIN — Medication 10 ML: at 06:17

## 2017-06-28 NOTE — INTERDISCIPLINARY ROUNDS
IDR/SLIDR Summary          Patient: Hollis Melissa MRN: [id number]    Age: 80 y.o. YOB: 1933 Room/Bed: [facility]   Admit Diagnosis: TIA (transient ischemic attack)  Altered mental status  Slurred speech  slurred speech  Principal Diagnosis: Hemispheric carotid artery syndrome   Goals: discharge  Readmission: NO  Quality Measure: Not applicable  VTE Prophylaxis: Mechanical  Influenza Vaccine screening completed? YES  Pneumococcal Vaccine screening completed? NO  Mobility needs: Yes   Nutrition plan:Yes  Consults:P.T, O.T. and Case Management    Financial concerns:No  Escalated to CM? YES  RRAT Score: 26   Interventions:H2H  Testing due for pt today?  NO  LOS: 1 days Expected length of stay 2 days  Discharge plan: [facility]   PCP: Erika Weiss MD  Transportation needs: Yes    Days before discharge:one day until discharge   Discharge disposition: Alicia Poe [facility]    Signed:     Riya Rojas  6/28/2017  12:31 AM

## 2017-06-28 NOTE — DISCHARGE INSTRUCTIONS
Discharge SNF/Rehab Instructions/LTAC       PATIENT ID: Kayla Fields  MRN: [id number]   YOB: 1933    DATE OF ADMISSION: 6/26/2017  6:46 PM    DATE OF DISCHARGE: 6/28/2017    PRIMARY CARE PROVIDER: Candance Scotland, MD       ATTENDING PHYSICIAN: Frandy Mar MD  DISCHARGING PROVIDER: Frandy Mar MD     To contact this individual call [phone] and ask the  to page. If unavailable ask to be transferred the Adult Hospitalist Department. CONSULTATIONS: IP CONSULT TO HOSPITALIST  IP CONSULT TO NEUROLOGY    PROCEDURES/SURGERIES: * No surgery found *    ADMITTING DIAGNOSES & HOSPITAL COURSE:   80 y.o man with Parkinson's disease, presence of cardiac pacemaker, who presented with a fall and slurred speech.     Slurred speech: (POA) resolved, concern for TIA initially. Described as some word-finding difficulties and \"stuttering. \"   -unable to obtain MRI due to presence of pacemaker  -CTA head/neck with significant stenosis or aneurysm  -neurology consulted  -started on a baby aspirin daily     UTI: (POA) he was dx'd with a UTI about a week ago and prescribed Bactrim. He had confusion at that time which has improved. He was started on IV ceftriaxone here but changed to PO Bactrim. He should complete his home dose as previously prescribed. Parkinson's disease     Anxiety/depression: please consider weaning lorazepam    HTN: his BP was elevated here and should be re-checked on follow-up. Avoid aggressive BP control but if severely elevated he will need to be started on additional meds.       DISCHARGE DIAGNOSES / PLAN:      PENDING TEST RESULTS:   At the time of discharge the following test results are still pending: n/a    FOLLOW UP APPOINTMENTS:    Follow-up Information     Follow up With Details Comments Contact Oliva Hall MD Schedule an appointment as soon as possible for a visit in 1 week  [address] [place] [address] Avenue  [phone]             DIET: Cardiac Diet    TUBE FEEDING INSTRUCTIONS: n/a    OXYGEN / BiPAP SETTINGS: n/a    ACTIVITY: PT/OT Eval and Treat      DISCHARGE MEDICATIONS:   See Medication Reconciliation Form      NOTIFY YOUR PHYSICIAN FOR ANY OF THE FOLLOWING:   Fever over 101 degrees for 24 hours. Chest pain, shortness of breath, fever, chills, nausea, vomiting, diarrhea, change in mentation, falling, weakness, bleeding. Severe pain or pain not relieved by medications. Or, any other signs or symptoms that you may have questions about.     DISPOSITION:    Home With:   OT  PT  HH  RN       SNF/Inpatient Rehab/LTAC   x Independent/assisted living    Hospice    Other:       PATIENT CONDITION AT DISCHARGE:     Functional status    Poor     Deconditioned     Independent      Cognition     Lucid    x Forgetful     Dementia      Catheters/lines (plus indication)    Vance     PICC     PEG    x None      Code status     Full code    x DNR      PHYSICAL EXAMINATION AT DISCHARGE:   Refer to Progress Note      CHRONIC MEDICAL DIAGNOSES:  Problem List as of 2017  Date Reviewed: 2017          Codes Class Noted - Resolved    Hemispheric carotid artery syndrome ICD-10-CM: G45.1  ICD-9-CM: 435.8  2017 - Present        Slurred speech ICD-10-CM: R47.81  ICD-9-CM: 784.59  2017 - Present        Altered mental status ICD-10-CM: R41.82  ICD-9-CM: 780.97  2017 - Present        ACP (advance care planning) ICD-10-CM: Z71.89  ICD-9-CM: V65.49  2017 - Present        Influenza A ICD-10-CM: J10.1  ICD-9-CM: 487.1  3/19/2017 - Present        Pacemaker end of life ICD-10-CM: E80.350  ICD-9-CM: V53.31  3/13/2017 - Present    Overview Signed 3/13/2017  9:49 AM by Joanna Robertson MD     Generator change 3/13/2017             Pneumonia ICD-10-CM: Q58.4  ICD-9-CM: 486  2017 - Present        Aneurysm of iliac artery ([facility] [address] [address]- left 3.9 cm  CT scan- no change ICD-10-CM: I72.3  ICD-9-CM: 442.2  2016 - Present        Grief- wife of 61 years  2015 ICD-10-CM: F43.20  ICD-9-CM: 309.0  11/8/2015 - Present        Primary osteoarthritis of both knees ICD-10-CM: M17.0  ICD-9-CM: 715.16  11/8/2015 - Present        Overactive bladder- Dr. Kelly Sheriff: H86.00  ICD-9-CM: 596.51  7/23/2015 - Present        Rupture quadriceps tendon- bilat--2/9/2014- UNABLE TO WALK AFTER THAT. (Chronic) ICD-10-CM: C80.917K  ICD-9-CM: 844.8  12/31/2014 - Present        Mild cognitive impairment w/o memory loss ICD-10-CM: G31.84  ICD-9-CM: 331.83  11/19/2014 - Present        Major depressive disorder, single episode, unspecified ICD-10-CM: F32.9  ICD-9-CM: 296.20  11/19/2014 - Present        Generalized anxiety disorder ICD-10-CM: F41.1  ICD-9-CM: 300.02  11/19/2014 - Present        Pacemaker ICD-10-CM: Z95.0  ICD-9-CM: V45.01  10/21/2013 - Present        Orthostatic hypotension ICD-10-CM: I95.1  ICD-9-CM: 458.0  9/28/2013 - Present        Blurry vision-chronic, no change- neg w/u ICD-10-CM: H53.8  ICD-9-CM: 368.8  7/9/2013 - Present        NSVT (nonsustained ventricular tachycardia) (HCC) ICD-10-CM: I47.2  ICD-9-CM: 427.1  7/20/2012 - Present    Overview Signed 7/20/2012  9:56 AM by Cisco Denis MD     2. 2.2012             PAT (paroxysmal atrial tachycardia) (HCC) ICD-10-CM: I47.1  ICD-9-CM: 427.0  1/19/2012 - Present        Sick sinus syndrome-pacemaker-2008 ICD-10-CM: I49.5  ICD-9-CM: 427.81  10/28/2011 - Present        BPH (benign prostatic hyperplasia)- Dr. Kelly Sheriff: N40.0  ICD-9-CM: 600.00  10/3/2011 - Present        Essential hypertension, difficult to control due to orthostatic hypotension ICD-10-CM: I10  ICD-9-CM: 401.1  4/18/2011 - Present        Parkinsonian syndrome- Dr. Cristian Grider ICD-10-CM: Sharath Mins  ICD-9-CM: 332.0  4/8/2011 - Present        Hyperlipemia ICD-10-CM: E78.5  ICD-9-CM: 272.4  2/19/2010 - Present        Reflux esophagitis ICD-10-CM: K21.0  ICD-9-CM: 530.11  2/19/2010 - Present        DDD (degenerative disc disease), lumbar ICD-10-CM: M51.36  ICD-9-CM: 722.52  2/19/2010 - Present        DJD (degenerative joint disease) of cervical spine ICD-10-CM: M47.812  ICD-9-CM: 721.0  2/19/2010 - Present        Paralysis agitans ([place] [address] ICD-10-CM: G20  ICD-9-CM: 332.0  2/19/2010 - Present        DU (duodenal ulcer) ICD-10-CM: K26.9  ICD-9-CM: 532.90  9/1/1990 - Present        History of duodenal ulcer ICD-10-CM: Z87.19  ICD-9-CM: V12.79  9/1/1990 - Present        RESOLVED: Sepsis ([place] [address] ICD-10-CM: A41.9  ICD-9-CM: 038.9, 995.91  6/10/2016 - 7/28/2016        RESOLVED: Catheter-associated urinary tract infection ([place] [address] ICD-10-CM: T83.511A, N39.0  ICD-9-CM: 996.64, 599.0  4/15/2016 - 4/18/2016        RESOLVED: Metabolic encephalopathy [facility]: G93.41  ICD-9-CM: 348.31  4/15/2016 - 4/18/2016        RESOLVED: GABI (acute kidney injury) ([place] [address] ICD-10-CM: N17.9  ICD-9-CM: 584.9  4/15/2016 - 4/18/2016        RESOLVED: Hyponatremia ICD-10-CM: E87.1  ICD-9-CM: 276.1  4/15/2016 - 4/18/2016        RESOLVED: Mental status change ICD-10-CM: R41.82  ICD-9-CM: 780.97  10/30/2013 - 1/5/2016        RESOLVED: Pneumonia, organism unspecified ICD-10-CM: J18.9  ICD-9-CM: 662  2/19/2012 - 1/5/2016        RESOLVED: Weakness generalized ICD-10-CM: R53.1  ICD-9-CM: 780.79  2/19/2012 - 2/22/2012        RESOLVED: PAT (paroxysmal atrial tachycardia) ([facility] ICD-10-CM: I47.1  ICD-9-CM: 427.0  1/19/2012 - 2/29/2012        RESOLVED: Premature atrial beats ICD-10-CM: I49.1  ICD-9-CM: 427.61  Unknown - 2/29/2012                CDMP Checked:   Yes x     PROBLEM LIST Updated:  Yes x         Signed:   Poly De La Rosa MD  6/28/2017  11:42 AM

## 2017-06-28 NOTE — PROGRESS NOTES
Bedside and Verbal shift change report given to sweta (oncoming nurse) by Anne Marie Avila (offgoing nurse). Report included the following information SBAR, Kardex, Intake/Output, MAR, Accordion, Recent Results and Cardiac Rhythm PACED.

## 2017-06-28 NOTE — DISCHARGE SUMMARY
Discharge Summary     Patient: Efrain Minaya MRN: [id number]  SSN: [id number]    YOB: 1933  Age: 80 y.o. Sex: male       Admit Date: 6/26/2017    Discharge Date: 6/28/2017      Admission Diagnoses: slurred speech    Discharge Diagnoses:    TIA    See other diagnoses below    Discharge Condition: Stable    Hospital Course: 80 y.o man with Parkinson's disease, presence of cardiac pacemaker, who presented with a fall and slurred speech.     Slurred speech: (POA) resolved, concern for TIA initially. Described as some word-finding difficulties and \"stuttering. \"   -unable to obtain MRI due to presence of pacemaker  -CTA head/neck with significant stenosis or aneurysm  -neurology consulted  -started on a baby aspirin daily  -LDL 65.4    UTI: (POA) he was dx'd with a UTI about a week ago and prescribed Bactrim. He had confusion at that time which has improved. He was started on IV ceftriaxone here but changed to PO Bactrim. He should complete his home dose as previously prescribed. Parkinson's disease     Anxiety/depression: please consider weaning lorazepam    HTN: his BP was elevated here and should be re-checked on follow-up. Avoid aggressive BP control but if severely elevated he will need to be started on additional meds. Consults: Neurology    Significant Diagnostic Studies: see above    Disposition: back to assisted living facility    S: states he feels well today, no issues with slurred speech, no weakness, ehadache, dizziness, or paresthesias. Wants to go home.     O:   Visit Vitals    BP (!) 161/93 (BP 1 Location: Left arm, BP Patient Position: At rest)    Pulse 82    Temp 98 °F (36.7 °C)    Resp 16    Ht 6' 2\" (1.88 m)    Wt 85.6 kg (188 lb 11.4 oz)    SpO2 96%    BMI 24.23 kg/m2     NAD  Heart RRR  Lungs CTA b/l      Discharge Medications:   Current Discharge Medication List      START taking these medications    Details   aspirin delayed-release 81 mg tablet Take 1 Tab by mouth daily. Qty: 30 Tab, Refills: 0         CONTINUE these medications which have NOT CHANGED    Details   QUEtiapine (SEROQUEL) 25 mg tablet Take 25 mg by mouth every evening. carbidopa-levodopa (SINEMET)  mg per tablet Take 2 Tabs by mouth four (4) times daily. (This is given at 8:00, 11:00, 14:00, and 21:00). Qty: 240 Tab, Refills: 0      cloNIDine HCl (CATAPRES) 0.1 mg tablet Take 1 Tab by mouth every six (6) hours as needed (SBP > 180 mmHg). Qty: 20 Tab, Refills: 0      trimethoprim-sulfamethoxazole (BACTRIM DS, SEPTRA DS) 160-800 mg per tablet Take 1 Tab by mouth two (2) times a day for 10 days. Qty: 20 Tab, Refills: 0      LORazepam (ATIVAN) 0.5 mg tablet Take  by mouth every six (6) hours as needed for Anxiety. DETROL LA 2 mg ER capsule TAKE 1 CAPSULE DAILY FOR BLADDER FREQUENCY  Qty: 90 Cap, Refills: 3    Associated Diagnoses: Overactive bladder      pindolol (VISKIN) 5 mg tablet Take 2.5 mg by mouth daily (after lunch). albuterol (PROVENTIL HFA, VENTOLIN HFA, PROAIR HFA) 90 mcg/actuation inhaler Take 2 Puffs by inhalation every six (6) hours as needed for Wheezing. Qty: 1 Inhaler, Refills: 0    Associated Diagnoses: Chronic cough      polyethylene glycol (MIRALAX) 17 gram packet Take 1 Packet by mouth daily. Indications: CONSTIPATION  Qty: 30 Packet, Refills: 0      therapeutic multivitamin (THERAGRAN) tablet Take 1 Tab by mouth daily. Qty: 30 Tab, Refills: 0      traMADol (ULTRAM) 50 mg tablet Take 1 Tab by mouth every eight (8) hours as needed for Pain. Max Daily Amount: 150 mg.  Qty: 30 Tab, Refills: 1      droxidopa (NORTHERA) 100 mg cap Take 100 mg by mouth three (3) times daily. Indications: SYMPTOMATIC ORTHOSTATIC HYPOTENSION  Qty: 90 Tab, Refills: 1      docusate sodium (COLACE) 100 mg capsule Take 1 Cap by mouth daily as needed for Constipation.   Qty: 30 Cap, Refills: 0      cycloSPORINE (RESTASIS) 0.05 % ophthalmic emulsion Administer 1 Drop to both eyes two (2) times a day.  Qty: 60 Each, Refills: 0      escitalopram oxalate (LEXAPRO) 20 mg tablet Take 1 Tab by mouth daily. Qty: 30 Tab, Refills: 0      rivastigmine (EXELON) 4.6 mg/24 hr patch 1 Patch by TransDERmal route daily. Qty: 30 Patch, Refills: 0    Associated Diagnoses: Mild cognitive impairment      esomeprazole (NEXIUM) 40 mg capsule Take 1 Cap by mouth daily. Qty: 30 Cap, Refills: 0      acetaminophen (MAPAP EXTRA STRENGTH) 500 mg tablet Take 1 Tab by mouth every six (6) hours as needed for Pain. Qty: 120 Tab, Refills: 0             Follow-up Appointments   Procedures    FOLLOW UP VISIT Appointment in: One Week     Standing Status:   Standing     Number of Occurrences:   1     Standing Expiration Date:   6/29/2017     Order Specific Question:   Appointment in     Answer:    One Week       Signed By: Guerita Hdz MD     June 28, 2017

## 2017-06-28 NOTE — PROGRESS NOTES
Patient to return to [facility] via ambulance at 4:30 today. Update and clinical information provided to Miky Badillo. Daughter has been called who requests [facility] coordinate dinner for her father and [facility] is aware. [facility] to  patient at 4:30 pm.    IDT updated.     Moe Arce RN

## 2017-06-28 NOTE — PROGRESS NOTES
TRANSFER - OUT REPORT:    Verbal report given to Kleverjolene TangRussell at [facility] (name) on Berenice Mock  being transferred to [facility] for routine progression of care       Report consisted of patients Situation, Background, Assessment and   Recommendations(SBAR). Information from the following report(s) SBAR, Kardex, ED Summary, Procedure Summary, Intake/Output, MAR, Accordion, Recent Results and Med Rec Status was reviewed with the receiving nurse. Lines:       Opportunity for questions and clarification was provided. Patient transported with:   MAR, AVS, Specific Discharge instructional paperwork. Paperwork given to Transport. PIV removed prior to discharge.

## 2017-06-29 ENCOUNTER — PATIENT OUTREACH (OUTPATIENT)
Dept: FAMILY MEDICINE CLINIC | Age: 82
End: 2017-06-29

## 2017-06-29 LAB
BACTERIA SPEC CULT: ABNORMAL
BACTERIA SPEC CULT: ABNORMAL
SERVICE CMNT-IMP: ABNORMAL

## 2017-06-29 NOTE — Clinical Note
Needs order for daily bp checks- high in recent hospitalization. At appt 7/10-please evaluate Seroquel and Ativan d/t confusion.  Lily Sinclair

## 2017-06-29 NOTE — PROGRESS NOTES
Jannette Sanchez is a 80 y.o. male   This patient was received as a referral from the [facility], [facility] report and also from PAUL CalderonRN and LIZABETH Singh. Insurance is Medicare. Lives at [facility]. Was admitted to [facility] 6/26-6/28 for ? TIA after having a fall and slurred speech. Per Discharge Summary by Beau Kc on 6/28:  ATTENDING PHYSICIAN: Radhika Knight MD  DISCHARGING PROVIDER: Radhika Knight MD     To contact this individual call [phone] and ask the  to page. If unavailable ask to be transferred the Adult Hospitalist Department.     CONSULTATIONS: IP CONSULT TO HOSPITALIST  IP CONSULT TO NEUROLOGY     PROCEDURES/SURGERIES: * No surgery found *     ADMITTING [address] [place] [address] COURSE:   80 y.o man with Parkinson's disease, presence of cardiac pacemaker, who presented with a fall and slurred speech.      Slurred speech: (POA) resolved, concern for TIA initially. Described as some word-finding difficulties and \"stuttering. \"   -unable to obtain MRI due to presence of pacemaker  -CTA head/neck with significant stenosis or aneurysm  -neurology consulted  -started on a baby aspirin daily      UTI: (POA) he was dx'd with a UTI about a week ago and prescribed Bactrim. He had confusion at that time which has improved. He was started on IV ceftriaxone here but changed to PO Bactrim. He should complete his home dose as previously prescribed.     Parkinson's disease      Anxiety/depression: please consider weaning lorazepam     HTN: his BP was elevated here and should be re-checked on follow-up.  Avoid aggressive BP control but if severely elevated he will need to be started on additional meds.        DISCHARGE DIAGNOSES / PLAN:       PENDING TEST RESULTS:   At the time of discharge the following test results are still pending: n/a     FOLLOW UP APPOINTMENTS:    Follow-up Information     Follow up With Details Comments Walter Ge MD Schedule an appointment as soon as possible for a visit in 1 week   [address] [facility] [address]  [phone]             DIET: Cardiac Diet     TUBE FEEDING INSTRUCTIONS: n/a     OXYGEN / BiPAP SETTINGS: n/a     ACTIVITY: PT/OT Eval and Treat        DISCHARGE MEDICATIONS:                        See Medication Reconciliation Form        NOTIFY YOUR PHYSICIAN FOR ANY OF THE FOLLOWING:   Fever over 101 degrees for 24 hours. Chest pain, shortness of breath, fever, chills, nausea, vomiting, diarrhea, change in mentation, falling, weakness, bleeding. Severe pain or pain not relieved by medications. Or, any other signs or symptoms that you may have questions about.     DISPOSITION:     Home With:    OT   PT   HH   RN        SNF/Inpatient Rehab/LTAC   x Independent/assisted living     Hospice     Other:               Summary of patients top three medical problems:     Problem 1: Parkinsons     Problem 2:Pacemaker     Problem 3: HTN    Patient's challenges to self management identified:  Patient is wheelchair bound, lives in [place] [address] at [facility]. Patients motivational level on a scale of 0-10: unable to determine at this time. Medication Management:  Administered by nurse at [facility]. Advance Care Planning:   Patient was offered the opportunity to discuss advance care planning:  N/A   Does patient have an Advance Directive:  yes   If no, did you provide information on Advance Care Planning? N/A     [employer], Referrals, and Durable Medical Equipment: wheelchair, uses PT by Jon at [facility] prior to admission and ordered upon discharge. Along with OT.     Follow up appointments:   , Monday 7/3 at 3:30pm. (see note later -appt changed to 7/10 d/t transportation)  Goals      Identification of barriers to adherence to a plan of care such as inability to afford medications, lack of insurance, lack of transportation, etc. (pt-stated)            3/17/17- Family/patient verbalized understanding of plan of care, when to contact NN or provider, for assistance with providing resources. PK       Knowledge and adherence of prescribed medication (ie. action, side effects, missed dose, etc.).  Prevent complications post hospitalization. 3/39/17-will attend follow-ups as scheduled and notify providers if changes. PK       Supportive resources in place to maintain patient in the community (ie. Home Health, DME equipment, refer to, medication assistant plan, etc.)            3/29/17-patient/family verbalizes understanding of treatment plan and agrees to compliance. PK        NN called and spoke briefly to Johnny Yusuf, at [facility]. Had called several times requesting to speak to a nurse on the floor, but no one answered. Jono reports he is doing well -said the hospital had made some changes in his meds. Says he has Physical therapy by Krystina Quigley there and that will be continued per hospital order. Also reported that they were notified today by hospital that the Nares swab for MRSA was positive. NN requested BP be checked by Pedro Hernández states they will need order to do so. Will have  send order to [facility]. Scheduled for f/u with  on Monday 7/3 at 3:30pm(earliest available per NO Mariee LPN). Advised will need to schedule f/u with  for Ailyn Shepard told me to check with his daughter Nicola Escudero, she coordinates all of his appointments. NN called Nicola Escudero x 2 and LM on mobile and home phone. Requested she call NN back on direct line. Will continue to try and reach daughter re appt for f/u with . Spoke to daughter 6/30- says he will not be able to come 7/3 to see . Must use transportation at [employer] 7 days notice and only provide service on [employer]. Appointment with  rescheduled for 7/10 at 3:30pm. LM for daughter and request call back to confirm.   Daughter said hospitalist had requested that  evaluate 2 of his medications he is taking d/t confusion-      1. Seroquel      2. Ativan  Advised daughter that I will send note to  to address at f/u appointment. POC- Call daughter and Zuleika Upton weekly x 4 to check on status/progress. At end of 4 weeks, assess for need for CCM.

## 2017-06-30 ENCOUNTER — TELEPHONE (OUTPATIENT)
Dept: FAMILY MEDICINE CLINIC | Age: 82
End: 2017-06-30

## 2017-06-30 NOTE — TELEPHONE ENCOUNTER
Contact # is [phone]    Carlie Claire, with [facility], is calling to get the verbal okay to start patient back with home health

## 2017-07-05 ENCOUNTER — TELEPHONE (OUTPATIENT)
Dept: FAMILY MEDICINE CLINIC | Age: 82
End: 2017-07-05

## 2017-07-05 ENCOUNTER — PATIENT OUTREACH (OUTPATIENT)
Dept: FAMILY MEDICINE CLINIC | Age: 82
End: 2017-07-05

## 2017-07-05 NOTE — TELEPHONE ENCOUNTER
Glory Quiroga, Physical Therapist with [facility] is calling, Glory Quiroga states that she saw the patient today for an evaluation and would like verbal orders for continuing care. Glory Quiroga states that she would like to see the patient for 3 more visits to help with transferring back and forth to wheelchair and a strengthening program. Purnima Bowen I would send back a message to the nurse and she would consult with Dr. William Zacarias, after this is done she will be receiving a call back.      Best call back # for Glory Quiroga: [phone]

## 2017-07-05 NOTE — TELEPHONE ENCOUNTER
Rex Mejia, Occupational Therapist from [facility] is calling, Rex Mejia would like to request a call back from Dr. Nai Quintanilla nurse in regards to the patient most recent occupational therapy orders. Rex Jackie states that the patient was recently discharged from the hospital and she would like for his occupational therapy to continue, Rex Mejia is requesting verbal order for occupational therapy with the frequency and duration of 2 time a week for 5 weeks. Berna Benjamin I would send back a message to [facility] and she would consult with Dr. Isac Velazquez as soon as she has an answer she will be returning her call.     Best call back # for [id number]:[phone]

## 2017-07-05 NOTE — PROGRESS NOTES
NN called daughter, Rakan Smith), today- she gave 2 identifiers. Checked with her regarding how he has been doing- Chrissy Santos says he is doing fairly well, speech is fine. No further sxs that led to his hospitalization. Still unsure if he was having a stroke or just UTI sxs. Says PT and OT are seeing and working with him. Back to baseline. Daughter aware of his appt 7/10 with  at 3:30pm and says [facility] will be providing his transportation since he is in a wheelchair. NN then called patient- explained to him that I am a nurse that works with  and wanted to check with him. Told me he had been in and out of the hospital, finished his medication. Sometimes feels \"punk\" in the mornings but then starts to feel better. Still misses his wife(they were  for 59 years), often looks to see if she is following him and then remembers that she is gone. Told NN he has 2 sons and one daughter. Says wife and daughter used to fight a lot, but in last 10 years got along well together. Daughter reminds him of his wife and does a really good job at taking care of him. Thanked me for calling to check on him. NN attempted to call Nurse at [facility], on hold twice, no answer. Will continue to try and reach out to nursing staff to get update from them. POC- continue weekly calls to [facility] and daughter to check on status. Appt 7/10 with . Encouraged daughter to call NN if any questions or concerns. Assess patient cognitive level. Goals      Identification of barriers to adherence to a plan of care such as inability to afford medications, lack of insurance, lack of transportation, etc. (pt-stated)            3/17/17- Family/patient verbalized understanding of plan of care, when to contact NN or provider, for assistance with providing resources. PK       Knowledge and adherence of prescribed medication (ie. action, side effects, missed dose, etc.).       Prevent complications post hospitalization. 3/39/17-will attend follow-ups as scheduled and notify providers if changes. PK       Supportive resources in place to maintain patient in the community (ie. Home Health, DME equipment, refer to, medication assistant plan, etc.)            3/29/17-patient/family verbalizes understanding of treatment plan and agrees to compliance.  PK

## 2017-07-07 ENCOUNTER — OFFICE VISIT (OUTPATIENT)
Dept: CARDIOLOGY CLINIC | Age: 82
End: 2017-07-07

## 2017-07-07 ENCOUNTER — CLINICAL SUPPORT (OUTPATIENT)
Dept: CARDIOLOGY CLINIC | Age: 82
End: 2017-07-07

## 2017-07-07 VITALS
HEART RATE: 87 BPM | WEIGHT: 205 LBS | DIASTOLIC BLOOD PRESSURE: 72 MMHG | OXYGEN SATURATION: 97 % | SYSTOLIC BLOOD PRESSURE: 110 MMHG | BODY MASS INDEX: 26.31 KG/M2 | HEIGHT: 74 IN

## 2017-07-07 DIAGNOSIS — Z95.0 CARDIAC PACEMAKER IN SITU: Primary | ICD-10-CM

## 2017-07-07 DIAGNOSIS — N39.0 FREQUENT UTI: ICD-10-CM

## 2017-07-07 DIAGNOSIS — G20 PARKINSON DISEASE (HCC): ICD-10-CM

## 2017-07-07 DIAGNOSIS — I95.1 ORTHOSTATIC HYPOTENSION: ICD-10-CM

## 2017-07-07 DIAGNOSIS — I49.5 SSS (SICK SINUS SYNDROME) (HCC): ICD-10-CM

## 2017-07-07 DIAGNOSIS — Z95.0 PACEMAKER: Primary | ICD-10-CM

## 2017-07-07 DIAGNOSIS — I10 SUPINE HYPERTENSION: ICD-10-CM

## 2017-07-07 RX ORDER — DICLOFENAC SODIUM 10 MG/G
GEL TOPICAL AS NEEDED
COMMUNITY

## 2017-07-07 RX ORDER — LOSARTAN POTASSIUM 25 MG/1
TABLET ORAL DAILY
COMMUNITY
End: 2017-11-16

## 2017-07-07 NOTE — PROGRESS NOTES
Cardiac Electrophysiology Office Note     Subjective:      Oneyda Brand is a 80 y.o. male patient who is seen s/p pacemaker generator change 3/13/17  He has the pacemaker for sick sinus syndrome. Hx includes chronic UTI, orthostatic hypotension, supine hypertension, Parkinson Disease. He denies SOB, chest pain. Lightheadedness and dizziness with position change. He says he is starting to have more symptoms of Parkinson's disease, he can barely write anymore. He has had several hospital/ED visits for UTI. Most recent  he presented with slurred speech, CVA r/o , dx UTI. He lives at [facility]. Echo 2016 LVEF 60 % to 65 %. No RWNA. Mild TR.     Patient Active Problem List    Diagnosis Date Noted    Hemispheric carotid artery syndrome 2017    Slurred speech 2017    Altered mental status 2017    ACP (advance care planning) 2017    Influenza A 2017    Pacemaker end of life 2017    Pneumonia 2017    Aneurysm of iliac artery ([facility]- left 3.9 cm  CT scan- no change 2016    Grief- wife of 61 years  2015    Primary osteoarthritis of both knees 2015    Overactive bladder- Dr. Princess Barlow 2015    Rupture quadriceps tendon- bilat--2014- UNABLE TO WALK AFTER THAT. 2014    Mild cognitive impairment w/o memory loss 2014    Major depressive disorder, single episode, unspecified 2014    Generalized anxiety disorder 2014    Pacemaker 10/21/2013    Orthostatic hypotension 2013    Blurry vision-chronic, no change- neg w/u 2013    NSVT [facility]nonsustained ventricular tachycardia) ([facility]) 2012    PAT (paroxysmal atrial tachycardia) ([facility] 2012    Sick sinus syndrome-pacemaker-2008 10/28/2011    BPH (benign prostatic hyperplasia)- Dr. Princess Barlow 10/03/2011    Essential hypertension, difficult to control due to orthostatic hypotension 2011    Parkinsonian syndrome- Dr. Petra Ashford 04/08/2011    Hyperlipemia 02/19/2010    Reflux esophagitis 02/19/2010    DDD (degenerative disc disease), lumbar 02/19/2010    DJD (degenerative joint disease) of cervical spine 02/19/2010    Paralysis agitans ([address] 02/19/2010    DU (duodenal ulcer) 09/01/1990    History of duodenal ulcer 09/01/1990     Current Outpatient Prescriptions   Medication Sig Dispense Refill    losartan (COZAAR) 25 mg tablet Take  by mouth daily.  diclofenac (VOLTAREN) 1 % gel Apply  to affected area as needed.  LORazepam (ATIVAN) 0.5 mg tablet Take  by mouth every six (6) hours as needed for Anxiety.  QUEtiapine (SEROQUEL) 25 mg tablet Take 25 mg by mouth every evening.  DETROL LA 2 mg ER capsule TAKE 1 CAPSULE DAILY FOR BLADDER FREQUENCY 90 Cap 3    pindolol (VISKIN) 5 mg tablet Take 2.5 mg by mouth daily (after lunch). Take 1/2 tablet by mouth at 2pm -Take 1 tablet by mouth at 9pm      albuterol (PROVENTIL HFA, VENTOLIN HFA, PROAIR HFA) 90 mcg/actuation inhaler Take 2 Puffs by inhalation every six (6) hours as needed for Wheezing. 1 Inhaler 0    carbidopa-levodopa (SINEMET)  mg per tablet Take 2 Tabs by mouth four (4) times daily. (This is given at 8:00, 11:00, 14:00, and 21:00). 240 Tab 0    polyethylene glycol (MIRALAX) 17 gram packet Take 1 Packet by mouth daily. Indications: CONSTIPATION 30 Packet 0    therapeutic multivitamin (THERAGRAN) tablet Take 1 Tab by mouth daily. 30 Tab 0    traMADol (ULTRAM) 50 mg tablet Take 1 Tab by mouth every eight (8) hours as needed for Pain. Max Daily Amount: 150 mg. 30 Tab 1    cloNIDine HCl (CATAPRES) 0.1 mg tablet Take 1 Tab by mouth every six (6) hours as needed (SBP > 180 mmHg). 20 Tab 0    droxidopa (NORTHERA) 100 mg cap Take 100 mg by mouth three (3) times daily. Indications: SYMPTOMATIC ORTHOSTATIC HYPOTENSION 90 Tab 1    docusate sodium (COLACE) 100 mg capsule Take 1 Cap by mouth daily as needed for Constipation.  30 Cap 0    cycloSPORINE (RESTASIS) 0.05 % ophthalmic emulsion Administer 1 Drop to both eyes two (2) times a day. 60 Each 0    escitalopram oxalate (LEXAPRO) 20 mg tablet Take 1 Tab by mouth daily. 30 Tab 0    rivastigmine (EXELON) 4.6 mg/24 hr patch 1 Patch by TransDERmal route daily. 30 Patch 0    esomeprazole (NEXIUM) 40 mg capsule Take 1 Cap by mouth daily. 30 Cap 0    acetaminophen (MAPAP EXTRA STRENGTH) 500 mg tablet Take 1 Tab by mouth every six (6) hours as needed for Pain. 120 Tab 0    aspirin delayed-release 81 mg tablet Take 1 Tab by mouth daily. 27 Tab 0     No Known Allergies  Past Medical History:   Diagnosis Date    Blurry vision 1/19/2012    BPH (benign prostatic hyperplasia)     DDD (degenerative disc disease), lumbar 2/19/2010    DJD (degenerative joint disease) of cervical spine 2/19/2010    DU (duodenal ulcer) 9/1/1990    Hyperlipidemia     Hypertension     Other and unspecified hyperlipidemia 2/19/2010    Pacemaker     Paralysis agitans ([address] 2/19/2010    Parkinson disease ([facility]     PAT [facility]) [facility]     Premature atrial beats     Reflux esophagitis 2/19/2010    Sick sinus syndrome [facility])      Past Surgical History:   Procedure Laterality Date    HX CATARACT REMOVAL  6/2012 and 7/2012    Both eyes    HX ORTHOPAEDIC  04/2015    tendon repair both knees    HX PACEMAKER  2008    bradycardia    HX VITRECTOMY  11/2011    IA REMVL PERM PM PLS GEN W/REPL PLSE GEN 2 LEAD SYS  3/13/2017          Family History   Problem Relation Age of Onset    Asthma Mother     Heart Disease Father      Social History   Substance Use Topics    Smoking status: Former Smoker     Types: Pipe     Quit date: 7/14/1945    Smokeless tobacco: Never Used    Alcohol use 2.0 oz/week     4 Cans of beer per week      Comment: glass of wine every now and then        Review of Systems:   Constitutional: Negative for fever, chills, weight loss, +malaise/fatigue.    HEENT: Negative for nosebleeds, vision changes. Respiratory: Negative for cough, hemoptysis, sputum production, and wheezing. Cardiovascular: Negative for chest pain, palpitations, orthopnea, claudication, leg swelling, syncope, and PND. Gastrointestinal: Negative for nausea, vomiting, diarrhea, constipation, blood in stool and melena. Genitourinary: Negative for dysuria, and hematuria. + frequent UTI  Musculoskeletal: Negative for myalgias, arthralgia. Skin: Negative for rash. Heme: Does not bleed or bruise easily. Neurological: Negative for speech change and focal weakness. + tremors. Objective:     Visit Vitals    /72 (BP 1 Location: Left arm, BP Patient Position: Sitting)    Pulse 87    Ht 6' 2\" (1.88 m)    Wt 205 lb (93 kg)    SpO2 97%    BMI 26.32 kg/m2      Physical Exam:   Constitutional: well-nourished. No distress. In a wheel chair  Head: Normocephalic and atraumatic. Eyes: Pupils are equal, round  Neck: supple. No JVD present. Cardiovascular: Normal rate, regular rhythm. Exam reveals no gallop and no friction rub. No murmur heard. Pulmonary/Chest: Effort normal and breath sounds normal. No wheezes. Abdominal: Soft, no tenderness. Musculoskeletal: no edema. Neurological: alert,oriented. Skin: Skin is warm and dry. Left infraclavicular pacemaker scar is healed   Psychiatric: normal mood and affect. Behavior is normal. Judgment and thought content normal.      Assessment/Plan:       ICD-10-CM ICD-9-CM    1. Pacemaker Z95.0 V45.01    2. SSS (sick sinus syndrome) ([facility]) I49.5 427.81    3. Parkinson disease ([facility] G20 332.0    4. Frequent UTI N39.0 599.0    5. Orthostatic hypotension I95.1 458.0    6. Supine hypertension I10 401.9      Device check today shows proper functioning, 99% AP. He does not have a remote box that is functioning. Will contact [employer] about getting him a new remote box. BP is controlled in sitting position   He is compensated on exam today, no CHF.   He has DNR and we talked about his assisted living facility [facility] where he is satisfied with his care  He wears diapers at night and when going out due to urinary incontinence  Follow-up Disposition:  Return in about 6 months (around 1/7/2018). Thank you for involving me in this patient's care and please call with further concerns or questions. Yeimi Mays M.D.   Electrophysiology/Cardiology  [facility] [place]  [facility], [facility] [address] [facility], Kash Zuleta [address] [place] [address], [address] [place] [address]  [phone]

## 2017-07-07 NOTE — PROGRESS NOTES
Cardiac Electrophysiology Office Note     Subjective:      Catalino Alvarez is a 80 y.o. male patient who is seen s/p pacemaker generator change 3/13/17  He has the pacemaker for sick sinus syndrome. Hx includes chronic UTI, orthostatic hypotension, supine hypertension, Parkinson Disease. He denies SOB, chest pain. Lightheadedness and dizziness with position change. He says he is starting to have more symptoms of Parkinson's disease, he can barely write anymore. He has had several hospital/ED visits for UTI. Most recent  he presented with slurred speech, CVA r/o , dx UTI. He lives at [facility]. Echo 2016 LVEF 60 % to 65 %. No RWNA. Mild TR.     Patient Active Problem List    Diagnosis Date Noted    Hemispheric carotid artery syndrome 2017    Slurred speech 2017    Altered mental status 2017    ACP (advance care planning) 2017    Influenza A 2017    Pacemaker end of life 2017    Pneumonia 2017    Aneurysm of iliac artery ([facility]- left 3.9 cm  CT scan- no change 2016    Grief- wife of 61 years  2015    Primary osteoarthritis of both knees 2015    Overactive bladder- Dr. Hayden Davila 2015    Rupture quadriceps tendon- bilat--2014- UNABLE TO WALK AFTER THAT. 2014    Mild cognitive impairment w/o memory loss 2014    Major depressive disorder, single episode, unspecified 2014    Generalized anxiety disorder 2014    Pacemaker 10/21/2013    Orthostatic hypotension 2013    Blurry vision-chronic, no change- neg w/u 2013    NSVT [facility]onsustained [facility] tachycardia) ([facility]) 2012    PAT (paroxysmal atrial tachycardia) ([facility] 2012    Sick sinus syndrome-pacemaker-2008 10/28/2011    BPH (benign prostatic hyperplasia)- Dr. Hayden Davila 10/03/2011    Essential hypertension, difficult to control due to orthostatic hypotension 2011    Parkinsonian syndrome- Dr. Lisa Marsh 04/08/2011    Hyperlipemia 02/19/2010    Reflux esophagitis 02/19/2010    DDD (degenerative disc disease), lumbar 02/19/2010    DJD (degenerative joint disease) of cervical spine 02/19/2010    Paralysis agitans ([address] 02/19/2010    DU (duodenal ulcer) 09/01/1990    History of duodenal ulcer 09/01/1990     Current Outpatient Prescriptions   Medication Sig Dispense Refill    losartan (COZAAR) 25 mg tablet Take  by mouth daily.  diclofenac (VOLTAREN) 1 % gel Apply  to affected area as needed.  LORazepam (ATIVAN) 0.5 mg tablet Take  by mouth every six (6) hours as needed for Anxiety.  QUEtiapine (SEROQUEL) 25 mg tablet Take 25 mg by mouth every evening.  DETROL LA 2 mg ER capsule TAKE 1 CAPSULE DAILY FOR BLADDER FREQUENCY 90 Cap 3    pindolol (VISKIN) 5 mg tablet Take 2.5 mg by mouth daily (after lunch). Take 1/2 tablet by mouth at 2pm -Take 1 tablet by mouth at 9pm      albuterol (PROVENTIL HFA, VENTOLIN HFA, PROAIR HFA) 90 mcg/actuation inhaler Take 2 Puffs by inhalation every six (6) hours as needed for Wheezing. 1 Inhaler 0    carbidopa-levodopa (SINEMET)  mg per tablet Take 2 Tabs by mouth four (4) times daily. (This is given at 8:00, 11:00, 14:00, and 21:00). 240 Tab 0    polyethylene glycol (MIRALAX) 17 gram packet Take 1 Packet by mouth daily. Indications: CONSTIPATION 30 Packet 0    therapeutic multivitamin (THERAGRAN) tablet Take 1 Tab by mouth daily. 30 Tab 0    traMADol (ULTRAM) 50 mg tablet Take 1 Tab by mouth every eight (8) hours as needed for Pain. Max Daily Amount: 150 mg. 30 Tab 1    cloNIDine HCl (CATAPRES) 0.1 mg tablet Take 1 Tab by mouth every six (6) hours as needed (SBP > 180 mmHg). 20 Tab 0    droxidopa (NORTHERA) 100 mg cap Take 100 mg by mouth three (3) times daily. Indications: SYMPTOMATIC ORTHOSTATIC HYPOTENSION 90 Tab 1    docusate sodium (COLACE) 100 mg capsule Take 1 Cap by mouth daily as needed for Constipation.  30 Cap 0    cycloSPORINE (RESTASIS) 0.05 % ophthalmic emulsion Administer 1 Drop to both eyes two (2) times a day. 60 Each 0    escitalopram oxalate (LEXAPRO) 20 mg tablet Take 1 Tab by mouth daily. 30 Tab 0    rivastigmine (EXELON) 4.6 mg/24 hr patch 1 Patch by TransDERmal route daily. 30 Patch 0    esomeprazole (NEXIUM) 40 mg capsule Take 1 Cap by mouth daily. 30 Cap 0    acetaminophen (MAPAP EXTRA STRENGTH) 500 mg tablet Take 1 Tab by mouth every six (6) hours as needed for Pain. 120 Tab 0    aspirin delayed-release 81 mg tablet Take 1 Tab by mouth daily. [address] [place] Tab 0     No Known Allergies  Past Medical History:   Diagnosis Date    Blurry vision 1/19/2012    BPH (benign prostatic hyperplasia)     DDD (degenerative disc disease), lumbar 2/19/2010    DJD (degenerative joint disease) of cervical spine 2/19/2010    DU (duodenal ulcer) 9/1/1990    Hyperlipidemia     Hypertension     Other and unspecified hyperlipidemia 2/19/2010    Pacemaker     Paralysis agitans ([address] 2/19/2010    Parkinson disease ([address]     PAT [facility]ox[facility]) ([facility]     Premature atrial beats     Reflux esophagitis 2/19/2010    Sick sinus syndrome [facility])      Past Surgical History:   Procedure Laterality Date    HX CATARACT REMOVAL  6/2012 and 7/2012    Both eyes    HX ORTHOPAEDIC  04/2015    tendon repair both knees    HX PACEMAKER  2008    bradycardia    HX VITRECTOMY  11/2011    MI REMVL PERM PM PLS GEN W/REPL PLSE GEN 2 LEAD SYS  3/13/2017          Family History   Problem Relation Age of Onset    Asthma Mother     Heart Disease Father      Social History   Substance Use Topics    Smoking status: Former Smoker     Types: Pipe     Quit date: 7/14/1945    Smokeless tobacco: Never Used    Alcohol use 2.0 oz/week     4 Cans of beer per week      Comment: glass of wine every now and then        Review of Systems:   Constitutional: Negative for fever, chills, weight loss, +malaise/fatigue.    HEENT: Negative for nosebleeds, vision changes. Respiratory: Negative for cough, hemoptysis, sputum production, and wheezing. Cardiovascular: Negative for chest pain, palpitations, orthopnea, claudication, leg swelling, syncope, and PND. Gastrointestinal: Negative for nausea, vomiting, diarrhea, constipation, blood in stool and melena. Genitourinary: Negative for dysuria, and hematuria. + frequent UTI  Musculoskeletal: Negative for myalgias, arthralgia. Skin: Negative for rash. Heme: Does not bleed or bruise easily. Neurological: Negative for speech change and focal weakness. + tremors. Objective:     Visit Vitals    /72 (BP 1 Location: Left arm, BP Patient Position: Sitting)    Pulse 87    Ht 6' 2\" (1.88 m)    Wt 205 lb (93 kg)    SpO2 97%    BMI 26.32 kg/m2      Physical Exam:   Constitutional: well-nourished. No distress. In a wheel chair  Head: Normocephalic and atraumatic. Eyes: Pupils are equal, round  Neck: supple. No JVD present. Cardiovascular: Normal rate, regular rhythm. Exam reveals no gallop and no friction rub. No murmur heard. Pulmonary/Chest: Effort normal and breath sounds normal. No wheezes. Abdominal: Soft, no tenderness. Musculoskeletal: no edema. Neurological: alert,oriented. Skin: Skin is warm and dry. Left infraclavicular scar is healed   Psychiatric: normal mood and affect. Behavior is normal. Judgment and thought content normal.          Assessment/Plan:       ICD-10-CM ICD-9-CM    1. Pacemaker Z95.0 V45.01    2. Sick sinus syndrome-pacemaker-2008 I49.5 427.81    3. SSS (sick sinus syndrome) ([facility]) I49.5 427.81    4. Parkinson disease ([facility] G20 332.0    5. Frequent UTI N39.0 599.0    6. Orthostatic hypotension I95.1 458.0    7. Supine hypertension I10 401.9        Device check today shows proper functioning, 99% AP. He does not have a remote box that is functioning. Will contact [employer] about getting him a new remote box. BP controlled.    He is compensated on exam today.      Thank you for involving me in this patient's care and please call with further concerns or questions. Sarah De Oliveira M.D.   Electrophysiology/Cardiology  [facility] [place]  [facility], [facility] [address] [place] [address] [place] [address] [place] [address]  [phone]

## 2017-07-07 NOTE — MR AVS SNAPSHOT
Visit Information Date & Time Provider Department Dept. Phone Encounter #  
 7/7/2017 10:40 AM Apurva Rachel MD [facility] [phone] [id number] Follow-up Instructions Return in about 6 months (around 1/7/2018). Your Appointments 1/18/2018 10:40 AM  
ESTABLISHED PATIENT with Apurva Rachel MD  
[facility] ([address]) Appt Note: 6 month f/u  
 [address] [place] [address]  
[address] [facility] [address]  
  
    
 7/26/2018 10:30 AM  
PACEMAKER with Londell Kocher [facility] ([facility]) Appt Note: lachelle callaway, annual thresh/CL, see gilman; med threshold/rc/Gilman 1 yr b  
 [address] [place] [address]  
[address] [facility]  
  
    
 7/26/2018 10:40 AM  
ESTABLISHED PATIENT with Apurva Rachel MD  
[facility] ([address] [place] [address]) Appt Note: med threshold/rc/Gilman 1 yr b  
 [address] [address]  
[phone]  
  
    
  
 7/10/2017  3:30 PM  
TRANSITIONAL CARE MANAGEMENT with Jose Ramon Marion MD  
[address] [place] [address]) Appt Note: hospital follow up  
 [address] [facility] [address]  
[phone]  
  
   
 [address] [facility] [address]  
  
    
 10/9/2017 10:00 AM  
REMOTE OFFICE VISIT with Nikunj Driscoll [facility] ([facility]ED[facility]) Appt Note: lachelle callaway  
 [address] [place] [address] [address] [place]  
[address] [address]  
  
    
 1/15/2018  9:45 AM  
REMOTE OFFICE VISIT with Nikunj Driscoll [facility] ([facility]) Appt Note: lachelle callaway  
 [address] [place] [address] [address]  
[phone]  4/18/2018 10:45 AM  
 REMOTE OFFICE VISIT with Mariia Zhou [facility] ([facility] SCHEDULING) Appt Note: mdt ppi, rc, annual thresh/CL, see gilman; CL PPI/rc b  
 [address] [place] [address]  
[phone] Upcoming Health Maintenance Date Due DTaP/Tdap/Td series (1 - Tdap) 5/4/2012 INFLUENZA AGE 9 TO ADULT 8/1/2017 MEDICARE YEARLY EXAM 4/29/2018 GLAUCOMA SCREENING Q2Y 8/26/2018 Allergies as of 7/7/2017  Review Complete On: 7/7/2017 By: Rachel Bateman MD  
 No Known Allergies Current Immunizations  Reviewed on 3/14/2017 Name Date Influenza High Dose Vaccine PF 10/27/2015 Influenza Vaccine Split 11/17/2011 Pneumococcal Vaccine (Unspecified Type) 4/20/2010 TB Skin Test (PPD) Intradermal 3/25/2013 TD Vaccine 5/3/2012 Not reviewed this visit You Were Diagnosed With   
  
 Codes Comments Pacemaker    -  Primary ICD-10-CM: Z95.0 ICD-9-CM: V45.01   
 SSS (sick sinus syndrome) (HCC)     ICD-10-CM: I49.5 ICD-9-CM: 427.81 Parkinson disease ([facility]     ICD-10-CM: G20 
ICD-9-CM: 332.0 Frequent UTI     ICD-10-CM: N39.0 ICD-9-CM: 599.0 Orthostatic hypotension     ICD-10-CM: I95.1 ICD-9-CM: 458.0 Supine hypertension     ICD-10-CM: I10 
ICD-9-CM: 401.9 Vitals BP Pulse Height(growth percentile) Weight(growth percentile) SpO2 BMI  
 110/72 (BP 1 Location: Left arm, BP Patient Position: Sitting) 87 6' 2\" (1.88 m) 205 lb (93 kg) 97% 26.32 kg/m2 Smoking Status Former Smoker Vitals History BMI and BSA Data Body Mass Index Body Surface Area  
 26.32 kg/m 2 2.2 m 2 Preferred Pharmacy Pharmacy Name Phone [address] [facility] [phone] Your Updated Medication List  
  
   
This list is accurate as of: 7/7/17 12:19 PM.  Always use your most recent med list.  
  
  
  
  
 acetaminophen 500 mg tablet Commonly known as:  MAPAP EXTRA STRENGTH Take 1 Tab by mouth every six (6) hours as needed for Pain. albuterol 90 mcg/actuation inhaler Commonly known as:  PROVENTIL HFA, VENTOLIN HFA, PROAIR HFA Take 2 Puffs by inhalation every six (6) hours as needed for Wheezing. carbidopa-levodopa  mg per tablet Commonly known as:  SINEMET Take 2 Tabs by mouth four (4) times daily. (This is given at 8:00, 11:00, 14:00, and 21:00). cloNIDine HCl 0.1 mg tablet Commonly known as:  CATAPRES Take 1 Tab by mouth every six (6) hours as needed (SBP > 180 mmHg). cycloSPORINE 0.05 % ophthalmic emulsion Commonly known as:  RESTASIS Administer 1 Drop to both eyes two (2) times a day. DETROL LA 2 mg ER capsule Generic drug:  tolterodine ER  
TAKE 1 CAPSULE DAILY FOR BLADDER FREQUENCY  
  
 docusate sodium 100 mg capsule Commonly known as:  Parrish Bai Take 1 Cap by mouth daily as needed for Constipation. droxidopa 100 mg Cap capsule Commonly known as:  Scarlet Bachelor Take 100 mg by mouth three (3) times daily. Indications: SYMPTOMATIC ORTHOSTATIC HYPOTENSION  
  
 escitalopram oxalate 20 mg tablet Commonly known as:  Cy Null Take 1 Tab by mouth daily. esomeprazole 40 mg capsule Commonly known as:  NexIUM Take 1 Cap by mouth daily. LORazepam 0.5 mg tablet Commonly known as:  ATIVAN Take  by mouth every six (6) hours as needed for Anxiety. losartan 25 mg tablet Commonly known as:  COZAAR Take  by mouth daily. pindolol 5 mg tablet Commonly known as:  VISKIN Take 2.5 mg by mouth daily (after lunch). Take 1/2 tablet by mouth at 2pm -Take 1 tablet by mouth at 9pm  
  
 polyethylene glycol 17 gram packet Commonly known as:  Lawerance Amas Take 1 Packet by mouth daily. Indications: CONSTIPATION  
  
 QUEtiapine 25 mg tablet Commonly known as:  SEROquel Take 25 mg by mouth every evening. rivastigmine 4.6 mg/24 hr patch Commonly known as:  EXELON  
1 Patch by TransDERmal route daily. therapeutic multivitamin tablet Commonly known as:  [facility] Take 1 Tab by mouth daily. traMADol 50 mg tablet Commonly known as:  ULTRAM  
Take 1 Tab by mouth every eight (8) hours as needed for Pain. Max Daily Amount: 150 mg. VOLTAREN 1 % Gel Generic drug:  diclofenac Apply  to affected area as needed. Follow-up Instructions Return in about 6 months (around 1/7/2018). Patient Instructions You will need to follow up in clinic with Dr. Ubaldo Schirmer in 6 months. Introducing [facility] SERVICES! Dear Karan Bosworth: Thank you for requesting a [employer] account. Our records indicate that you already have an active [employer] account. You can access your account anytime at [email or web address] Did you know that you can access your hospital and ER discharge instructions at any time in [employer]? You can also review all of your test results from your hospital stay or ER visit. Additional Information If you have questions, please visit the Frequently Asked Questions section of the [employer] website at [email or web address] Remember, [employer] is NOT to be used for urgent needs. For medical emergencies, dial 911. Now available from your iPhone and Android! Please provide this summary of care documentation to your next provider. Your primary care clinician is listed as [address] [address], [facility]/s . If you have any questions after today's visit, please call [phone].

## 2017-07-10 ENCOUNTER — OFFICE VISIT (OUTPATIENT)
Dept: FAMILY MEDICINE CLINIC | Age: 82
End: 2017-07-10

## 2017-07-10 VITALS
HEART RATE: 87 BPM | TEMPERATURE: 98 F | HEIGHT: 74 IN | RESPIRATION RATE: 18 BRPM | SYSTOLIC BLOOD PRESSURE: 110 MMHG | DIASTOLIC BLOOD PRESSURE: 74 MMHG | OXYGEN SATURATION: 97 %

## 2017-07-10 DIAGNOSIS — G20 PARKINSON'S DISEASE (HCC): ICD-10-CM

## 2017-07-10 DIAGNOSIS — F43.21 GRIEF: ICD-10-CM

## 2017-07-10 DIAGNOSIS — F32.9 MAJOR DEPRESSIVE DISORDER, SINGLE EPISODE, UNSPECIFIED: ICD-10-CM

## 2017-07-10 DIAGNOSIS — G31.84 MILD COGNITIVE IMPAIRMENT: ICD-10-CM

## 2017-07-10 DIAGNOSIS — K21.00 REFLUX ESOPHAGITIS: ICD-10-CM

## 2017-07-10 DIAGNOSIS — G45.9 TRANSIENT CEREBRAL ISCHEMIA, UNSPECIFIED TYPE: Primary | ICD-10-CM

## 2017-07-10 DIAGNOSIS — I49.5 SICK SINUS SYNDROME (HCC): ICD-10-CM

## 2017-07-10 DIAGNOSIS — Z87.19 HISTORY OF DUODENAL ULCER: ICD-10-CM

## 2017-07-10 DIAGNOSIS — G45.1 HEMISPHERIC CAROTID ARTERY SYNDROME: ICD-10-CM

## 2017-07-10 DIAGNOSIS — N40.1 BENIGN NODULAR PROSTATIC HYPERPLASIA WITH LOWER URINARY TRACT SYMPTOMS: ICD-10-CM

## 2017-07-10 DIAGNOSIS — S76.119D: Chronic | ICD-10-CM

## 2017-07-10 DIAGNOSIS — N32.81 OVERACTIVE BLADDER: ICD-10-CM

## 2017-07-10 DIAGNOSIS — I10 ESSENTIAL HYPERTENSION, BENIGN: ICD-10-CM

## 2017-07-10 DIAGNOSIS — I95.1 ORTHOSTATIC HYPOTENSION: ICD-10-CM

## 2017-07-10 DIAGNOSIS — F41.1 GENERALIZED ANXIETY DISORDER: ICD-10-CM

## 2017-07-10 NOTE — PROGRESS NOTES
Chief Complaint   Patient presents with   [address] follow up from 6/26-6/28,  questionable TIA, fall and slurred speech     1. Have you been to the ER, urgent care clinic since your last visit? Hospitalized since your last visit? Yes When: see note above    2. Have you seen or consulted any other health care providers outside of the [address] [place] [address] since your last visit? Include any pap smears or colon screening. No       Patient has questions about a supplement before he starts to take it. Needs evaluation of Seroquel medication and ativan due to confusion.

## 2017-07-10 NOTE — MR AVS SNAPSHOT
Visit Information Date & Time Provider Department Dept. Phone Encounter #  
 7/10/2017  3:30 PM Karla Fleming  [facility] [phone] [id number] Your Appointments 1/18/2018 10:40 AM  
ESTABLISHED PATIENT with Rebekah Bush MD  
[facility] ([address]) Appt Note: 6 month f/u  
 [address] [place]  
[address] [facility] [address]  
  
    
 7/26/2018 10:30 AM  
PACEMAKER with Rachid Masters [facility] ([facility] SCH[facility]) Appt Note: lachelle callaway, annual thresh/CL, see gilman; med threshold/rc/Gilman 1 yr b  
 [address] [place]  
[address] [facility]  
  
    
 7/26/2018 10:40 AM  
ESTABLISHED PATIENT with Rebekah Bush MD  
[facility] ([address]) Appt Note: med threshold/rc/Gilman 1 yr b  
 [address] [place]  
[phone]  
  
    
  
 10/9/2017 10:00 AM  
REMOTE OFFICE VISIT with Андрей Ashley [facility] ([facility] SCHEDUL[facility]) Appt Note: lachelle callaway  
 [address] [place] [address] [address] [place] [address]  
[address] [facility] [address]  
  
    
 1/15/2018  9:45 AM  
REMOTE OFFICE VISIT with Андрей Ashley [facility] ([facility] SCHEDUL[facility]) Appt Note: lachelle callaway  
 [address] [place] [address] [address] [place] [address]  
[phone] 4/18/2018 10:45 AM  
REMOTE OFFICE VISIT with Андрей Ashley [facility] ([facility] SCHEDUL[facility]) Appt Note: lachelle callaway, annual thresh/CL, see gilman; CL PPI/rc b  
 [address] [facility] [address] [place]  
[phone] Upcoming Health Maintenance Date Due DTaP/Tdap/Td series (1 - Tdap) 5/4/2012 INFLUENZA AGE 9 TO ADULT 8/1/2017 MEDICARE YEARLY EXAM 4/29/2018 GLAUCOMA SCREENING Q2Y 8/26/2018 Allergies as of 7/10/2017  Review Complete On: 7/10/2017 By: Jayne Vickers LPN No Known Allergies Current Immunizations  Reviewed on 3/14/2017 Name Date Influenza High Dose Vaccine PF 10/27/2015 Influenza Vaccine Split 11/17/2011 Pneumococcal Vaccine (Unspecified Type) 4/20/2010 TB Skin Test (PPD) Intradermal 3/25/2013 TD Vaccine 5/3/2012 Not reviewed this visit Vitals BP Pulse Temp Resp Height(growth percentile) SpO2  
 110/74 (BP 1 Location: Left arm, BP Patient Position: Sitting) 87 98 °F (36.7 °C) (Oral) 18 6' 2\" (1.88 m) 97% Smoking Status Former Smoker Preferred Pharmacy Pharmacy Name Phone [address] [facility] [phone] Your Updated Medication List  
  
   
This list is accurate as of: 7/10/17  4:39 PM.  Always use your most recent med list.  
  
  
  
  
 acetaminophen 500 mg tablet Commonly known as:  MAPAP EXTRA STRENGTH Take 1 Tab by mouth every six (6) hours as needed for Pain. albuterol 90 mcg/actuation inhaler Commonly known as:  PROVENTIL HFA, VENTOLIN HFA, PROAIR HFA Take 2 Puffs by inhalation every six (6) hours as needed for Wheezing. carbidopa-levodopa  mg per tablet Commonly known as:  SINEMET Take 2 Tabs by mouth four (4) times daily. (This is given at 8:00, 11:00, 14:00, and 21:00). cloNIDine HCl 0.1 mg tablet Commonly known as:  CATAPRES Take 1 Tab by mouth every six (6) hours as needed (SBP > 180 mmHg). cycloSPORINE 0.05 % ophthalmic emulsion Commonly known as:  RESTASIS Administer 1 Drop to both eyes two (2) times a day. DETROL LA 2 mg ER capsule Generic drug:  tolterodine ER  
TAKE 1 CAPSULE DAILY FOR BLADDER FREQUENCY  
  
 docusate sodium 100 mg capsule Commonly known as:  [place]  
 Take 1 Cap by mouth daily as needed for Constipation. droxidopa 100 mg Cap capsule Commonly known as:  Aleene Candices Take 100 mg by mouth three (3) times daily. Indications: SYMPTOMATIC ORTHOSTATIC HYPOTENSION  
  
 escitalopram oxalate 20 mg tablet Commonly known as:  Sydni Bors Take 1 Tab by mouth daily. esomeprazole 40 mg capsule Commonly known as:  NexIUM Take 1 Cap by mouth daily. LORazepam 0.5 mg tablet Commonly known as:  ATIVAN Take  by mouth every six (6) hours as needed for Anxiety. losartan 25 mg tablet Commonly known as:  COZAAR Take  by mouth daily. pindolol 5 mg tablet Commonly known as:  VISKIN Take 2.5 mg by mouth daily (after lunch). Take 1/2 tablet by mouth at 2pm -Take 1 tablet by mouth at 9pm  
  
 polyethylene glycol 17 gram packet Commonly known as:  Latha Hodgkin Take 1 Packet by mouth daily. Indications: CONSTIPATION  
  
 QUEtiapine 25 mg tablet Commonly known as:  SEROquel Take 25 mg by mouth every evening. rivastigmine 4.6 mg/24 hr patch Commonly known as:  EXELON  
1 Patch by TransDERmal route daily. therapeutic multivitamin tablet Commonly known as:  [facility] Take 1 Tab by mouth daily. traMADol 50 mg tablet Commonly known as:  ULTRAM  
Take 1 Tab by mouth every eight (8) hours as needed for Pain. Max Daily Amount: 150 mg. VOLTAREN 1 % Gel Generic drug:  diclofenac Apply  to affected area as needed. Patient Instructions Anxiety Disorder: Care Instructions Your Care Instructions Anxiety is a normal reaction to stress. Difficult situations can cause you to have symptoms such as sweaty palms and a nervous feeling. In an anxiety disorder, the symptoms are far more severe. Constant worry, muscle tension, trouble sleeping, nausea and diarrhea, and other symptoms can make normal daily activities difficult or impossible.  These symptoms may occur for no reason, and they can affect your work, school, or social life. Medicines, counseling, and self-care can all help. Follow-up care is a key part of your treatment and safety. Be sure to make and go to all appointments, and call your doctor if you are having problems. It's also a good idea to know your test results and keep a list of the medicines you take. How can you care for yourself at home? · Take medicines exactly as directed. Call your doctor if you think you are having a problem with your medicine. · Go to your counseling sessions and follow-up appointments. · Recognize and accept your anxiety. Then, when you are in a situation that makes you anxious, say to yourself, \"This is not an emergency. I feel uncomfortable, but I am not in danger. I can keep going even if I feel anxious. \" · Be kind to your body: ¨ Relieve tension with exercise or a massage. ¨ Get enough rest. 
¨ Avoid alcohol, caffeine, nicotine, and illegal drugs. They can increase your anxiety level and cause sleep problems. ¨ Learn and do relaxation techniques. See below for more about these techniques. · Engage your mind. Get out and do something you enjoy. Go to a funny movie, or take a walk or hike. Plan your day. Having too much or too little to do can make you anxious. · Keep a record of your symptoms. Discuss your fears with a good friend or family member, or join a support group for people with similar problems. Talking to others sometimes relieves stress. · Get involved in social groups, or volunteer to help others. Being alone sometimes makes things seem worse than they are. · Get at least 30 minutes of exercise on most days of the week to relieve stress. Walking is a good choice. You also may want to do other activities, such as running, swimming, cycling, or playing tennis or team sports. Relaxation techniques Do relaxation exercises 10 to 20 minutes a day.  You can play soothing, relaxing music while you do them, if you wish. · Tell others in your house that you are going to do your relaxation exercises. Ask them not to disturb you. · Find a comfortable place, away from all distractions and noise. · Lie down on your back, or sit with your back straight. · Focus on your breathing. Make it slow and steady. · Breathe in through your nose. Breathe out through either your nose or mouth. · Breathe deeply, filling up the area between your navel and your rib cage. Breathe so that your belly goes up and down. · Do not hold your breath. · Breathe like this for 5 to 10 minutes. Notice the feeling of calmness throughout your whole body. As you continue to breathe slowly and deeply, relax by doing the following for another 5 to 10 minutes: · Tighten and relax each muscle group in your body. You can begin at your toes and work your way up to your head. · Imagine your muscle groups relaxing and becoming heavy. · Empty your mind of all thoughts. · Let yourself relax more and more deeply. · Become aware of the state of calmness that surrounds you. · When your relaxation time is over, you can bring yourself back to alertness by moving your fingers and toes and then your hands and feet and then stretching and moving your entire body. Sometimes people fall asleep during relaxation, but they usually wake up shortly afterward. · Always give yourself time to return to full alertness before you drive a car or do anything that might cause an accident if you are not fully alert. Never play a relaxation tape while you drive a car. When should you call for help? Call 911 anytime you think you may need emergency care. For example, call if: 
· You feel you cannot stop from hurting yourself or someone else. Keep the numbers for these national suicide hotlines: 3-689-716-TALK (3-906.828.7305) and 2-127-LJSYJQQ (6-566.813.9801).  If you or someone you know talks about suicide or feeling hopeless, get help right away. Watch closely for changes in your health, and be sure to contact your doctor if: 
· You have anxiety or fear that affects your life. · You have symptoms of anxiety that are new or different from those you had before. Where can you learn more? Go to http://rodo-lukas.info/. Enter P754 in the search box to learn more about \"Anxiety Disorder: Care Instructions. \" Current as of: July 26, 2016 Content Version: 11.3 © 1398-8277 [employer]. Care instructions adapted under license by [employer] (which disclaims liability or warranty for this information). If you have questions about a medical condition or this instruction, always ask your healthcare professional. [address] any warranty or liability for your use of this information. Introducing [facility]! Dear Deborah Waters: Thank you for requesting a [employer] account. Our records indicate that you already have an active [employer] account. You can access your account anytime at [email or web address] Did you know that you can access your hospital and ER discharge instructions at any time in [employer]? You can also review all of your test results from your hospital stay or ER visit. Additional Information If you have questions, please visit the Frequently Asked Questions section of the [employer] website at [email or web address] Remember, [employer] is NOT to be used for urgent needs. For medical emergencies, dial 911. Now available from your iPhone and Android! Please provide this summary of care documentation to your next provider. Your primary care clinician is listed as [address] [place] [address], [facility]/s . If you have any questions after today's visit, please call [phone].

## 2017-07-10 NOTE — PROGRESS NOTES
HISTORY OF PRESENT ILLNESS  HPI Mr. Estrella Maldonado is a 80y.o. year old male, he is seen today for Transition of Care services following a hospital discharge for TIA on 6/28/2017. Our office Nurse Navigator performed an outreach to Mr. Anny Shafer on 6/29/2017 (within 2 business days of discharge) to complete medication reconciliation and a telephonic assessment of his condition. Tonya Gavin is a 80 y.o. Male with history of HTN, BPH, hyperlipidemia, HEAVEN, and depression who presents to office today in a wheelchair for transition of care. Pt was admitted to [facility] from 6/26 - 6/28/17 for a possible TIA, with symptoms of fall and slurred speech; his slurred speech resolved while in the hospital. He was started on aspirin 81mg daily. He notes that the fall occurred while transferring between his wheelchair and the bed. He has no new complaints or symptoms. Pt complains for years of episodes of blurred vision, which he describes as a \"cloud\" in front of his eyes; there has been no explanation from multiple doctors he has seen and this is unchanged. Pt notes intermittent lightheadedness after standing up, but not recently. His BP was 139/103 upon discharge on 6/28/17. Pt complains of chronic head pressure that hasn't changed and is presumably due to allergic nasal/sinus congestion. He denies sneezing and runny nose recently. No return of slurred speech or any focal weakness, double vision or other symptoms of neurologic deficit.       Past Medical History:   Diagnosis Date    Blurry vision 1/19/2012    BPH (benign prostatic hyperplasia)     DDD (degenerative disc disease), lumbar 2/19/2010    DJD (degenerative joint disease) of cervical spine 2/19/2010    DU (duodenal ulcer) 9/1/1990    Hyperlipidemia     Hypertension     Other and unspecified hyperlipidemia 2/19/2010    Pacemaker     Paralysis agitans ([address] 2/19/2010    Parkinson disease ([address]     PAT (paroxysmal atrial tachycardia) ([address]     Premature atrial beats     Reflux esophagitis 2/19/2010    Sick sinus syndrome [facility])      Past Surgical History:   Procedure Laterality Date    HX CATARACT REMOVAL  6/2012 and 7/2012    Both eyes    HX ORTHOPAEDIC  04/2015    tendon repair both knees    HX PACEMAKER  2008    bradycardia    HX VITRECTOMY  11/2011    HI REMVL PERM PM PLS GEN W/REPL PLSE GEN 2 LEAD SYS  3/13/2017          Current Outpatient Prescriptions on File Prior to Visit   Medication Sig Dispense Refill    losartan (COZAAR) 25 mg tablet Take  by mouth daily.  diclofenac (VOLTAREN) 1 % gel Apply  to affected area as needed.  LORazepam (ATIVAN) 0.5 mg tablet Take  by mouth every six (6) hours as needed for Anxiety.  QUEtiapine (SEROQUEL) 25 mg tablet Take 25 mg by mouth every evening.  DETROL LA 2 mg ER capsule TAKE 1 CAPSULE DAILY FOR BLADDER FREQUENCY 90 Cap 3    pindolol (VISKIN) 5 mg tablet Take 2.5 mg by mouth daily (after lunch). Take 1/2 tablet by mouth at 2pm -Take 1 tablet by mouth at 9pm      carbidopa-levodopa (SINEMET)  mg per tablet Take 2 Tabs by mouth four (4) times daily. (This is given at 8:00, 11:00, 14:00, and 21:00). 240 Tab 0    polyethylene glycol (MIRALAX) 17 gram packet Take 1 Packet by mouth daily. Indications: CONSTIPATION 30 Packet 0    therapeutic multivitamin (THERAGRAN) tablet Take 1 Tab by mouth daily. 30 Tab 0    traMADol (ULTRAM) 50 mg tablet Take 1 Tab by mouth every eight (8) hours as needed for Pain. Max Daily Amount: 150 mg. 30 Tab 1    cloNIDine HCl (CATAPRES) 0.1 mg tablet Take 1 Tab by mouth every six (6) hours as needed (SBP > 180 mmHg). 20 Tab 0    droxidopa (NORTHERA) 100 mg cap Take 100 mg by mouth three (3) times daily. Indications: SYMPTOMATIC ORTHOSTATIC HYPOTENSION 90 Tab 1    docusate sodium (COLACE) 100 mg capsule Take 1 Cap by mouth daily as needed for Constipation.  30 Cap 0    cycloSPORINE (RESTASIS) 0.05 % ophthalmic emulsion Administer 1 Drop to both eyes two (2) times a day. 60 Each 0    escitalopram oxalate (LEXAPRO) 20 mg tablet Take 1 Tab by mouth daily. 30 Tab 0    rivastigmine (EXELON) 4.6 mg/24 hr patch 1 Patch by TransDERmal route daily. 30 Patch 0    esomeprazole (NEXIUM) 40 mg capsule Take 1 Cap by mouth daily. 30 Cap 0    acetaminophen (MAPAP EXTRA STRENGTH) 500 mg tablet Take 1 Tab by mouth every six (6) hours as needed for Pain. 120 Tab 0    albuterol (PROVENTIL HFA, VENTOLIN HFA, PROAIR HFA) 90 mcg/actuation inhaler Take 2 Puffs by inhalation every six (6) hours as needed for Wheezing. 1 Inhaler 0     No current facility-administered medications on file prior to visit. No Known Allergies  Family History   Problem Relation Age of Onset    Asthma Mother     Heart Disease Father      Social History     Social History    Marital status:      Spouse name: N/A    Number of children: N/A    Years of education: N/A     Social History Main Topics    Smoking status: Former Smoker     Types: Pipe     Quit date: 7/14/1945    Smokeless tobacco: Never Used    Alcohol use 2.0 oz/week     4 Cans of beer per week      Comment: glass of wine every now and then    Drug use: No    Sexual activity: Not Asked     Other Topics Concern     Service Yes    Blood Transfusions No    Caffeine Concern No    Occupational Exposure No    Hobby Hazards No    Sleep Concern No    Stress Concern No    Weight Concern No    Special Diet No    Back Care No    Exercise No    Bike Helmet No    Seat Belt Yes    Self-Exams No     Social History Narrative               Review of Systems   Constitutional: Negative for chills, diaphoresis, fever, malaise/fatigue and weight loss. Eyes: Positive for blurred vision (intermittent). Negative for double vision, pain and redness. Respiratory: Negative for cough, shortness of breath and wheezing. Cardiovascular: Negative for chest pain, palpitations, orthopnea, claudication, leg swelling and PND. Skin: Negative for itching and rash. Neurological: Negative for dizziness, tingling, tremors, sensory change, speech change, focal weakness, seizures, loss of consciousness, weakness and headaches. Results for orders placed or performed during the hospital encounter of 06/26/17   CULTURE, MRSA   Result Value Ref Range    Special Requests: NO SPECIAL REQUESTS      Culture result: MRSA PRESENT (A)      Culture result:            Screening of patient nares for MRSA is for surveillance purposes and, if positive, to facilitate isolation considerations in high risk settings. It is not intended for automatic decolonization interventions per se as regimens are not sufficiently effective to warrant routine use. CBC WITH AUTOMATED DIFF   Result Value Ref Range    WBC 7.1 4.1 - 11.1 K/uL    RBC 4.07 (L) 4.10 - 5.70 M/uL    HGB 12.0 (L) 12.1 - 17.0 g/dL    HCT 36.2 (L) 36.6 - 50.3 %    MCV 88.9 80.0 - 99.0 FL    MCH 29.5 26.0 - 34.0 PG    MCHC 33.1 30.0 - 36.5 g/dL    RDW 15.9 (H) 11.5 - 14.5 %    PLATELET 994 840 - 511 K/uL    NEUTROPHILS 63 32 - 75 %    LYMPHOCYTES 27 12 - 49 %    MONOCYTES 6 5 - 13 %    EOSINOPHILS 4 0 - 7 %    BASOPHILS 0 0 - 1 %    ABS. NEUTROPHILS 4.5 1.8 - 8.0 K/UL    ABS. LYMPHOCYTES 1.9 0.8 - 3.5 K/UL    ABS. MONOCYTES 0.4 0.0 - 1.0 K/UL    ABS. EOSINOPHILS 0.3 0.0 - 0.4 K/UL    ABS. BASOPHILS 0.0 0.0 - 0.1 K/UL   METABOLIC PANEL, COMPREHENSIVE   Result Value Ref Range    Sodium 134 (L) 136 - 145 mmol/L    Potassium 4.2 3.5 - 5.1 mmol/L    Chloride 100 97 - 108 mmol/L    CO2 25 21 - 32 mmol/L    Anion gap 9 5 - 15 mmol/L    Glucose 96 65 - 100 mg/dL    BUN 21 (H) 6 - 20 MG/DL    Creatinine 1.18 0.70 - 1.30 MG/DL    BUN/Creatinine ratio 18 12 - 20      GFR est AA >60 >60 ml/min/1.73m2    GFR est non-AA 59 (L) >60 ml/min/1.73m2    Calcium 9.0 8.5 - 10.1 MG/DL    Bilirubin, total 0.6 0.2 - 1.0 MG/DL    ALT (SGPT) 10 (L) 12 - 78 U/L    AST (SGOT) 23 15 - 37 U/L    Alk.  phosphatase 76 45 - 117 U/L Protein, total 8.0 6.4 - 8.2 g/dL    Albumin 3.9 3.5 - 5.0 g/dL    Globulin 4.1 (H) 2.0 - 4.0 g/dL    A-G Ratio 1.0 (L) 1.1 - 2.2     TROPONIN I   Result Value Ref Range    Troponin-I, Qt. <0.04 <0.05 ng/mL   CK W/ REFLX CKMB   Result Value Ref Range     39 - 308 U/L   LACTIC ACID, PLASMA   Result Value Ref Range    Lactic acid 0.8 0.4 - 2.0 MMOL/L   URINALYSIS W/ RFLX MICROSCOPIC   Result Value Ref Range    Color YELLOW/STRAW      Appearance CLEAR CLEAR      Specific gravity 1.020 1.003 - 1.030      pH (UA) 6.0 5.0 - 8.0      Protein NEGATIVE  NEG mg/dL    Glucose NEGATIVE  NEG mg/dL    Ketone NEGATIVE  NEG mg/dL    Bilirubin NEGATIVE  NEG      Blood NEGATIVE  NEG      Urobilinogen 0.2 0.2 - 1.0 EU/dL    Nitrites NEGATIVE  NEG      Leukocyte Esterase SMALL (A) NEG      WBC 10-20 0 - 4 /hpf    RBC 0-5 0 - 5 /hpf    Epithelial cells FEW FEW /lpf    Bacteria NEGATIVE  NEG /hpf    Hyaline cast 0-2 0 - 5 /lpf   BNP   Result Value Ref Range     (H) 0 - 100 pg/mL   ACETAMINOPHEN   Result Value Ref Range    Acetaminophen level <2 (L) 10 - 30 ug/mL   SALICYLATE   Result Value Ref Range    SALICYLATE <3.0 (L) 2.8 - 20.0 MG/DL   LIPID PANEL   Result Value Ref Range    LIPID PROFILE          Cholesterol, total 122 <200 MG/DL    Triglyceride 48 <150 MG/DL    HDL Cholesterol 47 MG/DL    LDL, calculated 65.4 0 - 100 MG/DL    VLDL, calculated 9.6 MG/DL    CHOL/HDL Ratio 2.6 0 - 5.0     HEMOGLOBIN A1C WITH EAG   Result Value Ref Range    Hemoglobin A1c 5.3 4.2 - 6.3 %    Est. average glucose 105 mg/dL   PROTHROMBIN TIME + INR   Result Value Ref Range    INR 1.1 0.9 - 1.1      Prothrombin time 11.0 9.0 - 11.1 sec   PTT   Result Value Ref Range    aPTT 27.6 22.1 - 32.5 sec    aPTT, therapeutic range     58.0 - 77.0 SECS   GLUCOSE, POC   Result Value Ref Range    Glucose (POC) 102 (H) 65 - 100 mg/dL    Performed by Denis Vidal    POC INR   Result Value Ref Range    INR (POC) <0.9 <1.2                 Physical Exam  Visit Vitals    /74 (BP 1 Location: Left arm, BP Patient Position: Sitting)    Pulse 87    Temp 98 °F (36.7 °C) (Oral)    Resp 18    Ht 6' 2\" (1.88 m)    SpO2 97%      Neck: supple, symmetrical, trachea midline, no adenopathy, thyroid: not enlarged, symmetric, no tenderness/mass/nodules, no carotid bruit and no JVD  Lungs: clear to auscultation bilaterally  Heart: regular rate and rhythm, S1, S2 normal, no murmur, click, rub or gallop. Occasional ectopy  Neurologic: Grossly normal, he has normal mentation, oriented to person place and time, he has a somewhat flat affect compared to his baseline although he was able to smile at the end of the visit today; baseline resting tremor is slightly more pronounced than when I last saw him. Extremities: bilateral knee contractures, no cyanosis or edema           ASSESSMENT and PLAN    ICD-10-CM ICD-9-CM    1. Transient cerebral ischemia, unspecified type G45.9 435.9    2. Parkinson's disease ([facility] [address] G20 332.0    3. Sick sinus syndrome-pacemaker- I49.5 427.81    4. Rupture quadriceps tendon,Rupture quadriceps tendon- bilat--2014- UNABLE TO WALK AFTER THAT. J00.661W V58.89      843.8    5. Reflux esophagitis K21.0 530.11    6. Essential hypertension, difficult to control due to orthostatic hypotension I10 401.1    7. Benign nodular prostatic hyperplasia with lower urinary tract symptoms N40.1 600.10    8. Orthostatic hypotension I95.1 458.0    9. Mild cognitive impairment w/o memory loss G31.84 331.83    10. Generalized anxiety disorder F41.1 300.02    11. Overactive bladder- Dr. Nivia Mays N32.81 596.51    12. Grief- wife of 61 years  2015 F43.20 309.0    13. History of duodenal ulcer Z87.19 V12.79    14. 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 Major depressive disorder, single episode, unspecified F32.9 296.20      [employer] was seen today for hospital follow up.     Diagnoses and all orders for this visit:    Transient cerebral ischemia, unspecified type    [facility]    Sick sinus syndrome-pacemaker-    Rupture quadriceps tendon,Rupture quadriceps tendon- bilat--2014- UNABLE TO WALK AFTER THAT. Reflux esophagitis    Essential hypertension, difficult to control due to orthostatic hypotension    Benign nodular prostatic hyperplasia with lower urinary tract symptoms    Orthostatic hypotension    Mild cognitive impairment w/o memory loss    Generalized anxiety disorder    Overactive bladder- Dr. Tati Wilburn- wife of 61 years  2015    History of duodenal ulcer    Hemispheric carotid artery syndrome    Major depressive disorder, single episode, unspecified      Follow-up Disposition:  Return in about 3 months (around 10/10/2017) for check BP, orthostasis. reviewed medications and side effects in detail  Please call my office if there are any questions- [phone]. Discussed expected course/resolution/complications of diagnosis in detail with patient. Medication risks/benefits/costs/interactions/alternatives discussed with patient. Pt was given an after visit summary which includes diagnoses, current medications & vitals. Pt expressed understanding with the diagnosis and plan. Patient to call if no better in 3 -4 days and prn new problems. Pt was started on aspirin 81mg at discharge for what appears to be a TIA for mild stroke; there are no residual symptoms from this. He continues to have complaints about his hazy vision unchanged. He continues to have bladder symptoms which are probably getting worse, but troubles with increased nocturia have been his main concern. He's on Detrol LA 2mg. Followe by Dr. Milagro Norwood. While he was in the hospital his BP was elevated a little but now it's been fine over the past week, having been checked both at this office and at Dr. Quita Durand. We'll continue to monitor the BP, and if elevated we'll treat it back down to 140/90.  Due to contractures in his knees, he's unable to stand and walk but can help with transfer from the bed to his chair and vice versa. I encouraged pt to have the nursing staff call us if he becomes lightheaded when he stands so we can make adjustments in his medications. This document was written by Colleen Abdalla, as dictated by Ilir Trammell MD.  I have reviewed and agree with the above note and have made corrections where appropriate To Whitaker M.D.

## 2017-07-10 NOTE — PATIENT INSTRUCTIONS

## 2017-07-11 ENCOUNTER — PATIENT OUTREACH (OUTPATIENT)
Dept: FAMILY MEDICINE CLINIC | Age: 82
End: 2017-07-11

## 2017-07-11 NOTE — PROGRESS NOTES
NN called daughter to see how he is doing- says he saw  yesterday for an appointment- she was not able to attend and wanted to know if  had made any med changes. Note had been sent that hospital was concerned about the Ativan and Seroquel causing confusion. NN checked the note, not completed yet, advised daughter that I will send a message to  to ask about it. She saw father Saturday for a visit-says he is looking better and less confusion noted. She talked to him by phone yesterday, all seemed well-she has not spoken to him today.

## 2017-07-11 NOTE — Clinical Note
Spoke to daughter today-wanted to know how OV went yesterday. Did you change any meds? She was concerned about Ativan and Seroquel causing confusion. Thanks!

## 2017-07-12 ENCOUNTER — TELEPHONE (OUTPATIENT)
Dept: FAMILY MEDICINE CLINIC | Age: 82
End: 2017-07-12

## 2017-07-12 DIAGNOSIS — R41.0 CONFUSION: Primary | ICD-10-CM

## 2017-07-12 PROBLEM — R47.81 SLURRED SPEECH: Status: RESOLVED | Noted: 2017-06-26 | Resolved: 2017-07-12

## 2017-07-12 PROBLEM — R41.82 ALTERED MENTAL STATUS: Status: RESOLVED | Noted: 2017-06-26 | Resolved: 2017-07-12

## 2017-07-12 PROBLEM — N40.1 BENIGN NODULAR PROSTATIC HYPERPLASIA WITH LOWER URINARY TRACT SYMPTOMS: Status: ACTIVE | Noted: 2017-07-12

## 2017-07-12 PROBLEM — J10.1 INFLUENZA A: Status: RESOLVED | Noted: 2017-03-19 | Resolved: 2017-07-12

## 2017-07-12 NOTE — TELEPHONE ENCOUNTER
Phone#[phone]    Patient's daughter Kev Mackenzie is calling for the patient stating that the patient has been acting really confused. She states that he gets like this when he has an urinary tract infection. Patient lives at [facility] and Kev Mackenzie is requesting an order for an urinalysis to be sent over. Kev Mackenzie would like a call back in regards to this.   YGQ#[phone]

## 2017-07-17 ENCOUNTER — PATIENT OUTREACH (OUTPATIENT)
Dept: FAMILY MEDICINE CLINIC | Age: 82
End: 2017-07-17

## 2017-07-17 NOTE — PROGRESS NOTES
NN called daughter to check on her father. Says he is very confused- she is convinced that he has another UTI. [facility] collected a urine on Thursday, she is trying to reach nurses there to see if results back. Says he is not good about drinking fluids, has a care giver 3 mornings a week who is always encouraging him to drink fluids. Daughter had asked whether  had evaluated the Ativan and Seroquel at last office visit- hospitalist had thought ? Contributing to the confusion. Advised her that  did not start the 2 meds, unsure who did or why. Did not make any med changes at last ov. Advised daughter to make sure [facility] sends urine results to  so that he can write any orders if needed. She will continue to try and reach the nurses there. Advised to call me if I can help in any way.  She thanked me for the gina.

## 2017-07-20 ENCOUNTER — PATIENT OUTREACH (OUTPATIENT)
Dept: FAMILY MEDICINE CLINIC | Age: 82
End: 2017-07-20

## 2017-07-20 ENCOUNTER — TELEPHONE (OUTPATIENT)
Dept: FAMILY MEDICINE CLINIC | Age: 82
End: 2017-07-20

## 2017-07-20 NOTE — TELEPHONE ENCOUNTER
[id number]     -    [phone] -  Patient has a UTI need an order for an antibiotic-  Daughter demanding this to be done today-  A fax for this request was also sent-

## 2017-07-20 NOTE — PROGRESS NOTES
NN reviewed patient's chart- did not see any results from U/A ordered 7/12 to check for possible UTI per request of daughter. She had noted increase in confusion and suspected ? UTI. Checked with 's nurse, no results received. Called daughter, Sophia Bishop- she told me that the urine was collected but never sent out to the lab. She discussed with Lilian Singh, new urine collected on Monday of this week and sent out to lab per DON on Tuesday. Daughter plans to call this am to check on results, requested she let me know so that I can alert /nurse to watch for results. Daughter will call me back with update. Reports father is still confused.

## 2017-07-20 NOTE — TELEPHONE ENCOUNTER
Patients results came back and patient needs an order for an antibiotic over at [facility].

## 2017-07-21 ENCOUNTER — APPOINTMENT (OUTPATIENT)
Dept: GENERAL RADIOLOGY | Age: 82
End: 2017-07-21
Attending: EMERGENCY MEDICINE
Payer: MEDICARE

## 2017-07-21 ENCOUNTER — HOSPITAL ENCOUNTER (EMERGENCY)
Age: 82
Discharge: HOME | End: 2017-07-21
Attending: EMERGENCY MEDICINE | Admitting: EMERGENCY MEDICINE
Payer: MEDICARE

## 2017-07-21 ENCOUNTER — APPOINTMENT (OUTPATIENT)
Dept: CT IMAGING | Age: 82
End: 2017-07-21
Attending: EMERGENCY MEDICINE
Payer: MEDICARE

## 2017-07-21 VITALS
RESPIRATION RATE: 20 BRPM | TEMPERATURE: 98 F | SYSTOLIC BLOOD PRESSURE: 150 MMHG | HEART RATE: 87 BPM | DIASTOLIC BLOOD PRESSURE: 95 MMHG | HEIGHT: 74 IN | OXYGEN SATURATION: 98 %

## 2017-07-21 DIAGNOSIS — Z00.00 GENERAL MEDICAL EXAM: Primary | ICD-10-CM

## 2017-07-21 LAB
ALBUMIN SERPL BCP-MCNC: 3.5 G/DL (ref 3.5–5)
ALBUMIN/GLOB SERPL: 1 {RATIO} (ref 1.1–2.2)
ALP SERPL-CCNC: 79 U/L (ref 45–117)
ALT SERPL-CCNC: 11 U/L (ref 12–78)
ANION GAP BLD CALC-SCNC: 6 MMOL/L (ref 5–15)
APPEARANCE UR: CLEAR
AST SERPL W P-5'-P-CCNC: 20 U/L (ref 15–37)
BACTERIA URNS QL MICRO: NEGATIVE /HPF
BASOPHILS # BLD AUTO: 0 K/UL (ref 0–0.1)
BASOPHILS # BLD: 1 % (ref 0–1)
BILIRUB SERPL-MCNC: 0.5 MG/DL (ref 0.2–1)
BILIRUB UR QL: NEGATIVE
BUN SERPL-MCNC: 18 MG/DL (ref 6–20)
BUN/CREAT SERPL: 17 (ref 12–20)
CALCIUM SERPL-MCNC: 8.7 MG/DL (ref 8.5–10.1)
CHLORIDE SERPL-SCNC: 99 MMOL/L (ref 97–108)
CO2 SERPL-SCNC: 28 MMOL/L (ref 21–32)
COLOR UR: ABNORMAL
CREAT SERPL-MCNC: 1.03 MG/DL (ref 0.7–1.3)
EOSINOPHIL # BLD: 0.4 K/UL (ref 0–0.4)
EOSINOPHIL NFR BLD: 7 % (ref 0–7)
EPITH CASTS URNS QL MICRO: ABNORMAL /LPF
ERYTHROCYTE [DISTWIDTH] IN BLOOD BY AUTOMATED COUNT: 15.2 % (ref 11.5–14.5)
GLOBULIN SER CALC-MCNC: 3.5 G/DL (ref 2–4)
GLUCOSE SERPL-MCNC: 68 MG/DL (ref 65–100)
GLUCOSE UR STRIP.AUTO-MCNC: NEGATIVE MG/DL
HCT VFR BLD AUTO: 34.6 % (ref 36.6–50.3)
HGB BLD-MCNC: 11.5 G/DL (ref 12.1–17)
HGB UR QL STRIP: NEGATIVE
HYALINE CASTS URNS QL MICRO: ABNORMAL /LPF (ref 0–5)
KETONES UR QL STRIP.AUTO: NEGATIVE MG/DL
LACTATE SERPL-SCNC: 1 MMOL/L (ref 0.4–2)
LEUKOCYTE ESTERASE UR QL STRIP.AUTO: ABNORMAL
LYMPHOCYTES # BLD AUTO: 24 % (ref 12–49)
LYMPHOCYTES # BLD: 1.3 K/UL (ref 0.8–3.5)
MCH RBC QN AUTO: 29.9 PG (ref 26–34)
MCHC RBC AUTO-ENTMCNC: 33.2 G/DL (ref 30–36.5)
MCV RBC AUTO: 90.1 FL (ref 80–99)
MONOCYTES # BLD: 0.4 K/UL (ref 0–1)
MONOCYTES NFR BLD AUTO: 7 % (ref 5–13)
NEUTS SEG # BLD: 3.2 K/UL (ref 1.8–8)
NEUTS SEG NFR BLD AUTO: 61 % (ref 32–75)
NITRITE UR QL STRIP.AUTO: NEGATIVE
PH UR STRIP: 5.5 [PH] (ref 5–8)
PLATELET # BLD AUTO: 187 K/UL (ref 150–400)
POTASSIUM SERPL-SCNC: 4.2 MMOL/L (ref 3.5–5.1)
PROT SERPL-MCNC: 7 G/DL (ref 6.4–8.2)
PROT UR STRIP-MCNC: NEGATIVE MG/DL
RBC # BLD AUTO: 3.84 M/UL (ref 4.1–5.7)
RBC #/AREA URNS HPF: ABNORMAL /HPF (ref 0–5)
SODIUM SERPL-SCNC: 133 MMOL/L (ref 136–145)
SP GR UR REFRACTOMETRY: 1.01 (ref 1–1.03)
UROBILINOGEN UR QL STRIP.AUTO: 0.2 EU/DL (ref 0.2–1)
WBC # BLD AUTO: 5.2 K/UL (ref 4.1–11.1)
WBC URNS QL MICRO: ABNORMAL /HPF (ref 0–4)

## 2017-07-21 PROCEDURE — 81001 URINALYSIS AUTO W/SCOPE: CPT | Performed by: EMERGENCY MEDICINE

## 2017-07-21 PROCEDURE — 36415 COLL VENOUS BLD VENIPUNCTURE: CPT | Performed by: EMERGENCY MEDICINE

## 2017-07-21 PROCEDURE — 71010 XR CHEST PORT: CPT

## 2017-07-21 PROCEDURE — 99285 EMERGENCY DEPT VISIT HI MDM: CPT

## 2017-07-21 PROCEDURE — 87186 SC STD MICRODIL/AGAR DIL: CPT | Performed by: EMERGENCY MEDICINE

## 2017-07-21 PROCEDURE — 74011250636 HC RX REV CODE- 250/636: Performed by: EMERGENCY MEDICINE

## 2017-07-21 PROCEDURE — 87040 BLOOD CULTURE FOR BACTERIA: CPT | Performed by: EMERGENCY MEDICINE

## 2017-07-21 PROCEDURE — 83605 ASSAY OF LACTIC ACID: CPT | Performed by: EMERGENCY MEDICINE

## 2017-07-21 PROCEDURE — 80053 COMPREHEN METABOLIC PANEL: CPT | Performed by: EMERGENCY MEDICINE

## 2017-07-21 PROCEDURE — 85025 COMPLETE CBC W/AUTO DIFF WBC: CPT | Performed by: EMERGENCY MEDICINE

## 2017-07-21 PROCEDURE — 96360 HYDRATION IV INFUSION INIT: CPT

## 2017-07-21 PROCEDURE — 87086 URINE CULTURE/COLONY COUNT: CPT | Performed by: EMERGENCY MEDICINE

## 2017-07-21 PROCEDURE — 87077 CULTURE AEROBIC IDENTIFY: CPT | Performed by: EMERGENCY MEDICINE

## 2017-07-21 PROCEDURE — 70450 CT HEAD/BRAIN W/O DYE: CPT

## 2017-07-21 RX ORDER — SODIUM CHLORIDE 9 MG/ML
100 INJECTION, SOLUTION INTRAVENOUS CONTINUOUS
Status: DISCONTINUED | OUTPATIENT
Start: 2017-07-21 | End: 2017-07-21 | Stop reason: HOSPADM

## 2017-07-21 RX ADMIN — SODIUM CHLORIDE 500 ML: 900 INJECTION, SOLUTION INTRAVENOUS at 12:28

## 2017-07-21 NOTE — ED PROVIDER NOTES
HPI Comments: 80 y.o. male with past medical history significant for unspecified hyperlipidemia, duodenal ulcer, reflux esophagitis, DDD, DJD, paralysis agitans, premature atrial beats, pacemaker, blurry vision, parkinson disease, HTN, BPH, PAT and sick sinus syndrome who presents from [facility] via EMS  with chief complaint of UTI. Per pt, he was sitting in his living room this evening when EMS arrived to transport him to the ED for evaluation and admission for a UTI. His medical records per PCP showed that the pt has evidence of a UTI that is resistant to all oral abx and requires IV abx which is the reason for his intended admission today (7/21/2017). Per pt, he notes that he experienced onset of mild dysuria this morning, yet has otherwise felt per usual recently. When asked, if he has known hx of frequent UTI's the pt states that he is unsure at this time. The pt denies fever, chills, N/V/D, CP, SOB, abd pain, lightheadedness, dizziness, headache and other urinary symptoms. There are no other acute medical concerns at this time. Old Chart: The pt was admitted to the ED on 6/26/2017 for slurred speech, with stuttering while speaking. POA his symptoms appeared resolved. The pt was discharged home on 6/28/2017 after diagnosis of TIA with prescription for ASA extended release 1x daily. Social hx: Former smoker, Current ETOH use   PCP: Salas Garland MD    Note written by Imtiaz Alexandra, as dictated by Tiesha Meyers MD 11:32 AM        The history is provided by the patient and medical records.         Past Medical History:   Diagnosis Date    Blurry vision 1/19/2012    BPH (benign prostatic hyperplasia)     DDD (degenerative disc disease), lumbar 2/19/2010    DJD (degenerative joint disease) of cervical spine 2/19/2010    DU (duodenal ulcer) 9/1/1990    Hyperlipidemia     Hypertension     Other and unspecified hyperlipidemia 2/19/2010    Pacemaker     Paralysis agitans ([facility] 2/19/2010    Parkinson disease ([facility]     [facility]     Premature atrial beats     Reflux esophagitis 2/19/2010    Sick sinus syndrome [facility])        Past Surgical History:   Procedure Laterality Date    HX CATARACT REMOVAL  6/2012 and 7/2012    Both eyes    HX ORTHOPAEDIC  04/2015    tendon repair both knees    HX PACEMAKER  2008    bradycardia    HX VITRECTOMY  11/2011    ND REMVL PERM PM PLS GEN W/REPL PLSE GEN 2 LEAD SYS  3/13/2017              Family History:   Problem Relation Age of Onset    Asthma Mother     Heart Disease Father        Social History     Social History    Marital status:      Spouse name: N/A    Number of children: N/A    Years of education: N/A     Occupational History    Not on file. Social History Main Topics    Smoking status: Former Smoker     Types: Pipe     Quit date: 7/14/1945    Smokeless tobacco: Never Used    Alcohol use 2.0 oz/week     4 Cans of beer per week      Comment: glass of wine every now and then    Drug use: No    Sexual activity: Not on file     Other Topics Concern     Service Yes    Blood Transfusions No    Caffeine Concern No    Occupational Exposure No    Hobby Hazards No    Sleep Concern No    Stress Concern No    Weight Concern No    Special Diet No    Back Care No    Exercise No    Bike Helmet No    Seat Belt Yes    Self-Exams No     Social History Narrative         ALLERGIES: Review of patient's allergies indicates no known allergies. Review of Systems   Constitutional: Negative. Negative for activity change, appetite change, chills and fatigue. HENT: Negative. Negative for ear pain, facial swelling, sore throat and trouble swallowing. Eyes: Negative. Negative for pain, discharge and visual disturbance. Respiratory: Negative. Negative for chest tightness, shortness of breath and wheezing. Cardiovascular: Negative. Negative for chest pain and palpitations. Gastrointestinal: Negative. Negative for abdominal pain, blood in stool, nausea and vomiting. Endocrine: Negative. Genitourinary: Positive for dysuria (mild, with onset this morning (7/21/2017)). Negative for difficulty urinating, flank pain and hematuria. Musculoskeletal: Negative. Negative for arthralgias, joint swelling, myalgias and neck pain. Skin: Negative. Negative for color change and rash. Allergic/Immunologic: Negative. Neurological: Negative. Negative for dizziness, weakness, numbness and headaches. Hematological: Negative. Negative for adenopathy. Does not bruise/bleed easily. Psychiatric/Behavioral: Negative. Negative for behavioral problems, confusion and sleep disturbance. All other systems reviewed and are negative. Vitals:    07/21/17 1122   BP: 133/80   Pulse: 85   Resp: 18   Temp: 97.9 °F (36.6 °C)   SpO2: 99%   Height: 6' 2\" (1.88 m)            Physical Exam   Constitutional: He is oriented to person, place, and time. He appears well-developed and well-nourished. No distress. HENT:   Head: Normocephalic and atraumatic. Nose: Nose normal.   Mouth/Throat: Oropharynx is clear and moist.   Eyes: Conjunctivae and EOM are normal. Pupils are equal, round, and reactive to light. No scleral icterus. Neck: Normal range of motion. Neck supple. No JVD present. No tracheal deviation present. No thyromegaly present. No carotid bruits noted. Cardiovascular: Normal rate, regular rhythm, normal heart sounds and intact distal pulses. Exam reveals no gallop and no friction rub. No murmur heard. Pulmonary/Chest: Effort normal and breath sounds normal. No respiratory distress. He has no wheezes. He has no rales. He exhibits no tenderness. Abdominal: Soft. Bowel sounds are normal. He exhibits no distension and no mass. There is no tenderness. There is no rebound and no guarding. Musculoskeletal: Normal range of motion. He exhibits no edema or tenderness. Lymphadenopathy:     He has no cervical adenopathy. Neurological: He is alert and oriented to person, place, and time. He has normal reflexes. No cranial nerve deficit. Coordination normal.   Skin: Skin is warm and dry. No rash noted. No erythema. Psychiatric: He has a normal mood and affect. His behavior is normal. Judgment and thought content normal.   Nursing note and vitals reviewed. Note written by Imtiaz Wagner, as dictated by Jeanine Rausch MD 11:32 AM    MDM  Number of Diagnoses or Management Options  General medical exam: new and requires workup     Amount and/or Complexity of Data Reviewed  Clinical lab tests: ordered and reviewed  Tests in the radiology section of CPT®: ordered and reviewed  Decide to obtain previous medical records or to obtain history from someone other than the patient: yes  Review and summarize past medical records: yes  Discuss the patient with other providers: yes  Independent visualization of images, tracings, or specimens: yes    Risk of Complications, Morbidity, and/or Mortality  Presenting problems: high  Diagnostic procedures: high  Management options: high    Patient Progress  Patient progress: stable    ED Course       Procedures    PROGRESS NOTE:  2:16 PM    Still waiting for consult with Dr. Obey Lr. No evidence of UTI is noted thus far. Discussed with Dr. Obey Lr and the patient will be discharged home with continuation of current medications and will follow up with him in the office in the next few days if continued difficulty.

## 2017-07-21 NOTE — TELEPHONE ENCOUNTER
UC&S from assisted living shows E coli resistant to all PO antibiotics- needs to go to ER/Hosp for IV antibiotics per Dr. Joel Iqbal with Alka Espinoza at [facility] and she made aware. Alka Espinoza voiced understanding and said that she would notify the daughter and make her aware.

## 2017-07-21 NOTE — ED TRIAGE NOTES
Arrives via EMS from [facility] for admission for UTI that is resistant to all oral antibiotics and requires IV antibiotics . + dysuria this AM, denies any other complaints.

## 2017-07-23 LAB
BACTERIA SPEC CULT: ABNORMAL
BACTERIA SPEC CULT: ABNORMAL
CC UR VC: ABNORMAL
SERVICE CMNT-IMP: ABNORMAL

## 2017-07-24 ENCOUNTER — PATIENT OUTREACH (OUTPATIENT)
Dept: FAMILY MEDICINE CLINIC | Age: 82
End: 2017-07-24

## 2017-07-24 ENCOUNTER — TELEPHONE (OUTPATIENT)
Dept: FAMILY MEDICINE CLINIC | Age: 82
End: 2017-07-24

## 2017-07-24 DIAGNOSIS — N30.00 ACUTE CYSTITIS WITHOUT HEMATURIA: Primary | ICD-10-CM

## 2017-07-24 NOTE — TELEPHONE ENCOUNTER
----- Message from Mai Harrell MD sent at 7/24/2017 12:51 AM EDT -----  Long story--- last week due to confusion, uc7s done; came back resistant to all PO antibiotics; I sent him to ER for possible admission for antibiotic treatment; saw ER MD and because repeat urine \"looked clear,\" and he was not confused and was without fever, a repeat UC&S was done and he was sent home. Now, the UC&S has come back positive for 2 different bacteria, totally different from  the original one. Go ahead and call in Cipro 500 mg BID x 10 days and repeat UC&s  1-2 weeks after that.

## 2017-07-24 NOTE — TELEPHONE ENCOUNTER
----- Message from [facility] sent at 7/24/2017  8:51 AM EDT -----  Regarding: Barr/telephone  Pts daughter Ajith Rader is requesting for you to call her in regard too her father. Ms Allie Leung number is [phone].

## 2017-07-24 NOTE — Clinical Note
Daughter called to see what was going on- I relayed your note about ED UC&S coming back positive for 2 different bacteria and plan. She requests new order for Cipro and recheck of UC&S be faxed to [facility]. Thanks!

## 2017-07-24 NOTE — TELEPHONE ENCOUNTER
Daughter informed of note.  rx faxed to [address] [place] [address] with instructions to repeat culture in 1 weeks

## 2017-07-24 NOTE — PROGRESS NOTES
Daughter, Kev Mackenzie, called NN to discuss events since Friday. Told NN father was sent to ED because he needed IV antibiotic(per UC&S results last week)- says ED did not give him IV antibiotics and sent him home. But says he called her frequently this weekend, and was confused. NN relayed 's note to her from earlier this am(7/24) that ED UC&S did come back and postive for 2 different bacteria from last result and he would treat with Cipro 500mg bid x 10 days and then recheck a UC&S. Daughter thanked me for the information and requested new rx and order to recheck UC&S be sent to [facility] as soon as possible. NN will send note to  and his nurse.

## 2017-07-24 NOTE — PROGRESS NOTES
Long story--- last week due to confusion, uc7s done; came back resistant to all PO antibiotics; I sent him to ER for possible admission for antibiotic treatment; saw ER MD and because repeat urine \"looked clear,\" and he was not confused and was without fever, a repeat UC&S was done and he was sent home. Now, the UC&S has come back positive for 2 different bacteria, totally different from  the original one. Go ahead and call in Cipro 500 mg BID x 10 days and repeat UC&s  1-2 weeks after that.

## 2017-07-26 ENCOUNTER — PATIENT OUTREACH (OUTPATIENT)
Dept: FAMILY MEDICINE CLINIC | Age: 82
End: 2017-07-26

## 2017-07-26 LAB
BACTERIA SPEC CULT: NORMAL
SERVICE CMNT-IMP: NORMAL

## 2017-07-26 NOTE — PROGRESS NOTES
Called daughter,Nupur, to check on how her father is doing since starting the antibiotic. LM requesting she call me back on my direct line. Will continue to try and reach her. (ED visit 7/21 for UTI and last admission 6/26-6/28 for ?  TIA)

## 2017-07-26 NOTE — IP AVS SNAPSHOT
[address] 
[phone] Patient: Eloise Wilder MRN: [id number] ERV:[id number] You are allergic to the following No active allergies Recent Documentation Height Weight BMI Smoking Status 1.88 m 88.6 kg 25.08 kg/m2 Former Smoker Unresulted Labs Order Current Status CULTURE, BLOOD, PAIRED Preliminary result Emergency Contacts Name Discharge Info Relation Home Work Mobile Austin Reeves  Child [2] [phone] About your hospitalization You were admitted on:  March 19, 2017 You last received care in the:  [facility] 2N MED SURG You were discharged on:  March 21, 2017 Unit phone number:  [phone] Why you were hospitalized Your primary diagnosis was:  Not on File Your diagnoses also included:  Influenza A Providers Seen During Your Hospitalizations Provider Role Specialty Primary office phone Aurora Ontiveros MD Attending Provider Emergency Medicine [phone] Elias Bartholomew DO Attending Provider Internal Medicine [phone] Jennifer Burt MD Attending Provider Internal Medicine [phone] Solo Cast MD Attending Provider Internal Medicine [phone] Your Primary Care Physician (PCP) Primary Care Physician Office Phone Office Fax MICHEL Heath [phone] ** None ** Follow-up Information Follow up With Details Comments Contact Info Xiomara Saldana MD   [address] [facility] 
[phone] In 1 week Your Appointments Monday March 27, 2017  2:00 PM EDT  
PACEMAKER with Zuhair Alas [facility] ([facility] SCHEDULING) [address] [place] [address]  [address] [place]  
[phone]  Monday March 27, 2017  2:20 PM EDT  
ESTABLISHED PATIENT with Patric Baumgarten, MD  
 [facility] ([facility] SCHEDULING) [address] [place]  
[phone] Wednesday March 29, 2017  4:00 PM EDT ROUTINE CARE with Kay Oneal MD  
[facility]) [address] [place] [address]  
[phone] Thursday April 06, 2017  9:15 AM EDT  
PACEMAKER with RLRBWTNVANayeli Teran [facility] ([facility] SCHEDULING) [address] [place]  
[phone] Thursday April 06, 2017  9:20 AM EDT  
ESTABLISHED PATIENT with Diego Fitch MD  
[facility] ([facility] [place]) [address] [facility]  
[phone] Current Discharge Medication List  
  
START taking these medications Dose & Instructions Dispensing Information Comments Morning Noon Evening Bedtime  
 oseltamivir 75 mg capsule Commonly known as:  TAMIFLU Your last dose was: Your next dose is:    
   
   
 Dose:  75 mg Take 1 Cap by mouth two (2) times a day for 5 doses. Quantity:  5 Cap Refills:  0 CONTINUE these medications which have CHANGED Dose & Instructions Dispensing Information Comments Morning Noon Evening Bedtime DETROL LA 2 mg ER capsule Generic drug:  tolterodine ER What changed:  Another medication with the same name was removed. Continue taking this medication, and follow the directions you see here. Your last dose was: Your next dose is: TAKE 1 CAPSULE DAILY FOR BLADDER FREQUENCY Quantity:  90 Cap Refills:  3 CONTINUE these medications which have NOT CHANGED Dose & Instructions Dispensing Information Comments Morning Noon Evening Bedtime  
 acetaminophen 500 mg tablet Commonly known as:  MAPAP EXTRA STRENGTH Your last dose was: Your next dose is:    
   
   
 Dose:  500 mg Take 1 Tab by mouth every six (6) hours as needed for Pain. Quantity:  120 Tab Refills:  0  
     
   
   
   
  
 albuterol 90 mcg/actuation inhaler Commonly known as:  PROVENTIL HFA, VENTOLIN HFA, PROAIR HFA Your last dose was: Your next dose is:    
   
   
 Dose:  2 Puff Take 2 Puffs by inhalation every six (6) hours as needed for Wheezing. Quantity:  1 Inhaler Refills:  0  
     
   
   
   
  
 carbidopa-levodopa  mg per tablet Commonly known as:  SINEMET Your last dose was: Your next dose is:    
   
   
 Dose:  2 Tab Take 2 Tabs by mouth four (4) times daily. (This is given at 8:00, 11:00, 14:00, and 21:00). Quantity:  240 Tab Refills:  0  
     
   
   
   
  
 cloNIDine HCl 0.1 mg tablet Commonly known as:  CATAPRES Your last dose was: Your next dose is:    
   
   
 Dose:  0.1 mg Take 1 Tab by mouth every six (6) hours as needed (SBP > 180 mmHg). Quantity:  20 Tab Refills:  0  
     
   
   
   
  
 cycloSPORINE 0.05 % ophthalmic emulsion Commonly known as:  RESTASIS Your last dose was: Your next dose is:    
   
   
 Dose:  1 Drop Administer 1 Drop to both eyes two (2) times a day. Quantity:  60 Each Refills:  0  
     
   
   
   
  
 docusate sodium 100 mg capsule Commonly known as:  Pamela Escobar Your last dose was: Your next dose is:    
   
   
 Dose:  100 mg Take 1 Cap by mouth daily as needed for Constipation. Quantity:  30 Cap Refills:  0  
     
   
   
   
  
 droxidopa 100 mg Cap capsule Commonly known as:  Laurel So Your last dose was: Your next dose is:    
   
   
 Dose:  100 mg Take 100 mg by mouth three (3) times daily. Indications: SYMPTOMATIC ORTHOSTATIC HYPOTENSION Quantity:  90 Tab Refills:  1  
     
   
   
   
  
 escitalopram oxalate 20 mg tablet Commonly known as:  Uvaldo Holland Your last dose was: Your next dose is:    
   
   
 Dose:  20 mg Take 1 Tab by mouth daily. Quantity:  30 Tab Refills:  0  
     
   
   
   
  
 esomeprazole 40 mg capsule Commonly known as:  NexIUM Your last dose was: Your next dose is:    
   
   
 Dose:  40 mg Take 1 Cap by mouth daily. Quantity:  30 Cap Refills:  0  
     
   
   
   
  
 * LORazepam 0.5 mg tablet Commonly known as:  ATIVAN Your last dose was: Your next dose is: Take  by mouth every six (6) hours as needed for Anxiety. Refills:  0  
     
   
   
   
  
 * LORazepam 0.5 mg tablet Commonly known as:  ATIVAN Your last dose was: Your next dose is:    
   
   
 Dose:  0.5 mg Take 0.5 mg by mouth every evening. Refills:  0  
     
   
   
   
  
 meclizine 25 mg tablet Commonly known as:  ANTIVERT Your last dose was: Your next dose is:    
   
   
 Dose:  25 mg Take 25 mg by mouth three (3) times daily as needed for Dizziness. Refills:  0  
     
   
   
   
  
 * pindolol 5 mg tablet Commonly known as:  VISKIN Your last dose was: Your next dose is:    
   
   
 Dose:  5 mg Take 5 mg by mouth nightly. Refills:  0  
     
   
   
   
  
 * pindolol 5 mg tablet Commonly known as:  VISKIN Your last dose was: Your next dose is:    
   
   
 Dose:  2.5 mg Take 2.5 mg by mouth daily (after lunch). Refills:  0  
     
   
   
   
  
 polyethylene glycol 17 gram packet Commonly known as:  Ana Valeri Your last dose was: Your next dose is:    
   
   
 Dose:  17 g Take 1 Packet by mouth daily. Indications: CONSTIPATION Quantity:  30 Packet Refills:  0 QUEtiapine 25 mg tablet Commonly known as:  SEROquel Your last dose was: Your next dose is:    
   
   
 Dose:  25 mg Take 25 mg by mouth every evening. Refills:  0 rivastigmine 4.6 mg/24 hr patch Commonly known as:  EXELON Your last dose was: Your next dose is:    
   
   
 Dose:  1 Patch 1 Patch by TransDERmal route daily. Quantity:  30 Patch Refills:  0  
     
   
   
   
  
 therapeutic multivitamin tablet Commonly known as:  [facility] Your last dose was: Your next dose is:    
   
   
 Dose:  1 Tab Take 1 Tab by mouth daily. Quantity:  30 Tab Refills:  0  
     
   
   
   
  
 traMADol 50 mg tablet Commonly known as:  ULTRAM  
   
Your last dose was: Your next dose is:    
   
   
 Dose:  50 mg Take 1 Tab by mouth every eight (8) hours as needed for Pain. Max Daily Amount: 150 mg.  
 Quantity:  30 Tab Refills:  1  
     
   
   
   
  
 * Notice: This list has 4 medication(s) that are the same as other medications prescribed for you. Read the directions carefully, and ask your doctor or other care provider to review them with you. STOP taking these medications   
 cephALEXin 250 mg capsule Commonly known as:  Laralena Natasha Where to Get Your Medications Information on where to get these meds will be given to you by the nurse or doctor. ! Ask your nurse or doctor about these medications  
  oseltamivir 75 mg capsule Discharge Instructions Discharge Instructions PATIENT ID: Miguel Collier MRN: [id number] YOB: 1933 DATE OF ADMISSION: 3/19/2017 12:30 AM   
DATE OF DISCHARGE: 3/21/2017 PRIMARY CARE PROVIDER: Ji Hdz MD  
 
ATTENDING PHYSICIAN: Rolando Gurrola. MD [facility] 
DISCHARGING PROVIDER: Rolando Cast MD   
To contact this individual call [phone] and ask the  to page. If unavailable ask to be transferred the Adult Hospitalist Department. DISCHARGE DIAGNOSES: 
1. Influenza A 2. COPD exacerbation   
3. Dementia 4. Parkinsons disease 5. Sick sinus syndrome   
6.  GERD history of reflux esophagitis   
 7. Essential hypertension 
   
 
CONSULTATIONS: IP CONSULT TO HOSPITALIST 
 
PROCEDURES/SURGERIES: * No surgery found * PENDING TEST RESULTS:  
At the time of discharge the following test results are still pending: None FOLLOW UP APPOINTMENTS:  
Follow-up Information Follow up With Details Comments Contact Info Izzy Ram MD   [address] [place] [address] 
[phone] In 1 week ADDITIONAL CARE RECOMMENDATIONS: Pt was on home oxygen prior to admission but has done very well without oxygen in the hospital. Will recommend pt to continue home oxygen only as needed. DIET: Cardiac Diet ACTIVITY: Activity as tolerated WOUND CARE: None EQUIPMENT needed: None DISCHARGE MEDICATIONS: 
 See Medication Reconciliation Form · It is important that you take the medication exactly as they are prescribed. · Keep your medication in the bottles provided by the pharmacist and keep a list of the medication names, dosages, and times to be taken in your wallet. · Do not take other medications without consulting your doctor. NOTIFY YOUR PHYSICIAN FOR ANY OF THE FOLLOWING:  
Fever over 101 degrees for 24 hours. Chest pain, shortness of breath, fever, chills, nausea, vomiting, diarrhea, change in mentation, falling, weakness, bleeding. Severe pain or pain not relieved by medications. Or, any other signs or symptoms that you may have questions about. DISPOSITION: 
  Home With: 
 OT  PT  HH  RN  
  
 SNF/Inpatient Rehab/LTAC  
X Independent/assisted living Hospice Other: CDMP Checked: Yes X PROBLEM LIST Updated: Yes X Signed:  
Shantell Cast MD 
3/21/2017 
12:07 PM 
 
Discharge Orders None ACO Transitions of Care Introducing [employer] [address] Yvonne Funes offers a voluntary care coordination program to provide high quality service and care to [facility] fee-for-service paulinoEdwige Pratt was designed to help you enhance your health and well-being through the following services: ? Transitions of Care  support for individuals who are transitioning from one care setting to another (example: Hospital to home). ? Chronic and Complex Care Coordination  support for individuals and caregivers of those with serious or chronic illnesses or with more than one chronic (ongoing) condition and those who take a number of different medications. If you meet specific medical criteria, a [facility] [address] may call you directly to coordinate your care with your primary care physician and your other care providers. For questions about the [facility] programs, please, contact your physicians office. For general questions or additional information about Accountable Care Organizations: 
Please visit www.medicare.gov/acos. html or call 1-800-MEDICARE (8-320.970.1839) TTY users should call 7-644.115.7028. [employer] Announcement We are excited to announce that we are making your provider's discharge notes available to you in [employer]. You will see these notes when they are completed and signed by the physician that discharged you from your recent hospital stay. If you have any questions or concerns about any information you see in [employer], please call the Health Information Department where you were seen or reach out to your Primary Care Provider for more information about your plan of care. Introducing [facility]! Dear Malcom Sanford: Thank you for requesting a [employer] account. Our records indicate that you already have an active [employer] account. You can access your account anytime at [email or web address] Did you know that you can access your hospital and ER discharge instructions at any time in [employer]? You can also review all of your test results from your hospital stay or ER visit. Additional Information If you have questions, please visit the Frequently Asked Questions section of the MyChart website at [email or web address] Remember, MyChart is NOT to be used for urgent needs. For medical emergencies, dial 911. Now available from your iPhone and Android! General Information Please provide this summary of care documentation to your next provider. Patient Signature:  ____________________________________________________________ Date:  ____________________________________________________________  
  
Vacne Martinez Provider Signature:  ____________________________________________________________ Date:  ____________________________________________________________ (4) no impairment

## 2017-07-31 ENCOUNTER — TELEPHONE (OUTPATIENT)
Dept: FAMILY MEDICINE CLINIC | Age: 82
End: 2017-07-31

## 2017-07-31 NOTE — TELEPHONE ENCOUNTER
Siri Santos  [phone]    PSR returned Mr. Iam Snider in answer to his staff message asking for an appointment. He has an appointment scheduled with JOY Chatman on August 14th but is asking if he can be worked in sooner.  (He is aware that Dr. Jerman Peña is out of the office this week)

## 2017-08-01 ENCOUNTER — TELEPHONE (OUTPATIENT)
Dept: FAMILY MEDICINE CLINIC | Age: 82
End: 2017-08-01

## 2017-08-01 NOTE — TELEPHONE ENCOUNTER
Polina Avalos @ [facility]  [phone]    Polina Jenningspoli states that she is with Mr. Abdirahman Baez. Mr. Abdirahman Baez has recently been prescribed Cipro by Dr. Sapna Gao.   Her computer says that there could be a drug interaction problem with Cipro and Quetiapine which could affect his QT interval.

## 2017-08-11 ENCOUNTER — TELEPHONE (OUTPATIENT)
Dept: FAMILY MEDICINE CLINIC | Age: 82
End: 2017-08-11

## 2017-08-14 RX ORDER — DICLOFENAC SODIUM 10 MG/G
2 GEL TOPICAL EVERY 6 HOURS
Qty: 100 G | Refills: 0 | Status: SHIPPED | OUTPATIENT
Start: 2017-08-14 | End: 2018-02-23 | Stop reason: SDUPTHER

## 2017-08-14 NOTE — TELEPHONE ENCOUNTER
MD Angela Laughlin LPN        Caller: Unspecified (3 days ago,  1:35 PM)                     OK for Voltaren gel 2 gm up to every 6 hrs to affected shoulder; stop if not helping after 5 days.

## 2017-08-16 ENCOUNTER — TELEPHONE (OUTPATIENT)
Dept: FAMILY MEDICINE CLINIC | Age: 82
End: 2017-08-16

## 2017-08-16 NOTE — TELEPHONE ENCOUNTER
Shayna Portillo, Physical Therapist with [facility] is calling, Shayna Portillo would like to simply let Dr. Mariana Doty know that PT will no longer be treating the patient for his shoulder pain, OT will be taking over this as of today.      If there are any questions, Shayna Portillo can be reached at: [phone]

## 2017-09-19 RX ORDER — TRAMADOL HYDROCHLORIDE 50 MG/1
50 TABLET ORAL
Qty: 30 TAB | Refills: 1 | Status: SHIPPED | OUTPATIENT
Start: 2017-09-19 | End: 2018-03-08 | Stop reason: SDUPTHER

## 2017-09-19 RX ORDER — LORAZEPAM 0.5 MG/1
0.5 TABLET ORAL
Qty: 30 TAB | Refills: 0 | Status: SHIPPED | OUTPATIENT
Start: 2017-09-19 | End: 2017-11-16

## 2017-09-22 ENCOUNTER — TELEPHONE (OUTPATIENT)
Dept: FAMILY MEDICINE CLINIC | Age: 82
End: 2017-09-22

## 2017-09-22 NOTE — TELEPHONE ENCOUNTER
MD Zaire Velarde LPN        Caller: Unspecified (Today,  9:25 AM)                     If spinning- Dr. Maik Castañeda; if not , to Neurology.

## 2017-09-22 NOTE — TELEPHONE ENCOUNTER
Patient is calling, he would like to let Dr. Barnhart Speak know that his dizziness has not improved at all. He would like to know what the next steps will be for this treatment.      Best call back # for TAMELA:[phone]

## 2017-10-17 RX ORDER — HYDROCODONE BITARTRATE AND ACETAMINOPHEN 5; 325 MG/1; MG/1
1 TABLET ORAL
Qty: 30 TAB | Refills: 0 | Status: SHIPPED | OUTPATIENT
Start: 2017-10-17 | End: 2017-11-16

## 2017-10-18 ENCOUNTER — TELEPHONE (OUTPATIENT)
Dept: FAMILY MEDICINE CLINIC | Age: 82
End: 2017-10-18

## 2017-10-18 NOTE — TELEPHONE ENCOUNTER
Patient is calling, he is requesting a call back from [facility] in regards to the paperwork that was sent over by [facility] to our office for approval to work on the patients wheel chair.     Best call back # for patient: [phone]

## 2017-10-24 ENCOUNTER — TELEPHONE (OUTPATIENT)
Dept: CARDIOLOGY CLINIC | Age: 82
End: 2017-10-24

## 2017-11-03 ENCOUNTER — OFFICE VISIT (OUTPATIENT)
Dept: FAMILY MEDICINE CLINIC | Age: 82
End: 2017-11-03

## 2017-11-03 ENCOUNTER — HOSPITAL ENCOUNTER (OUTPATIENT)
Dept: LAB | Age: 82
Discharge: HOME | End: 2017-11-03
Payer: MEDICARE

## 2017-11-03 VITALS
OXYGEN SATURATION: 96 % | HEIGHT: 74 IN | RESPIRATION RATE: 20 BRPM | DIASTOLIC BLOOD PRESSURE: 92 MMHG | HEART RATE: 87 BPM | SYSTOLIC BLOOD PRESSURE: 122 MMHG | TEMPERATURE: 97.8 F

## 2017-11-03 DIAGNOSIS — R53.83 FATIGUE, UNSPECIFIED TYPE: ICD-10-CM

## 2017-11-03 DIAGNOSIS — I10 ESSENTIAL HYPERTENSION, BENIGN: ICD-10-CM

## 2017-11-03 DIAGNOSIS — E78.2 MIXED HYPERLIPIDEMIA: Primary | ICD-10-CM

## 2017-11-03 DIAGNOSIS — K21.00 REFLUX ESOPHAGITIS: ICD-10-CM

## 2017-11-03 DIAGNOSIS — M25.511 ACUTE PAIN OF RIGHT SHOULDER: ICD-10-CM

## 2017-11-03 PROCEDURE — 84439 ASSAY OF FREE THYROXINE: CPT

## 2017-11-03 PROCEDURE — 84443 ASSAY THYROID STIM HORMONE: CPT

## 2017-11-03 PROCEDURE — 80053 COMPREHEN METABOLIC PANEL: CPT

## 2017-11-03 PROCEDURE — 36415 COLL VENOUS BLD VENIPUNCTURE: CPT

## 2017-11-03 PROCEDURE — 85027 COMPLETE CBC AUTOMATED: CPT

## 2017-11-03 NOTE — PROGRESS NOTES
HISTORY OF PRESENT ILLNESS  HPI  Zachery Santizo is a 80 y.o. male with history of HTN, Parkinson's, BPH, hyperlipidemia, DJD, HEAVEN, and major depressive disorder who presents to office today in a wheelchair for follow-up. Pt was admitted to [facility] from 6/26 - 6/28 with symptoms of slurred speech and right shoulder pain and numbness after falling; the slurred speech resolved while in the hospital. He states that the shoulder pain has worsened since then; it is aggravated by movement. An xray of the shoulder done on 9/25 revealed arthritis but no acute abnormalities. Pt states that his most recent meal was root beer and chicken and dumplings at 12:30 PM today. Past Medical History:   Diagnosis Date    Blurry vision 1/19/2012    BPH (benign prostatic hyperplasia)     DDD (degenerative disc disease), lumbar 2/19/2010    DJD (degenerative joint disease) of cervical spine 2/19/2010    DU (duodenal ulcer) 9/1/1990    Hyperlipidemia     Hypertension     Other and unspecified hyperlipidemia 2/19/2010    Pacemaker     Paralysis agitans ([address] 2/19/2010    Parkinson disease ([address]     PAT [facility]oxys[facility])     Premature atrial beats     Reflux esophagitis 2/19/2010    Sick sinus syndrome [facility])      Past Surgical History:   Procedure Laterality Date    HX CATARACT REMOVAL  6/2012 and 7/2012    Both eyes    HX ORTHOPAEDIC  04/2015    tendon repair both knees    HX PACEMAKER  2008    bradycardia    HX VITRECTOMY  11/2011    OH REMVL PERM PM PLS GEN W/REPL PLSE GEN 2 LEAD SYS  3/13/2017          Current Outpatient Prescriptions on File Prior to Visit   Medication Sig Dispense Refill    HYDROcodone-acetaminophen (NORCO) 5-325 mg per tablet Take 1 Tab by mouth every four (4) hours as needed for Pain. Max Daily Amount: 6 Tabs. 30 Tab 0    LORazepam (ATIVAN) 0.5 mg tablet Take 1 Tab by mouth every six (6) hours as needed for Anxiety.  Max Daily Amount: 2 mg. 30 Tab 0    traMADol (ULTRAM) 50 mg tablet Take 1 Tab by mouth every eight (8) hours as needed for Pain. Max Daily Amount: 150 mg. 30 Tab 1    diclofenac (VOLTAREN) 1 % gel Apply 2 g to affected area every six (6) hours. Apply to shoulder for 5 days stop if no improvement 100 g 0    diclofenac (VOLTAREN) 1 % gel Apply  to affected area as needed.  QUEtiapine (SEROQUEL) 25 mg tablet Take 25 mg by mouth every evening.  DETROL LA 2 mg ER capsule TAKE 1 CAPSULE DAILY FOR BLADDER FREQUENCY 90 Cap 3    pindolol (VISKIN) 5 mg tablet Take 2.5 mg by mouth daily (after lunch). Take 1/2 tablet by mouth at 2pm -Take 1 tablet by mouth at 9pm      albuterol (PROVENTIL HFA, VENTOLIN HFA, PROAIR HFA) 90 mcg/actuation inhaler Take 2 Puffs by inhalation every six (6) hours as needed for Wheezing. 1 Inhaler 0    carbidopa-levodopa (SINEMET)  mg per tablet Take 2 Tabs by mouth four (4) times daily. (This is given at 8:00, 11:00, 14:00, and 21:00). 240 Tab 0    polyethylene glycol (MIRALAX) 17 gram packet Take 1 Packet by mouth daily. Indications: CONSTIPATION 30 Packet 0    therapeutic multivitamin (THERAGRAN) tablet Take 1 Tab by mouth daily. 30 Tab 0    cloNIDine HCl (CATAPRES) 0.1 mg tablet Take 1 Tab by mouth every six (6) hours as needed (SBP > 180 mmHg). 20 Tab 0    droxidopa (NORTHERA) 100 mg cap Take 100 mg by mouth three (3) times daily. Indications: SYMPTOMATIC ORTHOSTATIC HYPOTENSION 90 Tab 1    docusate sodium (COLACE) 100 mg capsule Take 1 Cap by mouth daily as needed for Constipation. 30 Cap 0    cycloSPORINE (RESTASIS) 0.05 % ophthalmic emulsion Administer 1 Drop to both eyes two (2) times a day. 60 Each 0    escitalopram oxalate (LEXAPRO) 20 mg tablet Take 1 Tab by mouth daily. 30 Tab 0    rivastigmine (EXELON) 4.6 mg/24 hr patch 1 Patch by TransDERmal route daily. 30 Patch 0    esomeprazole (NEXIUM) 40 mg capsule Take 1 Cap by mouth daily.  30 Cap 0    acetaminophen (MAPAP EXTRA STRENGTH) 500 mg tablet Take 1 Tab by mouth every six (6) hours as needed for Pain. 120 Tab 0    losartan (COZAAR) 25 mg tablet Take  by mouth daily. No current facility-administered medications on file prior to visit. No Known Allergies  Family History   Problem Relation Age of Onset    Asthma Mother     Heart Disease Father      Social History     Social History    Marital status:      Spouse name: N/A    Number of children: N/A    Years of education: N/A     Social History Main Topics    Smoking status: Former Smoker     Types: Pipe     Quit date: 7/14/1945    Smokeless tobacco: Never Used    Alcohol use 2.0 oz/week     4 Cans of beer per week      Comment: glass of wine every now and then    Drug use: No    Sexual activity: Not Asked     Other Topics Concern     Service Yes    Blood Transfusions No    Caffeine Concern No    Occupational Exposure No    Hobby Hazards No    Sleep Concern No    Stress Concern No    Weight Concern No    Special Diet No    Back Care No    Exercise No    Bike Helmet No    Seat Belt Yes    Self-Exams No     Social History Narrative             Review of Systems   Constitutional: Negative for chills, diaphoresis, fever, malaise/fatigue and weight loss. Eyes: Negative for blurred vision, double vision, pain and redness. Respiratory: Negative for cough, shortness of breath and wheezing. Cardiovascular: Negative for chest pain, palpitations, orthopnea, claudication, leg swelling and PND. Musculoskeletal: Positive for joint pain (right shoulder). Skin: Negative for itching and rash. Neurological: Negative for dizziness, tingling, tremors, sensory change, speech change, focal weakness, seizures, loss of consciousness, weakness and headaches.      Results for orders placed or performed in visit on 11/03/17   CBC W/O DIFF   Result Value Ref Range    WBC 5.5 3.4 - 10.8 x10E3/uL    RBC 3.99 (L) 4.14 - 5.80 x10E6/uL    HGB 12.0 (L) 12.6 - 17.7 g/dL    HCT 35.4 (L) 37.5 - 51.0 % MCV 89 79 - 97 fL    MCH 30.1 26.6 - 33.0 pg    MCHC 33.9 31.5 - 35.7 g/dL    RDW 14.6 12.3 - 15.4 %    PLATELET 920 007 - 390 [id number]/FG   METABOLIC PANEL, COMPREHENSIVE   Result Value Ref Range    Glucose 95 65 - 99 mg/dL    BUN 23 8 - 27 mg/dL    Creatinine 1.09 0.76 - 1.27 mg/dL    GFR est non-AA 62 >59 mL/min/1.73    GFR est AA 72 >59 mL/min/1.73    BUN/Creatinine ratio 21 10 - 24    Sodium 137 134 - 144 mmol/L    Potassium 4.7 3.5 - 5.2 mmol/L    Chloride 98 96 - 106 mmol/L    CO2 23 18 - 29 mmol/L    Calcium 9.0 8.6 - 10.2 mg/dL    Protein, total 7.1 6.0 - 8.5 g/dL    Albumin 4.1 3.5 - 4.7 g/dL    GLOBULIN, TOTAL 3.0 1.5 - 4.5 g/dL    A-G Ratio 1.4 1.2 - 2.2    Bilirubin, total 0.5 0.0 - 1.2 mg/dL    Alk. phosphatase 81 39 - 117 IU/L    AST (SGOT) 26 0 - 40 IU/L    ALT (SGPT) 11 0 - 44 IU/L   TSH 3RD GENERATION   Result Value Ref Range    TSH 0.704 0.450 - 4.500 uIU/mL   T4, FREE   Result Value Ref Range    T4, Free 0.99 0.82 - 1.77 ng/dL               Physical Exam  Visit Vitals    BP (!) 122/92 (BP 1 Location: Left arm, BP Patient Position: Sitting)    Pulse 87    Temp 97.8 °F (36.6 °C) (Oral)    Resp 20    Ht 6' 2\" (1.88 m)    SpO2 96%     Lungs: clear to auscultation bilaterally  Heart: regular rate and rhythm, S1, S2 normal, no murmur, click, rub or gallop  Extremities: extremities normal, atraumatic, no cyanosis or edema  Right shoulder: tenderness at the [facility] joint, increased discomfort with abduction especially as he gets horizontal and above            ASSESSMENT and PLAN    ICD-10-CM ICD-9-CM    1. Mixed hyperlipidemia E78.2 272.2    2. Reflux esophagitis K21.0 530.11    3. Essential hypertension, difficult to control due to orthostatic hypotension I10 401.1 CBC W/O DIFF      METABOLIC PANEL, COMPREHENSIVE   4. Acute pain of right shoulder M25.511 719.41 REFERRAL TO PHYSICAL THERAPY   5.  Fatigue, unspecified type R53.83 780.79 TSH 3RD GENERATION      T4, FREE     Diagnoses and all orders for this visit:    1. Mixed hyperlipidemia    2. Reflux esophagitis    3. Essential hypertension, difficult to control due to orthostatic hypotension  -     CBC W/O DIFF  -     METABOLIC PANEL, COMPREHENSIVE    4. Acute pain of right shoulder  -     [facility]    5. Fatigue, unspecified type  -     TSH 3RD GENERATION  -     T4, FREE      Follow-up Disposition:  Return in about 1 month (around 12/3/2017) for fatigue, ? depression, recheck BP.     lab results and schedule of future lab studies reviewed with patient  reviewed diet, exercise and weight control  cardiovascular risk and specific lipid/LDL goals reviewed  reviewed medications and side effects in detail  Please call my office if there are any questions- [phone]. I will arrange for follow up after the lab tests done from today return  Recommended a weekly \"heart check. \" I went into detail how to do this. Call for refills on medications as needed. Discussed expected course/resolution/complications of diagnosis in detail with patient. Medication risks/benefits/costs/interactions/alternatives discussed with patient. Pt was given an after visit summary which includes diagnoses, current medications & vitals. Pt expressed understanding with the diagnosis and plan. .  Total 25 minutes,60 % counseling re: Reviewed symptoms, or lack thereof, of hypertension and elevated cholesterol. Reviewed in detail the proper technique of checking the blood pressure- check it on an average day only, not on a stressful day, sitting, no exercise for at least 1 hour and not experiencing any new pain( chronic pain is OK). Patient encouraged to check BP sitting and standing at least once a month and to report these readings to me if > 140/ 90 on average , or if the standing BP is >  15 points lower than the sitting. We'll do some lab work related to his BP and also his complaint of some fatigue and possible depression.  I recommended that he do PT along with diclofenac gel for the [facility] joint area, and if he's not seeing any improvement come back in a week or two for an injection in the area. Also, discussed symptoms of concern that were noted today in the note above, treatment options( including doing nothing), when to follow up before recommended time frame. Also, answered all questions. This document was written by Thuy Mock, as dictated by Vj Almendarez MD.  I have reviewed and agree with the above note and have made corrections where appropriate To Snow M.D.

## 2017-11-03 NOTE — PATIENT INSTRUCTIONS
Low Blood Pressure: Care Instructions  Your Care Instructions    Blood pressure is a measurement of the force of the blood against the walls of the blood vessels during and after each beat of the heart. Low blood pressure is also called hypotension. It means that your blood pressure is much lower than normal. Some people, especially young, slim women, may have slightly low blood pressure without symptoms. But in many people, low blood pressure can cause symptoms such as feeling dizzy or lightheaded. When your blood pressure is too low, your heart, brain, and other organs do not get enough blood. Low blood pressure can be caused by many things, including heart problems and some medicines. Diabetes that is not under control can cause your blood pressure to drop. And so can a severe allergic reaction or infection. Another cause is dehydration, which is when your body loses too much fluid. Treatment for low blood pressure depends on the cause. Follow-up care is a key part of your treatment and safety. Be sure to make and go to all appointments, and call your doctor if you are having problems. It's also a good idea to know your test results and keep a list of the medicines you take. How can you care for yourself at home? · Drink plenty of fluids, enough so that your urine is light yellow or clear like water. If you have kidney, heart, or liver disease and have to limit fluids, talk with your doctor before you increase the amount of fluids you drink. · Be safe with medicines. Call your doctor if you think you are having a problem with your medicine. You will get more details on the specific medicines your doctor prescribes. · Stand up or get out of bed very slowly to allow your body to adjust.  · Get plenty of rest.  · Do not smoke. Smoking increases your risk of heart attack. If you need help quitting, talk to your doctor about stop-smoking programs and medicines.  These can increase your chances of quitting for good. · Limit alcohol to 2 drinks a day for men and 1 drink a day for women. Alcohol may interfere with your medicine. In addition, alcohol can make your low blood pressure worse by causing your body to lose water. When should you call for help? Call 911 anytime you think you may need emergency care. For example, call if:  ? · You passed out (lost consciousness). ?Call your doctor now or seek immediate medical care if:  ? · You are dizzy or lightheaded, or you feel like you may faint. ? Watch closely for changes in your health, and be sure to contact your doctor if you have any problems. Where can you learn more? Go to http://rodo-lukas.info/. Enter C304 in the search box to learn more about \"Low Blood Pressure: Care Instructions. \"  Current as of: September 21, 2016  Content Version: 11.4  © 8487-5796 [employer]. Care instructions adapted under license by [employer] (which disclaims liability or warranty for this information). If you have questions about a medical condition or this instruction, always ask your healthcare professional. [address] any warranty or liability for your use of this information.

## 2017-11-03 NOTE — MR AVS SNAPSHOT
Visit Information Date & Time Provider Department Dept. Phone Encounter #  
 11/3/2017  2:00 PM Jose Juan Magaña  Formerly [facility] [address] [phone] [id number] Your Appointments 1/18/2018 10:40 AM  
ESTABLISHED PATIENT with Dayna Girard MD  
[facility] Appt Note: 6 month f/u  
 [address] [place]  
[address] [address]  
  
    
 7/26/2018 10:30 AM  
PACEMAKER with Joshua Zakiya [facility] ([facility] SCHEDULING) Appt Note: aminata ppi, rc, annual thresh/CL, see gilman; med threshold/rc/Gilman 1 yr b  
 [address] [place]  
[address] [facility]  
  
    
 7/26/2018 10:40 AM  
ESTABLISHED PATIENT with Dayna Girard MD  
[facility] ([facility]) Appt Note: med threshold/rc/Gilman 1 yr b  
 [address] [place]  
[phone]  
  
    
  
 1/15/2018  9:45 AM  
REMOTE OFFICE VISIT with Rashad Griffiths [facility] ([facility] SCHEDULING) Appt Note: lachelle callaway  
 [address] [place] [address] [address] [place] [address]  
[address] [address]  
  
    
 4/18/2018 10:45 AM  
REMOTE OFFICE VISIT with Rashad Griffiths [facility] ([facility] SCHEDULING) Appt Note: aminata ppi, rc, annual thresh/CL, see gilman; CL PPI/rc b  
 [address] [place] [address] [address] [place]  
[phone] Upcoming Health Maintenance Date Due DTaP/Tdap/Td series (1 - Tdap) 5/4/2012 MEDICARE YEARLY EXAM 4/29/2018 GLAUCOMA SCREENING Q2Y 8/26/2018 Allergies as of 11/3/2017  Review Complete On: 11/3/2017 By: Brendon Fink LPN No Known Allergies Current Immunizations  Reviewed on 3/14/2017 Name Date Influenza High Dose Vaccine PF 10/27/2015 Influenza Vaccine Split 11/17/2011 TB Skin Test (PPD) Intradermal 3/25/2013 TD Vaccine 5/3/2012 ZZZ-RETIRED (DO NOT USE) Pneumococcal Vaccine (Unspecified Type) 4/20/2010 Not reviewed this visit You Were Diagnosed With   
  
 Codes Comments Mixed hyperlipidemia    -  Primary ICD-10-CM: Y60.3 ICD-9-CM: 272.2 Reflux esophagitis     ICD-10-CM: K21.0 ICD-9-CM: 530.11 Essential hypertension, benign     ICD-10-CM: I10 
ICD-9-CM: 401.1 Acute pain of right shoulder     ICD-10-CM: M25.511 ICD-9-CM: 719.41 Fatigue, unspecified type     ICD-10-CM: R53.83 ICD-9-CM: 780.79 Vitals BP Pulse Temp Resp Height(growth percentile) SpO2  
 (!) 122/92 (BP 1 Location: Left arm, BP Patient Position: Sitting) 87 97.8 °F (36.6 °C) (Oral) 20 6' 2\" (1.88 m) 96% Smoking Status Former Smoker Vitals History Preferred Pharmacy Pharmacy Name Phone [phone] Your Updated Medication List  
  
   
This list is accurate as of: 11/3/17  2:55 PM.  Always use your most recent med list.  
  
  
  
  
 acetaminophen 500 mg tablet Commonly known as:  MAPAP EXTRA STRENGTH Take 1 Tab by mouth every six (6) hours as needed for Pain. albuterol 90 mcg/actuation inhaler Commonly known as:  PROVENTIL HFA, VENTOLIN HFA, PROAIR HFA Take 2 Puffs by inhalation every six (6) hours as needed for Wheezing. carbidopa-levodopa  mg per tablet Commonly known as:  SINEMET Take 2 Tabs by mouth four (4) times daily. (This is given at 8:00, 11:00, 14:00, and 21:00). cloNIDine HCl 0.1 mg tablet Commonly known as:  CATAPRES Take 1 Tab by mouth every six (6) hours as needed (SBP > 180 mmHg). cycloSPORINE 0.05 % ophthalmic emulsion Commonly known as:  RESTASIS Administer 1 Drop to both eyes two (2) times a day. DETROL LA 2 mg ER capsule Generic drug:  tolterodine ER  
 TAKE 1 CAPSULE DAILY FOR BLADDER FREQUENCY  
  
 docusate sodium 100 mg capsule Commonly known as:  Gisela Romero Take 1 Cap by mouth daily as needed for Constipation. droxidopa 100 mg Cap capsule Commonly known as:  Audrey Pozot Take 100 mg by mouth three (3) times daily. Indications: SYMPTOMATIC ORTHOSTATIC HYPOTENSION  
  
 escitalopram oxalate 20 mg tablet Commonly known as:  Elva Chris Take 1 Tab by mouth daily. esomeprazole 40 mg capsule Commonly known as:  NexIUM Take 1 Cap by mouth daily. HYDROcodone-acetaminophen 5-325 mg per tablet Commonly known as:  Wayna Glaze Take 1 Tab by mouth every four (4) hours as needed for Pain. Max Daily Amount: 6 Tabs. LORazepam 0.5 mg tablet Commonly known as:  ATIVAN Take 1 Tab by mouth every six (6) hours as needed for Anxiety. Max Daily Amount: 2 mg.  
  
 losartan 25 mg tablet Commonly known as:  COZAAR Take  by mouth daily. pindolol 5 mg tablet Commonly known as:  VISKIN Take 2.5 mg by mouth daily (after lunch). Take 1/2 tablet by mouth at 2pm -Take 1 tablet by mouth at 9pm  
  
 polyethylene glycol 17 gram packet Commonly known as:  Scot [place]ula Take 1 Packet by mouth daily. Indications: CONSTIPATION  
  
 QUEtiapine 25 mg tablet Commonly known as:  SEROquel Take 25 mg by mouth every evening. rivastigmine 4.6 mg/24 hr patch Commonly known as:  EXELON  
1 Patch by TransDERmal route daily. therapeutic multivitamin tablet Commonly known as:  [facility] Take 1 Tab by mouth daily. traMADol 50 mg tablet Commonly known as:  ULTRAM  
Take 1 Tab by mouth every eight (8) hours as needed for Pain. Max Daily Amount: 150 mg.  
  
 * VOLTAREN 1 % Gel Generic drug:  diclofenac Apply  to affected area as needed. * diclofenac 1 % Gel Commonly known as:  VOLTAREN Apply 2 g to affected area every six (6) hours. Apply to shoulder for 5 days stop if no improvement * Notice: This list has 2 medication(s) that are the same as other medications prescribed for you. Read the directions carefully, and ask your doctor or other care provider to review them with you. We Performed the Following CBC W/O DIFF [49143 CPT(R)] METABOLIC PANEL, COMPREHENSIVE [50606 CPT(R)] REFERRAL TO PHYSICAL THERAPY [RHC34 Custom] Comments:  
 eval and treat right shoulder pain at  T4, FREE [id number] CPT(R)] TSH 3RD GENERATION [25219 CPT(R)] Referral Information Referral ID Referred By Referred To  
  
 [id number] [facility] [address] [facility], [place] Phone: [phone] Visits Status Start Date End Date 1 New Request 11/3/17 11/3/18 If your referral has a status of pending review or denied, additional information will be sent to support the outcome of this decision. Patient Instructions Low Blood Pressure: Care Instructions Your Care Instructions Blood pressure is a measurement of the force of the blood against the walls of the blood vessels during and after each beat of the heart. Low blood pressure is also called hypotension. It means that your blood pressure is much lower than normal. Some people, especially young, slim women, may have slightly low blood pressure without symptoms. But in many people, low blood pressure can cause symptoms such as feeling dizzy or lightheaded. When your blood pressure is too low, your heart, brain, and other organs do not get enough blood. Low blood pressure can be caused by many things, including heart problems and some medicines. Diabetes that is not under control can cause your blood pressure to drop. And so can a severe allergic reaction or infection. Another cause is dehydration, which is when your body loses too much fluid. Treatment for low blood pressure depends on the cause. Follow-up care is a key part of your treatment and safety. Be sure to make and go to all appointments, and call your doctor if you are having problems. It's also a good idea to know your test results and keep a list of the medicines you take. How can you care for yourself at home? · Drink plenty of fluids, enough so that your urine is light yellow or clear like water. If you have kidney, heart, or liver disease and have to limit fluids, talk with your doctor before you increase the amount of fluids you drink. · Be safe with medicines. Call your doctor if you think you are having a problem with your medicine. You will get more details on the specific medicines your doctor prescribes. · Stand up or get out of bed very slowly to allow your body to adjust. 
· Get plenty of rest. 
· Do not smoke. Smoking increases your risk of heart attack. If you need help quitting, talk to your doctor about stop-smoking programs and medicines. These can increase your chances of quitting for good. · Limit alcohol to 2 drinks a day for men and 1 drink a day for women. Alcohol may interfere with your medicine. In addition, alcohol can make your low blood pressure worse by causing your body to lose water. When should you call for help? Call 911 anytime you think you may need emergency care. For example, call if: 
? · You passed out (lost consciousness). ?Call your doctor now or seek immediate medical care if: 
? · You are dizzy or lightheaded, or you feel like you may faint. ? Watch closely for changes in your health, and be sure to contact your doctor if you have any problems. Where can you learn more? Go to http://rodo-lukas.info/. Enter C304 in the search box to learn more about \"Low Blood Pressure: Care Instructions. \" Current as of: September 21, 2016 Content Version: 11.4 © 1566-0124 Healthwise, Incorporated.  Care instructions adapted under license by [address] (which disclaims liability or warranty for this information). If you have questions about a medical condition or this instruction, always ask your healthcare professional. [address] any warranty or liability for your use of this information. Introducing [facility]! Dear Manuel Jay: Thank you for requesting a [employer] account. Our records indicate that you already have an active [employer] account. You can access your account anytime at [email or web address] Did you know that you can access your hospital and ER discharge instructions at any time in [employer]? You can also review all of your test results from your hospital stay or ER visit. Additional Information If you have questions, please visit the Frequently Asked Questions section of the [employer] website at [email or web address] Remember, [employer] is NOT to be used for urgent needs. For medical emergencies, dial 911. Now available from your iPhone and Android! Please provide this summary of care documentation to your next provider. Your primary care clinician is listed as [address] [address], [facility] . If you have any questions after today's visit, please call [phone].

## 2017-11-03 NOTE — PROGRESS NOTES
\"Reviewed record in preparation for visit and have obtained the necessary documentation\"  Chief Complaint   Patient presents with    Hypertension     follow up     Cholesterol Problem     follow up     Parkinsons Disease     follow up     Elbow Pain     right side, continued pain worsening past hx of     Shoulder Pain     right side, continued pain worsening past hx of        1. Have you been to the ER, urgent care clinic since your last visit? Hospitalized since your last visit? No    2. Have you seen or consulted any other health care providers outside of the [address] [place] [address] since your last visit? Include any pap smears or colon screening.  No

## 2017-11-03 NOTE — LETTER
11/6/2017 10:15 AM 
 
[facility] 
[facility] [id number] Dear Radha Ureña: 
 
Please find your most recent results below. Resulted Orders CBC W/O DIFF Result Value Ref Range WBC 5.5 3.4 - 10.8 x10E3/uL  
 RBC 3.99 (L) 4.14 - 5.80 x10E6/uL HGB 12.0 (L) 12.6 - 17.7 g/dL HCT 35.4 (L) 37.5 - 51.0 % MCV 89 79 - 97 fL  
 MCH 30.1 26.6 - 33.0 pg  
 MCHC 33.9 31.5 - 35.7 g/dL  
 RDW 14.6 12.3 - 15.4 % PLATELET 984 652 - 382 x10E3/uL Narrative Performed at:  [address]  [id number] : Sara Garcia MD, Phone:  [phone] METABOLIC PANEL, COMPREHENSIVE Result Value Ref Range Glucose 95 65 - 99 mg/dL BUN 23 8 - 27 mg/dL Creatinine 1.09 0.76 - 1.27 mg/dL GFR est non-AA 62 >59 mL/min/1.73 GFR est AA 72 >59 mL/min/1.73  
 BUN/Creatinine ratio 21 10 - 24 Sodium 137 134 - 144 mmol/L Potassium 4.7 3.5 - 5.2 mmol/L Chloride 98 96 - 106 mmol/L  
 CO2 23 18 - 29 mmol/L Calcium 9.0 8.6 - 10.2 mg/dL Protein, total 7.1 6.0 - 8.5 g/dL Albumin 4.1 3.5 - 4.7 g/dL GLOBULIN, TOTAL 3.0 1.5 - 4.5 g/dL A-G Ratio 1.4 1.2 - 2.2 Bilirubin, total 0.5 0.0 - 1.2 mg/dL Alk. phosphatase 81 39 - 117 IU/L  
 AST (SGOT) 26 0 - 40 IU/L  
 ALT (SGPT) 11 0 - 44 IU/L Narrative Performed at:  [address]  [id number] : Sara Garcia MD, Phone:  [phone] TSH 3RD GENERATION Result Value Ref Range TSH 0.704 0.450 - 4.500 uIU/mL Narrative Performed at:  [address]  [id number] : Sara Garcia MD, Phone:  [phone] T4, FREE Result Value Ref Range T4, Free 0.99 0.82 - 1.77 ng/dL Narrative Performed at:  [address]  [id number] : Jose Luis Clemons MD, Phone:  [phone] RECOMMENDATIONS: 
    
    
   
    
  Normal Thyroid function tests. Everything else is OK. Recheck every 6 monts, non fasting lab work Please call me if you have any questions: [phone] Sincerely, 
 
 
Ulysses Davey MD

## 2017-11-04 LAB
ALBUMIN SERPL-MCNC: 4.1 G/DL (ref 3.5–4.7)
ALBUMIN/GLOB SERPL: 1.4 {RATIO} (ref 1.2–2.2)
ALP SERPL-CCNC: 81 IU/L (ref 39–117)
ALT SERPL-CCNC: 11 IU/L (ref 0–44)
AST SERPL-CCNC: 26 IU/L (ref 0–40)
BILIRUB SERPL-MCNC: 0.5 MG/DL (ref 0–1.2)
BUN SERPL-MCNC: 23 MG/DL (ref 8–27)
BUN/CREAT SERPL: 21 (ref 10–24)
CALCIUM SERPL-MCNC: 9 MG/DL (ref 8.6–10.2)
CHLORIDE SERPL-SCNC: 98 MMOL/L (ref 96–106)
CO2 SERPL-SCNC: 23 MMOL/L (ref 18–29)
CREAT SERPL-MCNC: 1.09 MG/DL (ref 0.76–1.27)
ERYTHROCYTE [DISTWIDTH] IN BLOOD BY AUTOMATED COUNT: 14.6 % (ref 12.3–15.4)
GFR SERPLBLD CREATININE-BSD FMLA CKD-EPI: 62 ML/MIN/1.73
GFR SERPLBLD CREATININE-BSD FMLA CKD-EPI: 72 ML/MIN/1.73
GLOBULIN SER CALC-MCNC: 3 G/DL (ref 1.5–4.5)
GLUCOSE SERPL-MCNC: 95 MG/DL (ref 65–99)
HCT VFR BLD AUTO: 35.4 % (ref 37.5–51)
HGB BLD-MCNC: 12 G/DL (ref 12.6–17.7)
MCH RBC QN AUTO: 30.1 PG (ref 26.6–33)
MCHC RBC AUTO-ENTMCNC: 33.9 G/DL (ref 31.5–35.7)
MCV RBC AUTO: 89 FL (ref 79–97)
PLATELET # BLD AUTO: 217 X10E3/UL (ref 150–379)
POTASSIUM SERPL-SCNC: 4.7 MMOL/L (ref 3.5–5.2)
PROT SERPL-MCNC: 7.1 G/DL (ref 6–8.5)
RBC # BLD AUTO: 3.99 X10E6/UL (ref 4.14–5.8)
SODIUM SERPL-SCNC: 137 MMOL/L (ref 134–144)
T4 FREE SERPL-MCNC: 0.99 NG/DL (ref 0.82–1.77)
TSH SERPL DL<=0.005 MIU/L-ACNC: 0.7 UIU/ML (ref 0.45–4.5)
WBC # BLD AUTO: 5.5 X10E3/UL (ref 3.4–10.8)

## 2017-11-12 ENCOUNTER — APPOINTMENT (OUTPATIENT)
Dept: CT IMAGING | Age: 82
DRG: 689 | End: 2017-11-12
Attending: EMERGENCY MEDICINE
Payer: MEDICARE

## 2017-11-12 ENCOUNTER — HOSPITAL ENCOUNTER (INPATIENT)
Age: 82
LOS: 4 days | Discharge: HOME HEALTH | DRG: 689 | End: 2017-11-16
Attending: EMERGENCY MEDICINE | Admitting: FAMILY MEDICINE
Payer: MEDICARE

## 2017-11-12 ENCOUNTER — APPOINTMENT (OUTPATIENT)
Dept: GENERAL RADIOLOGY | Age: 82
DRG: 689 | End: 2017-11-12
Attending: EMERGENCY MEDICINE
Payer: MEDICARE

## 2017-11-12 DIAGNOSIS — G93.40 ACUTE ENCEPHALOPATHY: Primary | ICD-10-CM

## 2017-11-12 DIAGNOSIS — R41.82 ALTERED MENTAL STATUS, UNSPECIFIED ALTERED MENTAL STATUS TYPE: ICD-10-CM

## 2017-11-12 DIAGNOSIS — G93.41 METABOLIC ENCEPHALOPATHY: ICD-10-CM

## 2017-11-12 DIAGNOSIS — N30.00 ACUTE CYSTITIS WITHOUT HEMATURIA: ICD-10-CM

## 2017-11-12 PROBLEM — R45.1 AGITATION: Status: ACTIVE | Noted: 2017-11-12

## 2017-11-12 PROBLEM — E86.0 DEHYDRATION: Status: ACTIVE | Noted: 2017-11-12

## 2017-11-12 LAB
ALBUMIN SERPL-MCNC: 3.7 G/DL (ref 3.5–5)
ALBUMIN/GLOB SERPL: 1 {RATIO} (ref 1.1–2.2)
ALP SERPL-CCNC: 72 U/L (ref 45–117)
ALT SERPL-CCNC: 9 U/L (ref 12–78)
AMMONIA PLAS-SCNC: 19 UMOL/L
ANION GAP SERPL CALC-SCNC: 7 MMOL/L (ref 5–15)
APAP SERPL-MCNC: <2 UG/ML (ref 10–30)
APPEARANCE UR: CLEAR
APTT PPP: 25.2 SEC (ref 22.1–32.5)
AST SERPL-CCNC: 28 U/L (ref 15–37)
BACTERIA URNS QL MICRO: NEGATIVE /HPF
BASOPHILS # BLD: 0 K/UL (ref 0–0.1)
BASOPHILS NFR BLD: 1 % (ref 0–1)
BILIRUB SERPL-MCNC: 0.7 MG/DL (ref 0.2–1)
BILIRUB UR QL CFM: NEGATIVE
BUN SERPL-MCNC: 30 MG/DL (ref 6–20)
BUN/CREAT SERPL: 21 (ref 12–20)
CALCIUM SERPL-MCNC: 8.2 MG/DL (ref 8.5–10.1)
CHLORIDE SERPL-SCNC: 105 MMOL/L (ref 97–108)
CK SERPL-CCNC: 179 U/L (ref 39–308)
CO2 SERPL-SCNC: 25 MMOL/L (ref 21–32)
COLOR UR: ABNORMAL
CREAT SERPL-MCNC: 1.41 MG/DL (ref 0.7–1.3)
EOSINOPHIL # BLD: 0.4 K/UL (ref 0–0.4)
EOSINOPHIL NFR BLD: 7 % (ref 0–7)
EPITH CASTS URNS QL MICRO: ABNORMAL /LPF
ERYTHROCYTE [DISTWIDTH] IN BLOOD BY AUTOMATED COUNT: 14.3 % (ref 11.5–14.5)
GLOBULIN SER CALC-MCNC: 3.6 G/DL (ref 2–4)
GLUCOSE SERPL-MCNC: 96 MG/DL (ref 65–100)
GLUCOSE UR STRIP.AUTO-MCNC: NEGATIVE MG/DL
HCT VFR BLD AUTO: 35.8 % (ref 36.6–50.3)
HGB BLD-MCNC: 11.8 G/DL (ref 12.1–17)
HGB UR QL STRIP: NEGATIVE
HYALINE CASTS URNS QL MICRO: ABNORMAL /LPF (ref 0–5)
INR PPP: 1.1 (ref 0.9–1.1)
KETONES UR QL STRIP.AUTO: ABNORMAL MG/DL
LACTATE SERPL-SCNC: 0.8 MMOL/L (ref 0.4–2)
LEUKOCYTE ESTERASE UR QL STRIP.AUTO: ABNORMAL
LYMPHOCYTES # BLD: 1.9 K/UL (ref 0.8–3.5)
LYMPHOCYTES NFR BLD: 30 % (ref 12–49)
MCH RBC QN AUTO: 30.2 PG (ref 26–34)
MCHC RBC AUTO-ENTMCNC: 33 G/DL (ref 30–36.5)
MCV RBC AUTO: 91.6 FL (ref 80–99)
MONOCYTES # BLD: 0.5 K/UL (ref 0–1)
MONOCYTES NFR BLD: 8 % (ref 5–13)
NEUTS SEG # BLD: 3.3 K/UL (ref 1.8–8)
NEUTS SEG NFR BLD: 54 % (ref 32–75)
NITRITE UR QL STRIP.AUTO: NEGATIVE
PH UR STRIP: 5.5 [PH] (ref 5–8)
PLATELET # BLD AUTO: 199 K/UL (ref 150–400)
POTASSIUM SERPL-SCNC: 4.5 MMOL/L (ref 3.5–5.1)
PROT SERPL-MCNC: 7.3 G/DL (ref 6.4–8.2)
PROT UR STRIP-MCNC: NEGATIVE MG/DL
PROTHROMBIN TIME: 11.1 SEC (ref 9–11.1)
RBC # BLD AUTO: 3.91 M/UL (ref 4.1–5.7)
RBC #/AREA URNS HPF: ABNORMAL /HPF (ref 0–5)
SALICYLATES SERPL-MCNC: <1.7 MG/DL (ref 2.8–20)
SODIUM SERPL-SCNC: 137 MMOL/L (ref 136–145)
SP GR UR REFRACTOMETRY: 1.03 (ref 1–1.03)
THERAPEUTIC RANGE,PTTT: NORMAL SECS (ref 58–77)
TROPONIN I SERPL-MCNC: <0.04 NG/ML
UA: UC IF INDICATED,UAUC: ABNORMAL
UROBILINOGEN UR QL STRIP.AUTO: 1 EU/DL (ref 0.2–1)
WBC # BLD AUTO: 6.1 K/UL (ref 4.1–11.1)
WBC URNS QL MICRO: ABNORMAL /HPF (ref 0–4)

## 2017-11-12 PROCEDURE — 74011000258 HC RX REV CODE- 258: Performed by: EMERGENCY MEDICINE

## 2017-11-12 PROCEDURE — 80307 DRUG TEST PRSMV CHEM ANLYZR: CPT | Performed by: EMERGENCY MEDICINE

## 2017-11-12 PROCEDURE — 74011250636 HC RX REV CODE- 250/636: Performed by: FAMILY MEDICINE

## 2017-11-12 PROCEDURE — 87186 SC STD MICRODIL/AGAR DIL: CPT | Performed by: EMERGENCY MEDICINE

## 2017-11-12 PROCEDURE — 87040 BLOOD CULTURE FOR BACTERIA: CPT | Performed by: EMERGENCY MEDICINE

## 2017-11-12 PROCEDURE — 83605 ASSAY OF LACTIC ACID: CPT | Performed by: EMERGENCY MEDICINE

## 2017-11-12 PROCEDURE — 85025 COMPLETE CBC W/AUTO DIFF WBC: CPT | Performed by: EMERGENCY MEDICINE

## 2017-11-12 PROCEDURE — 84484 ASSAY OF TROPONIN QUANT: CPT | Performed by: EMERGENCY MEDICINE

## 2017-11-12 PROCEDURE — 87077 CULTURE AEROBIC IDENTIFY: CPT | Performed by: EMERGENCY MEDICINE

## 2017-11-12 PROCEDURE — 36415 COLL VENOUS BLD VENIPUNCTURE: CPT | Performed by: EMERGENCY MEDICINE

## 2017-11-12 PROCEDURE — 80053 COMPREHEN METABOLIC PANEL: CPT | Performed by: EMERGENCY MEDICINE

## 2017-11-12 PROCEDURE — 65660000000 HC RM CCU STEPDOWN

## 2017-11-12 PROCEDURE — 85610 PROTHROMBIN TIME: CPT | Performed by: FAMILY MEDICINE

## 2017-11-12 PROCEDURE — 74011250636 HC RX REV CODE- 250/636: Performed by: EMERGENCY MEDICINE

## 2017-11-12 PROCEDURE — 87086 URINE CULTURE/COLONY COUNT: CPT | Performed by: EMERGENCY MEDICINE

## 2017-11-12 PROCEDURE — 81001 URINALYSIS AUTO W/SCOPE: CPT | Performed by: EMERGENCY MEDICINE

## 2017-11-12 PROCEDURE — 82140 ASSAY OF AMMONIA: CPT | Performed by: FAMILY MEDICINE

## 2017-11-12 PROCEDURE — 85730 THROMBOPLASTIN TIME PARTIAL: CPT | Performed by: FAMILY MEDICINE

## 2017-11-12 PROCEDURE — 82550 ASSAY OF CK (CPK): CPT | Performed by: EMERGENCY MEDICINE

## 2017-11-12 PROCEDURE — 99285 EMERGENCY DEPT VISIT HI MDM: CPT

## 2017-11-12 PROCEDURE — 71010 XR CHEST PORT: CPT

## 2017-11-12 PROCEDURE — 80307 DRUG TEST PRSMV CHEM ANLYZR: CPT | Performed by: FAMILY MEDICINE

## 2017-11-12 PROCEDURE — 70450 CT HEAD/BRAIN W/O DYE: CPT

## 2017-11-12 RX ORDER — SODIUM CHLORIDE 9 MG/ML
75 INJECTION, SOLUTION INTRAVENOUS CONTINUOUS
Status: DISCONTINUED | OUTPATIENT
Start: 2017-11-12 | End: 2017-11-15

## 2017-11-12 RX ORDER — SODIUM CHLORIDE 0.9 % (FLUSH) 0.9 %
5-10 SYRINGE (ML) INJECTION AS NEEDED
Status: DISCONTINUED | OUTPATIENT
Start: 2017-11-12 | End: 2017-11-16 | Stop reason: HOSPADM

## 2017-11-12 RX ORDER — SODIUM CHLORIDE 0.9 % (FLUSH) 0.9 %
5-10 SYRINGE (ML) INJECTION EVERY 8 HOURS
Status: DISCONTINUED | OUTPATIENT
Start: 2017-11-12 | End: 2017-11-16 | Stop reason: HOSPADM

## 2017-11-12 RX ADMIN — Medication 10 ML: at 23:00

## 2017-11-12 RX ADMIN — SODIUM CHLORIDE 1000 ML: 900 INJECTION, SOLUTION INTRAVENOUS at 21:34

## 2017-11-12 RX ADMIN — CEFTRIAXONE 1 G: 1 INJECTION, POWDER, FOR SOLUTION INTRAMUSCULAR; INTRAVENOUS at 21:04

## 2017-11-12 RX ADMIN — SODIUM CHLORIDE 125 ML/HR: 900 INJECTION, SOLUTION INTRAVENOUS at 22:40

## 2017-11-12 NOTE — ED PROVIDER NOTES
HPI Comments: 80 y.o. male with past medical history significant for sick sinus syndrome, PAT, BPH, Parkinson disease, blurred vision, HTN, hyperlipidemia, pacemaker, duodenal ulcer, and reflux who presents via EMS to be evaluated for altered mental status. Per EMS, the staff at the pt's facility noted that the pt became more altered and agitated today. EMS personnel reports that the pt is currently being treated for a UTI with Bactrim. Unknown baseline mentation. The pt c/o knee pain. There are no other acute medical concerns at this time. Full history, physical exam, and ROS unable to be obtained due to:  AMS. Social hx: Lives at [address] [place] [address]  PCP: Muriel Leon MD    Note written by Imtiaz Joshi, as dictated by Marielena Gore MD 6:20 PM      The history is provided by the EMS personnel. No  was used.         Past Medical History:   Diagnosis Date    Blurry vision 1/19/2012    BPH (benign prostatic hyperplasia)     DDD (degenerative disc disease), lumbar 2/19/2010    DJD (degenerative joint disease) of cervical spine 2/19/2010    DU (duodenal ulcer) 9/1/1990    Hyperlipidemia     Hypertension     Other and unspecified hyperlipidemia 2/19/2010    Pacemaker     Paralysis agitans ([facility] [address] 2/19/2010    Parkinson disease ([facility] [address]     PAT (paroxysmal atrial tachycardia) (HCC)     Premature atrial beats     Reflux esophagitis 2/19/2010    Sick sinus syndrome [facility])        Past Surgical History:   Procedure Laterality Date    HX CATARACT REMOVAL  6/2012 and 7/2012    Both eyes    HX ORTHOPAEDIC  04/2015    tendon repair both knees    HX PACEMAKER  2008    bradycardia    HX VITRECTOMY  11/2011    ME REMVL PERM PM PLS GEN W/REPL PLSE GEN 2 LEAD SYS  3/13/2017              Family History:   Problem Relation Age of Onset    Asthma Mother     Heart Disease Father        Social History     Social History    Marital status:      Spouse name: N/A   Verlinda Smoker Number of children: N/A    Years of education: N/A     Occupational History    Not on file. Social History Main Topics    Smoking status: Former Smoker     Types: Pipe     Quit date: 7/14/1945    Smokeless tobacco: Never Used    Alcohol use 2.0 oz/week     4 Cans of beer per week      Comment: glass of wine every now and then    Drug use: No    Sexual activity: Not on file     Other Topics Concern     Service Yes    Blood Transfusions No    Caffeine Concern No    Occupational Exposure No    Hobby Hazards No    Sleep Concern No    Stress Concern No    Weight Concern No    Special Diet No    Back Care No    Exercise No    Bike Helmet No    Seat Belt Yes    Self-Exams No     Social History Narrative         ALLERGIES: Review of patient's allergies indicates no known allergies. Review of Systems   Unable to perform ROS: Mental status change       There were no vitals filed for this visit. Physical Exam   Vital signs reviewed. Nursing notes reviewed. Const:  No acute distress, well developed, well nourished. Mildly agitated. Head:  Atraumatic, normocephalic  Eyes:  PERRL, conjunctiva normal, no scleral icterus  Neck:  Supple, trachea midline  Cardiovascular:  RRR, no murmurs, no gallops, no rubs  Resp:  No resp distress, no increased work of breathing, no wheezes, no rhonchi, no rales,  Abd:  Soft, non-tender, non-distended, no rebound, no guarding, no CVA tenderness  :  Deferred  MSK:  No pedal edema, normal ROM  Neuro:  Alert and oriented x2, no cranial nerve defect. Speech is difficult to understand. Able to follow simple commands.   Skin:  Warm, dry, intact   Note written by Imtiaz Kapoor, as dictated by Reagan Duarte MD 6:20 PM    MDM  Number of Diagnoses or Management Options  Acute cystitis without hematuria:   Acute encephalopathy:      Amount and/or Complexity of Data Reviewed  Clinical lab tests: ordered and reviewed  Tests in the radiology section of CPT®: ordered and reviewed  Review and summarize past medical records: yes    Patient Progress  Patient progress: stable    ED Course     Pt. Presents to the ER with AMS and recent UTI diagnosis. Pt. Is altered in the ER and his urine looks like it could still have infection. I have covered him with ceftriaxone. Cultures are pending. Pt. To be evaluated for admission by the hospitalist.      Procedures              CONSULT NOTE:  7:42 PM Debbie Carter MD spoke with the hospitalist.  Discussed available diagnostic tests and clinical findings. He is in agreement with care plans as outlined. The hospitalist will see and admit pt for further evaluation of treatment.

## 2017-11-12 NOTE — IP AVS SNAPSHOT
[address] 
[phone] Patient: Laney Chavez MRN: [id number] About your hospitalization You were admitted on:  November 12, 2017 You last received care in the:  [facility] 2N MED SURG You were discharged on:  November 16, 2017 Why you were hospitalized Your primary diagnosis was: Altered Mental Status Your diagnoses also included:  Dehydration, Agitation Things You Need To Do (next 8 weeks) Monday Nov 20, 2017 TRANSITIONAL CARE MANAGEMENT with Mary Pappas MD at  3:30 PM  
Where:  [address] [place] [address] Discharge Orders None A check ricky indicates which time of day the medication should be taken. My Medications STOP taking these medications   
 droxidopa 100 mg Cap capsule Commonly known as:  Coye  HYDROcodone-acetaminophen 5-325 mg per tablet Commonly known as:  NORCO  
   
  
 LORazepam 0.5 mg tablet Commonly known as:  ATIVAN  
   
  
 oxybutynin 5 mg tablet Commonly known as:  DITROPAN  
   
  
  
TAKE these medications as instructed Instructions Each Dose to Equal  
 Morning Noon Evening Bedtime  
 acetaminophen 500 mg tablet Commonly known as:  MAPAP EXTRA STRENGTH Your last dose was: Your next dose is: Take 1 Tab by mouth every six (6) hours as needed for Pain. 500 mg  
    
   
   
   
  
 albuterol 90 mcg/actuation inhaler Commonly known as:  PROVENTIL HFA, VENTOLIN HFA, PROAIR HFA Your last dose was: Your next dose is: Take 2 Puffs by inhalation every six (6) hours as needed for Wheezing. 2 Puff  
    
   
   
   
  
 amoxicillin 500 mg capsule Commonly known as:  AMOXIL Your last dose was: Your next dose is: Take 1 Cap by mouth three (3) times daily for 5 days.   
 500 mg  
    
   
   
   
  
 aspirin 81 mg chewable tablet Your last dose was: Your next dose is: Take 81 mg by mouth daily. 81 mg  
    
   
   
   
  
 carbidopa-levodopa  mg per tablet Commonly known as:  SINEMET Your last dose was: Your next dose is: Take 2 Tabs by mouth four (4) times daily. (This is given at 8:00, 11:00, 14:00, and 21:00). 2 Tab  
    
   
   
   
  
 cloNIDine HCl 0.1 mg tablet Commonly known as:  CATAPRES Your last dose was: Your next dose is: Take 1 Tab by mouth every six (6) hours as needed (SBP > 180 mmHg). 0.1 mg  
    
   
   
   
  
 cycloSPORINE 0.05 % ophthalmic emulsion Commonly known as:  RESTASIS Your last dose was: Your next dose is:    
   
   
 Administer 1 Drop to both eyes two (2) times a day. 1 Drop DETROL LA 2 mg ER capsule Generic drug:  tolterodine ER Your last dose was: Your next dose is: TAKE 1 CAPSULE DAILY FOR BLADDER FREQUENCY  
     
   
   
   
  
 docusate sodium 100 mg capsule Commonly known as:  Gonzalo Kong Your last dose was: Your next dose is: Take 1 Cap by mouth daily as needed for Constipation. 100 mg  
    
   
   
   
  
 escitalopram oxalate 20 mg tablet Commonly known as:  Machelle Corbin Your last dose was: Your next dose is: Take 1 Tab by mouth daily. 20 mg  
    
   
   
   
  
 esomeprazole 40 mg capsule Commonly known as:  NexIUM Your last dose was: Your next dose is: Take 1 Cap by mouth daily. 40 mg  
    
   
   
   
  
 losartan 50 mg tablet Commonly known as:  COZAAR Start taking on:  11/17/2017 Your last dose was: Your next dose is: Take 1 Tab by mouth daily. 50 mg  
    
   
   
   
  
 nitrofurantoin 50 mg capsule Commonly known as:  MACRODANTIN Your last dose was: Your next dose is: Take 50 mg by mouth nightly. Indications: uti prophylaxis 50 mg  
    
   
   
   
  
 pindolol 5 mg tablet Commonly known as:  VISKIN Your last dose was: Your next dose is: Take 2.5 mg by mouth daily (after lunch). Take 1/2 tablet by mouth at 2pm -Take 1 tablet by mouth at 9pm  
 2.5 mg  
    
   
   
   
  
 polyethylene glycol 17 gram packet Commonly known as:  [place] Clos Your last dose was: Your next dose is: Take 1 Packet by mouth daily. Indications: CONSTIPATION  
 17 g QUEtiapine 25 mg tablet Commonly known as:  SEROquel Your last dose was: Your next dose is: Take 25 mg by mouth every evening. 25 mg  
    
   
   
   
  
 rivastigmine 4.6 mg/24 hr patch Commonly known as:  EXELON Your last dose was: Your next dose is:    
   
   
 1 Patch by TransDERmal route daily. 1 Patch  
    
   
   
   
  
 therapeutic multivitamin tablet Commonly known as:  [facility] Your last dose was: Your next dose is: Take 1 Tab by mouth daily. 1 Tab  
    
   
   
   
  
 traMADol 50 mg tablet Commonly known as:  ULTRAM  
   
Your last dose was: Your next dose is: Take 1 Tab by mouth every eight (8) hours as needed for Pain. Max Daily Amount: 150 mg.  
 50 mg * VOLTAREN 1 % Gel Generic drug:  diclofenac Your last dose was: Your next dose is:    
   
   
 Apply  to affected area as needed. * diclofenac 1 % Gel Commonly known as:  VOLTAREN Your last dose was: Your next dose is:    
   
   
 Apply 2 g to affected area every six (6) hours. Apply to shoulder for 5 days stop if no improvement 2 g * Notice:   This list has 2 medication(s) that are the same as other medications prescribed for you. Read the directions carefully, and ask your doctor or other care provider to review them with you. Where to Get Your Medications Information on where to get these meds will be given to you by the nurse or doctor. ! Ask your nurse or doctor about these medications  
  amoxicillin 500 mg capsule  
 losartan 50 mg tablet Discharge Instructions Discharge Summary  
  
PATIENT ID: Arabella Mejía MRN: [id number] YOB: 1933 DATE OF ADMISSION: 11/12/2017  5:57 PM   
DATE OF DISCHARGE: 11/16/2017 PRIMARY CARE PROVIDER: Susan Samuels MD  
ATTENDING PHYSICIAN: Tara Toledo MD  
DISCHARGING PROVIDER: Matt Pop NP. To contact this individual call [phone] and ask the  to page. If unavailable ask to be transferred the Adult Hospitalist Department. 
  
CONSULTATIONS: IP CONSULT TO Gagandeep Brunson COURSE:  
Pt presented to the ED from [facility] with reported altered mental status and agitation.  Per collective reports, staff at the facility noticed that the patient was confused and agitated and were concerned as he was recently treated for UTI and on Bactrim.  Patient reports that he fell last week with no reported head or neck trauma.  
  
DISCHARGE DIAGNOSES / PLAN:   
  
Altered mental status (AMS), possible metabolic encephalopathy:    
- CT head without contrast showing no acute intracranial process 
- neurology has seen - repeat CT shows nothing new 
- may be due to UTI 
- acetaminophen/ salicylate/TSH, and ammonia normal.   
- no narcotics - may have tramadol 
- PT/OT have seen - SNF is mentioned, however, daughter reports pt does not do well at these facilities and just becomes more confused. - mental status a bit better today, Hopeful that transfer home will aid with mental status and orientation.  
   
Pain R Shoulder: 
- has been imaged with no acute abnormality - R humerus imaged yesterday - no fracture 
- tramadol prn. Avoid narcotics 
   
Dehydration: encourage po diet 
   
Anxiety/ Agitation:   
- continue Seroquel and Lexapro - will no re-order ativan on discharge 
   
Hypocalcemia: midly low 
  
UTI (POA):   
- Current UA suggest pyuria in context of already having received Bactrim.   
- Urine culture with enterococcus faecalis, susceptible to ampicillin - Abx changed to amoxicillin today, will continue for 5 more days 
   
Hx Hypertension: increased pts cozaar, has prn clonidine to use which appears to be fairly effective. - has a hx og orthostatic hypotension. Pt no longer stands and BP has been high. Will not restart this for now, but if BP were to start dropping with sitting up or he become symptomatic, consider restarting. This was discussed with pts daughter, Eleno Christian.  
   
Hx Parkinson's disease: On Sinemet and Exelon.   
- Northera on hold. 
   
Cardiac pacemaker  
  
FOLLOW UP APPOINTMENTS:   
Follow-up Information Follow up With Details Comments Contact Info  
  Emmie Torres MD In 1 week or [facility] provider [address] [place] [address] 
[phone] ADDITIONAL CARE RECOMMENDATIONS:  
Stopped narcotics and ativan.  
  
Stop Millie Lefort for now due to hypertension. If pt start exhibiting signs of hypotension - may restart. Dose was 100 mg TID 
  
DIET: Cardiac Diet 
  
ACTIVITY: PT/OT Eval and Treat 
  
EQUIPMENT needed: as per PT/OT 
  
DISCHARGE MEDICATIONS: 
     
Current Discharge Medication List  
   
     
START taking these medications  
  Details  
amoxicillin (AMOXIL) 500 mg capsule Take 1 Cap by mouth three (3) times daily for 5 days. Qty: 15 Cap, Refills: 0  
   
   
     
CONTINUE these medications which have CHANGED  
  Details  
losartan (COZAAR) 50 mg tablet Take 1 Tab by mouth daily. Qty: 30 Tab, Refills: 0  
   
   
     
CONTINUE these medications which have NOT CHANGED  
  Details aspirin 81 mg chewable tablet Take 81 mg by mouth daily.  
   
nitrofurantoin (MACRODANTIN) 50 mg capsule Take 50 mg by mouth nightly. Indications: uti prophylaxis  
   
traMADol (ULTRAM) 50 mg tablet Take 1 Tab by mouth every eight (8) hours as needed for Pain. Max Daily Amount: 150 mg. 
Qty: 30 Tab, Refills: 1  
   
QUEtiapine (SEROQUEL) 25 mg tablet Take 25 mg by mouth every evening.  
   
DETROL LA 2 mg ER capsule TAKE 1 CAPSULE DAILY FOR BLADDER FREQUENCY Qty: 90 Cap, Refills: 3  
  Associated Diagnoses: Overactive bladder  
   
pindolol (VISKIN) 5 mg tablet Take 2.5 mg by mouth daily (after lunch). Take 1/2 tablet by mouth at 2pm -Take 1 tablet by mouth at 9pm  
   
albuterol (PROVENTIL HFA, VENTOLIN HFA, PROAIR HFA) 90 mcg/actuation inhaler Take 2 Puffs by inhalation every six (6) hours as needed for Wheezing. Qty: 1 Inhaler, Refills: 0  
  Associated Diagnoses: Chronic cough  
   
carbidopa-levodopa (SINEMET)  mg per tablet Take 2 Tabs by mouth four (4) times daily. (This is given at 8:00, 11:00, 14:00, and 21:00). Qty: 240 Tab, Refills: 0  
   
polyethylene glycol (MIRALAX) 17 gram packet Take 1 Packet by mouth daily. Indications: CONSTIPATION Qty: 30 Packet, Refills: 0  
   
therapeutic multivitamin (THERAGRAN) tablet Take 1 Tab by mouth daily. Qty: 30 Tab, Refills: 0  
   
cloNIDine HCl (CATAPRES) 0.1 mg tablet Take 1 Tab by mouth every six (6) hours as needed (SBP > 180 mmHg). Qty: 20 Tab, Refills: 0  
   
docusate sodium (COLACE) 100 mg capsule Take 1 Cap by mouth daily as needed for Constipation. Qty: 30 Cap, Refills: 0  
   
cycloSPORINE (RESTASIS) 0.05 % ophthalmic emulsion Administer 1 Drop to both eyes two (2) times a day. Qty: 60 Each, Refills: 0  
   
escitalopram oxalate (LEXAPRO) 20 mg tablet Take 1 Tab by mouth daily. Qty: 30 Tab, Refills: 0  
   
rivastigmine (EXELON) 4.6 mg/24 hr patch 1 Patch by TransDERmal route daily. Qty: 30 Patch, Refills: 0   Associated Diagnoses: Mild cognitive impairment  
   
esomeprazole (NEXIUM) 40 mg capsule Take 1 Cap by mouth daily. Qty: 30 Cap, Refills: 0  
   
!! diclofenac (VOLTAREN) 1 % gel Apply 2 g to affected area every six (6) hours. Apply to shoulder for 5 days stop if no improvement 
Qty: 100 g, Refills: 0  
   
!! diclofenac (VOLTAREN) 1 % gel Apply  to affected area as needed.  
   
acetaminophen (MAPAP EXTRA STRENGTH) 500 mg tablet Take 1 Tab by mouth every six (6) hours as needed for Pain. Qty: 120 Tab, Refills: 0  
   
 !! - Potential duplicate medications found. Please discuss with provider.  
   
    
STOP taking these medications  
   
  oxybutynin (DITROPAN) 5 mg tablet Comments:  
Reason for Stopping:   
     
  HYDROcodone-acetaminophen (NORCO) 5-325 mg per tablet Comments:  
Reason for Stopping:   
     
  LORazepam (ATIVAN) 0.5 mg tablet Comments:  
Reason for Stopping:   
     
  droxidopa (NORTHERA) 100 mg cap Comments:  
Reason for Stopping:   
     
   
  
NOTIFY YOUR PHYSICIAN FOR ANY OF THE FOLLOWING:  
Fever over 101 degrees for 24 hours. Chest pain, shortness of breath, fever, chills, nausea, vomiting, diarrhea, change in mentation, falling, weakness, bleeding. Severe pain or pain not relieved by medications.  
Or, any other signs or symptoms that you may have questions about. 
  
DISPOSITION: 
xx  Home With: 
xx OT xx PT xx HH   RN  
  
  Long term SNF/Inpatient Rehab  
xx Independent/assisted living  
  Hospice  
  Other:  
  
PATIENT CONDITION AT DISCHARGE:  
Functional status  
xx Poor   
xx Deconditioned   
  Independent   
  
Cognition  
   Lucid   
xx Forgetful   
  Dementia   
  
Catheters/lines (plus indication)  
  Vance   
  PICC   
  PEG   
xx None   
  
Code status  
   Full code   
xx DNR   
  
PHYSICAL EXAMINATION AT DISCHARGE: 
/82  Pulse 81  Temp 98.4 °F (36.9 °C)  Resp 18  Ht 6' 2\" (1.88 m)  Wt 92.7 kg (204 lb 5.9 oz)  SpO2 95%  BMI 26.24 kg/m2 
  
 Pt lying in bed, orientation is better today but still waxing and waning. Plans for discharge today were discussed with pts daughter, Gabriela Wallis, over the phone.  
  
Constitutional:  No acute distress, cooperative, pleasant   
ENT:  Oral mucous membranes moist, oropharynx benign. Resp:  CTA bilaterally. No wheezing. No accessory muscle use, on RA  
CV:  Regular rhythm, normal rate.  
 GI:  Soft, non distended, non tender. Normoactive bowel sounds   
 Musculoskeletal:  No edema, warm, 2+ pulses throughout. Limited ROM to R shoulder/arm due to discomfort  
 Neurologic:  Moves all extremities.  Alert. Oriented to person/family and place.  
  
CHRONIC MEDICAL DIAGNOSES: 
Problem List as of 2017  Date Reviewed: 2017  
          Codes Class Noted - Resolved  
  * (Principal)Altered mental status ICD-10-CM: R41.82 
ICD-9-CM: 780.97   2017 - Present  
     
  Benign nodular prostatic hyperplasia with lower urinary tract symptoms- Dr. Margo Thornton: N40.1 ICD-9-CM: 600.10   2017 - Present  
     
  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 8   2017 - Present  
     
  ACP (advance care planning) ICD-10-CM: Z71.89 ICD-9-CM: V65.49   2017 - Present  
     
  Pacemaker end of life ICD-10-CM: U47.843 
ICD-9-CM: V53.31   3/13/2017 - Present  
  Overview Signed 3/13/2017  9:49 AM by Wendy Whiting MD  
    Generator change 3/13/2017  
   
     
  Pneumonia ICD-10-CM: J18.9 ICD-9-CM: 595   2017 - Present  
     
  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 2   2016 - Present  
     
  Grief- wife of 61 years  2015 ICD-10-CM: F43.20 ICD-9-CM: 309.0   2015 - Present  
     
  Primary osteoarthritis of both knees ICD-10-CM: M17.0 ICD-9-CM: 715.16   2015 - Present  
     
  Overactive bladder- Dr. Margo Thornton: U26.72 ICD-9-CM: 596.51   2015 - Present  
     
   Rupture quadriceps tendon- bilat--2/9/2014- UNABLE TO WALK AFTER THAT. (Chronic) ICD-10-CM: E25.241D 
ICD-9-CM: 122. 8   12/31/2014 - Present  
     
  Mild cognitive impairment w/o memory loss ICD-10-CM: G31.84 ICD-9-CM: 331.83   11/19/2014 - Present  
     
  Major depressive disorder, single episode, unspecified ICD-10-CM: F32.9 ICD-9-CM: 296.20   11/19/2014 - Present  
     
  Generalized anxiety disorder ICD-10-CM: F41.1 ICD-9-CM: 300.02   11/19/2014 - Present  
     
  Orthostatic hypotension ICD-10-CM: I95.1 ICD-9-CM: 369. 0   9/28/2013 - Present  
     
  Blurry vision-chronic, no change- neg w/u ICD-10-CM: H53.8 ICD-9-CM: 368. 8   7/9/2013 - Present  
     
  NSVT (nonsustained ventricular tachycardia) (HCC) ICD-10-CM: I47.2 ICD-9-CM: 427.1   7/20/2012 - Present  
  Overview Signed 7/20/2012  9:56 AM by Rex Pleitez MD  
    2. 2.2012  
   
     
  PAT (paroxysmal atrial tachycardia) (HCC) ICD-10-CM: I47.1 ICD-9-CM: 427.0   1/19/2012 - Present  
     
  Sick sinus syndrome-pacemaker-2008 ICD-10-CM: I49.5 ICD-9-CM: 427.81   10/28/2011 - Present  
     
  Essential hypertension, difficult to control due to orthostatic hypotension ICD-10-CM: I10 
ICD-9-CM: 401. 1   4/18/2011 - Present  
     
  Parkinsonian syndrome- Dr. Tremayne Chung ICD-10-CM: G20 
ICD-9-CM: 332.0   4/8/2011 - Present  
     
  Hyperlipemia ICD-10-CM: E16.1 ICD-9-CM: 272.4   2/19/2010 - Present  
     
  Reflux esophagitis ICD-10-CM: K21.0 ICD-9-CM: 530.11   2/19/2010 - Present  
     
  DDD (degenerative disc disease), lumbar ICD-10-CM: M51.36 
ICD-9-CM: 722.52   2/19/2010 - Present  
     
  DJD (degenerative joint disease) of cervical spine ICD-10-CM: V08.981 ICD-9-CM: 721.0   2/19/2010 - Present  
     
  Paralysis agitans ([address] ICD-10-CM: Ansley Contes 
ICD-9-CM: 332.0   2/19/2010 - Present  
     
  DU (duodenal ulcer) ICD-10-CM: K26.9 ICD-9-CM: 532.90   9/1/1990 - Present  
     
  History of duodenal ulcer ICD-10-CM: Z87.19 ICD-9-CM: V12.79   9/1/1990 - Present  
     
  RESOLVED: Dehydration ICD-10-CM: E86.0 ICD-9-CM: 276.51   11/12/2017 - 11/16/2017  
     
  RESOLVED: Agitation ICD-10-CM: R45.1 ICD-9-CM: 307. 9   11/12/2017 - 11/16/2017  
     
  RESOLVED: Slurred speech ICD-10-CM: K83.03 ICD-9-CM: 784.59   6/26/2017 - 7/12/2017  
     
  RESOLVED: Altered mental status ICD-10-CM: R41.82 
ICD-9-CM: 780.97   6/26/2017 - 7/12/2017  
     
  RESOLVED: Influenza A ICD-10-CM: J10.1 ICD-9-CM: 487.1   3/19/2017 - 7/12/2017  
     
  RESOLVED: Sepsis ([facility] ICD-10-CM: A41.9 ICD-9-CM: 038.9, 995.91   6/10/2016 - 7/28/2016  
     
  RESOLVED: Catheter-associated urinary tract infection (HCC) ICD-10-CM: T83.511A, N39.0 ICD-9-CM: 996.64, 599.0   4/15/2016 - 4/18/2016  
     
  RESOLVED: Metabolic encephalopathy EQV-90-QR: G93.41 
ICD-9-CM: 348.31   4/15/2016 - 4/18/2016  
     
  RESOLVED: GABI (acute kidney injury) ([facility] ICD-10-CM: N17.9 ICD-9-CM: 259. 9   4/15/2016 - 4/18/2016  
     
  RESOLVED: Hyponatremia ICD-10-CM: E87.1 ICD-9-CM: 276.1   4/15/2016 - 4/18/2016  
     
  RESOLVED: Mental status change ICD-10-CM: R41.82 
ICD-9-CM: 780.97   10/30/2013 - 1/5/2016  
     
  RESOLVED: Pneumonia, organism unspecified(486) ICD-10-CM: J18.9 ICD-9-CM: 243   2/19/2012 - 1/5/2016  
     
  RESOLVED: Weakness generalized ICD-10-CM: R53.1 ICD-9-CM: 780.79   2/19/2012 - 2/22/2012  
     
  RESOLVED: PAT (paroxysmal atrial tachycardia) (HCC) ICD-10-CM: I47.1 ICD-9-CM: 427.0   1/19/2012 - 2/29/2012  
     
  RESOLVED: Premature atrial beats ICD-10-CM: I49.1 ICD-9-CM: 427.61   Unknown - 2/29/2012  
     
  
  
Greater than 30 minutes were spent with the patient on counseling and coordination of care 
  
Signed:  
Krysten Summers NP 
11/16/2017 
1:39 PM 
  
  
 
 
ACO Transitions of Care Introducing [facility] [address] Yvonne Funes offers a voluntary care coordination program to provide high quality service and care to [facility] fee-for-service beneficiaries. Alecia Solorzano was designed to help you enhance your health and well-being through the following services: ? Transitions of Care  support for individuals who are transitioning from one care setting to another (example: Hospital to home). ? Chronic and Complex Care Coordination  support for individuals and caregivers of those with serious or chronic illnesses or with more than one chronic (ongoing) condition and those who take a number of different medications. If you meet specific medical criteria, a [address] [place] [address] Rd may call you directly to coordinate your care with your primary care physician and your other care providers. For questions about the [facility] programs, please, contact your physicians office. For general questions or additional information about Accountable Care Organizations: 
Please visit www.medicare.gov/acos. html or call 1-800-MEDICARE (4-643.226.5513) TTY users should call 7-374.244.8351. Connexient Announcement We are excited to announce that we are making your provider's discharge notes available to you in Connexient. You will see these notes when they are completed and signed by the physician that discharged you from your recent hospital stay. If you have any questions or concerns about any information you see in Edkimohart, please call the Health Information Department where you were seen or reach out to your Primary Care Provider for more information about your plan of care. Introducing \[facility] [place] [facility]\"" & HEALTH SERVICES! Dear Nataliya Orozco: Thank you for requesting a Connexient account. Our records indicate that you already have an active Connexient account. You can access your account anytime at [email or web address] Did you know that you can access your hospital and ER discharge instructions at any time in Jimmy Fairlyhart? You can also review all of your test results from your hospital stay or ER visit. Additional Information If you have questions, please visit the Frequently Asked Questions section of the [email or web address] website at [email or web address] Remember, Jimmy Fairlyhart is NOT to be used for urgent needs. For medical emergencies, dial 911. Now available from your iPhone and Android! Unresulted Labs-Please follow up with your PCP about these lab tests Order Current Status CULTURE, BLOOD, PAIRED Preliminary result Providers Seen During Your Hospitalization Provider Specialty Primary office phone Darcy Porter MD Emergency Medicine [phone] Lizz Amaro MD Family Practice [phone] Guillermo Brooks MD Internal Medicine [phone] Lilly Laboy MD Internal Medicine [phone] Immunizations Administered for This Admission Name Date Influenza Vaccine (Quad) PF 11/16/2017 Your Primary Care Physician (PCP) Primary Care Physician Office Phone Office Fax [facility] [phone] You are allergic to the following No active allergies Recent Documentation Height Weight BMI Smoking Status 1.88 m 92.7 kg 26.24 kg/m2 Former Smoker Emergency Contacts Name Discharge Info Relation Home Work Mobile Arturo Barnes CAREGIVER [3] Child [2] [phone] Salty Panda  Child [2] [phone] Patient Belongings The following personal items are in your possession at time of discharge: 
     Visual Aid: With patient, Glasses      Home Medications: None   Jewelry: Ring  Clothing: None    Other Valuables: Eyeglasses, At bedside Discharge Instructions Attachments/References ALTERED MENTAL STATUS (ENGLISH) Patient Handouts Altered Mental Status: Care Instructions Your Care Instructions Altered mental status is a change in how well your brain is working. As a result, you may be confused, be less alert than usual, or act in odd ways. This may include seeing or hearing things that aren't really there (hallucinations). A mental status change has many possible causes. For example, it may be the result of an infection, an imbalance of chemicals in the body, or a chronic disease such as diabetes or COPD. It can also be caused by things such as a head injury, taking certain medicines, or using alcohol or drugs. The doctor may do tests to look for the cause. These tests may include urine tests, blood tests, and imaging tests such as a CT scan. Sometimes a clear cause isn't found. But tests can help the doctor rule out a serious cause of your symptoms. A change in mental status can be scary. But mental status will often return to normal when the cause is treated. So it is important to get any follow-up testing or treatment the doctor has suggested. The doctor has checked you carefully, but problems can develop later. If you notice any problems or new symptoms, get medical treatment right away. Follow-up care is a key part of your treatment and safety. Be sure to make and go to all appointments, and call your doctor if you are having problems. It's also a good idea to know your test results and keep a list of the medicines you take. How can you care for yourself at home? · Be safe with medicines. Take your medicines exactly as prescribed. Call your doctor if you think you are having a problem with your medicine. · Have another adult stay with you until you are better. This can help keep you safe. Ask that person to watch for signs that your mental status is getting worse. When should you call for help? Call 911 anytime you think you may need emergency care. For example, call if: 
? · You passed out (lost consciousness). ?Call your doctor now or seek immediate medical care if: 
? · Your mental status is getting worse. ? · You have new symptoms, such as a fever, chills, or shortness of breath. ? · You do not feel safe. ? Watch closely for changes in your health, and be sure to contact your doctor if: 
? · You do not get better as expected. Where can you learn more? Go to http://rodo-lukas.info/. Enter O908 in the search box to learn more about \"Altered Mental Status: Care Instructions. \" Current as of: October 14, 2016 Content Version: 11.4 © 8536-3381 [employer]. Care instructions adapted under license by "[employer] (which disclaims liability or warranty for this information). If you have questions about a medical condition or this instruction, always ask your healthcare professional. [address] any warranty or liability for your use of this information. Please provide this summary of care documentation to your next provider. Signatures-by signing, you are acknowledging that this After Visit Summary has been reviewed with you and you have received a copy. Patient Signature:  ____________________________________________________________ Date:  ____________________________________________________________  
  
Belinda Vance Provider Signature:  ____________________________________________________________ Date:  ____________________________________________________________

## 2017-11-12 NOTE — IP AVS SNAPSHOT
[address] 
[phone] Patient: Radha Covarrubias MRN: [id number] IER:[id number] My Medications STOP taking these medications   
 droxidopa 100 mg Cap capsule Commonly known as:  Carron Retort HYDROcodone-acetaminophen 5-325 mg per tablet Commonly known as:  NORCO  
   
  
 LORazepam 0.5 mg tablet Commonly known as:  ATIVAN  
   
  
 oxybutynin 5 mg tablet Commonly known as:  DITROPAN  
   
  
  
TAKE these medications as instructed Instructions Each Dose to Equal  
 Morning Noon Evening Bedtime  
 acetaminophen 500 mg tablet Commonly known as:  MAPAP EXTRA STRENGTH Your last dose was: Your next dose is: Take 1 Tab by mouth every six (6) hours as needed for Pain. 500 mg  
    
   
   
   
  
 albuterol 90 mcg/actuation inhaler Commonly known as:  PROVENTIL HFA, VENTOLIN HFA, PROAIR HFA Your last dose was: Your next dose is: Take 2 Puffs by inhalation every six (6) hours as needed for Wheezing. 2 Puff  
    
   
   
   
  
 amoxicillin 500 mg capsule Commonly known as:  AMOXIL Your last dose was: Your next dose is: Take 1 Cap by mouth three (3) times daily for 5 days. 500 mg  
    
   
   
   
  
 aspirin 81 mg chewable tablet Your last dose was: Your next dose is: Take 81 mg by mouth daily. 81 mg  
    
   
   
   
  
 carbidopa-levodopa  mg per tablet Commonly known as:  SINEMET Your last dose was: Your next dose is: Take 2 Tabs by mouth four (4) times daily. (This is given at 8:00, 11:00, 14:00, and 21:00). 2 Tab  
    
   
   
   
  
 cloNIDine HCl 0.1 mg tablet Commonly known as:  CATAPRES Your last dose was: Your next dose is: Take 1 Tab by mouth every six (6) hours as needed (SBP > 180 mmHg).   
 0.1 mg  
    
   
   
 cycloSPORINE 0.05 % ophthalmic emulsion Commonly known as:  RESTASIS Your last dose was: Your next dose is:    
   
   
 Administer 1 Drop to both eyes two (2) times a day. 1 Drop DETROL LA 2 mg ER capsule Generic drug:  tolterodine ER Your last dose was: Your next dose is: TAKE 1 CAPSULE DAILY FOR BLADDER FREQUENCY  
     
   
   
   
  
 docusate sodium 100 mg capsule Commonly known as:  Elsie Dilling Your last dose was: Your next dose is: Take 1 Cap by mouth daily as needed for Constipation. 100 mg  
    
   
   
   
  
 escitalopram oxalate 20 mg tablet Commonly known as:  [place] Your last dose was: Your next dose is: Take 1 Tab by mouth daily. 20 mg  
    
   
   
   
  
 esomeprazole 40 mg capsule Commonly known as:  NexIUM Your last dose was: Your next dose is: Take 1 Cap by mouth daily. 40 mg  
    
   
   
   
  
 losartan 50 mg tablet Commonly known as:  COZAAR Start taking on:  11/17/2017 Your last dose was: Your next dose is: Take 1 Tab by mouth daily. 50 mg  
    
   
   
   
  
 nitrofurantoin 50 mg capsule Commonly known as:  MACRODANTIN Your last dose was: Your next dose is: Take 50 mg by mouth nightly. Indications: uti prophylaxis 50 mg  
    
   
   
   
  
 pindolol 5 mg tablet Commonly known as:  VISKIN Your last dose was: Your next dose is: Take 2.5 mg by mouth daily (after lunch). Take 1/2 tablet by mouth at 2pm -Take 1 tablet by mouth at 9pm  
 2.5 mg  
    
   
   
   
  
 polyethylene glycol 17 gram packet Commonly known as:  Davi Jamil Your last dose was: Your next dose is: Take 1 Packet by mouth daily. Indications: CONSTIPATION  
 17 g QUEtiapine 25 mg tablet Commonly known as:  SEROquel Your last dose was: Your next dose is: Take 25 mg by mouth every evening. 25 mg  
    
   
   
   
  
 rivastigmine 4.6 mg/24 hr patch Commonly known as:  EXELON Your last dose was: Your next dose is:    
   
   
 1 Patch by TransDERmal route daily. 1 Patch  
    
   
   
   
  
 therapeutic multivitamin tablet Commonly known as:  [facility] Your last dose was: Your next dose is: Take 1 Tab by mouth daily. 1 Tab  
    
   
   
   
  
 traMADol 50 mg tablet Commonly known as:  ULTRAM  
   
Your last dose was: Your next dose is: Take 1 Tab by mouth every eight (8) hours as needed for Pain. Max Daily Amount: 150 mg.  
 50 mg * VOLTAREN 1 % Gel Generic drug:  diclofenac Your last dose was: Your next dose is:    
   
   
 Apply  to affected area as needed. * diclofenac 1 % Gel Commonly known as:  VOLTAREN Your last dose was: Your next dose is:    
   
   
 Apply 2 g to affected area every six (6) hours. Apply to shoulder for 5 days stop if no improvement 2 g * Notice: This list has 2 medication(s) that are the same as other medications prescribed for you. Read the directions carefully, and ask your doctor or other care provider to review them with you. Where to Get Your Medications Information on where to get these meds will be given to you by the nurse or doctor. ! Ask your nurse or doctor about these medications  
  amoxicillin 500 mg capsule  
 losartan 50 mg tablet

## 2017-11-13 LAB
ANION GAP SERPL CALC-SCNC: 7 MMOL/L (ref 5–15)
BASOPHILS # BLD: 0.1 K/UL (ref 0–0.1)
BASOPHILS NFR BLD: 1 % (ref 0–1)
BUN SERPL-MCNC: 25 MG/DL (ref 6–20)
BUN/CREAT SERPL: 23 (ref 12–20)
CALCIUM SERPL-MCNC: 7.5 MG/DL (ref 8.5–10.1)
CHLORIDE SERPL-SCNC: 110 MMOL/L (ref 97–108)
CO2 SERPL-SCNC: 25 MMOL/L (ref 21–32)
CREAT SERPL-MCNC: 1.07 MG/DL (ref 0.7–1.3)
EOSINOPHIL # BLD: 0.7 K/UL (ref 0–0.4)
EOSINOPHIL NFR BLD: 10 % (ref 0–7)
ERYTHROCYTE [DISTWIDTH] IN BLOOD BY AUTOMATED COUNT: 14.2 % (ref 11.5–14.5)
GLUCOSE SERPL-MCNC: 81 MG/DL (ref 65–100)
HCT VFR BLD AUTO: 33.8 % (ref 36.6–50.3)
HGB BLD-MCNC: 11 G/DL (ref 12.1–17)
LYMPHOCYTES # BLD: 2.1 K/UL (ref 0.8–3.5)
LYMPHOCYTES NFR BLD: 33 % (ref 12–49)
MAGNESIUM SERPL-MCNC: 1.9 MG/DL (ref 1.6–2.4)
MCH RBC QN AUTO: 30.1 PG (ref 26–34)
MCHC RBC AUTO-ENTMCNC: 32.5 G/DL (ref 30–36.5)
MCV RBC AUTO: 92.3 FL (ref 80–99)
MONOCYTES # BLD: 0.5 K/UL (ref 0–1)
MONOCYTES NFR BLD: 8 % (ref 5–13)
NEUTS SEG # BLD: 3.2 K/UL (ref 1.8–8)
NEUTS SEG NFR BLD: 48 % (ref 32–75)
PHOSPHATE SERPL-MCNC: 2.4 MG/DL (ref 2.6–4.7)
PLATELET # BLD AUTO: 188 K/UL (ref 150–400)
POTASSIUM SERPL-SCNC: 4 MMOL/L (ref 3.5–5.1)
RBC # BLD AUTO: 3.66 M/UL (ref 4.1–5.7)
SODIUM SERPL-SCNC: 142 MMOL/L (ref 136–145)
TSH SERPL DL<=0.05 MIU/L-ACNC: 0.93 UIU/ML (ref 0.36–3.74)
WBC # BLD AUTO: 6.5 K/UL (ref 4.1–11.1)

## 2017-11-13 PROCEDURE — 65660000000 HC RM CCU STEPDOWN

## 2017-11-13 PROCEDURE — 74011250637 HC RX REV CODE- 250/637: Performed by: NURSE PRACTITIONER

## 2017-11-13 PROCEDURE — 85025 COMPLETE CBC W/AUTO DIFF WBC: CPT | Performed by: FAMILY MEDICINE

## 2017-11-13 PROCEDURE — 36415 COLL VENOUS BLD VENIPUNCTURE: CPT | Performed by: FAMILY MEDICINE

## 2017-11-13 PROCEDURE — 97161 PT EVAL LOW COMPLEX 20 MIN: CPT

## 2017-11-13 PROCEDURE — 74011000258 HC RX REV CODE- 258: Performed by: HOSPITALIST

## 2017-11-13 PROCEDURE — G8978 MOBILITY CURRENT STATUS: HCPCS

## 2017-11-13 PROCEDURE — 80048 BASIC METABOLIC PNL TOTAL CA: CPT | Performed by: FAMILY MEDICINE

## 2017-11-13 PROCEDURE — 84443 ASSAY THYROID STIM HORMONE: CPT | Performed by: FAMILY MEDICINE

## 2017-11-13 PROCEDURE — 74011250636 HC RX REV CODE- 250/636: Performed by: HOSPITALIST

## 2017-11-13 PROCEDURE — 84100 ASSAY OF PHOSPHORUS: CPT | Performed by: FAMILY MEDICINE

## 2017-11-13 PROCEDURE — 74011250636 HC RX REV CODE- 250/636: Performed by: FAMILY MEDICINE

## 2017-11-13 PROCEDURE — 83735 ASSAY OF MAGNESIUM: CPT | Performed by: FAMILY MEDICINE

## 2017-11-13 PROCEDURE — G8979 MOBILITY GOAL STATUS: HCPCS

## 2017-11-13 RX ORDER — TRAMADOL HYDROCHLORIDE 50 MG/1
50 TABLET ORAL
Status: DISCONTINUED | OUTPATIENT
Start: 2017-11-13 | End: 2017-11-16 | Stop reason: HOSPADM

## 2017-11-13 RX ORDER — CARBIDOPA AND LEVODOPA 25; 100 MG/1; MG/1
2 TABLET ORAL 4 TIMES DAILY
Status: DISCONTINUED | OUTPATIENT
Start: 2017-11-13 | End: 2017-11-16 | Stop reason: HOSPADM

## 2017-11-13 RX ORDER — QUETIAPINE FUMARATE 25 MG/1
25 TABLET, FILM COATED ORAL EVERY EVENING
Status: DISCONTINUED | OUTPATIENT
Start: 2017-11-13 | End: 2017-11-16 | Stop reason: HOSPADM

## 2017-11-13 RX ORDER — NITROFURANTOIN MACROCRYSTALS 50 MG/1
50 CAPSULE ORAL
COMMUNITY
End: 2018-01-03 | Stop reason: ALTCHOICE

## 2017-11-13 RX ORDER — LOSARTAN POTASSIUM 25 MG/1
25 TABLET ORAL DAILY
Status: DISCONTINUED | OUTPATIENT
Start: 2017-11-14 | End: 2017-11-16

## 2017-11-13 RX ORDER — GUAIFENESIN 100 MG/5ML
81 LIQUID (ML) ORAL DAILY
Status: DISCONTINUED | OUTPATIENT
Start: 2017-11-14 | End: 2017-11-16 | Stop reason: HOSPADM

## 2017-11-13 RX ORDER — PINDOLOL 5 MG/1
2.5 TABLET ORAL
Status: DISCONTINUED | OUTPATIENT
Start: 2017-11-14 | End: 2017-11-16 | Stop reason: HOSPADM

## 2017-11-13 RX ORDER — OXYBUTYNIN CHLORIDE 5 MG/1
5 TABLET ORAL DAILY
COMMUNITY
End: 2017-11-16

## 2017-11-13 RX ORDER — PINDOLOL 5 MG/1
2.5 TABLET ORAL
Status: DISCONTINUED | OUTPATIENT
Start: 2017-11-13 | End: 2017-11-16 | Stop reason: HOSPADM

## 2017-11-13 RX ORDER — GUAIFENESIN 100 MG/5ML
81 LIQUID (ML) ORAL DAILY
COMMUNITY

## 2017-11-13 RX ORDER — RIVASTIGMINE 4.6 MG/24H
1 PATCH, EXTENDED RELEASE TRANSDERMAL DAILY
Status: DISCONTINUED | OUTPATIENT
Start: 2017-11-14 | End: 2017-11-16 | Stop reason: HOSPADM

## 2017-11-13 RX ORDER — ESCITALOPRAM OXALATE 10 MG/1
20 TABLET ORAL DAILY
Status: DISCONTINUED | OUTPATIENT
Start: 2017-11-14 | End: 2017-11-16 | Stop reason: HOSPADM

## 2017-11-13 RX ORDER — CLONIDINE HYDROCHLORIDE 0.1 MG/1
0.1 TABLET ORAL
Status: DISCONTINUED | OUTPATIENT
Start: 2017-11-13 | End: 2017-11-16 | Stop reason: HOSPADM

## 2017-11-13 RX ADMIN — Medication 10 ML: at 06:07

## 2017-11-13 RX ADMIN — PINDOLOL 2.5 MG: 5 TABLET ORAL at 20:32

## 2017-11-13 RX ADMIN — Medication 10 ML: at 15:57

## 2017-11-13 RX ADMIN — SODIUM CHLORIDE 125 ML/HR: 900 INJECTION, SOLUTION INTRAVENOUS at 15:16

## 2017-11-13 RX ADMIN — Medication 10 ML: at 20:37

## 2017-11-13 RX ADMIN — TRAMADOL HYDROCHLORIDE 50 MG: 50 TABLET, FILM COATED ORAL at 21:28

## 2017-11-13 RX ADMIN — CARBIDOPA AND LEVODOPA 2 TABLET: 25; 100 TABLET ORAL at 20:31

## 2017-11-13 RX ADMIN — SODIUM CHLORIDE 3 G: 900 INJECTION, SOLUTION INTRAVENOUS at 20:30

## 2017-11-13 RX ADMIN — QUETIAPINE FUMARATE 25 MG: 25 TABLET ORAL at 18:09

## 2017-11-13 NOTE — ROUTINE PROCESS
TRANSFER - OUT REPORT:    Verbal report given to Emili BARONE on Lenward Rolling  being transferred to [address] [place] [address] for routine progression of care       Report consisted of patients Situation, Background, Assessment and   Recommendations(SBAR). Information from the following report(s) SBAR was reviewed with the receiving nurse. Lines:   Peripheral IV 11/12/17 Right Forearm (Active)   Site Assessment Clean, dry, & intact 11/12/2017  6:15 PM   Phlebitis Assessment 0 11/12/2017  6:15 PM   Infiltration Assessment 0 11/12/2017  6:15 PM   Dressing Status Clean, dry, & intact 11/12/2017  6:15 PM   Dressing Type Transparent 11/12/2017  6:15 PM   Hub Color/Line Status Capped;Flushed 11/12/2017  6:15 PM   Action Taken Blood drawn 11/12/2017  6:15 PM   Alcohol Cap Used Yes 11/12/2017  6:15 PM        Opportunity for questions and clarification was provided.       Patient transported with:   Monitor

## 2017-11-13 NOTE — PROGRESS NOTES
Problem: Mobility Impaired (Adult and Pediatric)  Goal: *Acute Goals and Plan of Care (Insert Text)  Physical Therapy Goals  Initiated 11/13/2017  1. Patient will move from supine to sit and sit to supine  and scoot up and down in bed with supervision/set-up within 7 day(s). 2.  Patient will transfer from bed to wheelchair and wheelchair to bed with minimal assistance/contact guard assist using the least restrictive device within 7 day(s). 3.  Patient will demonstrate good sitting balance at EOB with B feet approximated to floor x5min with SUP within 7 days. 4. Patient will demonstrate improved B knee extension ROM to -80deg to improve transfer ability within 7 days. physical Therapy EVALUATION  Patient: Isaac Duff (87 y.o. male)  Date: 11/13/2017  Primary Diagnosis: Altered mental status  Dehydration  Agitation        Precautions:        ASSESSMENT :  Based on the objective data described below, the patient presents with impaired functional mobility as compared to baseline level 2* significantly limited B knee A/PROM (-90deg flexion contractures), decreased functional strength, poor coordination, intention tremors, impaired balance, and increased confusion following admission for AMS/dehydration. Prior to this admission, daughter repots that [facility] and received assist from staff for all ADLs and is now requiring two person assist for slide board vs pivot transfers from bed<>w/c (was more indep with transfers). He has hired caregiver assist for ~3hrs, 3days/week however is alone in his apartment the remaining time. He required additional time and up to min A for supine<>sit and required intermittent support to maintain postural control at EOB (posterior lean as fatigued). Attempted laterally scooting at EOB however ineffective and required total A. Of note, pt reports R shoulder pain from previous strain that has been affecting transfer abilities (typically transfers to the R).  Assisted pt back to supine at end of session, NAD. Discharge plan discussed with pt and daughter who both state that the healthcare section is not an option and would prefer him to discharge back to his apartment. Would recommend increased caregiver support and HHPT at this time. Will plan to follow for transfer training. Patient will benefit from skilled intervention to address the above impairments. Patients rehabilitation potential is considered to be Good  Factors which may influence rehabilitation potential include:   []         None noted  []         Mental ability/status  []         Medical condition  []         Home/family situation and support systems  [x]         Safety awareness  []         Pain tolerance/management  []         Other:      PLAN :  Recommendations and Planned Interventions:  [x]           Bed Mobility Training             [x]    Neuromuscular Re-Education  [x]           Transfer Training                   []    Orthotic/Prosthetic Training  []           Gait Training                         []    Modalities  [x]           Therapeutic Exercises           []    Edema Management/Control  [x]           Therapeutic Activities            [x]    Patient and Family Training/Education  []           Other (comment):    Frequency/Duration: Patient will be followed by physical therapy  3 times a week to address goals. Discharge Recommendations: Home Health and increased caregiver assistance  Further Equipment Recommendations for Discharge: None anticipated- has w/c and slide board     SUBJECTIVE:   Patient stated My shoulder hurts me.     OBJECTIVE DATA SUMMARY:   HISTORY:    Past Medical History:   Diagnosis Date    Blurry vision 1/19/2012    BPH (benign prostatic hyperplasia)     DDD (degenerative disc disease), lumbar 2/19/2010    DJD (degenerative joint disease) of cervical spine 2/19/2010    DU (duodenal ulcer) 9/1/1990    Hyperlipidemia     Hypertension     Other and unspecified hyperlipidemia 2/19/2010    Pacemaker     Paralysis agitans ([facility] [address] 2/19/2010    Parkinson disease ([facility] [address] [facility]     PAT (paroxysmal atrial tachycardia) ([facility])     Premature atrial beats     Reflux esophagitis 2/19/2010    Sick sinus syndrome [facility])      Past Surgical History:   Procedure Laterality Date    HX CATARACT REMOVAL  6/2012 and 7/2012    Both eyes    HX ORTHOPAEDIC  04/2015    tendon repair both knees    HX PACEMAKER  2008    bradycardia    HX VITRECTOMY  11/2011    FL REMVL PERM PM PLS GEN W/REPL PLSE GEN 2 LEAD SYS  3/13/2017          Prior Level of Function/Home Situation: long term, receives caregiver assist for all ADLs, two person assists for transfers to/from wheelchair 2* functional decline - was previously able to complete with less/no assist   Personal factors and/or comorbidities impacting plan of care:     Home Situation  Home Environment: [address] [place] [address] Name: Ryanne   One/Two Story Residence: One story  Living Alone: Yes  Support Systems: Assisted living, Family member(s), Home care staff  Patient Expects to be Discharged to[de-identified] Assisted living  Current DME Used/Available at Home: Wheelchair    EXAMINATION/PRESENTATION/DECISION MAKING:   Critical Behavior:  Neurologic State: Alert, Confused  Orientation Level: Oriented to person, Oriented to place, Oriented to time  Cognition: Follows commands, Poor safety awareness  Safety/Judgement: Decreased awareness of need for assistance, Decreased awareness of need for safety  Hearing:   Auditory  Auditory Impairment: Hard of hearing, bilateral  Skin:  Intact where exposed   Edema: none noted  Range Of Motion:  AROM: Grossly decreased, non-functional (B knee flexion contractures -90deg)   PROM:  (B knee flexion contractures, -90deg)      Strength:    Strength: Grossly decreased, non-functional   Tone & Sensation:       Sensation: Intact      Coordination:  Coordination: Grossly decreased, non-functional  Vision:      Functional Mobility:  Bed Mobility:     Supine to Sit: Minimum assistance; Additional time  Sit to Supine: Additional time;Minimum assistance     Transfers:     Balance:   Sitting: Impaired; With support  Sitting - Static: Fair (occasional)  Sitting - Dynamic: Poor (constant support)        Functional Measure:  Timed up and go:    Timed Get Up And Go Test:  (unable)     Timed Up and Go and G-code impairment scale:  Percentage of Impairment CH    0%   CI    1-19% CJ    20-39% CK    40-59% CL    60-79% CM    80-99% CN     100%   Timed   Score 0-56 10 11-12 13-14 15-16 17-18 19 20       < than 10 seconds=Normal  Greater then 13.5 seconds (in elderly)=Increased fall risk   Tip PEREZ, Pushpa Ibanez, Dae LOCO. Predicting the probability for falls in community dwelling older adults using the Timed Up and Go Test. Phys Ther. 2000;80:896-903. G codes: In compliance with CMSs Claims Based Outcome Reporting, the following G-code set was chosen for this patient based on their primary functional limitation being treated: The outcome measure chosen to determine the severity of the functional limitation was the TUG with a score of 0/unable which was correlated with the impairment scale.     ? Mobility - Walking and Moving Around:     - CURRENT STATUS: CN - 100% impaired, limited or restricted    - GOAL STATUS: CM - 80%-99% impaired, limited or restricted    - D/C STATUS:  ---------------To be determined---------------      Physical Therapy Evaluation Charge Determination   History Examination Presentation Decision-Making   HIGH Complexity :3+ comorbidities / personal factors will impact the outcome/ POC  LOW Complexity : 1-2 Standardized tests and measures addressing body structure, function, activity limitation and / or participation in recreation  LOW Complexity : Stable, uncomplicated  HIGH Complexity : FOTO score of 1- 25       Based on the above components, the patient evaluation is determined to be of the following complexity level: LOW     Pain:  Pain Scale 1: Numeric (0 - 10)  Pain Intensity 1: 0              Activity Tolerance:   VSS, NAD  Please refer to the flowsheet for vital signs taken during this treatment. After treatment:   []         Patient left in no apparent distress sitting up in chair  [x]         Patient left in no apparent distress in bed  [x]         Call bell left within reach  [x]         Nursing notified  [x]         Daughter present  [x]         Bed alarm activated    COMMUNICATION/EDUCATION:   The patients plan of care was discussed with: Registered Nurse. [x]         Fall prevention education was provided and the patient/caregiver indicated understanding. [x]         Patient/family have participated as able in goal setting and plan of care. [x]         Patient/family agree to work toward stated goals and plan of care. []         Patient understands intent and goals of therapy, but is neutral about his/her participation. []         Patient is unable to participate in goal setting and plan of care.     Thank you for this referral.  Nupur Duran, PT, DPT   Time Calculation: 20 mins

## 2017-11-13 NOTE — PROGRESS NOTES
Problem: Falls - Risk of  Goal: *Absence of Falls  Document Oksana Fall Risk and appropriate interventions in the flowsheet.    Outcome: Progressing Towards Goal  Fall Risk Interventions:  Mobility Interventions: Bed/chair exit alarm, Communicate number of staff needed for ambulation/transfer, OT consult for ADLs, Patient to call before getting OOB, PT Consult for mobility concerns, PT Consult for assist device competence, Utilize walker, cane, or other assitive device    Mentation Interventions: Bed/chair exit alarm, Eyeglasses and hearing aids, More frequent rounding, Reorient patient, Room close to nurse's station, Toileting rounds, Update white board    Medication Interventions: Bed/chair exit alarm    Elimination Interventions: Call light in reach, Toileting schedule/hourly rounds, Urinal in reach

## 2017-11-13 NOTE — PROGRESS NOTES
Occupational Therapy Note 1148    Orders acknowledged, chart reviewed. Spoke with PT and [facility] where patient resides. Patient requires assist x2 for slide board transfers to/from wheelchair, has assist for all ADLs (facility reports patient feeds himself with weighted fork d/t baseline tremors from Parkinson's), is given baths and fully dressed by staff. Family potentially planning to hire additional services as patient can be intermittently alone in apartment. However, from functional standpoint patient is at his baseline and does not have any acute OT needs at this time. Will complete orders. Thank you.     Dominga Rasmussen, OTR/L

## 2017-11-13 NOTE — INTERDISCIPLINARY ROUNDS
IDR/SLIDR Summary          Patient: Yadiel Mullen MRN: [id number]    Age: 80 y.o. YOB: 1933 Room/Bed: [facility]   Admit Diagnosis: Altered mental status  Dehydration  Agitation  Principal Diagnosis: Altered mental status   Goals: Neuro checks  Readmission: NO  Quality Measure: Not applicable  VTE Prophylaxis: Mechanical  Influenza Vaccine screening completed? YES  Pneumococcal Vaccine screening completed? YES  Mobility needs: Yes   Nutrition plan:Yes  Consults:P.T, O.T. and Case Management    Financial concerns:Yes  Escalated to CM? YES  RRAT Score: 24   Interventions:  Testing due for pt today?  NO  LOS: 1 days Expected length of stay 2 days  Discharge plan: TBD   PCP: Izzy Ram MD  Transportation needs: Yes    Days before discharge:two or more days before discharge   Discharge disposition: SNF    Signed:     María Elena Cortez RN  11/13/2017

## 2017-11-13 NOTE — PROGRESS NOTES
2210: pt arrived to unit via stretcher; vitals signs stable, pt oriented to self only   Bed alarm placed under pt, per pt's daughter, the pt has a hx of becoming more confused at night  Unable to perform bedside swallow eval d/t lethargy; will keep pt NPO and re-assess in the morning     Admission database completed with assistance of his daughter (via telephone); unable to complete PTA med list at this time. She states that \"his paper work from [employer] was sent over to the ER with him\". Spoke with nurse in the ER and stated she would look around and would scan any paperwork into the chart if found.

## 2017-11-13 NOTE — ED NOTES
Jose Alberto Garret - Daughter  [phone]    Daughter informed of room and update on plan of care.

## 2017-11-13 NOTE — H&P
History & Physical    Date of admission: 11/12/2017    Patient name: Westley Leblanc  MRN: [id number]  YOB: 1933  Age: 80 y.o. Primary care provider:  Lamar Ennis MD     Source of Information: patient, ED and medical records                             Chief complaint: patient does not provide    History of present illness  Westley Leblanc is a an 80-year-old white male with past medical history of Parkinson's disease, gait abnormality, general debility, reflux esophagitis, hypertension, hyperlipidemia,   status post cardiac pacemaker implantation, degenerative disk disease, degenerative joint disease, and duodenal ulcer presented to the ED from nursing home with reported altered mental status and agitation. Patient is a poor historian. As such, majority of history is obtained per my review of ED and medical records. Per these collective reports, staff at the nursing home noticed patient was confused and agitated. There was concern as patient recently has been treated for UTI on Bactrim. Per the ED, patient also complained of knee pain. Paient reports that he fell last week with no reported head or neck trauma. There were no reports of new onset syncope, loss of consciousness, headache, neck pain, visual disturbance, numbness, paresthesias, focal weakness, chest pain, palpitations, abdominal pain, nausea, vomiting, diarrhea, calf pain, increased leg swelling/ edema, fever, chills. On arrival in the emergency department, initial recorded vital signs were blood pressure = 113/86, heart rate = 83, respiratory rate= 18, O2 saturation 95% on room air. Workup in the emergency department included CT head without contrast showing no acute intracranial process. UA was abnormal with small leucocyte esterase and WBC= 10-20 but bacteria negative. Patient has had similar presentation on prior hospitalization.  Patient is now seen for admission to the hospitalist service for continued evaluation and treatments. Past Medical History:   Diagnosis Date    Blurry vision 1/19/2012    BPH (benign prostatic hyperplasia)     DDD (degenerative disc disease), lumbar 2/19/2010    DJD (degenerative joint disease) of cervical spine 2/19/2010    DU (duodenal ulcer) 9/1/1990    Hyperlipidemia     Hypertension     Other and unspecified hyperlipidemia 2/19/2010    Pacemaker     Paralysis agitans ([facility] [address] 2/19/2010    Parkinson disease ([facility] [address]     PAT [facility]ox[facility] tachy[facility])     Premature atrial beats     Reflux esophagitis 2/19/2010    Sick sinus syndrome [facility])       Past Surgical History:   Procedure Laterality Date    HX CATARACT REMOVAL  6/2012 and 7/2012    Both eyes    HX ORTHOPAEDIC  04/2015    tendon repair both knees    HX PACEMAKER  2008    bradycardia    HX VITRECTOMY  11/2011    DE REMVL PERM PM PLS GEN W/REPL PLSE GEN 2 LEAD SYS  3/13/2017          Prior to Admission medications    Medication Sig Start Date End Date Taking? Authorizing Provider   HYDROcodone-acetaminophen (NORCO) 5-325 mg per tablet Take 1 Tab by mouth every four (4) hours as needed for Pain. Max Daily Amount: 6 Tabs. 10/17/17   Xiomara Saldana MD   LORazepam (ATIVAN) 0.5 mg tablet Take 1 Tab by mouth every six (6) hours as needed for Anxiety. Max Daily Amount: 2 mg. 9/19/17   Xiomara Saldana MD   traMADol Dominique Grater) 50 mg tablet Take 1 Tab by mouth every eight (8) hours as needed for Pain. Max Daily Amount: 150 mg. 9/19/17   Xiomara Saldana MD   diclofenac (VOLTAREN) 1 % gel Apply 2 g to affected area every six (6) hours. Apply to shoulder for 5 days stop if no improvement 8/14/17   Xiomara Saldana MD   losartan (COZAAR) 25 mg tablet Take  by mouth daily. Historical Provider   diclofenac (VOLTAREN) 1 % gel Apply  to affected area as needed. Historical Provider   QUEtiapine (SEROQUEL) 25 mg tablet Take 25 mg by mouth every evening.     Historical Provider   DETROL LA 2 mg ER capsule TAKE 1 CAPSULE DAILY FOR BLADDER FREQUENCY 2/13/17   Ashley Haywood MD   pindolol (VISKIN) 5 mg tablet Take 2.5 mg by mouth daily (after lunch). Take 1/2 tablet by mouth at 2pm -Take 1 tablet by mouth at 9pm    Historical Provider   albuterol (PROVENTIL HFA, VENTOLIN HFA, PROAIR HFA) 90 mcg/actuation inhaler Take 2 Puffs by inhalation every six (6) hours as needed for Wheezing. 1/9/17   Isac Chatman NP   carbidopa-levodopa (SINEMET)  mg per tablet Take 2 Tabs by mouth four (4) times daily. (This is given at 8:00, 11:00, 14:00, and 21:00). 7/28/16   Ceferino Rojas NP   polyethylene glycol (MIRALAX) 17 gram packet Take 1 Packet by mouth daily. Indications: CONSTIPATION 7/28/16   Ceferino Rojas NP   therapeutic multivitamin [facility]) tablet Take 1 Tab by mouth daily. 7/28/16   Ceferino Rojas NP   cloNIDine HCl (CATAPRES) 0.1 mg tablet Take 1 Tab by mouth every six (6) hours as needed (SBP > 180 mmHg). 7/28/16   Ceferino Rojas NP   droxidopa (NORTHERA) 100 mg cap Take 100 mg by mouth three (3) times daily. Indications: SYMPTOMATIC ORTHOSTATIC HYPOTENSION 7/28/16   Ceferino Rojas NP   docusate sodium (COLACE) 100 mg capsule Take 1 Cap by mouth daily as needed for Constipation. 7/28/16   Ceferino Rojas NP   cycloSPORINE (RESTASIS) 0.05 % ophthalmic emulsion Administer 1 Drop to both eyes two (2) times a day. 7/28/16   Ceferino Rojas NP   escitalopram oxalate (LEXAPRO) 20 mg tablet Take 1 Tab by mouth daily. 7/28/16   Ceferino Rojas NP   rivastigmine (EXELON) 4.6 mg/24 hr patch 1 Patch by TransDERmal route daily. 7/28/16   Ceferino Rojas NP   esomeprazole (NEXIUM) 40 mg capsule Take 1 Cap by mouth daily. 7/28/16   Ceferino Rojas NP   acetaminophen (MAPAP EXTRA STRENGTH) 500 mg tablet Take 1 Tab by mouth every six (6) hours as needed for Pain.  7/28/16   Ceferino Rojas NP     ALLERGIES:  NO KNOWN DRUG ALLERGIES    FAMILY HISTORY:  Family History   Problem Relation Age of Onset    Asthma Mother     Heart Disease Father          Social history  Patient resides              X  SNF    Ambulates    Independently      With cane       Assisted walker         Alcohol history     None recently reported           Smoking history      X  Former smoker         History   Smoking Status    Former Smoker    Types: Pipe    Quit date: 7/14/1945   Smokeless Tobacco    Never Used       Code status       x  DNR/DNI     Review of systems  Patient is not answering questions appropriately. As such, I was unable to obtain a review of systems. Physical Examination   Visit Vitals    /86    Pulse 82    Temp 99 °F (37.2 °C)    Resp 21    Ht 6' 2\" (1.88 m)    Wt 93.4 kg (206 lb)    SpO2 96%    BMI 26.45 kg/m2          O2 Device: Room air    General:  Elderly male in no acute respiratory distress   Head:  Normocephalic, without obvious abnormality, atraumatic   Eyes:  Conjunctivae/corneas clear. PERRL, EOMs intact   E/N/M/T: Nares normal. Septum midline.  No nasal drainage or sinus tenderness  Tongue midline/ non-edematous  Clear oropharynx   Neck: Normal appearance and movements, symmetrical, trachea midline  No palpable adenopathy  No thyroid enlargement, tenderness or nodules  No carotid bruit   Normal JVD   Lungs:   Symmetrical chest expansion and respiratory effort  Clear to auscultation bilaterally   Chest wall:  No tenderness or deformity   Heart:  Regular rate and rhythm   Sounds normal; no murmur, click, rub or gallop   Abdomen:   Soft, no tenderness  No rebound, guarding, or rigidity  Non-distended  Bowel sounds normal  No masses or hepatosplenomegaly  No hernias present   Back: No CVA tenderness   Extremities: Extremities normal, atraumatic  No cyanosis or edema  No DVT signs   Pulses 2+ radial/ 1+ DP bilateral pulses   Skin: No rashes or ulcers  Warm/ dry   Musculo-      skeletal: Gait not tested  Limited range of motion with generalized weakness   Neuro: GCS 13  (E4 V3 M6).  Moves all extremities x 4. No slurred speech. No facial droop. Sensation grossly intact. Tremulous. Psych: Alert, oriented x 2 to person and place only  Anxious/ slightly agitated           Data Review      24 Hour Results:  Recent Results (from the past 24 hour(s))   METABOLIC PANEL, COMPREHENSIVE    Collection Time: 11/12/17  6:32 PM   Result Value Ref Range    Sodium 137 136 - 145 mmol/L    Potassium 4.5 3.5 - 5.1 mmol/L    Chloride 105 97 - 108 mmol/L    CO2 25 21 - 32 mmol/L    Anion gap 7 5 - 15 mmol/L    Glucose 96 65 - 100 mg/dL    BUN 30 (H) 6 - 20 MG/DL    Creatinine 1.41 (H) 0.70 - 1.30 MG/DL    BUN/Creatinine ratio 21 (H) 12 - 20      GFR est AA 58 (L) >60 ml/min/1.73m2    GFR est non-AA 48 (L) >60 ml/min/1.73m2    Calcium 8.2 (L) 8.5 - 10.1 MG/DL    Bilirubin, total 0.7 0.2 - 1.0 MG/DL    ALT (SGPT) 9 (L) 12 - 78 U/L    AST (SGOT) 28 15 - 37 U/L    Alk. phosphatase 72 45 - 117 U/L    Protein, total 7.3 6.4 - 8.2 g/dL    Albumin 3.7 3.5 - 5.0 g/dL    Globulin 3.6 2.0 - 4.0 g/dL    A-G Ratio 1.0 (L) 1.1 - 2.2     CBC WITH AUTOMATED DIFF    Collection Time: 11/12/17  6:32 PM   Result Value Ref Range    WBC 6.1 4.1 - 11.1 K/uL    RBC 3.91 (L) 4.10 - 5.70 M/uL    HGB 11.8 (L) 12.1 - 17.0 g/dL    HCT 35.8 (L) 36.6 - 50.3 %    MCV 91.6 80.0 - 99.0 FL    MCH 30.2 26.0 - 34.0 PG    MCHC 33.0 30.0 - 36.5 g/dL    RDW 14.3 11.5 - 14.5 %    PLATELET 746 772 - 329 K/uL    NEUTROPHILS 54 32 - 75 %    LYMPHOCYTES 30 12 - 49 %    MONOCYTES 8 5 - 13 %    EOSINOPHILS 7 0 - 7 %    BASOPHILS 1 0 - 1 %    ABS. NEUTROPHILS 3.3 1.8 - 8.0 K/UL    ABS. LYMPHOCYTES 1.9 0.8 - 3.5 K/UL    ABS. MONOCYTES 0.5 0.0 - 1.0 K/UL    ABS. EOSINOPHILS 0.4 0.0 - 0.4 K/UL    ABS.  BASOPHILS 0.0 0.0 - 0.1 K/UL   URINALYSIS W/ REFLEX CULTURE    Collection Time: 11/12/17  6:32 PM   Result Value Ref Range    Color DARK YELLOW      Appearance CLEAR CLEAR      Specific gravity 1.028 1.003 - 1.030      pH (UA) 5.5 5.0 - 8.0      Protein NEGATIVE  NEG mg/dL    Glucose NEGATIVE  NEG mg/dL    Ketone TRACE (A) NEG mg/dL    Blood NEGATIVE  NEG      Urobilinogen 1.0 0.2 - 1.0 EU/dL    Nitrites NEGATIVE  NEG      Leukocyte Esterase SMALL (A) NEG      WBC 10-20 0 - 4 /hpf    RBC 5-10 0 - 5 /hpf    Epithelial cells FEW FEW /lpf    Bacteria NEGATIVE  NEG /hpf    UA:UC IF INDICATED URINE CULTURE ORDERED (A) CNI      Hyaline cast 0-2 0 - 5 /lpf   CK W/ REFLX CKMB    Collection Time: 11/12/17  6:32 PM   Result Value Ref Range     39 - 308 U/L   TROPONIN I    Collection Time: 11/12/17  6:32 PM   Result Value Ref Range    Troponin-I, Qt. <0.04 <0.05 ng/mL   LACTIC ACID    Collection Time: 11/12/17  6:32 PM   Result Value Ref Range    Lactic acid 0.8 0.4 - 2.0 MMOL/L   BILIRUBIN, CONFIRM    Collection Time: 11/12/17  6:32 PM   Result Value Ref Range    Bilirubin UA, confirm NEGATIVE  NEG       Recent Labs      11/12/17   1832   WBC  6.1   HGB  11.8*   HCT  35.8*   PLT  199     Recent Labs      11/12/17   1832   NA  137   K  4.5   CL  105   CO2  25   GLU  96   BUN  30*   CREA  1.41*   CA  8.2*   ALB  3.7   TBILI  0.7   SGOT  28   ALT  9*       Imaging  CT head without contrast:    FINDINGS:  The patient is positioned oddly in the scanner. The ventricles and sulci are  normal in size, shape and configuration and midline. There is no significant  white matter disease. There is no intracranial hemorrhage, extra-axial  collection, mass, mass effect or midline shift. The basilar cisterns are open. No acute infarct is identified. The bone windows demonstrate no abnormalities. The visualized portions of the paranasal sinuses and mastoid air cells are  clear.     IMPRESSION: No acute abnormality    Chest xray portable:  COMPARISON: 7/21/2017.     FINDINGS: A single frontal view of the chest at 1848 hours shows clear lungs. The right pulmonary apex is obscured by the patient's chin. Transvenous  pacemaker from the left appears stable.  The heart, mediastinum and pulmonary  vasculature are stable . The bony thorax is unremarkable for age. .     IMPRESSION:  No acute cardiopulmonary disease radiographically. .  [facility]    Assessment and Plan   1. Altered mental status (AMS). Admit to neuro floor. Place on neurovascular checks and fall precautions. Bedrest tonight. May be ambulatory in the am with assistance. Will consult neurologist.  Consult PT/OT. Check acetaminophen, salicylate, TSH, and ammonia levels. Treat other possible underlying factors such as dehydration and possible UTI, as may be metabolic encephalopathy. Also uncertain if may be medication related as on multiple different agents. 2.  Dehydration. Provide IV fluid hydration. Repeat renal panel in a.m. Monitor urine output with strict Is/Os/ and daily weights. 3.  Anxiety/ Agitation. Plan as above and place on fall precautions as he is a fall risk. On Seroquel, Lorazepam, and Lexapro at the nursing home. 4.  History of UTI. Current UA suggest pyuria in context of already having received Bactrim. No bacteria. Check urine culture. Already started on Ceftriaxone per the ED. May continue on the same, pending urine culture results. 5.  Anemia. Repeat H/H in a.m. 6.  Hypertension. Resume home BP medication. Monitor BP closely and provide additional anti-hypertensive therapy as needed. 7.  Parkinson's disease. On Sinemet, Northera, and Exelon. 8.  Hypocalcemia. Provide calcium replacement and repeat calcium level in the a.m.    9.  S/p cardiac pacemaker. Place on telemetry monitoring with h/o sick sinus syndrome. 10.  Generalized weakness/ debility. Plan as noted. 11. VTE prophylaxis. SCDs to BLEs.        Signed by: Kymberly Restrepo MD    November 12, 2017 at 8:40 PM

## 2017-11-13 NOTE — PROGRESS NOTES
Primary Nurse Joy Flores RN and Charo Cueva RN performed a dual skin assessment on this patient No impairment noted  Joey score is 15

## 2017-11-13 NOTE — CONSULTS
NEUROLOGY CONSULT NOTE    Name Laney Chavez Age 80 y.o. MRN [id number]  1933     Consulting Physician: Saqib Jimenez MD      Chief Complaint:  AMS     Assessment:     · Principal Problem:  ·   Altered mental status (2017)  ·   · Active Problems:  ·   Dehydration (2017)  ·   ·   Agitation (2017)  ·   ·   80year old RHM h/o SSS s/p PPM, PD, HTN, HPL, memory impairment (unclear baseline), wheelchair bound with LE contractures admitted from [facility] with AMS. Recently diagnosed UTI and GABI on labs. TSH, ammonia nml. Neurological examination with RUE drift which patient reports is new, b/l LE weakness, baseline PD tremors otherwise non-focal.    DDx: Possible acute encephalopathy 2/2 metabolic vs infectious processes vs medication effect (Norco/Tramadol, Ativan, Seroquel on medication list) vs acute L hemispheric ischemia not identified on head CT. Recommendations:   Agree with limiting narcotics/sedatives until mental status clears  Repeat head CT 17 to assess for interval change/acute ischemia (MRI c/i 2/2 PPM)  Resume Sinemet for PD  PT/OT  Will follow    Thank you very much for this referral. I appreciate the opportunity to participate in this patient's care. History of Present Illness: This is a 80 y.o.  right handed  male, we were asked to see for AMS. PMH notable for SSS s/p PPM, PD, HTN, HPL, memory impairment. He presents from his [facility] with AMS/agitation per staff. He has been recently treated for UTI with mildly abnormal UA on admission. CT head performed and NAP. Ammonia and TSH normal.  Baseline mental status is unclear- apparently wheelchair bound following multiple musculoskeletal injuries/ruptured tendons per PCP notes in the past.    He reports that his RUE appears more weak today, otherwise denies new focal paresthesias, speech/vision deficits.       No Known Allergies     Prior to Admission medications    Medication Sig Start Date End Date Taking? Authorizing Provider   aspirin 81 mg chewable tablet Take 81 mg by mouth daily. Yes Historical Provider   nitrofurantoin (MACRODANTIN) 50 mg capsule Take 50 mg by mouth nightly. Indications: uti prophylaxis   Yes Historical Provider   oxybutynin (DITROPAN) 5 mg tablet Take 5 mg by mouth daily. Indications: Bladder Hyperactivity   Yes Historical Provider   HYDROcodone-acetaminophen (NORCO) 5-325 mg per tablet Take 1 Tab by mouth every four (4) hours as needed for Pain. Max Daily Amount: 6 Tabs. 10/17/17  Yes Izzy Ram MD   LORazepam (ATIVAN) 0.5 mg tablet Take 1 Tab by mouth every six (6) hours as needed for Anxiety. Max Daily Amount: 2 mg. 9/19/17  Yes Izzy Ram MD   traMADol Tammy Ysabel) 50 mg tablet Take 1 Tab by mouth every eight (8) hours as needed for Pain. Max Daily Amount: 150 mg. 9/19/17  Yes Izzy Ram MD   QUEtiapine (SEROQUEL) 25 mg tablet Take 25 mg by mouth every evening. Yes Historical Provider   DETROL LA 2 mg ER capsule TAKE 1 CAPSULE DAILY FOR BLADDER FREQUENCY 2/13/17  Yes Izzy Ram MD   pindolol (VISKIN) 5 mg tablet Take 2.5 mg by mouth daily (after lunch). Take 1/2 tablet by mouth at 2pm -Take 1 tablet by mouth at 9pm   Yes Historical Provider   albuterol (PROVENTIL HFA, VENTOLIN HFA, PROAIR HFA) 90 mcg/actuation inhaler Take 2 Puffs by inhalation every six (6) hours as needed for Wheezing. 1/9/17  Yes Phylicia Chatman NP   carbidopa-levodopa (SINEMET)  mg per tablet Take 2 Tabs by mouth four (4) times daily. (This is given at 8:00, 11:00, 14:00, and 21:00). 7/28/16  Yes Brigid Lopez NP   polyethylene glycol (MIRALAX) 17 gram packet Take 1 Packet by mouth daily. Indications: CONSTIPATION 7/28/16  Yes Brigid Lopez NP   therapeutic multivitamin [facility]) tablet Take 1 Tab by mouth daily. 7/28/16  Yes Brigid Lopez NP   cloNIDine HCl (CATAPRES) 0.1 mg tablet Take 1 Tab by mouth every six (6) hours as needed (SBP > 180 mmHg).  7/28/16  Yes Kaylan Adkins JOY Mederos   docusate sodium (COLACE) 100 mg capsule Take 1 Cap by mouth daily as needed for Constipation. 7/28/16  Yes Carmen Hernandez NP   cycloSPORINE (RESTASIS) 0.05 % ophthalmic emulsion Administer 1 Drop to both eyes two (2) times a day. 7/28/16  Yes Carmen Hernandez NP   escitalopram oxalate (LEXAPRO) 20 mg tablet Take 1 Tab by mouth daily. 7/28/16  Yes Carmen Hernandez NP   rivastigmine (EXELON) 4.6 mg/24 hr patch 1 Patch by TransDERmal route daily. 7/28/16  Yes Carmen Hernandez NP   esomeprazole (NEXIUM) 40 mg capsule Take 1 Cap by mouth daily. 7/28/16  Yes Carmen Hernandez NP   diclofenac (VOLTAREN) 1 % gel Apply 2 g to affected area every six (6) hours. Apply to shoulder for 5 days stop if no improvement 8/14/17   Emmie Torres MD   losartan (COZAAR) 25 mg tablet Take  by mouth daily. Historical Provider   diclofenac (VOLTAREN) 1 % gel Apply  to affected area as needed. Historical Provider   droxidopa (NORTHERA) 100 mg cap Take 100 mg by mouth three (3) times daily. Indications: SYMPTOMATIC ORTHOSTATIC HYPOTENSION 7/28/16   Carmen Hernandez NP   acetaminophen (MAPAP EXTRA STRENGTH) 500 mg tablet Take 1 Tab by mouth every six (6) hours as needed for Pain.  7/28/16   Carmen Hernandez NP       Past Medical History:   Diagnosis Date    Blurry vision 1/19/2012    BPH (benign prostatic hyperplasia)     DDD (degenerative disc disease), lumbar 2/19/2010    DJD (degenerative joint disease) of cervical spine 2/19/2010    DU (duodenal ulcer) 9/1/1990    Hyperlipidemia     Hypertension     Other and unspecified hyperlipidemia 2/19/2010    Pacemaker     Paralysis agitans ([address] 2/19/2010    Parkinson disease ([address]     [facility]     Premature atrial beats     Reflux esophagitis 2/19/2010    Sick sinus syndrome [facility])         Past Surgical History:   Procedure Laterality Date    HX CATARACT REMOVAL  6/2012 and 7/2012    Both eyes    HX ORTHOPAEDIC  04/2015    tendon repair both knees    HX PACEMAKER  2008    bradycardia    HX VITRECTOMY  11/2011    MT REMVL PERM PM PLS GEN W/REPL PLSE GEN 2 LEAD SYS  3/13/2017             Social History   Substance Use Topics    Smoking status: Former Smoker     Types: Pipe     Quit date: 7/14/1945    Smokeless tobacco: Never Used    Alcohol use 2.0 oz/week     4 Cans of beer per week      Comment: glass of wine every now and then        Family History   Problem Relation Age of Onset    Asthma Mother     Heart Disease Father         Review of Systems:   Comprehensive review of systems performed and negative except for as listed above. Exam:     Visit Vitals    /82    Pulse 82    Temp 98.7 °F (37.1 °C)    Resp 17    Ht 6' 2\" (1.88 m)    Wt 93.6 kg (206 lb 5.6 oz)    SpO2 95%    BMI 26.49 kg/m2        General: Patient in no apparent distress   Head: Normocephalic, atraumatic, anicteric sclera   Lungs:  Clear to auscultation bilaterally, No wheezes or rubs   Cardiac: Regular rate and rhythm with no murmurs. Abd: Bowel sounds were audible. No tenderness on palpation   Ext: No pedal edema   Skin: No overt signs of rash     Neurological Exam:  Mental Status: Alert and oriented to person, month/year not exact date. Speech: Fluent no aphasia or dysarthria. Cranial Nerves:   Intact visual fields. Facial sensation is normal. Facial movement is symmetric. Palate is midline. Normal sternocleidomastoid strength. Tongue is midline. Hearing is intact bilaterally. Eyes: PERRL, EOM's full, no nystagmus, no ptosis. Motor:  RUE 4+/5 drift, LUE 5/5, LE 4+/5 w/contractures at knee b/l    Reflexes:   Deep tendon reflexes 1+/4 and symmetrical.  Plantar response is downgoing b/l. Sensory:   Symmetrically intact    Gait:  Unable to assess   Tremor:   +tremor predominant during action b/l UE, +bradykinesia   Cerebellar:  Finger to nose intact b/l   Neurovascular: No carotid bruits.  No JVD       Imaging  CT Results (maximum last 3):    Results from Hospital Encounter encounter on 11/12/17   CT HEAD WO CONT   Narrative EXAM:  CT HEAD WO CONT    INDICATION:   ams    COMPARISON: 7/21/2017. CONTRAST:  None. TECHNIQUE: Unenhanced CT of the head was performed using 5 mm images. Brain and  bone windows were generated. CT dose reduction was achieved through use of a  standardized protocol tailored for this examination and automatic exposure  control for dose modulation. FINDINGS:  The patient is positioned oddly in the scanner. The ventricles and sulci are  normal in size, shape and configuration and midline. There is no significant  white matter disease. There is no intracranial hemorrhage, extra-axial  collection, mass, mass effect or midline shift. The basilar cisterns are open. No acute infarct is identified. The bone windows demonstrate no abnormalities. The visualized portions of the paranasal sinuses and mastoid air cells are  clear. Impression IMPRESSION: No acute abnormality      MRI Results (maximum last 3): No results found for this or any previous visit. Lab Review  Lab Results   Component Value Date/Time    WBC 6.5 11/13/2017 12:30 AM    HCT 33.8 11/13/2017 12:30 AM    HGB 11.0 11/13/2017 12:30 AM    PLATELET 887 33/26/0006 12:30 AM     Lab Results   Component Value Date/Time    Sodium 142 11/13/2017 12:30 AM    Potassium 4.0 11/13/2017 12:30 AM    Chloride 110 11/13/2017 12:30 AM    CO2 25 11/13/2017 12:30 AM    Glucose 81 11/13/2017 12:30 AM    BUN 25 11/13/2017 12:30 AM    Creatinine 1.07 11/13/2017 12:30 AM    Calcium 7.5 11/13/2017 12:30 AM     No components found for: TROPQUANT  No results found for: SPENCER    Signed:  Tamia Morton, DO  11/13/2017  2:23 PM

## 2017-11-13 NOTE — PROGRESS NOTES
Problem: Falls - Risk of  Goal: *Absence of Falls  Document Oksana Fall Risk and appropriate interventions in the flowsheet.   Outcome: Progressing Towards Goal  Fall Risk Interventions:  Mobility Interventions: Bed/chair exit alarm, OT consult for ADLs, PT Consult for mobility concerns, Patient to call before getting OOB    Mentation Interventions: Bed/chair exit alarm, Door open when patient unattended, Eyeglasses and hearing aids, Gait belt with transfers/ambulation, More frequent rounding, Reorient patient, Toileting rounds, Update white board    Medication Interventions: Bed/chair exit alarm    Elimination Interventions: Call light in reach, Bed/chair exit alarm, Toileting schedule/hourly rounds

## 2017-11-13 NOTE — PROGRESS NOTES
Bedside and Verbal shift change report given to Jorge Crespo. (oncoming nurse) by Anne Marie Ingram (offgoing nurse). Report included the following information SBAR, Kardex, Intake/Output, MAR and Recent Results.

## 2017-11-14 ENCOUNTER — APPOINTMENT (OUTPATIENT)
Dept: CT IMAGING | Age: 82
DRG: 689 | End: 2017-11-14
Attending: PSYCHIATRY & NEUROLOGY
Payer: MEDICARE

## 2017-11-14 ENCOUNTER — APPOINTMENT (OUTPATIENT)
Dept: GENERAL RADIOLOGY | Age: 82
DRG: 689 | End: 2017-11-14
Attending: NURSE PRACTITIONER
Payer: MEDICARE

## 2017-11-14 LAB
BACTERIA SPEC CULT: ABNORMAL
BACTERIA SPEC CULT: NORMAL
BACTERIA SPEC CULT: NORMAL
CC UR VC: ABNORMAL
SERVICE CMNT-IMP: ABNORMAL
SERVICE CMNT-IMP: NORMAL

## 2017-11-14 PROCEDURE — 74011250637 HC RX REV CODE- 250/637: Performed by: NURSE PRACTITIONER

## 2017-11-14 PROCEDURE — 74011000258 HC RX REV CODE- 258: Performed by: HOSPITALIST

## 2017-11-14 PROCEDURE — C1758 CATHETER, URETERAL: HCPCS

## 2017-11-14 PROCEDURE — 65270000029 HC RM PRIVATE

## 2017-11-14 PROCEDURE — 70450 CT HEAD/BRAIN W/O DYE: CPT

## 2017-11-14 PROCEDURE — 74011250636 HC RX REV CODE- 250/636: Performed by: HOSPITALIST

## 2017-11-14 PROCEDURE — 74011250636 HC RX REV CODE- 250/636: Performed by: FAMILY MEDICINE

## 2017-11-14 PROCEDURE — 73060 X-RAY EXAM OF HUMERUS: CPT

## 2017-11-14 RX ADMIN — SODIUM CHLORIDE 3 G: 900 INJECTION, SOLUTION INTRAVENOUS at 09:28

## 2017-11-14 RX ADMIN — QUETIAPINE FUMARATE 25 MG: 25 TABLET ORAL at 17:22

## 2017-11-14 RX ADMIN — Medication 10 ML: at 15:12

## 2017-11-14 RX ADMIN — SODIUM CHLORIDE 3 G: 900 INJECTION, SOLUTION INTRAVENOUS at 02:49

## 2017-11-14 RX ADMIN — CARBIDOPA AND LEVODOPA 2 TABLET: 25; 100 TABLET ORAL at 21:51

## 2017-11-14 RX ADMIN — PINDOLOL 2.5 MG: 5 TABLET ORAL at 21:51

## 2017-11-14 RX ADMIN — SODIUM CHLORIDE 3 G: 900 INJECTION, SOLUTION INTRAVENOUS at 20:10

## 2017-11-14 RX ADMIN — SODIUM CHLORIDE 125 ML/HR: 900 INJECTION, SOLUTION INTRAVENOUS at 15:11

## 2017-11-14 RX ADMIN — PINDOLOL 2.5 MG: 5 TABLET ORAL at 15:09

## 2017-11-14 RX ADMIN — SODIUM CHLORIDE 3 G: 900 INJECTION, SOLUTION INTRAVENOUS at 15:11

## 2017-11-14 RX ADMIN — ASPIRIN 81 MG 81 MG: 81 TABLET ORAL at 09:23

## 2017-11-14 RX ADMIN — CARBIDOPA AND LEVODOPA 2 TABLET: 25; 100 TABLET ORAL at 09:21

## 2017-11-14 RX ADMIN — CARBIDOPA AND LEVODOPA 2 TABLET: 25; 100 TABLET ORAL at 15:09

## 2017-11-14 RX ADMIN — ESCITALOPRAM OXALATE 20 MG: 10 TABLET ORAL at 09:23

## 2017-11-14 RX ADMIN — LOSARTAN POTASSIUM 25 MG: 25 TABLET ORAL at 09:21

## 2017-11-14 RX ADMIN — CARBIDOPA AND LEVODOPA 2 TABLET: 25; 100 TABLET ORAL at 11:13

## 2017-11-14 NOTE — PROGRESS NOTES
[facility] who stated pt can come out of isolation as the MRSA resulted as negative. Pt's nurse Missy RN informed of same.

## 2017-11-14 NOTE — PROGRESS NOTES
Bedside and Verbal shift change report given to Kapil Rai. (oncoming nurse) by Bashir Wright (offgoing nurse). Report included the following information SBAR, Kardex, Intake/Output, MAR and Recent Results.

## 2017-11-14 NOTE — ROUTINE PROCESS
TRANSFER - OUT REPORT:    Verbal report given to Tanesha(name) on [facility]  being transferred to  (unit) for routine progression of care       Report consisted of patients Situation, Background, Assessment and   Recommendations(SBAR). Information from the following report(s) SBAR, Kardex, Recent Results and Cardiac Rhythm A Paced was reviewed with the receiving nurse. Lines:   Peripheral IV 11/13/17 Left Forearm (Active)   Site Assessment Clean, dry, & intact 11/14/2017 12:00 PM   Phlebitis Assessment 0 11/14/2017 12:00 PM   Infiltration Assessment 0 11/14/2017 12:00 PM   Dressing Status Clean, dry, & intact 11/14/2017 12:00 PM   Dressing Type Transparent;Tape;Stabilization/securement device 11/14/2017 12:00 PM   Hub Color/Line Status Pink; Infusing 11/14/2017 12:00 PM   Action Taken Open ports on tubing capped 11/14/2017 12:00 PM   Alcohol Cap Used Yes 11/14/2017 12:00 PM        Opportunity for questions and clarification was provided.       Patient transported with:   Registered Nurse   Personal belongings

## 2017-11-14 NOTE — PROGRESS NOTES
Isamar Martinez is a 80 y.o. male with SSS s/p PPM, PD, HTN, HPL, memory impairment (unclear baseline), wheelchair bound with LE contractures admitted from [facility] with AMS. Recently diagnosed UTI and GABI on labs. TSH, ammonia nml. Neurological examination initially noted RUE drift which patient reported as new, b/l LE weakness. CT X 2 negative  Baseline PD tremors otherwise non-focal.      Much alert and conversant today. EXAM  Exam:  Visit Vitals    /86 (BP 1 Location: Left arm, BP Patient Position: At rest)    Pulse 82    Temp 97.5 °F (36.4 °C)  Comment: back from CT    Resp 15    Ht 6' 2\" (1.88 m)    Wt 88 kg (194 lb 0.1 oz)    SpO2 97%    BMI 24.91 kg/m2     Gen:Alert  CV: RRR  Lungs: non labored breathing  Abd: non distending  Neuro: A&O x 3, no dysarthria or aphasia  CN II-XII: PERRL, EOMI, face symmetric, tongue/palate midline  Motor: strength 5/5 both arms. Contractures bilateral legs  Sensory: intact to LT  Gait: unable to walk    LABS/ IMAGING  CT Results (most recent):    Results from Hospital Encounter encounter on 11/12/17   CT HEAD WO CONT   Narrative EXAM:  CT HEAD WITHOUT CONTRAST  INDICATION: Altered mental status and right upper extremity weakness. COMPARISON: 11/12/2017. CONTRAST: None. TECHNIQUE: Unenhanced CT of the head was performed using 5 mm images. Brain and  bone windows were generated. Sagittal and coronal reformations were generated. CT dose reduction was achieved through use of a standardized protocol tailored  for this examination and automatic exposure control for dose modulation. CT dose  reduction was achieved through use of a standardized protocol tailored for this  examination and automatic exposure control for dose modulation. Adaptive  statistical iterative reconstruction (ASIR) was utilized for this examination. FINDINGS:  The head is tipped to the right and positioned asymmetrically within the  scanner.  The ventricles and sulci are enlarged in a generalized fashion  consistent with volume loss. There is no significant white matter disease. There is no intracranial hemorrhage. There is no extra-axial collection, mass,  mass effect or midline shift. The basilar cisterns are open. No acute infarct  is identified. The bone windows demonstrate no abnormalities. The visualized  portions of the paranasal sinuses and mastoid air cells are clear. Impression IMPRESSION: No acute intracranial abnormality.           ASSESSMENT  Hospital Problems  Date Reviewed: 11/12/2017          Codes Class Noted POA    Dehydration ICD-10-CM: E86.0  ICD-9-CM: 276.51  11/12/2017 Unknown        Agitation ICD-10-CM: R45.1  ICD-9-CM: 307.9  11/12/2017 Unknown        * (Principal)Altered mental status ICD-10-CM: R41.82  ICD-9-CM: 780.97  11/12/2017 Unknown             PLAN  Suspect metabolic encephalopathy-now resolved  Now back to baseline  CT does not show anything new  Cassy Morales MD

## 2017-11-14 NOTE — ROUTINE PROCESS
Bedside shift change report given to Emili (oncoming nurse) by Azalia Jesus (offgoing nurse). Report included the following information SBAR, Kardex, Intake/Output, MAR, Accordion and Cardiac Rhythm NSR, paced.

## 2017-11-14 NOTE — ROUTINE PROCESS
TRANSFER - IN REPORT:    Verbal report received from izzy leon(name) on [facility]  being received from [facility](unit) for routine progression of care      Report consisted of patients Situation, Background, Assessment and   Recommendations(SBAR). Information from the following report(s) SBAR and Kardex was reviewed with the receiving nurse. Opportunity for questions and clarification was provided. Assessment completed upon patients arrival to unit and care assumed.

## 2017-11-14 NOTE — PROGRESS NOTES
Hospitalist Progress Note  Lonell Old, NP  Answering service: [phone] from in house phone  Cell: [phone]      Date of Service:  2017  NAME:  Angeline Bergeron  :  1933  MRN:  [id number]    Admission Summary:   Pt presented to the ED from [facility] with reported altered mental status and agitation. Per collective reports, staff at the facility noticed that the patient was confused and agitated and were concerned as he was recently treated for UTI and on Bactrim. Patient reports that he fell last week with no reported head or neck trauma.       Interval history / Subjective:   Pt in bed, mostly oriented, does have some episodes of possible confusion as he has told nursing different information but knows place, self and family, president and why he is in the hospital.      Assessment & Plan: Altered mental status (AMS), possible metabolic encephalopathy:     - CT head without contrast showing no acute intracranial process  - neurology has seen - repeat CT in am for any change as pt unable to have MRI due to pace maker  - Consult PT/OT  - acetaminophen/ salicylate/TSH, and ammonia normal.    - no narcotics - may have tramadol     Dehydration:   - continue IV fluid hydration. - po diet     Anxiety/ Agitation:    - continue Seroquel and Lexapro   - hold ativan for now      Hypocalcemia: monitor in am    Hx of UTI. - Current UA suggest pyuria in context of already having received Bactrim. - Urine culture with possible enterococcus  - historically, pt had UTI with morganella morganii, enterococcus faecalis  - change abx to ampicillin      Hx Hypertension:  Resume home BP medication.       Hx Parkinson's disease:  On Sinemet, Northera, and Exelon.        S/p cardiac pacemaker:  stable on tele, possible downgrade in am     Code status: DNR  DVT prophylaxis: SCDs  Care Plan discussed with: nursing  Disposition: back to [facility] when able     Hospital Problems  Date Reviewed: 11/12/2017          Codes Class Noted POA    Dehydration ICD-10-CM: E86.0  ICD-9-CM: 276.51  11/12/2017 Unknown        Agitation ICD-10-CM: R45.1  ICD-9-CM: 307.9  11/12/2017 Unknown        * (Principal)Altered mental status ICD-10-CM: R41.82  ICD-9-CM: 780.97  11/12/2017 Unknown            Review of Systems:   Denies HA. No chest pain or pressure. No GI or respiratory complaints. + R shoulder pain, but this is chronic    Vital Signs:    Last 24hrs VS reviewed since prior progress note. Most recent are:  Visit Vitals    /78 (BP 1 Location: Right arm, BP Patient Position: At rest;Head of bed elevated (Comment degrees))    Pulse 85    Temp 98.2 °F (36.8 °C)    Resp 18    Ht 6' 2\" (1.88 m)    Wt 93.6 kg (206 lb 5.6 oz)    SpO2 96%    BMI 26.49 kg/m2       Intake/Output Summary (Last 24 hours) at 11/13/17 1954  Last data filed at 11/13/17 1600   Gross per 24 hour   Intake              610 ml   Output              525 ml   Net               85 ml      Physical Examination:         Constitutional:  No acute distress, cooperative, pleasant    ENT:  Oral mucous membranes moist, oropharynx benign. Resp:  CTA bilaterally. No wheezing/rhonchi/rales. No accessory muscle use, on RA   CV:  Regular rhythm, normal rate    GI:  Soft, non distended, non tender. normoactive bowel sounds     Musculoskeletal:  No edema, warm, 2+ pulses throughout. Limited ROM to R shoulder (baseline)    Neurologic:  Moves all extremities.   AAOx3      Data Review:   Review and/or order of clinical lab test  Review and/or order of tests in the radiology section of CPT  Review and/or order of tests in the medicine section of CPT    Labs:     Recent Labs      11/13/17 0030  11/12/17   1832   WBC  6.5  6.1   HGB  11.0*  11.8*   HCT  33.8*  35.8*   PLT  188  199     Recent Labs      11/13/17 0030  11/12/17   1832   NA  142  137   K  4.0  4.5   CL  110*  105   CO2  25  25   BUN 25*  30*   CREA  1.07  1.41*   GLU  81  96   CA  7.5*  8.2*   MG  1.9   --    PHOS  2.4*   --      Recent Labs      11/12/17   1832   SGOT  28   ALT  9*   AP  72   TBILI  0.7   TP  7.3   ALB  3.7   GLOB  3.6     Recent Labs      11/12/17   2101   INR  1.1   PTP  11.1   APTT  25.2      No results for input(s): FE, TIBC, PSAT, FERR in the last 72 hours. Lab Results   Component Value Date/Time    Folate 32.6 10/30/2013 10:30 AM      No results for input(s): PH, PCO2, PO2 in the last 72 hours.   Recent Labs      11/12/17   1832   TROIQ  <0.04     Lab Results   Component Value Date/Time    Cholesterol, total 122 06/27/2017 02:25 AM    HDL Cholesterol 47 06/27/2017 02:25 AM    LDL, calculated 65.4 06/27/2017 02:25 AM    Triglyceride 48 06/27/2017 02:25 AM    CHOL/HDL Ratio 2.6 06/27/2017 02:25 AM     Lab Results   Component Value Date/Time    Glucose (POC) 102 06/26/2017 07:02 PM    Glucose (POC) 97 08/23/2016 02:04 PM    Glucose (POC) 125 08/23/2016 01:20 PM    Glucose (POC) 109 06/15/2016 09:23 PM    Glucose (POC) 108 06/15/2016 05:28 PM     Lab Results   Component Value Date/Time    Color DARK YELLOW 11/12/2017 06:32 PM    Appearance CLEAR 11/12/2017 06:32 PM    Specific gravity 1.028 11/12/2017 06:32 PM    pH (UA) 5.5 11/12/2017 06:32 PM    Protein NEGATIVE  11/12/2017 06:32 PM    Glucose NEGATIVE  11/12/2017 06:32 PM    Ketone TRACE 11/12/2017 06:32 PM    Bilirubin NEGATIVE  07/21/2017 12:55 PM    Urobilinogen 1.0 11/12/2017 06:32 PM    Nitrites NEGATIVE  11/12/2017 06:32 PM    Leukocyte Esterase SMALL 11/12/2017 06:32 PM    Epithelial cells FEW 11/12/2017 06:32 PM    Bacteria NEGATIVE  11/12/2017 06:32 PM    WBC 10-20 11/12/2017 06:32 PM    RBC 5-10 11/12/2017 06:32 PM     Medications Reviewed:     Current Facility-Administered Medications   Medication Dose Route Frequency    [START ON 11/14/2017] aspirin chewable tablet 81 mg  81 mg Oral DAILY    carbidopa-levodopa (SINEMET)  mg per tablet 2 Tab  2 Tab Oral QID    [START ON 11/14/2017] escitalopram oxalate (LEXAPRO) tablet 20 mg  20 mg Oral DAILY    [START ON 11/14/2017] losartan (COZAAR) tablet 25 mg  25 mg Oral DAILY    QUEtiapine (SEROquel) tablet 25 mg  25 mg Oral QPM    [START ON 11/14/2017] rivastigmine (EXELON) 4.6 mg/24 hr patch 1 Patch  1 Patch TransDERmal DAILY    [START ON 11/14/2017] pindolol (VISKIN) tablet 2.5 mg  2.5 mg Oral PCL    . PHARMACY TO SUBSTITUTE PER PROTOCOL    Per Protocol    cloNIDine HCl (CATAPRES) tablet 0.1 mg  0.1 mg Oral Q6H PRN    traMADol (ULTRAM) tablet 50 mg  50 mg Oral Q8H PRN    pindolol (VISKIN) tablet 2.5 mg  2.5 mg Oral QHS    ampicillin-sulbactam (UNASYN) 3 g in 0.9% sodium chloride (MBP/ADV) 100 mL  3 g IntraVENous Q6H    sodium chloride (NS) flush 5-10 mL  5-10 mL IntraVENous Q8H    sodium chloride (NS) flush 5-10 mL  5-10 mL IntraVENous PRN    0.9% sodium chloride infusion  125 mL/hr IntraVENous CONTINUOUS    influenza vaccine 2017-18 (3 yrs+)(PF) (FLUZONE QUAD/FLUARIX QUAD) injection 0.5 mL  0.5 mL IntraMUSCular PRIOR TO DISCHARGE   ______________________________________________________________________  EXPECTED LENGTH OF STAY: 3d 21h  ACTUAL LENGTH OF STAY:          1               Edis Oleary NP

## 2017-11-14 NOTE — PROGRESS NOTES
Clinical updates have been faxed to [facility] on [address] PHONE# [phone]    Fax number used:  [phone] with confirmation received and ATTN to [place]. Patient has been recommended for Home Health (PT) at this time. Final Order for PT will need to be added and clarified. Rehab notes, Neuro Consult, and H&P has been provided to the [facility] to ensure ongoing communication. Jayden Girard RN    Update:  11/15/17 1:40pm.  Spoke with [place] at [facility] on [facility] ( [phone], option [phone] to have the  locate her) to confirm patient information had been received. Skylar will need to come and assess patient prior to readmission and new room number has been provided for her to come and visit. Discussed current recommendation for PT to continue once back at [facility] Explained no d/c det established. Skylar asked to be contact as soon as possible, if not a few days prior to patient leaving acute care.

## 2017-11-14 NOTE — PROGRESS NOTES
Bedside shift change report given to Whitney Haines (oncoming nurse) by Gucci Arce (offgoing nurse). Report included the following information SBAR, Kardex, MAR, Accordion, Recent Results and Cardiac Rhythm NSR, paced.

## 2017-11-14 NOTE — PROGRESS NOTES
Problem: Falls - Risk of  Goal: *Absence of Falls  Document Oksana Fall Risk and appropriate interventions in the flowsheet.    Outcome: Progressing Towards Goal  Fall Risk Interventions:  Mobility Interventions: Bed/chair exit alarm, OT consult for ADLs, PT Consult for mobility concerns, PT Consult for assist device competence, Strengthening exercises (ROM-active/passive), Utilize walker, cane, or other assitive device    Mentation Interventions: Bed/chair exit alarm, Door open when patient unattended, Eyeglasses and hearing aids, More frequent rounding, Reorient patient, Room close to nurse's station, Toileting rounds, Update white board    Medication Interventions: Bed/chair exit alarm, Evaluate medications/consider consulting pharmacy    Elimination Interventions: Call light in reach, Bed/chair exit alarm, Patient to call for help with toileting needs, Toileting schedule/hourly rounds, Urinal in reach

## 2017-11-14 NOTE — PROGRESS NOTES
Problem: Falls - Risk of  Goal: *Absence of Falls  Document Oksana Fall Risk and appropriate interventions in the flowsheet.    Outcome: Progressing Towards Goal  Fall Risk Interventions:  Mobility Interventions: Bed/chair exit alarm, Communicate number of staff needed for ambulation/transfer, OT consult for ADLs, Patient to call before getting OOB, PT Consult for mobility concerns, PT Consult for assist device competence    Mentation Interventions: Bed/chair exit alarm, Door open when patient unattended, More frequent rounding, Reorient patient, Toileting rounds    Medication Interventions: Assess postural VS orthostatic hypotension, Bed/chair exit alarm, Patient to call before getting OOB, Teach patient to arise slowly    Elimination Interventions: Bed/chair exit alarm, Call light in reach, Patient to call for help with toileting needs, Toileting schedule/hourly rounds

## 2017-11-14 NOTE — INTERDISCIPLINARY ROUNDS
IDR/SLIDR Summary          Patient: Isaac Duff MRN: [id number]    Age: 80 y.o. YOB: 1933 Room/Bed: [facility]   Admit Diagnosis: Altered mental status  Dehydration  Agitation  Principal Diagnosis: Altered mental status   Goals: IV fluids  Readmission: NO  Quality Measure: Not applicable  VTE Prophylaxis: Mechanical  Influenza Vaccine screening completed? YES  Pneumococcal Vaccine screening completed? YES  Mobility needs: Yes   Nutrition plan:Yes  Consults:P.T, O.T. and Case Management    Financial concerns:Yes  Escalated to CM? YES  RRAT Score: 24   Interventions:  Testing due for pt today?  NO  LOS: 2 days Expected length of stay 2 days  Discharge plan: back to [facility] when ready  PCP: Margaret Pizano MD  Transportation needs: Yes    Days before discharge:two or more days before discharge   Discharge disposition: [facility]  Signed:     Aurora Prabhakar RN  11/14/2017

## 2017-11-14 NOTE — PROGRESS NOTES
Hospitalist Progress Note  Betty Nieves NP  Answering service: [phone] from in house phone  Cell: [phone]   Date of Service:  2017  NAME:  Phil Jane  :  1933  MRN:  [id number]    Admission Summary:   Pt presented to the ED from [facility] with reported altered mental status and agitation. Per collective reports, staff at the facility noticed that the patient was confused and agitated and were concerned as he was recently treated for UTI and on Bactrim. Patient reports that he fell last week with no reported head or neck trauma.       Interval history / Subjective:   Pt in bed, mostly oriented, does have some episodes of confusion which daughter Cloyce Scrape) confirms to frequently happen when he has UTIs. Assessment & Plan: Altered mental status (AMS), possible metabolic encephalopathy:     - CT head without contrast showing no acute intracranial process  - neurology has seen - repeat CT has been done  - awaiting read  - Consult PT/OT, pt limited to use of R arm and shoulder due to pain. Have ordered Xray or R humerus due to more specific pain identified in mid arm with noted bruise  - acetaminophen/ salicylate/TSH, and ammonia normal.    - no narcotics - may have tramadol  - mental status is improved.      Dehydration:   - continue IV fluid hydration, reduce rate  - po diet     Anxiety/ Agitation:    - continue Seroquel and Lexapro   - hold ativan - has not needed     Hypocalcemia: monitor in am    Hx of UTI. - Current UA suggest pyuria in context of already having received Bactrim. - Urine culture with enterococcus faecalis, susceptible to ampicillin  - historically, pt had UTI with morganella morganii, enterococcus faecalis      Hx Hypertension: Resume home BP medication, fairly controlled     Hx Parkinson's disease:  On Sinemet, Northera, and Exelon.        Cardiac pacemaker:  - stable on tele, downgrade to medical floor     Code status: DNR  DVT prophylaxis: SCDs  Care Plan discussed with: nursing, attending MD and pts daughter  Disposition: back to [facility] [address] when able     Hospital Problems  Date Reviewed: 11/12/2017          Codes Class Noted POA    Dehydration ICD-10-CM: E86.0  ICD-9-CM: 276.51  11/12/2017 Unknown        Agitation ICD-10-CM: R45.1  ICD-9-CM: 307.9  11/12/2017 Unknown        * (Principal)Altered mental status ICD-10-CM: R41.82  ICD-9-CM: 780.97  11/12/2017 Unknown            Review of Systems:   Denies HA. No chest pain or pressure. No GI or respiratory complaints. + R shoulder pain/arm pain    Vital Signs:    Last 24hrs VS reviewed since prior progress note. Most recent are:  Visit Vitals    /86 (BP 1 Location: Left arm, BP Patient Position: At rest)    Pulse 82    Temp 97.5 °F (36.4 °C)    Resp 15    Ht 6' 2\" (1.88 m)    Wt 88 kg (194 lb 0.1 oz)    SpO2 97%    BMI 24.91 kg/m2       Intake/Output Summary (Last 24 hours) at 11/14/17 1516  Last data filed at 11/14/17 1200   Gross per 24 hour   Intake              600 ml   Output                0 ml   Net              600 ml      Physical Examination:         Constitutional:  No acute distress, cooperative, pleasant    ENT:  Oral mucous membranes moist, oropharynx benign. Resp:  CTA bilaterally. No wheezing. No accessory muscle use, on RA   CV:  Regular rhythm, normal rate. NSR on tele. GI:  Soft, non distended, non tender. Normoactive bowel sounds     Musculoskeletal:  No edema, warm, 2+ pulses throughout. Limited ROM to R shoulder/arm due to discomfort    Neurologic:  Moves all extremities.   AAOx3      Data Review:   Review and/or order of clinical lab test  Review and/or order of tests in the radiology section of CPT  Review and/or order of tests in the medicine section of CPT    Labs:     Recent Labs      11/13/17   0030  11/12/17   1832   WBC  6.5  6.1   HGB  11.0*  11.8*   HCT  33.8*  35.8*   PLT  188 199     Recent Labs      11/13/17   0030  11/12/17   1832   NA  142  137   K  4.0  4.5   CL  110*  105   CO2  25  25   BUN  25*  30*   CREA  1.07  1.41*   GLU  81  96   CA  7.5*  8.2*   MG  1.9   --    PHOS  2.4*   --      Recent Labs      11/12/17   1832   SGOT  28   ALT  9*   AP  72   TBILI  0.7   TP  7.3   ALB  3.7   GLOB  3.6     Recent Labs      11/12/17   2101   INR  1.1   PTP  11.1   APTT  25.2      No results for input(s): FE, TIBC, PSAT, FERR in the last 72 hours. Lab Results   Component Value Date/Time    Folate 32.6 10/30/2013 10:30 AM      No results for input(s): PH, PCO2, PO2 in the last 72 hours.   Recent Labs      11/12/17   1832   TROIQ  <0.04     Lab Results   Component Value Date/Time    Cholesterol, total 122 06/27/2017 02:25 AM    HDL Cholesterol 47 06/27/2017 02:25 AM    LDL, calculated 65.4 06/27/2017 02:25 AM    Triglyceride 48 06/27/2017 02:25 AM    CHOL/HDL Ratio 2.6 06/27/2017 02:25 AM     Lab Results   Component Value Date/Time    Glucose (POC) 102 06/26/2017 07:02 PM    Glucose (POC) 97 08/23/2016 02:04 PM    Glucose (POC) 125 08/23/2016 01:20 PM    Glucose (POC) 109 06/15/2016 09:23 PM    Glucose (POC) 108 06/15/2016 05:28 PM     Lab Results   Component Value Date/Time    Color DARK YELLOW 11/12/2017 06:32 PM    Appearance CLEAR 11/12/2017 06:32 PM    Specific gravity 1.028 11/12/2017 06:32 PM    pH (UA) 5.5 11/12/2017 06:32 PM    Protein NEGATIVE  11/12/2017 06:32 PM    Glucose NEGATIVE  11/12/2017 06:32 PM    Ketone TRACE 11/12/2017 06:32 PM    Bilirubin NEGATIVE  07/21/2017 12:55 PM    Urobilinogen 1.0 11/12/2017 06:32 PM    Nitrites NEGATIVE  11/12/2017 06:32 PM    Leukocyte Esterase SMALL 11/12/2017 06:32 PM    Epithelial cells FEW 11/12/2017 06:32 PM    Bacteria NEGATIVE  11/12/2017 06:32 PM    WBC 10-20 11/12/2017 06:32 PM    RBC 5-10 11/12/2017 06:32 PM     Medications Reviewed:     Current Facility-Administered Medications   Medication Dose Route Frequency    aspirin chewable tablet 81 mg  81 mg Oral DAILY    carbidopa-levodopa (SINEMET)  mg per tablet 2 Tab  2 Tab Oral QID    escitalopram oxalate (LEXAPRO) tablet 20 mg  20 mg Oral DAILY    losartan (COZAAR) tablet 25 mg  25 mg Oral DAILY    QUEtiapine (SEROquel) tablet 25 mg  25 mg Oral QPM    rivastigmine (EXELON) 4.6 mg/24 hr patch 1 Patch  1 Patch TransDERmal DAILY    pindolol (VISKIN) tablet 2.5 mg  2.5 mg Oral PCL    . PHARMACY TO SUBSTITUTE PER PROTOCOL    Per Protocol    cloNIDine HCl (CATAPRES) tablet 0.1 mg  0.1 mg Oral Q6H PRN    traMADol (ULTRAM) tablet 50 mg  50 mg Oral Q8H PRN    pindolol (VISKIN) tablet 2.5 mg  2.5 mg Oral QHS    ampicillin-sulbactam (UNASYN) 3 g in 0.9% sodium chloride (MBP/ADV) 100 mL  3 g IntraVENous Q6H    sodium chloride (NS) flush 5-10 mL  5-10 mL IntraVENous Q8H    sodium chloride (NS) flush 5-10 mL  5-10 mL IntraVENous PRN    0.9% sodium chloride infusion  125 mL/hr IntraVENous CONTINUOUS    influenza vaccine 2017-18 (3 yrs+)(PF) (FLUZONE QUAD/FLUARIX QUAD) injection 0.5 mL  0.5 mL IntraMUSCular PRIOR TO DISCHARGE   ______________________________________________________________________  EXPECTED LENGTH OF STAY: 3d 21h  ACTUAL LENGTH OF STAY:          2               Mary Duran NP

## 2017-11-15 LAB
ANION GAP SERPL CALC-SCNC: 9 MMOL/L (ref 5–15)
BUN SERPL-MCNC: 14 MG/DL (ref 6–20)
BUN/CREAT SERPL: 18 (ref 12–20)
CALCIUM SERPL-MCNC: 8.2 MG/DL (ref 8.5–10.1)
CHLORIDE SERPL-SCNC: 107 MMOL/L (ref 97–108)
CO2 SERPL-SCNC: 25 MMOL/L (ref 21–32)
CREAT SERPL-MCNC: 0.77 MG/DL (ref 0.7–1.3)
GLUCOSE BLD STRIP.AUTO-MCNC: 96 MG/DL (ref 65–100)
GLUCOSE SERPL-MCNC: 73 MG/DL (ref 65–100)
PHOSPHATE SERPL-MCNC: 2.7 MG/DL (ref 2.6–4.7)
POTASSIUM SERPL-SCNC: 3.9 MMOL/L (ref 3.5–5.1)
SERVICE CMNT-IMP: NORMAL
SODIUM SERPL-SCNC: 141 MMOL/L (ref 136–145)

## 2017-11-15 PROCEDURE — 84100 ASSAY OF PHOSPHORUS: CPT | Performed by: NURSE PRACTITIONER

## 2017-11-15 PROCEDURE — 36415 COLL VENOUS BLD VENIPUNCTURE: CPT | Performed by: NURSE PRACTITIONER

## 2017-11-15 PROCEDURE — 74011000258 HC RX REV CODE- 258: Performed by: HOSPITALIST

## 2017-11-15 PROCEDURE — 82962 GLUCOSE BLOOD TEST: CPT

## 2017-11-15 PROCEDURE — 97530 THERAPEUTIC ACTIVITIES: CPT

## 2017-11-15 PROCEDURE — 74011250637 HC RX REV CODE- 250/637: Performed by: NURSE PRACTITIONER

## 2017-11-15 PROCEDURE — 74011250636 HC RX REV CODE- 250/636: Performed by: HOSPITALIST

## 2017-11-15 PROCEDURE — 65270000029 HC RM PRIVATE

## 2017-11-15 PROCEDURE — 80048 BASIC METABOLIC PNL TOTAL CA: CPT | Performed by: NURSE PRACTITIONER

## 2017-11-15 RX ORDER — AMOXICILLIN 250 MG/1
500 CAPSULE ORAL 3 TIMES DAILY
Status: DISCONTINUED | OUTPATIENT
Start: 2017-11-15 | End: 2017-11-16 | Stop reason: HOSPADM

## 2017-11-15 RX ADMIN — LOSARTAN POTASSIUM 25 MG: 25 TABLET ORAL at 08:36

## 2017-11-15 RX ADMIN — PINDOLOL 2.5 MG: 5 TABLET ORAL at 14:41

## 2017-11-15 RX ADMIN — ESCITALOPRAM OXALATE 20 MG: 10 TABLET ORAL at 08:36

## 2017-11-15 RX ADMIN — AMOXICILLIN 500 MG: 250 CAPSULE ORAL at 21:14

## 2017-11-15 RX ADMIN — SODIUM CHLORIDE 3 G: 900 INJECTION, SOLUTION INTRAVENOUS at 03:00

## 2017-11-15 RX ADMIN — Medication 10 ML: at 21:15

## 2017-11-15 RX ADMIN — CLONIDINE HYDROCHLORIDE 0.1 MG: 0.1 TABLET ORAL at 10:39

## 2017-11-15 RX ADMIN — CARBIDOPA AND LEVODOPA 2 TABLET: 25; 100 TABLET ORAL at 10:31

## 2017-11-15 RX ADMIN — CARBIDOPA AND LEVODOPA 2 TABLET: 25; 100 TABLET ORAL at 14:41

## 2017-11-15 RX ADMIN — AMOXICILLIN 500 MG: 250 CAPSULE ORAL at 14:41

## 2017-11-15 RX ADMIN — ASPIRIN 81 MG 81 MG: 81 TABLET ORAL at 08:36

## 2017-11-15 RX ADMIN — CARBIDOPA AND LEVODOPA 2 TABLET: 25; 100 TABLET ORAL at 07:18

## 2017-11-15 RX ADMIN — PINDOLOL 2.5 MG: 5 TABLET ORAL at 21:14

## 2017-11-15 RX ADMIN — TRAMADOL HYDROCHLORIDE 50 MG: 50 TABLET, FILM COATED ORAL at 10:31

## 2017-11-15 RX ADMIN — CARBIDOPA AND LEVODOPA 2 TABLET: 25; 100 TABLET ORAL at 21:15

## 2017-11-15 RX ADMIN — SODIUM CHLORIDE 3 G: 900 INJECTION, SOLUTION INTRAVENOUS at 10:32

## 2017-11-15 RX ADMIN — QUETIAPINE FUMARATE 25 MG: 25 TABLET ORAL at 16:43

## 2017-11-15 RX ADMIN — TRAMADOL HYDROCHLORIDE 50 MG: 50 TABLET, FILM COATED ORAL at 21:24

## 2017-11-15 NOTE — PROGRESS NOTES
Hospitalist Progress Note  Ginger Lundy NP  Answering service: [phone] from in house phone  Cell: [phone]   Date of Service:  11/15/2017  NAME:  Jesslyn Schaumann  :  1933  MRN:  [id number]    Admission Summary:   Pt presented to the ED from [facility] with reported altered mental status and agitation. Per collective reports, staff at the facility noticed that the patient was confused and agitated and were concerned as he was recently treated for UTI and on Bactrim. Patient reports that he fell last week with no reported head or neck trauma.       Interval history / Subjective:   Pt in bed, more confused today. Assessment & Plan: Altered mental status (AMS), possible metabolic encephalopathy:     - CT head without contrast showing no acute intracranial process  - neurology has seen - repeat CT shows nothing new  - may be due to UTI  - acetaminophen/ salicylate/TSH, and ammonia normal.    - no narcotics - may have tramadol  - PT/OT have seen - SNF is mentioned, however, daughter reports pt does not do well at these facilities and just becomes more confused. - mental status worse today - no new medications. Pt did move from 6S to 2N yesterday - possible cause of confusion? Will see how he is tomorrow. Pain R Shoulder:  - has been imaged with no acute abnormality  - R humerus imaged yesterday - no fracture     Dehydration: encourage po diet     Anxiety/ Agitation:    - continue Seroquel and Lexapro   - hold ativan - has not needed     Hypocalcemia: monitor in am    UTI (POA):    - Current UA suggest pyuria in context of already having received Bactrim. - Urine culture with enterococcus faecalis, susceptible to ampicillin  - Abx changed to amoxicillin today      Hx Hypertension: Resume home BP medication, fairly controlled     Hx Parkinson's disease:  On Sinemet, Northera, and Exelon.        Cardiac pacemaker     Code status: DNR  DVT prophylaxis: SCDs  Care Plan discussed with: nursing, attending MD and pts daughter  Disposition: back to [place] [facility] [address] when able     Hospital Problems  Date Reviewed: 11/12/2017          Codes Class Noted POA    Dehydration ICD-10-CM: E86.0  ICD-9-CM: 276.51  11/12/2017 Unknown        Agitation ICD-10-CM: R45.1  ICD-9-CM: 307.9  11/12/2017 Unknown        * (Principal)Altered mental status ICD-10-CM: R41.82  ICD-9-CM: 780.97  11/12/2017 Unknown            Review of Systems:   Denies HA. No chest pain or pressure. No GI or respiratory complaints. + R shoulder pain/arm pain    Vital Signs:    Last 24hrs VS reviewed since prior progress note. Most recent are:  Visit Vitals    BP (!) 147/94 (BP 1 Location: Left arm, BP Patient Position: At rest)    Pulse 81    Temp 97.7 °F (36.5 °C)    Resp 16    Ht 6' 2\" (1.88 m)    Wt 92 kg (202 lb 13.2 oz)    SpO2 98%    BMI 26.04 kg/m2       Intake/Output Summary (Last 24 hours) at 11/15/17 1711  Last data filed at 11/15/17 1222   Gross per 24 hour   Intake              400 ml   Output             1400 ml   Net            -1000 ml      Physical Examination:         Constitutional:  No acute distress, cooperative, pleasant    ENT:  Oral mucous membranes moist, oropharynx benign. Resp:  CTA bilaterally. No wheezing. No accessory muscle use, on RA   CV:  Regular rhythm, normal rate. GI:  Soft, non distended, non tender. Normoactive bowel sounds     Musculoskeletal:  No edema, warm, 2+ pulses throughout. Limited ROM to R shoulder/arm due to discomfort    Neurologic:  Moves all extremities. Alert. Oriented to person/family.        Data Review:   Review and/or order of clinical lab test  Review and/or order of tests in the radiology section of CPT  Review and/or order of tests in the medicine section of CPT    Labs:     Recent Labs      11/13/17   0030  11/12/17   1832   WBC  6.5  6.1   HGB  11.0*  11.8*   HCT  33.8*  35.8*   PLT  188  199     Recent Labs      11/15/17   0315  11/13/17   0030  11/12/17   1832   NA  141  142  137   K  3.9  4.0  4.5   CL  107  110*  105   CO2  25  25  25   BUN  14  25*  30*   CREA  0.77  1.07  1.41*   GLU  73  81  96   CA  8.2*  7.5*  8.2*   MG   --   1.9   --    PHOS  2.7  2.4*   --      Recent Labs      11/12/17   1832   SGOT  28   ALT  9*   AP  72   TBILI  0.7   TP  7.3   ALB  3.7   GLOB  3.6     Recent Labs      11/12/17   2101   INR  1.1   PTP  11.1   APTT  25.2      No results for input(s): FE, TIBC, PSAT, FERR in the last 72 hours. Lab Results   Component Value Date/Time    Folate 32.6 10/30/2013 10:30 AM      No results for input(s): PH, PCO2, PO2 in the last 72 hours.   Recent Labs      11/12/17   1832   TROIQ  <0.04     Lab Results   Component Value Date/Time    Cholesterol, total 122 06/27/2017 02:25 AM    HDL Cholesterol 47 06/27/2017 02:25 AM    LDL, calculated 65.4 06/27/2017 02:25 AM    Triglyceride 48 06/27/2017 02:25 AM    CHOL/HDL Ratio 2.6 06/27/2017 02:25 AM     Lab Results   Component Value Date/Time    Glucose (POC) 96 11/15/2017 03:59 PM    Glucose (POC) 102 06/26/2017 07:02 PM    Glucose (POC) 97 08/23/2016 02:04 PM    Glucose (POC) 125 08/23/2016 01:20 PM    Glucose (POC) 109 06/15/2016 09:23 PM     Lab Results   Component Value Date/Time    Color DARK YELLOW 11/12/2017 06:32 PM    Appearance CLEAR 11/12/2017 06:32 PM    Specific gravity 1.028 11/12/2017 06:32 PM    pH (UA) 5.5 11/12/2017 06:32 PM    Protein NEGATIVE  11/12/2017 06:32 PM    Glucose NEGATIVE  11/12/2017 06:32 PM    Ketone TRACE 11/12/2017 06:32 PM    Bilirubin NEGATIVE  07/21/2017 12:55 PM    Urobilinogen 1.0 11/12/2017 06:32 PM    Nitrites NEGATIVE  11/12/2017 06:32 PM    Leukocyte Esterase SMALL 11/12/2017 06:32 PM    Epithelial cells FEW 11/12/2017 06:32 PM    Bacteria NEGATIVE  11/12/2017 06:32 PM    WBC 10-20 11/12/2017 06:32 PM    RBC 5-10 11/12/2017 06:32 PM     Medications Reviewed:     Current Facility-Administered Medications Medication Dose Route Frequency    amoxicillin (AMOXIL) capsule 500 mg  500 mg Oral TID    aspirin chewable tablet 81 mg  81 mg Oral DAILY    carbidopa-levodopa (SINEMET)  mg per tablet 2 Tab  2 Tab Oral QID    escitalopram oxalate (LEXAPRO) tablet 20 mg  20 mg Oral DAILY    losartan (COZAAR) tablet 25 mg  25 mg Oral DAILY    QUEtiapine (SEROquel) tablet 25 mg  25 mg Oral QPM    rivastigmine (EXELON) 4.6 mg/24 hr patch 1 Patch  1 Patch TransDERmal DAILY    pindolol (VISKIN) tablet 2.5 mg  2.5 mg Oral PCL    cloNIDine HCl (CATAPRES) tablet 0.1 mg  0.1 mg Oral Q6H PRN    traMADol (ULTRAM) tablet 50 mg  50 mg Oral Q8H PRN    pindolol (VISKIN) tablet 2.5 mg  2.5 mg Oral QHS    sodium chloride (NS) flush 5-10 mL  5-10 mL IntraVENous Q8H    sodium chloride (NS) flush 5-10 mL  5-10 mL IntraVENous PRN    0.9% sodium chloride infusion  75 mL/hr IntraVENous CONTINUOUS    influenza vaccine 2017-18 (3 yrs+)(PF) (FLUZONE QUAD/FLUARIX QUAD) injection 0.5 mL  0.5 mL IntraMUSCular PRIOR TO DISCHARGE   ______________________________________________________________________  EXPECTED LENGTH OF STAY: 3d 21h  ACTUAL LENGTH OF STAY:          3               Tejas San NP

## 2017-11-15 NOTE — PROGRESS NOTES
Problem: Mobility Impaired (Adult and Pediatric)  Goal: *Acute Goals and Plan of Care (Insert Text)  Physical Therapy Goals  Initiated 11/13/2017  1. Patient will move from supine to sit and sit to supine  and scoot up and down in bed with supervision/set-up within 7 day(s). 2.  Patient will transfer from bed to wheelchair and wheelchair to bed with minimal assistance/contact guard assist using the least restrictive device within 7 day(s). 3.  Patient will demonstrate good sitting balance at EOB with B feet approximated to floor x5min with SUP within 7 days. 4. Patient will demonstrate improved B knee extension ROM to -80deg to improve transfer ability within 7 days. physical Therapy TREATMENT  Patient: Lendel Snellen (71 y.o. male)  Date: 11/15/2017  Diagnosis: Altered mental status  Dehydration  Agitation Altered mental status  Precautions:  fall, BLE contractures    ASSESSMENT:  Pt is progressing slowly this afternoon. Pt agreeable to bed mobility and planned attempt to chair (unsuccessful). Pt unable/unsafe to complete stand-pivot to chair on right despite assist x2. Recommend next session trial sliding board per baseline technique. Pt pleasant and motivated during session, stating, \"Ill do whatever you guys say. .\"  Pt left in bed in NAD and RN notified of wet/saturated brief. All needs met and call bell in reach. Pt remains appropriate for D/C to rehab once medically stable 2* elevated level of care required. Should pt/family refuse, pt will require max physical assist for all mobility vs harmony lift (will trial SB transfer next session for appropriateness). Progression toward goals:  []    Improving appropriately and progressing toward goals  [x]    Improving slowly and progressing toward goals  []    Not making progress toward goals and plan of care will be adjusted     PLAN:  Patient continues to benefit from skilled intervention to address the above impairments.   Continue treatment per established plan of care. Discharge Recommendations:  Skilled Nursing Facility  Further Equipment Recommendations for Discharge:  TBD     SUBJECTIVE:   Patient stated Alfa Vasquez will do whatever you guys want me to. ..    OBJECTIVE DATA SUMMARY:   Critical Behavior:  Neurologic State: Alert  Orientation Level: Oriented to person, Disoriented to place, Disoriented to situation, Disoriented to time  Cognition: Decreased attention/concentration, Follows commands  Safety/Judgement: Decreased awareness of need for assistance, Decreased awareness of need for safety  Functional Mobility Training:  Bed Mobility:  Supine to Sit: Minimum assistance; Additional time (OOB on R with rail used)  Sit to Supine: Maximum assistance; Additional time  Transfers:   Pt unable to complete squat/stand-pivot to chair on right with max A x2  Balance:  Sitting: Impaired; With support  Sitting - Static: Fair (occasional)  Sitting - Dynamic: Fair (occasional)  Standing: Impaired; With support  Standing - Static: Poor  Standing - Dynamic :  (NT)  Pain:  Pain Scale 1: Numeric (0 - 10)  Pain Intensity 1: 0  Activity Tolerance:   VSS and improved once assessed on RUE EOB  After treatment:   []    Patient left in no apparent distress sitting up in chair  [x]    Patient left in no apparent distress in bed  [x]    Call bell left within reach  [x]    Nursing notified  []    Caregiver present  [x]    Bed alarm activated    COMMUNICATION/COLLABORATION:   The patients plan of care was discussed with: Registered Nurse    Norm Alcaraz   Time Calculation: 16 mins

## 2017-11-15 NOTE — PROGRESS NOTES
Problem: Falls - Risk of  Goal: *Absence of Falls  Document Oksana Fall Risk and appropriate interventions in the flowsheet.    Outcome: Progressing Towards Goal  Fall Risk Interventions:  Mobility Interventions: PT Consult for mobility concerns    Mentation Interventions: Bed/chair exit alarm    Medication Interventions: Patient to call before getting OOB    Elimination Interventions: Patient to call for help with toileting needs

## 2017-11-15 NOTE — PROGRESS NOTES
Bedside shift change report given to Niles Cantu (oncoming nurse) by Zoila Mar (offgoing nurse). Report included the following information SBAR, Kardex and MAR.

## 2017-11-15 NOTE — ROUTINE PROCESS
Primary Nurse Fabiola Moctezuma, RN and Sydnee Hernández RN performed a dual skin assessment on this patient No impairment noted  Joey score is 15

## 2017-11-16 VITALS
TEMPERATURE: 97.7 F | HEIGHT: 74 IN | WEIGHT: 204.37 LBS | RESPIRATION RATE: 16 BRPM | DIASTOLIC BLOOD PRESSURE: 96 MMHG | OXYGEN SATURATION: 96 % | SYSTOLIC BLOOD PRESSURE: 151 MMHG | HEART RATE: 81 BPM | BODY MASS INDEX: 26.23 KG/M2

## 2017-11-16 PROBLEM — E86.0 DEHYDRATION: Status: RESOLVED | Noted: 2017-11-12 | Resolved: 2017-11-16

## 2017-11-16 PROBLEM — R45.1 AGITATION: Status: RESOLVED | Noted: 2017-11-12 | Resolved: 2017-11-16

## 2017-11-16 LAB
ERYTHROCYTE [DISTWIDTH] IN BLOOD BY AUTOMATED COUNT: 13.8 % (ref 11.5–14.5)
HCT VFR BLD AUTO: 35.9 % (ref 36.6–50.3)
HGB BLD-MCNC: 12 G/DL (ref 12.1–17)
MCH RBC QN AUTO: 30.2 PG (ref 26–34)
MCHC RBC AUTO-ENTMCNC: 33.4 G/DL (ref 30–36.5)
MCV RBC AUTO: 90.4 FL (ref 80–99)
PLATELET # BLD AUTO: 175 K/UL (ref 150–400)
RBC # BLD AUTO: 3.97 M/UL (ref 4.1–5.7)
WBC # BLD AUTO: 5.8 K/UL (ref 4.1–11.1)

## 2017-11-16 PROCEDURE — 85027 COMPLETE CBC AUTOMATED: CPT | Performed by: NURSE PRACTITIONER

## 2017-11-16 PROCEDURE — 36415 COLL VENOUS BLD VENIPUNCTURE: CPT | Performed by: NURSE PRACTITIONER

## 2017-11-16 PROCEDURE — 90686 IIV4 VACC NO PRSV 0.5 ML IM: CPT | Performed by: FAMILY MEDICINE

## 2017-11-16 PROCEDURE — 90471 IMMUNIZATION ADMIN: CPT

## 2017-11-16 PROCEDURE — 74011250636 HC RX REV CODE- 250/636: Performed by: FAMILY MEDICINE

## 2017-11-16 PROCEDURE — 74011250637 HC RX REV CODE- 250/637: Performed by: NURSE PRACTITIONER

## 2017-11-16 RX ORDER — LOSARTAN POTASSIUM 50 MG/1
50 TABLET ORAL DAILY
Status: DISCONTINUED | OUTPATIENT
Start: 2017-11-16 | End: 2017-11-16 | Stop reason: HOSPADM

## 2017-11-16 RX ORDER — AMOXICILLIN 500 MG/1
500 CAPSULE ORAL 3 TIMES DAILY
Qty: 15 CAP | Refills: 0 | Status: SHIPPED
Start: 2017-11-16 | End: 2017-11-21

## 2017-11-16 RX ORDER — LOSARTAN POTASSIUM 50 MG/1
50 TABLET ORAL DAILY
Qty: 30 TAB | Refills: 0 | Status: SHIPPED
Start: 2017-11-17 | End: 2018-04-26

## 2017-11-16 RX ADMIN — AMOXICILLIN 500 MG: 250 CAPSULE ORAL at 14:54

## 2017-11-16 RX ADMIN — AMOXICILLIN 500 MG: 250 CAPSULE ORAL at 08:02

## 2017-11-16 RX ADMIN — ASPIRIN 81 MG 81 MG: 81 TABLET ORAL at 08:02

## 2017-11-16 RX ADMIN — CLONIDINE HYDROCHLORIDE 0.1 MG: 0.1 TABLET ORAL at 11:53

## 2017-11-16 RX ADMIN — TRAMADOL HYDROCHLORIDE 50 MG: 50 TABLET, FILM COATED ORAL at 14:54

## 2017-11-16 RX ADMIN — CARBIDOPA AND LEVODOPA 2 TABLET: 25; 100 TABLET ORAL at 08:04

## 2017-11-16 RX ADMIN — LOSARTAN POTASSIUM 50 MG: 50 TABLET ORAL at 08:02

## 2017-11-16 RX ADMIN — PINDOLOL 2.5 MG: 5 TABLET ORAL at 14:53

## 2017-11-16 RX ADMIN — CARBIDOPA AND LEVODOPA 2 TABLET: 25; 100 TABLET ORAL at 06:47

## 2017-11-16 RX ADMIN — INFLUENZA VIRUS VACCINE 0.5 ML: 15; 15; 15; 15 SUSPENSION INTRAMUSCULAR at 16:19

## 2017-11-16 RX ADMIN — CARBIDOPA AND LEVODOPA 2 TABLET: 25; 100 TABLET ORAL at 14:54

## 2017-11-16 RX ADMIN — Medication 10 ML: at 06:47

## 2017-11-16 RX ADMIN — ESCITALOPRAM OXALATE 20 MG: 10 TABLET ORAL at 08:02

## 2017-11-16 RX ADMIN — TRAMADOL HYDROCHLORIDE 50 MG: 50 TABLET, FILM COATED ORAL at 06:47

## 2017-11-16 NOTE — PROGRESS NOTES
Bedside and Verbal shift change report given to [address] [place] [address] 20 (oncoming nurse) by Nacho Escobar (offgoing nurse). Report included the following information SBAR, Kardex, Intake/Output, MAR and Recent Results.

## 2017-11-16 NOTE — PROGRESS NOTES
Hospital follow up PCP transitional care appointment has been scheduled for Monday, 11/20/17 at 3:30 p.m. Pending patient discharge.   Anaid Ledezma, Care Management Specialist.

## 2017-11-16 NOTE — DISCHARGE INSTRUCTIONS
Discharge Summary      PATIENT ID: Isis Davison  MRN: [id number]   YOB: 1933    DATE OF ADMISSION: 11/12/2017  5:57 PM    DATE OF DISCHARGE: 11/16/2017  PRIMARY CARE PROVIDER: Loly Aguilar MD   ATTENDING PHYSICIAN: Merlinda Heap, MD   DISCHARGING PROVIDER: Nader Jay NP. To contact this individual call [phone] and ask the  to page. If unavailable ask to be transferred the Adult Hospitalist Department.     CONSULTATIONS: IP CONSULT TO [address] [place] [address] COURSE:   Pt presented to the ED from [facility] with reported altered mental status and agitation.  Per collective reports, staff at the facility noticed that the patient was confused and agitated and were concerned as he was recently treated for UTI and on Bactrim.  Patient reports that he fell last week with no reported head or neck trauma.      DISCHARGE DIAGNOSES / PLAN:       Altered mental status (AMS), possible metabolic encephalopathy:     - CT head without contrast showing no acute intracranial process  - neurology has seen - repeat CT shows nothing new  - may be due to UTI  - acetaminophen/ salicylate/TSH, and ammonia normal.    - no narcotics - may have tramadol  - PT/OT have seen - SNF is mentioned, however, daughter reports pt does not do well at these facilities and just becomes more confused. - mental status a bit better today, Hopeful that transfer home will aid with mental status and orientation.       Pain R Shoulder:  - has been imaged with no acute abnormality  - R humerus imaged yesterday - no fracture  - tramadol prn.  Avoid narcotics      Dehydration: encourage po diet      Anxiety/ Agitation:    - continue Seroquel and Lexapro   - will no re-order ativan on discharge      Hypocalcemia: midly low     UTI (POA):    - Current UA suggest pyuria in context of already having received Bactrim.    - Urine culture with enterococcus faecalis, susceptible to ampicillin  - Abx changed to amoxicillin today, will continue for 5 more days      Hx Hypertension: increased pts cozaar, has prn clonidine to use which appears to be fairly effective. - has a hx og orthostatic hypotension. Pt no longer stands and BP has been high. Will not restart this for now, but if BP were to start dropping with sitting up or he become symptomatic, consider restarting. This was discussed with pts daughter, Cinda Mendez.       Hx Parkinson's disease: On Sinemet and Exelon.    - Northera on hold.      Cardiac pacemaker      FOLLOW UP APPOINTMENTS:    Follow-up Information     Follow up With Details Comments Contact Info     Jose Juan Magaña MD In 1 week or [address]  [phone]         ADDITIONAL CARE RECOMMENDATIONS:   Stopped narcotics and ativan.      Stop Bakari Romance for now due to hypertension. If pt start exhibiting signs of hypotension - may restart. Dose was 100 mg TID     DIET: Cardiac Diet     ACTIVITY: PT/OT Eval and Treat     EQUIPMENT needed: as per PT/OT     DISCHARGE MEDICATIONS:        Current Discharge Medication List             START taking these medications     Details   amoxicillin (AMOXIL) 500 mg capsule Take 1 Cap by mouth three (3) times daily for 5 days. Qty: 15 Cap, Refills: 0                 CONTINUE these medications which have CHANGED     Details   losartan (COZAAR) 50 mg tablet Take 1 Tab by mouth daily. Qty: 30 Tab, Refills: 0                 CONTINUE these medications which have NOT CHANGED     Details   aspirin 81 mg chewable tablet Take 81 mg by mouth daily.       nitrofurantoin (MACRODANTIN) 50 mg capsule Take 50 mg by mouth nightly. Indications: uti prophylaxis       traMADol (ULTRAM) 50 mg tablet Take 1 Tab by mouth every eight (8) hours as needed for Pain.  Max Daily Amount: 150 mg.  Qty: 30 Tab, Refills: 1       QUEtiapine (SEROQUEL) 25 mg tablet Take 25 mg by mouth every evening.       DETROL LA 2 mg ER capsule TAKE 1 CAPSULE DAILY FOR BLADDER FREQUENCY  Qty: 90 Cap, Refills: 3     Associated Diagnoses: Overactive bladder       pindolol (VISKIN) 5 mg tablet Take 2.5 mg by mouth daily (after lunch). Take 1/2 tablet by mouth at 2pm -Take 1 tablet by mouth at 9pm       albuterol (PROVENTIL HFA, VENTOLIN HFA, PROAIR HFA) 90 mcg/actuation inhaler Take 2 Puffs by inhalation every six (6) hours as needed for Wheezing. Qty: 1 Inhaler, Refills: 0     Associated Diagnoses: Chronic cough       carbidopa-levodopa (SINEMET)  mg per tablet Take 2 Tabs by mouth four (4) times daily. (This is given at 8:00, 11:00, 14:00, and 21:00). Qty: 240 Tab, Refills: 0       polyethylene glycol (MIRALAX) 17 gram packet Take 1 Packet by mouth daily. Indications: CONSTIPATION  Qty: 30 Packet, Refills: 0       therapeutic multivitamin (THERAGRAN) tablet Take 1 Tab by mouth daily. Qty: 30 Tab, Refills: 0       cloNIDine HCl (CATAPRES) 0.1 mg tablet Take 1 Tab by mouth every six (6) hours as needed (SBP > 180 mmHg). Qty: 20 Tab, Refills: 0       docusate sodium (COLACE) 100 mg capsule Take 1 Cap by mouth daily as needed for Constipation. Qty: 30 Cap, Refills: 0       cycloSPORINE (RESTASIS) 0.05 % ophthalmic emulsion Administer 1 Drop to both eyes two (2) times a day. Qty: 60 Each, Refills: 0       escitalopram oxalate (LEXAPRO) 20 mg tablet Take 1 Tab by mouth daily. Qty: 30 Tab, Refills: 0       rivastigmine (EXELON) 4.6 mg/24 hr patch 1 Patch by TransDERmal route daily. Qty: 30 Patch, Refills: 0     Associated Diagnoses: Mild cognitive impairment       esomeprazole (NEXIUM) 40 mg capsule Take 1 Cap by mouth daily. Qty: 30 Cap, Refills: 0       !! diclofenac (VOLTAREN) 1 % gel Apply 2 g to affected area every six (6) hours.  Apply to shoulder for 5 days stop if no improvement  Qty: 100 g, Refills: 0       !! diclofenac (VOLTAREN) 1 % gel Apply  to affected area as needed.       acetaminophen (MAPAP EXTRA STRENGTH) 500 mg tablet Take 1 Tab by mouth every six (6) hours as needed for Pain. Qty: 120 Tab, Refills: 0        !! - Potential duplicate medications found. Please discuss with provider.            STOP taking these medications         oxybutynin (DITROPAN) 5 mg tablet Comments:   Reason for Stopping:            HYDROcodone-acetaminophen (NORCO) 5-325 mg per tablet Comments:   Reason for Stopping:            LORazepam (ATIVAN) 0.5 mg tablet Comments:   Reason for Stopping:            droxidopa (NORTHERA) 100 mg cap Comments:   Reason for Stopping:                 NOTIFY YOUR PHYSICIAN FOR ANY OF THE FOLLOWING:   Fever over 101 degrees for 24 hours. Chest pain, shortness of breath, fever, chills, nausea, vomiting, diarrhea, change in mentation, falling, weakness, bleeding. Severe pain or pain not relieved by medications. Or, any other signs or symptoms that you may have questions about.     DISPOSITION:  xx  Home With:  xx OT xx PT xx HH   RN        Long term SNF/Inpatient Rehab   xx Independent/assisted living     Hospice     Other:      PATIENT CONDITION AT DISCHARGE:   Functional status   xx Poor    xx Deconditioned      Independent       Cognition      Lucid    xx Forgetful      Dementia       Catheters/lines (plus indication)     Vance      PICC      PEG    xx None       Code status      Full code    xx DNR       PHYSICAL EXAMINATION AT DISCHARGE:  /82  Pulse 81  Temp 98.4 °F (36.9 °C)  Resp 18  Ht 6' 2\" (1.88 m)  Wt 92.7 kg (204 lb 5.9 oz)  SpO2 95%  BMI 26.24 kg/m2     Pt lying in bed, orientation is better today but still waxing and waning. Plans for discharge today were discussed with pts daughter, Isis Lewis, over the phone.      Constitutional:  No acute distress, cooperative, pleasant    ENT:  Oral mucous membranes moist, oropharynx benign. Resp:  CTA bilaterally. No wheezing. No accessory muscle use, on RA   CV:  Regular rhythm, normal rate.    GI:  Soft, non distended, non tender.  Normoactive bowel sounds     Musculoskeletal:  No edema, warm, 2+ pulses throughout. Limited ROM to R shoulder/arm due to discomfort    Neurologic:  Moves all extremities.  Alert. Oriented to person/family and place.      CHRONIC MEDICAL DIAGNOSES:  Problem List as of 2017  Date Reviewed: 2017             Codes Class Noted - Resolved     * (Principal)Altered mental status ICD-10-CM: R41.82  ICD-9-CM: 780.97   2017 - Present           Benign nodular prostatic hyperplasia with lower urinary tract symptoms- Dr. Chi Vital: N40.1  ICD-9-CM: 600.10   2017 - Present           Hemispheric carotid artery syndrome ICD-10-CM: G45.1  ICD-9-CM: 482. 8   2017 - Present           ACP (advance care planning) ICD-10-CM: Z71.89  ICD-9-CM: V65.49   2017 - Present           Pacemaker end of life ICD-10-CM: H50.584  ICD-9-CM: V53.31   3/13/2017 - Present     Overview Signed 3/13/2017  9:49 AM by Felipe Castro MD       Generator change 3/13/2017               Pneumonia ICD-10-CM: J18.9  ICD-9-CM: 419   2017 - Present           Aneurysm of iliac artery ([address]- left 3.9 cm  CT scan- no change ICD-10-CM: I72.3  ICD-9-CM: 501. 2   2016 - Present           Grief- wife of 61 years  2015 ICD-10-CM: F43.20  ICD-9-CM: 309.0   2015 - Present           Primary osteoarthritis of both knees ICD-10-CM: M17.0  ICD-9-CM: 715.16   2015 - Present           Overactive bladder- Dr. Lance Matthew ICD-10-CM: C79.74  ICD-9-CM: 596.51   2015 - Present           Rupture quadriceps tendon- bilat--2014- UNABLE TO WALK AFTER THAT. (Chronic) ICD-10-CM: N34.247R  ICD-9-CM: 962. 8   2014 - Present           Mild cognitive impairment w/o memory loss ICD-10-CM: G31.84  ICD-9-CM: 331.83   2014 - Present           Major depressive disorder, single episode, unspecified ICD-10-CM: F32.9  ICD-9-CM: 296.20   2014 - Present           Generalized anxiety disorder ICD-10-CM: F41.1  ICD-9-CM: 300.02   2014 - Present           Orthostatic hypotension ICD-10-CM: I95.1  ICD-9-CM: 781. 0   9/28/2013 - Present           Blurry vision-chronic, no change- neg w/u ICD-10-CM: H53.8  ICD-9-CM: 368. 8   7/9/2013 - Present           NSVT (nonsustained ventricular tachycardia) ([facility]) ICD-10-CM: I47.2  ICD-9-CM: 427.1   7/20/2012 - Present     Overview Signed 7/20/2012  9:56 AM by Zack Trejo MD       2. 2.2012               PAT (paroxysmal atrial tachycardia) ([facility]) ICD-10-CM: I47.1  ICD-9-CM: 427.0   1/19/2012 - Present           Sick sinus syndrome-pacemaker-2008 ICD-10-CM: I49.5  ICD-9-CM: 427.81   10/28/2011 - Present           Essential hypertension, difficult to control due to orthostatic hypotension ICD-10-CM: I10  ICD-9-CM: 401. 1   4/18/2011 - Present           Parkinsonian syndrome- Dr. Norah Estrella ICD-10-CM: Nicky Bedolla  ICD-9-CM: 332.0   4/8/2011 - Present           Hyperlipemia ICD-10-CM: E78.5  ICD-9-CM: 272.4   2/19/2010 - Present           Reflux esophagitis ICD-10-CM: K21.0  ICD-9-CM: 530.11   2/19/2010 - Present           DDD (degenerative disc disease), lumbar ICD-10-CM: M51.36  ICD-9-CM: 722.52   2/19/2010 - Present           DJD (degenerative joint disease) of cervical spine ICD-10-CM: M47.812  ICD-9-CM: 721.0   2/19/2010 - Present           Paralysis agitans ([address] ICD-10-CM: Nicky Bedolla  ICD-9-CM: 332.0   2/19/2010 - Present           DU (duodenal ulcer) ICD-10-CM: K26.9  ICD-9-CM: 532.90   9/1/1990 - Present           History of duodenal ulcer ICD-10-CM: Z87.19  ICD-9-CM: V12.79   9/1/1990 - Present           RESOLVED: Dehydration ICD-10-CM: E86.0  ICD-9-CM: 276.51   11/12/2017 - 11/16/2017           RESOLVED: Agitation ICD-10-CM: R45.1  ICD-9-CM: 307. 9   11/12/2017 - 11/16/2017           RESOLVED: Slurred speech ICD-10-CM: R47.81  ICD-9-CM: 784.59   6/26/2017 - 7/12/2017           RESOLVED: Altered mental status ICD-10-CM: R41.82  ICD-9-CM: 780.97   6/26/2017 - 7/12/2017           RESOLVED: Influenza A ICD-10-CM: J10.1  ICD-9-CM: 487.1   3/19/2017 - 7/12/2017         RESOLVED: Sepsis ([facility] ICD-10-CM: A41.9  ICD-9-CM: 038.9, 995.91   6/10/2016 - 7/28/2016           RESOLVED: Catheter-associated urinary tract infection (HCC) ICD-10-CM: T83.511A, N39.0  ICD-9-CM: 996.64, 599.0   4/15/2016 - 4/18/2016           RESOLVED: Metabolic encephalopathy JOHN-52-CY: G93.41  ICD-9-CM: 348.31   4/15/2016 - 4/18/2016           RESOLVED: GABI (acute kidney injury) ([facility] ICD-10-CM: N17.9  ICD-9-CM: 776. 9   4/15/2016 - 4/18/2016           RESOLVED: Hyponatremia ICD-10-CM: E87.1  ICD-9-CM: 276.1   4/15/2016 - 4/18/2016           RESOLVED: Mental status change ICD-10-CM: R41.82  ICD-9-CM: 780.97   10/30/2013 - 1/5/2016           RESOLVED: Pneumonia, organism unspecified(486) ICD-10-CM: J18.9  ICD-9-CM: 608   2/19/2012 - 1/5/2016           RESOLVED: Weakness generalized ICD-10-CM: R53.1  ICD-9-CM: 780.79   2/19/2012 - 2/22/2012           RESOLVED: PAT (paroxysmal atrial tachycardia) (HCC) ICD-10-CM: I47.1  ICD-9-CM: 427.0   1/19/2012 - 2/29/2012           RESOLVED: Premature atrial beats ICD-10-CM: I49.1  ICD-9-CM: 427.61   Unknown - 2/29/2012               Greater than 30 minutes were spent with the patient on counseling and coordination of care     Signed:   Matt Pop NP  11/16/2017  1:39 PM

## 2017-11-16 NOTE — PROGRESS NOTES
Cm received a call from Ml Azar, charge nurse of the day at [facility]. She stated that they can accept this pt back there today. CM informed NP, Milo Phalen, who will discharge pt today. CM spoke with pt's daughter, Rere Gramajo, to inform her and she is in agreement with this plan. Home health orders were written for this pt and will be sent in a packet that will go with pt to the facility. Per Ml Azar, they will set this up with [facility]. Per daughter's request, CM set up ambulance transport with [facility] for 4pm. An envelope containing pt's kardex, MAR, AVS and home health orders will be sent with pt to [facility]. Also, pt's nurse will call report.  Vanesa Crowley

## 2017-11-16 NOTE — PROGRESS NOTES
Problem: Falls - Risk of  Goal: *Absence of Falls  Document Oksana Fall Risk and appropriate interventions in the flowsheet.    Outcome: Progressing Towards Goal  Fall Risk Interventions:  Mobility Interventions: Bed/chair exit alarm    Mentation Interventions: Bed/chair exit alarm    Medication Interventions: Bed/chair exit alarm    Elimination Interventions: Bed/chair exit alarm

## 2017-11-16 NOTE — PROGRESS NOTES
11/16/17 0749   Vital Signs   Temp 98.4 °F (36.9 °C)   Temp Source Oral   Pulse (Heart Rate) 85   Heart Rate Source Monitor   Resp Rate 18   O2 Sat (%) 95 %   Level of Consciousness Alert   BP (!) 169/112   MAP (Calculated) 131   BP 1 Method Automatic   BP 1 Location Left arm   BP Patient Position At rest   MEWS Score 1   Oxygen Therapy   O2 Device Room air   NP informed.  Elie Love

## 2017-11-16 NOTE — DISCHARGE SUMMARY
Discharge Summary     PATIENT ID: Miguel Collier  MRN: [id number]   YOB: 1933    DATE OF ADMISSION: 11/12/2017  5:57 PM    DATE OF DISCHARGE: 11/16/2017  PRIMARY CARE PROVIDER: Ji Hdz MD   ATTENDING PHYSICIAN: Jay Morales MD   DISCHARGING PROVIDER: Ac Pearce NP. To contact this individual call [phone] and ask the  to page. If unavailable ask to be transferred the Adult Hospitalist Department. CONSULTATIONS: IP CONSULT  [facility] COURSE:   Pt presented to the ED from [facility] with reported altered mental status and agitation.  Per collective reports, staff at the facility noticed that the patient was confused and agitated and were concerned as he was recently treated for UTI and on Bactrim.  Patient reports that he fell last week with no reported head or neck trauma. DISCHARGE DIAGNOSES / PLAN:      Altered mental status (AMS), possible metabolic encephalopathy:     - CT head without contrast showing no acute intracranial process  - neurology has seen - repeat CT shows nothing new  - may be due to UTI  - acetaminophen/ salicylate/TSH, and ammonia normal.    - no narcotics - may have tramadol  - PT/OT have seen - SNF is mentioned, however, daughter reports pt does not do well at these facilities and just becomes more confused. - mental status a bit better today, Hopeful that transfer home will aid with mental status and orientation.      Pain R Shoulder:  - has been imaged with no acute abnormality  - R humerus imaged yesterday - no fracture  - tramadol prn.  Avoid narcotics     Dehydration: encourage po diet      Anxiety/ Agitation:    - continue Seroquel and Lexapro   - will no re-order ativan on discharge      Hypocalcemia: midly low    UTI (POA):    - Current UA suggest pyuria in context of already having received Bactrim.    - Urine culture with enterococcus faecalis, susceptible to ampicillin  - Abx changed to amoxicillin today, will continue for 5 more days      Hx Hypertension: increased pts cozaar, has prn clonidine to use which appears to be fairly effective. - has a hx og orthostatic hypotension. Pt no longer stands and BP has been high. Will not restart this for now, but if BP were to start dropping with sitting up or he become symptomatic, consider restarting. This was discussed with pts daughter, Lana Rawls.       Hx Parkinson's disease: On Sinemet and Exelon.    - Northera on hold.      Cardiac pacemaker     FOLLOW UP APPOINTMENTS:    Follow-up Information     Follow up With Details Comments Αμαλίας MD Edward In 1 week or [facility] provider [address] [place] [address]  [phone]           ADDITIONAL CARE RECOMMENDATIONS:   Stopped narcotics and ativan. Stop [place] for now due to hypertension. If pt start exhibiting signs of hypotension - may restart. Dose was 100 mg TID    DIET: Cardiac Diet    ACTIVITY: PT/OT Eval and Treat    EQUIPMENT needed: as per PT/OT    DISCHARGE MEDICATIONS:  Current Discharge Medication List      START taking these medications    Details   amoxicillin (AMOXIL) 500 mg capsule Take 1 Cap by mouth three (3) times daily for 5 days. Qty: 15 Cap, Refills: 0         CONTINUE these medications which have CHANGED    Details   losartan (COZAAR) 50 mg tablet Take 1 Tab by mouth daily. Qty: 30 Tab, Refills: 0         CONTINUE these medications which have NOT CHANGED    Details   aspirin 81 mg chewable tablet Take 81 mg by mouth daily. nitrofurantoin (MACRODANTIN) 50 mg capsule Take 50 mg by mouth nightly. Indications: uti prophylaxis      traMADol (ULTRAM) 50 mg tablet Take 1 Tab by mouth every eight (8) hours as needed for Pain. Max Daily Amount: 150 mg.  Qty: 30 Tab, Refills: 1      QUEtiapine (SEROQUEL) 25 mg tablet Take 25 mg by mouth every evening.       DETROL LA 2 mg ER capsule TAKE 1 CAPSULE DAILY FOR BLADDER FREQUENCY  Qty: 90 Cap, Refills: 3 Associated Diagnoses: Overactive bladder      pindolol (VISKIN) 5 mg tablet Take 2.5 mg by mouth daily (after lunch). Take 1/2 tablet by mouth at 2pm -Take 1 tablet by mouth at 9pm      albuterol (PROVENTIL HFA, VENTOLIN HFA, PROAIR HFA) 90 mcg/actuation inhaler Take 2 Puffs by inhalation every six (6) hours as needed for Wheezing. Qty: 1 Inhaler, Refills: 0    Associated Diagnoses: Chronic cough      carbidopa-levodopa (SINEMET)  mg per tablet Take 2 Tabs by mouth four (4) times daily. (This is given at 8:00, 11:00, 14:00, and 21:00). Qty: 240 Tab, Refills: 0      polyethylene glycol (MIRALAX) 17 gram packet Take 1 Packet by mouth daily. Indications: CONSTIPATION  Qty: 30 Packet, Refills: 0      therapeutic multivitamin (THERAGRAN) tablet Take 1 Tab by mouth daily. Qty: 30 Tab, Refills: 0      cloNIDine HCl (CATAPRES) 0.1 mg tablet Take 1 Tab by mouth every six (6) hours as needed (SBP > 180 mmHg). Qty: 20 Tab, Refills: 0      docusate sodium (COLACE) 100 mg capsule Take 1 Cap by mouth daily as needed for Constipation. Qty: 30 Cap, Refills: 0      cycloSPORINE (RESTASIS) 0.05 % ophthalmic emulsion Administer 1 Drop to both eyes two (2) times a day. Qty: 60 Each, Refills: 0      escitalopram oxalate (LEXAPRO) 20 mg tablet Take 1 Tab by mouth daily. Qty: 30 Tab, Refills: 0      rivastigmine (EXELON) 4.6 mg/24 hr patch 1 Patch by TransDERmal route daily. Qty: 30 Patch, Refills: 0    Associated Diagnoses: Mild cognitive impairment      esomeprazole (NEXIUM) 40 mg capsule Take 1 Cap by mouth daily. Qty: 30 Cap, Refills: 0      !! diclofenac (VOLTAREN) 1 % gel Apply 2 g to affected area every six (6) hours. Apply to shoulder for 5 days stop if no improvement  Qty: 100 g, Refills: 0      !! diclofenac (VOLTAREN) 1 % gel Apply  to affected area as needed. acetaminophen (MAPAP EXTRA STRENGTH) 500 mg tablet Take 1 Tab by mouth every six (6) hours as needed for Pain.   Qty: 120 Tab, Refills: 0       !! - Potential duplicate medications found. Please discuss with provider. STOP taking these medications       oxybutynin (DITROPAN) 5 mg tablet Comments:   Reason for Stopping:         HYDROcodone-acetaminophen (NORCO) 5-325 mg per tablet Comments:   Reason for Stopping:         LORazepam (ATIVAN) 0.5 mg tablet Comments:   Reason for Stopping:         droxidopa (NORTHERA) 100 mg cap Comments:   Reason for Stopping:             NOTIFY YOUR PHYSICIAN FOR ANY OF THE FOLLOWING:   Fever over 101 degrees for 24 hours. Chest pain, shortness of breath, fever, chills, nausea, vomiting, diarrhea, change in mentation, falling, weakness, bleeding. Severe pain or pain not relieved by medications. Or, any other signs or symptoms that you may have questions about. DISPOSITION:  xx  Home With:  xx OT xx PT xx HH  RN       Long term SNF/Inpatient Rehab   xx Independent/assisted living    Hospice    Other:     PATIENT CONDITION AT DISCHARGE:   Functional status   xx Poor    xx Deconditioned     Independent      Cognition     Lucid    xx Forgetful     Dementia      Catheters/lines (plus indication)    Vance     PICC     PEG    xx None      Code status     Full code    xx DNR      PHYSICAL EXAMINATION AT DISCHARGE:  /82  Pulse 81  Temp 98.4 °F (36.9 °C)  Resp 18  Ht 6' 2\" (1.88 m)  Wt 92.7 kg (204 lb 5.9 oz)  SpO2 95%  BMI 26.24 kg/m2    Pt lying in bed, orientation is better today but still waxing and waning. Plans for discharge today were discussed with pts daughter, Elie Field, over the phone. Constitutional:  No acute distress, cooperative, pleasant    ENT:  Oral mucous membranes moist, oropharynx benign. Resp:  CTA bilaterally. No wheezing. No accessory muscle use, on RA   CV:  Regular rhythm, normal rate. GI:  Soft, non distended, non tender. Normoactive bowel sounds     Musculoskeletal:  No edema, warm, 2+ pulses throughout.  Limited ROM to R shoulder/arm due to discomfort    Neurologic:  Moves all extremities. Alert. Oriented to person/family and place. CHRONIC MEDICAL DIAGNOSES:  Problem List as of 2017  Date Reviewed: 2017          Codes Class Noted - Resolved    * (Principal)Altered mental status ICD-10-CM: R41.82  ICD-9-CM: 780.97  2017 - Present        Benign nodular prostatic hyperplasia with lower urinary tract symptoms- Dr. Kaykay Benton: N40.1  ICD-9-CM: 600.10  2017 - Present        Hemispheric carotid artery syndrome ICD-10-CM: G45.1  ICD-9-CM: 435.8  2017 - Present        ACP (advance care planning) ICD-10-CM: Z71.89  ICD-9-CM: V65.49  2017 - Present        Pacemaker end of life ICD-10-CM: L74.881  ICD-9-CM: V53.31  3/13/2017 - Present    Overview Signed 3/13/2017  9:49 AM by Ethel Lacy MD     Generator change 3/13/2017             Pneumonia ICD-10-CM: M57.6  ICD-9-CM: 486  2017 - Present        Aneurysm of iliac artery (HCC)- left 3.9 cm  CT scan- no change ICD-10-CM: I72.3  ICD-9-CM: 442.2  2016 - Present        Grief- wife of 61 years  2015 ICD-10-CM: F43.20  ICD-9-CM: 309.0  2015 - Present        Primary osteoarthritis of both knees ICD-10-CM: M17.0  ICD-9-CM: 715.16  2015 - Present        Overactive bladder- Dr. 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  (Chronic) ICD-10-CM: R46.203F  ICD-9-CM: 844.8  2014 - Present        Mild cognitive impairment w/o memory loss ICD-10-CM: G31.84  ICD-9-CM: 331.83  2014 - Present        Major depressive disorder, single episode, unspecified ICD-10-CM: F32.9  ICD-9-CM: 296.20  2014 - Present        Generalized anxiety disorder ICD-10-CM: F41.1  ICD-9-CM: 300.02  2014 - Present        Orthostatic hypotension ICD-10-CM: I95.1  ICD-9-CM: 458.0  2013 - Present        Blurry vision-chronic, no change- neg w/u ICD-10-CM: H53.8  ICD-9-CM: 368.8  2013 - Present        NSVT (nonsustained ventricular tachycardia) ([facility]) ICD-10-CM: I47.2  ICD-9-CM: 427.1  7/20/2012 - Present    Overview Signed 7/20/2012  9:56 AM by Felipe Castro MD     2. 2.2012             PAT (paroxysmal atrial tachycardia) ([facility]) ICD-10-CM: I47.1  ICD-9-CM: 427.0  1/19/2012 - Present        Sick sinus syndrome-pacemaker-2008 ICD-10-CM: I49.5  ICD-9-CM: 427.81  10/28/2011 - Present        Essential hypertension, difficult to control due to orthostatic hypotension ICD-10-CM: I10  ICD-9-CM: 401.1  4/18/2011 - Present        Parkinsonian syndrome- Dr. Oleg Thomas ICD-10-CM: Howard Perdomo  ICD-9-CM: 332.0  4/8/2011 - Present        Hyperlipemia ICD-10-CM: E78.5  ICD-9-CM: 272.4  2/19/2010 - Present        Reflux esophagitis ICD-10-CM: K21.0  ICD-9-CM: 530.11  2/19/2010 - Present        DDD (degenerative disc disease), lumbar ICD-10-CM: M51.36  ICD-9-CM: 722.52  2/19/2010 - Present        DJD (degenerative joint disease) of cervical spine ICD-10-CM: M47.812  ICD-9-CM: 721.0  2/19/2010 - Present        Paralysis agitans ([address] ICD-10-CM: Howard Perdomo  ICD-9-CM: 332.0  2/19/2010 - Present        DU (duodenal ulcer) ICD-10-CM: K26.9  ICD-9-CM: 532.90  9/1/1990 - Present        History of duodenal ulcer ICD-10-CM: Z87.19  ICD-9-CM: V12.79  9/1/1990 - Present        RESOLVED: Dehydration ICD-10-CM: E86.0  ICD-9-CM: 276.51  11/12/2017 - 11/16/2017        RESOLVED: Agitation ICD-10-CM: R45.1  ICD-9-CM: 307.9  11/12/2017 - 11/16/2017        RESOLVED: Slurred speech ICD-10-CM: R47.81  ICD-9-CM: 784.59  6/26/2017 - 7/12/2017        RESOLVED: Altered mental status ICD-10-CM: R41.82  ICD-9-CM: 780.97  6/26/2017 - 7/12/2017        RESOLVED: Influenza A ICD-10-CM: J10.1  ICD-9-CM: 487.1  3/19/2017 - 7/12/2017        RESOLVED: Sepsis ([facility] ICD-10-CM: A41.9  ICD-9-CM: 038.9, 995.91  6/10/2016 - 7/28/2016        RESOLVED: Catheter-associated urinary tract infection ([facility] ICD-10-CM: T83.511A, N39.0  ICD-9-CM: 996.64, 599.0  4/15/2016 - 4/18/2016 RESOLVED: Metabolic encephalopathy KOG-35-BX: G93.41  ICD-9-CM: 348.31  4/15/2016 - 4/18/2016        RESOLVED: GABI (acute kidney injury) ([facility] ICD-10-CM: N17.9  ICD-9-CM: 584.9  4/15/2016 - 4/18/2016        RESOLVED: Hyponatremia ICD-10-CM: E87.1  ICD-9-CM: 276.1  4/15/2016 - 4/18/2016        RESOLVED: Mental status change ICD-10-CM: R41.82  ICD-9-CM: 780.97  10/30/2013 - 1/5/2016        RESOLVED: Pneumonia, organism unspecified(486) ICD-10-CM: J18.9  ICD-9-CM: 086  2/19/2012 - 1/5/2016        RESOLVED: Weakness generalized ICD-10-CM: R53.1  ICD-9-CM: 780.79  2/19/2012 - 2/22/2012        RESOLVED: PAT (paroxysmal atrial tachycardia) ([facility] ICD-10-CM: I47.1  ICD-9-CM: 427.0  1/19/2012 - 2/29/2012        RESOLVED: Premature atrial beats ICD-10-CM: I49.1  ICD-9-CM: 427.61  Unknown - 2/29/2012            Greater than 30 minutes were spent with the patient on counseling and coordination of care    Signed:   Hermelindo Frost NP  11/16/2017  1:39 PM

## 2017-11-16 NOTE — PROGRESS NOTES
11/15/17 2113   Vitals   Temp 97.8 °F (36.6 °C)   Temp Source Oral   Pulse (Heart Rate) 81   Heart Rate Source Monitor   Resp Rate 16   O2 Sat (%) 95 %   Level of Consciousness Alert   BP (!) 156/106   MAP (Calculated) 123   BP 1 Location Right arm   BP 1 Method Automatic   BP Patient Position At rest   MEWS Score 1   Patient in pain.  Will recheck BP after pain medication administered

## 2017-11-16 NOTE — PROGRESS NOTES
Pt may be ready for d/c today. Clayton spoke with Pretty Li , charge nurse at [facility], and per her request, CM faxed her some clinicals. She will review them and then call this CM. She said that pt can most likely return there today. Will follow.  Tali Smith

## 2017-11-16 NOTE — PROGRESS NOTES
Problem: Falls - Risk of  Goal: *Absence of Falls  Document Oksana Fall Risk and appropriate interventions in the flowsheet.    Outcome: Progressing Towards Goal  Fall Risk Interventions:  Mobility Interventions: Bed/chair exit alarm, Patient to call before getting OOB, Strengthening exercises (ROM-active/passive), Utilize walker, cane, or other assitive device, Utilize gait belt for transfers/ambulation    Mentation Interventions: Bed/chair exit alarm, Adequate sleep, hydration, pain control, Door open when patient unattended, Eyeglasses and hearing aids, Familiar objects from home, Increase mobility, More frequent rounding, Reorient patient, Update white board    Medication Interventions: Bed/chair exit alarm, Teach patient to arise slowly, Patient to call before getting OOB    Elimination Interventions: Bed/chair exit alarm, Call light in reach, Toileting schedule/hourly rounds

## 2017-11-17 ENCOUNTER — PATIENT OUTREACH (OUTPATIENT)
Dept: FAMILY MEDICINE CLINIC | Age: 82
End: 2017-11-17

## 2017-11-17 ENCOUNTER — TELEPHONE (OUTPATIENT)
Dept: FAMILY MEDICINE CLINIC | Age: 82
End: 2017-11-17

## 2017-11-17 LAB
BACTERIA SPEC CULT: NORMAL
SERVICE CMNT-IMP: NORMAL

## 2017-11-17 NOTE — PROGRESS NOTES
Viktoria Hernandez is a 80 y.o. male   This patient was received as a referral from [facility] report and daily census   Inpatient RRAT Score: 24  Patient's challenges to self management identified:  None identified    Medication Management:  good adherence; medications are being given by [email or web address] at [place]. Summary of patients top three problems:     Problem 1: Altered mental status- patient admitted to [facility] 17-17 for acute encephalopathy. Patient resides at [facility]. Staff reports increased confusion and agitation with recent diagnosis UTI on Bactrim. Imaging completed: CT neg. Labs unremarkable. UA suggest pyuria. Urine culture revealed enterococcus faecalis. New prescription: Amoxicillin, Losartan increased     Patients motivational level on a scale of 0-10: 6    Advance Care Planning:   Patient was offered the opportunity to discuss advance care planning:  no     Does patient have an Advance Directive:  yes   If no, did you provide information on Advance Care Planning? yes     [employer], Referrals, and Durable Medical Equipment: [facility] for PT/OT eval.    Follow up appointments:  Patient has scheduled JAMI appointment scheduled for Wednesday, 17  Called patient.  verified. Patient resides at [address] [place] [address]; wheelchair bound. Reports pain to right shoulder. \"can't lift it\". Has paid caregiver. Patient authorized this NN to speak with Rossi Fernandez. Chayo Hurley reports he is \"doing ok\"; does not appear to be as confused today. Medication reconciliation completed with Chayo Hurley; however, discrepancies noted. NN contacted Tamra Low to review medications. Message left with [facility] requesting a return call for medication review. Per discharge instructions: discontinue ativan, ditropan, norco, and northera. Goals        Patient Stated     Altered Mental Status (pt-stated)            17- patient sent to [facility] for increased confusion and agitation.  Urine culture revealed enterococcus faecalis. New prescription: Amoxicillin. Patient will complete antibiotic therapy. sc         Other     Attends follow-up appointments as directed. 11/17/17-patient has scheduled JAMI appointment scheduled for 11/27/17. sc          Patient verbalized understanding of all information discussed. Patient has this Nurse [facility]igators contact information for any further questions, concerns, or needs.

## 2017-11-17 NOTE — TELEPHONE ENCOUNTER
Leo Pantoja from [place] [address] is calling for home health orders for patient for skilled nursing and PT for recent hospital visit for a fall and a UTI. Patient had UTI on 11/13 visited hospital on same day returned home on 11/16.   Best call back # for Leo Pantoja [phone]

## 2017-11-17 NOTE — PROGRESS NOTES
Documented on 11.17.17    Spiritual Care Partner Volunteer visited patient in room 204/01 on 1.16.17. Documented by: : Rev. Allyson Wetzel.  Cat Mcwilliams; [facility], to contact [phone] [address] Maloney call: [phone]

## 2017-11-20 ENCOUNTER — TELEPHONE (OUTPATIENT)
Dept: FAMILY MEDICINE CLINIC | Age: 82
End: 2017-11-20

## 2017-11-20 NOTE — TELEPHONE ENCOUNTER
Josue Gambino from [facility]   [phone]  Asking for approval for occupational therapy he is already getting physical therapy

## 2017-11-22 ENCOUNTER — TELEPHONE (OUTPATIENT)
Dept: FAMILY MEDICINE CLINIC | Age: 82
End: 2017-11-22

## 2017-11-22 NOTE — TELEPHONE ENCOUNTER
Arturo Navarrete From [facility] [phone] is calling wanting to add occupational therapy  To his orders if she does not answer ask that you leave message

## 2017-11-27 ENCOUNTER — HOSPITAL ENCOUNTER (OUTPATIENT)
Dept: LAB | Age: 82
Discharge: HOME | End: 2017-11-27
Payer: MEDICARE

## 2017-11-27 ENCOUNTER — OFFICE VISIT (OUTPATIENT)
Dept: FAMILY MEDICINE CLINIC | Age: 82
End: 2017-11-27

## 2017-11-27 ENCOUNTER — PATIENT OUTREACH (OUTPATIENT)
Dept: FAMILY MEDICINE CLINIC | Age: 82
End: 2017-11-27

## 2017-11-27 VITALS
DIASTOLIC BLOOD PRESSURE: 68 MMHG | OXYGEN SATURATION: 96 % | HEART RATE: 79 BPM | WEIGHT: 204 LBS | TEMPERATURE: 97.8 F | SYSTOLIC BLOOD PRESSURE: 122 MMHG | BODY MASS INDEX: 26.18 KG/M2 | RESPIRATION RATE: 18 BRPM | HEIGHT: 74 IN

## 2017-11-27 DIAGNOSIS — I10 ESSENTIAL HYPERTENSION, BENIGN: ICD-10-CM

## 2017-11-27 DIAGNOSIS — G93.40 ACUTE ENCEPHALOPATHY: ICD-10-CM

## 2017-11-27 DIAGNOSIS — M19.011 PRIMARY OSTEOARTHRITIS OF RIGHT SHOULDER: ICD-10-CM

## 2017-11-27 DIAGNOSIS — D64.9 NORMOCYTIC ANEMIA: ICD-10-CM

## 2017-11-27 DIAGNOSIS — Z09 HOSPITAL DISCHARGE FOLLOW-UP: Primary | ICD-10-CM

## 2017-11-27 DIAGNOSIS — N30.00 ACUTE CYSTITIS WITHOUT HEMATURIA: ICD-10-CM

## 2017-11-27 PROCEDURE — 83550 IRON BINDING TEST: CPT

## 2017-11-27 PROCEDURE — 36415 COLL VENOUS BLD VENIPUNCTURE: CPT

## 2017-11-27 PROCEDURE — 82728 ASSAY OF FERRITIN: CPT

## 2017-11-27 RX ORDER — HYDROCODONE BITARTRATE AND ACETAMINOPHEN 5; 325 MG/1; MG/1
TABLET ORAL
COMMUNITY
End: 2017-11-27

## 2017-11-27 NOTE — MR AVS SNAPSHOT
Visit Information Date & Time Provider Department Dept. Phone Encounter #  
 11/27/2017  1:15 PM Alexis Ireland MD [address] [place] [address] [phone] [id number] Follow-up Instructions Return in about 8 weeks (around 1/22/2018) for Follow Up. Your Appointments 1/18/2018 10:40 AM  
ESTABLISHED PATIENT with Guanakito Rodriguez MD  
[facility] ([address] [place] [address] Road) Appt Note: 6 month f/u  
 [address] [place] [address]  
[address] [place] [address]  
  
    
 7/26/2018 10:30 AM  
PACEMAKER with Cirilo Arceo [facility] ([facility] SCHEDULING) Appt Note: lachelle callaway, annual thresh/CL, see gilman; med threshold/rc/Gilman 1 yr b  
 [address] [place] [address]  
[address] [facility]  
  
    
 7/26/2018 10:40 AM  
ESTABLISHED PATIENT with Guanakito Rodriguez MD  
[facility] ([address] [place] [address] Road) Appt Note: med threshold/rc/Gilman 1 yr b  
 [address]  [address] [place] [address]  
[phone]  
  
    
  
 1/15/2018  9:45 AM  
REMOTE OFFICE VISIT with Alex Velázquez [facility] ([facility] SCHEDULING) Appt Note: lachelle callaway  
 [address] [facility] [address] [place]  
[address] [place] [address]  
  
    
 4/18/2018 10:45 AM  
REMOTE OFFICE VISIT with Alex Velázquez [facility] ([facility] SCHEDULING) Appt Note: lachelle callaway, annual thresh/CL, see gilman; CL PPI/rc b  
 [address] [facility] [address]  
[phone] Upcoming Health Maintenance Date Due DTaP/Tdap/Td series (1 - Tdap) 5/4/2012 MEDICARE YEARLY EXAM 4/29/2018 GLAUCOMA SCREENING Q2Y 8/26/2018 Allergies as of 11/27/2017  Review Complete On: 11/27/2017 By: Manav Patrick [facility] No Known Allergies Current Immunizations  Reviewed on 3/14/2017 Name Date Influenza High Dose Vaccine PF 10/27/2015 Influenza Vaccine (Quad) PF 11/16/2017  4:19 PM  
 Influenza Vaccine Split 11/17/2011 TB Skin Test (PPD) Intradermal 3/25/2013 TD Vaccine 5/3/2012 ZZZ-RETIRED (DO NOT USE) Pneumococcal Vaccine (Unspecified Type) 4/20/2010 Not reviewed this visit You Were Diagnosed With   
  
 Codes Comments Acute cystitis without hematuria    -  Primary ICD-10-CM: N30.00 ICD-9-CM: 595.0 Acute encephalopathy     ICD-10-CM: G93.40 ICD-9-CM: 348.30 Essential hypertension, benign     ICD-10-CM: I10 
ICD-9-CM: 401.1 Primary osteoarthritis of right shoulder     ICD-10-CM: M19.011 
ICD-9-CM: 715.11 Vitals BP Pulse Temp Resp Height(growth percentile) Weight(growth percentile) 122/68 (BP 1 Location: Left arm, BP Patient Position: Sitting) 79 97.8 °F (36.6 °C) (Oral) 18 6' 2\" (1.88 m) 204 lb (92.5 kg) SpO2 BMI Smoking Status 96% 26.19 kg/m2 Former Smoker BMI and BSA Data Body Mass Index Body Surface Area  
 26.19 kg/m 2 2.2 m 2 Preferred Pharmacy Pharmacy Name Phone [address] [place] [address] [phone] Your Updated Medication List  
  
   
This list is accurate as of: 11/27/17  2:11 PM.  Always use your most recent med list.  
  
  
  
  
 acetaminophen 500 mg tablet Commonly known as:  MAPAP EXTRA STRENGTH Take 1 Tab by mouth every six (6) hours as needed for Pain. albuterol 90 mcg/actuation inhaler Commonly known as:  PROVENTIL HFA, VENTOLIN HFA, PROAIR HFA Take 2 Puffs by inhalation every six (6) hours as needed for Wheezing. aspirin 81 mg chewable tablet Take 81 mg by mouth daily. carbidopa-levodopa  mg per tablet Commonly known as:  SINEMET Take 2 Tabs by mouth four (4) times daily. (This is given at 8:00, 11:00, 14:00, and 21:00). cloNIDine HCl 0.1 mg tablet Commonly known as:  CATAPRES Take 1 Tab by mouth every six (6) hours as needed (SBP > 180 mmHg). cycloSPORINE 0.05 % ophthalmic emulsion Commonly known as:  RESTASIS Administer 1 Drop to both eyes two (2) times a day. DETROL LA 2 mg ER capsule Generic drug:  tolterodine ER  
TAKE 1 CAPSULE DAILY FOR BLADDER FREQUENCY  
  
 docusate sodium 100 mg capsule Commonly known as:  Pineda Bough Take 1 Cap by mouth daily as needed for Constipation. escitalopram oxalate 20 mg tablet Commonly known as:  Cloria Kohut Take 1 Tab by mouth daily. esomeprazole 40 mg capsule Commonly known as:  NexIUM Take 1 Cap by mouth daily. HYDROcodone-acetaminophen 5-325 mg per tablet Commonly known as:  Mandy Parisian Take  by mouth.  
  
 losartan 50 mg tablet Commonly known as:  COZAAR Take 1 Tab by mouth daily. nitrofurantoin 50 mg capsule Commonly known as:  MACRODANTIN Take 50 mg by mouth nightly. Indications: uti prophylaxis  
  
 pindolol 5 mg tablet Commonly known as:  VISKIN Take 2.5 mg by mouth daily (after lunch). Take 1/2 tablet by mouth at 2pm -Take 1 tablet by mouth at 9pm  
  
 polyethylene glycol 17 gram packet Commonly known as:  Kathie Kehr Take 1 Packet by mouth daily. Indications: CONSTIPATION  
  
 QUEtiapine 25 mg tablet Commonly known as:  SEROquel Take 25 mg by mouth every evening. rivastigmine 4.6 mg/24 hr patch Commonly known as:  EXELON  
1 Patch by TransDERmal route daily. therapeutic multivitamin tablet Commonly known as:  [facility] Take 1 Tab by mouth daily. traMADol 50 mg tablet Commonly known as:  ULTRAM  
Take 1 Tab by mouth every eight (8) hours as needed for Pain. Max Daily Amount: 150 mg.  
  
 * VOLTAREN 1 % Gel Generic drug:  diclofenac Apply  to affected area as needed. * diclofenac 1 % Gel Commonly known as:  VOLTAREN  
 Apply 2 g to affected area every six (6) hours. Apply to shoulder for 5 days stop if no improvement * Notice: This list has 2 medication(s) that are the same as other medications prescribed for you. Read the directions carefully, and ask your doctor or other care provider to review them with you. Follow-up Instructions Return in about 8 weeks (around 1/22/2018) for Follow Up. Patient Instructions Please see urologist within the next 1 month. Continue pain medicine for your shoulder. Confusion better today. We will try yo reach your pharmacy to be sure you are on the recommended regimen after discharge form hospital  
  
Arthritis: Care Instructions Your Care Instructions Arthritis, also called osteoarthritis, is a breakdown of the cartilage that cushions your joints. When the cartilage wears down, your bones rub against each other. This causes pain and stiffness. Many people have some arthritis as they age. Arthritis most often affects the joints of the spine, hands, hips, knees, or feet. You can take simple measures to protect your joints, ease your pain, and help you stay active. Follow-up care is a key part of your treatment and safety. Be sure to make and go to all appointments, and call your doctor if you are having problems. It's also a good idea to know your test results and keep a list of the medicines you take. How can you care for yourself at home? · Stay at a healthy weight. Being overweight puts extra strain on your joints. · Talk to your doctor or physical therapist about exercises that will help ease joint pain. ¨ Stretch. You may enjoy gentle forms of yoga to help keep your joints and muscles flexible. ¨ Walk instead of jog. Other types of exercise that are less stressful on the joints include riding a bicycle, swimming, janes chi, or water exercise. ¨ Lift weights. Strong muscles help reduce stress on your joints.  Stronger thigh muscles, for example, take some of the stress off of the knees and hips. Learn the right way to lift weights so you do not make joint pain worse. · Take your medicines exactly as prescribed. Call your doctor if you think you are having a problem with your medicine. · Take pain medicines exactly as directed. ¨ If the doctor gave you a prescription medicine for pain, take it as prescribed. ¨ If you are not taking a prescription pain medicine, ask your doctor if you can take an over-the-counter medicine. · Use a cane, crutch, walker, or another device if you need help to get around. These can help rest your joints. You also can use other things to make life easier, such as a higher toilet seat and padded handles on kitchen utensils. · Do not sit in low chairs, which can make it hard to get up. · Put heat or cold on your sore joints as needed. Use whichever helps you most. You also can take turns with hot and cold packs. ¨ Apply heat 2 or 3 times a day for 20 to 30 minutes-using a heating pad, hot shower, or hot pack-to relieve pain and stiffness. ¨ Put ice or a cold pack on your sore joint for 10 to 20 minutes at a time. Put a thin cloth between the ice and your skin. When should you call for help? Call your doctor now or seek immediate medical care if: 
? · You have sudden swelling, warmth, or pain in any joint. ? · You have joint pain and a fever or rash. ? · You have such bad pain that you cannot use a joint. ? Watch closely for changes in your health, and be sure to contact your doctor if: 
? · You have mild joint symptoms that continue even with more than 6 weeks of care at home. ? · You have stomach pain or other problems with your medicine. Where can you learn more? Go to http://rodo-lukas.info/. Enter T714 in the search box to learn more about \"Arthritis: Care Instructions. \" Current as of: October 31, 2016 Content Version: 11.4 © 1731-7913 Healthwise, Incorporated. Care instructions adapted under license by [employer] (which disclaims liability or warranty for this information). If you have questions about a medical condition or this instruction, always ask your healthcare professional. [address] any warranty or liability for your use of this information. DASH Diet: Care Instructions Your Care Instructions The DASH diet is an eating plan that can help lower your blood pressure. DASH stands for Dietary Approaches to Stop Hypertension. Hypertension is high blood pressure. The DASH diet focuses on eating foods that are high in calcium, potassium, and magnesium. These nutrients can lower blood pressure. The foods that are highest in these nutrients are fruits, vegetables, low-fat dairy products, nuts, seeds, and legumes. But taking calcium, potassium, and magnesium supplements instead of eating foods that are high in those nutrients does not have the same effect. The DASH diet also includes whole grains, fish, and poultry. The DASH diet is one of several lifestyle changes your doctor may recommend to lower your high blood pressure. Your doctor may also want you to decrease the amount of sodium in your diet. Lowering sodium while following the DASH diet can lower blood pressure even further than just the DASH diet alone. Follow-up care is a key part of your treatment and safety. Be sure to make and go to all appointments, and call your doctor if you are having problems. It's also a good idea to know your test results and keep a list of the medicines you take. How can you care for yourself at home? Following the DASH diet · Eat 4 to 5 servings of fruit each day. A serving is 1 medium-sized piece of fruit, ½ cup chopped or canned fruit, 1/4 cup dried fruit, or 4 ounces (½ cup) of fruit juice. Choose fruit more often than fruit juice. · Eat 4 to 5 servings of vegetables each day. A serving is 1 cup of lettuce or raw leafy vegetables, ½ cup of chopped or cooked vegetables, or 4 ounces (½ cup) of vegetable juice. Choose vegetables more often than vegetable juice. · Get 2 to 3 servings of low-fat and fat-free dairy each day. A serving is 8 ounces of milk, 1 cup of yogurt, or 1 ½ ounces of cheese. · Eat 6 to 8 servings of grains each day. A serving is 1 slice of bread, 1 ounce of dry cereal, or ½ cup of cooked rice, pasta, or cooked cereal. Try to choose whole-grain products as much as possible. · Limit lean meat, poultry, and fish to 2 servings each day. A serving is 3 ounces, about the size of a deck of cards. · Eat 4 to 5 servings of nuts, seeds, and legumes (cooked dried beans, lentils, and split peas) each week. A serving is 1/3 cup of nuts, 2 tablespoons of seeds, or ½ cup of cooked beans or peas. · Limit fats and oils to 2 to 3 servings each day. A serving is 1 teaspoon of vegetable oil or 2 tablespoons of salad dressing. · Limit sweets and added sugars to 5 servings or less a week. A serving is 1 tablespoon jelly or jam, ½ cup sorbet, or 1 cup of lemonade. · Eat less than 2,300 milligrams (mg) of sodium a day. If you limit your sodium to 1,500 mg a day, you can lower your blood pressure even more. Tips for success · Start small. Do not try to make dramatic changes to your diet all at once. You might feel that you are missing out on your favorite foods and then be more likely to not follow the plan. Make small changes, and stick with them. Once those changes become habit, add a few more changes. · Try some of the following: ¨ Make it a goal to eat a fruit or vegetable at every meal and at snacks. This will make it easy to get the recommended amount of fruits and vegetables each day. ¨ Try yogurt topped with fruit and nuts for a snack or healthy dessert. ¨ Add lettuce, tomato, cucumber, and onion to sandwiches. ¨ Combine a ready-made pizza crust with low-fat mozzarella cheese and lots of vegetable toppings. Try using tomatoes, squash, spinach, broccoli, carrots, cauliflower, and onions. ¨ Have a variety of cut-up vegetables with a low-fat dip as an appetizer instead of chips and dip. ¨ Sprinkle sunflower seeds or chopped almonds over salads. Or try adding chopped walnuts or almonds to cooked vegetables. ¨ Try some vegetarian meals using beans and peas. Add garbanzo or kidney beans to salads. Make burritos and tacos with mashed kerr beans or black beans. Where can you learn more? Go to http://rodo-lukas.info/. Enter Z390 in the search box to learn more about \"DASH Diet: Care Instructions. \" Current as of: September 21, 2016 Content Version: 11.4 © 9613-9752 [employer]. Care instructions adapted under license by [employer] (which disclaims liability or warranty for this information). If you have questions about a medical condition or this instruction, always ask your healthcare professional. [place] [address] any warranty or liability for your use of this information. Urinary Tract Infections in Men: Care Instructions Your Care Instructions A urinary tract infection, or UTI, is a general term for an infection anywhere between the kidneys and the tip of the penis. UTIs can also be a result of a prostate problem. Most cause pain or burning when you urinate. Most UTIs are caused by bacteria and can be cured with antibiotics. It is important to complete your treatment so that the infection does not get worse. Follow-up care is a key part of your treatment and safety. Be sure to make and go to all appointments, and call your doctor if you are having problems. It's also a good idea to know your test results and keep a list of the medicines you take. How can you care for yourself at home? · Take your antibiotics as prescribed. Do not stop taking them just because you feel better. You need to take the full course of antibiotics. · Take your medicines exactly as prescribed. Your doctor may have prescribed a medicine, such as phenazopyridine (Pyridium), to help relieve pain when you urinate. This turns your urine orange. You may stop taking it when your symptoms get better. But be sure to take all of your antibiotics, which treat the infection. · Drink extra water for the next day or two. This will help make the urine less concentrated and help wash out the bacteria causing the infection. (If you have kidney, heart, or liver disease and have to limit your fluids, talk with your doctor before you increase your fluid intake.) · Avoid drinks that are carbonated or have caffeine. They can irritate the bladder. · Urinate often. Try to empty your bladder each time. · To relieve pain, take a hot bath or lay a heating pad (set on low) over your lower belly or genital area. Never go to sleep with a heating pad in place. To help prevent UTIs · Drink plenty of fluids, enough so that your urine is light yellow or clear like water. If you have kidney, heart, or liver disease and have to limit fluids, talk with your doctor before you increase the amount of fluids you drink. · Urinate when you have the urge. Do not hold your urine for a long time. Urinate before you go to sleep. · Keep your penis clean. Catheter care If you have a drainage tube (catheter) in place, the following steps will help you care for it. · Always wash your hands before and after touching your catheter. · Check the area around the urethra for inflammation or signs of infection. Signs of infection include irritated, swollen, red, or tender skin, or pus around the catheter. · Clean the area around the catheter with soap and water two times a day. Dry with a clean towel afterward. · Do not apply powder or lotion to the skin around the catheter. To empty the urine collection bag · Wash your hands with soap and water. · Without touching the drain spout, remove the spout from its sleeve at the bottom of the collection bag. Open the valve on the spout. · Let the urine flow out of the bag and into the toilet or a container. Do not let the tubing or drain spout touch anything. · After you empty the bag, clean the end of the drain spout with tissue and water. Close the valve and put the drain spout back into its sleeve at the bottom of the collection bag. · Wash your hands with soap and water. When should you call for help? Call your doctor now or seek immediate medical care if: 
? · Symptoms such as a fever, chills, nausea, or vomiting get worse or happen for the first time. ? · You have new pain in your back just below your rib cage. This is called flank pain. ? · There is new blood or pus in your urine. ? · You are not able to take or keep down your antibiotics. ? Watch closely for changes in your health, and be sure to contact your doctor if: 
? · You are not getting better after taking an antibiotic for 2 days. ? · Your symptoms go away but then come back. Where can you learn more? Go to http://rodo-lukas.info/. Enter A860 in the search box to learn more about \"Urinary Tract Infections in Men: Care Instructions. \" Current as of: May 12, 2017 Content Version: 11.4 © 7120-7219 [employer]. Care instructions adapted under license by [employer] (which disclaims liability or warranty for this information). If you have questions about a medical condition or this instruction, always ask your healthcare professional. [place] [address] any warranty or liability for your use of this information. Shoulder Arthritis: Exercises Your Care Instructions Here are some examples of typical rehabilitation exercises for your condition. Start each exercise slowly. Ease off the exercise if you start to have pain. Your doctor or physical therapist will tell you when you can start these exercises and which ones will work best for you. How to do the exercises Shoulder flexion (lying down) To make a wand for this exercise, use a piece of PVC pipe or a broom handle with the broom removed. Make the wand about a foot wider than your shoulders. 1. Lie on your back, holding a wand with both hands. Your palms should face down as you hold the wand. 2. Keeping your elbows straight, slowly raise your arms over your head. Raise them until you feel a stretch in your shoulders, upper back, and chest. 
3. Hold for 15 to 30 seconds. 4. Repeat 2 to 4 times. Shoulder rotation (lying down) To make a wand for this exercise, use a piece of PVC pipe or a broom handle with the broom removed. Make the wand about a foot wider than your shoulders. 1. Lie on your back. Hold a wand with both hands with your elbows bent and palms up. 2. Keep your elbows close to your body, and move the wand across your body toward the sore arm. 3. Hold for 8 to 12 seconds. 4. Repeat 2 to 4 times. Shoulder internal rotation with towel 1. Hold a towel above and behind your head with the arm that is not sore. 2. With your sore arm, reach behind your back and grasp the towel. 3. With the arm above your head, pull the towel upward. Do this until you feel a stretch on the front and outside of your sore shoulder. 4. Hold 15 to 30 seconds. 5. Repeat 2 to 4 times. Shoulder blade squeeze 1. Stand with your arms at your sides, and squeeze your shoulder blades together. Do not raise your shoulders up as you squeeze. 2. Hold 6 seconds. 3. Repeat 8 to 12 times. Resisted rows For this exercise, you will need elastic exercise material, such as surgical tubing or Thera-Band. 1. Put the band around a solid object at about waist level. (A bedpost will work well.) Each hand should hold an end of the band. 2. With your elbows at your sides and bent to 90 degrees, pull the band back. Your shoulder blades should move toward each other. Return to the starting position. 3. Repeat 8 to 12 times. External rotator strengthening exercise 1. Start by tying a piece of elastic exercise material to a doorknob. You can use surgical tubing or Thera-Band. (You may also hold one end of the band in each hand.) 2. Stand or sit with your shoulder relaxed and your elbow bent 90 degrees. Your upper arm should rest comfortably against your side. Squeeze a rolled towel between your elbow and your body for comfort. This will help keep your arm at your side. 3. Hold one end of the elastic band with the hand of the painful arm. 4. Start with your forearm across your belly. Slowly rotate the forearm out away from your body. Keep your elbow and upper arm tucked against the towel roll or the side of your body until you begin to feel tightness in your shoulder. Slowly move your arm back to where you started. 5. Repeat 8 to 12 times. Internal rotator strengthening exercise 1. Start by tying a piece of elastic exercise material to a doorknob. You can use surgical tubing or Thera-Band. 2. Stand or sit with your shoulder relaxed and your elbow bent 90 degrees. Your upper arm should rest comfortably against your side. Squeeze a rolled towel between your elbow and your body for comfort. This will help keep your arm at your side. 3. Hold one end of the elastic band in the hand of the painful arm. 4. Slowly rotate your forearm toward your body until it touches your belly. Slowly move it back to where you started. 5. Keep your elbow and upper arm firmly tucked against the towel roll or at your side. 6. Repeat 8 to 12 times. Pendulum swing If you have pain in your back, do not do this exercise. 1. Hold on to a table or the back of a chair with your good arm. Then bend forward a little and let your sore arm hang straight down. This exercise does not use the arm muscles. Rather, use your legs and your hips to create movement that makes your arm swing freely. 2. Use the movement from your hips and legs to guide the slightly swinging arm back and forth like a pendulum (or elephant trunk). Then guide it in circles that start small (about the size of a dinner plate). Make the circles a bit larger each day, as your pain allows. 3. Do this exercise for 5 minutes, 5 to 7 times each day. 4. As you have less pain, try bending over a little farther to do this exercise. This will increase the amount of movement at your shoulder. Follow-up care is a key part of your treatment and safety. Be sure to make and go to all appointments, and call your doctor if you are having problems. It's also a good idea to know your test results and keep a list of the medicines you take. Where can you learn more? Go to http://rodo-lukas.info/. Enter H562 in the search box to learn more about \"Shoulder Arthritis: Exercises. \" Current as of: March 21, 2017 Content Version: 11.4 © 4465-9145 Healthwise, Incorporated. Care instructions adapted under license by [employer] (which disclaims liability or warranty for this information). If you have questions about a medical condition or this instruction, always ask your healthcare professional. [place] [address] any warranty or liability for your use of this information. Introducing [facility]! Dear Chaz Whitt: Thank you for requesting a [employer] account. Our records indicate that you already have an active [employer] account. You can access your account anytime at [email or web address] Did you know that you can access your hospital and ER discharge instructions at any time in ScreenTag? You can also review all of your test results from your hospital stay or ER visit. Additional Information If you have questions, please visit the Frequently Asked Questions section of the ScreenTag website at [email or web address] Remember, ScreenTag is NOT to be used for urgent needs. For medical emergencies, dial 911. Now available from your iPhone and Android! Please provide this summary of care documentation to your next provider. Your primary care clinician is listed as [address] [id number], [facility]/s . If you have any questions after today's visit, please call [phone].

## 2017-11-27 NOTE — LETTER
12/5/2017 10:47 AM 
 
Mr. Essenec Cosme [facility] 
[facility] [id number] Dear Essence Cosme: 
 
Please find your most recent results below. Resulted Orders IRON PROFILE Result Value Ref Range TIBC 258 250 - 450 ug/dL UIBC 215 111 - 343 ug/dL Iron 43 38 - 169 ug/dL Iron % saturation 17 15 - 55 % Narrative Performed at:  [address]  [id number] : Steve Ramírez MD, Phone:  [phone] FERRITIN Result Value Ref Range Ferritin 45 30 - 400 ng/mL Narrative Performed at:  [address]  [id number] : Steve Ramírez MD, Phone:  [phone] RECOMMENDATIONS: 
Your iron studies were normal.  We will continue to monitor your anemia with lab work. Inell Darrin is reassuring that you had a normal colonoscopy in the past.  Call to clinic if you have any questions or concerns Please call me if you have any questions: [phone] Sincerely, April Batista MD

## 2017-11-27 NOTE — PROGRESS NOTES
[address] Note      Subjective:     Chief Complaint   Patient presents with    Shoulder Pain     Right shoulder pain. Patient states he has limited mobility in the arm. Patient did have a slight fall about 10 weeks ago. Was seen by another provider in office for the shoulder.  Transitions Of Care     Follow up from last hospital visit. Newton Daniels is a 80 y.o. male who presents for transitional/hospital follow up. Hospitalized for: Acute Encephalopathy, UTI  Discharge Date: 11/16/17  Interval update:   No confusion per patient today and per NN noted conversation with caregiver on 11/17/17  Pt reported factors contributing to admission or ED visit:      Medication Review:   Per pt memory, no bottles: by LPN and MD  Discrepencies: several taking hydrocodone still  [facility] pharmacy manages medications at home. - After visit NN reconciled meds with [facility] staff anf determined Louellen Pears was not being taken by patiaric since discharge. Care Management:  Was home health ordered: PT and OT- recently verbal order by PCP per notes. Farshad Haver states no therapy is being done. Was DME ordered: No  Is there informal support at home: Yes  Barriers to care (homelessness, transportation, financial): Immobile. Acute Concern:   Right Shoulder Pain  Onset 2 weeks ago after a fall  Tx: diclofenac, tylenol, tramadol (not sure if he still has pills)  Degenerative chages on xray      Review of Systems   Pertinent positives and negative per HPI. All other systems  reviewed are negative for a Comprehensive ROS (10+).        Past Medical History:   Diagnosis Date    Blurry vision 1/19/2012    BPH (benign prostatic hyperplasia)     DDD (degenerative disc disease), lumbar 2/19/2010    DJD (degenerative joint disease) of cervical spine 2/19/2010    DU (duodenal ulcer) 9/1/1990    Hyperlipidemia     Hypertension     Other and unspecified hyperlipidemia 2/19/2010    Pacemaker     Paralysis agitans ([facility] [address] 2/19/2010    Parkinson disease ([facility] [address]     [facility]ox[facility])     Premature atrial beats     Reflux esophagitis 2/19/2010    Sick sinus syndrome [facility])         Social History     Social History    Marital status:      Spouse name: N/A    Number of children: N/A    Years of education: N/A     Occupational History    Not on file. Social History Main Topics    Smoking status: Former Smoker     Types: Pipe     Quit date: 7/14/1945    Smokeless tobacco: Never Used    Alcohol use 2.0 oz/week     4 Cans of beer per week      Comment: glass of wine every now and then    Drug use: No    Sexual activity: Not on file     Other Topics Concern     Service Yes    Blood Transfusions No    Caffeine Concern No    Occupational Exposure No    Hobby Hazards No    Sleep Concern No    Stress Concern No    Weight Concern No    Special Diet No    Back Care No    Exercise No    Bike Helmet No    Seat Belt Yes    Self-Exams No     Social History Narrative       Current Outpatient Prescriptions   Medication Sig    losartan (COZAAR) 50 mg tablet Take 1 Tab by mouth daily.  aspirin 81 mg chewable tablet Take 81 mg by mouth daily.  nitrofurantoin (MACRODANTIN) 50 mg capsule Take 50 mg by mouth nightly. Indications: uti prophylaxis    traMADol (ULTRAM) 50 mg tablet Take 1 Tab by mouth every eight (8) hours as needed for Pain. Max Daily Amount: 150 mg.    diclofenac (VOLTAREN) 1 % gel Apply 2 g to affected area every six (6) hours. Apply to shoulder for 5 days stop if no improvement    diclofenac (VOLTAREN) 1 % gel Apply  to affected area as needed.  QUEtiapine (SEROQUEL) 25 mg tablet Take 25 mg by mouth every evening.  DETROL LA 2 mg ER capsule TAKE 1 CAPSULE DAILY FOR BLADDER FREQUENCY    pindolol (VISKIN) 5 mg tablet Take 2.5 mg by mouth daily (after lunch).  Take 1/2 tablet by mouth at 2pm -Take 1 tablet by mouth at 9pm    albuterol (PROVENTIL HFA, VENTOLIN HFA, PROAIR HFA) 90 mcg/actuation inhaler Take 2 Puffs by inhalation every six (6) hours as needed for Wheezing.  carbidopa-levodopa (SINEMET)  mg per tablet Take 2 Tabs by mouth four (4) times daily. (This is given at 8:00, 11:00, 14:00, and 21:00).  polyethylene glycol (MIRALAX) 17 gram packet Take 1 Packet by mouth daily. Indications: CONSTIPATION    therapeutic multivitamin (THERAGRAN) tablet Take 1 Tab by mouth daily.  cloNIDine HCl (CATAPRES) 0.1 mg tablet Take 1 Tab by mouth every six (6) hours as needed (SBP > 180 mmHg).  docusate sodium (COLACE) 100 mg capsule Take 1 Cap by mouth daily as needed for Constipation.  cycloSPORINE (RESTASIS) 0.05 % ophthalmic emulsion Administer 1 Drop to both eyes two (2) times a day.  escitalopram oxalate (LEXAPRO) 20 mg tablet Take 1 Tab by mouth daily.  rivastigmine (EXELON) 4.6 mg/24 hr patch 1 Patch by TransDERmal route daily.  esomeprazole (NEXIUM) 40 mg capsule Take 1 Cap by mouth daily.  acetaminophen (MAPAP EXTRA STRENGTH) 500 mg tablet Take 1 Tab by mouth every six (6) hours as needed for Pain. No current facility-administered medications for this visit. Objective:     Vitals:    11/27/17 1325   BP: 122/68   Pulse: 79   Resp: 18   Temp: 97.8 °F (36.6 °C)   TempSrc: Oral   SpO2: 96%   Weight: 204 lb (92.5 kg)   Height: 6' 2\" (1.88 m)       Physical Examination:  General: Alert, cooperative, no distress, appears stated age. In wheelchair  Eyes: Conjunctivae/corneas clear. PERRL, EOMs intact. Ears: Normal external ear canals both ears. Nose: Nares normal. Septum midline. Mucosa normal. No drainage or sinus tenderness. Mouth/Throat: Lips, mucosa, and tongue normal. Teeth and gums normal.  Neck: Supple, symmetrical, trachea midline, no adenopathy. No thyroid enlargement/tenderness/nodules  Back: Symmetric, no curvature. ROM normal. No CVA tenderness. Lungs: Clear to auscultation bilaterally.  Normal inspiratory and expiratory ratio. Heart: Regular rate and rhythm, S1, S2 normal, no murmur, click, rub or gallop. Abdomen: Soft, non-tender. Bowel sounds normal. No masses or organomegaly. Pulses: 2+ and symmetric all extremities. Skin: Skin color, texture, turgor normal. No rashes or lesions  Lymph nodes: Cervical, supraclavicular nodes normal.  Neurologic: CNII-XII intact. Strength 5/5 grossly. Sensation and reflexes normal throughout. Orientatation  [facility]: [facility]  Place: \"Aassited living\" and give emergency care  Date: 11/29/2017        Lab Results   Component Value Date/Time    WBC 5.8 11/16/2017 02:05 AM    HGB 12.0 11/16/2017 02:05 AM    HCT 35.9 11/16/2017 02:05 AM    PLATELET 058 79/44/4290 02:05 AM    MCV 90.4 11/16/2017 02:05 AM     Lab Results   Component Value Date/Time    Sodium 141 11/15/2017 03:15 AM    Potassium 3.9 11/15/2017 03:15 AM    Chloride 107 11/15/2017 03:15 AM    CO2 25 11/15/2017 03:15 AM    Anion gap 9 11/15/2017 03:15 AM    Glucose 73 11/15/2017 03:15 AM    BUN 14 11/15/2017 03:15 AM    Creatinine 0.77 11/15/2017 03:15 AM    BUN/Creatinine ratio 18 11/15/2017 03:15 AM    GFR est AA >60 11/15/2017 03:15 AM    GFR est non-AA >60 11/15/2017 03:15 AM    Calcium 8.2 11/15/2017 03:15 AM    Bilirubin, total 0.7 11/12/2017 06:32 PM    AST (SGOT) 28 11/12/2017 06:32 PM    Alk. phosphatase 72 11/12/2017 06:32 PM    Protein, total 7.3 11/12/2017 06:32 PM    Albumin 3.7 11/12/2017 06:32 PM    Globulin 3.6 11/12/2017 06:32 PM    A-G Ratio 1.0 11/12/2017 06:32 PM    ALT (SGPT) 9 11/12/2017 06:32 PM     11/13/17 Urine Culture  Culture result:  (A)    Final   ENTEROCOCCUS FAECALIS GROUP D         Assessment/ Plan:   Diagnoses and all orders for this visit:    1.  Hospital discharge follow-up  Medication adjustments:  1) Northera discontinued due to HTN inpatient  2) START Amoxicillin  3) STOP hydorcode  4) STOP oxybutynin  5) STOP Lorazepam  Medications managed by [facility] pharmacy  Patient unclear about which medicine he is taking. Biggest Risk for Readmission: Medication reconciliaton    Items to follow-up on next visit: Urology evaluation  Red Flag symptoms discussed with patient today. 2. Acute cystitis without hematuria: resolved. Follow up with urology about prevention treaties. 3. Acute encephalopathy: resolved. Noted some disorientation which may be chronic given history of parkinson disease and per verbal history of NN. 4. Essential hypertension, difficult to control due to orthostatic hypotension: well controlled in office today. Continue current regime. 5. Primary osteoarthritis of right shoulder: Stable. Continue NSAID/ tylenol + diclofenac gel for pain control. 6. Normocytic anemia: unclear etiology. Labs to eval.   -     IRON PROFILE  -     FERRITIN        I have discussed the diagnosis with the patient and the intended plan as seen in the above orders. The patient has received an after-visit summary and questions were answered concerning future plans. I have discussed medication side effects and warnings with the patient as well. Follow-up Disposition:  Return in about 8 weeks (around 1/22/2018) for Follow Up.       Signed,    Maik Morillo MD  11/28/2017

## 2017-11-27 NOTE — PATIENT INSTRUCTIONS
Please see urologist within the next 1 month. Continue pain medicine for your shoulder. Confusion better today. We will try yo reach your pharmacy to be sure you are on the recommended regimen after discharge form hospital      Arthritis: Care Instructions  Your Care Instructions  Arthritis, also called osteoarthritis, is a breakdown of the cartilage that cushions your joints. When the cartilage wears down, your bones rub against each other. This causes pain and stiffness. Many people have some arthritis as they age. Arthritis most often affects the joints of the spine, hands, hips, knees, or feet. You can take simple measures to protect your joints, ease your pain, and help you stay active. Follow-up care is a key part of your treatment and safety. Be sure to make and go to all appointments, and call your doctor if you are having problems. It's also a good idea to know your test results and keep a list of the medicines you take. How can you care for yourself at home? · Stay at a healthy weight. Being overweight puts extra strain on your joints. · Talk to your doctor or physical therapist about exercises that will help ease joint pain. ¨ Stretch. You may enjoy gentle forms of yoga to help keep your joints and muscles flexible. ¨ Walk instead of jog. Other types of exercise that are less stressful on the joints include riding a bicycle, swimming, janes chi, or water exercise. ¨ Lift weights. Strong muscles help reduce stress on your joints. Stronger thigh muscles, for example, take some of the stress off of the knees and hips. Learn the right way to lift weights so you do not make joint pain worse. · Take your medicines exactly as prescribed. Call your doctor if you think you are having a problem with your medicine. · Take pain medicines exactly as directed. ¨ If the doctor gave you a prescription medicine for pain, take it as prescribed.   ¨ If you are not taking a prescription pain medicine, ask your doctor if you can take an over-the-counter medicine. · Use a cane, crutch, walker, or another device if you need help to get around. These can help rest your joints. You also can use other things to make life easier, such as a higher toilet seat and padded handles on kitchen utensils. · Do not sit in low chairs, which can make it hard to get up. · Put heat or cold on your sore joints as needed. Use whichever helps you most. You also can take turns with hot and cold packs. ¨ Apply heat 2 or 3 times a day for 20 to 30 minutes-using a heating pad, hot shower, or hot pack-to relieve pain and stiffness. ¨ Put ice or a cold pack on your sore joint for 10 to 20 minutes at a time. Put a thin cloth between the ice and your skin. When should you call for help? Call your doctor now or seek immediate medical care if:  ? · You have sudden swelling, warmth, or pain in any joint. ? · You have joint pain and a fever or rash. ? · You have such bad pain that you cannot use a joint. ? Watch closely for changes in your health, and be sure to contact your doctor if:  ? · You have mild joint symptoms that continue even with more than 6 weeks of care at home. ? · You have stomach pain or other problems with your medicine. Where can you learn more? Go to http://rdoo-lukas.info/. Enter X266 in the search box to learn more about \"Arthritis: Care Instructions. \"  Current as of: October 31, 2016  Content Version: 11.4  © 9327-7139 [employer]. Care instructions adapted under license by [employer] (which disclaims liability or warranty for this information). If you have questions about a medical condition or this instruction, always ask your healthcare professional. [address] any warranty or liability for your use of this information.        DASH Diet: Care Instructions  Your Care Instructions    The DASH diet is an eating plan that can help lower your blood pressure. DASH stands for Dietary Approaches to Stop Hypertension. Hypertension is high blood pressure. The DASH diet focuses on eating foods that are high in calcium, potassium, and magnesium. These nutrients can lower blood pressure. The foods that are highest in these nutrients are fruits, vegetables, low-fat dairy products, nuts, seeds, and legumes. But taking calcium, potassium, and magnesium supplements instead of eating foods that are high in those nutrients does not have the same effect. The DASH diet also includes whole grains, fish, and poultry. The DASH diet is one of several lifestyle changes your doctor may recommend to lower your high blood pressure. Your doctor may also want you to decrease the amount of sodium in your diet. Lowering sodium while following the DASH diet can lower blood pressure even further than just the DASH diet alone. Follow-up care is a key part of your treatment and safety. Be sure to make and go to all appointments, and call your doctor if you are having problems. It's also a good idea to know your test results and keep a list of the medicines you take. How can you care for yourself at home? Following the DASH diet  · Eat 4 to 5 servings of fruit each day. A serving is 1 medium-sized piece of fruit, ½ cup chopped or canned fruit, 1/4 cup dried fruit, or 4 ounces (½ cup) of fruit juice. Choose fruit more often than fruit juice. · Eat 4 to 5 servings of vegetables each day. A serving is 1 cup of lettuce or raw leafy vegetables, ½ cup of chopped or cooked vegetables, or 4 ounces (½ cup) of vegetable juice. Choose vegetables more often than vegetable juice. · Get 2 to 3 servings of low-fat and fat-free dairy each day. A serving is 8 ounces of milk, 1 cup of yogurt, or 1 ½ ounces of cheese. · Eat 6 to 8 servings of grains each day.  A serving is 1 slice of bread, 1 ounce of dry cereal, or ½ cup of cooked rice, pasta, or cooked cereal. Try to choose whole-grain products as much as possible. · Limit lean meat, poultry, and fish to 2 servings each day. A serving is 3 ounces, about the size of a deck of cards. · Eat 4 to 5 servings of nuts, seeds, and legumes (cooked dried beans, lentils, and split peas) each week. A serving is 1/3 cup of nuts, 2 tablespoons of seeds, or ½ cup of cooked beans or peas. · Limit fats and oils to 2 to 3 servings each day. A serving is 1 teaspoon of vegetable oil or 2 tablespoons of salad dressing. · Limit sweets and added sugars to 5 servings or less a week. A serving is 1 tablespoon jelly or jam, ½ cup sorbet, or 1 cup of lemonade. · Eat less than 2,300 milligrams (mg) of sodium a day. If you limit your sodium to 1,500 mg a day, you can lower your blood pressure even more. Tips for success  · Start small. Do not try to make dramatic changes to your diet all at once. You might feel that you are missing out on your favorite foods and then be more likely to not follow the plan. Make small changes, and stick with them. Once those changes become habit, add a few more changes. · Try some of the following:  ¨ Make it a goal to eat a fruit or vegetable at every meal and at snacks. This will make it easy to get the recommended amount of fruits and vegetables each day. ¨ Try yogurt topped with fruit and nuts for a snack or healthy dessert. ¨ Add lettuce, tomato, cucumber, and onion to sandwiches. ¨ Combine a ready-made pizza crust with low-fat mozzarella cheese and lots of vegetable toppings. Try using tomatoes, squash, spinach, broccoli, carrots, cauliflower, and onions. ¨ Have a variety of cut-up vegetables with a low-fat dip as an appetizer instead of chips and dip. ¨ Sprinkle sunflower seeds or chopped almonds over salads. Or try adding chopped walnuts or almonds to cooked vegetables. ¨ Try some vegetarian meals using beans and peas. Add garbanzo or kidney beans to salads. Make burritos and tacos with mashed kerr beans or black beans.   Where can you learn more? Go to http://rodo-lukas.info/. Enter O070 in the search box to learn more about \"DASH Diet: Care Instructions. \"  Current as of: September 21, 2016  Content Version: 11.4  © 3282-0768 [employer]. Care instructions adapted under license by [employer] (which disclaims liability or warranty for this information). If you have questions about a medical condition or this instruction, always ask your healthcare professional. [address] any warranty or liability for your use of this information. Urinary Tract Infections in Men: Care Instructions  Your Care Instructions    A urinary tract infection, or UTI, is a general term for an infection anywhere between the kidneys and the tip of the penis. UTIs can also be a result of a prostate problem. Most cause pain or burning when you urinate. Most UTIs are caused by bacteria and can be cured with antibiotics. It is important to complete your treatment so that the infection does not get worse. Follow-up care is a key part of your treatment and safety. Be sure to make and go to all appointments, and call your doctor if you are having problems. It's also a good idea to know your test results and keep a list of the medicines you take. How can you care for yourself at home? · Take your antibiotics as prescribed. Do not stop taking them just because you feel better. You need to take the full course of antibiotics. · Take your medicines exactly as prescribed. Your doctor may have prescribed a medicine, such as phenazopyridine (Pyridium), to help relieve pain when you urinate. This turns your urine orange. You may stop taking it when your symptoms get better. But be sure to take all of your antibiotics, which treat the infection. · Drink extra water for the next day or two. This will help make the urine less concentrated and help wash out the bacteria causing the infection.  (If you have kidney, heart, or liver disease and have to limit your fluids, talk with your doctor before you increase your fluid intake.)  · Avoid drinks that are carbonated or have caffeine. They can irritate the bladder. · Urinate often. Try to empty your bladder each time. · To relieve pain, take a hot bath or lay a heating pad (set on low) over your lower belly or genital area. Never go to sleep with a heating pad in place. To help prevent UTIs  · Drink plenty of fluids, enough so that your urine is light yellow or clear like water. If you have kidney, heart, or liver disease and have to limit fluids, talk with your doctor before you increase the amount of fluids you drink. · Urinate when you have the urge. Do not hold your urine for a long time. Urinate before you go to sleep. · Keep your penis clean. Catheter care  If you have a drainage tube (catheter) in place, the following steps will help you care for it. · Always wash your hands before and after touching your catheter. · Check the area around the urethra for inflammation or signs of infection. Signs of infection include irritated, swollen, red, or tender skin, or pus around the catheter. · Clean the area around the catheter with soap and water two times a day. Dry with a clean towel afterward. · Do not apply powder or lotion to the skin around the catheter. To empty the urine collection bag  · Wash your hands with soap and water. · Without touching the drain spout, remove the spout from its sleeve at the bottom of the collection bag. Open the valve on the spout. · Let the urine flow out of the bag and into the toilet or a container. Do not let the tubing or drain spout touch anything. · After you empty the bag, clean the end of the drain spout with tissue and water. Close the valve and put the drain spout back into its sleeve at the bottom of the collection bag. · Wash your hands with soap and water. When should you call for help?   Call your doctor now or seek immediate medical care if:  ? · Symptoms such as a fever, chills, nausea, or vomiting get worse or happen for the first time. ? · You have new pain in your back just below your rib cage. This is called flank pain. ? · There is new blood or pus in your urine. ? · You are not able to take or keep down your antibiotics. ? Watch closely for changes in your health, and be sure to contact your doctor if:  ? · You are not getting better after taking an antibiotic for 2 days. ? · Your symptoms go away but then come back. Where can you learn more? Go to http://rodo-lukas.info/. Enter J079 in the search box to learn more about \"Urinary Tract Infections in Men: Care Instructions. \"  Current as of: May 12, 2017  Content Version: 11.4  © 9996-9775 "[employer]. Care instructions adapted under license by [employer] (which disclaims liability or warranty for this information). If you have questions about a medical condition or this instruction, always ask your healthcare professional. [place] [address] any warranty or liability for your use of this information. Shoulder Arthritis: Exercises  Your Care Instructions  Here are some examples of typical rehabilitation exercises for your condition. Start each exercise slowly. Ease off the exercise if you start to have pain. Your doctor or physical therapist will tell you when you can start these exercises and which ones will work best for you. How to do the exercises  Shoulder flexion (lying down)    To make a wand for this exercise, use a piece of PVC pipe or a broom handle with the broom removed. Make the wand about a foot wider than your shoulders. 1. Lie on your back, holding a wand with both hands. Your palms should face down as you hold the wand. 2. Keeping your elbows straight, slowly raise your arms over your head.  Raise them until you feel a stretch in your shoulders, upper back, and chest.  3. Hold for 15 to 30 seconds. 4. Repeat 2 to 4 times. Shoulder rotation (lying down)    To make a wand for this exercise, use a piece of PVC pipe or a broom handle with the broom removed. Make the wand about a foot wider than your shoulders. 1. Lie on your back. Hold a wand with both hands with your elbows bent and palms up. 2. Keep your elbows close to your body, and move the wand across your body toward the sore arm. 3. Hold for 8 to 12 seconds. 4. Repeat 2 to 4 times. Shoulder internal rotation with towel    1. Hold a towel above and behind your head with the arm that is not sore. 2. With your sore arm, reach behind your back and grasp the towel. 3. With the arm above your head, pull the towel upward. Do this until you feel a stretch on the front and outside of your sore shoulder. 4. Hold 15 to 30 seconds. 5. Repeat 2 to 4 times. Shoulder blade squeeze    1. Stand with your arms at your sides, and squeeze your shoulder blades together. Do not raise your shoulders up as you squeeze. 2. Hold 6 seconds. 3. Repeat 8 to 12 times. Resisted rows    For this exercise, you will need elastic exercise material, such as surgical tubing or Thera-Band. 1. Put the band around a solid object at about waist level. (A bedpost will work well.) Each hand should hold an end of the band. 2. With your elbows at your sides and bent to 90 degrees, pull the band back. Your shoulder blades should move toward each other. Return to the starting position. 3. Repeat 8 to 12 times. External rotator strengthening exercise    1. Start by tying a piece of elastic exercise material to a doorknob. You can use surgical tubing or Thera-Band. (You may also hold one end of the band in each hand.)  2. Stand or sit with your shoulder relaxed and your elbow bent 90 degrees. Your upper arm should rest comfortably against your side. Squeeze a rolled towel between your elbow and your body for comfort.  This will help keep your arm at your side. 3. Hold one end of the elastic band with the hand of the painful arm. 4. Start with your forearm across your belly. Slowly rotate the forearm out away from your body. Keep your elbow and upper arm tucked against the towel roll or the side of your body until you begin to feel tightness in your shoulder. Slowly move your arm back to where you started. 5. Repeat 8 to 12 times. Internal rotator strengthening exercise    1. Start by tying a piece of elastic exercise material to a doorknob. You can use surgical tubing or Thera-Band. 2. Stand or sit with your shoulder relaxed and your elbow bent 90 degrees. Your upper arm should rest comfortably against your side. Squeeze a rolled towel between your elbow and your body for comfort. This will help keep your arm at your side. 3. Hold one end of the elastic band in the hand of the painful arm. 4. Slowly rotate your forearm toward your body until it touches your belly. Slowly move it back to where you started. 5. Keep your elbow and upper arm firmly tucked against the towel roll or at your side. 6. Repeat 8 to 12 times. Pendulum swing    If you have pain in your back, do not do this exercise. 1. Hold on to a table or the back of a chair with your good arm. Then bend forward a little and let your sore arm hang straight down. This exercise does not use the arm muscles. Rather, use your legs and your hips to create movement that makes your arm swing freely. 2. Use the movement from your hips and legs to guide the slightly swinging arm back and forth like a pendulum (or elephant trunk). Then guide it in circles that start small (about the size of a dinner plate). Make the circles a bit larger each day, as your pain allows. 3. Do this exercise for 5 minutes, 5 to 7 times each day. 4. As you have less pain, try bending over a little farther to do this exercise. This will increase the amount of movement at your shoulder.   Follow-up care is a key part of your treatment and safety. Be sure to make and go to all appointments, and call your doctor if you are having problems. It's also a good idea to know your test results and keep a list of the medicines you take. Where can you learn more? Go to http://rodo-lukas.info/. Enter H562 in the search box to learn more about \"Shoulder Arthritis: Exercises. \"  Current as of: March 21, 2017  Content Version: 11.4  © 6320-3690 [employer]. Care instructions adapted under license by [employer] (which disclaims liability or warranty for this information). If you have questions about a medical condition or this instruction, always ask your healthcare professional. [address] any warranty or liability for your use of this information.

## 2017-11-27 NOTE — PROGRESS NOTES
Outbound call made to [facility] for medication reconciliation. NN left voicemail message with contact information requesting a return call. NN spoke to [email or web address]. Medication reconciliation completed. Patient no longer taking Norco 5/325mg. Current Outpatient Prescriptions   Medication Sig    losartan (COZAAR) 50 mg tablet Take 1 Tab by mouth daily.  aspirin 81 mg chewable tablet Take 81 mg by mouth daily.  nitrofurantoin (MACRODANTIN) 50 mg capsule Take 50 mg by mouth nightly. Indications: uti prophylaxis    traMADol (ULTRAM) 50 mg tablet Take 1 Tab by mouth every eight (8) hours as needed for Pain. Max Daily Amount: 150 mg.    diclofenac (VOLTAREN) 1 % gel Apply 2 g to affected area every six (6) hours. Apply to shoulder for 5 days stop if no improvement    diclofenac (VOLTAREN) 1 % gel Apply  to affected area as needed.  QUEtiapine (SEROQUEL) 25 mg tablet Take 25 mg by mouth every evening.  DETROL LA 2 mg ER capsule TAKE 1 CAPSULE DAILY FOR BLADDER FREQUENCY    pindolol (VISKIN) 5 mg tablet Take 2.5 mg by mouth daily (after lunch). Take 1/2 tablet by mouth at 2pm -Take 1 tablet by mouth at 9pm    albuterol (PROVENTIL HFA, VENTOLIN HFA, PROAIR HFA) 90 mcg/actuation inhaler Take 2 Puffs by inhalation every six (6) hours as needed for Wheezing.  carbidopa-levodopa (SINEMET)  mg per tablet Take 2 Tabs by mouth four (4) times daily. (This is given at 8:00, 11:00, 14:00, and 21:00).  polyethylene glycol (MIRALAX) 17 gram packet Take 1 Packet by mouth daily. Indications: CONSTIPATION    therapeutic multivitamin (THERAGRAN) tablet Take 1 Tab by mouth daily.  cloNIDine HCl (CATAPRES) 0.1 mg tablet Take 1 Tab by mouth every six (6) hours as needed (SBP > 180 mmHg).  docusate sodium (COLACE) 100 mg capsule Take 1 Cap by mouth daily as needed for Constipation.     cycloSPORINE (RESTASIS) 0.05 % ophthalmic emulsion Administer 1 Drop to both eyes two (2) times a day.  escitalopram oxalate (LEXAPRO) 20 mg tablet Take 1 Tab by mouth daily.  rivastigmine (EXELON) 4.6 mg/24 hr patch 1 Patch by TransDERmal route daily.  esomeprazole (NEXIUM) 40 mg capsule Take 1 Cap by mouth daily.  acetaminophen (MAPAP EXTRA STRENGTH) 500 mg tablet Take 1 Tab by mouth every six (6) hours as needed for Pain. No current facility-administered medications for this visit.       Medications Discontinued During This Encounter   Medication Reason    HYDROcodone-acetaminophen (NORCO) 5-325 mg per tablet Not A Current Medication

## 2017-11-28 LAB
FERRITIN SERPL-MCNC: 45 NG/ML (ref 30–400)
IRON SATN MFR SERPL: 17 % (ref 15–55)
IRON SERPL-MCNC: 43 UG/DL (ref 38–169)
TIBC SERPL-MCNC: 258 UG/DL (ref 250–450)
UIBC SERPL-MCNC: 215 UG/DL (ref 111–343)

## 2017-11-29 ENCOUNTER — PATIENT OUTREACH (OUTPATIENT)
Dept: FAMILY MEDICINE CLINIC | Age: 82
End: 2017-11-29

## 2017-11-29 ENCOUNTER — DOCUMENTATION ONLY (OUTPATIENT)
Dept: FAMILY MEDICINE CLINIC | Age: 82
End: 2017-11-29

## 2017-11-29 ENCOUNTER — PATIENT OUTREACH (OUTPATIENT)
Dept: CARDIOLOGY CLINIC | Age: 82
End: 2017-11-29

## 2017-11-29 NOTE — PROGRESS NOTES
Nurse navigator note - cardiology  Consultation from pcp NN - re: Chapo Winters - as pt is post hospital visit for encephalopathy and was found to be hypertensive. Upon discharge, Chapo Winters held - note that pt is no longer standing -    LM for his daughter, Vik Renee - on both #s - re: holding Northera -   This could be revisited if bp starts to drop -     Vik Renee - daughter return call -   NN faxed order to Kenia Mendoza in this regard and notification to pcp office and NN. Fax to [address]. Viktoria Gilmore RN , [facility], [facility]   [address]  [phone]    Cardiology note    Continue to hold northera if he is hypertensive. Follow up as scheduled   1/18/2018  10:40 AM   Leila Leiva MD           [id number]   Or sooner if blood pressures are liable  (Routing comment)      Hospital discharge note -  Hx Hypertension: increased pts cozaar, has prn clonidine to use which appears to be fairly effective. - has a hx og orthostatic hypotension. Pt no longer stands and BP has been high. Will not restart this for now, but if BP were to start dropping with sitting up or he become symptomatic, consider restarting. This was discussed with pts daughter, Anthony Amen  Hx Parkinson's disease: On Sinemet and Exelon.    - Northera on hold.   Chapo Winters is not on med list at this time - they just got new script - will hold     Viktoria Gilmore RN , [facility], [address] [place] [address]   [address]  [phone]

## 2017-11-29 NOTE — LETTER
11/29/2017 12:35 PM 
 
In regards to :  
 
Mr. Liliane Trinidad [facility] 
[facility] [phone] Please be advised: 
 
Maxine Bank for now: Discharge order/ and verified by cardiology/  
Kelley Jewelliths 
 
 
  Continue to hold northera if he is hypertensive. Follow up as scheduled 1/18/2018  10:40 AM   Rex Pleitez MD           [address] [facility]. Or sooner if blood pressures are liable  (Routing comment) Cardiology office has on call 24/7 at [phone] - (Dr. Rex Pleitez) 
____________________________________________________________ Hospital note - Hx Hypertension: increased pts cozaar, has prn clonidine to use which appears to be fairly effective. - has a hx og orthostatic hypotension. Pt no longer stands and BP has been high. Will not restart this for now, but if BP were to start dropping with sitting up or he become symptomatic, consider restarting. This was discussed with pts daughter, Cheyanne Auguste  
Hx Parkinson's disease: On Sinemet and Exelon.    
- Northera on hold.  
    
Cardiac pacemaker  
   
FOLLOW UP APPOINTMENTS:   Pt saw Dr. Malia Russell 11/28/17 
Roderick Blackmon MD In 1 week or faciloty provider [address] [place] [address]  
[phone]  
   
ADDITIONAL CARE RECOMMENDATIONS:  
Stopped narcotics and ativan.  
   
Stop Ada Abts for now due to hypertension. If pt start exhibiting signs of hypotension - may restart. Dose was 100 mg TID (Routing comment)

## 2017-11-29 NOTE — PROGRESS NOTES
Voice mail message received Seth Morgan (HIPPA verified) patients daughter. NN returned call. NN advised Krupa Mcelroy that medication reconciliation was completed by [facility] yesterday. Krupa Mcelroy wanted to know when Ian Barry would resume since it was placed on hold during hospitalization. NN advised the information will be researched and I would call her back. Krupa Mcelroy expressed understanding. After research, it appears Dr Radha Langford prescribed Northera back in August 2017. Message sent to cardiac NN.

## 2017-11-29 NOTE — PROGRESS NOTES
Morning  This patient was recently discharged from hospital for acute encephalopathy. While inpatient, his blood pressures were elevated and MD discontinued Northera. ( Dr Shani Douglas prescribed this back in August 2017). I was speaking with Tatyana Castro (Estrella damon) patients daughter who wanted to know when the medication will be resumed. I am reaching out to you to see if he needs to be followed up with you. Patient attended [facility] appointment and provider did not address; his blood pressure during office visit was 122/68. Please advise. Thanks.

## 2017-11-30 NOTE — PROGRESS NOTES
Notify patient    Your iron studies were normal.  We will continue to monitor your anemia with lab work.   It is reassuring that you had a normal colonoscopy in the past.  Call to clinic if you have any questions or concerns

## 2017-12-01 ENCOUNTER — PATIENT OUTREACH (OUTPATIENT)
Dept: FAMILY MEDICINE CLINIC | Age: 82
End: 2017-12-01

## 2017-12-01 NOTE — PROGRESS NOTES
Outbound call made 12/1/17 to follow up with patient. Gave 2 identifiers. Patient resides at [facility]. Home alone when I called. States nurses are there first thing in the morning. Patient sounded alert and oriented. Reports nurses are there in the mornings. NN asked patient if it would be ok if I call in the mornings to speak to the nurse. He confirmed. NN plans to call patient next week. NN reached out to Petr Villalpando, daughter (Yuri damon) to confirm if patient \"rolled out of the bed three nights ago\". NN left voicemail message with contact information requesting a return call. Goals Addressed             Most Recent       Patient Stated     Altered Mental Status (pt-stated)   On track (12/1/2017)             11/17/17- patient sent to [facility] for increased confusion and agitation. Urine culture revealed enterococcus faecalis. New prescription: Amoxicillin. Patient will complete antibiotic therapy. sc  12/1/17- patient seemed alert and oriented; able to answer questions appropriately. NN to follow up with staff for mental status updates. sc       Pain (pt-stated)                12/1/17- patient reports right shoulder pain 8/10 today. Reports morning nurse applies voltaren gel to right shoulder. Minor relief. Says he cant raise his arm above his shoulder. Reports difficulty using arm to cut up meat for meals; gets assistance from staff. Reports he rolled out of bed three nights ago landing on his right side. Reports sensitivity to that side. NN will follow up with staff.sc         Other     COMPLETED: Attends follow-up appointments as directed. 11/17/17-patient has scheduled [facility] appointment scheduled for 11/27/17. Sc  12/1/17-patient attended appointment. sc

## 2017-12-01 NOTE — PROGRESS NOTES
Outgoing call to patient. Phone went straight to voicemail. Message for patient to return call to office left. t

## 2017-12-05 NOTE — PROGRESS NOTES
Outgoing call to patient. Phone went straight to voicemail. Message left for patient to return call. Letter sent with results as well.

## 2017-12-06 ENCOUNTER — PATIENT OUTREACH (OUTPATIENT)
Dept: FAMILY MEDICINE CLINIC | Age: 82
End: 2017-12-06

## 2017-12-06 NOTE — PROGRESS NOTES
Outbound call made 12/6/17 to follow up  Patient reports he is ok. Still having something discomfort to right shoulder s/p fall several weeks ago. Goals Addressed             Most Recent       Patient Stated     COMPLETED: Altered Mental Status (pt-stated)   On track (12/1/2017)             11/17/17- patient sent to [facility] for increased confusion and agitation. Urine culture revealed enterococcus faecalis. New prescription: Amoxicillin. Patient will complete antibiotic therapy. sc  12/1/17- patient seemed alert and oriented; able to answer questions appropriately. NN to follow up with staff for mental status updates. sc  12/6/17-patient a/o x4 today. Able to answer questions appropriately.  Pain (pt-stated)                12/1/17- patient reports right shoulder pain 8/10 today. Reports morning nurse applies voltaren gel to right shoulder. Minor relief. Says he cant raise his arm above his shoulder. Reports difficulty using arm to cut up meat for meals; gets assistance from staff. Reports he rolled out of bed three nights ago landing on his right side. Reports sensitivity to that side. NN will follow up with staff.sc    12/6/17-continues to report discomfort to right shoulder. States pain is constant, but not severe. [facility] staff continues to apply volteran gel to shoulder with some relief. Reports has difficulty  lifting things with right arm. Norco was discontinued while hospitalized due to increase confusion. States he applys heating pad approximately 3-4 times per day with 30 minute intervals- temporary relief. Reviewed red flags, ie burns. sc       Safety (pt-stated)                12/6/17-patient wheelchair bound. Was able to transfer independently, but with bilateral lower leg weakness and  right shoulder discomfort s/p fall approximately 10 weeks ago unable to transfer safely. Patient reports his wheelchair brakes needs to be replaced. [facility] wheelchair was purchased from [employer].  NN contacted Breanna Flores, rep from [facility], reports brakes were repaired on 12/1/17. Breanna Flores transferred me to Charly Monk,  who states they have tried three different types of brakes: push to lock, pull to lock, and scissor mount unsuccessfully. Advised safety is a major concern. Inquired if there are any other options. Charly Monk stated he will contact Chris Forman, , for any other options. NN to follow up in one week to check status. Patient will not transfer without assistance from [facility] staff. sc

## 2017-12-11 ENCOUNTER — TELEPHONE (OUTPATIENT)
Dept: FAMILY MEDICINE CLINIC | Age: 82
End: 2017-12-11

## 2017-12-11 NOTE — TELEPHONE ENCOUNTER
----- Message from Madison Perez sent at 12/11/2017 12:04 PM EST -----  Regarding: / telephone   Mia Moe, from [address] [place] [address], requesting the status of face to face encounter faxed on 12.8.17.  [phone] ([phone]

## 2017-12-12 ENCOUNTER — TELEPHONE (OUTPATIENT)
Dept: FAMILY MEDICINE CLINIC | Age: 82
End: 2017-12-12

## 2017-12-12 NOTE — TELEPHONE ENCOUNTER
Patient is calling regarding documentation for his wheel chair. He states that the [address] [place] [address] is suppose to have faxed over documentation in regards to this issue and he is following up on it. Would like to speak to nurse.      Best contact number for patient: [phone] 12/12/17  Ezequiel Monteiro

## 2017-12-13 ENCOUNTER — PATIENT OUTREACH (OUTPATIENT)
Dept: FAMILY MEDICINE CLINIC | Age: 82
End: 2017-12-13

## 2017-12-13 NOTE — PROGRESS NOTES
Outbound call made 12/13/17 to follow up with patient. Goals Addressed             Most Recent       Patient Stated     Pain (pt-stated)   No change (12/13/2017)             12/1/17- patient reports right shoulder pain 8/10 today. Reports morning nurse applies voltaren gel to right shoulder. Minor relief. Says he cant raise his arm above his shoulder. Reports difficulty using arm to cut up meat for meals; gets assistance from staff. Reports he rolled out of bed three nights ago landing on his right side. Reports sensitivity to that side. NN will follow up with staff.sc    12/6/17-continues to report discomfort to right shoulder. [facility] pain is constant, but not severe. [facility] staff continues to apply volteran gel to shoulder with some relief. Reports has difficulty  lifting things with right arm. Norco was discontinued while hospitalized due to increase confusion. [facility] he applys heating pad approximately 3-4 times per day with 30 minute intervals- temporary relief. Reviewed red flags, ie burns. sc    12/13/17-reports discomfort to right shoulder when raising his right arm. [facility] staff is compliant with diclofenac gel. He states he will call this writer next week if no relief. XR right humerus completed 11/14/17 revealed normal right humerus. sc       Safety (pt-stated)   On track (12/13/2017)             12/6/17-patient wheelchair bound. Was able to transfer independently, but with bilateral lower leg weakness and  right shoulder discomfort s/p fall approximately 10 weeks ago unable to transfer safely. Patient reports his wheelchair brakes needs to be replaced. [facility] wheelchair was purchased from [employer]. NN contacted rep Cierra from [employer], reports brakes were repaired on 12/1/17. Lane Thompson transferred me to Lennox Likes,  who states they have tried three different types of brakes: push to lock, pull to lock, and scissor mount unsuccessfully. Advised safety is a major concern.  Inquired if there are any other options. Irmaramiro Brown stated he will contact [employer], , for any other options. NN to follow up in one week to check status. Patient will not transfer without assistance from [facility] staff.sc    12/13/17-reports someone from [facility] came out to inspect wheelchair; a part has been ordered. Patient acknowledged two people are required to transfer. Patient will not transfer independently due to the risk of falls. sc

## 2017-12-20 ENCOUNTER — TELEPHONE (OUTPATIENT)
Dept: FAMILY MEDICINE CLINIC | Age: 82
End: 2017-12-20

## 2017-12-20 NOTE — TELEPHONE ENCOUNTER
----- Message from Josiah Mckoy sent at 12/20/2017 10:31 AM EST -----  Regarding: Dr. González Tabares, with [facility], states they faxed over a face to face encounter form. Leann Rodriguez is requesting Dr. Yennifer Kim to cosign the notes. Best contact number [phone].

## 2017-12-21 ENCOUNTER — PATIENT OUTREACH (OUTPATIENT)
Dept: FAMILY MEDICINE CLINIC | Age: 82
End: 2017-12-21

## 2017-12-21 NOTE — PROGRESS NOTES
Outbound call made today to follow up. Goals Addressed             Most Recent       Patient Stated     Pain (pt-stated)   No change (12/21/2017)             12/1/17- patient reports right shoulder pain 8/10 today. Reports morning nurse applies voltaren gel to right shoulder. Minor relief. Says he cant raise his arm above his shoulder. Reports difficulty using arm to cut up meat for meals; gets assistance from staff. Reports he rolled out of bed three nights ago landing on his right side. Reports sensitivity to that side. NN will follow up with staff.sc    12/6/17-continues to report discomfort to right shoulder. States pain is constant, but not severe. [facility] staff continues to apply volteran gel to shoulder with some relief. Reports has difficulty  lifting things with right arm. Norco was discontinued while hospitalized due to increase confusion. [facility] he applys heating pad approximately 3-4 times per day with 30 minute intervals- temporary relief. Reviewed red flags, ie burns. sc    12/13/17-reports discomfort to right shoulder when raising his right arm. [facility] staff is compliant with diclofenac gel. He states he will call this writer next week if no relief. XR right humerus completed 11/14/17 revealed normal right humerus. Sc    12/21/17-continues to report discomfort to right shoulder. States right shoulder hurts when raising his arm. Reports [employer] staff continues compliance with application of diclofenac gel to shoulder with some relief. NN urged patient to follow up with provider. Patient authorized NN to contact daughter, Mare Giang for scheduling an appointment. Mare Giang reports patient fell approximately one week ago and \"could have strained his shoulder\". Transportation provided by [facility]. Appointment scheduled for 1/3/18. Leonidasarlene Winterthur aware and reports [facility] NN left voicemail message with patient to advise of appointment. sc       Safety (pt-stated)   No change (12/21/2017)             12/6/17-patient wheelchair bound. Was able to transfer independently, but with bilateral lower leg weakness and  right shoulder discomfort s/p fall approximately 10 weeks ago unable to transfer safely. Patient reports his wheelchair brakes needs to be replaced. States wheelchair was purchased from [employer]. NN contacted Lamar Barros, rep from [employer], reports brakes were repaired on 12/1/17. Lamar Barros transferred me to Mitesh Cortez,  who states they have tried three different types of brakes: push to lock, pull to lock, and scissor mount unsuccessfully. Advised safety is a major concern. Inquired if there are any other options. Mitesh Cortez stated he will contact Jeanne Wilson, , for any other options. NN to follow up in one week to check status. Patient will not transfer without assistance from [employer] staff.sc    12/13/17-reports someone from [employer] came out to inspect wheelchair; a part has been ordered. Patient acknowledged two people are required to transfer. Patient will not transfer independently due to the risk of falls. sc  12/21/17-denies falls. Reports compliance with assistance from [employer] staff for transfers. sc

## 2017-12-28 ENCOUNTER — TELEPHONE (OUTPATIENT)
Dept: FAMILY MEDICINE CLINIC | Age: 82
End: 2017-12-28

## 2017-12-28 NOTE — TELEPHONE ENCOUNTER
----- Message from Chana Sheikh sent at 12/28/2017 11:20 AM EST -----  Regarding: dr Jose Hylton, [facility], inquired about face to face and a copy of office notes for 11/27/17 sent on 12/19/17. Best contact  O     F .

## 2018-01-03 ENCOUNTER — OFFICE VISIT (OUTPATIENT)
Dept: FAMILY MEDICINE CLINIC | Age: 83
End: 2018-01-03

## 2018-01-03 ENCOUNTER — TELEPHONE (OUTPATIENT)
Dept: CARDIOLOGY CLINIC | Age: 83
End: 2018-01-03

## 2018-01-03 VITALS
OXYGEN SATURATION: 98 % | HEART RATE: 84 BPM | RESPIRATION RATE: 16 BRPM | TEMPERATURE: 97.5 F | HEIGHT: 74 IN | WEIGHT: 205 LBS | SYSTOLIC BLOOD PRESSURE: 102 MMHG | DIASTOLIC BLOOD PRESSURE: 76 MMHG | BODY MASS INDEX: 26.31 KG/M2

## 2018-01-03 DIAGNOSIS — M25.511 CHRONIC RIGHT SHOULDER PAIN: Primary | ICD-10-CM

## 2018-01-03 DIAGNOSIS — F33.9 RECURRENT DEPRESSION (HCC): ICD-10-CM

## 2018-01-03 DIAGNOSIS — I49.5 SICK SINUS SYNDROME (HCC): ICD-10-CM

## 2018-01-03 DIAGNOSIS — G89.29 CHRONIC RIGHT SHOULDER PAIN: Primary | ICD-10-CM

## 2018-01-03 DIAGNOSIS — I72.3 ANEURYSM OF ILIAC ARTERY (HCC): ICD-10-CM

## 2018-01-03 DIAGNOSIS — G20 PARKINSON'S DISEASE (HCC): ICD-10-CM

## 2018-01-03 NOTE — PROGRESS NOTES
HISTORY OF PRESENT ILLNESS  HPI  Chris Chacon is a 80 y.o. male with history of HTN, BPH, DDD, DJD, hyperlipidemia, HEAVEN, and major depressive disorder who presents to office today in a wheelchair for shoulder pain. Pt complains of right shoulder pain present since he fell around 5 months ago; he fell forward on his knees with his right arm extended. He states the pain is worse with sitting. He notes discomfort when sleeping on his right side, but only minimal discomfort on his left. He has been applying heat and Voltaren gel to the area with little relief. Right humerus xray on 11/14/17 revealed no acute abnormalities. Past Medical History:   Diagnosis Date    Blurry vision 1/19/2012    BPH (benign prostatic hyperplasia)     DDD (degenerative disc disease), lumbar 2/19/2010    DJD (degenerative joint disease) of cervical spine 2/19/2010    DU (duodenal ulcer) 9/1/1990    Hyperlipidemia     Hypertension     Other and unspecified hyperlipidemia 2/19/2010    Pacemaker     Paralysis agitans ([address] 2/19/2010    Parkinson disease ([address]     [facility]     Premature atrial beats     Reflux esophagitis 2/19/2010    Sick sinus syndrome [facility])      Past Surgical History:   Procedure Laterality Date    HX CATARACT REMOVAL  6/2012 and 7/2012    Both eyes    HX ORTHOPAEDIC  04/2015    tendon repair both knees    HX PACEMAKER  2008    bradycardia    HX VITRECTOMY  11/2011    AR REMVL PERM PM PLS GEN W/REPL PLSE GEN 2 LEAD SYS  3/13/2017          Current Outpatient Prescriptions on File Prior to Visit   Medication Sig Dispense Refill    losartan (COZAAR) 50 mg tablet Take 1 Tab by mouth daily. 30 Tab 0    aspirin 81 mg chewable tablet Take 81 mg by mouth daily.  traMADol (ULTRAM) 50 mg tablet Take 1 Tab by mouth every eight (8) hours as needed for Pain.  Max Daily Amount: 150 mg. 30 Tab 1    diclofenac (VOLTAREN) 1 % gel Apply 2 g to affected area every six (6) hours. Apply to shoulder for 5 days stop if no improvement 100 g 0    diclofenac (VOLTAREN) 1 % gel Apply  to affected area as needed.  QUEtiapine (SEROQUEL) 25 mg tablet Take 25 mg by mouth every evening.  DETROL LA 2 mg ER capsule TAKE 1 CAPSULE DAILY FOR BLADDER FREQUENCY 90 Cap 3    pindolol (VISKIN) 5 mg tablet Take 2.5 mg by mouth daily (after lunch). Take 1/2 tablet by mouth at 2pm -Take 1 tablet by mouth at 9pm      carbidopa-levodopa (SINEMET)  mg per tablet Take 2 Tabs by mouth four (4) times daily. (This is given at 8:00, 11:00, 14:00, and 21:00). 240 Tab 0    polyethylene glycol (MIRALAX) 17 gram packet Take 1 Packet by mouth daily. Indications: CONSTIPATION 30 Packet 0    therapeutic multivitamin (THERAGRAN) tablet Take 1 Tab by mouth daily. 30 Tab 0    cloNIDine HCl (CATAPRES) 0.1 mg tablet Take 1 Tab by mouth every six (6) hours as needed (SBP > 180 mmHg). 20 Tab 0    docusate sodium (COLACE) 100 mg capsule Take 1 Cap by mouth daily as needed for Constipation. 30 Cap 0    cycloSPORINE (RESTASIS) 0.05 % ophthalmic emulsion Administer 1 Drop to both eyes two (2) times a day. 60 Each 0    escitalopram oxalate (LEXAPRO) 20 mg tablet Take 1 Tab by mouth daily. 30 Tab 0    rivastigmine (EXELON) 4.6 mg/24 hr patch 1 Patch by TransDERmal route daily. 30 Patch 0    esomeprazole (NEXIUM) 40 mg capsule Take 1 Cap by mouth daily. 30 Cap 0    acetaminophen (MAPAP EXTRA STRENGTH) 500 mg tablet Take 1 Tab by mouth every six (6) hours as needed for Pain. 120 Tab 0     No current facility-administered medications on file prior to visit.       No Known Allergies  Family History   Problem Relation Age of Onset    Asthma Mother     Heart Disease Father      Social History     Social History    Marital status:      Spouse name: N/A    Number of children: N/A    Years of education: N/A     Social History Main Topics    Smoking status: Former Smoker     Types: Pipe     Quit date: 7/14/1945    Smokeless tobacco: Never Used    Alcohol use 2.0 oz/week     4 Cans of beer per week      Comment: glass of wine every now and then    Drug use: No    Sexual activity: Not Asked     Other Topics Concern     Service Yes    Blood Transfusions No    Caffeine Concern No    Occupational Exposure No    Hobby Hazards No    Sleep Concern No    Stress Concern No    Weight Concern No    Special Diet No    Back Care No    Exercise No    Bike Helmet No    Seat Belt Yes    Self-Exams No     Social History Narrative             Review of Systems   Constitutional: Negative for chills, diaphoresis, fever, malaise/fatigue and weight loss. Eyes: Negative for blurred vision, double vision, pain and redness. Respiratory: Negative for cough, shortness of breath and wheezing. Cardiovascular: Negative for chest pain, palpitations, orthopnea, claudication, leg swelling and PND. Musculoskeletal:        Right shoulder pain   Skin: Negative for itching and rash. Neurological: Negative for dizziness, tingling, tremors, sensory change, speech change, focal weakness, seizures, loss of consciousness, weakness and headaches.      Results for orders placed or performed in visit on 11/27/17   IRON PROFILE   Result Value Ref Range    TIBC 258 250 - 450 ug/dL    UIBC 215 111 - 343 ug/dL    Iron 43 38 - 169 ug/dL    Iron % saturation 17 15 - 55 %   FERRITIN   Result Value Ref Range    Ferritin 45 30 - 400 ng/mL             Physical Exam  Visit Vitals    /76    Pulse 84    Temp 97.5 °F (36.4 °C) (Oral)    Resp 16    Ht 6' 2\" (1.88 m)    Wt 205 lb (93 kg)    SpO2 98%    BMI 26.32 kg/m2     Shoulders: left shoulder has decreased ROM mainly in abduction but has some mild decreased ROM in all directions, the right shoulder has marked limitations in abduction and cannot reach past horizontal without having to stop, could not really check internal rotation because of discomfort, he has fairly good across the body flexion, there is pain however at the end of ROM with that maneuver, he complains of discomfort in his deltoid area both laterally and posteriorly mainly when using the rotator cuff muscle          ASSESSMENT and PLAN    ICD-10-CM ICD-9-CM    1. Chronic right shoulder painChronic M25.511 719.41     G89.29 338.29     x 2 mths, started after a fall getting out of bed-? rotator cuff tear/strain   2. Recurrent depression ([facility]) F33.9 296.30    3. Parkinson's disease ([facility] G20 332.0    4. Sick sinus syndrome-pacemaker-2008 I49.5 427.81    5. Aneurysm of iliac artery ([facility])- left 3.9 cm 2016 CT scan- no change I72.3 442.2      Diagnoses and all orders for this visit:    1. Chronic right shoulder pain  Comments:  x 2 mths, started after a fall getting out of bed-? rotator cuff tear/strain    2. Recurrent depression ([facility]  Assessment & Plan:  Well Controlled, based on history, physical exam and review of pertinent labs, studies and medications; meds reconciled; continue current treatment plan., This condition is managed by Specialist.  Key Psychotherapeutic Meds             QUEtiapine (SEROQUEL) 25 mg tablet  (Taking) Take 25 mg by mouth every evening.    escitalopram oxalate (LEXAPRO) 20 mg tablet  (Taking) Take 1 Tab by mouth daily. rivastigmine (EXELON) 4.6 mg/24 hr patch  (Taking) 1 Patch by TransDERmal route daily. Other [address] Meds             traMADol (ULTRAM) 50 mg tablet  (Taking) Take 1 Tab by mouth every eight (8) hours as needed for Pain. Max Daily Amount: 150 mg. Lab Results   Component Value Date/Time    Sodium 141 11/15/2017 03:15 AM    Creatinine 0.77 11/15/2017 03:15 AM    TSH 0.93 11/13/2017 12:30 AM    WBC 5.8 11/16/2017 02:05 AM    ALT (SGPT) 9 11/12/2017 06:32 PM    AST (SGOT) 28 11/12/2017 06:32 PM         3.  Parkinson's disease [facility])  Assessment & Plan:  Stable, based on history, physical exam and review of pertinent labs, studies and medications; meds reconciled; continue current treatment plan., This condition is managed by Specialist.  Lab Results   Component Value Date/Time    WBC 5.8 11/16/2017 02:05 AM    HGB 12.0 11/16/2017 02:05 AM    HCT 35.9 11/16/2017 02:05 AM    PLATELET 977 37/87/8686 02:05 AM    Creatinine 0.77 11/15/2017 03:15 AM    BUN 14 11/15/2017 03:15 AM    Potassium 3.9 11/15/2017 03:15 AM    INR 1.1 11/12/2017 09:01 PM    Prothrombin time 11.1 11/12/2017 09:01 PM         4. Sick sinus syndrome-pacemaker-2008  Assessment & Plan:  Stable, based on history, physical exam and review of pertinent labs, studies and medications; meds reconciled; continue current treatment plan., This condition is managed by Specialist.  Key CAD CHF Meds             losartan (COZAAR) 50 mg tablet  (Taking) Take 1 Tab by mouth daily. aspirin 81 mg chewable tablet  (Taking) Take 81 mg by mouth daily. pindolol (VISKIN) 5 mg tablet  (Taking) Take 2.5 mg by mouth daily (after lunch). Take 1/2 tablet by mouth at 2pm -Take 1 tablet by mouth at 9pm    cloNIDine HCl (CATAPRES) 0.1 mg tablet  (Taking) Take 1 Tab by mouth every six (6) hours as needed (SBP > 180 mmHg). Key Antihyperlipidemia Meds     The patient is on no antihyperlipidemia meds. Lab Results   Component Value Date/Time     06/26/2017 07:07 PM     02/12/2017 02:14 AM    Sodium 141 11/15/2017 03:15 AM    Potassium 3.9 11/15/2017 03:15 AM    Cholesterol, total 122 06/27/2017 02:25 AM    HDL Cholesterol 47 06/27/2017 02:25 AM    LDL, calculated 65.4 06/27/2017 02:25 AM    Triglyceride 48 06/27/2017 02:25 AM    INR 1.1 11/12/2017 09:01 PM    Prothrombin time 11.1 11/12/2017 09:01 PM         5. 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         Lab Results   Component Value Date/Time    WBC 5.8 11/16/2017 02:05 AM    HGB 12.0 11/16/2017 02:05 AM    HCT 35.9 11/16/2017 02:05 AM    PLATELET 617 37/67/1456 02:05 AM    Creatinine 0.77 11/15/2017 03:15 AM    BUN 14 11/15/2017 03:15 AM    INR 1.1 11/12/2017 09:01 PM    Prothrombin time 11.1 11/12/2017 09:01 PM    Cholesterol, total 122 06/27/2017 02:25 AM    HDL Cholesterol 47 06/27/2017 02:25 AM    LDL, calculated 65.4 06/27/2017 02:25 AM    Triglyceride 48 06/27/2017 02:25 AM     Follow-up Disposition:  Return in about 1 month (around 2/3/2018) for F/U R>>L shoulder decreased ROM and right shoulder pain. reviewed medications and side effects in detail  Please call my office if there are any questions- [phone]. Discussed expected course/resolution/complications of diagnosis in detail with patient. Medication risks/benefits/costs/interactions/alternatives discussed with patient. Pt was given an after visit summary which includes diagnoses, current medications & vitals. Pt expressed understanding with the diagnosis and plan. Total 25 minutes,60 % counseling re: Patient to call if no better in 3 -4 days and prn new problems. He appears to have a rotator cuff injury. Xray on 11/14 was unremarkable for any fracture. We need to increase his ROM in his shoulder before we can strengthen the shoulder and get it back to normal. I suggested that we do ROM exercises in both shoulders, and then some strengthening after the ROM is normal. He does have PT [facility] where he [facility], [facility], so we'll call out there and get him involved. I informed him that this is something which will take months and months, maybe even a year, to get the best improvement we can get, and we'll just need to keep working at it until we get it better. Medications and injections will only give temporary relief, and he can use extra strength tylenol for any discomfort that he has. Apparently they're using Voltaren gel, which is helping very little.  He notes when he sleeps on his right side that it does affect his sleep, but that if he sleeps on his other side it's not too bad, and he probably doesn't need medication at nighttime for the pain. He does have tramadol on his list of medications he can take prn, so he could use that in addition to the tylenol for this pain during the day or at night. Also, discussed symptoms of concern that were noted today in the note above, treatment options( including doing nothing), when to follow up before recommended time frame. Also, answered all questions. This document was written by Zeferino Owusu, as dictated by Kenyon Hatchet, MD.  I have reviewed and agree with the above note and have made corrections where appropriate To Gaytan M.D.

## 2018-01-03 NOTE — PROGRESS NOTES
Pt presents to office today for a follow up on Right Shoulder Pain . Pt states that he has been using a heating pad and his Voltaren gel for the past 6 weeks, but it does not seem to be helping at all. Chief Complaint   Patient presents with    Shoulder Pain     Follow up from 6 weeks ago. Using Heating pad and Voltaren get, not helping miuch. 1. Have you been to the ER, urgent care clinic since your last visit? Hospitalized since your last visit? No    2. Have you seen or consulted any other health care providers outside of the [address] [place] [address] since your last visit? Include any pap smears or colon screening.  No

## 2018-01-03 NOTE — MR AVS SNAPSHOT
Visit Information Date & Time Provider Department Dept. Phone Encounter #  
 1/3/2018 11:00 AM Suly Young MD [address] [place] [phone] [id number] Your Appointments 1/18/2018 10:40 AM  
ESTABLISHED PATIENT with Elizabeth Rogers MD  
[facility] ([facility]) Appt Note: 6 month f/u  
 [address] [place] [address]  
[address] [id number] [place] [phone]  
  
    
 7/26/2018 10:30 AM  
PACEMAKER with Amado Rivero [facility] ([facility] SCHEDULING) Appt Note: lachelle callaway, annual thresh/CL, see gilman; med threshold/rc/Gilman 1 yr b  
 [address] [place] [address]  
[address] [facility]  
  
    
 7/26/2018 10:40 AM  
ESTABLISHED PATIENT with Elizabeth Rogers MD  
[facility] ([facility]) Appt Note: med threshold/rc/Gilman 1 yr b  
 [address]  [address] [place] [address]  
[phone]  
  
    
  
 1/15/2018  9:45 AM  
REMOTE OFFICE VISIT with Jack Irene [facility] ([facility]) Appt Note: lachelle callaway  
 [address] [facility] [address]  
[address] [address] [place] [phone]  
  
    
 4/18/2018 10:45 AM  
REMOTE OFFICE VISIT with Jack Irene [facility] ([facility] SCHEDULING) Appt Note: lachelle callaway, annual thresh/CL, see gilman; CL PPI/rc b  
 [address] [facility] [address] [address]  
[phone] Upcoming Health Maintenance Date Due DTaP/Tdap/Td series (1 - Tdap) 5/4/2012 MEDICARE YEARLY EXAM 4/29/2018 GLAUCOMA SCREENING Q2Y 8/26/2018 Allergies as of 1/3/2018  Review Complete On: 1/3/2018 By: Benito Parra LPN No Known Allergies Current Immunizations  Reviewed on 3/14/2017 Name Date Influenza High Dose Vaccine PF 10/27/2015 Influenza Vaccine (Quad) PF 11/16/2017  4:19 PM  
 Influenza Vaccine Split 11/17/2011 TB Skin Test (PPD) Intradermal 3/25/2013 TD Vaccine 5/3/2012 Tdap 5/3/2012 ZZZ-RETIRED (DO NOT USE) Pneumococcal Vaccine (Unspecified Type) 4/20/2010 Not reviewed this visit Vitals BP Pulse Temp Resp Height(growth percentile) Weight(growth percentile) 102/76 84 97.5 °F (36.4 °C) (Oral) 16 6' 2\" (1.88 m) 205 lb (93 kg) SpO2 BMI Smoking Status 98% 26.32 kg/m2 Former Smoker Vitals History BMI and BSA Data Body Mass Index Body Surface Area  
 26.32 kg/m 2 2.2 m 2 Preferred Pharmacy Pharmacy Name Phone [address] [address] [phone] Your Updated Medication List  
  
   
This list is accurate as of: 1/3/18 12:38 PM.  Always use your most recent med list.  
  
  
  
  
 acetaminophen 500 mg tablet Commonly known as:  MAPAP EXTRA STRENGTH Take 1 Tab by mouth every six (6) hours as needed for Pain. albuterol 90 mcg/actuation inhaler Commonly known as:  PROVENTIL HFA, VENTOLIN HFA, PROAIR HFA Take 2 Puffs by inhalation every six (6) hours as needed for Wheezing. aspirin 81 mg chewable tablet Take 81 mg by mouth daily. carbidopa-levodopa  mg per tablet Commonly known as:  SINEMET Take 2 Tabs by mouth four (4) times daily. (This is given at 8:00, 11:00, 14:00, and 21:00). cloNIDine HCl 0.1 mg tablet Commonly known as:  CATAPRES Take 1 Tab by mouth every six (6) hours as needed (SBP > 180 mmHg). cycloSPORINE 0.05 % ophthalmic emulsion Commonly known as:  RESTASIS Administer 1 Drop to both eyes two (2) times a day. DETROL LA 2 mg ER capsule Generic drug:  tolterodine ER  
TAKE 1 CAPSULE DAILY FOR BLADDER FREQUENCY  
  
 docusate sodium 100 mg capsule Commonly known as:  Montesteven Roch  
 Take 1 Cap by mouth daily as needed for Constipation. escitalopram oxalate 20 mg tablet Commonly known as:  Foreign [place] Take 1 Tab by mouth daily. esomeprazole 40 mg capsule Commonly known as:  NexIUM Take 1 Cap by mouth daily. losartan 50 mg tablet Commonly known as:  COZAAR Take 1 Tab by mouth daily. nitrofurantoin 50 mg capsule Commonly known as:  MACRODANTIN Take 50 mg by mouth nightly. Indications: uti prophylaxis  
  
 pindolol 5 mg tablet Commonly known as:  VISKIN Take 2.5 mg by mouth daily (after lunch). Take 1/2 tablet by mouth at 2pm -Take 1 tablet by mouth at 9pm  
  
 polyethylene glycol 17 gram packet Commonly known as:  Susanne Session Take 1 Packet by mouth daily. Indications: CONSTIPATION  
  
 QUEtiapine 25 mg tablet Commonly known as:  SEROquel Take 25 mg by mouth every evening. rivastigmine 4.6 mg/24 hr patch Commonly known as:  EXELON  
1 Patch by TransDERmal route daily. therapeutic multivitamin tablet Commonly known as:  [facility] Take 1 Tab by mouth daily. traMADol 50 mg tablet Commonly known as:  ULTRAM  
Take 1 Tab by mouth every eight (8) hours as needed for Pain. Max Daily Amount: 150 mg.  
  
 * VOLTAREN 1 % Gel Generic drug:  diclofenac Apply  to affected area as needed. * diclofenac 1 % Gel Commonly known as:  VOLTAREN Apply 2 g to affected area every six (6) hours. Apply to shoulder for 5 days stop if no improvement * Notice: This list has 2 medication(s) that are the same as other medications prescribed for you. Read the directions carefully, and ask your doctor or other care provider to review them with you. Introducing [facility]! Dear Garfield Morton: Thank you for requesting a [employer] account. Our records indicate that you already have an active [employer] account. You can access your account anytime at [email or web address] Did you know that you can access your hospital and ER discharge instructions at any time in [employer]? You can also review all of your test results from your hospital stay or ER visit. Additional Information If you have questions, please visit the Frequently Asked Questions section of the [employer] website at [email or web address] Remember, [employer]Wired is NOT to be used for urgent needs. For medical emergencies, dial 911. Now available from your iPhone and Android! Please provide this summary of care documentation to your next provider. Your primary care clinician is listed as [address] [place] [address], [facility]s . If you have any questions after today's visit, please call [phone].

## 2018-01-03 NOTE — TELEPHONE ENCOUNTER
Left voice mail for Antoine Dewey (patient's daughter) advising them to call [employer] tech services and left the contact number. Other option would be for them to schedule an in clinic check of device.

## 2018-01-03 NOTE — TELEPHONE ENCOUNTER
Patients daughter called, she said the last time they had a remote check they couldn't get it to work so they're unsure what to do

## 2018-01-04 ENCOUNTER — TELEPHONE (OUTPATIENT)
Dept: FAMILY MEDICINE CLINIC | Age: 83
End: 2018-01-04

## 2018-01-04 NOTE — ASSESSMENT & PLAN NOTE
Stable, based on history, physical exam and review of pertinent labs, studies and medications; meds reconciled; continue current treatment plan., This condition is managed by Specialist.  Key CAD CHF Meds             losartan (COZAAR) 50 mg tablet  (Taking) Take 1 Tab by mouth daily. aspirin 81 mg chewable tablet  (Taking) Take 81 mg by mouth daily. pindolol (VISKIN) 5 mg tablet  (Taking) Take 2.5 mg by mouth daily (after lunch). Take 1/2 tablet by mouth at 2pm -Take 1 tablet by mouth at 9pm    cloNIDine HCl (CATAPRES) 0.1 mg tablet  (Taking) Take 1 Tab by mouth every six (6) hours as needed (SBP > 180 mmHg). Key Antihyperlipidemia Meds     The patient is on no antihyperlipidemia meds.         Lab Results   Component Value Date/Time     06/26/2017 07:07 PM     02/12/2017 02:14 AM    Sodium 141 11/15/2017 03:15 AM    Potassium 3.9 11/15/2017 03:15 AM    Cholesterol, total 122 06/27/2017 02:25 AM    HDL Cholesterol 47 06/27/2017 02:25 AM    LDL, calculated 65.4 06/27/2017 02:25 AM    Triglyceride 48 06/27/2017 02:25 AM    INR 1.1 11/12/2017 09:01 PM    Prothrombin time 11.1 11/12/2017 09:01 PM

## 2018-01-04 NOTE — ASSESSMENT & PLAN NOTE
Well Controlled, based on history, physical exam and review of pertinent labs, studies and medications; meds reconciled; continue current treatment plan., This condition is managed by Specialist.  Key Psychotherapeutic Meds             QUEtiapine (SEROQUEL) 25 mg tablet  (Taking) Take 25 mg by mouth every evening.    escitalopram oxalate (LEXAPRO) 20 mg tablet  (Taking) Take 1 Tab by mouth daily. rivastigmine (EXELON) 4.6 mg/24 hr patch  (Taking) 1 Patch by TransDERmal route daily. Other [address] Meds             traMADol (ULTRAM) 50 mg tablet  (Taking) Take 1 Tab by mouth every eight (8) hours as needed for Pain. Max Daily Amount: 150 mg.         Lab Results   Component Value Date/Time    Sodium 141 11/15/2017 03:15 AM    Creatinine 0.77 11/15/2017 03:15 AM    TSH 0.93 11/13/2017 12:30 AM    WBC 5.8 11/16/2017 02:05 AM    ALT (SGPT) 9 11/12/2017 06:32 PM    AST (SGOT) 28 11/12/2017 06:32 PM

## 2018-01-04 NOTE — ASSESSMENT & PLAN NOTE
Key Peripheral Vascular Disease Meds             aspirin 81 mg chewable tablet  (Taking) Take 81 mg by mouth daily.         Lab Results   Component Value Date/Time    WBC 5.8 11/16/2017 02:05 AM    HGB 12.0 11/16/2017 02:05 AM    HCT 35.9 11/16/2017 02:05 AM    PLATELET 376 81/34/6151 02:05 AM    Creatinine 0.77 11/15/2017 03:15 AM    BUN 14 11/15/2017 03:15 AM    INR 1.1 11/12/2017 09:01 PM    Prothrombin time 11.1 11/12/2017 09:01 PM    Cholesterol, total 122 06/27/2017 02:25 AM    HDL Cholesterol 47 06/27/2017 02:25 AM    LDL, calculated 65.4 06/27/2017 02:25 AM    Triglyceride 48 06/27/2017 02:25 AM

## 2018-01-04 NOTE — TELEPHONE ENCOUNTER
Yohana from [facility] called because she would like to ask for physical therapy orders for the patient.  Best contact number is [phone]

## 2018-01-04 NOTE — ASSESSMENT & PLAN NOTE
Stable, based on history, physical exam and review of pertinent labs, studies and medications; meds reconciled; continue current treatment plan., This condition is managed by Specialist.  Lab Results   Component Value Date/Time    WBC 5.8 11/16/2017 02:05 AM    HGB 12.0 11/16/2017 02:05 AM    HCT 35.9 11/16/2017 02:05 AM    PLATELET 397 49/92/8443 02:05 AM    Creatinine 0.77 11/15/2017 03:15 AM    BUN 14 11/15/2017 03:15 AM    Potassium 3.9 11/15/2017 03:15 AM    INR 1.1 11/12/2017 09:01 PM    Prothrombin time 11.1 11/12/2017 09:01 PM

## 2018-01-08 ENCOUNTER — TELEPHONE (OUTPATIENT)
Dept: FAMILY MEDICINE CLINIC | Age: 83
End: 2018-01-08

## 2018-01-08 NOTE — TELEPHONE ENCOUNTER
----- Message from Isaac Cabral sent at 1/5/2018  5:02 PM EST -----  Regarding: Dr Rachele Velez (p)  [facility] , calling to get Justification for physical therapy for pt  For muscle strengthen and safety and transfer training for 2x a week for 5 weeks,     And they will need a call back for a verbal okay, can also leave a voice mail message and written  order care report will follow, need the verbal okay before they can start

## 2018-01-17 ENCOUNTER — TELEPHONE (OUTPATIENT)
Dept: FAMILY MEDICINE CLINIC | Age: 83
End: 2018-01-17

## 2018-01-17 NOTE — TELEPHONE ENCOUNTER
----- Message from [facility] sent at 1/17/2018 10:28 AM EST -----  Regarding: [phone] stated pt's bp 80/60 1/17/18. Physical therapist performed a re-cert today and needs Dr. Jazz Ge to approve him for therapy.

## 2018-01-18 ENCOUNTER — PATIENT OUTREACH (OUTPATIENT)
Dept: FAMILY MEDICINE CLINIC | Age: 83
End: 2018-01-18

## 2018-01-18 NOTE — PROGRESS NOTES
Patient on discharge report. Went to [facility] for nosebleed. Nose packed by Dr Apoorva Frazier. Lab work completed. Hgb-11.8. Patient to follow up with ENT. Outbound call made 18 to check on him: says he is \"doing ok\".  verified. Denies shortness of breath and pain. Reports good appetite. Has follow up appointment with ENT scheduled for 18.   NN contact information given to patient  He thanked me for calling

## 2018-01-19 ENCOUNTER — HOSPITAL ENCOUNTER (EMERGENCY)
Age: 83
Discharge: HOME | End: 2018-01-19
Attending: EMERGENCY MEDICINE
Payer: MEDICARE

## 2018-01-19 VITALS
HEIGHT: 74 IN | HEART RATE: 87 BPM | OXYGEN SATURATION: 98 % | BODY MASS INDEX: 26.31 KG/M2 | TEMPERATURE: 97.3 F | SYSTOLIC BLOOD PRESSURE: 131 MMHG | RESPIRATION RATE: 18 BRPM | DIASTOLIC BLOOD PRESSURE: 88 MMHG | WEIGHT: 205 LBS

## 2018-01-19 DIAGNOSIS — R04.0 EPISTAXIS: Primary | ICD-10-CM

## 2018-01-19 PROCEDURE — 99284 EMERGENCY DEPT VISIT MOD MDM: CPT

## 2018-01-19 NOTE — PROGRESS NOTES
Physical Therapy Screening:  Physical Therapy consult is not indicated at this time. A screening was performed on this patient's chart based on their entrance into the emergency department and potential need for physical therapy identified. The patients chart was reviewed and the patient was interviewed/screened. A physical therapy consult is not indicated at this time based on their prior level of function or current medical status. Please consult physical therapy if any therapy needs arise. Thank you. Chart reviewed. Noted TRST score. Patient lives at [facility]. At baseline patient has knee flexion contractures bilaterally, making any standing difficult. Patient is wheelchair bound at baseline. He uses a slide board vs squat pivot transfer with assist x 2 to get to the chair. Patient reporting one fall recently with a staff member during a transfer, but no residual pain or injury. Patient appears to be at functional baseline. Recommend return to SNF with continued 24 hour assist with transfers. Most likely will need stretcher transfer home. Thanks!     Trisha Dewey PT, DPT

## 2018-01-19 NOTE — ED NOTES
Spoke with Kristen Shelton LPN, nurse director regarding rhino rocket. Confirming there is no drain on a rhino rocket, only tape to secure it. Okay for patient to return to [facility] per Christian Roach, if there is no drain to maintain.

## 2018-01-19 NOTE — ED NOTES
Spoke with Calvin Bales at [facility], states patient was sent out due to scant bleeding/oozing from LEFT nare this morning, and for concerns of patient pulling at rhino rocket. Updated Calvin Bales that patient will most likely be discharged soon and this RN would call back to give report at that time.

## 2018-01-19 NOTE — ED NOTES
Patient has received discharge instructions from ER MD, verbalizes understanding. Report called to Cain Khanna, patient's nurse at [facility]. All questions/concerns addressed. [facility] transport ETA 1530 per case management.

## 2018-01-19 NOTE — PROGRESS NOTES
1340 Call received from daughter informed of pending communication. Discuss transport arrangements for stretcher safest mode of travel. In agreement. CM sent referral to [facility] for 1430 . Call placed to [address] [facility], [facility] Director informed in the process of reaching Administration. Contact made w/ Brock Brooks [facility] Liaison will pend transport until . [facility] - facility is attempting to reach  Shar Dumas. Updates provided to [facility]. Call received from [address] Director states unable to accept w/ drain (facility policy) however can take current packing out and use alternative dressing w/o drain.  provided LIZABETH Lewis with  name/number to clarify treatment plan. ETA for transport 1530.

## 2018-01-19 NOTE — PROGRESS NOTES
SSED/CM consult received. EMR reviewed. History significant for Parkinson disease, PAT (pacemaker placed), DDD, and DJD. Patient presents to the ED from [facility] w/ chief complaint of epistaxis.  will require transportation back to [facility] and is non-ambulatory. CM met w/patient and introduced self. Informed would communicate w/ daughter to discuss travel arrangements. [phone] [phone] informed she had spoken w/ Nursing at [facility] was \"[facility] would keep until Monday facility unable to accept w/ nose packing . \" Appointment is scheduled 1/22/18 at 1400.     1259 Call placed to [facility] Cristi Pérez,[facility] [phone] -  left for  [facility] [phone]. CM attempted call back to facility alternative  left. Then 3rd call placed and spoke w/ assigned Nurse - Ike Nicholas/ Resident Coordinator. Informed patient is discharged and has packing - Margareth Bishop has communicated w/ her supervisor and  [facility] - facility unable to accept (liability and staffing). Case discussed w/ Darren Alvarado and Randolph Montoya (outcome pending). [employer] Medicare A/B and [employer] are insurance providers.

## 2018-01-19 NOTE — ED TRIAGE NOTES
Patient arrives via EMS from [facility] for nosebleed. Patient was seen at [address] [place] [address] yesterday for same, cauterized and rhino rocket placed to LEFT nare. Today rhino rocket is coming out of place, nose again bleeding down back of throat. Minimal bleeding on arrival per patient.  VS stable en route per EMS

## 2018-01-19 NOTE — ED PROVIDER NOTES
HPI Comments: 80 y.o. male with past medical history significant for Parkinson disease, PAT (pacemaker placed), DDD, and DJD who presents from [facility] via EMS with chief complaint of epistaxis. Per EMS, per facility, pt was seen yesterday at [facility] yesterday for a nosebleed, which was cauterized and a rhino rocket placed to R nare. Per EMS, the facility called EMS this morning after the pt had been pulling the rocket out and noted some bleeding coming from his nare. Pt states earlier there was some blood going down his throat, but no active bleeding currently. Pt states he was recommended to ENT, but his daughter has made an appointment with his PCP for Monday. There are no other acute medical concerns at this time. Social hx: Former smoker ([address] [place] [address]); Rare EtOH use  PCP: Homer Fuentes MD    Note written by Jonh Wiseman. Claudeen Hans, as dictated by Dominique Burrows MD 11:49 AM      The history is provided by the patient and the nursing home. No  was used.         Past Medical History:   Diagnosis Date    Blurry vision 1/19/2012    BPH (benign prostatic hyperplasia)     DDD (degenerative disc disease), lumbar 2/19/2010    DJD (degenerative joint disease) of cervical spine 2/19/2010    DU (duodenal ulcer) 9/1/1990    Hyperlipidemia     Hypertension     Other and unspecified hyperlipidemia 2/19/2010    Pacemaker     Paralysis agitans ([facility] [address] 2/19/2010    Parkinson disease ([address]     PAT (paroxysmal atrial tachycardia) ([facility])     Premature atrial beats     Reflux esophagitis 2/19/2010    Sick sinus syndrome [facility])        Past Surgical History:   Procedure Laterality Date    HX CATARACT REMOVAL  6/2012 and 7/2012    Both eyes    HX ORTHOPAEDIC  04/2015    tendon repair both knees    HX PACEMAKER  2008    bradycardia    HX VITRECTOMY  11/2011    [place] REMVL PERM PM PLS GEN W/REPL PLSE GEN 2 LEAD SYS  3/13/2017              Family History:   Problem Relation Age of Onset    Asthma Mother     Heart Disease Father        Social History     Social History    Marital status:      Spouse name: N/A    Number of children: N/A    Years of education: N/A     Occupational History    Not on file. Social History Main Topics    Smoking status: Former Smoker     Types: Pipe     Quit date: 7/14/1945    Smokeless tobacco: Never Used    Alcohol use No      Comment: glass of wine every now and then    Drug use: No    Sexual activity: Not on file     Other Topics Concern     Service Yes    Blood Transfusions No    Caffeine Concern No    Occupational Exposure No    Hobby Hazards No    Sleep Concern No    Stress Concern No    Weight Concern No    Special Diet No    Back Care No    Exercise No    Bike Helmet No    Seat Belt Yes    Self-Exams No     Social History Narrative         ALLERGIES: Review of patient's allergies indicates no known allergies. Review of Systems   HENT: Positive for nosebleeds. All other systems reviewed and are negative. There were no vitals filed for this visit. Physical Exam   Constitutional: He is oriented to person, place, and time. He appears well-developed and well-nourished. He appears ill. No distress. Elderly, chronically ill appearing, debilitated. HENT:   Head: Normocephalic and atraumatic. Rhino rocket in L nare with some dried blood. No active bleeding. Eyes: Conjunctivae are normal. No scleral icterus. Neck: Neck supple. No tracheal deviation present. Cardiovascular: Normal rate, regular rhythm, normal heart sounds and intact distal pulses. Exam reveals no gallop and no friction rub. No murmur heard. Pulmonary/Chest: Effort normal and breath sounds normal. He has no wheezes. He has no rales. Abdominal: Soft. He exhibits no distension. There is no tenderness. There is no rebound and no guarding. Musculoskeletal: He exhibits no edema.    Neurological: He is alert and oriented to person, place, and time. Skin: Skin is warm and dry. No rash noted. Psychiatric: He has a normal mood and affect. Nursing note and vitals reviewed. Note written by Elayne Watkins, as dictated by Fran Thomas MD 11:49 AM    [facility]  ED Course       Procedures      PROGRESS NOTE:  12:48 PM  Pt has been observed for 2 hours, no active bleeding noted. Rapid rhino appears to be in a good position. Will D/C home to F/U with ENT as already arranged.

## 2018-01-19 NOTE — ED NOTES
No change to previous assessment, awaiting case management contact with director of Igor Lama regarding staff refusing to take patient with denise conley. Patient has eaten lunch, denies needs, is sitting in recliner.

## 2018-01-19 NOTE — DISCHARGE INSTRUCTIONS
Nosebleeds: Care Instructions  Your Care Instructions    Nosebleeds are common, especially if you have colds or allergies. Many things can cause a nosebleed. Some nosebleeds stop on their own with pressure. Others need packing. Some get cauterized (sealed). If you have gauze or other packing materials in your nose, you will need to follow up with your doctor to have the packing removed. You may need more treatment if you get nosebleeds a lot. The doctor has checked you carefully, but problems can develop later. If you notice any problems or new symptoms, get medical treatment right away. Follow-up care is a key part of your treatment and safety. Be sure to make and go to all appointments, and call your doctor if you are having problems. It's also a good idea to know your test results and keep a list of the medicines you take. How can you care for yourself at home? · If you get another nosebleed:  ¨ Sit up and tilt your head slightly forward. This keeps blood from going down your throat. ¨ Use your thumb and index finger to pinch your nose shut for 10 minutes. Use a clock. Do not check to see if the bleeding has stopped before the 10 minutes are up. If the bleeding has not stopped, pinch your nose shut for another 10 minutes. ¨ When the bleeding has stopped, try not to pick, rub, or blow your nose for 12 hours. Avoiding these things helps keep your nose from bleeding again. · If your doctor prescribed antibiotics, take them as directed. Do not stop taking them just because you feel better. You need to take the full course of antibiotics. To prevent nosebleeds  · Do not blow your nose too hard. · Try not to lift or strain after a nosebleed. · Raise your head on a pillow while you sleep. · Put a thin layer of a saline- or water-based nasal gel, such as NasoGel, inside your nose. Put it on the septum, which divides your nostrils. This will prevent dryness that can cause nosebleeds.   · Use a vaporizer or humidifier to add moisture to your bedroom. Follow the directions for cleaning the machine. · Do not use aspirin, ibuprofen (Advil, Motrin), or naproxen (Aleve) for 36 to 48 hours after a nosebleed unless your doctor tells you to. You can use acetaminophen (Tylenol) for pain relief. · Talk to your doctor about stopping any other medicines you are taking. Some medicines may make you more likely to get a nosebleed. · Do not use cold medicines or nasal sprays without first talking to your doctor. They can make your nose dry. When should you call for help? Call 911 anytime you think you may need emergency care. For example, call if:  ? · You passed out (lost consciousness). ?Call your doctor now or seek immediate medical care if:  ? · You get another nosebleed and your nose is still bleeding after you have applied pressure 3 times for 10 minutes each time (30 minutes total). ? · There is a lot of blood running down the back of your throat even after you pinch your nose and tilt your head forward. ? · You have a fever. ? · You have sinus pain. ? Watch closely for changes in your health, and be sure to contact your doctor if:  ? · You get nosebleeds often, even if they stop. ? · You do not get better as expected. Where can you learn more? Go to http://rodo-lukas.info/. Enter S156 in the search box to learn more about \"Nosebleeds: Care Instructions. \"  Current as of: March 20, 2017  Content Version: 11.4  © 4277-9196 [employer]. Care instructions adapted under license by [employer] (which disclaims liability or warranty for this information). If you have questions about a medical condition or this instruction, always ask your healthcare professional. [address] any warranty or liability for your use of this information. We hope that we have addressed all of your medical concerns.  The examination and treatment you received in the Emergency Department were for an emergent problem and were not intended as complete care. It is important that you follow up with your healthcare provider(s) for ongoing care. If your symptoms worsen or do not improve as expected, and you are unable to reach your usual health care provider(s), you should return to the Emergency Department. Today's healthcare is undergoing tremendous change, and patient satisfaction surveys are one of the many tools to assess the quality of medical care. You may receive a survey from the [employer] regarding your experience in the Emergency Department. I hope that your experience has been completely positive, particularly the medical care that I provided. As such, please participate in the survey; anything less than excellent does not meet my expectations or intentions. [facility] and [facility] participate in nationally recognized quality of care measures. If your blood pressure is greater than 120/80, as reported below, we urge that you seek medical care to address the potential of high blood pressure, commonly known as hypertension. Hypertension can be hereditary or can be caused by certain medical conditions, pain, stress, or \"white coat syndrome. \"       Please make an appointment with your health care provider(s) for follow up of your Emergency Department visit. VITALS:   Patient Vitals for the past 8 hrs:   Temp Pulse Resp BP SpO2   01/19/18 1145 97.4 °F (36.3 °C) 81 18 118/77 99 %          Thank you for allowing us to provide you with medical care today. We realize that you have many choices for your emergency care needs. Please choose us in the future for any continued health care needs. Kale Reyes, [facility] Gordon Stephens: [phone]            No results found for this or any previous visit (from the past 24 hour(s)).     No results found.

## 2018-01-21 ENCOUNTER — HOSPITAL ENCOUNTER (EMERGENCY)
Age: 83
Discharge: HOME | End: 2018-01-21
Attending: EMERGENCY MEDICINE
Payer: MEDICARE

## 2018-01-21 ENCOUNTER — APPOINTMENT (OUTPATIENT)
Dept: CT IMAGING | Age: 83
End: 2018-01-21
Attending: EMERGENCY MEDICINE
Payer: MEDICARE

## 2018-01-21 VITALS
DIASTOLIC BLOOD PRESSURE: 85 MMHG | RESPIRATION RATE: 16 BRPM | HEART RATE: 79 BPM | HEIGHT: 74 IN | OXYGEN SATURATION: 95 % | SYSTOLIC BLOOD PRESSURE: 141 MMHG | WEIGHT: 205.03 LBS | TEMPERATURE: 97.9 F | BODY MASS INDEX: 26.31 KG/M2

## 2018-01-21 DIAGNOSIS — M43.6 ACUTE TORTICOLLIS: Primary | ICD-10-CM

## 2018-01-21 LAB
ATRIAL RATE: 64 BPM
CALCULATED R AXIS, ECG10: -9 DEGREES
CALCULATED T AXIS, ECG11: 26 DEGREES
DIAGNOSIS, 93000: NORMAL
P-R INTERVAL, ECG05: 272 MS
Q-T INTERVAL, ECG07: 378 MS
QRS DURATION, ECG06: 98 MS
QTC CALCULATION (BEZET), ECG08: 441 MS
VENTRICULAR RATE, ECG03: 82 BPM

## 2018-01-21 PROCEDURE — 74011250637 HC RX REV CODE- 250/637: Performed by: EMERGENCY MEDICINE

## 2018-01-21 PROCEDURE — 72125 CT NECK SPINE W/O DYE: CPT

## 2018-01-21 PROCEDURE — 93005 ELECTROCARDIOGRAM TRACING: CPT

## 2018-01-21 PROCEDURE — 70450 CT HEAD/BRAIN W/O DYE: CPT

## 2018-01-21 PROCEDURE — 99285 EMERGENCY DEPT VISIT HI MDM: CPT

## 2018-01-21 RX ORDER — LIDOCAINE 50 MG/G
PATCH TOPICAL
Qty: 15 EACH | Refills: 0 | Status: SHIPPED | OUTPATIENT
Start: 2018-01-21

## 2018-01-21 RX ORDER — TRAMADOL HYDROCHLORIDE 50 MG/1
50 TABLET ORAL
Qty: 12 TAB | Refills: 0 | Status: SHIPPED | OUTPATIENT
Start: 2018-01-21 | End: 2018-02-05 | Stop reason: SDUPTHER

## 2018-01-21 RX ORDER — ACETAMINOPHEN 325 MG/1
650 TABLET ORAL
Status: COMPLETED | OUTPATIENT
Start: 2018-01-21 | End: 2018-01-21

## 2018-01-21 RX ORDER — LIDOCAINE 50 MG/G
1 PATCH TOPICAL EVERY 24 HOURS
Status: DISCONTINUED | OUTPATIENT
Start: 2018-01-21 | End: 2018-01-21 | Stop reason: HOSPADM

## 2018-01-21 RX ORDER — CYCLOBENZAPRINE HCL 10 MG
10 TABLET ORAL
Status: COMPLETED | OUTPATIENT
Start: 2018-01-21 | End: 2018-01-21

## 2018-01-21 RX ADMIN — CYCLOBENZAPRINE HYDROCHLORIDE 10 MG: 10 TABLET, FILM COATED ORAL at 03:32

## 2018-01-21 RX ADMIN — ACETAMINOPHEN 650 MG: 325 TABLET, FILM COATED ORAL at 03:31

## 2018-01-21 NOTE — ED NOTES
Patient discharged by MD. Results and discharge instructions reviewed with the patient by MD. Patient verbalizes understanding. Patient discharged to [facility], in no acute distress, VSS.  [facility] transport

## 2018-01-21 NOTE — ED TRIAGE NOTES
Patient awoke from sleeping with pain in his left neck which he states goes from over his ear and down into his neck and shoulder. Patient arrived from [facility]. Denies shortness of breath or chest pain.

## 2018-01-21 NOTE — ED PROVIDER NOTES
Patient is a 80 y.o. male presenting with neck pain. Neck Pain    Associated symptoms include headaches. Pertinent negatives include no chest pain, no numbness and no weakness. 80year old male with DJD, duodenal ulcer, htn, hyperlipidemia, pacemaker, parkinson's, presents with acute left sided neck pain that woke him from sleep tonight. Pain was a 10/10. Worse with movement. Does not radiate down the arm, no weakness or numbness. Denies chest pain, SOB, nausea or diaphoresis. Also with a headache. Pain is now a 3/10, was a 10/10. Denies injury. Incidentally patient was seen 2 days ago reportedly at [facility] for a nose bleed which was packed. Seen here yesterday for nose bleed- chart describes packing in place, no bleeding for 2 hours of observation and sent home. Currently there is no nasal packing and patient doesn't know what happened to it. Denies fever, cough or cold symptoms. Reports severe headache with the nose bleed earlier. Admission for stroke symptoms in NO[facility]- mention of confusion at at that time.      Past Medical History:   Diagnosis Date    Blurry vision 1/19/2012    BPH (benign prostatic hyperplasia)     DDD (degenerative disc disease), lumbar 2/19/2010    DJD (degenerative joint disease) of cervical spine 2/19/2010    DU (duodenal ulcer) 9/1/1990    Hyperlipidemia     Hypertension     Other and unspecified hyperlipidemia 2/19/2010    Pacemaker     Paralysis agitans ([facility] [address] 2/19/2010    Parkinson disease ([address]     PAT (paroxysmal atrial tachycardia) (HCC)     Premature atrial beats     Reflux esophagitis 2/19/2010    Sick sinus syndrome [facility])        Past Surgical History:   Procedure Laterality Date    HX CATARACT REMOVAL  6/2012 and 7/2012    Both eyes    HX ORTHOPAEDIC  04/2015    tendon repair both knees    HX PACEMAKER  2008    bradycardia    HX VITRECTOMY  11/2011    [place] REMVL PERM PM PLS GEN W/REPL PLSE GEN 2 LEAD SYS  3/13/2017              Family History:   Problem Relation Age of Onset    Asthma Mother     Heart Disease Father        Social History     Social History    Marital status:      Spouse name: N/A    Number of children: N/A    Years of education: N/A     Occupational History    Not on file. Social History Main Topics    Smoking status: Former Smoker     Types: Pipe     Quit date: 7/14/1945    Smokeless tobacco: Never Used    Alcohol use No      Comment: glass of wine every now and then    Drug use: No    Sexual activity: Not on file     Other Topics Concern     Service Yes    Blood Transfusions No    Caffeine Concern No    Occupational Exposure No    Hobby Hazards No    Sleep Concern No    Stress Concern No    Weight Concern No    Special Diet No    Back Care No    Exercise No    Bike Helmet No    Seat Belt Yes    Self-Exams No     Social History Narrative         ALLERGIES: Review of patient's allergies indicates no known allergies. Review of Systems   Constitutional: Negative for fever. HENT: Negative for congestion. Eyes: Negative for visual disturbance. Respiratory: Negative for cough and shortness of breath. Cardiovascular: Negative for chest pain. Gastrointestinal: Negative for abdominal pain, nausea and vomiting. Endocrine: Negative for polyuria. Genitourinary: Negative for dysuria. Musculoskeletal: Positive for neck pain. Skin: Negative for rash. Neurological: Positive for headaches. Negative for weakness and numbness. Psychiatric/Behavioral: Negative for dysphoric mood. Vitals:    01/21/18 0259   Resp: 16   Temp: 97.7 °F (36.5 °C)   SpO2: 100%            Physical Exam   Constitutional: He is oriented to person, place, and time. He appears well-developed and well-nourished. No distress. HENT:   Head: Normocephalic and atraumatic. Mouth/Throat: Oropharynx is clear and moist. No oropharyngeal exudate.    No nasal packing in place  No active bleeding    No sinus tenderness   Eyes: Conjunctivae and EOM are normal. Pupils are equal, round, and reactive to light. Right eye exhibits no discharge. Left eye exhibits no discharge. No scleral icterus. Neck: Normal range of motion. Neck supple. No JVD present. Head rotated to right  Can rotate further right without discomfort  Pain with rotation of head to left     reproducible tenderness to left lateral neck at base of skull. No lymphadenopathy, no swelling, no erythema or warmth   Cardiovascular: Normal rate, regular rhythm, normal heart sounds and intact distal pulses. Exam reveals no gallop and no friction rub. No murmur heard. Pulmonary/Chest: Effort normal and breath sounds normal. No stridor. No respiratory distress. He has no wheezes. He has no rales. He exhibits no tenderness. Abdominal: Soft. Bowel sounds are normal. He exhibits no distension and no mass. There is no tenderness. There is no rebound and no guarding. Musculoskeletal: Normal range of motion. He exhibits no edema or tenderness. Neurological: He is alert and oriented to person, place, and time. He has normal reflexes. No cranial nerve deficit. He exhibits normal muscle tone. Coordination normal.   Skin: Skin is warm and dry. No rash noted. No erythema. Psychiatric: He has a normal mood and affect. His behavior is normal. Judgment and thought content normal.        MDM  ED Course       Procedures         medicate for pain with tylenol, flexeril and lidocaine. CT head/neck and reassess. ED EKG interpretation:  Rhythm: paced; and regular . Rate (approx.): 82; Axis: left axis deviation; P wave:  QRS interval: normal ; ST/T wave: non-specific changes;. This EKG was interpreted by David Vail MD,ED Provider. Reproducible pain, worsened with rotation of neck c./w musculoskeletal pain. CT's reassuring. Pain improving with Lidoderm patch/flexeril/tylenol. Patient has tolerated Tramadol in past for Pain. Will provide prescription for Tramadol, Lidoderm patches. Continue tylenol. Follow up with pcp this week. Return if symptoms worsening.

## 2018-01-21 NOTE — DISCHARGE INSTRUCTIONS
Torticollis: Care Instructions  Your Care Instructions  Torticollis is a severe tightness of the muscles on one side of the neck. The tight muscles can make the head turn to one side, lean to one side, or be pulled forward or backward. It is also called wryneck. Your doctor asked questions about your health and examined you. You may also have had X-rays or other tests. If your doctor thinks another medical problem is causing your tight neck muscles, you may need more tests. Torticollis usually gets better with home care. Your doctor may have you take medicine to relieve pain or relax your muscles. He or she may suggest exercise and physical therapy to help increase flexibility and relieve stress. Your doctor may also have you wear a special collar, called a cervical collar, for a day or two. The collar may help make your neck more comfortable. Follow-up care is a key part of your treatment and safety. Be sure to make and go to all appointments, and call your doctor if you are having problems. It's also a good idea to know your test results and keep a list of the medicines you take. How can you care for yourself at home? · Be safe with medicines. Read and follow all instructions on the label. ¨ If the doctor gave you a prescription medicine for pain, take it as prescribed. ¨ If you are not taking a prescription pain medicine, ask your doctor if you can take an over-the-counter medicine. · Try using a heating pad on a low or medium setting for 15 to 20 minutes every 2 or 3 hours. Try a warm shower in place of one session with the heating pad. · Try using an ice pack for 10 to 15 minutes every 2 to 3 hours. Put a thin cloth between the ice and your skin. · If your doctor recommends a cervical collar, wear it exactly as directed. When should you call for help? Call your doctor now or seek immediate medical care if:  ? · You have new or worse numbness in your arms, buttocks, or legs.    ? · You have new or worse weakness in your arms or legs. ? · Your neck pain gets worse. ? · You lose bladder or bowel control. ? Watch closely for changes in your health, and be sure to contact your doctor if:  ? · You do not get better as expected. Where can you learn more? Go to http://rodo-lukas.info/. Enter R270 in the search box to learn more about \"Torticollis: Care Instructions. \"  Current as of: March 21, 2017  Content Version: 11.4  © 5085-4987 [employer]. Care instructions adapted under license by [employer] (which disclaims liability or warranty for this information). If you have questions about a medical condition or this instruction, always ask your healthcare professional. [address] any warranty or liability for your use of this information. We hope that we have addressed all of your medical concerns. The examination and treatment you received in the Emergency Department were for an emergent problem and were not intended as complete care. It is important that you follow up with your healthcare provider(s) for ongoing care. If your symptoms worsen or do not improve as expected, and you are unable to reach your usual health care provider(s), you should return to the Emergency Department. Today's healthcare is undergoing tremendous change, and patient satisfaction surveys are one of the many tools to assess the quality of medical care. You may receive a survey from the [employer] regarding your experience in the Emergency Department. I hope that your experience has been completely positive, particularly the medical care that I provided. As such, please participate in the survey; anything less than excellent does not meet my expectations or intentions. [facility] and [address] [place] [address] participate in nationally recognized quality of care measures.   If your blood pressure is greater than 120/80, as reported below, we urge that you seek medical care to address the potential of high blood pressure, commonly known as hypertension. Hypertension can be hereditary or can be caused by certain medical conditions, pain, stress, or \"white coat syndrome. \"       Please make an appointment with your health care provider(s) for follow up of your Emergency Department visit. VITALS:   Patient Vitals for the past 8 hrs:   Temp Pulse Resp BP SpO2   01/21/18 0330 - - - (!) 173/107 99 %   01/21/18 0317 97.7 °F (36.5 °C) 80 16 (!) 175/107 97 %   01/21/18 0259 97.7 °F (36.5 °C) - 16 - 100 %          Thank you for allowing us to provide you with medical care today. We realize that you have many choices for your emergency care needs. Please choose us in the future for any continued health care needs. Miriam Lao MD    [address] [place] [address].   Office: [phone]            Recent Results (from the past 24 hour(s))   EKG, 12 LEAD, INITIAL    Collection Time: 01/21/18  3:33 AM   Result Value Ref Range    Ventricular Rate 82 BPM    Atrial Rate 64 BPM    P-R Interval 272 ms    QRS Duration 98 ms    Q-T Interval 378 ms    QTC Calculation (Bezet) 441 ms    Calculated R Axis -9 degrees    Calculated T Axis 26 degrees    Diagnosis       Atrial-paced rhythm with prolonged AV conduction  When compared with ECG of 18-MAR-2017 21:11,  Electronic atrial pacemaker has replaced Sinus rhythm         Ct Head Wo Cont    Result Date: 1/21/2018  EXAM:  CT HEAD WO CONT INDICATION:   severe headache COMPARISON: 2017. CONTRAST:  None. TECHNIQUE: Unenhanced CT of the head was performed using 5 mm images. Brain and bone windows were generated. CT dose reduction was achieved through use of a standardized protocol tailored for this examination and automatic exposure control for dose modulation. FINDINGS: There is prominence of the ventricles and sulci diffusely.  No hemorrhage mass or acute infarction is identified. Bony structures are intact. IMPRESSION: No acute intracranial abnormality identified. Ct Spine Cerv Wo Cont    Result Date: 1/21/2018  EXAM:  CT CERVICAL SPINE WITHOUT CONTRAST INDICATION:   severe left sided neck pain. COMPARISON: None. CONTRAST:  None. TECHNIQUE: Multislice helical CT of the cervical spine was performed without intravenous contrast administration. Sagittal and coronal reconstructions were generated. CT dose reduction was achieved through use of a standardized protocol tailored for this examination and automatic exposure control for dose modulation. FINDINGS: There is levoconvex cervical scoliosis. There is no fracture or subluxation. The odontoid process is intact. The craniocervical junction is within normal limits. The prevertebral soft tissues are within normal limits. There are moderate to moderately severe degenerative changes throughout the cervical spine. There is narrowing of the right neural foramen C2-C3. There is mild narrowing of the left neural foramen at C3-C4 and moderate narrowing of the right neural foramen at C3-C4. There is narrowing of the bilateral neural foramina C4-C5 right greater than left. . There is narrowing of the left neural foramen C5-C6. There is narrowing of bilateral normal foramina C6-C7. There is moderate central stenosis C6-7 and slight central stenosis C5-C6     IMPRESSION: No fracture subluxation is identified.  There is levo convexed scoliosis in the cervical spine with moderate to moderately severe degenerative changes throughout the cervical spine

## 2018-01-22 ENCOUNTER — HOSPITAL ENCOUNTER (EMERGENCY)
Age: 83
Discharge: HOME | End: 2018-01-22
Attending: EMERGENCY MEDICINE
Payer: MEDICARE

## 2018-01-22 ENCOUNTER — PATIENT OUTREACH (OUTPATIENT)
Dept: FAMILY MEDICINE CLINIC | Age: 83
End: 2018-01-22

## 2018-01-22 VITALS
SYSTOLIC BLOOD PRESSURE: 168 MMHG | HEIGHT: 74 IN | OXYGEN SATURATION: 99 % | HEART RATE: 83 BPM | RESPIRATION RATE: 16 BRPM | DIASTOLIC BLOOD PRESSURE: 90 MMHG | TEMPERATURE: 97.8 F

## 2018-01-22 DIAGNOSIS — R04.0 EPISTAXIS: Primary | ICD-10-CM

## 2018-01-22 PROCEDURE — 99283 EMERGENCY DEPT VISIT LOW MDM: CPT

## 2018-01-22 NOTE — SENIOR SERVICES NOTE
SSED NP consult received and appreciated. Pt states that he had a nosebleed yesterday and went to [facility] EMERGENCY [facility] [place] and then came here but doesn't know why he is here today. States that he thinks his daughter canceled the ENT appointment but he is not sure. Mr. Valente Moreira was here on Friday for nosebleed and I spoke with facility at that time. I will contact daughter and facility to see why the patient did not go to his scheduled ENT appointment today. I spoke with Ailyn Carter, pt's daughter and she reports that she received a call from the facility that the patient had a nosebleed and after 10 minutes it was continuing to bleed so they were sending the patient to the ER. I spoke with Sudeep Banks, health and  at [facility] and she states that the nurse held pressure and ice for 10 minutes and nose continued to bleed so patient was sent to the hospital.  Reports that this it the 6th nosebleed in a few days. I spoke with Dr. Pamela Plata and there is no active bleeding now so we will make appointment for ENT and arrange transport because the daughter is unable to transport since patient cannot ambulate. ENT appointment scheduled with Dr. Lorne Valdivia for 1/26/2018 @ 11:30am with 11:15 am arrival.  I will fax recent ER notes to Dr. Daniel Taveras, updated on plan and I spoke with her about having patient go to [facility] if nose is actively bleeding as it is closer to facility. I had also spoken with Sudeep Banks about [facility] but they do not have a contract with them.

## 2018-01-22 NOTE — PROGRESS NOTES
Patient received on discharge report. Went to [place] [id number]. on 1/19/18 and 1/21/18 for nose bleed repacking and neck pain respectively. Outbound call made 1/22/18 to check on him. NN left voicemail message with contact information requesting return call. Outbound call made to Lisa Beck (HIPPA verified) to discuss ED visits over the weekend. NN left voicemail message with contact information requesting return call. 10:22am... incoming call received from daughter, Joseph. States patient presented to ED for nosebleed secondary to patient had removed packing from previous ED visit. While in the ED, L naris repacked. Returned to ED on 1/21/18 for c/o of shoulder pain and chest pain. New Rx lidocaine patch and tramadol. Per daughter, patient has appointment with ENT this afternoon. NN requested follow up call post ENT office visit today. Joseph conveyed understanding.

## 2018-01-22 NOTE — CALL BACK NOTE
[facility] Emergency Department Follow Up Call Record    Discharged to : Home/Family Home/Home Health/Skilled Facility/Rehab/Assisted Living/Other_Home_____  1) Did you receive your discharge instructions? Yes        2) Do you understand them? Yes         3) Are you able to follow them? Yes          If NO, what can I clarify for you? 4) Do you understand your diagnosis? Yes          5) Do you know which symptoms should prompt you to call the doctor? Yes     6) Were you able to fill and  any medications that were prescribed? Yes     7) You were prescribed __Lidocaine patch, tramadol_________for ____________________. Common side effects of this medication are____________________. This is not a complete list so please review the forms given from the pharmacy for a complete list.      8) Are there any questions about your medications? No            Have you scheduled any recommended doctors appointments (specialty, PCP)NOIf NO, what barriers are you encountering (transportation/lost contact info/cost/  didnt think necessary/no PCP  9) If discharged with Home Health, has the agency contacted you to schedule visit? N/A  10) Is there anyone available to help you at home (meals, errands, transportation    monitoring) (adult children, neighbors, private duty companions) Yes    11) Are you on a special diet? No         If YES, do you understand the requirements for this diet? Education provided? 12) If presented with cough, bronchitis, COPD, asthma, is it ok to ask that the   respiratory disease management educator call you? Not applicable      13)  A) If presented with fall, were you issued an assistive device in the ED    Are you using? Not applicable  B) If given RX for device, have you obtained? Not applicable       If NO, barriers? C) Therapist recommended:   Are you able to implement the suggestions? Not applicable        If NO, barriers to implementation?        D) Are you having any difficulties with mobility inside your home?     (steps, bed, tub)No   If YES, ask if the SSED PT can contact patient and good time and number?  14)  At the end of your discharge instructions, there is information about accessing [address], have you had a chance to review those? Yes         Do you have any questions about signing up for this service? We encourage our patients to be active participants in their healthcare and this site is one of the ways to do that. It will allow you to access parts of your medical record, email your doctors office, schedule appointments, and request medications refills . 15) Are there any other questions that I can answer for you regarding    your Emergency department visit?  NO             Estimated Call Time:___10:18 AM  ________________ Date/Time:_______________

## 2018-01-22 NOTE — PROGRESS NOTES
SSED/CM consult received and appreciated. History significant for DDD, DJD, Parkinson disease, HTN, and  Pacemaker. Patient presents from [address] [place] [address] for epistaxis.  known to this writer. Noted new ENT appointment scheduled for Friday 1/26/18 at 1130. Previous provider [facility] - call placed spoke w/ Georganne  and requested trip - ETA w/in the hour 1700/1715. Updates provided to patient and Nursing. No additional transitional care needs verbalized at this time. Care Management Interventions  PCP Verified by CM: Yes  Mode of Transport at Discharge: [address] [facility]  [address] Time of Discharge: 1700  Transition of Care Consult (CM Consult):  Other (transportation)  MyChart Signup: No  Discharge Durable Medical Equipment: No  Physical Therapy Consult: No  Occupational Therapy Consult: No  Speech Therapy Consult: No  Current Support Network: Assisted Living  Confirm Follow Up Transport: Other (see comment) (stretcher)  Plan discussed with Pt/Family/Caregiver: Yes  Discharge Location  Discharge Placement: Assisted Living

## 2018-01-22 NOTE — ED NOTES
Adult brief changed. [facility] here to transport patient back to [facility]. VSS. Respirations equal and unlabored on RA. Patient in no acute distress upon discharge.

## 2018-01-22 NOTE — DISCHARGE INSTRUCTIONS
Nosebleeds: Care Instructions  Your Care Instructions    Nosebleeds are common, especially if you have colds or allergies. Many things can cause a nosebleed. Some nosebleeds stop on their own with pressure. Others need packing. Some get cauterized (sealed). If you have gauze or other packing materials in your nose, you will need to follow up with your doctor to have the packing removed. You may need more treatment if you get nosebleeds a lot. The doctor has checked you carefully, but problems can develop later. If you notice any problems or new symptoms, get medical treatment right away. Follow-up care is a key part of your treatment and safety. Be sure to make and go to all appointments, and call your doctor if you are having problems. It's also a good idea to know your test results and keep a list of the medicines you take. How can you care for yourself at home? · If you get another nosebleed:  ¨ Sit up and tilt your head slightly forward. This keeps blood from going down your throat. ¨ Use your thumb and index finger to pinch your nose shut for 10 minutes. Use a clock. Do not check to see if the bleeding has stopped before the 10 minutes are up. If the bleeding has not stopped, pinch your nose shut for another 10 minutes. ¨ When the bleeding has stopped, try not to pick, rub, or blow your nose for 12 hours. Avoiding these things helps keep your nose from bleeding again. · If your doctor prescribed antibiotics, take them as directed. Do not stop taking them just because you feel better. You need to take the full course of antibiotics. To prevent nosebleeds  · Do not blow your nose too hard. · Try not to lift or strain after a nosebleed. · Raise your head on a pillow while you sleep. · Put a thin layer of a saline- or water-based nasal gel, such as NasoGel, inside your nose. Put it on the septum, which divides your nostrils. This will prevent dryness that can cause nosebleeds.   · Use a vaporizer or humidifier to add moisture to your bedroom. Follow the directions for cleaning the machine. · Do not use aspirin, ibuprofen (Advil, Motrin), or naproxen (Aleve) for 36 to 48 hours after a nosebleed unless your doctor tells you to. You can use acetaminophen (Tylenol) for pain relief. · Talk to your doctor about stopping any other medicines you are taking. Some medicines may make you more likely to get a nosebleed. · Do not use cold medicines or nasal sprays without first talking to your doctor. They can make your nose dry. When should you call for help? Call 911 anytime you think you may need emergency care. For example, call if:  ? · You passed out (lost consciousness). ?Call your doctor now or seek immediate medical care if:  ? · You get another nosebleed and your nose is still bleeding after you have applied pressure 3 times for 10 minutes each time (30 minutes total). ? · There is a lot of blood running down the back of your throat even after you pinch your nose and tilt your head forward. ? · You have a fever. ? · You have sinus pain. ? Watch closely for changes in your health, and be sure to contact your doctor if:  ? · You get nosebleeds often, even if they stop. ? · You do not get better as expected. Where can you learn more? Go to http://rodo-lukas.info/. Enter S156 in the search box to learn more about \"Nosebleeds: Care Instructions. \"  Current as of: March 20, 2017  Content Version: 11.4  © 8385-7712 [employer]. Care instructions adapted under license by [employer] (which disclaims liability or warranty for this information). If you have questions about a medical condition or this instruction, always ask your healthcare professional. [address] any warranty or liability for your use of this information.

## 2018-01-22 NOTE — ED TRIAGE NOTES
Pt has had a nose bleed since Friday, pt seen here for the same, no active bleeding noted at present time

## 2018-01-22 NOTE — ED NOTES
Attempted to call report to [facility]. Voicemail left with discharge instructions.

## 2018-01-22 NOTE — ED PROVIDER NOTES
HPI Comments: 80 y.o. male with past medical history significant for DDD, DJD, Parkinson disease, HTN, and s/p pacemaker placed who presents from [facility] with chief complaint of epistaxis. Pt had a cauterization and rhino rocket placed in his L nare 4 days ago after a nosebleed at [facility], was seen here 3 days ago after the pt was seen pulling at his rhino rocket, and there was no bleeding at that time. Pt states nurses took out his packing, but is unable to state whether it was at his facility or in a doctor's office. Pt states his nose started bleeding again last night and a little this morning and was sent here by his facility. Pt denies any current bleeding or post nasal drip. Pt denies being on any blood thinners. There are no other acute medical concerns at this time. Social hx: Former smoker ([address] [place] [address]); Rare EtOH use  PCP: Shaila Mora MD    Note written by Jacquie Castellanos. Trice , as dictated by Juana Vargas MD 2:39 PM      The history is provided by the patient. No  was used.         Past Medical History:   Diagnosis Date    Blurry vision 1/19/2012    BPH (benign prostatic hyperplasia)     DDD (degenerative disc disease), lumbar 2/19/2010    DJD (degenerative joint disease) of cervical spine 2/19/2010    DU (duodenal ulcer) 9/1/1990    Hyperlipidemia     Hypertension     Other and unspecified hyperlipidemia 2/19/2010    Pacemaker     Paralysis agitans ([facility] 2/19/2010    Parkinson disease ([facility]     PAT [facility]paroxysmal [facility] tachycardia) ([facility]     Premature atrial beats     Reflux esophagitis 2/19/2010    Sick sinus syndrome [facility])        Past Surgical History:   Procedure Laterality Date    HX CATARACT REMOVAL  6/2012 and 7/2012    Both eyes    HX ORTHOPAEDIC  04/2015    tendon repair both knees    HX PACEMAKER  2008    bradycardia    HX VITRECTOMY  11/2011    NE REMVL PERM PM PLS GEN W/REPL PLSE GEN 2 LEAD SYS  3/13/2017              Family History:   Problem Relation Age of Onset    Asthma Mother     Heart Disease Father        Social History     Social History    Marital status:      Spouse name: N/A    Number of children: N/A    Years of education: N/A     Occupational History    Not on file. Social History Main Topics    Smoking status: Former Smoker     Types: Pipe     Quit date: 7/14/1945    Smokeless tobacco: Never Used    Alcohol use No      Comment: glass of wine every now and then    Drug use: No    Sexual activity: Not on file     Other Topics Concern     Service Yes    Blood Transfusions No    Caffeine Concern No    Occupational Exposure No    Hobby Hazards No    Sleep Concern No    Stress Concern No    Weight Concern No    Special Diet No    Back Care No    Exercise No    Bike Helmet No    Seat Belt Yes    Self-Exams No     Social History Narrative         ALLERGIES: Review of patient's allergies indicates no known allergies. Review of Systems   Constitutional: Negative for chills, diaphoresis and fever. HENT: Positive for nosebleeds. Negative for congestion, postnasal drip, rhinorrhea and sore throat. Eyes: Negative for photophobia, discharge, redness and visual disturbance. Respiratory: Negative for cough, chest tightness, shortness of breath and wheezing. Cardiovascular: Negative for chest pain, palpitations and leg swelling. Gastrointestinal: Negative for abdominal distention, abdominal pain, blood in stool, constipation, diarrhea, nausea and vomiting. Genitourinary: Negative for difficulty urinating, dysuria, frequency, hematuria and urgency. Musculoskeletal: Negative for arthralgias, back pain, joint swelling and myalgias. Skin: Negative for color change and rash. Neurological: Negative for dizziness, speech difficulty, weakness, light-headedness, numbness and headaches. Psychiatric/Behavioral: Negative for confusion. The patient is not nervous/anxious.     All other systems reviewed and are negative. Vitals:    01/22/18 1349   BP: 114/74   Pulse: 80   Resp: 16   Temp: 97.8 °F (36.6 °C)   SpO2: 98%   Height: 6' 2\" (1.88 m)            Physical Exam   Constitutional: He is oriented to person, place, and time. He appears well-developed and well-nourished. No distress. HENT:   Head: Normocephalic and atraumatic. Right Ear: External ear normal.   Left Ear: External ear normal.   Nose: Nose normal.   Mouth/Throat: Oropharynx is clear and moist.   Dried blood on L nare. Eyes: Conjunctivae and EOM are normal. Pupils are equal, round, and reactive to light. No scleral icterus. Neck: Normal range of motion. Neck supple. No JVD present. No tracheal deviation present. No thyromegaly present. Cardiovascular: Normal rate, regular rhythm and normal heart sounds. Exam reveals no gallop and no friction rub. No murmur heard. Pulmonary/Chest: Effort normal and breath sounds normal. No respiratory distress. He has no wheezes. He has no rales. He exhibits no tenderness. Abdominal: Soft. Bowel sounds are normal. He exhibits no distension and no mass. There is no tenderness. There is no rebound and no guarding. Musculoskeletal: Normal range of motion. He exhibits no edema or tenderness. Lymphadenopathy:     He has no cervical adenopathy. Neurological: He is alert and oriented to person, place, and time. He has normal strength. He displays no atrophy and no tremor. No cranial nerve deficit. He exhibits normal muscle tone. Coordination and gait normal.   Skin: Skin is warm and dry. No rash noted. He is not diaphoretic. No erythema. Psychiatric: He has a normal mood and affect. His behavior is normal. Judgment and thought content normal.   Nursing note and vitals reviewed. Note written by Anshu Sheltno, as dictated by Nancy Biswas MD 2:39 PM    MDM  Number of Diagnoses or Management Options  Diagnosis management comments: GIORDANO  Impression: 80-year-old male presenting from [facility] for evaluation of epistaxis which has been intermittently ongoing for at least several days. Apparently he was originally packed at [facility] been referred here 2 days later for bleeding around the packing. It appears he has not had any ENT followup. There is no active bleeding presently. Plan of care will be to have senior services consult [facility] and arrange appropriate followup. I feel at this time there is no need to repack the patient as he is not having any active bleeding.       ED Course       Procedures

## 2018-01-23 ENCOUNTER — PATIENT OUTREACH (OUTPATIENT)
Dept: FAMILY MEDICINE CLINIC | Age: 83
End: 2018-01-23

## 2018-01-23 NOTE — PROGRESS NOTES
Patient on discharge report. Presented to [address] for nosebleed. Per Zeenat Antunez NP, note patient had nosebleed early afternoon yesterday. Staff at [facility] applied pressure and ice to nose for approximately 10 minutes without any relief and was transferred to [facility] for further evaluation. Upon arrival to ED no nosebleed noted. Patient has f/u appointment with ENT scheduled for 1/26/18. Outbound call made 1/23/18 to check on him. NN left voicemail message with contact information requesting return call. NN will reach out to daughter Radhika Kelley ([facility] verified)  after ENT appointment scheduled 1/26/18 at 11:30.   Will continue to follow

## 2018-01-24 ENCOUNTER — TELEPHONE (OUTPATIENT)
Dept: FAMILY MEDICINE CLINIC | Age: 83
End: 2018-01-24

## 2018-02-02 ENCOUNTER — PATIENT OUTREACH (OUTPATIENT)
Dept: FAMILY MEDICINE CLINIC | Age: 83
End: 2018-02-02

## 2018-02-02 NOTE — PROGRESS NOTES
Outbound call made 2/2/18 to complete discharge assessment. Patient admitted [facility] 1/23-1/30/18 for right facial drooping. Reports his body was leaning toward the right side. Author spoke to daughter, Sherman Severin, ([employer]IP[employer] verified) states patient was admitted for TIA and UTI. States patient had one nosebleed episode while inpatient. Reports patient is \"feeling good\" today. Author confirmed with Fran Lopez appointment scheduled 2/5/18. Author attempted to contact patient. Left voicemail message with contact information requesting return call.

## 2018-02-05 ENCOUNTER — TELEPHONE (OUTPATIENT)
Dept: CARDIOLOGY CLINIC | Age: 83
End: 2018-02-05

## 2018-02-05 ENCOUNTER — OFFICE VISIT (OUTPATIENT)
Dept: FAMILY MEDICINE CLINIC | Age: 83
End: 2018-02-05

## 2018-02-05 VITALS
DIASTOLIC BLOOD PRESSURE: 55 MMHG | HEART RATE: 74 BPM | RESPIRATION RATE: 16 BRPM | SYSTOLIC BLOOD PRESSURE: 79 MMHG | HEIGHT: 74 IN | TEMPERATURE: 97.9 F

## 2018-02-05 DIAGNOSIS — I95.1 ORTHOSTATIC HYPOTENSION: ICD-10-CM

## 2018-02-05 DIAGNOSIS — Z87.19 HISTORY OF DUODENAL ULCER: ICD-10-CM

## 2018-02-05 DIAGNOSIS — M17.0 PRIMARY OSTEOARTHRITIS OF BOTH KNEES: ICD-10-CM

## 2018-02-05 DIAGNOSIS — F32.4 MAJOR DEPRESSIVE DISORDER WITH SINGLE EPISODE, IN PARTIAL REMISSION (HCC): ICD-10-CM

## 2018-02-05 DIAGNOSIS — M25.511 ACUTE PAIN OF RIGHT SHOULDER: Primary | ICD-10-CM

## 2018-02-05 DIAGNOSIS — F43.21 GRIEF: ICD-10-CM

## 2018-02-05 NOTE — MR AVS SNAPSHOT
[facility] 
 
 
 [address] 
[phone] Patient: Al Awad MRN: [id number] Visit Information Date & Time Provider Department Dept. Phone Encounter #  
 2/5/2018  2:00 PM Alexandria Ku  [place] [phone] [id number] Your Appointments 7/26/2018 10:30 AM  
PACEMAKER with TIFFANI BURNETT [facility] ([place] SCHEDULING) Appt Note: mdpola ppi rc, annual thresh/CL, see [place]; med threshold/rc/[place] 1 yr b  
 [address]  [address] [place]  
[address] [facility]  
  
    
 7/26/2018 10:40 AM  
ESTABLISHED PATIENT with Amos Mayo MD  
[facility] Appt Note: med threshold/rc/[place] 1 yr b  
 [address]  [address] [place]  
[address] [facility] [address]  
  
    
  
 4/18/2018 10:45 AM  
REMOTE OFFICE VISIT with Rowdy Sanchez [facility] ([place] SCHEDULING) Appt Note: aminata horne rc, annual thresh/CL, see [place]; CL PPI/rc b  
 [address] [place]  
[address] [address] Upcoming Health Maintenance Date Due  
 MEDICARE YEARLY EXAM 4/29/2018 GLAUCOMA SCREENING Q2Y 8/26/2018 DTaP/Tdap/Td series (2 - Td) 1/31/2028 Allergies as of 2/5/2018  Review Complete On: 2/5/2018 By: Brodie Cummings LPN No Known Allergies Current Immunizations  Reviewed on 3/14/2017 Name Date Influenza High Dose Vaccine PF 10/27/2015 Influenza Vaccine (Quad) PF 11/16/2017  4:19 PM  
 Influenza Vaccine Split 11/17/2011 TB Skin Test (PPD) Intradermal 3/25/2013 TD Vaccine 5/3/2012 Tdap 5/3/2012 ZZZ-RETIRED (DO NOT USE) Pneumococcal Vaccine (Unspecified Type) 4/20/2010 Not reviewed this visit You Were Diagnosed With   
  
 Codes Comments Acute pain of right shoulder    -  Primary ICD-10-CM: M25.511 ICD-9-CM: 719.41 Vitals BP Pulse Temp Resp Height(growth percentile) Smoking Status (!) 79/55 (BP 1 Location: Right arm, BP Patient Position: Sitting) 74 97.9 °F (36.6 °C) (Oral) 16 6' 2\" (1.88 m) Former Smoker Vitals History Preferred Pharmacy Pharmacy Name Phone [facility] [address] [place] [address], [address] [place] [address] [phone] Your Updated Medication List  
  
   
This list is accurate as of: 2/5/18  2:41 PM.  Always use your most recent med list.  
  
  
  
  
 acetaminophen 500 mg tablet Commonly known as:  MAPAP EXTRA STRENGTH Take 1 Tab by mouth every six (6) hours as needed for Pain. aspirin 81 mg chewable tablet Take 81 mg by mouth two (2) times a day. carbidopa-levodopa  mg per tablet Commonly known as:  SINEMET Take 2 Tabs by mouth four (4) times daily. (This is given at 8:00, 11:00, 14:00, and 21:00). cloNIDine HCl 0.1 mg tablet Commonly known as:  CATAPRES Take 1 Tab by mouth every six (6) hours as needed (SBP > 180 mmHg). cycloSPORINE 0.05 % ophthalmic emulsion Commonly known as:  RESTASIS Administer 1 Drop to both eyes two (2) times a day. DETROL LA 2 mg ER capsule Generic drug:  tolterodine ER  
TAKE 1 CAPSULE DAILY FOR BLADDER FREQUENCY  
  
 docusate sodium 100 mg capsule Commonly known as:  Andrew Torres Take 1 Cap by mouth daily as needed for Constipation. escitalopram oxalate 20 mg tablet Commonly known as:  Vladislav Yimi Take 1 Tab by mouth daily. esomeprazole 40 mg capsule Commonly known as:  NexIUM Take 1 Cap by mouth daily. lidocaine 5 % Commonly known as:  Miller Galvan Apply patch to the affected area for 12 hours a day and remove for 12 hours a day. losartan 50 mg tablet Commonly known as:  COZAAR Take 1 Tab by mouth daily. pindolol 5 mg tablet Commonly known as:  VISKIN Take 2.5 mg by mouth daily (after lunch). Take 1/2 tablet by mouth at 2pm -Take 1 tablet by mouth at 9pm  
  
 polyethylene glycol 17 gram packet Commonly known as:  Shi Adrian Take 1 Packet by mouth daily. Indications: CONSTIPATION  
  
 QUEtiapine 25 mg tablet Commonly known as:  SEROquel Take 25 mg by mouth every evening. rivastigmine 4.6 mg/24 hr patch Commonly known as:  EXELON  
1 Patch by TransDERmal route daily. therapeutic multivitamin tablet Commonly known as:  [facility] Take 1 Tab by mouth daily. traMADol 50 mg tablet Commonly known as:  ULTRAM  
Take 1 Tab by mouth every eight (8) hours as needed for Pain. Max Daily Amount: 150 mg.  
  
 * VOLTAREN 1 % Gel Generic drug:  diclofenac Apply  to affected area as needed. * diclofenac 1 % Gel Commonly known as:  VOLTAREN Apply 2 g to affected area every six (6) hours. Apply to shoulder for 5 days stop if no improvement * Notice: This list has 2 medication(s) that are the same as other medications prescribed for you. Read the directions carefully, and ask your doctor or other care provider to review them with you. To-Do List   
 02/05/2018 Imaging:  XR SHOULDER RT AP/LAT MIN 2 V Introducing [facility] SERVICES! Dear Brianna West: Thank you for requesting a [employer] account. Our records indicate that you already have an active [employer] account. You can access your account anytime at [email or web address] Did you know that you can access your hospital and ER discharge instructions at any time in [employer]? You can also review all of your test results from your hospital stay or ER visit. Additional Information If you have questions, please visit the Frequently Asked Questions section of the [employer] website at [email or web address] Remember, MyChart is NOT to be used for urgent needs. For medical emergencies, dial 911. Now available from your iPhone and Android! Please provide this summary of care documentation to your next provider. Your primary care clinician is listed as [place] [id number], [facility]/s . If you have any questions after today's visit, please call [phone].

## 2018-02-05 NOTE — PROGRESS NOTES
Chief Complaint   Patient presents with    Shoulder Pain     Right shoulder pain      1. Have you been to the ER, urgent care clinic since your last visit? Hospitalized since your last visit? Yes [facility] ED - January 2018 - shoulder pain     2. Have you seen or consulted any other health care providers outside of the [address] [place] [address] since your last visit? Include any pap smears or colon screening.  No

## 2018-02-05 NOTE — TELEPHONE ENCOUNTER
Lorraine with [employer] called, she wants to know if we received the fax request for oxygen for a medicare audit?    Phone: [phone]  Fax: [phone]

## 2018-02-05 NOTE — PROGRESS NOTES
HISTORY OF PRESENT ILLNESS  HPI  Carmela Spencer is a 80 y.o. male with history of HTN, BPH, Parkinsonian syndrome, DDD/DJD, hyperlipidemia, HEAVEN, and depression who presents to office today in a wheelchair for shoulder follow-up. Pt came into office on 1/3 with complaint of right shoulder pain x5 months since a fall; right humerus xray on 11/14 was negative for fracture. He states that his right shoulder pain has worsened since his visit on 1/3. He has done PT 2-3 times, starting last week. Pt complains of bilateral knee pain. Pt went to [facility] ER on 1/18 for epistaxis; the area was cauterized and a rhino rocket was placed to his left nare. He went to [facility] ER on 1/19 and 1/22, also for epistaxis; the packing had been removed by 1/22. He has seen an ENT specialist since then and had the area cauterized. Past Medical History:   Diagnosis Date    Blurry vision 1/19/2012    BPH (benign prostatic hyperplasia)     DDD (degenerative disc disease), lumbar 2/19/2010    DJD (degenerative joint disease) of cervical spine 2/19/2010    DU (duodenal ulcer) 9/1/1990    Hyperlipidemia     Hypertension     Other and unspecified hyperlipidemia 2/19/2010    Pacemaker     Paralysis agitans ([facility] [address] 2/19/2010    Parkinson disease ([facility] [address]     PAT [facility]oxysmal atrial tachycardia) ([facility]     Premature atrial beats     Reflux esophagitis 2/19/2010    Sick sinus syndrome [facility])      Past Surgical History:   Procedure Laterality Date    HX CATARACT REMOVAL  6/2012 and 7/2012    Both eyes    HX ORTHOPAEDIC  04/2015    tendon repair both knees    HX PACEMAKER  2008    bradycardia    HX VITRECTOMY  11/2011    PA REMVL PERM PM PLS GEN W/REPL PLSE GEN 2 LEAD SYS  3/13/2017          Current Outpatient Prescriptions on File Prior to Visit   Medication Sig Dispense Refill    lidocaine (LIDODERM) 5 % Apply patch to the affected area for 12 hours a day and remove for 12 hours a day.  15 Each 0    losartan (COZAAR) 50 mg tablet Take 1 Tab by mouth daily. 30 Tab 0    aspirin 81 mg chewable tablet Take 81 mg by mouth two (2) times a day.  traMADol (ULTRAM) 50 mg tablet Take 1 Tab by mouth every eight (8) hours as needed for Pain. Max Daily Amount: 150 mg. 30 Tab 1    diclofenac (VOLTAREN) 1 % gel Apply 2 g to affected area every six (6) hours. Apply to shoulder for 5 days stop if no improvement 100 g 0    diclofenac (VOLTAREN) 1 % gel Apply  to affected area as needed.  QUEtiapine (SEROQUEL) 25 mg tablet Take 25 mg by mouth every evening.  DETROL LA 2 mg ER capsule TAKE 1 CAPSULE DAILY FOR BLADDER FREQUENCY 90 Cap 3    pindolol (VISKIN) 5 mg tablet Take 2.5 mg by mouth daily (after lunch). Take 1/2 tablet by mouth at 2pm -Take 1 tablet by mouth at 9pm      carbidopa-levodopa (SINEMET)  mg per tablet Take 2 Tabs by mouth four (4) times daily. (This is given at 8:00, 11:00, 14:00, and 21:00). 240 Tab 0    polyethylene glycol (MIRALAX) 17 gram packet Take 1 Packet by mouth daily. Indications: CONSTIPATION 30 Packet 0    therapeutic multivitamin (THERAGRAN) tablet Take 1 Tab by mouth daily. 30 Tab 0    cloNIDine HCl (CATAPRES) 0.1 mg tablet Take 1 Tab by mouth every six (6) hours as needed (SBP > 180 mmHg). 20 Tab 0    docusate sodium (COLACE) 100 mg capsule Take 1 Cap by mouth daily as needed for Constipation. 30 Cap 0    cycloSPORINE (RESTASIS) 0.05 % ophthalmic emulsion Administer 1 Drop to both eyes two (2) times a day. 60 Each 0    escitalopram oxalate (LEXAPRO) 20 mg tablet Take 1 Tab by mouth daily. 30 Tab 0    rivastigmine (EXELON) 4.6 mg/24 hr patch 1 Patch by TransDERmal route daily. 30 Patch 0    esomeprazole (NEXIUM) 40 mg capsule Take 1 Cap by mouth daily. 30 Cap 0    acetaminophen (MAPAP EXTRA STRENGTH) 500 mg tablet Take 1 Tab by mouth every six (6) hours as needed for Pain. 120 Tab 0     No current facility-administered medications on file prior to visit.       No Known Allergies  Family History Problem Relation Age of Onset    Asthma Mother     Heart Disease Father      Social History     Social History    Marital status:      Spouse name: N/A    Number of children: N/A    Years of education: N/A     Social History Main Topics    Smoking status: Former Smoker     Types: Pipe     Quit date: 7/14/1945    Smokeless tobacco: Never Used    Alcohol use No      Comment: glass of wine every now and then   Heloise McGehee Drug use: No    Sexual activity: Not Asked     Other Topics Concern     Service Yes    Blood Transfusions No    Caffeine Concern No    Occupational Exposure No    Hobby Hazards No    Sleep Concern No    Stress Concern No    Weight Concern No    Special Diet No    Back Care No    Exercise No    Bike Helmet No    Seat Belt Yes    Self-Exams No     Social History Narrative             Review of Systems   Constitutional: Negative for chills, diaphoresis, fever, malaise/fatigue and weight loss. HENT: Positive for nosebleeds. Eyes: Negative for blurred vision, double vision, pain and redness. Respiratory: Negative for cough, shortness of breath and wheezing. Cardiovascular: Negative for chest pain, palpitations, orthopnea, claudication, leg swelling and PND. Musculoskeletal: Positive for joint pain (bilateral knees). Right shoulder pain   Skin: Negative for itching and rash. Neurological: Negative for dizziness, tingling, tremors, sensory change, speech change, focal weakness, seizures, loss of consciousness, weakness and headaches.      Results for orders placed or performed during the hospital encounter of 01/21/18   EKG, 12 LEAD, INITIAL   Result Value Ref Range    Ventricular Rate 82 BPM    Atrial Rate 64 BPM    P-R Interval 272 ms    QRS Duration 98 ms    Q-T Interval 378 ms    QTC Calculation (Bezet) 441 ms    Calculated R Axis -9 degrees    Calculated T Axis 26 degrees    Diagnosis       ** Poor data quality, interpretation may be adversely affected Atrial-paced   rhythm with prolonged AV conduction  When compared with ECG of 18-MAR-2017 21:11,  Electronic atrial pacemaker has replaced Sinus rhythm  Confirmed by Herb Grant M.D., Lizz Rashide ([id number]) on 2018 1:20:46 PM                 Physical Exam  Visit Vitals    BP (!) 79/55 (BP 1 Location: Right arm, BP Patient Position: Sitting)    Pulse 74    Temp 97.9 °F (36.6 °C) (Oral)    Resp 16    Ht 6' 2\" (1.88 m)     Right shoulder: much better ROM than the left shoulder, right shoulder has minimal pain when it's down by his side but pain increases when he starts to abduct the shoulder, palpation of the distal clavicle and the proximal humerus is mild to moderately tender to palpation, it's hard to tell where the pain is coming from, ROM basically doing abduction against resistance and use of the rotator cuff muscle both cause a lot of discomfort, xray is pending              ASSESSMENT and PLAN    ICD-10-CM ICD-9-CM    1. Acute pain of right shoulder M25.511 719.41 XR SHOULDER RT AP/LAT MIN 2 V   2. Orthostatic hypotension I95.1 458.0    3. History of duodenal ulcer Z87.19 V12.79    4. Primary osteoarthritis of both knees M17.0 715.16    5. Grief- wife of 61 years  2015 F43.20 309.0    6. Major depressive disorder with single episode, in partial remission ([facility] [address] F32.4 296.25      Diagnoses and all orders for this visit:    1. Acute pain of right shoulder  -     XR SHOULDER RT AP/LAT MIN 2 V; Future    2. Orthostatic hypotension    3. History of duodenal ulcer    4. Primary osteoarthritis of both knees    5. Grief- wife of 61 years  2015    6. Major depressive disorder with single episode, in partial remission ([address]      Follow-up Disposition:  Return in about 1 month (around 3/5/2018), or if symptoms worsen or fail to improve, for F/U low BP and R shoulder pain. reviewed medications and side effects in detail  Please call my office if there are any questions- [phone].   Discussed expected course/resolution/complications of diagnosis in detail with patient. Medication risks/benefits/costs/interactions/alternatives discussed with patient. Pt was given an after visit summary which includes diagnoses, current medications & vitals. Pt expressed understanding with the diagnosis and plan. Patient to call if R shoulder not improving each week. .  Total 25 minutes,60 % counseling re:   Reviewed in detail the proper technique of checking the blood pressure- check it on an average day only, not on a stressful day, sitting, no exercise for at least 1 hour and not experiencing any new pain( chronic pain is OK). Patient encouraged to check BP sitting and standing at least once a month and to report these readings to me if > 140/ 90 on average , or if the standing BP is >  15 points lower than the sitting. Reviewed symptoms, or lack thereof, of hypertension and elevated cholesterol. I informed pt that there's no fracture when the xray was done back in November, but we need to make sure that's still the case because his memory is not clear. Some of the memory is related to having a UTI and maybe even having a TIA. His nosebleeds have stopped, as he saw an ENT specialist who did cauterization of the nose. If there is no fracture we'll continue with PT, and if he doesn't make progress over the next few weeks we'll get him over to the orthopedist for evaluation of his right shoulder pain. Apparently the therapist has seen him 2-3 times starting last week. For his low BP- he was on Florinef in the past, but this is not listed in his chart today- we will check at the assisted living facility if he is on this medication  Also, discussed symptoms of concern that were noted today in the note above, treatment options( including doing nothing), when to follow up before recommended time frame. Also, answered all questions.     This document was written by Lady Rashid, as dictated by Paul Rausch, MD.  I have reviewed and agree with the above note and have made corrections where appropriate To Garland M.D.

## 2018-02-08 ENCOUNTER — TELEPHONE (OUTPATIENT)
Dept: FAMILY MEDICINE CLINIC | Age: 83
End: 2018-02-08

## 2018-02-08 NOTE — TELEPHONE ENCOUNTER
----- Message from Tone Wooten sent at 2/8/2018  8:57 AM EST -----  Regarding: Dr. Morgan Escobar from [facility] is requesting a copy of the visit note from appt on 02/05/18, as well as signed orders. Best contact [phone] ([phone].

## 2018-02-09 ENCOUNTER — TELEPHONE (OUTPATIENT)
Dept: FAMILY MEDICINE CLINIC | Age: 83
End: 2018-02-09

## 2018-02-09 DIAGNOSIS — M79.89 PAIN AND SWELLING OF LEFT LOWER LEG: Primary | ICD-10-CM

## 2018-02-09 DIAGNOSIS — M79.662 PAIN AND SWELLING OF LEFT LOWER LEG: Primary | ICD-10-CM

## 2018-02-09 NOTE — TELEPHONE ENCOUNTER
Arlene Miller with [facility] is calling, she would like to let Dr. Jenny Murrell know that she was with the patient today and he is experiencing a left lower extremity +3 non pitting edema (from foot up to calf). She states that this is causing increased pain to the patient. He also has a wound on his left heel that they are dressing, this has not signs/symptoms of infection but he is having difficulty keeping a bandage on in between nurse visits as he is walking and this is also subtile to germs on the floor. Arlene Miller would like a call back to discuss this.      Best call back # for Arlene Miller: [phone]

## 2018-02-09 NOTE — TELEPHONE ENCOUNTER
Keerthi Ventura reports left leg is red and swollen more so in his foot and toes. No fever. Patient is trying to hold leg up because it feels better than having it down. Venous doppler ordered.  They will continue dressing ulcer on heel and advised patient to keep dressing on

## 2018-02-09 NOTE — TELEPHONE ENCOUNTER
MD Paul Yao LPN        Caller: Unspecified (Today,  1:38 PM)                     Is leg warm to the touch? Red? Any fever? Probably good idea to get venous doppler to R/O a clot.

## 2018-02-12 RX ORDER — FLUDROCORTISONE ACETATE 0.1 MG/1
TABLET ORAL DAILY
COMMUNITY
Start: 2018-02-12 | End: 2018-03-22

## 2018-02-13 ENCOUNTER — TELEPHONE (OUTPATIENT)
Dept: FAMILY MEDICINE CLINIC | Age: 83
End: 2018-02-13

## 2018-02-13 NOTE — TELEPHONE ENCOUNTER
Tim Moya, [facility],  is calling wanting to update Dr. Amada Lux on patient. Patient is still at PT 2/13/18. Patients Blood pressure is 70/40 and pulse is 80 to 140. Kristeen Cogan would like a call back from nurse.      Luz's number: [phone]

## 2018-02-14 NOTE — TELEPHONE ENCOUNTER
Ruth Leos is calling back and said she doesn't have an appt. With patient today. If you want her to see him, she will need an order. [place] [phone].

## 2018-02-16 ENCOUNTER — HOSPITAL ENCOUNTER (OUTPATIENT)
Dept: VASCULAR SURGERY | Age: 83
Discharge: HOME | End: 2018-02-16
Attending: FAMILY MEDICINE
Payer: MEDICARE

## 2018-02-16 DIAGNOSIS — M79.89 PAIN AND SWELLING OF LEFT LOWER LEG: ICD-10-CM

## 2018-02-16 DIAGNOSIS — M79.662 PAIN AND SWELLING OF LEFT LOWER LEG: ICD-10-CM

## 2018-02-16 PROCEDURE — 93970 EXTREMITY STUDY: CPT

## 2018-02-16 NOTE — PROCEDURES
[facility]  *** FINAL REPORT ***    Name: Stanley Francois  MRN: [id number]    Outpatient  : 18 May 1933  HIS Order #: [id number]  [address] [facility] Visit #: [id number]  Date: 2018    TYPE OF TEST: Peripheral Venous Testing    REASON FOR TEST  Pain in limb, Limb swelling    Right Leg:-  Deep venous thrombosis:           No  Superficial venous thrombosis:    No  Deep venous insufficiency:        Not examined  Superficial venous insufficiency: Not examined    Left Leg:-  Deep venous thrombosis:           No  Superficial venous thrombosis:    No  Deep venous insufficiency:        Not examined  Superficial venous insufficiency: Not examined      INTERPRETATION/FINDINGS  PROCEDURE:  Color duplex ultrasound imaging of lower extremity veins. FINDINGS:       Right: The common femoral, deep femoral, femoral, popliteal,  posterior tibial, peroneal, and great saphenous are patent and without   evidence of thrombus;  each is fully compressible and there is no  narrowing of the flow channel on color Doppler imaging. Phasic flow  is observed in the common femoral vein. Left:   The common femoral, deep femoral, femoral, popliteal,  posterior tibial, peroneal, and great saphenous are patent and without   evidence of thrombus;  each is fully compressible and there is no  narrowing of the flow channel on color Doppler imaging. Phasic flow  is observed in the common femoral vein. IMPRESSION:  No evidence of right or left lower extremity vein  thrombosis. Limited study due to mobility. ADDITIONAL COMMENTS    I have personally reviewed the data relevant to the interpretation of  this  study. TECHNOLOGIST: Janak Ochoa  Signed: 2018 11:19 AM    PHYSICIAN: Vj Baron MD  Signed: 2018 04:12 PM

## 2018-02-19 ENCOUNTER — TELEPHONE (OUTPATIENT)
Dept: FAMILY MEDICINE CLINIC | Age: 83
End: 2018-02-19

## 2018-02-19 NOTE — TELEPHONE ENCOUNTER
Leocadia Lundborg is calling from [facility] PT. Leocadia Lundborg is calling wanting to make sure that you received the Fax for the alternating pressure pad and patients blood pressure is 80/50 today.      [id number] [address] number: [phone]  2/19/18

## 2018-02-20 ENCOUNTER — TELEPHONE (OUTPATIENT)
Dept: FAMILY MEDICINE CLINIC | Age: 83
End: 2018-02-20

## 2018-02-20 ENCOUNTER — PATIENT OUTREACH (OUTPATIENT)
Dept: FAMILY MEDICINE CLINIC | Age: 83
End: 2018-02-20

## 2018-02-20 NOTE — TELEPHONE ENCOUNTER
Incoming call received from Comfort Fernandez, daughter, (HIPAA verified) requesting results from recent venous doppler to left lower extremity. Best contact number, [phone].

## 2018-02-20 NOTE — PROGRESS NOTES
Outbound call made 18 to follow up with patient.  verified. Reports right shoulder discomfort. States physical therapy is working with him with minimal relief. Patient rushed NN off the phone due to [facility] staff present to give him a shower. NN advised patient I will call him back. Patient acknowledges.

## 2018-02-20 NOTE — TELEPHONE ENCOUNTER
Patients daughter, Aditya Truong, is returning a call back from [place]. Aditya Truong stated that nurse did not state what she was calling in regards but she thinks it is about her dads foot and how it is swollen and the steps to take from here. Aditya Truong would like a call back.     [facility] number: [phone]  2/20/18    (sorry for opening a new message :( )

## 2018-02-20 NOTE — TELEPHONE ENCOUNTER
MD Monica Brown LPN       Caller: Unspecified (Yesterday, 11:26 AM)                     We are getting BP from PT department, how about from the nurse taking care of him?  Both sitting and standing preferably over the past few days             terrell springer reports bp readings ( unable to do standing because patient does not stand well)  02/13/18 118/68  02/14/18 128/64  02/15/18 133/71   02/16//18 123/72  02/19/18 100/62  02/20/18 100/62

## 2018-02-20 NOTE — TELEPHONE ENCOUNTER
Comfort Fernandez, daughter, was informed of negative  left venous doppler results; however, daughter is reporting left leg is red and swollen. Patient has appointment scheduled 3/5/18, but Jolene Baptiste is requesting an earlier appointment. Best contact number, [phone].

## 2018-02-22 ENCOUNTER — TELEPHONE (OUTPATIENT)
Dept: FAMILY MEDICINE CLINIC | Age: 83
End: 2018-02-22

## 2018-02-22 NOTE — TELEPHONE ENCOUNTER
Say Vance called from home health to see if we received an order for a mattress pad for [employer].  She says she has called several times  Best #[phone]  Martha Medel  02/22/18

## 2018-02-22 NOTE — TELEPHONE ENCOUNTER
Dori Borges from [facility] is calling, she is requesting a verbal order to increase the patients frequency of skilled nursing to 3 times a week for his wound on his heel.     Best call back # for Debbysa Castelan: [phone]

## 2018-02-23 ENCOUNTER — OFFICE VISIT (OUTPATIENT)
Dept: FAMILY MEDICINE CLINIC | Age: 83
End: 2018-02-23

## 2018-02-23 VITALS
HEIGHT: 74 IN | TEMPERATURE: 98 F | BODY MASS INDEX: 26.31 KG/M2 | RESPIRATION RATE: 18 BRPM | DIASTOLIC BLOOD PRESSURE: 51 MMHG | SYSTOLIC BLOOD PRESSURE: 77 MMHG | WEIGHT: 205 LBS | OXYGEN SATURATION: 96 % | HEART RATE: 82 BPM

## 2018-02-23 DIAGNOSIS — M47.812 SPONDYLOSIS OF CERVICAL REGION WITHOUT MYELOPATHY OR RADICULOPATHY: ICD-10-CM

## 2018-02-23 DIAGNOSIS — K21.00 REFLUX ESOPHAGITIS: ICD-10-CM

## 2018-02-23 DIAGNOSIS — I95.1 ORTHOSTATIC HYPOTENSION: ICD-10-CM

## 2018-02-23 DIAGNOSIS — Z87.19 HISTORY OF DUODENAL ULCER: ICD-10-CM

## 2018-02-23 DIAGNOSIS — F41.1 GENERALIZED ANXIETY DISORDER: ICD-10-CM

## 2018-02-23 DIAGNOSIS — I10 ESSENTIAL HYPERTENSION, BENIGN: ICD-10-CM

## 2018-02-23 DIAGNOSIS — S91.302D OPEN WOUND OF LEFT HEEL, SUBSEQUENT ENCOUNTER: Primary | ICD-10-CM

## 2018-02-23 RX ORDER — LEVOFLOXACIN 500 MG/1
500 TABLET, FILM COATED ORAL DAILY
Qty: 10 TAB | Refills: 0 | Status: SHIPPED | OUTPATIENT
Start: 2018-02-23 | End: 2018-03-05

## 2018-02-23 NOTE — PROGRESS NOTES
Chief Complaint   Patient presents with    Leg Swelling     x 2 1/2 weeks, Venous Doppler done/bilateral lower extremities 2/16/18    Foot Swelling     x 2 1/2 weeks, \"foot is swollen, red and warm to the touch\"     1. Have you been to the ER, urgent care clinic since your last visit? Hospitalized since your last visit? Seen at [place] [address] for TIA in January  Through the 18th to the 23rd. 2. Have you seen or consulted any other health care providers outside of the [address] [place] [address] since your last visit? Include any pap smears or colon screening. No    Visit Vitals    BP (!) 77/51 (BP 1 Location: Right arm, BP Patient Position: Sitting)    Pulse 82    Temp 98 °F (36.7 °C) (Oral)    Resp 18    Ht 6' 2\" (1.88 m)    Wt 205 lb (93 kg)    SpO2 96%    BMI 26.32 kg/m2     Blood pressure is low at today's visit.   Patients Son and Daughter states that this Blood Pressure is better than usual.

## 2018-02-23 NOTE — PROGRESS NOTES
HISTORY OF PRESENT ILLNESS  HPI  Al Awad is a 80 y.o. male with history of HTN, Parkinsonian syndrome, BPH, DDD/DJD, hyperlipidemia, HEAVEN, and major depressive disorder who presents to office today in a wheelchair with his daughter and son for leg swelling. Pt's daughter states that pt has had left leg swelling and warmth since 2/7. Bilateral lower extremity venous doppler on 2/16 was negative. Pt was admitted to [facility] from 1/23 - 1/30 for a TIA, and pt's daughter states he developed a wound on his left heel during his hospitalization. Pt's son states that pt is being seen by a home health nurse three times a week for this, but that the wound has not been healing. Pt's daughter denies fever, and pt denies pain to palpation. Past Medical History:   Diagnosis Date    Blurry vision 1/19/2012    BPH (benign prostatic hyperplasia)     DDD (degenerative disc disease), lumbar 2/19/2010    DJD (degenerative joint disease) of cervical spine 2/19/2010    DU (duodenal ulcer) 9/1/1990    Hyperlipidemia     Hypertension     Other and unspecified hyperlipidemia 2/19/2010    Pacemaker     Paralysis agitans ([address] 2/19/2010    Parkinson disease ([address]     PAT (paroxysmal atrial tachycardia) (HCC)     Premature atrial beats     Reflux esophagitis 2/19/2010    Sick sinus syndrome (HCC)     TIA (transient ischemic attack)      Past Surgical History:   Procedure Laterality Date    HX CATARACT REMOVAL  6/2012 and 7/2012    Both eyes    HX ORTHOPAEDIC  04/2015    tendon repair both knees    HX PACEMAKER  2008    bradycardia    HX VITRECTOMY  11/2011    IN REMVL PERM PM PLS GEN W/REPL PLSE GEN 2 LEAD SYS  3/13/2017          Current Outpatient Prescriptions on File Prior to Visit   Medication Sig Dispense Refill    lidocaine (LIDODERM) 5 % Apply patch to the affected area for 12 hours a day and remove for 12 hours a day. 15 Each 0    losartan (COZAAR) 50 mg tablet Take 1 Tab by mouth daily.  (Patient taking differently: Take 25 mg by mouth daily.) 30 Tab 0    traMADol (ULTRAM) 50 mg tablet Take 1 Tab by mouth every eight (8) hours as needed for Pain. Max Daily Amount: 150 mg. 30 Tab 1    diclofenac (VOLTAREN) 1 % gel Apply  to affected area as needed.  DETROL LA 2 mg ER capsule TAKE 1 CAPSULE DAILY FOR BLADDER FREQUENCY 90 Cap 3    pindolol (VISKIN) 5 mg tablet Take 2.5 mg by mouth two (2) times a day. Take 1/2 tablet by mouth at 2pm and 1/2 tab by mouth at 9pm      carbidopa-levodopa (SINEMET)  mg per tablet Take 2 Tabs by mouth four (4) times daily. (This is given at 8:00, 11:00, 14:00, and 21:00). 240 Tab 0    polyethylene glycol (MIRALAX) 17 gram packet Take 1 Packet by mouth daily. Indications: CONSTIPATION 30 Packet 0    therapeutic multivitamin (THERAGRAN) tablet Take 1 Tab by mouth daily. 30 Tab 0    cloNIDine HCl (CATAPRES) 0.1 mg tablet Take 1 Tab by mouth every six (6) hours as needed (SBP > 180 mmHg). 20 Tab 0    docusate sodium (COLACE) 100 mg capsule Take 1 Cap by mouth daily as needed for Constipation. 30 Cap 0    cycloSPORINE (RESTASIS) 0.05 % ophthalmic emulsion Administer 1 Drop to both eyes two (2) times a day. 60 Each 0    escitalopram oxalate (LEXAPRO) 20 mg tablet Take 1 Tab by mouth daily. 30 Tab 0    rivastigmine (EXELON) 4.6 mg/24 hr patch 1 Patch by TransDERmal route daily. 30 Patch 0    esomeprazole (NEXIUM) 40 mg capsule Take 1 Cap by mouth daily. 30 Cap 0    acetaminophen (MAPAP EXTRA STRENGTH) 500 mg tablet Take 1 Tab by mouth every six (6) hours as needed for Pain. 120 Tab 0    fludrocortisone (FLORINEF) 0.1 mg tablet Take  by mouth daily.  aspirin 81 mg chewable tablet Take 81 mg by mouth two (2) times a day. No current facility-administered medications on file prior to visit.       No Known Allergies  Family History   Problem Relation Age of Onset    Asthma Mother     Heart Disease Father      Social History     Social History    Marital status:      Spouse name: N/A    Number of children: N/A    Years of education: N/A     Social History Main Topics    Smoking status: Former Smoker     Types: Pipe     Quit date: 7/14/1945    Smokeless tobacco: Never Used    Alcohol use No      Comment: glass of wine every now and then    Drug use: No    Sexual activity: Not Asked     Other Topics Concern     Service Yes    Blood Transfusions No    Caffeine Concern No    Occupational Exposure No    Hobby Hazards No    Sleep Concern No    Stress Concern No    Weight Concern No    Special Diet No    Back Care No    Exercise No    Bike Helmet No    Seat Belt Yes    Self-Exams No     Social History Narrative               Review of Systems   Constitutional: Negative for chills, diaphoresis, fever, malaise/fatigue and weight loss. Eyes: Negative for blurred vision, double vision, pain and redness. Respiratory: Negative for cough, shortness of breath and wheezing. Cardiovascular: Positive for leg swelling (left). Negative for chest pain, palpitations, orthopnea, claudication and PND. Skin: Negative for itching and rash. left foot warmth & erythema   Neurological: Negative for dizziness, tingling, tremors, sensory change, speech change, focal weakness, seizures, loss of consciousness, weakness and headaches.      Results for orders placed or performed during the hospital encounter of 01/21/18   EKG, 12 LEAD, INITIAL   Result Value Ref Range    Ventricular Rate 82 BPM    Atrial Rate 64 BPM    P-R Interval 272 ms    QRS Duration 98 ms    Q-T Interval 378 ms    QTC Calculation (Bezet) 441 ms    Calculated R Axis -9 degrees    Calculated T Axis 26 degrees    Diagnosis       ** Poor data quality, interpretation may be adversely affected Atrial-paced   rhythm with prolonged AV conduction  When compared with ECG of 18-MAR-2017 21:11,  Electronic atrial pacemaker has replaced Sinus rhythm  Confirmed by Benji Saleh M.D., iNEWiT ([id number]) on 1/21/2018 1:20:46 PM                   Physical Exam  Visit Vitals    BP (!) 77/51 (BP 1 Location: Right arm, BP Patient Position: Sitting)    Pulse 82    Temp 98 °F (36.7 °C) (Oral)    Resp 18    Ht 6' 2\" (1.88 m)    Wt 205 lb (93 kg)    SpO2 96%    BMI 26.32 kg/m2     Lungs: clear to auscultation bilaterally  Heart: regular rate and rhythm, S1, S2 normal, no murmur, click, rub or gallop  Extremities: extremities normal except for bilateral shoulders and lower extremities, atraumatic, no cyanosis  Shoulders: he has decreased ROM of both shoulders but he does seem to have some improvement in his abduction of both shoulders (he's doing some fairly regular shoulder exercises)  Lower extremities: both knees are contracted which is his baseline, cannot straighten them past about 90 degrees. The right lower extremity has no swelling and the left has 2-3+ swelling, there is some increased erythema of his feet that seems to go down a little when you lift his foot up, there is increased warmth in the area above the dressing he has on which is at just above the left ankle, the left leg is definitely more warm than the other leg, dressing in place, did not view the wound as I did not feel it would help with his treatment, I'm assuming it's infected because of the warmth  Neurologic: Grossly normal                ASSESSMENT and PLAN    ICD-10-CM ICD-9-CM    1. Open wound of left heel, subsequent encounter S91.302D V58.89      892.0     initially seen in the hospital recently when there for a TIA. 2. Essential hypertension, difficult to control due to orthostatic hypotension I10 401.1    3. Orthostatic hypotension I95.1 458.0    4. Generalized anxiety disorder F41.1 300.02    5. Reflux esophagitis K21.0 530.11    6. Spondylosis of cervical region without myelopathy or radiculopathy M47.812 721.0    7. History of duodenal ulcer Z87.19 V12.79     1990     Diagnoses and all orders for this visit:    1.  Open wound of left heel, subsequent encounter  Comments:  initially seen in the hospital recently when there for a TIA. 2. Essential hypertension, difficult to control due to orthostatic hypotension    3. Orthostatic hypotension    4. Generalized anxiety disorder    5. Reflux esophagitis    6. Spondylosis of cervical region without myelopathy or radiculopathy    7. History of duodenal ulcer  Comments:  1990    Other orders  -     levoFLOXacin (LEVAQUIN) 500 mg tablet; Take 1 Tab by mouth daily for 10 days. Follow-up Disposition:  Return in about 1 month (around 3/23/2018), or 1 week if heelwound no better. , for F/U hypertension, recent hypotension. reviewed medications and side effects in detail  Please call my office if there are any questions- [phone]. Discussed expected course/resolution/complications of diagnosis in detail with patient. Medication risks/benefits/costs/interactions/alternatives discussed with patient. Pt was given an after visit summary which includes diagnoses, current medications & vitals. Pt expressed understanding with the diagnosis and plan. Patient to call if no better in 3 -4 days and prn new problems. Total 25 minutes,60 % counseling re: We'll start antibiotics and have wound care specialists evaluate the progress of the wound closely over the next week or so. If it's not improving, we'll send him to the hospital for evaluation, possible IV antibiotics and /or debridement. The swelling does impede the healing progress so we'll try to get his feet elevated as much as possible, which is hard to do with the flexed knee condition that he has. The wound specialist are working on a contraption to protect the wounded heal.    Will need to stop the Seroquel while he is on Levaquin due to potential life threatening QT prolongation. Due to the BP not responding to the florinef, I decreased the Cozaar and the pindolol doses and will follow this up next week as well.   Also, discussed symptoms of concern that were noted today in the note above, treatment options( including doing nothing), when to follow up before recommended time frame. Also, answered all questions. Discussed with son and daughter who accompanied pt today the difficulty in treating a heal wound with factors noted above. Also, discussed the BP problem as well- Recent readings from the nurse taking care of him have averaged >100/60, where the BP readings from PT are always 70/50, sometimes lower. This document was written by Yoselin Burciaga, as dictated by Taylor Pozo MD.  I have reviewed and agree with the above note and have made corrections where appropriate To Colindres M.D.

## 2018-02-23 NOTE — MR AVS SNAPSHOT
[facility] 
 
 
 [address] 
[phone] Patient: Mustapha Lucas MRN: [id number] Visit Information Date & Time Provider Department Dept. Phone Encounter #  
 2/23/2018 12:15 PM Gregg Hinojosa  [facility] [phone] [id number] Your Appointments 7/26/2018 10:30 AM  
PACEMAKER with RENE[facility]TIFF[facility] ([facility] SCHEDULING) Appt Note: mdpola ppi rc, annual thresh/CL, see gilman; med threshold/rc/Gilman 1 yr b  
 [address] [place]  
[address] [facility]  
  
    
 7/26/2018 10:40 AM  
ESTABLISHED PATIENT with Luis Fraser MD  
[facility] ([address]) Appt Note: med threshold/rc/Gilman 1 yr b  
 [address] [place]  
[address] [place] [address]  
  
    
  
 4/18/2018 10:45 AM  
REMOTE OFFICE VISIT with Jami Napoles [facility] ([facility] SCHEDULING) Appt Note: aminata horne rc, annual thresh/CL, see gilman; CL PPI/rc b  
 [address] [facility] [address] [place]  
[address] [facility],[address] [place] [address] Upcoming Health Maintenance Date Due  
 MEDICARE YEARLY EXAM 4/29/2018 GLAUCOMA SCREENING Q2Y 8/26/2018 DTaP/Tdap/Td series (2 - Td) 1/31/2028 Allergies as of 2/23/2018  Review Complete On: 2/23/2018 By: Randa Nixon No Known Allergies Current Immunizations  Reviewed on 3/14/2017 Name Date Influenza High Dose Vaccine PF 10/27/2015 Influenza Vaccine (Quad) PF 11/16/2017  4:19 PM  
 Influenza Vaccine Split 11/17/2011 TB Skin Test (PPD) Intradermal 3/25/2013 TD Vaccine 5/3/2012 Tdap 5/3/2012 ZZZ-RETIRED (DO NOT USE) Pneumococcal Vaccine (Unspecified Type) 4/20/2010 Not reviewed this visit Vitals BP Pulse Temp Resp Height(growth percentile) Weight(growth percentile) (!) 77/51 (BP 1 Location: Right arm, BP Patient Position: Sitting) 82 98 °F (36.7 °C) (Oral) 18 6' 2\" (1.88 m) 205 lb (93 kg) SpO2 BMI Smoking Status 96% 26.32 kg/m2 Former Smoker Vitals History BMI and BSA Data Body Mass Index Body Surface Area  
 26.32 kg/m 2 2.2 m 2 Preferred Pharmacy Pharmacy Name Phone [address]  [phone] Your Updated Medication List  
  
   
This list is accurate as of 2/23/18  1:25 PM.  Always use your most recent med list.  
  
  
  
  
 acetaminophen 500 mg tablet Commonly known as:  MAPAP EXTRA STRENGTH Take 1 Tab by mouth every six (6) hours as needed for Pain. aspirin 81 mg chewable tablet Take 81 mg by mouth two (2) times a day. carbidopa-levodopa  mg per tablet Commonly known as:  SINEMET Take 2 Tabs by mouth four (4) times daily. (This is given at 8:00, 11:00, 14:00, and 21:00). cloNIDine HCl 0.1 mg tablet Commonly known as:  CATAPRES Take 1 Tab by mouth every six (6) hours as needed (SBP > 180 mmHg). cycloSPORINE 0.05 % ophthalmic emulsion Commonly known as:  RESTASIS Administer 1 Drop to both eyes two (2) times a day. DETROL LA 2 mg ER capsule Generic drug:  tolterodine ER  
TAKE 1 CAPSULE DAILY FOR BLADDER FREQUENCY  
  
 docusate sodium 100 mg capsule Commonly known as:  Arlean Lavender Take 1 Cap by mouth daily as needed for Constipation. escitalopram oxalate 20 mg tablet Commonly known as:  Ruthie Benezett Take 1 Tab by mouth daily. esomeprazole 40 mg capsule Commonly known as:  NexIUM Take 1 Cap by mouth daily. fludrocortisone 0.1 mg tablet Commonly known as:  FLORINEF Take  by mouth daily. levoFLOXacin 500 mg tablet Commonly known as:  Heather Germain Take 1 Tab by mouth daily for 10 days. lidocaine 5 % Commonly known as:  Karel Briscoe Apply patch to the affected area for 12 hours a day and remove for 12 hours a day. losartan 50 mg tablet Commonly known as:  COZAAR Take 1 Tab by mouth daily. pindolol 5 mg tablet Commonly known as:  VISKIN Take 2.5 mg by mouth two (2) times a day. Take 1/2 tablet by mouth at 2pm and 1/2 tab by mouth at 9pm  
  
 polyethylene glycol 17 gram packet Commonly known as:  Odella Cadet Take 1 Packet by mouth daily. Indications: CONSTIPATION  
  
 rivastigmine 4.6 mg/24 hr patch Commonly known as:  EXELON  
1 Patch by TransDERmal route daily. therapeutic multivitamin tablet Commonly known as:  [facility] Take 1 Tab by mouth daily. traMADol 50 mg tablet Commonly known as:  ULTRAM  
Take 1 Tab by mouth every eight (8) hours as needed for Pain. Max Daily Amount: 150 mg. VOLTAREN 1 % Gel Generic drug:  diclofenac Apply  to affected area as needed. Prescriptions Sent to Pharmacy Refills  
 levoFLOXacin (LEVAQUIN) 500 mg tablet 0 Sig: Take 1 Tab by mouth daily for 10 days. Class: Normal  
 Pharmacy: [address] [place] [address] 18, [address] #: [phone] Route: Oral  
  
Introducing [place]! Dear Ann Baez: Thank you for requesting a [email or web address] account. Our records indicate that you already have an active [email or web address] account. You can access your account anytime at [email or web address]. [email or web address] Genie Did you know that you can access your hospital and ER discharge instructions at any time in [email or web address]? You can also review all of your test results from your hospital stay or ER visit. Additional Information If you have questions, please visit the Frequently Asked Questions section of the [email or web address] website at [email or web address] Remember, [email or web address] is NOT to be used for urgent needs. For medical emergencies, dial 911. Now available from your iPhone and Android! Please provide this summary of care documentation to your next provider. Your primary care clinician is listed as [address] [address], [facility]/s . If you have any questions after today's visit, please call [phone].

## 2018-02-26 NOTE — TELEPHONE ENCOUNTER
Cherie (PT) from [facility] is calling and wants to speak to someone regarding patient. She said his BP is always very low and they need to know when you want them to call and needs specific diameter. Also, she said they sent in an order for patient to have a pressure pad for his bed and haven't received anything back. .  [phone]

## 2018-02-26 NOTE — TELEPHONE ENCOUNTER
bp reading form nurse at [facility]  02/24/18 100/68  02/25/18 115/70  02/26/18 73/52  Is on cozaar 25mg  Pindolol 5mg 1/2 tab at 2 and 9

## 2018-03-01 NOTE — TELEPHONE ENCOUNTER
MD Tomeka Anaya LPN        Caller: Unspecified (1 week ago)                     Continue the same and have them call back with BP readings on Friday

## 2018-03-07 ENCOUNTER — TELEPHONE (OUTPATIENT)
Dept: FAMILY MEDICINE CLINIC | Age: 83
End: 2018-03-07

## 2018-03-07 NOTE — TELEPHONE ENCOUNTER
Deanna from [facility] is calling stating that they sent an occupational therapy letter yesterday for medical necessities that they need signed by Dr. Rosa Gallagher. She states that it is different from the order that was already signed.

## 2018-03-08 DIAGNOSIS — M51.36 DDD (DEGENERATIVE DISC DISEASE), LUMBAR: Primary | ICD-10-CM

## 2018-03-09 RX ORDER — TRAMADOL HYDROCHLORIDE 50 MG/1
50 TABLET ORAL
Qty: 30 TAB | Refills: 1 | Status: SHIPPED | OUTPATIENT
Start: 2018-03-09

## 2018-03-12 ENCOUNTER — TELEPHONE (OUTPATIENT)
Dept: FAMILY MEDICINE CLINIC | Age: 83
End: 2018-03-12

## 2018-03-12 DIAGNOSIS — G31.84 MILD COGNITIVE IMPAIRMENT: ICD-10-CM

## 2018-03-12 NOTE — TELEPHONE ENCOUNTER
Romel Andres RN calling from [facility] is calling requesting a call back for wound change orders as soon as possible.      Best call back #[phone]

## 2018-03-13 ENCOUNTER — TELEPHONE (OUTPATIENT)
Dept: FAMILY MEDICINE CLINIC | Age: 83
End: 2018-03-13

## 2018-03-13 DIAGNOSIS — R41.0 CONFUSION: Primary | ICD-10-CM

## 2018-03-13 RX ORDER — LIDOCAINE 50 MG/G
PATCH TOPICAL
Qty: 30 EACH | Refills: 11 | OUTPATIENT
Start: 2018-03-13

## 2018-03-13 RX ORDER — RIVASTIGMINE 4.6 MG/24H
1 PATCH, EXTENDED RELEASE TRANSDERMAL DAILY
Qty: 30 PATCH | Refills: 11 | OUTPATIENT
Start: 2018-03-13

## 2018-03-13 NOTE — TELEPHONE ENCOUNTER
Uday Kenyon from [facility] OT is calling, she is requesting a call back to obtain continuation OT/PT & skilled nursing orders for the patients wound. Uday Kenyon is requesting for the order to be evaluate and treat.     Best call back # for Uday Kenyon: [phone]

## 2018-03-15 ENCOUNTER — TELEPHONE (OUTPATIENT)
Dept: FAMILY MEDICINE CLINIC | Age: 83
End: 2018-03-15

## 2018-03-15 NOTE — TELEPHONE ENCOUNTER
MD Uriel Giron LPN        Caller: Unspecified (Today, 10:07 AM)                     Reassure that the nurse readings are > 90 % of the time > 686 systolic; unless symptomatic( light headed, no need to call).        Left message for Fide Heal with above info

## 2018-03-15 NOTE — TELEPHONE ENCOUNTER
Hussein Hogan from [facility] called to let us know that the patients blood pressure is 88/60   Best # [phone]

## 2018-03-22 ENCOUNTER — APPOINTMENT (OUTPATIENT)
Dept: GENERAL RADIOLOGY | Age: 83
DRG: 074 | End: 2018-03-22
Attending: EMERGENCY MEDICINE
Payer: MEDICARE

## 2018-03-22 ENCOUNTER — HOSPITAL ENCOUNTER (INPATIENT)
Age: 83
LOS: 6 days | Discharge: NURSING, REHAB OR LONG-TERM CARE | DRG: 074 | End: 2018-03-28
Attending: EMERGENCY MEDICINE | Admitting: HOSPITALIST
Payer: MEDICARE

## 2018-03-22 ENCOUNTER — TELEPHONE (OUTPATIENT)
Dept: FAMILY MEDICINE CLINIC | Age: 83
End: 2018-03-22

## 2018-03-22 DIAGNOSIS — I95.9 HYPOTENSION, UNSPECIFIED HYPOTENSION TYPE: Primary | ICD-10-CM

## 2018-03-22 DIAGNOSIS — R42 DIZZINESS: ICD-10-CM

## 2018-03-22 LAB
ALBUMIN SERPL-MCNC: 3.4 G/DL (ref 3.5–5)
ALBUMIN/GLOB SERPL: 1 {RATIO} (ref 1.1–2.2)
ALP SERPL-CCNC: 69 U/L (ref 45–117)
ALT SERPL-CCNC: 9 U/L (ref 12–78)
ANION GAP SERPL CALC-SCNC: 5 MMOL/L (ref 5–15)
APPEARANCE UR: CLEAR
AST SERPL-CCNC: 17 U/L (ref 15–37)
ATRIAL RATE: 86 BPM
BACTERIA URNS QL MICRO: NEGATIVE /HPF
BASOPHILS # BLD: 0 K/UL (ref 0–0.1)
BASOPHILS NFR BLD: 1 % (ref 0–1)
BILIRUB SERPL-MCNC: 0.6 MG/DL (ref 0.2–1)
BILIRUB UR QL: NEGATIVE
BUN SERPL-MCNC: 26 MG/DL (ref 6–20)
BUN/CREAT SERPL: 22 (ref 12–20)
CALCIUM SERPL-MCNC: 8.6 MG/DL (ref 8.5–10.1)
CALCULATED R AXIS, ECG10: -11 DEGREES
CALCULATED T AXIS, ECG11: 11 DEGREES
CHLORIDE SERPL-SCNC: 106 MMOL/L (ref 97–108)
CK SERPL-CCNC: 90 U/L (ref 39–308)
CO2 SERPL-SCNC: 29 MMOL/L (ref 21–32)
COLOR UR: ABNORMAL
CREAT SERPL-MCNC: 1.16 MG/DL (ref 0.7–1.3)
DIAGNOSIS, 93000: NORMAL
DIFFERENTIAL METHOD BLD: ABNORMAL
EOSINOPHIL # BLD: 0.4 K/UL (ref 0–0.4)
EOSINOPHIL NFR BLD: 9 % (ref 0–7)
EPITH CASTS URNS QL MICRO: ABNORMAL /LPF
ERYTHROCYTE [DISTWIDTH] IN BLOOD BY AUTOMATED COUNT: 13.5 % (ref 11.5–14.5)
GLOBULIN SER CALC-MCNC: 3.5 G/DL (ref 2–4)
GLUCOSE BLD STRIP.AUTO-MCNC: 70 MG/DL (ref 65–100)
GLUCOSE BLD STRIP.AUTO-MCNC: 85 MG/DL (ref 65–100)
GLUCOSE SERPL-MCNC: 67 MG/DL (ref 65–100)
GLUCOSE UR STRIP.AUTO-MCNC: NEGATIVE MG/DL
HCT VFR BLD AUTO: 33.2 % (ref 36.6–50.3)
HGB BLD-MCNC: 10.4 G/DL (ref 12.1–17)
HGB UR QL STRIP: NEGATIVE
HYALINE CASTS URNS QL MICRO: ABNORMAL /LPF (ref 0–5)
IMM GRANULOCYTES # BLD: 0 K/UL (ref 0–0.04)
IMM GRANULOCYTES NFR BLD AUTO: 0 % (ref 0–0.5)
KETONES UR QL STRIP.AUTO: NEGATIVE MG/DL
LACTATE SERPL-SCNC: 0.7 MMOL/L (ref 0.4–2)
LEUKOCYTE ESTERASE UR QL STRIP.AUTO: ABNORMAL
LYMPHOCYTES # BLD: 1.3 K/UL (ref 0.8–3.5)
LYMPHOCYTES NFR BLD: 27 % (ref 12–49)
MCH RBC QN AUTO: 28.7 PG (ref 26–34)
MCHC RBC AUTO-ENTMCNC: 31.3 G/DL (ref 30–36.5)
MCV RBC AUTO: 91.7 FL (ref 80–99)
MONOCYTES # BLD: 0.5 K/UL (ref 0–1)
MONOCYTES NFR BLD: 10 % (ref 5–13)
NEUTS SEG # BLD: 2.5 K/UL (ref 1.8–8)
NEUTS SEG NFR BLD: 53 % (ref 32–75)
NITRITE UR QL STRIP.AUTO: NEGATIVE
NRBC # BLD: 0 K/UL (ref 0–0.01)
NRBC BLD-RTO: 0 PER 100 WBC
P-R INTERVAL, ECG05: 284 MS
PH UR STRIP: 6.5 [PH] (ref 5–8)
PLATELET # BLD AUTO: 197 K/UL (ref 150–400)
PMV BLD AUTO: 10.5 FL (ref 8.9–12.9)
POTASSIUM SERPL-SCNC: 4.6 MMOL/L (ref 3.5–5.1)
PROT SERPL-MCNC: 6.9 G/DL (ref 6.4–8.2)
PROT UR STRIP-MCNC: NEGATIVE MG/DL
Q-T INTERVAL, ECG07: 368 MS
QRS DURATION, ECG06: 84 MS
QTC CALCULATION (BEZET), ECG08: 437 MS
RBC # BLD AUTO: 3.62 M/UL (ref 4.1–5.7)
RBC #/AREA URNS HPF: ABNORMAL /HPF (ref 0–5)
SERVICE CMNT-IMP: NORMAL
SERVICE CMNT-IMP: NORMAL
SODIUM SERPL-SCNC: 140 MMOL/L (ref 136–145)
SP GR UR REFRACTOMETRY: 1.02 (ref 1–1.03)
TROPONIN I SERPL-MCNC: <0.04 NG/ML
UR CULT HOLD, URHOLD: NORMAL
UROBILINOGEN UR QL STRIP.AUTO: 1 EU/DL (ref 0.2–1)
VENTRICULAR RATE, ECG03: 85 BPM
WBC # BLD AUTO: 4.7 K/UL (ref 4.1–11.1)
WBC URNS QL MICRO: ABNORMAL /HPF (ref 0–4)

## 2018-03-22 PROCEDURE — 36415 COLL VENOUS BLD VENIPUNCTURE: CPT | Performed by: EMERGENCY MEDICINE

## 2018-03-22 PROCEDURE — 74011250636 HC RX REV CODE- 250/636: Performed by: HOSPITALIST

## 2018-03-22 PROCEDURE — 65660000000 HC RM CCU STEPDOWN

## 2018-03-22 PROCEDURE — C1758 CATHETER, URETERAL: HCPCS

## 2018-03-22 PROCEDURE — 87086 URINE CULTURE/COLONY COUNT: CPT | Performed by: EMERGENCY MEDICINE

## 2018-03-22 PROCEDURE — 74011250636 HC RX REV CODE- 250/636: Performed by: EMERGENCY MEDICINE

## 2018-03-22 PROCEDURE — 99285 EMERGENCY DEPT VISIT HI MDM: CPT

## 2018-03-22 PROCEDURE — 80053 COMPREHEN METABOLIC PANEL: CPT | Performed by: EMERGENCY MEDICINE

## 2018-03-22 PROCEDURE — 82550 ASSAY OF CK (CPK): CPT | Performed by: EMERGENCY MEDICINE

## 2018-03-22 PROCEDURE — 71045 X-RAY EXAM CHEST 1 VIEW: CPT

## 2018-03-22 PROCEDURE — 87040 BLOOD CULTURE FOR BACTERIA: CPT | Performed by: EMERGENCY MEDICINE

## 2018-03-22 PROCEDURE — 84484 ASSAY OF TROPONIN QUANT: CPT | Performed by: EMERGENCY MEDICINE

## 2018-03-22 PROCEDURE — 83605 ASSAY OF LACTIC ACID: CPT | Performed by: EMERGENCY MEDICINE

## 2018-03-22 PROCEDURE — 82962 GLUCOSE BLOOD TEST: CPT

## 2018-03-22 PROCEDURE — 77030010545

## 2018-03-22 PROCEDURE — 74011250637 HC RX REV CODE- 250/637: Performed by: HOSPITALIST

## 2018-03-22 PROCEDURE — 81001 URINALYSIS AUTO W/SCOPE: CPT | Performed by: EMERGENCY MEDICINE

## 2018-03-22 PROCEDURE — 85025 COMPLETE CBC W/AUTO DIFF WBC: CPT | Performed by: EMERGENCY MEDICINE

## 2018-03-22 PROCEDURE — 93005 ELECTROCARDIOGRAM TRACING: CPT

## 2018-03-22 PROCEDURE — 74011000258 HC RX REV CODE- 258: Performed by: HOSPITALIST

## 2018-03-22 RX ORDER — NITROFURANTOIN MACROCRYSTALS 50 MG/1
50 CAPSULE ORAL DAILY
COMMUNITY

## 2018-03-22 RX ORDER — RIVASTIGMINE 4.6 MG/24H
1 PATCH, EXTENDED RELEASE TRANSDERMAL DAILY
Status: DISCONTINUED | OUTPATIENT
Start: 2018-03-23 | End: 2018-03-28 | Stop reason: HOSPADM

## 2018-03-22 RX ORDER — OXYBUTYNIN CHLORIDE 5 MG/1
5 TABLET, EXTENDED RELEASE ORAL DAILY
Status: DISCONTINUED | OUTPATIENT
Start: 2018-03-23 | End: 2018-03-28 | Stop reason: HOSPADM

## 2018-03-22 RX ORDER — SAME BUTANEDISULFONATE/BETAINE 400-600 MG
250 POWDER IN PACKET (EA) ORAL 2 TIMES DAILY
COMMUNITY

## 2018-03-22 RX ORDER — PANTOPRAZOLE SODIUM 40 MG/1
40 TABLET, DELAYED RELEASE ORAL
Status: DISCONTINUED | OUTPATIENT
Start: 2018-03-23 | End: 2018-03-28 | Stop reason: HOSPADM

## 2018-03-22 RX ORDER — SODIUM CHLORIDE 0.9 % (FLUSH) 0.9 %
5-10 SYRINGE (ML) INJECTION AS NEEDED
Status: DISCONTINUED | OUTPATIENT
Start: 2018-03-22 | End: 2018-03-28 | Stop reason: HOSPADM

## 2018-03-22 RX ORDER — LIDOCAINE 50 MG/G
1 PATCH TOPICAL EVERY 24 HOURS
Status: DISCONTINUED | OUTPATIENT
Start: 2018-03-22 | End: 2018-03-28 | Stop reason: HOSPADM

## 2018-03-22 RX ORDER — DOCUSATE SODIUM 100 MG/1
100 CAPSULE, LIQUID FILLED ORAL
Status: DISCONTINUED | OUTPATIENT
Start: 2018-03-22 | End: 2018-03-28 | Stop reason: HOSPADM

## 2018-03-22 RX ORDER — ESCITALOPRAM OXALATE 10 MG/1
20 TABLET ORAL DAILY
Status: DISCONTINUED | OUTPATIENT
Start: 2018-03-23 | End: 2018-03-28 | Stop reason: HOSPADM

## 2018-03-22 RX ORDER — ALBUTEROL SULFATE 0.83 MG/ML
2.5 SOLUTION RESPIRATORY (INHALATION)
Status: DISCONTINUED | OUTPATIENT
Start: 2018-03-22 | End: 2018-03-28 | Stop reason: HOSPADM

## 2018-03-22 RX ORDER — LOSARTAN POTASSIUM 50 MG/1
50 TABLET ORAL DAILY
Status: DISCONTINUED | OUTPATIENT
Start: 2018-03-23 | End: 2018-03-28 | Stop reason: HOSPADM

## 2018-03-22 RX ORDER — QUETIAPINE FUMARATE 25 MG/1
25 TABLET, FILM COATED ORAL
Status: DISCONTINUED | OUTPATIENT
Start: 2018-03-22 | End: 2018-03-28 | Stop reason: HOSPADM

## 2018-03-22 RX ORDER — CLONIDINE HYDROCHLORIDE 0.1 MG/1
0.1 TABLET ORAL
Status: DISCONTINUED | OUTPATIENT
Start: 2018-03-22 | End: 2018-03-28 | Stop reason: HOSPADM

## 2018-03-22 RX ORDER — POLYETHYLENE GLYCOL 3350 17 G/17G
17 POWDER, FOR SOLUTION ORAL DAILY
Status: DISCONTINUED | OUTPATIENT
Start: 2018-03-23 | End: 2018-03-28 | Stop reason: HOSPADM

## 2018-03-22 RX ORDER — ALBUTEROL SULFATE 90 UG/1
2 AEROSOL, METERED RESPIRATORY (INHALATION)
COMMUNITY

## 2018-03-22 RX ORDER — ENOXAPARIN SODIUM 100 MG/ML
40 INJECTION SUBCUTANEOUS EVERY 24 HOURS
Status: DISCONTINUED | OUTPATIENT
Start: 2018-03-22 | End: 2018-03-28 | Stop reason: HOSPADM

## 2018-03-22 RX ORDER — PINDOLOL 5 MG/1
2.5 TABLET ORAL 2 TIMES DAILY
Status: DISCONTINUED | OUTPATIENT
Start: 2018-03-22 | End: 2018-03-28 | Stop reason: HOSPADM

## 2018-03-22 RX ORDER — CARBIDOPA AND LEVODOPA 25; 100 MG/1; MG/1
2 TABLET ORAL 4 TIMES DAILY
Status: DISCONTINUED | OUTPATIENT
Start: 2018-03-22 | End: 2018-03-28 | Stop reason: HOSPADM

## 2018-03-22 RX ORDER — TRAMADOL HYDROCHLORIDE 50 MG/1
50 TABLET ORAL
Status: DISCONTINUED | OUTPATIENT
Start: 2018-03-22 | End: 2018-03-28 | Stop reason: HOSPADM

## 2018-03-22 RX ORDER — GUAIFENESIN 100 MG/5ML
81 LIQUID (ML) ORAL DAILY
Status: DISCONTINUED | OUTPATIENT
Start: 2018-03-23 | End: 2018-03-28 | Stop reason: HOSPADM

## 2018-03-22 RX ORDER — OMEPRAZOLE 40 MG/1
40 CAPSULE, DELAYED RELEASE ORAL DAILY
COMMUNITY

## 2018-03-22 RX ORDER — QUETIAPINE FUMARATE 25 MG/1
25 TABLET, FILM COATED ORAL
COMMUNITY
End: 2018-04-26

## 2018-03-22 RX ORDER — THERA TABS 400 MCG
1 TAB ORAL DAILY
Status: DISCONTINUED | OUTPATIENT
Start: 2018-03-23 | End: 2018-03-28 | Stop reason: HOSPADM

## 2018-03-22 RX ORDER — SODIUM CHLORIDE 0.9 % (FLUSH) 0.9 %
5-10 SYRINGE (ML) INJECTION EVERY 8 HOURS
Status: DISCONTINUED | OUTPATIENT
Start: 2018-03-22 | End: 2018-03-28 | Stop reason: HOSPADM

## 2018-03-22 RX ADMIN — Medication 10 ML: at 19:29

## 2018-03-22 RX ADMIN — PINDOLOL 2.5 MG: 5 TABLET ORAL at 21:00

## 2018-03-22 RX ADMIN — Medication 10 ML: at 21:01

## 2018-03-22 RX ADMIN — SODIUM CHLORIDE 1000 ML: 900 INJECTION, SOLUTION INTRAVENOUS at 13:58

## 2018-03-22 RX ADMIN — CARBIDOPA AND LEVODOPA 2 TABLET: 25; 100 TABLET ORAL at 19:35

## 2018-03-22 RX ADMIN — ENOXAPARIN SODIUM 40 MG: 40 INJECTION SUBCUTANEOUS at 19:29

## 2018-03-22 RX ADMIN — CARBIDOPA AND LEVODOPA 2 TABLET: 25; 100 TABLET ORAL at 23:46

## 2018-03-22 RX ADMIN — QUETIAPINE 25 MG: 25 TABLET ORAL at 21:00

## 2018-03-22 RX ADMIN — CEFTRIAXONE 1 G: 1 INJECTION, POWDER, FOR SOLUTION INTRAMUSCULAR; INTRAVENOUS at 17:41

## 2018-03-22 NOTE — ED PROVIDER NOTES
HPI       80y M with hx of a-fib, parkinson's, HTN here with hypotension. Was getting ready to do some PT and felt dizzy. BP checked and was in the 51'O systolic. EMS arrived and BP in the 60's. En route, BP came back up to the 965-223'W systolic. Pt reports feeling lightheaded and \"foggy\" when this was going on. Overall feels improved but not back to baseline. No focal numbness or weakness. No abdominal pain. No recent illnesses. No vomiting. No diarrhea. No rash. No chest pain. No trouble breathing. Past Medical History:   Diagnosis Date    Blurry vision 1/19/2012    BPH (benign prostatic hyperplasia)     DDD (degenerative disc disease), lumbar 2/19/2010    DJD (degenerative joint disease) of cervical spine 2/19/2010    DU (duodenal ulcer) 9/1/1990    Hyperlipidemia     Hypertension     Other and unspecified hyperlipidemia 2/19/2010    Pacemaker     Paralysis agitans ([address] 2/19/2010    Parkinson disease ([address]     PAT (paroxysmal atrial tachycardia) (HCC)     Premature atrial beats     Reflux esophagitis 2/19/2010    Sick sinus syndrome (HCC)     TIA (transient ischemic attack)        Past Surgical History:   Procedure Laterality Date    HX CATARACT REMOVAL  6/2012 and 7/2012    Both eyes    HX ORTHOPAEDIC  04/2015    tendon repair both knees    HX PACEMAKER  2008    bradycardia    HX VITRECTOMY  11/2011    UT REMVL PERM PM PLS GEN W/REPL PLSE GEN 2 LEAD SYS  3/13/2017              Family History:   Problem Relation Age of Onset    Asthma Mother     Heart Disease Father        Social History     Social History    Marital status:      Spouse name: N/A    Number of children: N/A    Years of education: N/A     Occupational History    Not on file.      Social History Main Topics    Smoking status: Former Smoker     Types: Pipe     Quit date: 7/14/1945    Smokeless tobacco: Never Used    Alcohol use No      Comment: glass of wine every now and then    Drug use: No    Sexual activity: Not on file     Other Topics Concern     Service Yes    Blood Transfusions No    Caffeine Concern No    Occupational Exposure No    Hobby Hazards No    Sleep Concern No    Stress Concern No    Weight Concern No    Special Diet No    Back Care No    Exercise No    Bike Helmet No    Seat Belt Yes    Self-Exams No     Social History Narrative         ALLERGIES: Review of patient's allergies indicates no known allergies. Review of Systems   Review of Systems   Constitutional: (-) weight loss. HEENT: (-) stiff neck   Eyes: (-) discharge. Respiratory: (-) for cough. Cardiovascular: (-) syncope. Gastrointestinal: (-) blood in stool. Genitourinary: (-) hematuria. Musculoskeletal: (-) myalgias. Neurological: (-) seizure. Skin: (-) petechiae  Lymph/Immunologic: (-) enlarged lymph nodes  All other systems reviewed and are negative. Vitals:    03/22/18 1206   BP: 146/90   Pulse: 87   Resp: 13   Temp: 97.6 °F (36.4 °C)   SpO2: 100%   Weight: 95.3 kg (210 lb)   Height: 6' 2\" (1.88 m)            Physical Exam Nursing note and vitals reviewed. Constitutional: oriented to person, place, and time. appears elderly and frail. No distress. Head: Normocephalic and atraumatic. Sclera anicteric  Nose: No rhinorrhea  Mouth/Throat: Oropharynx is clear and moist. Pharynx normal  Eyes: Conjunctivae are normal. Pupils are equal, round, and reactive to light. Right eye exhibits no discharge. Left eye exhibits no discharge. Neck: Painless normal range of motion. Neck supple. No LAD. Cardiovascular: Normal rate, regular rhythm, normal heart sounds and intact distal pulses. Exam reveals no gallop and no friction rub. No murmur heard. Pulmonary/Chest:  No respiratory distress. No wheezes. No rales. No rhonchi. No increased work of breathing. No accessory muscle use. Good air exchange throughout. Abdominal: soft, non-tender, no rebound or guarding. No hepatosplenomegaly.  Normal bowel sounds throughout. Back: no tenderness to palpation, no deformities, no CVA tenderness  Extremities/Musculoskeletal: Normal range of motion. no tenderness. No edema. Distal extremities are neurovasc intact. Lymphadenopathy:   No adenopathy. Neurological:  Alert and oriented to person, place, and time. Coordination normal. CN 2-12 intact. Motor and sensory function intact. Skin: Skin is warm and dry. No rash noted. No pallor. MDM 80y M here with hypotension that has now resolved. Unclear etiology. Will check labs here, give fluids. Anticipate admission.       ED Course       Procedures

## 2018-03-22 NOTE — ROUTINE PROCESS
TRANSFER - OUT REPORT:    Verbal report given to LIZABETH Dempsey(name) on Mustapha Lucas  being transferred to [facility](unit) for routine progression of care       Report consisted of patients Situation, Background, Assessment and   Recommendations(SBAR). Information from the following report(s) SBAR and Kardex was reviewed with the receiving nurse. Lines:   Peripheral IV 03/22/18 Right Antecubital (Active)   Site Assessment Clean, dry, & intact 3/22/2018 12:00 PM   Phlebitis Assessment 0 3/22/2018 12:00 PM   Infiltration Assessment 0 3/22/2018 12:00 PM   Dressing Status Clean, dry, & intact 3/22/2018 12:00 PM   Hub Color/Line Status Pink 3/22/2018 12:00 PM       Peripheral IV 03/22/18 Left Antecubital (Active)   Site Assessment Clean, dry, & intact 3/22/2018 11:40 AM   Phlebitis Assessment 0 3/22/2018 11:40 AM   Infiltration Assessment 0 3/22/2018 11:40 AM   Dressing Status Clean, dry, & intact 3/22/2018 11:40 AM   Hub Color/Line Status Pink 3/22/2018 11:40 AM        Opportunity for questions and clarification was provided.       Patient transported with:   [employer]

## 2018-03-22 NOTE — PROGRESS NOTES
Admission Medication Reconciliation:    Information obtained from: Medication list from [facility] ([facility])    Significant PMH/Disease States:   Past Medical History:   Diagnosis Date    Blurry vision 1/19/2012    BPH (benign prostatic hyperplasia)     DDD (degenerative disc disease), lumbar 2/19/2010    DJD (degenerative joint disease) of cervical spine 2/19/2010    DU (duodenal ulcer) 9/1/1990    Hyperlipidemia     Hypertension     Other and unspecified hyperlipidemia 2/19/2010    Pacemaker     Paralysis agitans ([facility] 2/19/2010    Parkinson disease ([facility]     PAT [facility]ox[facility]mal [facility]) ([facility])     Premature atrial beats     Reflux esophagitis 2/19/2010    Sick sinus syndrome ([facility])     TIA (transient ischemic attack)        Chief Complaint for this Admission:  Dizziness    Allergies:  Review of patient's allergies indicates no known allergies. Prior to Admission Medications:   Prior to Admission Medications   Prescriptions Last Dose Informant Patient Reported? Taking? DETROL LA 2 mg ER capsule 3/22/2018  No Yes   Sig: TAKE 1 CAPSULE DAILY FOR BLADDER FREQUENCY   QUEtiapine (SEROQUEL) 25 mg tablet 3/21/2018 at Unknown time  Yes Yes   Sig: Take 25 mg by mouth nightly. Saccharomyces boulardii (FLORASTOR) 250 mg capsule 3/22/2018  Yes Yes   Sig: Take 250 mg by mouth two (2) times a day. acetaminophen (MAPAP EXTRA STRENGTH) 500 mg tablet   No Yes   Sig: Take 1 Tab by mouth every six (6) hours as needed for Pain. albuterol (PROAIR HFA) 90 mcg/actuation inhaler   Yes Yes   Sig: Take 2 Puffs by inhalation every six (6) hours as needed for Wheezing. aspirin 81 mg chewable tablet 3/22/2018  Yes Yes   Sig: Take 81 mg by mouth daily. carbidopa-levodopa (SINEMET)  mg per tablet 3/22/2018  No Yes   Sig: Take 2 Tabs by mouth four (4) times daily. (This is given at 8:00, 11:00, 14:00, and 21:00).    cloNIDine HCl (CATAPRES) 0.1 mg tablet   No Yes   Sig: Take 1 Tab by mouth every six (6) hours as needed (SBP > 180 mmHg). cycloSPORINE (RESTASIS) 0.05 % ophthalmic emulsion 3/22/2018  No Yes   Sig: Administer 1 Drop to both eyes two (2) times a day. diclofenac (VOLTAREN) 1 % gel   Yes Yes   Sig: Apply  to affected area as needed. docusate sodium (COLACE) 100 mg capsule   No Yes   Sig: Take 1 Cap by mouth daily as needed for Constipation. escitalopram oxalate (LEXAPRO) 20 mg tablet 3/22/2018  No Yes   Sig: Take 1 Tab by mouth daily. lidocaine (LIDODERM) 5 % 3/22/2018  No Yes   Sig: Apply patch to the affected area for 12 hours a day and remove for 12 hours a day. losartan (COZAAR) 50 mg tablet 3/22/2018  No Yes   Sig: Take 1 Tab by mouth daily. nitrofurantoin (MACRODANTIN) 50 mg capsule 3/22/2018  Yes Yes   Sig: Take 50 mg by mouth daily. Indications: UTI PPX   omeprazole (PRILOSEC) 40 mg capsule 3/22/2018  Yes Yes   Sig: Take 40 mg by mouth daily. pindolol (VISKIN) 5 mg tablet 3/21/2018 at Unknown time  Yes Yes   Sig: Take 2.5 mg by mouth two (2) times a day. Take 1/2 tablet by mouth at 2pm and 1/2 tab by mouth at 9pm   polyethylene glycol (MIRALAX) 17 gram packet 3/22/2018  No Yes   Sig: Take 1 Packet by mouth daily. Indications: CONSTIPATION   rivastigmine (EXELON) 4.6 mg/24 hr patch 3/22/2018  No Yes   Si Patch by TransDERmal route daily. therapeutic multivitamin [facility]) tablet 3/22/2018  No Yes   Sig: Take 1 Tab by mouth daily. traMADol (ULTRAM) 50 mg tablet 3/22/2018  No Yes   Sig: Take 1 Tab by mouth every eight (8) hours as needed for Pain. Max Daily Amount: 150 mg. Facility-Administered Medications: None         Comments/Recommendations:   1. Changed aspirin BID to once daily  2. Changed Nexium to Prilosec  3. Removed Florinef  4.  Added nitrofurantoin, Florastore, Proair, quetiapine

## 2018-03-22 NOTE — TELEPHONE ENCOUNTER
Did urine due to confusion but it was negative per terrell. Patients confusion is much worse.  Can they do some and repeat urine per daughter request.

## 2018-03-22 NOTE — IP AVS SNAPSHOT
[address] 
[phone] Patient: Luz Higgins MRN: [id number] UQR:[birth year] You are allergic to the following No active allergies Recent Documentation Height Weight BMI Smoking Status 1.88 m 84.5 kg 23.92 kg/m2 Former Smoker Emergency Contacts  (Rel.) Home Phone Work Phone Mobile Phone Florentino Griffin  -- [phone] Florentino Griffin (Child) [phone] -- -- About your hospitalization You were admitted on:  March 22, 2018 You last received care in the:  [facility] You were discharged on:  March 28, 2018 Why you were hospitalized Your primary diagnosis was:  Not on File Your diagnoses also included:  Hypotension Providers Seen During Your Hospitalization Provider Specialty Primary office phone Margo Tate MD Emergency Medicine [phone] Monica Mane MD Internal Medicine [phone] Vikash Quach MD Internal Medicine [phone] Oc Pettit MD Internal Medicine [phone] Your Primary Care Physician (PCP) Primary Care Physician Office Phone Office Fax Walter Trevino [phone] Follow-up Information Follow up With Details Comments Contact Info Marletta Litten, MD Go on 4/18/2018 Hospitals in [place] f/u [facility] from [facility] on Wednesday 4/18/18 @ 2:00 p.m. [address] [facility] 
[phone] [address] 
[phone] My Medications STOP taking these medications   
 cloNIDine HCl 0.1 mg tablet Commonly known as:  CATAPRES  
   
  
 esomeprazole 40 mg capsule Commonly known as:  NexIUM  
   
  
  
TAKE these medications as instructed Instructions Each Dose to Equal  
 Morning Noon Evening Bedtime  
 acetaminophen 500 mg tablet Commonly known as:  MAPAP EXTRA STRENGTH Your last dose was: Your next dose is: Take 1 Tab by mouth every six (6) hours as needed for Pain. 500 mg  
    
   
   
   
  
 aspirin 81 mg chewable tablet Your last dose was: Your next dose is: Take 81 mg by mouth daily. 81 mg  
    
   
   
   
  
 carbidopa-levodopa  mg per tablet Commonly known as:  SINEMET Your last dose was: Your next dose is: Take 2 Tabs by mouth four (4) times daily. (This is given at 8:00, 11:00, 14:00, and 21:00). 2 Tab  
    
   
   
   
  
 cycloSPORINE 0.05 % ophthalmic emulsion Commonly known as:  RESTASIS Your last dose was: Your next dose is:    
   
   
 Administer 1 Drop to both eyes two (2) times a day. 1 Drop DETROL LA 2 mg ER capsule Generic drug:  tolterodine ER Your last dose was: Your next dose is: TAKE 1 CAPSULE DAILY FOR BLADDER FREQUENCY  
     
   
   
   
  
 docusate sodium 100 mg capsule Commonly known as:  Aloma Deaadali Your last dose was: Your next dose is: Take 1 Cap by mouth daily as needed for Constipation. 100 mg  
    
   
   
   
  
 escitalopram oxalate 20 mg tablet Commonly known as:  Linda Ruiz Your last dose was: Your next dose is: Take 1 Tab by mouth daily. 20 mg  
    
   
   
   
  
 FLORASTOR 250 mg capsule Generic drug:  Saccharomyces boulardii Your last dose was: Your next dose is: Take 250 mg by mouth two (2) times a day. 250 mg  
    
   
   
   
  
 lidocaine 5 % Commonly known as:  Kyleigh Amend Your last dose was: Your next dose is:    
   
   
 Apply patch to the affected area for 12 hours a day and remove for 12 hours a day. losartan 50 mg tablet Commonly known as:  COZAAR Your last dose was: Your next dose is: Take 1 Tab by mouth daily. 50 mg  
    
   
   
   
  
 nitrofurantoin 50 mg capsule Commonly known as:  MACRODANTIN Your last dose was: Your next dose is: Take 50 mg by mouth daily. Indications: UTI PPX 50 mg  
    
   
   
   
  
 omeprazole 40 mg capsule Commonly known as:  PRILOSEC Your last dose was: Your next dose is: Take 40 mg by mouth daily. 40 mg  
    
   
   
   
  
 pindolol 5 mg tablet Commonly known as:  VISKIN Your last dose was: Your next dose is: Take 2.5 mg by mouth two (2) times a day. Take 1/2 tablet by mouth at 2pm and 1/2 tab by mouth at 9pm  
 2.5 mg  
    
   
   
   
  
 polyethylene glycol 17 gram packet Commonly known as:  Risa Vance Your last dose was: Your next dose is: Take 1 Packet by mouth daily. Indications: CONSTIPATION  
 17 g PROAIR HFA 90 mcg/actuation inhaler Generic drug:  albuterol Your last dose was: Your next dose is: Take 2 Puffs by inhalation every six (6) hours as needed for Wheezing. 2 Puff QUEtiapine 25 mg tablet Commonly known as:  SEROquel Your last dose was: Your next dose is: Take 25 mg by mouth nightly. 25 mg  
    
   
   
   
  
 rivastigmine 4.6 mg/24 hr patch Commonly known as:  EXELON Your last dose was: Your next dose is:    
   
   
 1 Patch by TransDERmal route daily. 1 Patch  
    
   
   
   
  
 therapeutic multivitamin tablet Commonly known as:  [facility] Your last dose was: Your next dose is: Take 1 Tab by mouth daily. 1 Tab  
    
   
   
   
  
 traMADol 50 mg tablet Commonly known as:  ULTRAM  
   
Your last dose was: Your next dose is: Take 1 Tab by mouth every eight (8) hours as needed for Pain.  Max Daily Amount: 150 mg.  
 50 mg  
    
   
   
   
  
 VOLTAREN 1 % Gel Generic drug:  diclofenac Your last dose was: Your next dose is:    
   
   
 Apply  to affected area as needed. Discharge Instructions Discharge SNF/Rehab Instructions/LTAC  
 
 
PATIENT ID: Aga Brown MRN: [id number] YOB: 1933 DATE OF ADMISSION: 3/22/2018 12:04 PM   
DATE OF DISCHARGE: 3/28/2018 PRIMARY CARE PROVIDER: Gita Asif MD  
 
 
ATTENDING PHYSICIAN: Sue Crawford MD 
DISCHARGING PROVIDER: Sue Crawford MD    
To contact this individual call [phone] and ask the  to page. If unavailable ask to be transferred the Adult Hospitalist Department. CONSULTATIONS: IP CONSULT TO HOSPITALIST 
 
PROCEDURES/SURGERIES: * No surgery found * [address] COURSE: The patient is a pleasant 60-year-old  gentleman with past medical history significant for hypertension, parkinsonism syndrome, benign prostate hypertrophy, degenerative joint disease, hyperlipidemia, major depression, who presents to the hospital via ambulance for reports of low blood pressure.  He was apparently doing physical therapy at his facility when blood pressure was checked and it was in the 50O systolic.  He received IV fluids via EMS and his blood pressure came up to 202P to 070E systolic.   
  
Assessment & Plan:  
  
Dizziness due to orthostatic hypotension due to autonomic dysfunction 
suspect due to intravascular volume depletion in the setting of autonomic dysfunction. S/p NS IVF boluses Dizziness now resolved. Echo done was normal. Carotid dopplers normal. 
He has no focal deficits. No need obtaining head CT scan. Resolved  
  
Hypotension:  
Improved with NS IVF bolus. BP is now stable and has been labile and now elevated Continue losartan and pindolol 
  
HTN:  
BP has been labile due to autonomic dysfunction. Continue losartan and pindolol 
  
Parkinson disease: On sinemet Supportive care 
  
Anemia 
  
Abnormal Urinalysis with moderate leucocyte esterase: On rocephin. Urine Cx: Mixed skin samuel > 100,000. D/c [place] 
  
Left heel ulcer; This does not appear infected. Continue wound dressing consult 
  
Non ambulatory state: Start q 2 hourly turning 
  
Pressure ulcer of left heel, stage 2 
  PENDING TEST RESULTS:  
At the time of discharge the following test results are still pending: FOLLOW UP APPOINTMENTS:   
Follow-up Information Follow up With Details Comments Contact Info Reyes Rogers MD In 1 week  [address] [place] [address] 
[phone] ADDITIONAL CARE RECOMMENDATIONS:  
 
DIET: Regular Diet and Cardiac Diet TUBE FEEDING INSTRUCTIONS:  
 
OXYGEN / BiPAP SETTINGS:  
 
ACTIVITY: Activity as tolerated WOUND CARE:  
 
EQUIPMENT needed:  
 
 
DISCHARGE MEDICATIONS: 
 See Medication Reconciliation Form NOTIFY YOUR PHYSICIAN FOR ANY OF THE FOLLOWING:  
Fever over 101 degrees for 24 hours. Chest pain, shortness of breath, fever, chills, nausea, vomiting, diarrhea, change in mentation, falling, weakness, bleeding. Severe pain or pain not relieved by medications. Or, any other signs or symptoms that you may have questions about. DISPOSITION: 
  Home With: 
 OT  PT  HH  RN  
  
x SNF/Inpatient Rehab/LTAC Independent/assisted living Hospice Other:  
 
 
PATIENT CONDITION AT DISCHARGE:  
 
Functional status  
x Poor Deconditioned Independent Cognition  
x  Lucid Forgetful Dementia Catheters/lines (plus indication) Vance PICC   
 PEG   
x None Code status Full code   
x DNR PHYSICAL EXAMINATION AT DISCHARGE: 
 Refer to Progress Note CHRONIC MEDICAL DIAGNOSES: 
Problem List as of 3/28/2018  Date Reviewed: 3/28/2018 Codes Class Noted - Resolved Recurrent depression ([facility] [address] ICD-10-CM: F33.9 ICD-9-CM: 296.30  1/3/2018 - Present Altered mental status ICD-10-CM: R41.82 
ICD-9-CM: 780.97  2017 - Present Benign nodular prostatic hyperplasia with lower urinary tract symptoms- Dr. 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 
ICD-9-CM: V53.31  3/13/2017 - Present Overview Signed 3/13/2017  9:49 AM by Eric Russo MD  
  Generator change 3/13/2017 Pneumonia ICD-10-CM: J18.9 ICD-9-CM: 521  2017 - Present Aneurysm of iliac artery (HCC)- left 3.9 cm  CT scan- no change ICD-10-CM: I72.3 ICD-9-CM: 442.2  2016 - Present Grief- wife of 61 years  2015 ICD-10-CM: F43.20 ICD-9-CM: 309.0  2015 - Present Primary osteoarthritis of both knees ICD-10-CM: M17.0 ICD-9-CM: 715.16  2015 - Present Overactive bladder- Dr. Jony Esteves: L88.16 ICD-9-CM: 596.51  2015 - Present Rupture quadriceps tendon- bilat--2014- UNABLE TO WALK AFTER THAT. (Chronic) ICD-10-CM: T98.866F 
ICD-9-CM: 844.8  2014 - Present Mild cognitive impairment w/o memory loss ICD-10-CM: G31.84 ICD-9-CM: 331.83  2014 - Present Major depressive disorder, single episode ICD-10-CM: F32.9 ICD-9-CM: 296.20  2014 - Present Generalized anxiety disorder ICD-10-CM: F41.1 ICD-9-CM: 300.02  2014 - Present Orthostatic hypotension ICD-10-CM: I95.1 ICD-9-CM: 458.0  2013 - Present Blurry vision-chronic, no change- neg w/u ICD-10-CM: H53.8 ICD-9-CM: 368.8  2013 - Present NSVT (nonsustained ventricular tachycardia) (HCC) ICD-10-CM: I47.2 ICD-9-CM: 427.1  2012 - Present Overview Signed 7/20/2012  9:56 AM by Kayla Collier MD  
  2. 2.2012 PAT (paroxysmal atrial tachycardia) (HCC) ICD-10-CM: I47.1 ICD-9-CM: 427.0  1/19/2012 - Present Sick sinus syndrome-pacemaker-2008 ICD-10-CM: I49.5 ICD-9-CM: 427.81  10/28/2011 - Present Essential hypertension, difficult to control due to orthostatic hypotension ICD-10-CM: I10 
ICD-9-CM: 401.1  4/18/2011 - Present Parkinsonian syndrome- Dr. Zuleika To ICD-10-CM: G20 
ICD-9-CM: 332.0  4/8/2011 - Present Hyperlipemia ICD-10-CM: E78.5 ICD-9-CM: 272.4  2/19/2010 - Present Reflux esophagitis ICD-10-CM: K21.0 ICD-9-CM: 530.11  2/19/2010 - Present DDD (degenerative disc disease), lumbar ICD-10-CM: M51.36 
ICD-9-CM: 722.52  2/19/2010 - Present DJD (degenerative joint disease) of cervical spine ICD-10-CM: A54.305 ICD-9-CM: 721.0  2/19/2010 - Present Overview Signed 1/21/2018  9:01 AM by Jr Sheldon MD  
  Moderately severe in jan 2018 X ray Paralysis agitans ([address] ICD-10-CM: G20 
ICD-9-CM: 332.0  2/19/2010 - Present DU (duodenal ulcer) ICD-10-CM: K26.9 ICD-9-CM: 532.90  9/1/1990 - Present History of duodenal ulcer ICD-10-CM: Z87.19 ICD-9-CM: V12.79  9/1/1990 - Present RESOLVED: Hypotension ICD-10-CM: I95.9 ICD-9-CM: 458.9  3/22/2018 - 3/28/2018 RESOLVED: Dehydration ICD-10-CM: E86.0 ICD-9-CM: 276.51  11/12/2017 - 11/16/2017 RESOLVED: Agitation ICD-10-CM: R45.1 ICD-9-CM: 307.9  11/12/2017 - 11/16/2017 RESOLVED: Slurred speech ICD-10-CM: R47.81 ICD-9-CM: 784.59  6/26/2017 - 7/12/2017 RESOLVED: Altered mental status ICD-10-CM: R41.82 
ICD-9-CM: 780.97  6/26/2017 - 7/12/2017 RESOLVED: Influenza A ICD-10-CM: J10.1 ICD-9-CM: 487.1  3/19/2017 - 7/12/2017 RESOLVED: Sepsis ([facility] ICD-10-CM: A41.9 ICD-9-CM: 038.9, 995.91  6/10/2016 - 7/28/2016  RESOLVED: Catheter-associated urinary tract infection ([facility] ICD-10-CM: T83.511A, N39.0 ICD-9-CM: 996.64, 599.0  4/15/2016 - 4/18/2016 RESOLVED: Metabolic encephalopathy DOROTHY-27-XP: G93.41 
ICD-9-CM: 348.31  4/15/2016 - 4/18/2016 RESOLVED: GABI (acute kidney injury) ([facility] [address] ICD-10-CM: N17.9 ICD-9-CM: 584.9  4/15/2016 - 4/18/2016 RESOLVED: Hyponatremia ICD-10-CM: E87.1 ICD-9-CM: 276.1  4/15/2016 - 4/18/2016 RESOLVED: Mental status change ICD-10-CM: R41.82 
ICD-9-CM: 780.97  10/30/2013 - 1/5/2016 RESOLVED: Pneumonia, organism unspecified(486) ICD-10-CM: J18.9 ICD-9-CM: 115  2/19/2012 - 1/5/2016 RESOLVED: Weakness generalized ICD-10-CM: R53.1 ICD-9-CM: 780.79  2/19/2012 - 2/22/2012 RESOLVED: PAT (paroxysmal atrial tachycardia) (HCC) ICD-10-CM: I47.1 ICD-9-CM: 427.0  1/19/2012 - 2/29/2012 RESOLVED: Premature atrial beats ICD-10-CM: I49.1 ICD-9-CM: 427.61  Unknown - 2/29/2012 CDMP Checked:  
Yes x PROBLEM LIST Updated: 
Yes x Signed:  
Guero Wayne MD 
3/28/2018 12:16 PM 
 
  
 
Discharge Orders None ACO Transitions of Care Introducing [employer] [address] Yvonne Funes offers a voluntary care coordination program to provide high quality service and care to [facility] fee-for-service beneficiaries. Marlen Aguirre was designed to help you enhance your health and well-being through the following services: ? Transitions of Care  support for individuals who are transitioning from one care setting to another (example: Hospital to home). ? Chronic and Complex Care Coordination  support for individuals and caregivers of those with serious or chronic illnesses or with more than one chronic (ongoing) condition and those who take a number of different medications. If you meet specific medical criteria, a [address] [place] [address] Rd may call you directly to coordinate your care with your primary care physician and your other care providers. For questions about the [facility] programs, please, contact your physicians office. For general questions or additional information about Accountable Care Organizations: 
Please visit www.medicare.gov/acos. html or call 1-800-MEDICARE (0-572.834.3358) TTY users should call 5-359.801.1048. Introducing [facility] SERVICES! Dear Sd Mantilla: Thank you for requesting a [employer] account. Our records indicate that you already have an active [employer] account. You can access your account anytime at [email or web address] ([email or web address]). [email or web address]) Did you know that you can access your hospital and ER discharge instructions at any time in [employer]? You can also review all of your test results from your hospital stay or ER visit. Additional Information If you have questions, please visit the Frequently Asked Questions section of the [employer] website at [email or web address]. Remember, [employer] is NOT to be used for urgent needs. For medical emergencies, dial 911. Now available from your iPhone and Android! General Information Please provide this summary of care documentation to your next provider. Patient Signature:  ____________________________________________________________ Date:  ____________________________________________________________  
  
[facility] Provider Signature:  ____________________________________________________________ Date:  ____________________________________________________________

## 2018-03-22 NOTE — ED TRIAGE NOTES
Prior to starting physical therapy his BP 48/30 and pt stated that he felt dizzy but was A+O x's 4. Upon arrival EMS found BP 64/40 x's 2 and then en route pt was in the 150's Pt states that he feels light headed.

## 2018-03-22 NOTE — TELEPHONE ENCOUNTER
Patients daughter Augustus Jacob called in ref to her father having a negative urinalysis test done. She would like to speak to someone about it.    Best # [phone]

## 2018-03-22 NOTE — H&P
[address]   ACUTE CARE HISTORY AND PHYSICAL    Humera Sylvester  MR#: [id number]  : 1933  ACCOUNT #: [de-identified]   DATE OF SERVICE: 2018    PRIMARY CARE PHYSICIAN:  Dr. Johnny Davidson:  Dizziness. HISTORY OF PRESENT ILLNESS:  The patient is a pleasant 80-year-old  gentleman with past medical history significant for hypertension, parkinsonism syndrome, benign prostate hypertrophy, degenerative joint disease, hyperlipidemia, major depression, who presents to the hospital via ambulance for reports of low blood pressure. He was apparently doing physical therapy at his facility when blood pressure was checked and it was in the 87H systolic. He received IV fluids via EMS and his blood pressure came up to 717H to 182I systolic. At present, the patient denies any complaints. He said that he does not feel dizzy. He also reports that he was having increasing frequency of urination. Now, he feels that he is almost close to his baseline. He denies chest pain, nausea, vomiting, diarrhea, abdominal pains or blood in the stool. No rash. No chest pain. No trouble breathing. PAST MEDICAL HISTORY:  As mentioned in history of present illness. PAST SURGICAL HISTORY:  Includes cataract removal, tendon repair of both knees, pacemaker placement for sick sinus syndrome. FAMILY HISTORY:  Mother had asthma. Father had heart disease. SOCIAL HISTORY:  He is . He lives in an assisted living facility. Smoking:  He used to smoke in the past.  He quit in 80. Denies tobacco use. Denies alcohol use at present. REVIEW OF SYSTEMS:  As per history of present illness. Rest of review of systems has been negative. Pertinent positives as per HPI. PHYSICAL EXAMINATION:  VITAL SIGNS:  Show temperature 97.6, pulse rate 81, blood pressure 119/75, O2 sats 100% on room air.   GENERAL:  Reveals a pleasant 80-year-old  gentleman, looks stated age, lying in bed, was not in acute distress. HEENT:  Normocephalic, atraumatic. Pupils equal and react to light. Extraocular movements intact. Mouth:  Oral mucous membranes moist.  NECK:  Supple, no jugular venous distention. CHEST:  Clear to auscultation with normal respiratory effort. CARDIAC:  Regular rate and rhythm. ABDOMEN:  Soft, bowel sounds positive. EXTREMITIES:  Have no edema. NEUROLOGIC:  He is alert, oriented to person, place, and time. Strength is grossly 5/5 in all 4 extremities. LABORATORY DATA:  Show white cells 4.7, hemoglobin is 10.4, hematocrit 33.2, platelets were 673. His urinalysis showed leukocyte esterase moderate, nitrites negative. His sodium was 140, potassium 4.6, BUN 23, creatinine 1.16, glucose was 67. Cardiac enzymes are negative. Initial EKG showed normal sinus rhythm, atrial paced rhythm. The patient had a chest x-ray, which was read as no acute cardiopulmonary process. ASSESSMENT:    1. The patient presenting with dizziness with hypotension, possibly orthostatic in nature on admission. He has an underlying history of Parkinsonism Suspect autonomic dysfunction. I don't suspect sepsis. 2.  Urinary tract infection on admission. 3.  Increased frequency of urination, probably secondary to UTI, with Underlying bladder hyper reactivity. 4.  History of hyperlipidemia, degenerative joint disease, parkinsonism, benign prostate hypertrophy, which is all stable. PLAN:  The patient will be admitted to the telemetry unit. We will check orthostatics. His blood pressure improved with  IV fluids. Check echocardiogram to evaluate his left ventricular function. Check a carotid duplex scan also to rule out stenosis. Check TSH in Am. Follow up urine cultures. Start on Rocephin. Continue Home medications. Resume Bp medications if he gets hypertensive with holding parameters. HIS CODE STATUS IS FULL as per my discussion with him.   Anticipate he will stay in the hospital at least 2 midnights and likely discharge back to his home with outpatient followup. Plan discussed with the patient, nursing staff.       MD Ladi Fleming / Jaclyn Cobos  D: 03/22/2018 16:17     T: 03/22/2018 17:22  JOB #: [id number]

## 2018-03-23 LAB
ANION GAP SERPL CALC-SCNC: 7 MMOL/L (ref 5–15)
BASOPHILS # BLD: 0.1 K/UL (ref 0–0.1)
BASOPHILS NFR BLD: 1 % (ref 0–1)
BUN SERPL-MCNC: 21 MG/DL (ref 6–20)
BUN/CREAT SERPL: 25 (ref 12–20)
CALCIUM SERPL-MCNC: 8.3 MG/DL (ref 8.5–10.1)
CHLORIDE SERPL-SCNC: 107 MMOL/L (ref 97–108)
CO2 SERPL-SCNC: 25 MMOL/L (ref 21–32)
CORTIS AM PEAK SERPL-MCNC: 6 UG/DL (ref 4.3–22.4)
CREAT SERPL-MCNC: 0.83 MG/DL (ref 0.7–1.3)
DIFFERENTIAL METHOD BLD: ABNORMAL
EOSINOPHIL # BLD: 0.5 K/UL (ref 0–0.4)
EOSINOPHIL NFR BLD: 10 % (ref 0–7)
ERYTHROCYTE [DISTWIDTH] IN BLOOD BY AUTOMATED COUNT: 13.4 % (ref 11.5–14.5)
GLUCOSE SERPL-MCNC: 86 MG/DL (ref 65–100)
HCT VFR BLD AUTO: 31.8 % (ref 36.6–50.3)
HGB BLD-MCNC: 10.1 G/DL (ref 12.1–17)
IMM GRANULOCYTES # BLD: 0 K/UL (ref 0–0.04)
IMM GRANULOCYTES NFR BLD AUTO: 0 % (ref 0–0.5)
LYMPHOCYTES # BLD: 1.4 K/UL (ref 0.8–3.5)
LYMPHOCYTES NFR BLD: 28 % (ref 12–49)
MCH RBC QN AUTO: 28.7 PG (ref 26–34)
MCHC RBC AUTO-ENTMCNC: 31.8 G/DL (ref 30–36.5)
MCV RBC AUTO: 90.3 FL (ref 80–99)
MONOCYTES # BLD: 0.5 K/UL (ref 0–1)
MONOCYTES NFR BLD: 9 % (ref 5–13)
NEUTS SEG # BLD: 2.6 K/UL (ref 1.8–8)
NEUTS SEG NFR BLD: 51 % (ref 32–75)
NRBC # BLD: 0 K/UL (ref 0–0.01)
NRBC BLD-RTO: 0 PER 100 WBC
PLATELET # BLD AUTO: 167 K/UL (ref 150–400)
PMV BLD AUTO: 10.3 FL (ref 8.9–12.9)
POTASSIUM SERPL-SCNC: 3.9 MMOL/L (ref 3.5–5.1)
RBC # BLD AUTO: 3.52 M/UL (ref 4.1–5.7)
SODIUM SERPL-SCNC: 139 MMOL/L (ref 136–145)
TSH SERPL DL<=0.05 MIU/L-ACNC: 0.68 UIU/ML (ref 0.36–3.74)
WBC # BLD AUTO: 5.1 K/UL (ref 4.1–11.1)

## 2018-03-23 PROCEDURE — 74011250637 HC RX REV CODE- 250/637: Performed by: HOSPITALIST

## 2018-03-23 PROCEDURE — 85025 COMPLETE CBC W/AUTO DIFF WBC: CPT | Performed by: HOSPITALIST

## 2018-03-23 PROCEDURE — 80048 BASIC METABOLIC PNL TOTAL CA: CPT | Performed by: HOSPITALIST

## 2018-03-23 PROCEDURE — 93880 EXTRACRANIAL BILAT STUDY: CPT

## 2018-03-23 PROCEDURE — 74011000258 HC RX REV CODE- 258: Performed by: HOSPITALIST

## 2018-03-23 PROCEDURE — 36415 COLL VENOUS BLD VENIPUNCTURE: CPT | Performed by: HOSPITALIST

## 2018-03-23 PROCEDURE — 74011250636 HC RX REV CODE- 250/636: Performed by: HOSPITALIST

## 2018-03-23 PROCEDURE — 84443 ASSAY THYROID STIM HORMONE: CPT | Performed by: HOSPITALIST

## 2018-03-23 PROCEDURE — 82533 TOTAL CORTISOL: CPT | Performed by: HOSPITALIST

## 2018-03-23 PROCEDURE — 65660000000 HC RM CCU STEPDOWN

## 2018-03-23 PROCEDURE — 93306 TTE W/DOPPLER COMPLETE: CPT

## 2018-03-23 RX ORDER — HYDRALAZINE HYDROCHLORIDE 20 MG/ML
10 INJECTION INTRAMUSCULAR; INTRAVENOUS
Status: DISCONTINUED | OUTPATIENT
Start: 2018-03-23 | End: 2018-03-28 | Stop reason: HOSPADM

## 2018-03-23 RX ADMIN — CARBIDOPA AND LEVODOPA 2 TABLET: 25; 100 TABLET ORAL at 10:00

## 2018-03-23 RX ADMIN — ENOXAPARIN SODIUM 40 MG: 40 INJECTION SUBCUTANEOUS at 17:10

## 2018-03-23 RX ADMIN — PINDOLOL 2.5 MG: 5 TABLET ORAL at 14:17

## 2018-03-23 RX ADMIN — LOSARTAN POTASSIUM 50 MG: 50 TABLET ORAL at 10:00

## 2018-03-23 RX ADMIN — PANTOPRAZOLE SODIUM 40 MG: 40 TABLET, DELAYED RELEASE ORAL at 07:05

## 2018-03-23 RX ADMIN — Medication 10 ML: at 21:27

## 2018-03-23 RX ADMIN — OXYBUTYNIN CHLORIDE 5 MG: 5 TABLET, EXTENDED RELEASE ORAL at 09:59

## 2018-03-23 RX ADMIN — Medication 10 ML: at 07:05

## 2018-03-23 RX ADMIN — CARBIDOPA AND LEVODOPA 2 TABLET: 25; 100 TABLET ORAL at 14:17

## 2018-03-23 RX ADMIN — TRAMADOL HYDROCHLORIDE 50 MG: 50 TABLET, FILM COATED ORAL at 14:35

## 2018-03-23 RX ADMIN — CARBIDOPA AND LEVODOPA 2 TABLET: 25; 100 TABLET ORAL at 21:27

## 2018-03-23 RX ADMIN — ASPIRIN 81 MG 81 MG: 81 TABLET ORAL at 10:00

## 2018-03-23 RX ADMIN — QUETIAPINE 25 MG: 25 TABLET ORAL at 21:27

## 2018-03-23 RX ADMIN — PINDOLOL 2.5 MG: 5 TABLET ORAL at 21:27

## 2018-03-23 RX ADMIN — THERA TABS 1 TABLET: TAB at 10:00

## 2018-03-23 RX ADMIN — CARBIDOPA AND LEVODOPA 2 TABLET: 25; 100 TABLET ORAL at 17:10

## 2018-03-23 RX ADMIN — POLYETHYLENE GLYCOL 3350 17 G: 17 POWDER, FOR SOLUTION ORAL at 09:00

## 2018-03-23 RX ADMIN — HYDRALAZINE HYDROCHLORIDE 10 MG: 20 INJECTION INTRAMUSCULAR; INTRAVENOUS at 19:55

## 2018-03-23 RX ADMIN — Medication 10 ML: at 14:00

## 2018-03-23 RX ADMIN — CEFTRIAXONE 1 G: 1 INJECTION, POWDER, FOR SOLUTION INTRAMUSCULAR; INTRAVENOUS at 18:18

## 2018-03-23 RX ADMIN — ESCITALOPRAM OXALATE 20 MG: 10 TABLET ORAL at 10:00

## 2018-03-23 NOTE — PROCEDURES
[facility]  *** FINAL REPORT ***    Name: Sherry Tavera  MRN: [id number]    Inpatient  : 18 May 1933  HIS Order #: [id number]  [address] [facility] Visit #: [id number]  Date: 23 Mar 2018    TYPE OF TEST: Cerebrovascular Duplex    REASON FOR TEST  Carotid bruit    Right Carotid:-             Proximal               Mid                 Distal  cm/s  Systolic  Diastolic  Systolic  Diastolic  Systolic  Diastolic  CCA:     66.4      14.0                            70.0      16.0  Bulb:  ECA:     65.0  ICA:     39.0      20.0                            52.0      20.0  ICA/CCA:  0.6       1.3    ICA Stenosis:    Right Vertebral:-  Finding: Antegrade  Sys:       49.0  Martha:    Right Subclavian:    Left Carotid:-            Proximal                Mid                 Distal  cm/s  Systolic  Diastolic  Systolic  Diastolic  Systolic  Diastolic  CCA:     86.9      21.0                            67.0      19.0  Bulb:  ECA:     73.0  ICA:     62.0      19.0                            36.0      16.0  ICA/CCA:  0.9       1.0    ICA Stenosis:    Left Vertebral:-  Finding: Antegrade  Sys:       32.0  Martha:    Left Subclavian:    INTERPRETATION/FINDINGS  PROCEDURE:  Color duplex ultrasound imaging of extracranial  cerebrovascular arteries. FINDINGS:       Right:  Internal carotid velocity is within normal limits. There   is narrowing of the internal carotid flow channel on color Doppler  imaging and calcific plaque on B-mode imaging, consistent with less  than 50 percent stenosis. The common and external carotid arteries  are patent and without evidence of hemodynamically significant  stenosis. Left:  Internal carotid velocity is within normal limits. There  is narrowing of the internal carotid flow channel on color Doppler  imaging and mixed density plaque on B-mode imaging, consistent with  less than 50 percent stenosis.   The common and external carotid  arteries are patent and without evidence of hemodynamically  significant stenosis. IMPRESSION:  Consistent with less than 50% stenosis of the right  internal carotid and less than 50% stenosis (low end) of the left  internal carotid. Vertebrals are patent with antegrade flow. ADDITIONAL COMMENTS    I have personally reviewed the data relevant to the interpretation of  this  study.     TECHNOLOGIST: Criss Quiros RVT  Signed: 03/23/2018 06:17 PM    PHYSICIAN: Ilir Ruiz MD  Signed: 03/27/2018 09:17 AM

## 2018-03-23 NOTE — PROGRESS NOTES
Hospitalist Progress Note  Fuad Wellington MD  Answering service: [phone] from in house phone  Cell: [phone]      Date of Service:  3/23/2018  NAME:  Gelacio Yates  :  1933  MRN:  [id number]      Admission Summary:     The patient is a pleasant 80-year-old  gentleman with past medical history significant for hypertension, parkinsonism syndrome, benign prostate hypertrophy, degenerative joint disease, hyperlipidemia, major depression, who presents to the hospital via ambulance for reports of low blood pressure. He was apparently doing physical therapy at his facility when blood pressure was checked and it was in the 37V systolic. He received IV fluids via EMS and his blood pressure came up to 415A to 049S systolic. Interval history / Subjective:      F/u for dizziness and hypotension. No acute overnight event. He feels much better. No dizziness he says. He denies chest pain or SOB. Patient is wheel chair bound. He denies fever or chills     Assessment & Plan:     Dizziness due to orthostatic hypotension suspect due to intravascular volume depletion in the setting of autonomic dysfunction. S/p NS IVF boluses  Dizziness now resolved. Echo done was normal. Carotid dopplers ordered; follow up with result  He has no focal deficits    Hypotension: Improved with NS IVF bolus. BP is now stable and has been labile and now elevated  Continue losartan and pindolol  Monitor BP c    Parkinson disease: On sinemet    Anemia    Abnormal Urinalysis with moderate leucocyte esterase: On rocephin. Continue for now and follow up with Urine cx. Left heel ulcer; This does not appear infected.   Wound consult    Non ambulatory state: Start q 2 hourly turning    Code status: Full  DVT prophylaxis: Lovenox    Care Plan discussed with: Patient/Family and Nurse  Disposition: Home w/Family and TBD     Hospital Problems  Date Reviewed: 2/23/2018          Codes Class Noted POA    Hypotension ICD-10-CM: I95.9  ICD-9-CM: 458.9  3/22/2018 Unknown                Review of Systems:   Pertinent items are noted in HPI. Vital Signs:    Last 24hrs VS reviewed since prior progress note. Most recent are:  Visit Vitals    /81 (BP 1 Location: Left arm, BP Patient Position: At rest)    Pulse 86    Temp 98 °F (36.7 °C)    Resp 18    Ht 6' 2\" (1.88 m)    Wt 85.3 kg (188 lb 0.8 oz)    SpO2 99%    BMI 24.14 kg/m2         Intake/Output Summary (Last 24 hours) at 03/23/18 1059  Last data filed at 03/23/18 0707   Gross per 24 hour   Intake              530 ml   Output              400 ml   Net              130 ml        Physical Examination:             Constitutional:  No acute distress, cooperative, pleasant    ENT:  Oral mucous moist, oropharynx benign. Neck supple,    Resp:  CTA bilaterally. No wheezing/rhonchi/rales. No accessory muscle use   CV:  Regular rhythm, normal rate, no murmurs, gallops, rubs    GI:  Soft, non distended, non tender. normoactive bowel sounds, no hepatosplenomegaly     Musculoskeletal:  No edema, warm, 2+ pulses throughout    Neurologic:  Moves all extremities- bilateral upper and lower extremity contractures AAOx3, CN II-XII reviewed                Skin: Left heel ulcer- sero sanguinous discharge. Not foul smelling. No surrounding erythema       Data Review:          Labs:     Recent Labs      03/23/18   0409  03/22/18   1219   WBC  5.1  4.7   HGB  10.1*  10.4*   HCT  31.8*  33.2*   PLT  167  197     Recent Labs      03/23/18   0409  03/22/18   1219   NA  139  140   K  3.9  4.6   CL  107  106   CO2  25  29   BUN  21*  26*   CREA  0.83  1.16   GLU  86  67   CA  8.3*  8.6     Recent Labs      03/22/18   1219   SGOT  17   ALT  9*   AP  69   TBILI  0.6   TP  6.9   ALB  3.4*   GLOB  3.5     No results for input(s): INR, PTP, APTT in the last 72 hours.     No lab exists for component: INREXT   No results for input(s): FE, TIBC, PSAT, FERR in the last 72 hours. Lab Results   Component Value Date/Time    Folate 32.6 (H) 10/30/2013 10:30 AM      No results for input(s): PH, PCO2, PO2 in the last 72 hours.   Recent Labs      03/22/18   1219   TROIQ  <0.04     Lab Results   Component Value Date/Time    Cholesterol, total 122 06/27/2017 02:25 AM    HDL Cholesterol 47 06/27/2017 02:25 AM    LDL, calculated 65.4 06/27/2017 02:25 AM    Triglyceride 48 06/27/2017 02:25 AM    CHOL/HDL Ratio 2.6 06/27/2017 02:25 AM     Lab Results   Component Value Date/Time    Glucose (POC) 85 03/22/2018 05:44 PM    Glucose (POC) 70 03/22/2018 12:07 PM    Glucose (POC) 96 11/15/2017 03:59 PM    Glucose (POC) 102 (H) 06/26/2017 07:02 PM    Glucose (POC) 97 08/23/2016 02:04 PM     Lab Results   Component Value Date/Time    Color YELLOW/STRAW 03/22/2018 12:50 PM    Appearance CLEAR 03/22/2018 12:50 PM    Specific gravity 1.019 03/22/2018 12:50 PM    pH (UA) 6.5 03/22/2018 12:50 PM    Protein NEGATIVE  03/22/2018 12:50 PM    Glucose NEGATIVE  03/22/2018 12:50 PM    Ketone NEGATIVE  03/22/2018 12:50 PM    Bilirubin NEGATIVE  03/22/2018 12:50 PM    Urobilinogen 1.0 03/22/2018 12:50 PM    Nitrites NEGATIVE  03/22/2018 12:50 PM    Leukocyte Esterase MODERATE (A) 03/22/2018 12:50 PM    Epithelial cells FEW 03/22/2018 12:50 PM    Bacteria NEGATIVE  03/22/2018 12:50 PM    WBC 5-10 03/22/2018 12:50 PM    RBC 0-5 03/22/2018 12:50 PM         Medications Reviewed:     Current Facility-Administered Medications   Medication Dose Route Frequency    sodium chloride (NS) flush 5-10 mL  5-10 mL IntraVENous Q8H    sodium chloride (NS) flush 5-10 mL  5-10 mL IntraVENous PRN    enoxaparin (LOVENOX) injection 40 mg  40 mg SubCUTAneous Q24H    acetaminophen (TYLENOL) solution 650 mg  650 mg Oral Q4H PRN    carbidopa-levodopa (SINEMET)  mg per tablet 2 Tab  2 Tab Oral QID    polyethylene glycol (MIRALAX) packet 17 g  17 g Oral DAILY    therapeutic multivitamin [facility]) tablet 1 Tab  1 Tab Oral DAILY    docusate sodium (COLACE) capsule 100 mg  100 mg Oral DAILY PRN    . PHARMACY TO SUBSTITUTE PER PROTOCOL    Per Protocol    escitalopram oxalate (LEXAPRO) tablet 20 mg  20 mg Oral DAILY    rivastigmine (EXELON) 4.6 mg/24 hr patch 1 Patch  1 Patch TransDERmal DAILY    pindolol (VISKIN) tablet 2.5 mg  2.5 mg Oral BID    oxybutynin chloride XL (DITROPAN XL) tablet 5 mg  5 mg Oral DAILY    aspirin chewable tablet 81 mg  81 mg Oral DAILY    lidocaine (LIDODERM) 5 % patch 1 Patch  1 Patch TransDERmal Q24H    traMADol (ULTRAM) tablet 50 mg  50 mg Oral Q6H PRN    pantoprazole (PROTONIX) tablet 40 mg  40 mg Oral ACB    albuterol (PROVENTIL VENTOLIN) nebulizer solution 2.5 mg  2.5 mg Nebulization Q6H PRN    QUEtiapine (SEROquel) tablet 25 mg  25 mg Oral QHS    cefTRIAXone (ROCEPHIN) 1 g in 0.9% sodium chloride (MBP/ADV) 50 mL  1 g IntraVENous Q24H    cloNIDine HCl (CATAPRES) tablet 0.1 mg  0.1 mg Oral Q12H PRN    losartan (COZAAR) tablet 50 mg  50 mg Oral DAILY     ______________________________________________________________________  EXPECTED LENGTH OF STAY: - - -  ACTUAL LENGTH OF STAY:          1                 Denice Prater MD

## 2018-03-23 NOTE — PROGRESS NOTES
1800: TRANSFER - IN REPORT:  Verbal report received from LIZABETH Gonzales(name) on Candice Ponce  being received from ED(unit) for routine progression of care    Report consisted of patients Situation, Background, Assessment and   Recommendations(SBAR). Information from the following report(s) SBAR, Kardex, ED Summary, MAR, Accordion, Recent Results, Med Rec Status and Cardiac Rhythm paced was reviewed with the receiving nurse  Opportunity for questions and clarification was provided. Assessment completed upon patients arrival to unit and care assumed. 1902: Patient arrived on unit. Tele-monitor placed and confirmed with monitor tech. 1915: Condom cath placed on pt d/t incontinence. 1930: Bedside shift change report given to LIZABETH Irene (oncoming nurse) by Carmina Baumgarten, RN (offgoing nurse). Report included the following information SBAR, Kardex, ED Summary, Intake/Output, MAR, Accordion, Recent Results, Med Rec Status and Cardiac Rhythm paced.

## 2018-03-23 NOTE — CDMP QUERY
Nitin Jordan,     There is documentation of  dizziness due to orthostatic hypotension suspect due to intravascular volume depletion in the setting of autonomic dysfunction in the medical record, could this be further specified as;    => Neurogenic orthostatic hypotension in the setting of autonomic dysfunction requiring monitoring, IVF boluses and diagnostic testing  => Other explanation of clinical findings  => Clinically Undetermined (no explanation for clinical findings)    The medical record reflects the following clinical findings, treatment, and risk factors. Risk Factors:  79 y/o pt with hx of Parkinson's disease  Clinical Indicators:  dizziness  Treatment: Echo, Carotid dopplers, IVF boluses, orthostatic BP monitoring    Please clarify and document your clinical opinion in the progress notes and discharge summary including the definitive and/or presumptive diagnosis, (suspected or probable), related to the above clinical findings. Please include clinical findings supporting your diagnosis.     Thank you,  To Mondragon  [facility]  [phone]

## 2018-03-23 NOTE — PROGRESS NOTES
Reason for Admission:      Dizziness       RRAT Score:     19  Ask Me Three:         What is my (patient's) main problem? BP control   What is it that I (patient) need to do? Medication managed by [facility]  Why is it important for me (patient) to do this? [facility]  Do you (patient/family) have any concerns for transition/discharge?    none  Plan for utilizing home health:     [facility] PT/OT vs SNF  Likelihood of readmission?       low      CM met with patient - admitted for dizziness and BP control. Patient has dx of Parkinson's Dz.  Lives at [facility] [address] [phone]. Patient was able to transfer self with assist of 1 at facility- [id number] [facility] staff stated their staff would need to come and evaluate patient prior to return to facility - informed nursing staff of need for PT/?OT if SNF is needed and family in agreement. Pateint does not have an AMD on file - patient has support of his daughter, Deya Albert who helps handle all of his affairs. Patient uses w/c Shari Lundberg at facility for all of his follow-up doctors appointments. Care Management Interventions  PCP Verified by CM:  Yes  Transition of Care Consult (CM Consult): Assisted Living  MyChart Signup: Yes  Discharge Durable Medical Equipment: No (uses a w/c)  Physical Therapy Consult: No  Occupational Therapy Consult: No  Speech Therapy Consult: No  Current Support Network: Assisted Living  Confirm Follow Up Transport:  (w/c van vs stretcher)  Plan discussed with Pt/Family/Caregiver: Yes  Discharge Location  Discharge Placement: Home with home health ([facility] rehab)

## 2018-03-23 NOTE — PROGRESS NOTES
1930: Bedside shift change report given to [address] [place] [address] Nannette (oncoming nurse) by Crow Gavin (offgoing nurse). Report included the following information SBAR, Intake/Output, MAR, Med Rec Status and Cardiac Rhythm Paced. 0700: Pt had periodic confusion overnight. Pt washed with CHG and linens changed this AM.     0730: Bedside shift change report given to Pat Davey (oncoming nurse) by [address] [place] [address] Nannette (offgoing nurse). Report included the following information SBAR, Intake/Output, MAR, Med Rec Status and Cardiac Rhythm Paced. Problem: Pressure Injury - Risk of  Goal: *Prevention of pressure ulcer  Outcome: Progressing Towards Goal   03/22/18 2013   Wound Prevention and Protection Methods   Orientation of Wound Prevention Posterior   Location of Wound Prevention Sacrum/Coccyx   Dressing Present  No   Read Only, Retired: Wound Treatment (non-mechanical)   Wound Offloading (Prevention Methods) Bed, pressure redistribution/air;Blankets;Pillows;Repositioning;Turning       Problem: Falls - Risk of  Goal: *Absence of Falls  Document Oksana Fall Risk and appropriate interventions in the flowsheet.    Outcome: Progressing Towards Goal  Fall Risk Interventions:       Mentation Interventions: Door open when patient unattended, Familiar objects from home, Reorient patient, Room close to nurse's station, Toileting rounds         Elimination Interventions: Call light in reach, Patient to call for help with toileting needs, Toileting schedule/hourly rounds

## 2018-03-24 LAB
BACTERIA SPEC CULT: NORMAL
CC UR VC: NORMAL
SERVICE CMNT-IMP: NORMAL

## 2018-03-24 PROCEDURE — G8978 MOBILITY CURRENT STATUS: HCPCS

## 2018-03-24 PROCEDURE — 74011250636 HC RX REV CODE- 250/636: Performed by: HOSPITALIST

## 2018-03-24 PROCEDURE — 77030020186 HC BOOT HL PROTCT SAGE -B

## 2018-03-24 PROCEDURE — 74011250637 HC RX REV CODE- 250/637: Performed by: HOSPITALIST

## 2018-03-24 PROCEDURE — 65270000032 HC RM SEMIPRIVATE

## 2018-03-24 PROCEDURE — 97161 PT EVAL LOW COMPLEX 20 MIN: CPT

## 2018-03-24 PROCEDURE — 97530 THERAPEUTIC ACTIVITIES: CPT

## 2018-03-24 PROCEDURE — G8979 MOBILITY GOAL STATUS: HCPCS

## 2018-03-24 RX ADMIN — ESCITALOPRAM OXALATE 20 MG: 10 TABLET ORAL at 09:10

## 2018-03-24 RX ADMIN — PINDOLOL 2.5 MG: 5 TABLET ORAL at 13:14

## 2018-03-24 RX ADMIN — PANTOPRAZOLE SODIUM 40 MG: 40 TABLET, DELAYED RELEASE ORAL at 07:02

## 2018-03-24 RX ADMIN — CARBIDOPA AND LEVODOPA 2 TABLET: 25; 100 TABLET ORAL at 09:10

## 2018-03-24 RX ADMIN — CARBIDOPA AND LEVODOPA 2 TABLET: 25; 100 TABLET ORAL at 22:49

## 2018-03-24 RX ADMIN — THERA TABS 1 TABLET: TAB at 09:10

## 2018-03-24 RX ADMIN — PINDOLOL 2.5 MG: 5 TABLET ORAL at 22:49

## 2018-03-24 RX ADMIN — LOSARTAN POTASSIUM 50 MG: 50 TABLET ORAL at 09:10

## 2018-03-24 RX ADMIN — OXYBUTYNIN CHLORIDE 5 MG: 5 TABLET, EXTENDED RELEASE ORAL at 09:10

## 2018-03-24 RX ADMIN — ASPIRIN 81 MG 81 MG: 81 TABLET ORAL at 09:11

## 2018-03-24 RX ADMIN — Medication 10 ML: at 22:51

## 2018-03-24 RX ADMIN — CARBIDOPA AND LEVODOPA 2 TABLET: 25; 100 TABLET ORAL at 13:13

## 2018-03-24 RX ADMIN — CARBIDOPA AND LEVODOPA 2 TABLET: 25; 100 TABLET ORAL at 18:41

## 2018-03-24 RX ADMIN — Medication 10 ML: at 18:41

## 2018-03-24 RX ADMIN — ENOXAPARIN SODIUM 40 MG: 40 INJECTION SUBCUTANEOUS at 18:40

## 2018-03-24 RX ADMIN — QUETIAPINE 25 MG: 25 TABLET ORAL at 22:50

## 2018-03-24 NOTE — PROGRESS NOTES
Problem: Mobility Impaired (Adult and Pediatric)  Goal: *Acute Goals and Plan of Care (Insert Text)  Physical Therapy Goals  Initiated 3/24/2018  1. Patient will move from supine to sit and sit to supine  in bed with moderate assistance  within 7 day(s). 2.  Patient will transfer from bed to chair and chair to bed with maximal assistance using the least restrictive device within 7 day(s). 3.  Patient will perform sit to stand with maximal assistance within 7 day(s). physical Therapy EVALUATION  Patient: Shree Chaney (77 y.o. male)  Date: 3/24/2018  Primary Diagnosis: Hypotension        Precautions: fall       ASSESSMENT :  Based on the objective data described below, the patient presents with decreased ROM LE's which is baseline per his report. Patient with knees flexed to about 90 degrees from full extension. Per patient he has not walked in 2 years but transfers to w/c with assist and sometimes by himself. Today patient needed max assist of 2 for supine to sit to supine. Because of his knees he was not able to come to full stand but was able to perform a squat pivot transfer to a chair with 2 max assists. Once in the chair he was leaning back heavily scooting himself to the edge of the chair making sitting not safe today. Patient assisted back to bed with 2 total assists. He attempted helping but instead was pushing against therapists efforts making the transfer more difficult. If assisted living facility is able to give the patient the assist he needs which presently is 2 people for all mobility he could return vs SNF for rehab before returning to assisted living. Patient will benefit from skilled intervention to address the above impairments.   Patients rehabilitation potential is considered to be Guarded  Factors which may influence rehabilitation potential include:   []         None noted  [x]         Mental ability/status  [x]         Medical condition  []         Home/family situation and support systems  []         Safety awareness  []         Pain tolerance/management  []         Other:      PLAN :  Recommendations and Planned Interventions:  []           Bed Mobility Training             []    Neuromuscular Re-Education  []           Transfer Training                   []    Orthotic/Prosthetic Training  []           Gait Training                         []    Modalities  [x]           Therapeutic Exercises           []    Edema Management/Control  [x]           Therapeutic Activities            []    Patient and Family Training/Education  []           Other (comment):    Frequency/Duration: Patient will be followed by physical therapy  3 times a week to address goals. Discharge Recommendations: [address] and To Be Determined  Further Equipment Recommendations for Discharge: will continue to assess     SUBJECTIVE:   Patient stated I don't want to fall.     OBJECTIVE DATA SUMMARY:   HISTORY:    Past Medical History:   Diagnosis Date    Blurry vision 1/19/2012    BPH (benign prostatic hyperplasia)     DDD (degenerative disc disease), lumbar 2/19/2010    DJD (degenerative joint disease) of cervical spine 2/19/2010    DU (duodenal ulcer) 9/1/1990    Hyperlipidemia     Hypertension     Other and unspecified hyperlipidemia 2/19/2010    Pacemaker     Paralysis agitans ([address] 2/19/2010    Parkinson disease ([address]     [facility]     Premature atrial beats     Reflux esophagitis 2/19/2010    Sick sinus syndrome ([address]     TIA (transient ischemic attack)      Past Surgical History:   Procedure Laterality Date    HX CATARACT REMOVAL  6/2012 and 7/2012    Both eyes    HX ORTHOPAEDIC  04/2015    tendon repair both knees    HX PACEMAKER  2008    bradycardia    HX VITRECTOMY  11/2011    RI REMVL PERM PM PLS GEN W/REPL PLSE GEN 2 LEAD SYS  3/13/2017          Prior Level of Function/Home Situation: transfers to chair with assist  Personal factors and/or comorbidities impacting plan of care:     Home Situation  Home Environment: Assisted living  One/Two Story Residence: One story  Living Alone: No  Support Systems: Assisted living  Patient Expects to be Discharged to[de-identified] Unknown  Current DME Used/Available at Home: Wheelchair    EXAMINATION/PRESENTATION/DECISION MAKING:   Critical Behavior:  Neurologic State: Alert  Orientation Level: Oriented to person, Oriented to place  Cognition: Decreased command following, Poor safety awareness    Range Of Motion:  AROM: Grossly decreased, non-functional (LE's, UE's decreased but functional)         Strength:    Strength: Grossly decreased, non-functional (LE's, generally decreased but functional UE's)       Coordination:  Coordination: Generally decreased, functional  Functional Mobility:  Bed Mobility:   Supine to Sit: Maximum assistance;Assist x2  Sit to Supine: Maximum assistance;Assist x2   Transfers:  Sit to Stand: Maximum assistance;Assist x2  Stand to Sit: Maximum assistance;Assist x2      Bed to Chair: Maximum assistance;Assist x2 (total assist x2 chair to bed) Squat pivot transfer both ways      Balance:   Sitting: Impaired; With support  Sitting - Static: Fair (occasional)  Sitting - Dynamic: Fair (occasional)  Standing: Impaired  Standing - Static: Constant support;Poor  Standing - Dynamic : Poor        Functional Measure:  Tinetti test:    Sitting Balance: 0  Arises: 0  Attempts to Rise: 0  Immediate Standing Balance: 0  Standing Balance: 0  Nudged: 0  Eyes Closed: 0  Turn 360 Degrees - Continuous/Discontinuous: 0  Turn 360 Degrees - Steady/Unsteady: 0  Sitting Down: 0  Balance Score: 0  Indication of Gait: 0  R Step Length/Height: 0  L Step Length/Height: 0  R Foot Clearance: 0  L Foot Clearance: 0  Step Symmetry: 0  Step Continuity: 0  Path: 0  Trunk: 0  Walking Time: 0  Gait Score: 0  Total Score: 0       Tinetti Test and G-code impairment scale:  Percentage of Impairment CH    0% CI    1-19% CJ    20-39% CK    40-59% CL    60-79% CM    80-99% CN     100%   Tinetti  Score 0-28 28 23-27 17-22 12-16 6-11 1-5 0       Tinetti Tool Score Risk of Falls  <19 = High Fall Risk  19-24 = Moderate Fall Risk  25-28 = Low Fall Risk  Tinetti ME. Performance-Oriented Assessment of Mobility Problems in Elderly Patients. [facility]; [id number]. (Scoring Description: PT Bulletin Feb. 10, 1993)    Older adults: Minerva Garcia et al, 2009; n = 1000 [facility] elderly evaluated with ABC, MIKO, ADL, and IADL)  · Mean MIKO score for males aged 69-68 years = 26.21(3.40)  · Mean MIKO score for females age 69-68 years = 25.16(4.30)  · Mean MIKO score for males over 80 years = 23.29(6.02)  · Mean MIKO score for females over 80 years = 17.20(8.32)       G codes: In compliance with CMSs Claims Based Outcome Reporting, the following G-code set was chosen for this patient based on their primary functional limitation being treated: The outcome measure chosen to determine the severity of the functional limitation was the tinetti with a score of 0/28 which was correlated with the impairment scale. ? Mobility - Walking and Moving Around:     - CURRENT STATUS: CN - 100% impaired, limited or restricted    - GOAL STATUS: CM - 80%-99% impaired, limited or restricted    - D/C STATUS:  ---------------To be determined---------------          Pain:  Pain Scale 1: Numeric (0 - 10)  Pain Intensity 1: 0      Activity Tolerance:     Please refer to the flowsheet for vital signs taken during this treatment. After treatment:   []         Patient left in no apparent distress sitting up in chair  [x]         Patient left in no apparent distress in bed  [x]         Call bell left within reach  [x]         Nursing notified  []         Caregiver present  []         Bed alarm activated    COMMUNICATION/EDUCATION:   The patients plan of care was discussed with: Registered Nurse.   [x]         Fall prevention education was provided and the patient/caregiver indicated understanding. []         Patient/family have participated as able in goal setting and plan of care. [x]         Patient/family agree to work toward stated goals and plan of care. []         Patient understands intent and goals of therapy, but is neutral about his/her participation. []         Patient is unable to participate in goal setting and plan of care.     Thank you for this referral.  Antione AbdallaSt. [facility] Psychiatric [facility], PT   Time Calculation: 23 mins

## 2018-03-24 NOTE — PROGRESS NOTES
1200: I have read and agree with the rn orientee's assessment and documentation.     Kimberli Oswald, RN

## 2018-03-24 NOTE — PROGRESS NOTES
1930: Bedside shift change report given to Christopher Starkey (oncoming nurse) by Nestor Belle (offgoing nurse). Report included the following information SBAR, Intake/Output, MAR, Med Rec Status and Cardiac Rhythm Paced. 0600: Pt periodically confused throughout the night and pulled off telemetry stickers multiple times. Pt pulled out both IV lines at 0500.     0730: Bedside shift change report given to Anthony yDe (oncoming nurse) by Christopher Starkey (offgoing nurse). Report included the following information SBAR, Intake/Output, MAR, Med Rec Status and Cardiac Rhythm Paced. Problem: Pressure Injury - Risk of  Goal: *Prevention of pressure ulcer  Outcome: Progressing Towards Goal   03/23/18 2038   Wound Prevention and Protection Methods   Orientation of Wound Prevention Posterior   Location of Wound Prevention Sacrum/Coccyx   Dressing Present  No   Read Only, Retired: Wound Treatment (non-mechanical)   Wound Offloading (Prevention Methods) Bed, pressure redistribution/air;Blankets;Pillows;Repositioning;Turning       Problem: Falls - Risk of  Goal: *Absence of Falls  Document Oksana Fall Risk and appropriate interventions in the flowsheet.    Outcome: Progressing Towards Goal  Fall Risk Interventions:  Mobility Interventions: Communicate number of staff needed for ambulation/transfer, OT consult for ADLs, Patient to call before getting OOB, PT Consult for mobility concerns    Mentation Interventions: Adequate sleep, hydration, pain control, Door open when patient unattended, Room close to nurse's station, Update white board    Medication Interventions: Evaluate medications/consider consulting pharmacy, Patient to call before getting OOB, Teach patient to arise slowly    Elimination Interventions: Call light in reach, Patient to call for help with toileting needs, Toileting schedule/hourly rounds

## 2018-03-24 NOTE — PROGRESS NOTES
Bedside and Verbal shift change report given to LIZABETH Irene (oncoming nurse) by Rehana Blackburn RN (offgoing nurse). Report included the following information SBAR, Kardex, ED Summary, OR Summary, Procedure Summary, Intake/Output, MAR, Accordion and Cardiac Rhythm Paced.

## 2018-03-24 NOTE — PROGRESS NOTES
Hospitalist Progress Note  Leroy Munoz MD  Answering service: [phone] from in house phone  Cell: [phone]      Date of Service:  3/24/2018  NAME:  Ashtyn Francis  :  1933  MRN:  [id number]      Admission Summary:     The patient is a pleasant 22-year-old  gentleman with past medical history significant for hypertension, parkinsonism syndrome, benign prostate hypertrophy, degenerative joint disease, hyperlipidemia, major depression, who presents to the hospital via ambulance for reports of low blood pressure. He was apparently doing physical therapy at his facility when blood pressure was checked and it was in the 49Q systolic. He received IV fluids via EMS and his blood pressure came up to 529O to 436V systolic. Interval history / Subjective:      F/u for dizziness and hypotension. No acute overnight event. No new complaints     Assessment & Plan:     Dizziness due to orthostatic hypotension suspect due to intravascular volume depletion in the setting of autonomic dysfunction. S/p NS IVF boluses  Dizziness now resolved. Echo done was normal. Carotid dopplers normal.  He has no focal deficits  resolved    Hypotension: Improved with NS IVF bolus. BP is now stable and has been labile and now elevated  Continue losartan and pindolol  Monitor BP c  Resolved    HTN: Continue losartan and pindolol    Parkinson disease: On sinemet  Supportive care    Anemia    Abnormal Urinalysis with moderate leucocyte esterase: On rocephin. Urine Cx: Mixed skin samuel > 100,000. D/c [place]    Left heel ulcer; This does not appear infected. Continue wound dressing consult    Non ambulatory state: Start q 2 hourly turning    Code status: Full  DVT prophylaxis: Lovenox    Care Plan discussed with: Patient/Family and Nurse  Disposition: Home w/Family and TBD, Patient cannot go back to [facility]. Will be d/c to SNF.  Case management working on it     Hospital Problems  Date Reviewed: 2/23/2018          Codes Class Noted POA    Hypotension ICD-10-CM: I95.9  ICD-9-CM: 458.9  3/22/2018 Unknown                Review of Systems:   Pertinent items are noted in HPI. Vital Signs:    Last 24hrs VS reviewed since prior progress note. Most recent are:  Visit Vitals    /89 (BP 1 Location: Left arm, BP Patient Position: At rest)    Pulse 82    Temp 98 °F (36.7 °C)    Resp 18    Ht 6' 2\" (1.88 m)    Wt 84.5 kg (186 lb 4.6 oz)    SpO2 97%    BMI 23.92 kg/m2         Intake/Output Summary (Last 24 hours) at 03/24/18 1407  Last data filed at 03/24/18 1307   Gross per 24 hour   Intake             1060 ml   Output              600 ml   Net              460 ml        Physical Examination:             Constitutional:  No acute distress, cooperative, pleasant    ENT:  Oral mucous moist, oropharynx benign. Neck supple,    Resp:  CTA bilaterally. No wheezing/rhonchi/rales. No accessory muscle use   CV:  Regular rhythm, normal rate, no murmurs, gallops, rubs    GI:  Soft, non distended, non tender. normoactive bowel sounds, no hepatosplenomegaly     Musculoskeletal:  No edema, warm, 2+ pulses throughout    Neurologic:  Moves all extremities- bilateral upper and lower extremity contractures AAOx3, CN II-XII reviewed                Skin: Left heel ulcer- sero sanguinous discharge. Not foul smelling. No surrounding erythema       Data Review:          Labs:     Recent Labs      03/23/18   0409  03/22/18   1219   WBC  5.1  4.7   HGB  10.1*  10.4*   HCT  31.8*  33.2*   PLT  167  197     Recent Labs      03/23/18   0409  03/22/18   1219   NA  139  140   K  3.9  4.6   CL  107  106   CO2  25  29   BUN  21*  26*   CREA  0.83  1.16   GLU  86  67   CA  8.3*  8.6     Recent Labs      03/22/18   1219   SGOT  17   ALT  9*   AP  69   TBILI  0.6   TP  6.9   ALB  3.4*   GLOB  3.5     No results for input(s): INR, PTP, APTT in the last 72 hours.     No lab exists for component: INREXT, INREXT   No results for input(s): FE, TIBC, PSAT, FERR in the last 72 hours. Lab Results   Component Value Date/Time    Folate 32.6 (H) 10/30/2013 10:30 AM      No results for input(s): PH, PCO2, PO2 in the last 72 hours.   Recent Labs      03/22/18   1219   TROIQ  <0.04     Lab Results   Component Value Date/Time    Cholesterol, total 122 06/27/2017 02:25 AM    HDL Cholesterol 47 06/27/2017 02:25 AM    LDL, calculated 65.4 06/27/2017 02:25 AM    Triglyceride 48 06/27/2017 02:25 AM    CHOL/HDL Ratio 2.6 06/27/2017 02:25 AM     Lab Results   Component Value Date/Time    Glucose (POC) 85 03/22/2018 05:44 PM    Glucose (POC) 70 03/22/2018 12:07 PM    Glucose (POC) 96 11/15/2017 03:59 PM    Glucose (POC) 102 (H) 06/26/2017 07:02 PM    Glucose (POC) 97 08/23/2016 02:04 PM     Lab Results   Component Value Date/Time    Color YELLOW/STRAW 03/22/2018 12:50 PM    Appearance CLEAR 03/22/2018 12:50 PM    Specific gravity 1.019 03/22/2018 12:50 PM    pH (UA) 6.5 03/22/2018 12:50 PM    Protein NEGATIVE  03/22/2018 12:50 PM    Glucose NEGATIVE  03/22/2018 12:50 PM    Ketone NEGATIVE  03/22/2018 12:50 PM    Bilirubin NEGATIVE  03/22/2018 12:50 PM    Urobilinogen 1.0 03/22/2018 12:50 PM    Nitrites NEGATIVE  03/22/2018 12:50 PM    Leukocyte Esterase MODERATE (A) 03/22/2018 12:50 PM    Epithelial cells FEW 03/22/2018 12:50 PM    Bacteria NEGATIVE  03/22/2018 12:50 PM    WBC 5-10 03/22/2018 12:50 PM    RBC 0-5 03/22/2018 12:50 PM         Medications Reviewed:     Current Facility-Administered Medications   Medication Dose Route Frequency    hydrALAZINE (APRESOLINE) 20 mg/mL injection 10 mg  10 mg IntraVENous Q6H PRN    sodium chloride (NS) flush 5-10 mL  5-10 mL IntraVENous Q8H    sodium chloride (NS) flush 5-10 mL  5-10 mL IntraVENous PRN    enoxaparin (LOVENOX) injection 40 mg  40 mg SubCUTAneous Q24H    acetaminophen (TYLENOL) solution 650 mg  650 mg Oral Q4H PRN    carbidopa-levodopa (SINEMET)  mg per tablet 2 Tab  2 Tab Oral QID    polyethylene glycol (MIRALAX) packet 17 g  17 g Oral DAILY    therapeutic multivitamin (THERAGRAN) tablet 1 Tab  1 Tab Oral DAILY    docusate sodium (COLACE) capsule 100 mg  100 mg Oral DAILY PRN    escitalopram oxalate (LEXAPRO) tablet 20 mg  20 mg Oral DAILY    rivastigmine (EXELON) 4.6 mg/24 hr patch 1 Patch  1 Patch TransDERmal DAILY    pindolol (VISKIN) tablet 2.5 mg  2.5 mg Oral BID    oxybutynin chloride XL (DITROPAN XL) tablet 5 mg  5 mg Oral DAILY    aspirin chewable tablet 81 mg  81 mg Oral DAILY    lidocaine (LIDODERM) 5 % patch 1 Patch  1 Patch TransDERmal Q24H    traMADol (ULTRAM) tablet 50 mg  50 mg Oral Q6H PRN    pantoprazole (PROTONIX) tablet 40 mg  40 mg Oral ACB    albuterol (PROVENTIL VENTOLIN) nebulizer solution 2.5 mg  2.5 mg Nebulization Q6H PRN    QUEtiapine (SEROquel) tablet 25 mg  25 mg Oral QHS    cloNIDine HCl (CATAPRES) tablet 0.1 mg  0.1 mg Oral Q12H PRN    losartan (COZAAR) tablet 50 mg  50 mg Oral DAILY     ______________________________________________________________________  EXPECTED LENGTH OF STAY: 2d 9h  ACTUAL LENGTH OF STAY:          2                 Jared Hussein MD

## 2018-03-24 NOTE — PROGRESS NOTES
TRANSFER - OUT REPORT:    Verbal report given to Viv Webb on Candice Ponce  being transferred to [facility] room 606(unit) for routine progression of care       Report consisted of patients Situation, Background, Assessment and   Recommendations(SBAR). Information from the following report(s) SBAR, Kardex, ED Summary, Intake/Output, MAR, Recent Results and Cardiac Rhythm Paced Underlying Fib was reviewed with the receiving nurse. Lines:   Peripheral IV 03/24/18 Right; Inner Wrist (Active)   Site Assessment Clean, dry, & intact 3/24/2018 11:06 AM   Phlebitis Assessment 0 3/24/2018 11:06 AM   Infiltration Assessment 0 3/24/2018 11:06 AM   Dressing Status Clean, dry, & intact 3/24/2018 11:06 AM   Dressing Type Tape;Transparent;Elastic bandage 3/24/2018 11:06 AM   Hub Color/Line Status Blue;End cap changed; Capped 3/24/2018 11:06 AM   Action Taken Open ports on tubing capped 3/24/2018 11:06 AM   Alcohol Cap Used Yes 3/24/2018 11:06 AM        Opportunity for questions and clarification was provided. Patient transported with:   Patients medications from home    Patient's daughter was contacted prior to transfer.  Her phone number is [phone]

## 2018-03-24 NOTE — PROGRESS NOTES
.. TRANSFER - IN REPORT:    Verbal report received from [facility]) on Luz Stake  being received from [facility](unit) for routine progression of care      Report consisted of patients Situation, Background, Assessment and   Recommendations(SBAR). Information from the following report(s) SBAR, Kardex and MAR was reviewed with the receiving nurse. Opportunity for questions and clarification was provided. Assessment completed upon patients arrival to unit and care assumed. Report received. Pt remains on [facility] at this time.      1400- Pt arrive to unit

## 2018-03-25 PROCEDURE — 74011250636 HC RX REV CODE- 250/636: Performed by: HOSPITALIST

## 2018-03-25 PROCEDURE — 74011250637 HC RX REV CODE- 250/637: Performed by: HOSPITALIST

## 2018-03-25 PROCEDURE — 65270000032 HC RM SEMIPRIVATE

## 2018-03-25 RX ADMIN — PANTOPRAZOLE SODIUM 40 MG: 40 TABLET, DELAYED RELEASE ORAL at 07:16

## 2018-03-25 RX ADMIN — PINDOLOL 2.5 MG: 5 TABLET ORAL at 15:25

## 2018-03-25 RX ADMIN — THERA TABS 1 TABLET: TAB at 10:16

## 2018-03-25 RX ADMIN — OXYBUTYNIN CHLORIDE 5 MG: 5 TABLET, EXTENDED RELEASE ORAL at 10:16

## 2018-03-25 RX ADMIN — Medication 10 ML: at 15:25

## 2018-03-25 RX ADMIN — ENOXAPARIN SODIUM 40 MG: 40 INJECTION SUBCUTANEOUS at 19:10

## 2018-03-25 RX ADMIN — POLYETHYLENE GLYCOL 3350 17 G: 17 POWDER, FOR SOLUTION ORAL at 10:16

## 2018-03-25 RX ADMIN — LOSARTAN POTASSIUM 50 MG: 50 TABLET ORAL at 10:16

## 2018-03-25 RX ADMIN — Medication 10 ML: at 22:33

## 2018-03-25 RX ADMIN — ASPIRIN 81 MG 81 MG: 81 TABLET ORAL at 10:16

## 2018-03-25 RX ADMIN — CARBIDOPA AND LEVODOPA 2 TABLET: 25; 100 TABLET ORAL at 19:10

## 2018-03-25 RX ADMIN — CARBIDOPA AND LEVODOPA 2 TABLET: 25; 100 TABLET ORAL at 15:26

## 2018-03-25 RX ADMIN — ESCITALOPRAM OXALATE 20 MG: 10 TABLET ORAL at 10:16

## 2018-03-25 RX ADMIN — CARBIDOPA AND LEVODOPA 2 TABLET: 25; 100 TABLET ORAL at 10:17

## 2018-03-25 RX ADMIN — Medication 10 ML: at 07:16

## 2018-03-25 RX ADMIN — CARBIDOPA AND LEVODOPA 2 TABLET: 25; 100 TABLET ORAL at 22:34

## 2018-03-25 RX ADMIN — QUETIAPINE 25 MG: 25 TABLET ORAL at 22:34

## 2018-03-25 RX ADMIN — PINDOLOL 2.5 MG: 5 TABLET ORAL at 22:37

## 2018-03-25 NOTE — PROGRESS NOTES
..Primary Nurse Tata Eng and Patric Campbell RN performed a dual skin assessment on this patient Impairment noted- see wound doc flow sheet  Joey score is 13. Pt has bruising to upper and lower extremities.  He has a wound to left heel

## 2018-03-25 NOTE — PROGRESS NOTES
Bedside shift change report given to Howard Whittaker RN (oncoming nurse) by Mona Morales (offgoing nurse). Report included the following information SBAR.

## 2018-03-25 NOTE — PROGRESS NOTES
Problem: Falls - Risk of  Goal: *Absence of Falls  Document Oksana Fall Risk and appropriate interventions in the flowsheet.    Outcome: Progressing Towards Goal  Fall Risk Interventions:  Mobility Interventions: Bed/chair exit alarm, Communicate number of staff needed for ambulation/transfer    Mentation Interventions: Bed/chair exit alarm, Door open when patient unattended, Evaluate medications/consider consulting pharmacy    Medication Interventions: Bed/chair exit alarm, Evaluate medications/consider consulting pharmacy, Patient to call before getting OOB    Elimination Interventions: Bed/chair exit alarm, Call light in reach, Patient to call for help with toileting needs, Toilet paper/wipes in reach, Toileting schedule/hourly rounds    History of Falls Interventions: Bed/chair exit alarm, Consult care management for discharge planning, Door open when patient unattended, Evaluate medications/consider consulting pharmacy, Room close to nurse's station

## 2018-03-25 NOTE — PROGRESS NOTES
Hospitalist Progress Note  Walter Green MD  Answering service: [phone] OR 36 from in house phone  Cell: [phone]      Date of Service:  3/25/2018  NAME:  Nelda Hale  :  1933  MRN:  [id number]      Admission Summary:     The patient is a pleasant 22-year-old  gentleman with past medical history significant for hypertension, parkinsonism syndrome, benign prostate hypertrophy, degenerative joint disease, hyperlipidemia, major depression, who presents to the hospital via ambulance for reports of low blood pressure. He was apparently doing physical therapy at his facility when blood pressure was checked and it was in the 47H systolic. He received IV fluids via EMS and his blood pressure came up to 708M to 322T systolic. Interval history / Subjective:      F/u for dizziness and hypotension. No acute overnight event. No new complaints. Wants a Urinary     Assessment & Plan:     Dizziness due to orthostatic hypotension suspect due to intravascular volume depletion in the setting of autonomic dysfunction. S/p NS IVF boluses  Dizziness now resolved. Echo done was normal. Carotid dopplers normal.  He has no focal deficits  resolved    Hypotension: Improved with NS IVF bolus. BP is now stable and has been labile and now elevated  Continue losartan and pindolol  Monitor BP c  Resolved    HTN: Continue losartan and pindolol    Parkinson disease: On sinemet  Supportive care    Anemia    Abnormal Urinalysis with moderate leucocyte esterase: On rocephin. Urine Cx: Mixed skin samuel > 100,000. D/c [place]    Left heel ulcer; This does not appear infected. Continue wound dressing consult    Non ambulatory state: Start q 2 hourly turning    Code status: Full  DVT prophylaxis: Lovenox    Care Plan discussed with: Patient/Family and Nurse  Disposition: Home w/Family and TBD, Patient cannot go back to [facility].  Will be d/c to SNF. Case management working on it     Hospital Problems  Date Reviewed: 2/23/2018          Codes Class Noted POA    Hypotension ICD-10-CM: I95.9  ICD-9-CM: 458.9  3/22/2018 Unknown                Review of Systems:   Pertinent items are noted in HPI. Vital Signs:    Last 24hrs VS reviewed since prior progress note. Most recent are:  Visit Vitals    BP (!) 184/110 (BP 1 Location: Right arm, BP Patient Position: At rest)    Pulse 80    Temp 97.6 °F (36.4 °C)    Resp 16    Ht 6' 2\" (1.88 m)    Wt 84.5 kg (186 lb 4.6 oz)    SpO2 95%    BMI 23.92 kg/m2         Intake/Output Summary (Last 24 hours) at 03/25/18 1518  Last data filed at 03/25/18 1212   Gross per 24 hour   Intake              240 ml   Output                0 ml   Net              240 ml        Physical Examination:             Constitutional:  No acute distress, cooperative, pleasant    ENT:  Oral mucous moist, oropharynx benign. Neck supple,    Resp:  CTA bilaterally. No wheezing/rhonchi/rales. No accessory muscle use   CV:  Regular rhythm, normal rate, no murmurs, gallops, rubs    GI:  Soft, non distended, non tender. normoactive bowel sounds, no hepatosplenomegaly     Musculoskeletal:  No edema, warm, 2+ pulses throughout    Neurologic:  Moves all extremities- bilateral upper and lower extremity contractures AAOx3, CN II-XII reviewed                Skin: Left heel ulcer- sero sanguinous discharge. Not foul smelling. No surrounding erythema       Data Review:          Labs:     Recent Labs      03/23/18   0409   WBC  5.1   HGB  10.1*   HCT  31.8*   PLT  167     Recent Labs      03/23/18   0409   NA  139   K  3.9   CL  107   CO2  25   BUN  21*   CREA  0.83   GLU  86   CA  8.3*     No results for input(s): SGOT, GPT, ALT, AP, TBIL, TBILI, TP, ALB, GLOB, GGT, AML, LPSE in the last 72 hours. No lab exists for component: AMYP, HLPSE  No results for input(s): INR, PTP, APTT in the last 72 hours.     No lab exists for component: INREXT, INREXT No results for input(s): FE, TIBC, PSAT, FERR in the last 72 hours. Lab Results   Component Value Date/Time    Folate 32.6 (H) 10/30/2013 10:30 AM      No results for input(s): PH, PCO2, PO2 in the last 72 hours. No results for input(s): CPK, CKNDX, TROIQ in the last 72 hours.     No lab exists for component: CPKMB  Lab Results   Component Value Date/Time    Cholesterol, total 122 06/27/2017 02:25 AM    HDL Cholesterol 47 06/27/2017 02:25 AM    LDL, calculated 65.4 06/27/2017 02:25 AM    Triglyceride 48 06/27/2017 02:25 AM    CHOL/HDL Ratio 2.6 06/27/2017 02:25 AM     Lab Results   Component Value Date/Time    Glucose (POC) 85 03/22/2018 05:44 PM    Glucose (POC) 70 03/22/2018 12:07 PM    Glucose (POC) 96 11/15/2017 03:59 PM    Glucose (POC) 102 (H) 06/26/2017 07:02 PM    Glucose (POC) 97 08/23/2016 02:04 PM     Lab Results   Component Value Date/Time    Color YELLOW/STRAW 03/22/2018 12:50 PM    Appearance CLEAR 03/22/2018 12:50 PM    Specific gravity 1.019 03/22/2018 12:50 PM    pH (UA) 6.5 03/22/2018 12:50 PM    Protein NEGATIVE  03/22/2018 12:50 PM    Glucose NEGATIVE  03/22/2018 12:50 PM    Ketone NEGATIVE  03/22/2018 12:50 PM    Bilirubin NEGATIVE  03/22/2018 12:50 PM    Urobilinogen 1.0 03/22/2018 12:50 PM    Nitrites NEGATIVE  03/22/2018 12:50 PM    Leukocyte Esterase MODERATE (A) 03/22/2018 12:50 PM    Epithelial cells FEW 03/22/2018 12:50 PM    Bacteria NEGATIVE  03/22/2018 12:50 PM    WBC 5-10 03/22/2018 12:50 PM    RBC 0-5 03/22/2018 12:50 PM         Medications Reviewed:     Current Facility-Administered Medications   Medication Dose Route Frequency    hydrALAZINE (APRESOLINE) 20 mg/mL injection 10 mg  10 mg IntraVENous Q6H PRN    sodium chloride (NS) flush 5-10 mL  5-10 mL IntraVENous Q8H    sodium chloride (NS) flush 5-10 mL  5-10 mL IntraVENous PRN    enoxaparin (LOVENOX) injection 40 mg  40 mg SubCUTAneous Q24H    acetaminophen (TYLENOL) solution 650 mg  650 mg Oral Q4H PRN    carbidopa-levodopa (SINEMET)  mg per tablet 2 Tab  2 Tab Oral QID    polyethylene glycol (MIRALAX) packet 17 g  17 g Oral DAILY    therapeutic multivitamin (THERAGRAN) tablet 1 Tab  1 Tab Oral DAILY    docusate sodium (COLACE) capsule 100 mg  100 mg Oral DAILY PRN    escitalopram oxalate (LEXAPRO) tablet 20 mg  20 mg Oral DAILY    rivastigmine (EXELON) 4.6 mg/24 hr patch 1 Patch  1 Patch TransDERmal DAILY    pindolol (VISKIN) tablet 2.5 mg  2.5 mg Oral BID    oxybutynin chloride XL (DITROPAN XL) tablet 5 mg  5 mg Oral DAILY    aspirin chewable tablet 81 mg  81 mg Oral DAILY    lidocaine (LIDODERM) 5 % patch 1 Patch  1 Patch TransDERmal Q24H    traMADol (ULTRAM) tablet 50 mg  50 mg Oral Q6H PRN    pantoprazole (PROTONIX) tablet 40 mg  40 mg Oral ACB    albuterol (PROVENTIL VENTOLIN) nebulizer solution 2.5 mg  2.5 mg Nebulization Q6H PRN    QUEtiapine (SEROquel) tablet 25 mg  25 mg Oral QHS    cloNIDine HCl (CATAPRES) tablet 0.1 mg  0.1 mg Oral Q12H PRN    losartan (COZAAR) tablet 50 mg  50 mg Oral DAILY     ______________________________________________________________________  EXPECTED LENGTH OF STAY: 2d 9h  ACTUAL LENGTH OF STAY:          3                 Marco Harrell MD

## 2018-03-25 NOTE — PROGRESS NOTES
Bedside shift change report given to LIZABETH Stinson (oncoming nurse) by Susan Sharma (offgoing nurse). Report included the following information SBAR.

## 2018-03-26 PROCEDURE — 74011250636 HC RX REV CODE- 250/636: Performed by: HOSPITALIST

## 2018-03-26 PROCEDURE — 65270000032 HC RM SEMIPRIVATE

## 2018-03-26 PROCEDURE — 74011250637 HC RX REV CODE- 250/637: Performed by: HOSPITALIST

## 2018-03-26 PROCEDURE — 97165 OT EVAL LOW COMPLEX 30 MIN: CPT | Performed by: OCCUPATIONAL THERAPIST

## 2018-03-26 PROCEDURE — 97530 THERAPEUTIC ACTIVITIES: CPT

## 2018-03-26 RX ADMIN — CARBIDOPA AND LEVODOPA 2 TABLET: 25; 100 TABLET ORAL at 09:53

## 2018-03-26 RX ADMIN — Medication 10 ML: at 22:12

## 2018-03-26 RX ADMIN — OXYBUTYNIN CHLORIDE 5 MG: 5 TABLET, EXTENDED RELEASE ORAL at 09:53

## 2018-03-26 RX ADMIN — CARBIDOPA AND LEVODOPA 2 TABLET: 25; 100 TABLET ORAL at 12:00

## 2018-03-26 RX ADMIN — PINDOLOL 2.5 MG: 5 TABLET ORAL at 22:12

## 2018-03-26 RX ADMIN — THERA TABS 1 TABLET: TAB at 09:53

## 2018-03-26 RX ADMIN — PINDOLOL 2.5 MG: 5 TABLET ORAL at 15:17

## 2018-03-26 RX ADMIN — ASPIRIN 81 MG 81 MG: 81 TABLET ORAL at 09:53

## 2018-03-26 RX ADMIN — CARBIDOPA AND LEVODOPA 2 TABLET: 25; 100 TABLET ORAL at 17:37

## 2018-03-26 RX ADMIN — LOSARTAN POTASSIUM 50 MG: 50 TABLET ORAL at 09:53

## 2018-03-26 RX ADMIN — CARBIDOPA AND LEVODOPA 2 TABLET: 25; 100 TABLET ORAL at 22:12

## 2018-03-26 RX ADMIN — POLYETHYLENE GLYCOL 3350 17 G: 17 POWDER, FOR SOLUTION ORAL at 09:53

## 2018-03-26 RX ADMIN — PANTOPRAZOLE SODIUM 40 MG: 40 TABLET, DELAYED RELEASE ORAL at 07:45

## 2018-03-26 RX ADMIN — Medication 10 ML: at 07:45

## 2018-03-26 RX ADMIN — ESCITALOPRAM OXALATE 20 MG: 10 TABLET ORAL at 09:53

## 2018-03-26 RX ADMIN — ENOXAPARIN SODIUM 40 MG: 40 INJECTION SUBCUTANEOUS at 17:37

## 2018-03-26 RX ADMIN — Medication 10 ML: at 15:17

## 2018-03-26 RX ADMIN — QUETIAPINE 25 MG: 25 TABLET ORAL at 22:12

## 2018-03-26 NOTE — PROGRESS NOTES
Bedside and Verbal shift change report given to Renea Kimball RN (oncoming nurse) by Chaz Morales RN (offgoing nurse). Report included the following information SBAR, Kardex, Intake/Output and MAR.

## 2018-03-26 NOTE — WOUND CARE
WOCN Note:     New consult placed by RN for assessment of Left heel wound. Chart reviewed. Admitted DX:  Hypotension  Past Medical History: hypertension, parkinsonism syndrome, benign prostate hypertrophy, degenerative joint disease, hyperlipidemia, major depression. Assessment:   Patient is alert, communicative, uses urinal  and requires assist with repositioning. Bed: total care  Patient wearing briefs for incontinence. Patient reports no pain. Bilateral  buttocks and sacral skin intact and without erythema. Left heel Pressure Injury Stage 2 Present on Admission:  2.5 x 1 x 0.2 cm:  100% red; no exudate; no odor or erythema. Patient repositioned on left side with pillow between the knees. Heels offloaded on prevalon boots     Recommendations:    prevalon boots  1. Left heel:  Every 3 days cleanse with saline; apply Duoderm. Skin Care & Pressure Prevention:  Minimize layers of linen/pads under patient to optimize support surface. Turn/reposition approximately every 2 hours and offload heels. Manage incontinence / promote continence; Aloe Vesta to buttocks and sacrum. Discussed above plan with patient and RN.     Transition of Care: Plan to follow weekly and as needed while admitted to hospital.    LONNIE Egan RN  Wound Care  Office [phone]  Pager [phone]

## 2018-03-26 NOTE — CDMP QUERY
Nitin Frank,    Please Document Present on Admission Status for - Pressure ulcer of left heel, stage 2    Thank you,  [facility]  [phone]

## 2018-03-26 NOTE — PROGRESS NOTES
Problem: Self Care Deficits Care Plan (Adult)  Goal: *Acute Goals and Plan of Care (Insert Text)  Occupational Therapy Goals  Initiated 3/26/2018  1. Patient will perform self-feeding with supervision/set-up seated on supported surface within 7 day(s). 2.  Patient will perform grooming with supervision/set-up seated on supported surface within 7 day(s). 3.  Patient will perform toilet transfers using sliding board and drop arm BSC with maximal assistance within 7 day(s). 4.  Patient will participate in upper extremity therapeutic exercise/activities with minimal assistance/contact guard assist for 10 minutes within 7 day(s). Occupational Therapy EVALUATION  Patient: Alek Villegas (33 y.o. male)  Date: 3/26/2018  Primary Diagnosis: Hypotension        Precautions: Fall, Skin    ASSESSMENT :  Based on the objective data described below, the patient presents with impaired cognition, BUE tremors, BLE contractures, decreased strength, endurance, coordination, mobility, balance and safety. He currently requires max A for bed mobility and total A for ADLs. Unable to get pt to EOB with assist of only 1 person. Per chart and pt he lives at [facility], performs sliding board transfers with assist x2 people and gets assist with ADLs. Recommend return to [facility] if they are able to provide this level of physical assist.  If not then SNF. Recommend chair position in bed for all meals and harmony lift to recliner chair if OOB with nursing. Patient will benefit from skilled intervention to address the above impairments.   Patients rehabilitation potential is considered to be Guarded  Factors which may influence rehabilitation potential include:   []             None noted  [x]             Mental ability/status  [x]             Medical condition  []             Home/family situation and support systems  []             Safety awareness  []             Pain tolerance/management  []             Other:      PLAN :  Recommendations and Planned Interventions:  [x]               Self Care Training                  [x]        Therapeutic Activities  [x]               Functional Mobility Training    [x]        Cognitive Retraining  [x]               Therapeutic Exercises           [x]        Endurance Activities  [x]               Balance Training                   [x]        Neuromuscular Re-Education  []               Visual/Perceptual Training     [x]   Home Safety Training  [x]               Patient Education                 [x]        Family Training/Education  []               Other (comment):    Frequency/Duration: Patient will be followed by occupational therapy 3 times a week to address goals. Discharge Recommendations: Danilo Banks vs custodial  Further Equipment Recommendations for Discharge: TBD     SUBJECTIVE:   Patient stated My mother fed me this morning.     OBJECTIVE DATA SUMMARY:   HISTORY:   Past Medical History:   Diagnosis Date    Blurry vision 1/19/2012    BPH (benign prostatic hyperplasia)     DDD (degenerative disc disease), lumbar 2/19/2010    DJD (degenerative joint disease) of cervical spine 2/19/2010    DU (duodenal ulcer) 9/1/1990    Hyperlipidemia     Hypertension     Other and unspecified hyperlipidemia 2/19/2010    Pacemaker     Paralysis agitans ([address] 2/19/2010    Parkinson disease ([address]     [facility]     Premature atrial beats     Reflux esophagitis 2/19/2010    Sick sinus syndrome ([address]     TIA (transient ischemic attack)      Past Surgical History:   Procedure Laterality Date    HX CATARACT REMOVAL  6/2012 and 7/2012    Both eyes    HX ORTHOPAEDIC  04/2015    tendon repair both knees    HX PACEMAKER  2008    bradycardia    HX VITRECTOMY  11/2011    ID REMVL PERM PM PLS GEN W/REPL PLSE GEN 2 LEAD SYS  3/13/2017            Prior Level of Function/Environment/Context: Per chart and pt he lives at [facility], performs sliding board transfers with assist x2 people and gets assist with ADLs  Home Situation  Home Environment: [address] [place] [address] Road Name: Valentina Dejesus  # Steps to Enter: 0  One/Two Story Residence: One story  Living Alone: No  Support Systems: Assisted living, Family member(s)  Patient Expects to be Discharged to[de-identified] Skilled nursing facility  Current DME Used/Available at Home: Wheelchair, [address] [facility] chair (sliding board)  Tub or Shower Type: Shower  [x]  Right hand dominant   []  Left hand dominant    EXAMINATION OF PERFORMANCE DEFICITS:  Cognitive/Behavioral Status:  Neurologic State: Drowsy; Confused  Orientation Level: Oriented to person;Disoriented to place; Disoriented to situation;Disoriented to time  Cognition: Decreased attention/concentration; Follows commands  Perception: Cues to maintain midline in sitting; Tactile;Verbal;Visual  Perseveration: Perseverates during ADLS;Perseverates during conversation  Safety/Judgement: Decreased awareness of environment;Decreased awareness of need for assistance;Decreased awareness of need for safety;Decreased insight into deficits; Fall prevention    Hearing: Auditory  Auditory Impairment: None    Vision/Perceptual:    Acuity: Impaired near vision; Impaired far vision         Range of Motion:  AROM: Grossly decreased, non-functional (LE's, UE's decreased but functional)                         Strength:  Strength: Grossly decreased, non-functional (LE's, UE's decreased but functional)                Coordination:  Coordination: Grossly decreased, non-functional (tremors noted in BUEs)  Fine Motor Skills-Upper: Left Impaired;Right Impaired    Gross Motor Skills-Upper: Left Impaired;Right Impaired    Balance:  Sitting: Impaired; With support  Sitting - Static: Poor (constant support)  Sitting - Dynamic: Poor (constant support)  Standing: Impaired  Standing - Static: Poor  Standing - Dynamic : Poor    Functional Mobility and Transfers for ADLs:  Bed Mobility:  Rolling: Moderate assistance; Additional time;Assist x1 (using bed rail)  Supine to Sit: Total assistance (unable to perform with A x1 person)    Transfers:  Sit to Stand: Total assistance (pt uses sliding board at custodial)  Toilet Transfer : Total assistance (bed level for safety)    ADL Assessment:  Feeding: Total assistance    Oral Facial Hygiene/Grooming: Total assistance    Bathing: Total assistance    Upper Body Dressing: Total assistance    Lower Body Dressing: Total assistance    Toileting: Total assistance     Cognitive Retraining  Safety/Judgement: Decreased awareness of environment;Decreased awareness of need for assistance;Decreased awareness of need for safety;Decreased insight into deficits; Fall prevention      Functional Measure:  Barthel Index:    Bathin  Bladder: 0  Bowels: 0  Groomin  Dressin  Feedin  Mobility: 0  Stairs: 0  Toilet Use: 0  Transfer (Bed to Chair and Back): 0  Total: 0       Barthel and G-code impairment scale:  Percentage of impairment CH  0% CI  1-19% CJ  20-39% CK  40-59% CL  60-79% CM  80-99% CN  100%   Barthel Score 0-100 100 99-80 79-60 59-40 20-39 1-19   0   Barthel Score 0-20 20 17-19 13-16 9-12 5-8 1-4 0      The Barthel ADL Index: Guidelines  1. The index should be used as a record of what a patient does, not as a record of what a patient could do. 2. The main aim is to establish degree of independence from any help, physical or verbal, however minor and for whatever reason. 3. The need for supervision renders the patient not independent. 4. A patient's performance should be established using the best available evidence. Asking the patient, friends/relatives and nurses are the usual sources, but direct observation and common sense are also important. However direct testing is not needed. 5. Usually the patient's performance over the preceding 24-48 hours is important, but occasionally longer periods will be relevant.   6. Middle categories imply that the patient supplies over 50 per cent of the effort. 7. Use of aids to be independent is allowed. Juan F Anthony., Barthel, D.W. (8609). Functional evaluation: the Barthel Index. [address] (14)2. JULIAN Jacobson Reine Presser.Rika., Elli, [address] (1999). Measuring the change indisability after inpatient rehabilitation; comparison of the responsiveness of the Barthel Index and Functional [place] Measure. Journal of Neurology, Neurosurgery, and Psychiatry, 66(4), 694-353. PRAVEEN Gabriel, RANDY Rodriguez, & Magdy Montenegro M.A. (2004.) Assessment of post-stroke quality of life in cost-effectiveness studies: The usefulness of the Barthel Index and the EuroQoL-5D. Quality of Life Research, 13, 530-72       G codes: In compliance with CMSs Claims Based Outcome Reporting, the following G-code set was chosen for this patient based on their primary functional limitation being treated: The outcome measure chosen to determine the severity of the functional limitation was the Barthel Index with a score of 0/100 which was correlated with the impairment scale. ? Self Care:     - CURRENT STATUS: CN - 100% impaired, limited or restricted    - GOAL STATUS: CM - 80%-99% impaired, limited or restricted    - D/C STATUS:  ---------------To be determined---------------     Occupational Therapy Evaluation Charge Determination   History Examination Decision-Making   LOW Complexity : Brief history review  HIGH Complexity : 5 or more performance deficits relating to physical, cognitive , or psychosocial skils that result in activity limitations and / or participation restrictions HIGH Complexity : Patient presents with comorbidities that affect occupational performance.  Signifigant modification of tasks or assistance (eg, physical or verbal) with assessment (s) is necessary to enable patient to complete evaluation       Based on the above components, the patient evaluation is determined to be of the following complexity level: LOW   Pain:  Pain Scale 1: Numeric (0 - 10)  Pain Intensity 1: 0              Activity Tolerance:   Poor  Please refer to the flowsheet for vital signs taken during this treatment. After treatment:   [] Patient left in no apparent distress sitting up in chair  [x] Patient left in no apparent distress in bed  [x] Call bell left within reach  [x] Nursing notified  [] Caregiver present  [] Bed alarm activated    COMMUNICATION/EDUCATION:   The patients plan of care was discussed with: Registered Nurse. [x] Home safety education was provided and the patient/caregiver indicated understanding. [x] Patient/family have participated as able in goal setting and plan of care. [x] Patient/family agree to work toward stated goals and plan of care. [] Patient understands intent and goals of therapy, but is neutral about his/her participation. [] Patient is unable to participate in goal setting and plan of care. This patients plan of care is appropriate for delegation to JOSTIN.     Thank you for this referral.  Samantha Quiros, OT  Time Calculation: 15 mins

## 2018-03-26 NOTE — PROGRESS NOTES
Hospitalist Progress Note  aCmeron Pardo MD  Answering service: [phone] from in house phone  Cell: [phone]      Date of Service:  3/26/2018  NAME:  Adriel Badillo  :  1933  MRN:  [id number]      Admission Summary:     The patient is a pleasant 51-year-old  gentleman with past medical history significant for hypertension, parkinsonism syndrome, benign prostate hypertrophy, degenerative joint disease, hyperlipidemia, major depression, who presents to the hospital via ambulance for reports of low blood pressure. He was apparently doing physical therapy at his facility when blood pressure was checked and it was in the 18O systolic. He received IV fluids via EMS and his blood pressure came up to 847A to 705C systolic. Interval history / Subjective:      F/u for dizziness and hypotension. No acute overnight event. No new complaints. Assessment & Plan:     Dizziness due to orthostatic hypotension suspect due to intravascular volume depletion in the setting of autonomic dysfunction. S/p NS IVF boluses  Dizziness now resolved. Echo done was normal. Carotid dopplers normal.  He has no focal deficits. No need obtaining head CT scan. resolved    Hypotension: Improved with NS IVF bolus. BP is now stable and has been labile and now elevated  Continue losartan and pindolol  Monitor BP c  Resolved    HTN: BP has been labile due to autonomic dysfunction. Continue losartan and pindolol    Parkinson disease: On sinemet  Supportive care    Anemia    Abnormal Urinalysis with moderate leucocyte esterase: On rocephin. Urine Cx: Mixed skin samuel > 100,000. D/c Rocpehin    Left heel ulcer; This does not appear infected.   Continue wound dressing consult    Non ambulatory state: Start q 2 hourly turning    Code status: Full  DVT prophylaxis: Lovenox    Care Plan discussed with: Patient/Family and Nurse  Disposition: Home w/Family and TBD, Pending placement. Hospital Problems  Date Reviewed: 2/23/2018          Codes Class Noted POA    Hypotension ICD-10-CM: I95.9  ICD-9-CM: 458.9  3/22/2018 Unknown                Review of Systems:   Pertinent items are noted in HPI. Vital Signs:    Last 24hrs VS reviewed since prior progress note. Most recent are:  Visit Vitals    /71 (BP Patient Position: At rest;Supine; Head of bed elevated (Comment degrees))    Pulse 81    Temp 98 °F (36.7 °C)    Resp 16    Ht 6' 2\" (1.88 m)    Wt 84.5 kg (186 lb 4.6 oz)    SpO2 91%    BMI 23.92 kg/m2         Intake/Output Summary (Last 24 hours) at 03/26/18 1323  Last data filed at 03/26/18 0951   Gross per 24 hour   Intake              370 ml   Output                0 ml   Net              370 ml        Physical Examination:             Constitutional:  No acute distress, cooperative, pleasant    ENT:  Oral mucous moist, oropharynx benign. Neck supple,    Resp:  CTA bilaterally. No wheezing/rhonchi/rales. No accessory muscle use   CV:  Regular rhythm, normal rate, no murmurs, gallops, rubs    GI:  Soft, non distended, non tender. normoactive bowel sounds, no hepatosplenomegaly     Musculoskeletal:  No edema, warm, 2+ pulses throughout    Neurologic:  Moves all extremities- bilateral upper and lower extremity contractures AAOx3, CN II-XII reviewed                Skin: Left heel ulcer- sero sanguinous discharge. Not foul smelling. No surrounding erythema       Data Review:          Labs:     No results for input(s): WBC, HGB, HCT, PLT, HGBEXT, HCTEXT, PLTEXT, HGBEXT, HCTEXT, PLTEXT in the last 72 hours. No results for input(s): NA, K, CL, CO2, BUN, CREA, GLU, CA, MG, PHOS, URICA in the last 72 hours. No results for input(s): SGOT, GPT, ALT, AP, TBIL, TBILI, TP, ALB, GLOB, GGT, AML, LPSE in the last 72 hours. No lab exists for component: AMYP, HLPSE  No results for input(s): INR, PTP, APTT in the last 72 hours.     No lab exists for component: INREXT, INREXT   No results for input(s): FE, TIBC, PSAT, FERR in the last 72 hours. Lab Results   Component Value Date/Time    Folate 32.6 (H) 10/30/2013 10:30 AM      No results for input(s): PH, PCO2, PO2 in the last 72 hours. No results for input(s): CPK, CKNDX, TROIQ in the last 72 hours.     No lab exists for component: CPKMB  Lab Results   Component Value Date/Time    Cholesterol, total 122 06/27/2017 02:25 AM    HDL Cholesterol 47 06/27/2017 02:25 AM    LDL, calculated 65.4 06/27/2017 02:25 AM    Triglyceride 48 06/27/2017 02:25 AM    CHOL/HDL Ratio 2.6 06/27/2017 02:25 AM     Lab Results   Component Value Date/Time    Glucose (POC) 85 03/22/2018 05:44 PM    Glucose (POC) 70 03/22/2018 12:07 PM    Glucose (POC) 96 11/15/2017 03:59 PM    Glucose (POC) 102 (H) 06/26/2017 07:02 PM    Glucose (POC) 97 08/23/2016 02:04 PM     Lab Results   Component Value Date/Time    Color YELLOW/STRAW 03/22/2018 12:50 PM    Appearance CLEAR 03/22/2018 12:50 PM    Specific gravity 1.019 03/22/2018 12:50 PM    pH (UA) 6.5 03/22/2018 12:50 PM    Protein NEGATIVE  03/22/2018 12:50 PM    Glucose NEGATIVE  03/22/2018 12:50 PM    Ketone NEGATIVE  03/22/2018 12:50 PM    Bilirubin NEGATIVE  03/22/2018 12:50 PM    Urobilinogen 1.0 03/22/2018 12:50 PM    Nitrites NEGATIVE  03/22/2018 12:50 PM    Leukocyte Esterase MODERATE (A) 03/22/2018 12:50 PM    Epithelial cells FEW 03/22/2018 12:50 PM    Bacteria NEGATIVE  03/22/2018 12:50 PM    WBC 5-10 03/22/2018 12:50 PM    RBC 0-5 03/22/2018 12:50 PM         Medications Reviewed:     Current Facility-Administered Medications   Medication Dose Route Frequency    hydrALAZINE (APRESOLINE) 20 mg/mL injection 10 mg  10 mg IntraVENous Q6H PRN    sodium chloride (NS) flush 5-10 mL  5-10 mL IntraVENous Q8H    sodium chloride (NS) flush 5-10 mL  5-10 mL IntraVENous PRN    enoxaparin (LOVENOX) injection 40 mg  40 mg SubCUTAneous Q24H    acetaminophen (TYLENOL) solution 650 mg  650 mg Oral Q4H PRN    carbidopa-levodopa (SINEMET)  mg per tablet 2 Tab  2 Tab Oral QID    polyethylene glycol (MIRALAX) packet 17 g  17 g Oral DAILY    therapeutic multivitamin (THERAGRAN) tablet 1 Tab  1 Tab Oral DAILY    docusate sodium (COLACE) capsule 100 mg  100 mg Oral DAILY PRN    escitalopram oxalate (LEXAPRO) tablet 20 mg  20 mg Oral DAILY    rivastigmine (EXELON) 4.6 mg/24 hr patch 1 Patch  1 Patch TransDERmal DAILY    pindolol (VISKIN) tablet 2.5 mg  2.5 mg Oral BID    oxybutynin chloride XL (DITROPAN XL) tablet 5 mg  5 mg Oral DAILY    aspirin chewable tablet 81 mg  81 mg Oral DAILY    lidocaine (LIDODERM) 5 % patch 1 Patch  1 Patch TransDERmal Q24H    traMADol (ULTRAM) tablet 50 mg  50 mg Oral Q6H PRN    pantoprazole (PROTONIX) tablet 40 mg  40 mg Oral ACB    albuterol (PROVENTIL VENTOLIN) nebulizer solution 2.5 mg  2.5 mg Nebulization Q6H PRN    QUEtiapine (SEROquel) tablet 25 mg  25 mg Oral QHS    cloNIDine HCl (CATAPRES) tablet 0.1 mg  0.1 mg Oral Q12H PRN    losartan (COZAAR) tablet 50 mg  50 mg Oral DAILY     ______________________________________________________________________  EXPECTED LENGTH OF STAY: 2d 9h  ACTUAL LENGTH OF STAY:          4                 Rehan Marroquin MD

## 2018-03-26 NOTE — PROGRESS NOTES
Spiritual Care Partner Volunteer visited patient in Rm 606 on 3/26/2018.   Documented by:  Chaplain Fortune MDiv, MS, [place] [id number] PRA ([id number])

## 2018-03-26 NOTE — PROGRESS NOTES
Problem: Mobility Impaired (Adult and Pediatric)  Goal: *Acute Goals and Plan of Care (Insert Text)  Physical Therapy Goals  Initiated 3/24/2018  1. Patient will move from supine to sit and sit to supine  in bed with moderate assistance  within 7 day(s). 2.  Patient will transfer from bed to chair and chair to bed with maximal assistance using the least restrictive device within 7 day(s). 3.  Patient will perform sit to stand with maximal assistance within 7 day(s). physical Therapy TREATMENT  Patient: Ryan Pearson (86 y.o. male)  Date: 3/26/2018  Diagnosis: Hypotension <principal problem not specified>       Precautions: Fall, Skin  Chart, physical therapy assessment, plan of care and goals were reviewed. ASSESSMENT:  Pt cleared by RN for PT;pt agreeable. Pt bed mobility slightly improving as he was able to initiate rolling to EOB (to right side) as well as sit>supine transfer yet required Mod A x2 to complete each activity. Pt tolerated sitting EOB for approx 1-2 minutes w/o c/o dizziness. Pt declined standing at this time and attempt to transfer to chair. Pt returned to supine, bed positioned to chair position. Pt unable to tolerate sitting upright in chair position as he c/o of mild dizziness ; BP 80's/50's. Returned bed to flat w/ HOB elevated 30 degrees, /71. RN aware. Pt may benefit from gradual increases in [facility] progressing to chair position to improve vertical tolerance. PT to continue to follow fr progression of EOB/OOB activity as able and appropriate; will attempt bed>w/c transfer next session as pt reported ability to transfer to w/c PTA. Progression toward goals:  []    Improving appropriately and progressing toward goals  [x]    Improving slowly and progressing toward goals  []    Not making progress toward goals and plan of care will be adjusted     PLAN:  Patient continues to benefit from skilled intervention to address the above impairments.   Continue treatment per established plan of care. Discharge Recommendations:  Rehab  Further Equipment Recommendations for Discharge:  TBD     SUBJECTIVE:   Patient stated I'd like to try that first. referring to chair position vs transfer to recliner chair. OBJECTIVE DATA SUMMARY:   Critical Behavior:  Neurologic State: Drowsy, Confused  Orientation Level: Oriented to person, Disoriented to place, Disoriented to situation, Disoriented to time  Cognition: Decreased attention/concentration, Follows commands  Safety/Judgement: Decreased awareness of environment, Decreased awareness of need for assistance, Decreased awareness of need for safety, Decreased insight into deficits, Fall prevention  Functional Mobility Training:  Bed Mobility:  Rolling: Moderate assistance; Adaptive equipment; Additional time;Assist x1 (pt used bedrail (using LUE))  Supine to Sit: Moderate assistance; Additional time;Assist x2  Sit to Supine: Moderate assistance;Assist x2 (for LE's and repositioning in bed)  Scooting: Moderate assistance; Additional time;Assist x1 (pt attempted to scoot EOB w/o phys assist-CGA)        Transfers:  Sit to Stand: Other (comment) (NT-pt declined)                                Balance:  Sitting: Impaired  Sitting - Static: Fair (occasional)  Sitting - Dynamic: Fair (occasional)  Standing: Impaired  Standing - Static: Poor  Standing - Dynamic : Poor                Therapeutic Exercises:   LAQ x5 (Ghassan), shoulder ABD x3 (w/pain)  Pain:  Pain Scale 1: Numeric (0 - 10)  Pain Intensity 1: 0              Activity Tolerance:   Limited. Pt BP 80'/50's w/ sitting upright in chair position. Please refer to the flowsheet for vital signs taken during this treatment.   After treatment:   []    Patient left in no apparent distress sitting up in chair  [x]    Patient left in no apparent distress in bed  [x]    Call bell left within reach  [x]    Nursing notified  []    Caregiver present  [x]    Bed alarm activated    COMMUNICATION/COLLABORATION:   The patients plan of care was discussed with: Registered Nurse    Brit PEREZ Means,PTA   Time Calculation: 46 mins

## 2018-03-26 NOTE — PROGRESS NOTES
Bedside and Verbal shift change report given to Berenice Flowers (oncoming nurse) by [facility] (offgoing nurse). Report included the following information SBAR, Kardex and MAR.

## 2018-03-26 NOTE — PROGRESS NOTES
Spoke with daughter by phone and daughter has the staff at the assisted living facility coming to the hospital today to do a bedside assessment. Daughter wishes patient to return home to the assisted living and daughter is hiring a private aid to sit with patient.      [phone]

## 2018-03-27 LAB
BACTERIA SPEC CULT: NORMAL
ERYTHROCYTE [DISTWIDTH] IN BLOOD BY AUTOMATED COUNT: 13.2 % (ref 11.5–14.5)
HCT VFR BLD AUTO: 31.2 % (ref 36.6–50.3)
HGB BLD-MCNC: 10.1 G/DL (ref 12.1–17)
MCH RBC QN AUTO: 28.2 PG (ref 26–34)
MCHC RBC AUTO-ENTMCNC: 32.4 G/DL (ref 30–36.5)
MCV RBC AUTO: 87.2 FL (ref 80–99)
NRBC # BLD: 0 K/UL (ref 0–0.01)
NRBC BLD-RTO: 0 PER 100 WBC
PLATELET # BLD AUTO: 166 K/UL (ref 150–400)
PMV BLD AUTO: 10.4 FL (ref 8.9–12.9)
RBC # BLD AUTO: 3.58 M/UL (ref 4.1–5.7)
SERVICE CMNT-IMP: NORMAL
WBC # BLD AUTO: 5.8 K/UL (ref 4.1–11.1)

## 2018-03-27 PROCEDURE — 74011250636 HC RX REV CODE- 250/636: Performed by: HOSPITALIST

## 2018-03-27 PROCEDURE — 36415 COLL VENOUS BLD VENIPUNCTURE: CPT | Performed by: HOSPITALIST

## 2018-03-27 PROCEDURE — 65270000032 HC RM SEMIPRIVATE

## 2018-03-27 PROCEDURE — 97530 THERAPEUTIC ACTIVITIES: CPT

## 2018-03-27 PROCEDURE — 85027 COMPLETE CBC AUTOMATED: CPT | Performed by: HOSPITALIST

## 2018-03-27 PROCEDURE — 74011250637 HC RX REV CODE- 250/637: Performed by: HOSPITALIST

## 2018-03-27 RX ADMIN — LOSARTAN POTASSIUM 50 MG: 50 TABLET ORAL at 08:55

## 2018-03-27 RX ADMIN — ENOXAPARIN SODIUM 40 MG: 40 INJECTION SUBCUTANEOUS at 17:58

## 2018-03-27 RX ADMIN — CARBIDOPA AND LEVODOPA 2 TABLET: 25; 100 TABLET ORAL at 21:04

## 2018-03-27 RX ADMIN — CARBIDOPA AND LEVODOPA 2 TABLET: 25; 100 TABLET ORAL at 08:55

## 2018-03-27 RX ADMIN — CARBIDOPA AND LEVODOPA 2 TABLET: 25; 100 TABLET ORAL at 12:41

## 2018-03-27 RX ADMIN — THERA TABS 1 TABLET: TAB at 08:55

## 2018-03-27 RX ADMIN — Medication 10 ML: at 05:30

## 2018-03-27 RX ADMIN — ACETAMINOPHEN 650 MG: 650 SOLUTION ORAL at 08:55

## 2018-03-27 RX ADMIN — ASPIRIN 81 MG 81 MG: 81 TABLET ORAL at 08:55

## 2018-03-27 RX ADMIN — QUETIAPINE 25 MG: 25 TABLET ORAL at 21:04

## 2018-03-27 RX ADMIN — ESCITALOPRAM OXALATE 20 MG: 10 TABLET ORAL at 08:55

## 2018-03-27 RX ADMIN — OXYBUTYNIN CHLORIDE 5 MG: 5 TABLET, EXTENDED RELEASE ORAL at 08:55

## 2018-03-27 RX ADMIN — PINDOLOL 2.5 MG: 5 TABLET ORAL at 21:04

## 2018-03-27 RX ADMIN — PANTOPRAZOLE SODIUM 40 MG: 40 TABLET, DELAYED RELEASE ORAL at 06:30

## 2018-03-27 RX ADMIN — Medication 10 ML: at 13:22

## 2018-03-27 RX ADMIN — CARBIDOPA AND LEVODOPA 2 TABLET: 25; 100 TABLET ORAL at 17:59

## 2018-03-27 RX ADMIN — PINDOLOL 2.5 MG: 5 TABLET ORAL at 13:22

## 2018-03-27 NOTE — PROGRESS NOTES
Bedside and Verbal shift change report given to Jen Rooney (oncoming nurse) by Drew Gan RN (offgoing nurse). Report included the following information SBAR, Kardex, Intake/Output, MAR, Accordion, Recent Results and Med Rec Status.

## 2018-03-27 NOTE — PROGRESS NOTES
Bedside verbal shift change report given to oncoming nurse Nell, R.N. Opportunity for questions and clarifications provided.

## 2018-03-27 NOTE — PROGRESS NOTES
Hospitalist Progress Note  Lavelle Cheadle, MD  Answering service: [phone] from in house phone  Cell: [phone]      Date of Service:  3/27/2018  NAME:  Rosalino Giordano  :  1933  MRN:  [id number]      Admission Summary:     The patient is a pleasant 60-year-old  gentleman with past medical history significant for hypertension, parkinsonism syndrome, benign prostate hypertrophy, degenerative joint disease, hyperlipidemia, major depression, who presents to the hospital via ambulance for reports of low blood pressure. He was apparently doing physical therapy at his facility when blood pressure was checked and it was in the 67M systolic. He received IV fluids via EMS and his blood pressure came up to 421I to 943C systolic. Interval history / Subjective:   F/u for dizziness and hypotension. No acute overnight event. I and having cough      Assessment & Plan:     Dizziness due to orthostatic hypotension due to autonomic dysfunction  suspect due to intravascular volume depletion in the setting of autonomic dysfunction. S/p NS IVF boluses  Dizziness now resolved. Echo done was normal. Carotid dopplers normal.  He has no focal deficits. No need obtaining head CT scan. resolved    Hypotension:   Improved with NS IVF bolus. BP is now stable and has been labile and now elevated  Continue losartan and pindolol  Monitor BP   Resolved    HTN:   BP has been labile due to autonomic dysfunction. Continue losartan and pindolol    Parkinson disease: On sinemet  Supportive care    Anemia    Abnormal Urinalysis with moderate leucocyte esterase: On rocephin. Urine Cx: Mixed skin samuel > 100,000. D/c Rocpehin    Left heel ulcer; This does not appear infected.   Continue wound dressing consult    Non ambulatory state: Start q 2 hourly turning    Pressure ulcer of left heel, stage 2    Code status: Full  DVT prophylaxis: Lovenox    Care Plan discussed with: Patient/Family and Nurse  Disposition: Home w/Family and TBD, daughter said to  that he will not be back to [address] [place] [address]  as will not accept him so will look for other placement     Hospital Problems  Date Reviewed: 2/23/2018          Codes Class Noted POA    Hypotension ICD-10-CM: I95.9  ICD-9-CM: 458.9  3/22/2018 Unknown                Review of Systems:   Pertinent items are noted in HPI. Vital Signs:    Last 24hrs VS reviewed since prior progress note. Most recent are:  Visit Vitals    /85 (BP 1 Location: Right arm, BP Patient Position: At rest)    Pulse 82    Temp 98.4 °F (36.9 °C)    Resp 15    Ht 6' 2\" (1.88 m)    Wt 84.5 kg (186 lb 4.6 oz)    SpO2 96%    BMI 23.92 kg/m2         Intake/Output Summary (Last 24 hours) at 03/27/18 0925  Last data filed at 03/26/18 1253   Gross per 24 hour   Intake              300 ml   Output                0 ml   Net              300 ml        Physical Examination:             Constitutional:  No acute distress,    ENT:  MMM   Resp:  CTA bilaterally. CV:  Regular rhythm, normal rate    GI:  Soft, non distended, non tender. BS+    Musculoskeletal:  No edema, warm, 2+ pulses throughout    Neurologic:  Moves all extremities- bilateral upper and lower extremity contractures AAOx1/2                Skin: Left heel ulcer- sero sanguinous discharge. Not foul smelling. No surrounding erythema       Data Review:          Labs:     Recent Labs      03/27/18   0345   WBC  5.8   HGB  10.1*   HCT  31.2*   PLT  166     No results for input(s): NA, K, CL, CO2, BUN, CREA, GLU, CA, MG, PHOS, URICA in the last 72 hours. No results for input(s): SGOT, GPT, ALT, AP, TBIL, TBILI, TP, ALB, GLOB, GGT, AML, LPSE in the last 72 hours. No lab exists for component: AMYP, HLPSE  No results for input(s): INR, PTP, APTT in the last 72 hours. No lab exists for component: INREXT, INREXT   No results for input(s): FE, TIBC, PSAT, FERR in the last 72 hours. Lab Results   Component Value Date/Time    Folate 32.6 (H) 10/30/2013 10:30 AM      No results for input(s): PH, PCO2, PO2 in the last 72 hours. No results for input(s): CPK, CKNDX, TROIQ in the last 72 hours.     No lab exists for component: CPKMB  Lab Results   Component Value Date/Time    Cholesterol, total 122 06/27/2017 02:25 AM    HDL Cholesterol 47 06/27/2017 02:25 AM    LDL, calculated 65.4 06/27/2017 02:25 AM    Triglyceride 48 06/27/2017 02:25 AM    CHOL/HDL Ratio 2.6 06/27/2017 02:25 AM     Lab Results   Component Value Date/Time    Glucose (POC) 85 03/22/2018 05:44 PM    Glucose (POC) 70 03/22/2018 12:07 PM    Glucose (POC) 96 11/15/2017 03:59 PM    Glucose (POC) 102 (H) 06/26/2017 07:02 PM    Glucose (POC) 97 08/23/2016 02:04 PM     Lab Results   Component Value Date/Time    Color YELLOW/STRAW 03/22/2018 12:50 PM    Appearance CLEAR 03/22/2018 12:50 PM    Specific gravity 1.019 03/22/2018 12:50 PM    pH (UA) 6.5 03/22/2018 12:50 PM    Protein NEGATIVE  03/22/2018 12:50 PM    Glucose NEGATIVE  03/22/2018 12:50 PM    Ketone NEGATIVE  03/22/2018 12:50 PM    Bilirubin NEGATIVE  03/22/2018 12:50 PM    Urobilinogen 1.0 03/22/2018 12:50 PM    Nitrites NEGATIVE  03/22/2018 12:50 PM    Leukocyte Esterase MODERATE (A) 03/22/2018 12:50 PM    Epithelial cells FEW 03/22/2018 12:50 PM    Bacteria NEGATIVE  03/22/2018 12:50 PM    WBC 5-10 03/22/2018 12:50 PM    RBC 0-5 03/22/2018 12:50 PM         Medications Reviewed:     Current Facility-Administered Medications   Medication Dose Route Frequency    hydrALAZINE (APRESOLINE) 20 mg/mL injection 10 mg  10 mg IntraVENous Q6H PRN    sodium chloride (NS) flush 5-10 mL  5-10 mL IntraVENous Q8H    sodium chloride (NS) flush 5-10 mL  5-10 mL IntraVENous PRN    enoxaparin (LOVENOX) injection 40 mg  40 mg SubCUTAneous Q24H    acetaminophen (TYLENOL) solution 650 mg  650 mg Oral Q4H PRN    carbidopa-levodopa (SINEMET)  mg per tablet 2 Tab  2 Tab Oral QID    polyethylene glycol (MIRALAX) packet 17 g  17 g Oral DAILY    therapeutic multivitamin (THERAGRAN) tablet 1 Tab  1 Tab Oral DAILY    docusate sodium (COLACE) capsule 100 mg  100 mg Oral DAILY PRN    escitalopram oxalate (LEXAPRO) tablet 20 mg  20 mg Oral DAILY    rivastigmine (EXELON) 4.6 mg/24 hr patch 1 Patch  1 Patch TransDERmal DAILY    pindolol (VISKIN) tablet 2.5 mg  2.5 mg Oral BID    oxybutynin chloride XL (DITROPAN XL) tablet 5 mg  5 mg Oral DAILY    aspirin chewable tablet 81 mg  81 mg Oral DAILY    lidocaine (LIDODERM) 5 % patch 1 Patch  1 Patch TransDERmal Q24H    traMADol (ULTRAM) tablet 50 mg  50 mg Oral Q6H PRN    pantoprazole (PROTONIX) tablet 40 mg  40 mg Oral ACB    albuterol (PROVENTIL VENTOLIN) nebulizer solution 2.5 mg  2.5 mg Nebulization Q6H PRN    QUEtiapine (SEROquel) tablet 25 mg  25 mg Oral QHS    cloNIDine HCl (CATAPRES) tablet 0.1 mg  0.1 mg Oral Q12H PRN    losartan (COZAAR) tablet 50 mg  50 mg Oral DAILY     ______________________________________________________________________  EXPECTED LENGTH OF STAY: 2d 9h  ACTUAL LENGTH OF STAY:          5                 Cynthia Ormond, MD

## 2018-03-27 NOTE — PROGRESS NOTES
Dr. Corinne Marroquin asked me to call patients daughter to see if patient could leave today. Daughter Herson Mendoza [phone] or [phone]. She stated that she had called [facility] and they will not accept patient back. She states that she called Doyle Falcon with [employer], and is waiting for a return call from her. Herson Mendoza also, ask that I send referral to [address] [place] [address] if they will not take patient her next choice would be [facility].  I will send referral.    Anay Felix RN CRM

## 2018-03-27 NOTE — PROGRESS NOTES
Bedside shift change report given to Greg Zhao (oncoming nurse) by Dionna Quach (offgoing nurse). Report included the following information SBAR and Kardex.

## 2018-03-27 NOTE — PROGRESS NOTES
Problem: Falls - Risk of  Goal: *Absence of Falls  Document Oksana Fall Risk and appropriate interventions in the flowsheet.    Outcome: Progressing Towards Goal  Fall Risk Interventions:  Mobility Interventions: Communicate number of staff needed for ambulation/transfer, OT consult for ADLs, PT Consult for mobility concerns    Mentation Interventions: Adequate sleep, hydration, pain control, Bed/chair exit alarm, Door open when patient unattended, More frequent rounding, Reorient patient, Toileting rounds, Update white board    Medication Interventions: Bed/chair exit alarm    Elimination Interventions: Bed/chair exit alarm, Call light in reach, Patient to call for help with toileting needs    History of Falls Interventions: Bed/chair exit alarm, Consult care management for discharge planning

## 2018-03-27 NOTE — PROGRESS NOTES
Problem: Mobility Impaired (Adult and Pediatric)  Goal: *Acute Goals and Plan of Care (Insert Text)  Physical Therapy Goals  Initiated 3/24/2018  1. Patient will move from supine to sit and sit to supine  in bed with moderate assistance  within 7 day(s). 2.  Patient will transfer from bed to chair and chair to bed with maximal assistance using the least restrictive device within 7 day(s). 3.  Patient will perform sit to stand with maximal assistance within 7 day(s). physical Therapy TREATMENT  Patient: Luz Higgins (44 y.o. male)  Date: 3/27/2018  Diagnosis: Hypotension <principal problem not specified>       Precautions: Fall, Skin  Chart, physical therapy assessment, plan of care and goals were reviewed. ASSESSMENT:  Patient received in bed, lying sideways with all bed rails up and both LE's dangling over the side of the rails, having kicked off the protective boots. Patient stating he was trying to get up to get to the shower. Attempted to reorient patient to hospital and repositioned. Patient rolling much better today in the bed in order to readjust draw sheet as well as change depends. Rolled side to side multiple times with min assist to SBA. Did monitor blood pressure for orthostatic pressure and did well from supine to partial sit in chair position. Did some passive ROM to bilateral LE's but very limited. Patient overall slightly agitated and limiting session due to confusion and wanting to get a shower. Positioned for comfort in bed with Head up in order to eat lunch. Continues to require significant assist to manage general bed mobility and will benefit from SNF. Progression toward goals:  []    Improving appropriately and progressing toward goals  [x]    Improving slowly and progressing toward goals  []    Not making progress toward goals and plan of care will be adjusted     PLAN:  Patient continues to benefit from skilled intervention to address the above impairments. Continue treatment per established plan of care. Discharge Recommendations:  Skilled Nursing Facility  Further Equipment Recommendations for Discharge:  none     SUBJECTIVE:   Patient stated I need to get out of here a get a shower.     OBJECTIVE DATA SUMMARY:   Critical Behavior:  Neurologic State: Alert, Confused  Orientation Level: Oriented to person, Oriented to place, Disoriented to time, Disoriented to situation  Cognition: Follows commands, Memory loss  Safety/Judgement: Decreased awareness of environment, Decreased awareness of need for assistance, Decreased awareness of need for safety, Decreased insight into deficits, Fall prevention  Functional Mobility Training:  Bed Mobility:  Rolling: Minimum assistance         Balance:  Sitting: Impaired         Therapeutic Exercises:   PROM BLE  Pain:  Pain Scale 1: Numeric (0 - 10)  Pain Intensity 1: 0              Activity Tolerance:   VSS  Not orthostatic today  After treatment:   []    Patient left in no apparent distress sitting up in chair  [x]    Patient left in no apparent distress in bed  [x]    Call bell left within reach  [x]    Nursing notified  []    Caregiver present  [x]    Bed alarm activated    COMMUNICATION/COLLABORATION:   The patients plan of care was discussed with: Registered Nurse    Charo Menendez, PT   Time Calculation: 27 mins

## 2018-03-27 NOTE — PROGRESS NOTES
NUTRITION       Nutrition screening referral was triggered based on results obtained during nursing admission assessment (heel wound). The patient's chart was reviewed and nutrition assessment is not indicated at this time. Plan to see patient for rescreen as indicated. Thank you.   Patient Vitals for the past 100 hrs:   % Diet Eaten   03/26/18 1253 60 %   03/26/18 0951 50 %   03/25/18 1717 50 %   03/25/18 1212 40 %   03/24/18 1106 75 %   03/24/18 0927 30 %   03/23/18 1630 80 %   03/23/18 1111 100 %   03/23/18 0856 100 %     [address], [address] [facility]

## 2018-03-28 ENCOUNTER — TELEPHONE (OUTPATIENT)
Dept: FAMILY MEDICINE CLINIC | Age: 83
End: 2018-03-28

## 2018-03-28 VITALS
OXYGEN SATURATION: 96 % | RESPIRATION RATE: 16 BRPM | TEMPERATURE: 98.5 F | WEIGHT: 186.29 LBS | BODY MASS INDEX: 23.91 KG/M2 | SYSTOLIC BLOOD PRESSURE: 130 MMHG | HEIGHT: 74 IN | HEART RATE: 78 BPM | DIASTOLIC BLOOD PRESSURE: 85 MMHG

## 2018-03-28 PROBLEM — I95.9 HYPOTENSION: Status: RESOLVED | Noted: 2018-03-22 | Resolved: 2018-03-28

## 2018-03-28 PROCEDURE — 74011250637 HC RX REV CODE- 250/637: Performed by: HOSPITALIST

## 2018-03-28 RX ADMIN — PANTOPRAZOLE SODIUM 40 MG: 40 TABLET, DELAYED RELEASE ORAL at 06:40

## 2018-03-28 RX ADMIN — Medication 10 ML: at 13:58

## 2018-03-28 RX ADMIN — POLYETHYLENE GLYCOL 3350 17 G: 17 POWDER, FOR SOLUTION ORAL at 09:20

## 2018-03-28 RX ADMIN — ESCITALOPRAM OXALATE 20 MG: 10 TABLET ORAL at 09:19

## 2018-03-28 RX ADMIN — ASPIRIN 81 MG 81 MG: 81 TABLET ORAL at 09:19

## 2018-03-28 RX ADMIN — Medication 10 ML: at 06:40

## 2018-03-28 RX ADMIN — PINDOLOL 2.5 MG: 5 TABLET ORAL at 14:04

## 2018-03-28 RX ADMIN — CARBIDOPA AND LEVODOPA 2 TABLET: 25; 100 TABLET ORAL at 09:19

## 2018-03-28 RX ADMIN — OXYBUTYNIN CHLORIDE 5 MG: 5 TABLET, EXTENDED RELEASE ORAL at 09:19

## 2018-03-28 RX ADMIN — CARBIDOPA AND LEVODOPA 2 TABLET: 25; 100 TABLET ORAL at 13:57

## 2018-03-28 RX ADMIN — THERA TABS 1 TABLET: TAB at 09:19

## 2018-03-28 NOTE — DISCHARGE INSTRUCTIONS
Discharge SNF/Rehab Instructions/LTAC       PATIENT ID: Catia Villafuerte  MRN: [id number]   YOB: 1933    DATE OF ADMISSION: 3/22/2018 12:04 PM    DATE OF DISCHARGE: 3/28/2018    PRIMARY CARE PROVIDER: John Desai MD       ATTENDING PHYSICIAN: Daniel Ibanez MD  DISCHARGING PROVIDER: Daniel Ibanez MD     To contact this individual call [phone] and ask the  to page. If unavailable ask to be transferred the Adult Hospitalist Department. CONSULTATIONS: IP CONSULT TO HOSPITALIST    PROCEDURES/SURGERIES: * No surgery found *    [id number] [facility] COURSE:     The patient is a pleasant 80-year-old  gentleman with past medical history significant for hypertension, parkinsonism syndrome, benign prostate hypertrophy, degenerative joint disease, hyperlipidemia, major depression, who presents to the hospital via ambulance for reports of low blood pressure.  He was apparently doing physical therapy at his facility when blood pressure was checked and it was in the 51J systolic.  He received IV fluids via EMS and his blood pressure came up to 135Z to 190O systolic.       Assessment & Plan:      Dizziness due to orthostatic hypotension due to autonomic dysfunction  suspect due to intravascular volume depletion in the setting of autonomic dysfunction. S/p NS IVF boluses  Dizziness now resolved. Echo done was normal. Carotid dopplers normal.  He has no focal deficits. No need obtaining head CT scan. Resolved      Hypotension:   Improved with NS IVF bolus. BP is now stable and has been labile and now elevated  Continue losartan and pindolol     HTN:   BP has been labile due to autonomic dysfunction. Continue losartan and pindolol     Parkinson disease: On sinemet  Supportive care     Anemia     Abnormal Urinalysis with moderate leucocyte esterase: On rocephin. Urine Cx: Mixed skin samuel > 100,000. D/c [place]     Left heel ulcer;  This does not appear infected. Continue wound dressing consult     Non ambulatory state: Start q 2 hourly turning     Pressure ulcer of left heel, stage 2                PENDING TEST RESULTS:   At the time of discharge the following test results are still pending:     FOLLOW UP APPOINTMENTS:    Follow-up Information     Follow up With Details Comments Contact Saba Qureshi MD In 1 week  [address] [facility] [phone]             ADDITIONAL CARE RECOMMENDATIONS:     DIET: Regular Diet and Cardiac Diet    TUBE FEEDING INSTRUCTIONS:     OXYGEN / BiPAP SETTINGS:     ACTIVITY: Activity as tolerated    WOUND CARE:     EQUIPMENT needed:       DISCHARGE MEDICATIONS:   See Medication Reconciliation Form      NOTIFY YOUR PHYSICIAN FOR ANY OF THE FOLLOWING:   Fever over 101 degrees for 24 hours. Chest pain, shortness of breath, fever, chills, nausea, vomiting, diarrhea, change in mentation, falling, weakness, bleeding. Severe pain or pain not relieved by medications. Or, any other signs or symptoms that you may have questions about.     DISPOSITION:    Home With:   OT  PT  HH  RN      x SNF/Inpatient Rehab/LTAC    Independent/assisted living    Hospice    Other:       PATIENT CONDITION AT DISCHARGE:     Functional status   x Poor     Deconditioned     Independent      Cognition   x  Lucid     Forgetful     Dementia      Catheters/lines (plus indication)    Vance     PICC     PEG    x None      Code status     Full code    x DNR      PHYSICAL EXAMINATION AT DISCHARGE:   Refer to Progress Note      CHRONIC MEDICAL DIAGNOSES:  Problem List as of 3/28/2018  Date Reviewed: 3/28/2018          Codes Class Noted - Resolved    Recurrent depression ([facility] ICD-10-CM: F33.9  ICD-9-CM: 296.30  1/3/2018 - Present        Altered mental status ICD-10-CM: R41.82  ICD-9-CM: 780.97  11/12/2017 - Present        Benign nodular prostatic hyperplasia with lower urinary tract symptoms- Dr. 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.1  ICD-9-CM: 435.8  2017 - Present        ACP (advance care planning) ICD-10-CM: Z71.89  ICD-9-CM: V65.49  2017 - Present        Pacemaker end of life ICD-10-CM: Z45.018  ICD-9-CM: V53.31  3/13/2017 - Present    Overview Signed 3/13/2017  9:49 AM by Izell Cowden, MD     Generator change 3/13/2017             Pneumonia ICD-10-CM: J18.9  ICD-9-CM: 486  2017 - Present        Aneurysm of iliac artery (HCC)- left 3.9 cm  CT scan- no change ICD-10-CM: I72.3  ICD-9-CM: 442.2  2016 - Present        Grief- wife of 61 years  2015 ICD-10-CM: F43.20  ICD-9-CM: 309.0  2015 - Present        Primary osteoarthritis of both knees ICD-10-CM: M17.0  ICD-9-CM: 715.16  2015 - Present        Overactive bladder- Dr. 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 (Chronic) ICD-10-CM: F91.865K  ICD-9-CM: 844.8  2014 - Present        Mild cognitive impairment w/o memory loss ICD-10-CM: G31.84  ICD-9-CM: 331.83  2014 - Present        Major depressive disorder, single episode ICD-10-CM: F32.9  ICD-9-CM: 296.20  2014 - Present        Generalized anxiety disorder ICD-10-CM: F41.1  ICD-9-CM: 300.02  2014 - Present        Orthostatic hypotension ICD-10-CM: I95.1  ICD-9-CM: 458.0  2013 - Present        Blurry vision-chronic, no change- neg w/u ICD-10-CM: H53.8  ICD-9-CM: 368.8  2013 - Present        NSVT (nonsustained ventricular tachycardia) ([facility]) ICD-10-CM: I47.2  ICD-9-CM: 427.1  2012 - Present    Overview Signed 2012  9:56 AM by Izell Cowden, MD     2. 2.             PAT (paroxysmal atrial tachycardia) ([facility]) ICD-10-CM: I47.1  ICD-9-CM: 427.0  2012 - Present        Sick sinus syndrome-pacemaker- ICD-10-CM: I49.5  ICD-9-CM: 427.81  10/28/2011 - Present        Essential hypertension, difficult to control due to orthostatic hypotension ICD-10-CM: I10  ICD-9-CM: 401.1  4/18/2011 - Present        Parkinsonian syndrome- Dr. Le Gregorio ICD-10-CM: Dipesh Kuo  ICD-9-CM: 332.0  4/8/2011 - Present        Hyperlipemia ICD-10-CM: E78.5  ICD-9-CM: 272.4  2/19/2010 - Present        Reflux esophagitis ICD-10-CM: K21.0  ICD-9-CM: 530.11  2/19/2010 - Present        DDD (degenerative disc disease), lumbar ICD-10-CM: M51.36  ICD-9-CM: 722.52  2/19/2010 - Present        DJD (degenerative joint disease) of cervical spine ICD-10-CM: M47.812  ICD-9-CM: 721.0  2/19/2010 - Present    Overview Signed 1/21/2018  9:01 AM by Yeimi Gracia MD     Moderately severe in jan 2018 X ray             Paralysis agitans ([address] ICD-10-CM: Dipesh Kuo  ICD-9-CM: 332.0  2/19/2010 - Present        DU (duodenal ulcer) ICD-10-CM: K26.9  ICD-9-CM: 532.90  9/1/1990 - Present        History of duodenal ulcer ICD-10-CM: Z87.19  ICD-9-CM: V12.79  9/1/1990 - Present        RESOLVED: Hypotension ICD-10-CM: I95.9  ICD-9-CM: 458.9  3/22/2018 - 3/28/2018        RESOLVED: Dehydration ICD-10-CM: E86.0  ICD-9-CM: 276.51  11/12/2017 - 11/16/2017        RESOLVED: Agitation ICD-10-CM: R45.1  ICD-9-CM: 307.9  11/12/2017 - 11/16/2017        RESOLVED: Slurred speech ICD-10-CM: R47.81  ICD-9-CM: 784.59  6/26/2017 - 7/12/2017        RESOLVED: Altered mental status ICD-10-CM: R41.82  ICD-9-CM: 780.97  6/26/2017 - 7/12/2017        RESOLVED: Influenza A ICD-10-CM: J10.1  ICD-9-CM: 487.1  3/19/2017 - 7/12/2017        RESOLVED: Sepsis ([facility] ICD-10-CM: A41.9  ICD-9-CM: 038.9, 995.91  6/10/2016 - 7/28/2016        RESOLVED: Catheter-associated urinary tract infection ([facility] ICD-10-CM: T83.511A, N39.0  ICD-9-CM: 996.64, 599.0  4/15/2016 - 4/18/2016        RESOLVED: Metabolic encephalopathy QJM-80-ZQ: G93.41  ICD-9-CM: 348.31  4/15/2016 - 4/18/2016        RESOLVED: GABI (acute kidney injury) ([facility] ICD-10-CM: N17.9  ICD-9-CM: 584.9  4/15/2016 - 4/18/2016        RESOLVED: Hyponatremia ICD-10-CM: E87.1  ICD-9-CM: 276.1  4/15/2016 - 4/18/2016        RESOLVED: Mental status change ICD-10-CM: R41.82  ICD-9-CM: 780.97  10/30/2013 - 1/5/2016        RESOLVED: Pneumonia, organism unspecified(486) ICD-10-CM: J18.9  ICD-9-CM: 936  2/19/2012 - 1/5/2016        RESOLVED: Weakness generalized ICD-10-CM: R53.1  ICD-9-CM: 780.79  2/19/2012 - 2/22/2012        RESOLVED: PAT (paroxysmal atrial tachycardia) (HCC) ICD-10-CM: I47.1  ICD-9-CM: 427.0  1/19/2012 - 2/29/2012        RESOLVED: Premature atrial beats ICD-10-CM: I49.1  ICD-9-CM: 427.61  Unknown - 2/29/2012                CDMP Checked:   Yes x     PROBLEM LIST Updated:  Yes x         Signed:   Ilda Girard MD  3/28/2018  12:16 PM

## 2018-03-28 NOTE — PROGRESS NOTES
CM reviewed chart. Noted referrals sent to SNF on yesterday. [facility] cannot accept due to no bed available. CM sent updated referral to [facility] as no response from prior referral.     Kizzie Cabot, RN ACM [facility] will accept. CM talked to pt's daughter. She wants ambulance transportation. Li Odalis requested that pt come at 3:00 pm. Will set up 3 pm transport with [facility]. [facility] will  pt at 4:00 pm. Pt and hid daughter informed of time. Nurse to call report to [phone].

## 2018-03-28 NOTE — PROGRESS NOTES
Hospital to SNF SBAR Handoff - Red Beckwith                                                                        80 y.o.   male    [address]   [address]: [address]    [facility]  Unit Phone# :  [phone]      [place] [address]  Dept: [address]: [phone]                    SITUATION     Admitted:  3/22/2018         Attending Provider:  Faith Retana MD       Consultations:  IP CONSULT TO HOSPITALIST    PCP:  Jr Sheldon MD   [phone]    Treatment Team: Attending Provider: Faith Retana MD; Consulting Provider: Asmita Tillman MD; Utilization Review: Martha Nava RN; Care Manager: Kyleigh Hannah RN    Admitting Dx:  Hypotension       Principal Problem: <principal problem not specified>    * No surgery found * of      BY: * Surgery not found *             ON: * No surgery found *                  Code Status: DNR                Advance Directives:   Advance Care Planning 3/22/2018   Patient's Healthcare Decision Maker is: Legal Next of Chuy 69   Primary Decision Maker Name Kale Saravia   Primary Decision Maker Phone Number [phone]   Primary Decision Maker Relationship to Patient Adult child   Confirm Advance Directive Yes, on file    (Send w/patient)   <no information>       Isolation:  There are currently no Active Isolations       MDRO: No current active infections    Pain Medications given:  N/A        Special Equipment needed: no  Type of equipment:      (Not currently on dialysis)  (Not currently on dialysis)  (Not currently on dialysis)     BACKGROUND     Allergies:  No Known Allergies    Past Medical History:   Diagnosis Date    Blurry vision 1/19/2012    BPH (benign prostatic hyperplasia)     DDD (degenerative disc disease), lumbar 2/19/2010    DJD (degenerative joint disease) of cervical spine 2/19/2010    DU (duodenal ulcer) 9/1/1990    Hyperlipidemia     Hypertension     Other and unspecified hyperlipidemia 2/19/2010    Pacemaker     Paralysis agitans ([facility] 2/19/2010    Parkinson disease ([facility]     PAT ([facility]oxysmal atrial tachycardia) [facility])     Premature atrial beats     Reflux esophagitis 2/19/2010    Sick sinus syndrome ([facility])     TIA (transient ischemic attack)        Past Surgical History:   Procedure Laterality Date    HX CATARACT REMOVAL  6/2012 and 7/2012    Both eyes    HX ORTHOPAEDIC  04/2015    tendon repair both knees    HX PACEMAKER  2008    bradycardia    HX VITRECTOMY  11/2011    SC REMVL PERM PM PLS GEN W/REPL PLSE GEN 2 LEAD SYS  3/13/2017            Prescriptions Prior to Admission   Medication Sig    omeprazole (PRILOSEC) 40 mg capsule Take 40 mg by mouth daily.  nitrofurantoin (MACRODANTIN) 50 mg capsule Take 50 mg by mouth daily. Indications: UTI PPX    Saccharomyces boulardii (FLORASTOR) 250 mg capsule Take 250 mg by mouth two (2) times a day.  albuterol (PROAIR HFA) 90 mcg/actuation inhaler Take 2 Puffs by inhalation every six (6) hours as needed for Wheezing.  QUEtiapine (SEROQUEL) 25 mg tablet Take 25 mg by mouth nightly.  traMADol (ULTRAM) 50 mg tablet Take 1 Tab by mouth every eight (8) hours as needed for Pain. Max Daily Amount: 150 mg.    lidocaine (LIDODERM) 5 % Apply patch to the affected area for 12 hours a day and remove for 12 hours a day.  losartan (COZAAR) 50 mg tablet Take 1 Tab by mouth daily.  aspirin 81 mg chewable tablet Take 81 mg by mouth daily.  diclofenac (VOLTAREN) 1 % gel Apply  to affected area as needed.  DETROL LA 2 mg ER capsule TAKE 1 CAPSULE DAILY FOR BLADDER FREQUENCY    pindolol (VISKIN) 5 mg tablet Take 2.5 mg by mouth two (2) times a day. Take 1/2 tablet by mouth at 2pm and 1/2 tab by mouth at 9pm    carbidopa-levodopa (SINEMET)  mg per tablet Take 2 Tabs by mouth four (4) times daily. (This is given at 8:00, 11:00, 14:00, and 21:00).     polyethylene glycol (MIRALAX) 17 gram packet Take 1 Packet by mouth daily. Indications: CONSTIPATION    therapeutic multivitamin (THERAGRAN) tablet Take 1 Tab by mouth daily.  cloNIDine HCl (CATAPRES) 0.1 mg tablet Take 1 Tab by mouth every six (6) hours as needed (SBP > 180 mmHg).  docusate sodium (COLACE) 100 mg capsule Take 1 Cap by mouth daily as needed for Constipation.  cycloSPORINE (RESTASIS) 0.05 % ophthalmic emulsion Administer 1 Drop to both eyes two (2) times a day.  escitalopram oxalate (LEXAPRO) 20 mg tablet Take 1 Tab by mouth daily.  rivastigmine (EXELON) 4.6 mg/24 hr patch 1 Patch by TransDERmal route daily.  acetaminophen (MAPAP EXTRA STRENGTH) 500 mg tablet Take 1 Tab by mouth every six (6) hours as needed for Pain. Hard scripts included in transfer packet no    Vaccinations:    Immunization History   Administered Date(s) Administered    Influenza High Dose Vaccine PF 10/27/2015    Influenza Vaccine (Quad) PF 11/16/2017    Influenza Vaccine Split 11/17/2011    TB Skin Test (PPD) Intradermal 03/25/2013    TD Vaccine 05/03/2012    Tdap 05/03/2012    ZZZ-RETIRED (DO NOT USE) Pneumococcal Vaccine (Unspecified Type) 04/20/2010       Readmission Risks:    Known Risks: The Charlson CoMorbitiy Index tool is an evidenced based tool that has more automatic generated information. The tool looks at many different items such as the age of the patient, how many times they were admitted in the last calendar year, current length of stay in the hospital and their diagnosis. All of these items are pulled automatically from information documented in the chart from various places and will generate a score that predicts whether a patient is at low (less than 13), medium (13-20) or high (21 or greater) risk of being readmitted.         ASSESSMENT                Temp: 98.5 °F (36.9 °C) (03/28/18 1357) Pulse (Heart Rate): 78 (03/28/18 1357)     Resp Rate: 16 (03/28/18 1357)           BP: 130/85 (03/28/18 1357)     O2 Sat (%): 96 % (03/28/18 1357)     Weight: 84.5 kg (186 lb 4.6 oz)    Height: 6' 2\" (188 cm) (03/22/18 1206)       If above not within 1 hour of discharge:    BP:_____  P:____  R:____ T:_____ O2 Sat: ___%  O2: ______    Active Orders   Diet    DIET CARDIAC Regular         Orientation: disoriented     Active Behaviors: None                                   Active Lines/Drains:  (Peg Tube / Vance / CL or S/L?): no    Urinary Status: Voiding, Incontinent briefs     Last BM: Last Bowel Movement Date: 03/26/18     Skin Integrity: Wound (add Wound LDA) (left heel wound)   Wound Heel Left-DRESSING STATUS: Changed per order    Wound Heel Left-DRESSING TYPE: Hydrocolloid    Mobility: Very limited   Weight Bearing Status: NWB (Non Weight Bearing)                Lab Results   Component Value Date/Time    Glucose 86 03/23/2018 04:09 AM    Hemoglobin A1c 5.3 06/27/2017 02:25 AM    INR 1.1 11/12/2017 09:01 PM    INR 1.1 06/27/2017 02:25 AM    HGB 10.1 (L) 03/27/2018 03:45 AM    HGB 10.1 (L) 03/23/2018 04:09 AM        RECOMMENDATION     See After Visit Summary (AVS) for:  · Discharge instructions  · After [address]   · Special equipment needed (entered pre-discharge by Care Management)  · Medication Reconciliation    · Follow up Appointment(s)         Report given/sent by:  Aldo Peterson RN                    Verbal report given to: Booker Sullivan          Estimated discharge time:  3/28/2018 at 1600

## 2018-03-28 NOTE — PROGRESS NOTES
Bedside shift change report given to [address] [place] [address] Drive (oncoming nurse) by Jamal Infante RN (offgoing nurse). Report included the following information SBAR, Kardex, Intake/Output, MAR and Recent Results.

## 2018-03-28 NOTE — PROGRESS NOTES
Hospitalist Progress Note  Vijaya Roy MD  Answering service: [phone] OR 36 from in house phone  Cell: [phone]      Date of Service:  3/28/2018  NAME:  Francheska Ash  :  1933  MRN:  [id number]      Admission Summary:     The patient is a pleasant 51-year-old  gentleman with past medical history significant for hypertension, parkinsonism syndrome, benign prostate hypertrophy, degenerative joint disease, hyperlipidemia, major depression, who presents to the hospital via ambulance for reports of low blood pressure. He was apparently doing physical therapy at his facility when blood pressure was checked and it was in the 29F systolic. He received IV fluids via EMS and his blood pressure came up to 631A to 676U systolic. Interval history / Subjective:   F/u for dizziness and hypotension. No acute overnight event. Assessment & Plan:     Dizziness due to orthostatic hypotension due to autonomic dysfunction  suspect due to intravascular volume depletion in the setting of autonomic dysfunction. S/p NS IVF boluses  Dizziness now resolved. Echo done was normal. Carotid dopplers normal.  He has no focal deficits. No need obtaining head CT scan. Resolved     Hypotension:   Improved with NS IVF bolus. BP is now stable and has been labile and now elevated  Continue losartan and pindolol  Monitor BP   Resolved  - hypotension in AM while in bed ACE held    HTN:   BP has been labile due to autonomic dysfunction. Continue losartan and pindolol    Parkinson disease: On sinemet  Supportive care    Anemia    Abnormal Urinalysis with moderate leucocyte esterase: On rocephin. Urine Cx: Mixed skin samuel > 100,000. D/c Rocpehin    Left heel ulcer; This does not appear infected.   Continue wound dressing consult    Non ambulatory state: Start q 2 hourly turning    Pressure ulcer of left heel, stage 2 POA    Code status: Full  DVT prophylaxis: Lovenox    Care Plan discussed with: Patient/Family and Nurse  Disposition: Daughter said to CM that he will not be back to [address] [place] [address] Dr as will not accept him so will look for other placement CM working on placement     Hospital Problems  Date Reviewed: 2/23/2018          Codes Class Noted POA    Hypotension ICD-10-CM: I95.9  ICD-9-CM: 458.9  3/22/2018 Unknown                Review of Systems:   Pertinent items are noted in HPI. Vital Signs:    Last 24hrs VS reviewed since prior progress note. Most recent are:  Visit Vitals    BP (!) 88/54 (BP 1 Location: Right arm, BP Patient Position: At rest)    Pulse 82    Temp 98.4 °F (36.9 °C)    Resp 16    Ht 6' 2\" (1.88 m)    Wt 84.5 kg (186 lb 4.6 oz)    SpO2 97%    BMI 23.92 kg/m2         Intake/Output Summary (Last 24 hours) at 03/28/18 0955  Last data filed at 03/27/18 1707   Gross per 24 hour   Intake              480 ml   Output                0 ml   Net              480 ml        Physical Examination:             Constitutional:  No acute distress,    ENT:  MMM   Resp:  CTA bilaterally. CV:  Regular rhythm, normal rate    GI:  Soft, non distended, non tender. BS+    Musculoskeletal:  No edema, warm, 2+ pulses throughout    Neurologic:  Moves all extremities- bilateral upper and lower extremity contractures AAOx1/2                Skin: Left heel ulcer- sero sanguinous discharge. Not foul smelling. No surrounding erythema       Data Review:          Labs:     Recent Labs      03/27/18   0345   WBC  5.8   HGB  10.1*   HCT  31.2*   PLT  166     No results for input(s): NA, K, CL, CO2, BUN, CREA, GLU, CA, MG, PHOS, URICA in the last 72 hours. No results for input(s): SGOT, GPT, ALT, AP, TBIL, TBILI, TP, ALB, GLOB, GGT, AML, LPSE in the last 72 hours. No lab exists for component: AMYP, HLPSE  No results for input(s): INR, PTP, APTT in the last 72 hours.     No lab exists for component: INREXT, INREXT   No results for input(s): FE, TIBC, PSAT, FERR in the last 72 hours. Lab Results   Component Value Date/Time    Folate 32.6 (H) 10/30/2013 10:30 AM      No results for input(s): PH, PCO2, PO2 in the last 72 hours. No results for input(s): CPK, CKNDX, TROIQ in the last 72 hours.     No lab exists for component: CPKMB  Lab Results   Component Value Date/Time    Cholesterol, total 122 06/27/2017 02:25 AM    HDL Cholesterol 47 06/27/2017 02:25 AM    LDL, calculated 65.4 06/27/2017 02:25 AM    Triglyceride 48 06/27/2017 02:25 AM    CHOL/HDL Ratio 2.6 06/27/2017 02:25 AM     Lab Results   Component Value Date/Time    Glucose (POC) 85 03/22/2018 05:44 PM    Glucose (POC) 70 03/22/2018 12:07 PM    Glucose (POC) 96 11/15/2017 03:59 PM    Glucose (POC) 102 (H) 06/26/2017 07:02 PM    Glucose (POC) 97 08/23/2016 02:04 PM     Lab Results   Component Value Date/Time    Color YELLOW/STRAW 03/22/2018 12:50 PM    Appearance CLEAR 03/22/2018 12:50 PM    Specific gravity 1.019 03/22/2018 12:50 PM    pH (UA) 6.5 03/22/2018 12:50 PM    Protein NEGATIVE  03/22/2018 12:50 PM    Glucose NEGATIVE  03/22/2018 12:50 PM    Ketone NEGATIVE  03/22/2018 12:50 PM    Bilirubin NEGATIVE  03/22/2018 12:50 PM    Urobilinogen 1.0 03/22/2018 12:50 PM    Nitrites NEGATIVE  03/22/2018 12:50 PM    Leukocyte Esterase MODERATE (A) 03/22/2018 12:50 PM    Epithelial cells FEW 03/22/2018 12:50 PM    Bacteria NEGATIVE  03/22/2018 12:50 PM    WBC 5-10 03/22/2018 12:50 PM    RBC 0-5 03/22/2018 12:50 PM         Medications Reviewed:     Current Facility-Administered Medications   Medication Dose Route Frequency    hydrALAZINE (APRESOLINE) 20 mg/mL injection 10 mg  10 mg IntraVENous Q6H PRN    sodium chloride (NS) flush 5-10 mL  5-10 mL IntraVENous Q8H    sodium chloride (NS) flush 5-10 mL  5-10 mL IntraVENous PRN    enoxaparin (LOVENOX) injection 40 mg  40 mg SubCUTAneous Q24H    acetaminophen (TYLENOL) solution 650 mg  650 mg Oral Q4H PRN    carbidopa-levodopa (SINEMET)  mg per tablet 2 Tab  2 Tab Oral QID    polyethylene glycol (MIRALAX) packet 17 g  17 g Oral DAILY    therapeutic multivitamin (THERAGRAN) tablet 1 Tab  1 Tab Oral DAILY    docusate sodium (COLACE) capsule 100 mg  100 mg Oral DAILY PRN    escitalopram oxalate (LEXAPRO) tablet 20 mg  20 mg Oral DAILY    rivastigmine (EXELON) 4.6 mg/24 hr patch 1 Patch  1 Patch TransDERmal DAILY    pindolol (VISKIN) tablet 2.5 mg  2.5 mg Oral BID    oxybutynin chloride XL (DITROPAN XL) tablet 5 mg  5 mg Oral DAILY    aspirin chewable tablet 81 mg  81 mg Oral DAILY    lidocaine (LIDODERM) 5 % patch 1 Patch  1 Patch TransDERmal Q24H    traMADol (ULTRAM) tablet 50 mg  50 mg Oral Q6H PRN    pantoprazole (PROTONIX) tablet 40 mg  40 mg Oral ACB    albuterol (PROVENTIL VENTOLIN) nebulizer solution 2.5 mg  2.5 mg Nebulization Q6H PRN    QUEtiapine (SEROquel) tablet 25 mg  25 mg Oral QHS    cloNIDine HCl (CATAPRES) tablet 0.1 mg  0.1 mg Oral Q12H PRN    losartan (COZAAR) tablet 50 mg  50 mg Oral DAILY     ______________________________________________________________________  EXPECTED LENGTH OF STAY: 2d 9h  ACTUAL LENGTH OF STAY:          6                 Zaire Berger MD

## 2018-03-28 NOTE — TELEPHONE ENCOUNTER
----- Message from [facility] sent at 3/28/2018  9:00 AM EDT -----  Regarding: Dr. Mayito Chavira / telephone  Addis Dinero with [facility] called to advise received all information requested via  fax this morning except the \"face to face encounter\". Need to have that information refaxed.   Best contact [phone] [facility]#[phone]

## 2018-03-28 NOTE — PROGRESS NOTES
Spiritual Care Partner Volunteer visited patient in room 606/01 on 3.28.18. Documented by: : Rev. Elmer Mccormack.  Félix Voss; [facility], to contact [phone] Michael Maloney call: [phone]

## 2018-03-28 NOTE — PROGRESS NOTES
Bedside shift change report given to Juanita Mcmahan (oncoming nurse) by Hussein Lanza (offgoing nurse). Report included the following information SBAR and Kardex. [id number] - Informed Dr. Monalisa Arroyo of patient's blood pressure of 88/54. Dr. Monalisa Arroyo stated to hold losartan and continue to monitor.

## 2018-03-28 NOTE — PROGRESS NOTES
Hospital PCP SABRINA ENRIQUE appointment scheduled with Dr. Pavan Gannon on Wednesday April 18, 2018 @ 2:00 p.m.  Added to AVS. Cindy Justice CM Specialist

## 2018-03-29 ENCOUNTER — PATIENT OUTREACH (OUTPATIENT)
Dept: FAMILY MEDICINE CLINIC | Age: 83
End: 2018-03-29

## 2018-03-29 NOTE — PROGRESS NOTES
Patient received on discharge report. Admitted [facility] 3/22-3/28/18 for hypotension. Reports increase confusion. Patient resided at [facility]. During PT, blood pressure taken, 08'V systolic. EMS summoned. Blood pressure 95'0 systolic. Patient received IV fluids and blood pressure came up to 431'W systolic. UA revealed UTI. Per chart, patient with stage II left heel ulcer. Patient discharged to [facility] SN[facility]. Outbound call made 3/29/18 to SNF to complete discharge assessment and anticipated discharge date. NN spoke to Willow, patient's nurse, reports he is doing well and took his medications. Reports PT plans to evaluate patient this afternoon. NN spoke to [facility] [employer],  re: anticipated length of stay. Disposition date unknown at this time. NN contact information given to [facility] [employer] for update.

## 2018-04-03 ENCOUNTER — PATIENT OUTREACH (OUTPATIENT)
Dept: CASE MANAGEMENT | Age: 83
End: 2018-04-03

## 2018-04-03 NOTE — PROGRESS NOTES
Community Care Team Documentation for Patient in [facility]  Initial Follow Up       Patient was admitted to [facility] from 3/22 to 3/28. Patient was discharged to [facility], [facility], on 3/28 (date). See previous [place] Care Team documentation under Patient Outreach. Hospital Discharge diagnosis: Dizziness due to orthostatic hypotension due to autonomic dysfunction    RRAT score:  22    Advance Medical Directive on file in EMR? Total Hospitalizations/ED visits last 6 months? IP - 3; ED - 2    PCP : Karen Alva MD  Nurse Navigator in PCP office: Laurie Felix  Note routed to Nurse Navigator team.    Per Jamin Ray at [facility], Reviewed [employer] questions with SNF to ensure patient arrived with admission packet in order. Pt being skilled by PT/OT. Currently mod assist with bed mobility, transfers and bathing. Max assist with upper body ADLs. Dependent with toileting, lower body bathing and dressing. Supervision with feeding, hygiene and grooming. Therapy anticipating 3 weeks LOS. Plan to return to [facility] ER. Pt's daughter willing to hire private caregivers if needed. L heel wound care continues. SNF Attending:  Agus Tirado MD    Medications were not reconciled and general patient assessment was not completed during this skilled nursing facility outreach.      SOBIA Uriarte

## 2018-04-05 ENCOUNTER — PATIENT OUTREACH (OUTPATIENT)
Dept: FAMILY MEDICINE CLINIC | Age: 83
End: 2018-04-05

## 2018-04-05 NOTE — DISCHARGE SUMMARY
Discharge Summary       PATIENT ID: Rosalino Giordano  MRN: [id number]   YOB: 1933    DATE OF ADMISSION: 3/22/2018 12:04 PM    DATE OF DISCHARGE: 3/28/18   PRIMARY CARE PROVIDER: Hi Gage MD     ATTENDING PHYSICIAN: Berto Yousif  DISCHARGING PROVIDER: Lavelle Cheadle, MD    To contact this individual call [phone] and ask the  to page. If unavailable ask to be transferred the Adult Hospitalist Department. CONSULTATIONS: None    PROCEDURES/SURGERIES: * No surgery found *    [address] COURSE:     The patient is a pleasant 80-year-old  gentleman with past medical history significant for hypertension, parkinsonism syndrome, benign prostate hypertrophy, degenerative joint disease, hyperlipidemia, major depression, who presents to the hospital via ambulance for reports of low blood pressure.  He was apparently doing physical therapy at his facility when blood pressure was checked and it was in the 31O systolic.  He received IV fluids via EMS and his blood pressure came up to 654F to 356G systolic.       Assessment & Plan:      Dizziness due to orthostatic hypotension due to autonomic dysfunction  suspect due to intravascular volume depletion in the setting of autonomic dysfunction. S/p NS IVF boluses  Dizziness now resolved. Echo done was normal. Carotid dopplers normal.  He has no focal deficits. No need obtaining head CT scan. Resolved      Hypotension:   Improved with NS IVF bolus. BP is now stable and has been labile and now elevated  Continue losartan and pindolol     HTN:   BP has been labile due to autonomic dysfunction. Continue losartan and pindolol     Parkinson disease: On sinemet  Supportive care     Anemia     Abnormal Urinalysis with moderate leucocyte esterase: On rocephin. Urine Cx: Mixed skin samuel > 100,000. D/c Rocpehin     Left heel ulcer; This does not appear infected.   Continue wound dressing consult     Non ambulatory state: Start q 2 hourly turning     Pressure ulcer of left heel, stage 2                  PENDING TEST RESULTS:   At the time of discharge the following test results are still pending:     FOLLOW UP APPOINTMENTS:    Follow-up Information     Follow up With Details Comments ARASHμαλίαcolleen Leon MD Go on 4/18/2018 hospital f/u SABRINA ENRIQUE from [facility] on Wednesday 4/18/18 @ 2:00 p.m. [address] [id number]  [phone]      [facility]   [address] [facility]  [phone]             ADDITIONAL CARE RECOMMENDATIONS:     DIET: Cardiac Diet      ACTIVITY: Activity as tolerated    WOUND CARE:     EQUIPMENT needed:       DISCHARGE MEDICATIONS:  Discharge Medication List as of 3/28/2018  2:27 PM      CONTINUE these medications which have NOT CHANGED    Details   omeprazole (PRILOSEC) 40 mg capsule Take 40 mg by mouth daily. , Historical Med      nitrofurantoin (MACRODANTIN) 50 mg capsule Take 50 mg by mouth daily. Indications: UTI PPX, Historical Med      Saccharomyces boulardii (FLORASTOR) 250 mg capsule Take 250 mg by mouth two (2) times a day., Historical Med      albuterol (PROAIR HFA) 90 mcg/actuation inhaler Take 2 Puffs by inhalation every six (6) hours as needed for Wheezing., Historical Med      QUEtiapine (SEROQUEL) 25 mg tablet Take 25 mg by mouth nightly., Historical Med      traMADol (ULTRAM) 50 mg tablet Take 1 Tab by mouth every eight (8) hours as needed for Pain. Max Daily Amount: 150 mg., Print, Disp-30 Tab, R-1      lidocaine (LIDODERM) 5 % Apply patch to the affected area for 12 hours a day and remove for 12 hours a day., Print, Disp-15 Each, R-0      losartan (COZAAR) 50 mg tablet Take 1 Tab by mouth daily. , No Print, Disp-30 Tab, R-0      aspirin 81 mg chewable tablet Take 81 mg by mouth daily. , Historical Med      diclofenac (VOLTAREN) 1 % gel Apply  to affected area as needed., Historical Med      DETROL LA 2 mg ER capsule TAKE 1 CAPSULE DAILY FOR BLADDER FREQUENCY, Normal, Disp-90 Cap, R-3      pindolol (VISKIN) 5 mg tablet Take 2.5 mg by mouth two (2) times a day. Take 1/2 tablet by mouth at 2pm and 1/2 tab by mouth at 9pm, Historical Med      carbidopa-levodopa (SINEMET)  mg per tablet Take 2 Tabs by mouth four (4) times daily. (This is given at 8:00, 11:00, 14:00, and 21:00). , Print, Disp-240 Tab, R-0      polyethylene glycol (MIRALAX) 17 gram packet Take 1 Packet by mouth daily. Indications: CONSTIPATION, Print, Disp-30 Packet, R-0      therapeutic multivitamin (THERAGRAN) tablet Take 1 Tab by mouth daily. , Print, Disp-30 Tab, R-0      docusate sodium (COLACE) 100 mg capsule Take 1 Cap by mouth daily as needed for Constipation. , Print, Disp-30 Cap, R-0      cycloSPORINE (RESTASIS) 0.05 % ophthalmic emulsion Administer 1 Drop to both eyes two (2) times a day., Print, Disp-60 Each, R-0      escitalopram oxalate (LEXAPRO) 20 mg tablet Take 1 Tab by mouth daily. , Print, Disp-30 Tab, R-0      rivastigmine (EXELON) 4.6 mg/24 hr patch 1 Patch by TransDERmal route daily. , Print, Disp-30 Patch, R-0      acetaminophen (MAPAP EXTRA STRENGTH) 500 mg tablet Take 1 Tab by mouth every six (6) hours as needed for Pain., Print, Disp-120 Tab, R-0         STOP taking these medications       cloNIDine HCl (CATAPRES) 0.1 mg tablet Comments:   Reason for Stopping:         esomeprazole (NEXIUM) 40 mg capsule Comments:   Reason for Stopping:                 NOTIFY YOUR PHYSICIAN FOR ANY OF THE FOLLOWING:   Fever over 101 degrees for 24 hours. Chest pain, shortness of breath, fever, chills, nausea, vomiting, diarrhea, change in mentation, falling, weakness, bleeding. Severe pain or pain not relieved by medications. Or, any other signs or symptoms that you may have questions about.     DISPOSITION:    Home With:   OT  PT  HH  RN      x Long term SNF/Inpatient Rehab    Independent/assisted living    Hospice    Other:       PATIENT CONDITION AT DISCHARGE:     Functional status   x Poor     Deconditioned     Independent      Cognition    x Lucid     Forgetful     Dementia      Catheters/lines (plus indication)    Vance     PICC     PEG    x None      Code status     Full code    x DNR      PHYSICAL EXAMINATION AT DISCHARGE:   Refer to Progress Note      CHRONIC MEDICAL DIAGNOSES:  Problem List as of 3/28/2018  Date Reviewed: 3/28/2018          Codes Class Noted - Resolved    Recurrent depression ([facility] [address] ICD-10-CM: F33.9  ICD-9-CM: 296.30  1/3/2018 - Present        Altered mental status ICD-10-CM: R41.82  ICD-9-CM: 780.97  2017 - Present        Benign nodular prostatic hyperplasia with lower urinary tract symptoms- Dr. Lissett Bravo: N40.1  ICD-9-CM: 600.10  2017 - Present        Hemispheric carotid artery syndrome ICD-10-CM: G45.1  ICD-9-CM: 435.8  2017 - Present        ACP (advance care planning) ICD-10-CM: Z71.89  ICD-9-CM: V65.49  2017 - Present        Pacemaker end of life ICD-10-CM: P36.993  ICD-9-CM: V53.31  3/13/2017 - Present    Overview Signed 3/13/2017  9:49 AM by Dru Gonzalez MD     Generator change 3/13/2017             Pneumonia ICD-10-CM: W21.5  ICD-9-CM: 486  2017 - Present        Aneurysm of iliac artery ([facility] [address]- left 3.9 cm  CT scan- no change ICD-10-CM: I72.3  ICD-9-CM: 442.2  2016 - Present        Grief- wife of 61 years  2015 ICD-10-CM: F43.20  ICD-9-CM: 309.0  2015 - Present        Primary osteoarthritis of both knees ICD-10-CM: M17.0  ICD-9-CM: 715.16  2015 - Present        Overactive bladder- Dr. Lissett Bravo: S07.45  ICD-9-CM: 596.51  2015 - Present        Rupture quadriceps tendon- bilat--2014- UNABLE TO WALK AFTER THAT.  (Chronic) ICD-10-CM: V02.331N  ICD-9-CM: 844.8  2014 - Present        Mild cognitive impairment w/o memory loss ICD-10-CM: G31.84  ICD-9-CM: 331.83  2014 - Present        Major depressive disorder, single episode ICD-10-CM: F32.9  ICD-9-CM: 296.20  2014 - Present        Generalized anxiety disorder ICD-10-CM: F41.1  ICD-9-CM: 300.02  11/19/2014 - Present        Orthostatic hypotension ICD-10-CM: I95.1  ICD-9-CM: 458.0  9/28/2013 - Present        Blurry vision-chronic, no change- neg w/u ICD-10-CM: H53.8  ICD-9-CM: 368.8  7/9/2013 - Present        NSVT (nonsustained ventricular tachycardia) ([facility]) ICD-10-CM: I47.2  ICD-9-CM: 427.1  7/20/2012 - Present    Overview Signed 7/20/2012  9:56 AM by Aide Nettles MD     2. 2.2012             PAT (paroxysmal atrial tachycardia) ([facility]) ICD-10-CM: I47.1  ICD-9-CM: 427.0  1/19/2012 - Present        Sick sinus syndrome-pacemaker-2008 ICD-10-CM: I49.5  ICD-9-CM: 427.81  10/28/2011 - Present        Essential hypertension, difficult to control due to orthostatic hypotension ICD-10-CM: I10  ICD-9-CM: 401.1  4/18/2011 - Present        Parkinsonian syndrome- Dr. Carmina Baumgarten ICD-10-CM: Limmie Laughter  ICD-9-CM: 332.0  4/8/2011 - Present        Hyperlipemia ICD-10-CM: E78.5  ICD-9-CM: 272.4  2/19/2010 - Present        Reflux esophagitis ICD-10-CM: K21.0  ICD-9-CM: 530.11  2/19/2010 - Present        DDD (degenerative disc disease), lumbar ICD-10-CM: M51.36  ICD-9-CM: 722.52  2/19/2010 - Present        DJD (degenerative joint disease) of cervical spine ICD-10-CM: M47.812  ICD-9-CM: 721.0  2/19/2010 - Present    Overview Signed 1/21/2018  9:01 AM by Dwayne Bernal MD     Moderately severe in jan 2018 X ray             Paralysis agitans ([address] ICD-10-CM: Limmie Laughter  ICD-9-CM: 332.0  2/19/2010 - Present        DU (duodenal ulcer) ICD-10-CM: K26.9  ICD-9-CM: 532.90  9/1/1990 - Present        History of duodenal ulcer ICD-10-CM: Z87.19  ICD-9-CM: V12.79  9/1/1990 - Present        RESOLVED: Hypotension ICD-10-CM: I95.9  ICD-9-CM: 458.9  3/22/2018 - 3/28/2018        RESOLVED: Dehydration ICD-10-CM: E86.0  ICD-9-CM: 276.51  11/12/2017 - 11/16/2017        RESOLVED: Agitation ICD-10-CM: R45.1  ICD-9-CM: 307.9  11/12/2017 - 11/16/2017        RESOLVED: Slurred speech ICD-10-CM: R47.81  ICD-9-CM: 784.59  6/26/2017 - 7/12/2017        RESOLVED: Altered mental status ICD-10-CM: R41.82  ICD-9-CM: 780.97  6/26/2017 - 7/12/2017        RESOLVED: Influenza A ICD-10-CM: J10.1  ICD-9-CM: 487.1  3/19/2017 - 7/12/2017        RESOLVED: Sepsis ([facility] ICD-10-CM: A41.9  ICD-9-CM: 038.9, 995.91  6/10/2016 - 7/28/2016        RESOLVED: Catheter-associated urinary tract infection ([facility] ICD-10-CM: T83.511A, N39.0  ICD-9-CM: 996.64, 599.0  4/15/2016 - 4/18/2016        RESOLVED: Metabolic encephalopathy [facility]-: G93.41  ICD-9-CM: 348.31  4/15/2016 - 4/18/2016        RESOLVED: GABI (acute kidney injury) ([facility] ICD-10-CM: N17.9  ICD-9-CM: 584.9  4/15/2016 - 4/18/2016        RESOLVED: Hyponatremia ICD-10-CM: E87.1  ICD-9-CM: 276.1  4/15/2016 - 4/18/2016        RESOLVED: Mental status change ICD-10-CM: R41.82  ICD-9-CM: 780.97  10/30/2013 - 1/5/2016        RESOLVED: Pneumonia, organism unspecified(486) ICD-10-CM: J18.9  ICD-9-CM: 486  2/19/2012 - 1/5/2016        RESOLVED: Weakness generalized ICD-10-CM: R53.1  ICD-9-CM: 780.79  2/19/2012 - 2/22/2012        RESOLVED: PAT (paroxysmal atrial tachycardia) ([facility] ICD-10-CM: I47.1  ICD-9-CM: 427.0  1/19/2012 - 2/29/2012        RESOLVED: Premature atrial beats ICD-10-CM: I49.1  ICD-9-CM: 427.61  Unknown - 2/29/2012              Greater than 30  minutes were spent with the patient on counseling and coordination of care    Signed:   Lars Schaefer MD  4/5/2018  7:45 AM

## 2018-04-05 NOTE — PROGRESS NOTES
Outreach call to [employer] at [address] [place] [address] and rehab, she is director of discharge planning.  requesting she give me a call re EdwigeJamal. Want to find out if a discharge date has been set yet for him.    [facility] 3/22-3/28 then to [address] [place] [address])

## 2018-04-05 NOTE — Clinical Note
patient-at [facility] 3/22-3/28 then to [facility]. I called 4/6 and LM for CM there re d/c date-no response. Thanks!

## 2018-04-10 ENCOUNTER — PATIENT OUTREACH (OUTPATIENT)
Dept: CASE MANAGEMENT | Age: 83
End: 2018-04-10

## 2018-04-10 NOTE — PROGRESS NOTES
Community Care Team Documentation for Patient in [facility]  Subsequent Follow up     Patient remains at [facility] See previous [facility] Team notes. PCP : Jose Almazan MD  Nurse Navigator in PCP office: Zachery Esquivel at [facility], PT/OT continue. Currently max assist for upper body bathing and dressing. Min assist for bed mobility and transfers. Dependent with lower body bathing, dressing and toileting. Able to self propel WC 350ft. SNF SW to hold family meeting with pt and pt's daughter tomorrow 4/11 to discuss private caregivers. 2 weeks LOS anticipated. Plan to DC to [facility]. CCT to reschedule PCP FU. Medications were not reconciled and general patient assessment was not completed during this skilled nursing facility outreach.      SOBIA Cordero

## 2018-04-17 ENCOUNTER — PATIENT OUTREACH (OUTPATIENT)
Dept: CASE MANAGEMENT | Age: 83
End: 2018-04-17

## 2018-04-17 NOTE — PROGRESS NOTES
Community Care Team Documentation for Patient in [facility]  Subsequent Follow up     Patient remains at [facility] ([facility]). See previous [facility] Team notes. PCP : Ramesh Walters MD  Nurse Navigator in PCP office: Baron Socrates Morales at [facility], PT/OT continue. Currently min assist with transfers. Self propelling in WC unlimited distances. Plan to DC 4/23 to [facility]. Anticipate [facility] to prefer [facility]. CCT requested [facility]. SNF SW to discuss preference with [facility] and pt. PCP FU scheduled for 4/30 at 12:00PM.     Medications were not reconciled and general patient assessment was not completed during this skilled nursing facility outreach.      SOBIA Cox

## 2018-04-24 ENCOUNTER — PATIENT OUTREACH (OUTPATIENT)
Dept: CASE MANAGEMENT | Age: 83
End: 2018-04-24

## 2018-04-26 ENCOUNTER — PATIENT OUTREACH (OUTPATIENT)
Dept: FAMILY MEDICINE CLINIC | Age: 83
End: 2018-04-26

## 2018-04-26 RX ORDER — LOSARTAN POTASSIUM 25 MG/1
TABLET ORAL DAILY
COMMUNITY

## 2018-04-26 NOTE — PROGRESS NOTES
Hospital Discharge Follow-Up      Date/Time:  2018 1:22 PM    Patient was admitted to [facility] on 3/22/18 and discharged on 3/28/18 for hypotension. Patient discharged to [facility] 3/28-18. Patient discharged to [facility] on 18. The physician discharge summary was available at the time of outreach. Patient was contacted within one business days of discharge. Top Challenges reviewed with the provider   Patient admitted [facility] 3/22-3/28/18 for hypotension. Method of communication with provider :none    Inpatient RRAT score: 25  Was this a readmission? no       Nurse Navigator (NN) contacted the [facility]. Spoke to [facility] nurse Alonzo by telephone to perform post hospital discharge assessment. NN left voicemail message for Emery Avalos, daughter, HIPAA verified, requesting return call. Verified name and  with [facility] Nimo Carrasco N, as identifiers. Provided introduction to self, and explanation of the Nurse Navigator role. Patient reports he is doing fine, just having some discomfort to right shoulder and left heel. Compliant with pain management. Patient will receive HH SN/PT/OT. NN spoke to [address] [place] [address] Danilo [facility], who reports blood pressure this morning 142/80. Reviewed discharge instructions and red flags with patient who verbalized understanding. Patient given an opportunity to ask questions and does not have any further questions or concerns at this time. The patient agrees to contact the PCP office for questions related to their healthcare. NN provided contact information for future reference. Summary of patients top problems:  1. Hypotension  2. Left heel ulcer with pain      Home Health orders at discharge: PT, OT, [address]  Date of initial visit: Per To with [facility] start of care today  *NN gave verbal order for treatment to L heel ulcer.  Ot to fax order    Durable Medical Equipment ordered/company:n/a  Durable Medical Equipment received: n/a    Barriers to care? none identified    Advance Care Planning:   Does patient have an Advance Directive:  reviewed and needs to be updated       Medication:     New Medications at Discharge: none  Changed Medications at Discharge: none  Discontinued Medications at Discharge: Clonidine 0.1mg, Nexium 40mg    Medication reconciliation was performed with Enrrique Fuller LPN, from [facility] faxed over [facility], who verbalizes understanding of administration of home medications. There were no barriers to obtaining medications identified at this time. Referral to Pharm D needed: no     Current Outpatient Prescriptions   Medication Sig    losartan (COZAAR) 25 mg tablet Take  by mouth daily.  neomycin-bacitracnZn-polymyxnB (NEOSPORIN) 3.5-400-5,000 mg-unit-unit oipk ointment Apply 1 Packet to affected area as needed (apply small amount).  omeprazole (PRILOSEC) 40 mg capsule Take 40 mg by mouth daily.  nitrofurantoin (MACRODANTIN) 50 mg capsule Take 50 mg by mouth daily. Indications: UTI PPX    Saccharomyces boulardii (FLORASTOR) 250 mg capsule Take 250 mg by mouth two (2) times a day.  albuterol (PROAIR HFA) 90 mcg/actuation inhaler Take 2 Puffs by inhalation every six (6) hours as needed for Wheezing.  traMADol (ULTRAM) 50 mg tablet Take 1 Tab by mouth every eight (8) hours as needed for Pain. Max Daily Amount: 150 mg.    lidocaine (LIDODERM) 5 % Apply patch to the affected area for 12 hours a day and remove for 12 hours a day.  aspirin 81 mg chewable tablet Take 81 mg by mouth daily.  diclofenac (VOLTAREN) 1 % gel Apply  to affected area as needed.  DETROL LA 2 mg ER capsule TAKE 1 CAPSULE DAILY FOR BLADDER FREQUENCY    pindolol (VISKIN) 5 mg tablet Take 2.5 mg by mouth two (2) times a day. Take 1/2 tablet by mouth at 2pm and 1/2 tab by mouth at 9pm    carbidopa-levodopa (SINEMET)  mg per tablet Take 2 Tabs by mouth four (4) times daily.  (This is given at 8:00, 11:00, 14:00, and 21:00).  polyethylene glycol (MIRALAX) 17 gram packet Take 1 Packet by mouth daily. Indications: CONSTIPATION    therapeutic multivitamin (THERAGRAN) tablet Take 1 Tab by mouth daily.  docusate sodium (COLACE) 100 mg capsule Take 1 Cap by mouth daily as needed for Constipation.  cycloSPORINE (RESTASIS) 0.05 % ophthalmic emulsion Administer 1 Drop to both eyes two (2) times a day.  escitalopram oxalate (LEXAPRO) 20 mg tablet Take 1 Tab by mouth daily.  rivastigmine (EXELON) 4.6 mg/24 hr patch 1 Patch by TransDERmal route daily.  acetaminophen (MAPAP EXTRA STRENGTH) 500 mg tablet Take 1 Tab by mouth every six (6) hours as needed for Pain. No current facility-administered medications for this visit. Medications Discontinued During This Encounter   Medication Reason    losartan (COZAAR) 50 mg tablet Not A Current Medication    QUEtiapine (SEROQUEL) 25 mg tablet Not A Current Medication       PCP/Specialist follow up:   Future Appointments  Date Time Provider Clementine Roca   5/3/2018 11:00 AM Patito Burciaga MD [facility] [address]   7/26/2018 10:30 AM [facility] [facility]   7/26/2018 10:40 AM Bob Ward  [address] [facility]        Patient [address] (pt-stated)            4/26/18-patient currently has DDNR on file. NN will discuss ACP during upcoming call or during office visit. sc       Pain free (pt-stated)            12/1/17- patient reports right shoulder pain 8/10 today. Reports morning nurse applies voltaren gel to right shoulder. Minor relief. Says he cant raise his arm above his shoulder. Reports difficulty using arm to cut up meat for meals; gets assistance from staff. Reports he rolled out of bed three nights ago landing on his right side. Reports sensitivity to that side. NN will follow up with staff.sc    12/6/17-continues to report discomfort to right shoulder. States pain is constant, but not severe.  States staff continues to apply volteran gel to shoulder with some relief. Reports has difficulty  lifting things with right arm. Norco was discontinued while hospitalized due to increase confusion. [facility] he applys heating pad approximately 3-4 times per day with 30 minute intervals- temporary relief. Reviewed red flags, ie burns. sc    12/13/17-reports discomfort to right shoulder when raising his right arm. [facility] staff is compliant with diclofenac gel. He states he will call this writer next week if no relief. XR right humerus completed 11/14/17 revealed normal right humerus. Sc    12/21/17-continues to report discomfort to right shoulder. [facility] right shoulder hurts when raising his arm. Reports [facility] staff continues compliance with application of diclofenac gel to shoulder with some relief. NN urged patient to follow up with provider. Patient authorized NN to contact daughter, Judson Beach for scheduling an appointment. Judson Beach reports patient fell approximately one week ago and \"could have strained his shoulder\". Transportation provided by [facility]. Appointment scheduled for 1/3/18. Judson Beach aware and reports she will inform [facility]. NN left voicemail message with patient to advise of appointment. sc    4/26/18-   -patient reports 5/10 pain to right shoulder. Endorses uses lidocaine patch and pain pill with     symptom relief   -patient to receive physical therapy. Patient will actively participate in therapy and perform      recommended exercises to increase range of motion. sc          Safety (pt-stated)            12/6/17-patient wheelchair bound. Was able to transfer independently, but with bilateral lower leg weakness and  right shoulder discomfort s/p fall approximately 10 weeks ago unable to transfer safely. Patient reports his wheelchair brakes needs to be replaced. [facility] wheelchair was purchased from [employer]. NN contacted Mechelle Canales, rep from [employer], reports brakes were repaired on 12/1/17. Kemi Woods transferred me to Carl Higgins,  who states they have tried three different types of brakes: push to lock, pull to lock, and scissor mount unsuccessfully. Advised safety is a major concern. Inquired if there are any other options. Carl Higgins stated he will contact Katharine Rojas, , for any other options. NN to follow up in one week to check status. Patient will not transfer without assistance from [employer] staff.sc    12/13/17-reports someone from [facility] came out to inspect wheelchair; a part has been ordered. Patient acknowledged two people are required to transfer. Patient will not transfer independently due to the risk of falls. sc  12/21/17-denies falls. Reports compliance with assistance from [employer] staff for transfers. sc  4/26/18   -patient is wheelchair bound. NN reviewed with patient safety measures, ie requesting assistance   with transfers. Patient will not fall, sc. Other     Attends follow-up appointments as directed.             4/26/18-   -patient has JAMI appointment scheduled 5/3/18   -patient will attend appointment,sc       Free of infection            4/26/18-   -patient with left heel ulcer   -reviewed s/sx infection-warmth, redness, drainage, foul odor, fever    -patient will report symptoms immediately   - SN will evaluate and treat ulcer   -NN reviewed protein consumption to promote wound healing, sc

## 2018-04-27 ENCOUNTER — TELEPHONE (OUTPATIENT)
Dept: FAMILY MEDICINE CLINIC | Age: 83
End: 2018-04-27

## 2018-04-27 NOTE — TELEPHONE ENCOUNTER
Outbound call to Rita Lynn at [facility]. Patients  verified. Patent was admitted to facility on 18 on day shift upon admission patients BP reading was 90/80. Same day (18) on evening shift patients BP was noted to be 125/98, night shift reading on same day was 110/80. Yesterday patients reading on day shift was 142/80, evening shift reading was 163/89 and night shift was 140/82. Reading this morning was 150/100. Patient is taking Losartan 25mg daily and Pindolol 2.5mg BID. Patient denies any headache, chest pain, blurred vision. No distress is noted per Ritalinda Lynn. Dexter Forbes I would send Dr. Jerman Peña a message regarding the BP. Rita Lynn can be reached at [phone] or her cell is [phone]. Inquired from Rita Lynn if patient was being managed by in house provider and she stated he came in under Dr. Jerman Peña.  Please advise

## 2018-04-27 NOTE — TELEPHONE ENCOUNTER
----- Message from Julissa Levi sent at 4/26/2018  7:44 PM EDT -----  Regarding: DR. Len Mi / Telephone  Contact: [phone]  Gopi Austin, [address] [place] [address] stated the admission was completed. Pt will receive nursing services, PT and OT.

## 2018-04-27 NOTE — TELEPHONE ENCOUNTER
[facility] calling in ref to pt's  pre elevated blood pressure  Best # [phone] or [phone]

## 2018-04-27 NOTE — TELEPHONE ENCOUNTER
Outbound call to [place] at [facility]. No answer left message for her to return call. BP check daily x 2 weeks.

## 2018-05-01 ENCOUNTER — PATIENT OUTREACH (OUTPATIENT)
Dept: FAMILY MEDICINE CLINIC | Age: 83
End: 2018-05-01

## 2018-05-01 ENCOUNTER — PATIENT OUTREACH (OUTPATIENT)
Dept: CASE MANAGEMENT | Age: 83
End: 2018-05-01

## 2018-05-01 NOTE — PROGRESS NOTES
Outbound call made 18 to Guillermina Perera, daughter (HIPAA verified) re: canceled parveen appt.  verified. Guillermina Perera reports she canceled the appointment scheduled 5/3/18 because Tate Hopkins needs at least one week notification. NN offered to reschedule appointment. Advised I will call back with a date/time. Outbound call made to confirm appointment for 18 at 11:00. NN left voicemail message with contact information requesting return call.

## 2018-05-01 NOTE — PROGRESS NOTES
Community Care Team Documentation for Patient in [facility]  Discharge Note    Per SNF staff, patient discharged from [facility] ([facility]). See previous [facility] Team notes. PCP : Bernardo Quiroga MD    PCP JAMI follow up appointment:  4/30 at 12:00 PM     Nurse Navigator in PCP office: Corina Wright  Note routed to Nurse Navigator team.    Per Audra Del Castillo at [facility], Confirmed patient was discharged 4/25 to [address] [place] [address] with [facility]. PCP follow up 4/30 at 12:00 PM.. SNF LOS:  33 days    Community Care Team will sign off at this time. Medications were not reconciled and general patient assessment was not completed during this skilled nursing facility outreach.      Joann Best, MSN, RN, ACNS-BC, [facility]  Nurse Navigator, [employer] [phone]

## 2018-05-02 ENCOUNTER — TELEPHONE (OUTPATIENT)
Dept: FAMILY MEDICINE CLINIC | Age: 83
End: 2018-05-02

## 2018-05-02 DIAGNOSIS — N30.00 ACUTE CYSTITIS WITHOUT HEMATURIA: Primary | ICD-10-CM

## 2018-05-02 NOTE — TELEPHONE ENCOUNTER
Anu Robertson from [employer] is requesting a US- lab analysis order for the patient. Patient is having increased confusion.   Anu Robertson is requesting the order should be sent via fax or called in  Last seen :  February 23, 2018  Anu Robertson best call back : [phone]  [facility]: [phone]

## 2018-05-03 NOTE — TELEPHONE ENCOUNTER
He is in assisted living and I think they just need a verbal order to run this themselves- I don't think they were planning on having him come in for this- I OKed labs in case he is coming in. Also, I noticed when ordering X ray or labs for \"in future,\" they require a date in the order( just put today's date in that box so I won't need to open it and do that. Thanks! Ask me or [facility] [facility] if you have ?s about this.

## 2018-05-04 ENCOUNTER — TELEPHONE (OUTPATIENT)
Dept: FAMILY MEDICINE CLINIC | Age: 83
End: 2018-05-04

## 2018-05-04 ENCOUNTER — HOSPITAL ENCOUNTER (EMERGENCY)
Age: 83
Discharge: OTHER INSTITUTION | End: 2018-05-04
Attending: EMERGENCY MEDICINE
Payer: MEDICARE

## 2018-05-04 ENCOUNTER — APPOINTMENT (OUTPATIENT)
Dept: GENERAL RADIOLOGY | Age: 83
End: 2018-05-04
Attending: EMERGENCY MEDICINE
Payer: MEDICARE

## 2018-05-04 VITALS
RESPIRATION RATE: 21 BRPM | HEIGHT: 73 IN | OXYGEN SATURATION: 96 % | BODY MASS INDEX: 25.39 KG/M2 | TEMPERATURE: 98.5 F | DIASTOLIC BLOOD PRESSURE: 92 MMHG | HEART RATE: 81 BPM | WEIGHT: 191.58 LBS | SYSTOLIC BLOOD PRESSURE: 147 MMHG

## 2018-05-04 DIAGNOSIS — R50.9 LOW GRADE FEVER: Primary | ICD-10-CM

## 2018-05-04 DIAGNOSIS — R09.02 HYPOXIA: Primary | ICD-10-CM

## 2018-05-04 DIAGNOSIS — R19.5 LOOSE STOOLS: ICD-10-CM

## 2018-05-04 DIAGNOSIS — R79.81 LOW O2 SATURATION: ICD-10-CM

## 2018-05-04 LAB
ALBUMIN SERPL-MCNC: 3.3 G/DL (ref 3.5–5)
ALBUMIN/GLOB SERPL: 1 {RATIO} (ref 1.1–2.2)
ALP SERPL-CCNC: 65 U/L (ref 45–117)
ALT SERPL-CCNC: 7 U/L (ref 12–78)
ANION GAP SERPL CALC-SCNC: 8 MMOL/L (ref 5–15)
APPEARANCE UR: CLEAR
AST SERPL-CCNC: 19 U/L (ref 15–37)
BACTERIA URNS QL MICRO: ABNORMAL /HPF
BASOPHILS # BLD: 0 K/UL (ref 0–0.1)
BASOPHILS NFR BLD: 1 % (ref 0–1)
BILIRUB SERPL-MCNC: 0.7 MG/DL (ref 0.2–1)
BILIRUB UR QL: NEGATIVE
BUN SERPL-MCNC: 19 MG/DL (ref 6–20)
BUN/CREAT SERPL: 21 (ref 12–20)
CALCIUM SERPL-MCNC: 8.3 MG/DL (ref 8.5–10.1)
CHLORIDE SERPL-SCNC: 104 MMOL/L (ref 97–108)
CO2 SERPL-SCNC: 24 MMOL/L (ref 21–32)
COLOR UR: ABNORMAL
CREAT SERPL-MCNC: 0.9 MG/DL (ref 0.7–1.3)
DIFFERENTIAL METHOD BLD: ABNORMAL
EOSINOPHIL # BLD: 0.3 K/UL (ref 0–0.4)
EOSINOPHIL NFR BLD: 6 % (ref 0–7)
EPITH CASTS URNS QL MICRO: ABNORMAL /LPF
ERYTHROCYTE [DISTWIDTH] IN BLOOD BY AUTOMATED COUNT: 15.5 % (ref 11.5–14.5)
GLOBULIN SER CALC-MCNC: 3.2 G/DL (ref 2–4)
GLUCOSE SERPL-MCNC: 93 MG/DL (ref 65–100)
GLUCOSE UR STRIP.AUTO-MCNC: NEGATIVE MG/DL
HCT VFR BLD AUTO: 34 % (ref 36.6–50.3)
HGB BLD-MCNC: 10.8 G/DL (ref 12.1–17)
HGB UR QL STRIP: NEGATIVE
HYALINE CASTS URNS QL MICRO: ABNORMAL /LPF (ref 0–5)
IMM GRANULOCYTES # BLD: 0 K/UL (ref 0–0.04)
IMM GRANULOCYTES NFR BLD AUTO: 0 % (ref 0–0.5)
KETONES UR QL STRIP.AUTO: ABNORMAL MG/DL
LEUKOCYTE ESTERASE UR QL STRIP.AUTO: ABNORMAL
LYMPHOCYTES # BLD: 1.3 K/UL (ref 0.8–3.5)
LYMPHOCYTES NFR BLD: 25 % (ref 12–49)
MCH RBC QN AUTO: 27.9 PG (ref 26–34)
MCHC RBC AUTO-ENTMCNC: 31.8 G/DL (ref 30–36.5)
MCV RBC AUTO: 87.9 FL (ref 80–99)
MONOCYTES # BLD: 0.5 K/UL (ref 0–1)
MONOCYTES NFR BLD: 10 % (ref 5–13)
NEUTS SEG # BLD: 3.1 K/UL (ref 1.8–8)
NEUTS SEG NFR BLD: 58 % (ref 32–75)
NITRITE UR QL STRIP.AUTO: NEGATIVE
NRBC # BLD: 0 K/UL (ref 0–0.01)
NRBC BLD-RTO: 0 PER 100 WBC
PH UR STRIP: 5.5 [PH] (ref 5–8)
PLATELET # BLD AUTO: 197 K/UL (ref 150–400)
PMV BLD AUTO: 10.5 FL (ref 8.9–12.9)
POTASSIUM SERPL-SCNC: 4.3 MMOL/L (ref 3.5–5.1)
PROT SERPL-MCNC: 6.5 G/DL (ref 6.4–8.2)
PROT UR STRIP-MCNC: NEGATIVE MG/DL
RBC # BLD AUTO: 3.87 M/UL (ref 4.1–5.7)
RBC #/AREA URNS HPF: ABNORMAL /HPF (ref 0–5)
SODIUM SERPL-SCNC: 136 MMOL/L (ref 136–145)
SP GR UR REFRACTOMETRY: 1.02 (ref 1–1.03)
UR CULT HOLD, URHOLD: NORMAL
UROBILINOGEN UR QL STRIP.AUTO: 0.2 EU/DL (ref 0.2–1)
WBC # BLD AUTO: 5.3 K/UL (ref 4.1–11.1)
WBC URNS QL MICRO: ABNORMAL /HPF (ref 0–4)

## 2018-05-04 PROCEDURE — 81001 URINALYSIS AUTO W/SCOPE: CPT | Performed by: EMERGENCY MEDICINE

## 2018-05-04 PROCEDURE — 99285 EMERGENCY DEPT VISIT HI MDM: CPT

## 2018-05-04 PROCEDURE — 85025 COMPLETE CBC W/AUTO DIFF WBC: CPT | Performed by: EMERGENCY MEDICINE

## 2018-05-04 PROCEDURE — 71045 X-RAY EXAM CHEST 1 VIEW: CPT

## 2018-05-04 PROCEDURE — 96360 HYDRATION IV INFUSION INIT: CPT

## 2018-05-04 PROCEDURE — 36415 COLL VENOUS BLD VENIPUNCTURE: CPT | Performed by: EMERGENCY MEDICINE

## 2018-05-04 PROCEDURE — 80053 COMPREHEN METABOLIC PANEL: CPT | Performed by: EMERGENCY MEDICINE

## 2018-05-04 PROCEDURE — 74011250636 HC RX REV CODE- 250/636: Performed by: EMERGENCY MEDICINE

## 2018-05-04 RX ADMIN — SODIUM CHLORIDE 500 ML: 900 INJECTION, SOLUTION INTRAVENOUS at 18:52

## 2018-05-04 NOTE — DISCHARGE INSTRUCTIONS
We hope that we have addressed all of your medical concerns. The examination and treatment you received in the Emergency Department were for an emergent problem and were not intended as complete care. It is important that you follow up with your healthcare provider(s) for ongoing care. If your symptoms worsen or do not improve as expected, and you are unable to reach your usual health care provider(s), you should return to the Emergency Department. Today's healthcare is undergoing tremendous change, and patient satisfaction surveys are one of the many tools to assess the quality of medical care. You may receive a survey from the [employer] regarding your experience in the Emergency Department. I hope that your experience has been completely positive, particularly the medical care that I provided. As such, please participate in the survey; anything less than excellent does not meet my expectations or intentions. Critical [facility] hospital[facility] and [address] [place] [address] participate in nationally recognized quality of care measures. If your blood pressure is greater than 120/80, as reported below, we urge that you seek medical care to address the potential of high blood pressure, commonly known as hypertension. Hypertension can be hereditary or can be caused by certain medical conditions, pain, stress, or \"white coat syndrome. \"       Please make an appointment with your health care provider(s) for follow up of your Emergency Department visit. VITALS:   Patient Vitals for the past 8 hrs:   Temp Pulse Resp BP SpO2   05/04/18 1758 98.5 °F (36.9 °C) 81 21 (!) 147/92 96 %          Thank you for allowing us to provide you with medical care today. We realize that you have many choices for your emergency care needs. Please choose us in the future for any continued health care needs.       Gilson Clemons MD    [facility], [address] [place] [address]. Office: [phone]            Recent Results (from the past 24 hour(s))   CBC WITH AUTOMATED DIFF    Collection Time: 05/04/18  6:07 PM   Result Value Ref Range    WBC 5.3 4.1 - 11.1 K/uL    RBC 3.87 (L) 4.10 - 5.70 M/uL    HGB 10.8 (L) 12.1 - 17.0 g/dL    HCT 34.0 (L) 36.6 - 50.3 %    MCV 87.9 80.0 - 99.0 FL    MCH 27.9 26.0 - 34.0 PG    MCHC 31.8 30.0 - 36.5 g/dL    RDW 15.5 (H) 11.5 - 14.5 %    PLATELET 708 345 - 031 K/uL    MPV 10.5 8.9 - 12.9 FL    NRBC 0.0 0  WBC    ABSOLUTE NRBC 0.00 0.00 - 0.01 K/uL    NEUTROPHILS 58 32 - 75 %    LYMPHOCYTES 25 12 - 49 %    MONOCYTES 10 5 - 13 %    EOSINOPHILS 6 0 - 7 %    BASOPHILS 1 0 - 1 %    IMMATURE GRANULOCYTES 0 0.0 - 0.5 %    ABS. NEUTROPHILS 3.1 1.8 - 8.0 K/UL    ABS. LYMPHOCYTES 1.3 0.8 - 3.5 K/UL    ABS. MONOCYTES 0.5 0.0 - 1.0 K/UL    ABS. EOSINOPHILS 0.3 0.0 - 0.4 K/UL    ABS. BASOPHILS 0.0 0.0 - 0.1 K/UL    ABS. IMM. GRANS. 0.0 0.00 - 0.04 K/UL    DF AUTOMATED     METABOLIC PANEL, COMPREHENSIVE    Collection Time: 05/04/18  6:07 PM   Result Value Ref Range    Sodium 136 136 - 145 mmol/L    Potassium 4.3 3.5 - 5.1 mmol/L    Chloride 104 97 - 108 mmol/L    CO2 24 21 - 32 mmol/L    Anion gap 8 5 - 15 mmol/L    Glucose 93 65 - 100 mg/dL    BUN 19 6 - 20 MG/DL    Creatinine 0.90 0.70 - 1.30 MG/DL    BUN/Creatinine ratio 21 (H) 12 - 20      GFR est AA >60 >60 ml/min/1.73m2    GFR est non-AA >60 >60 ml/min/1.73m2    Calcium 8.3 (L) 8.5 - 10.1 MG/DL    Bilirubin, total 0.7 0.2 - 1.0 MG/DL    ALT (SGPT) 7 (L) 12 - 78 U/L    AST (SGOT) 19 15 - 37 U/L    Alk.  phosphatase 65 45 - 117 U/L    Protein, total 6.5 6.4 - 8.2 g/dL    Albumin 3.3 (L) 3.5 - 5.0 g/dL    Globulin 3.2 2.0 - 4.0 g/dL    A-G Ratio 1.0 (L) 1.1 - 2.2     URINALYSIS W/MICROSCOPIC    Collection Time: 05/04/18  7:06 PM   Result Value Ref Range    Color DARK YELLOW      Appearance CLEAR CLEAR      Specific gravity 1.025 1.003 - 1.030      pH (UA) 5.5 5.0 - 8.0      Protein NEGATIVE  NEG mg/dL Glucose NEGATIVE  NEG mg/dL    Ketone TRACE (A) NEG mg/dL    Bilirubin NEGATIVE  NEG      Blood NEGATIVE  NEG      Urobilinogen 0.2 0.2 - 1.0 EU/dL    Nitrites NEGATIVE  NEG      Leukocyte Esterase SMALL (A) NEG      WBC 0-4 0 - 4 /hpf    RBC 0-5 0 - 5 /hpf    Epithelial cells FEW FEW /lpf    Bacteria 2+ (A) NEG /hpf    Hyaline cast 0-2 0 - 5 /lpf   URINE CULTURE HOLD SAMPLE    Collection Time: 05/04/18  7:06 PM   Result Value Ref Range    Urine culture hold        URINE ON HOLD IN MICROBIOLOGY DEPT FOR 3 DAYS. IF UNPRESERVED URINE IS SUBMITTED, IT CANNOT BE USED FOR ADDITIONAL TESTING AFTER 24 HRS, RECOLLECTION WILL BE REQUIRED. Xr Chest Port    Result Date: 5/4/2018  INDICATION: Hypoxia COMPARISON: 3/22/2018 A single frontal view was obtained. The time of this study is 1810 hours. The lungs are clear. Heart and mediastinal shadows are stable. Cardiac pacemaker noted. There is bowel distention noted. Brittany Rice IMPRESSION: No acute process in the lungs. Bowel distention.

## 2018-05-04 NOTE — ED TRIAGE NOTES
TRIAGE NOTE: Patient sent here from [facility] via EMS with c/o urinary tract infection and possible sepsis. Patient has urinary pain. Today, patient had a brief period of time where his oxygen dropped to 73% on room air. Patient placed on 2L via nasal cannula at the facility. Currently, room air stats are 97%.

## 2018-05-04 NOTE — ED PROVIDER NOTES
HPI Comments: 80 y.o. male with past medical history significant for hyperlipidemia, duodenal ulcer, reflux esophagitis, DDD, DJD, paralysis agitans, pacemaker, Parkinson's disease, HTN, BPH, PAT, sick sinus syndrome, and TIA who presents via EMS with chief complaint of urinary pain. Per triage note, EMS personnel report picking up the patient from [facility] for a complaint of urinary pain, possible UTI due to a positive UA, and intermittent periods of low SpO2 in the 70s on room air with recent onset. Patient reports ankle pain and slight diarrhea with recent onset. Patient reports a history of a tendon repair in his ankle. Patient denies dysuria, abdominal pain, loss of appetite, fever, nausea, and vomiting. There are no other acute medical concerns at this time. Full history, physical exam, and ROS unable to be obtained due to: poor historian. PCP: Bernardo Quiroga MD    Note written by Imtiaz Ludwig, as dictated by Pantera Padilla MD 6:29 PM     The history is provided by the patient.         Past Medical History:   Diagnosis Date    Blurry vision 1/19/2012    BPH (benign prostatic hyperplasia)     DDD (degenerative disc disease), lumbar 2/19/2010    DJD (degenerative joint disease) of cervical spine 2/19/2010    DU (duodenal ulcer) 9/1/1990    Hyperlipidemia     Hypertension     Other and unspecified hyperlipidemia 2/19/2010    Pacemaker     Paralysis agitans ([address] 2/19/2010    Parkinson disease ([address]     PAT (paroxysmal atrial tachycardia) (HCC)     Premature atrial beats     Reflux esophagitis 2/19/2010    Sick sinus syndrome ([address]     TIA (transient ischemic attack)        Past Surgical History:   Procedure Laterality Date    HX CATARACT REMOVAL  6/2012 and 7/2012    Both eyes    HX ORTHOPAEDIC  04/2015    tendon repair both knees    HX PACEMAKER  2008    bradycardia    HX VITRECTOMY  11/2011    DE REMVL PERM PM PLS GEN W/REPL PLSE GEN 2 LEAD SYS  3/13/2017 Family History:   Problem Relation Age of Onset    Asthma Mother     Heart Disease Father        Social History     Social History    Marital status:      Spouse name: N/A    Number of children: N/A    Years of education: N/A     Occupational History    Not on file. Social History Main Topics    Smoking status: Former Smoker     Types: Pipe     Quit date: 7/14/1945    Smokeless tobacco: Never Used    Alcohol use No      Comment: glass of wine every now and then    Drug use: No    Sexual activity: Not on file     Other Topics Concern     Service Yes    Blood Transfusions No    Caffeine Concern No    Occupational Exposure No    Hobby Hazards No    Sleep Concern No    Stress Concern No    Weight Concern No    Special Diet No    Back Care No    Exercise No    Bike Helmet No    Seat Belt Yes    Self-Exams No     Social History Narrative         ALLERGIES: Review of patient's allergies indicates no known allergies. Review of Systems   Reason unable to perform ROS: Poor historian. Constitutional: Negative for appetite change, diaphoresis and fever. HENT: Negative for facial swelling. Eyes: Negative for visual disturbance. Respiratory: Negative for cough. Cardiovascular: Negative for chest pain. Gastrointestinal: Positive for diarrhea. Negative for abdominal pain, nausea and vomiting. Genitourinary: Negative for dysuria. Musculoskeletal: Positive for arthralgias. Negative for joint swelling. Skin: Negative for rash. Neurological: Negative for headaches. Hematological: Negative for adenopathy. Psychiatric/Behavioral: Negative for suicidal ideas. Vitals:    05/04/18 1758   BP: (!) 147/92   Pulse: 81   Resp: 21   Temp: 98.5 °F (36.9 °C)   SpO2: 96%   Weight: 86.9 kg (191 lb 9.3 oz)   Height: 6' 1\" (1.854 m)            Physical Exam   Constitutional: He appears well-developed. No distress. Obese. HENT:   Head: Normocephalic and atraumatic. Dry mucous membranes. Eyes: Pupils are equal, round, and reactive to light. Neck: Normal range of motion. Neck supple. Cardiovascular: Normal rate, regular rhythm, normal heart sounds and intact distal pulses. Pulmonary/Chest: Effort normal and breath sounds normal. No respiratory distress. Abdominal: Soft. Bowel sounds are normal. He exhibits no distension. There is no tenderness. Musculoskeletal: Normal range of motion. He exhibits no edema. Neurological: He is alert. Skin: Skin is warm and dry. Nursing note and vitals reviewed. Note written by Imtiaz Mullen, as dictated by Tyler Morales MD 6:39 PM      MDM  Number of Diagnoses or Management Options  Hypoxia:   Diagnosis management comments: A:  Pt sent from [facility] for concern for UTI. Pt noted to have transient low O2 sat on room air. VS normal in ED. Pt with no complaints. VS, UA, and CXR unremarkable. P:  Stable for discharge home. No evidence of UTI or other infectious process. CXR clear. Unclear reason for transient hypoxia but no evidence of respiratory problems at this time.         ED Course       Procedures

## 2018-05-04 NOTE — TELEPHONE ENCOUNTER
Called [facility] and spoke with Lavonne Mcneal. She states that patient was on room air when the oxygen was taken. Patient's oxygen is now 95% on 2L of O2. Kortney Reyes states that patient's WBC count is 36 with \"loads of bacteria\" in urine. Patient's BP is 90/62 with a constant low grade fever and left side pain. Spoke with Dr. Isac Velazquez. Dr. Isac Velazquez states for patient to go to the ER. Called Kortney Reyes and advised her of this.      Kortney Reyes states that she will need a signed order for:   O2 at 2L  An updated order for miralax prn   Probiotic Lisa [facility] # [phone]    Faxed orders to [phone]

## 2018-05-04 NOTE — TELEPHONE ENCOUNTER
Rona Sun is calling back. [phone] or [phone]. She said please call her back.   She leaves at 3:30pm.

## 2018-05-05 NOTE — ED NOTES
[facility] here to transport patient back to facility via stretcher. Report given to [facility] and called to [place] [facility]. Patient cleaned and repositioned. Updated vitals. IV removed. Patient is in no distress.

## 2018-05-05 NOTE — ED NOTES
Arranged transport back to [facility] ) Verified with facility. ETA is within the hour.

## 2018-05-08 ENCOUNTER — TELEPHONE (OUTPATIENT)
Dept: FAMILY MEDICINE CLINIC | Age: 83
End: 2018-05-08

## 2018-05-08 DIAGNOSIS — N30.00 ACUTE CYSTITIS WITHOUT HEMATURIA: ICD-10-CM

## 2018-05-08 NOTE — TELEPHONE ENCOUNTER
[address] Funes called in ref to pt's left heel, they want to continue to monitor   [facility] #  [phone]

## 2018-05-09 ENCOUNTER — OFFICE VISIT (OUTPATIENT)
Dept: FAMILY MEDICINE CLINIC | Age: 83
End: 2018-05-09

## 2018-05-09 ENCOUNTER — TELEPHONE (OUTPATIENT)
Dept: FAMILY MEDICINE CLINIC | Age: 83
End: 2018-05-09

## 2018-05-09 VITALS
TEMPERATURE: 98.5 F | RESPIRATION RATE: 18 BRPM | DIASTOLIC BLOOD PRESSURE: 82 MMHG | HEART RATE: 77 BPM | SYSTOLIC BLOOD PRESSURE: 130 MMHG | OXYGEN SATURATION: 96 %

## 2018-05-09 DIAGNOSIS — E78.5 HYPERLIPIDEMIA LDL GOAL <130: ICD-10-CM

## 2018-05-09 DIAGNOSIS — Z00.00 MEDICARE ANNUAL WELLNESS VISIT, SUBSEQUENT: ICD-10-CM

## 2018-05-09 DIAGNOSIS — N32.81 OVERACTIVE BLADDER: ICD-10-CM

## 2018-05-09 DIAGNOSIS — R35.0 URINARY FREQUENCY: Primary | ICD-10-CM

## 2018-05-09 DIAGNOSIS — Z71.89 ACP (ADVANCE CARE PLANNING): ICD-10-CM

## 2018-05-09 DIAGNOSIS — I47.29 NSVT (NONSUSTAINED VENTRICULAR TACHYCARDIA): ICD-10-CM

## 2018-05-09 DIAGNOSIS — G89.29 CHRONIC RIGHT SHOULDER PAIN: ICD-10-CM

## 2018-05-09 DIAGNOSIS — M25.511 CHRONIC RIGHT SHOULDER PAIN: ICD-10-CM

## 2018-05-09 DIAGNOSIS — F41.1 GENERALIZED ANXIETY DISORDER: ICD-10-CM

## 2018-05-09 DIAGNOSIS — I10 ESSENTIAL HYPERTENSION, BENIGN: ICD-10-CM

## 2018-05-09 PROBLEM — Z45.018 PACEMAKER END OF LIFE: Status: RESOLVED | Noted: 2017-03-13 | Resolved: 2018-05-09

## 2018-05-09 RX ORDER — TOLTERODINE 4 MG/1
4 CAPSULE, EXTENDED RELEASE ORAL DAILY
Qty: 30 CAP | Refills: 11 | Status: SHIPPED | OUTPATIENT
Start: 2018-05-09

## 2018-05-09 NOTE — PROGRESS NOTES
Chief Complaint   Patient presents with   [facility]'s for low bp    Shoulder Pain     right shoulder seemd worse     Unable to verify meds he has no paper work with him   1. Have you been to the ER, urgent care clinic since your last visit? Hospitalized since your last visit? yes [facility]'s    2.  Have you seen or consulted any other health care providers outside of the [facility] since your last visit?  no

## 2018-05-09 NOTE — MR AVS SNAPSHOT
[facility] 
 
 
 [address] 
[phone] Patient: Theodore Fontanez MRN: [id number] Visit Information Date & Time Provider Department Dept. Phone Encounter #  
 5/9/2018 11:00 AM Hermelindo Fay  [facility] [phone] [id number] Your Appointments 7/26/2018 10:30 AM  
PACEMAKER with PACEMAKER3, [facility] ([facility] SCHEDULING) Appt Note: mdt ppi, rc, annual thresh/CL, see gilman; med threshold/rc/Gilman 1 yr b  
 [address] [place]  
[address] [facility]  
  
    
 7/26/2018 10:40 AM  
ESTABLISHED PATIENT with Marlon Singletary MD  
[facility] ([address]) Appt Note: med threshold/rc/Gilman 1 yr b  
 [address] [place]  
[address] [facility] [phone] Upcoming Health Maintenance Date Due  
 MEDICARE YEARLY EXAM 4/29/2018 Influenza Age 5 to Adult 8/1/2018 GLAUCOMA SCREENING Q2Y 8/26/2018 DTaP/Tdap/Td series (2 - Td) 1/31/2028 Allergies as of 5/9/2018  Review Complete On: 5/9/2018 By: Maura Arcos LPN No Known Allergies Current Immunizations  Reviewed on 3/14/2017 Name Date Influenza High Dose Vaccine PF 10/27/2015 Influenza Vaccine (Quad) PF 11/16/2017  4:19 PM  
 Influenza Vaccine Split 11/17/2011 TB Skin Test (PPD) Intradermal 3/25/2013 TD Vaccine 5/3/2012 Tdap 5/3/2012 ZZZ-RETIRED (DO NOT USE) Pneumococcal Vaccine (Unspecified Type) 4/20/2010 Not reviewed this visit You Were Diagnosed With   
  
 Codes Comments Urinary frequency    -  Primary ICD-10-CM: R35.0 ICD-9-CM: 788.41 Essential hypertension, benign     ICD-10-CM: I10 
ICD-9-CM: 401.1 Hyperlipidemia LDL goal <130     ICD-10-CM: E78.5 ICD-9-CM: 272.4 Generalized anxiety disorder     ICD-10-CM: F41.1 ICD-9-CM: 300.02 Overactive bladder     ICD-10-CM: N32.81 ICD-9-CM: 596.51   
 NSVT (nonsustained ventricular tachycardia) ([facility])     ICD-10-CM: I47.2 ICD-9-CM: 427.1 in 2012 Vitals BP Pulse Temp Resp SpO2 Smoking Status 130/82 77 98.5 °F (36.9 °C) 18 96% Former Smoker [phone] Your Updated Medication List  
  
   
This list is accurate as of 5/9/18 12:34 PM.  Always use your most recent med list.  
  
  
  
  
 acetaminophen 500 mg tablet Commonly known as:  MAPAP EXTRA STRENGTH Take 1 Tab by mouth every six (6) hours as needed for Pain. aspirin 81 mg chewable tablet Take 81 mg by mouth daily. carbidopa-levodopa  mg per tablet Commonly known as:  SINEMET Take 2 Tabs by mouth four (4) times daily. (This is given at 8:00, 11:00, 14:00, and 21:00). cycloSPORINE 0.05 % ophthalmic emulsion Commonly known as:  RESTASIS Administer 1 Drop to both eyes two (2) times a day. docusate sodium 100 mg capsule Commonly known as:  Walterine [place] Take 1 Cap by mouth daily as needed for Constipation. escitalopram oxalate 20 mg tablet Commonly known as:  Vallorie Primmer Take 1 Tab by mouth daily. FLORASTOR 250 mg capsule Generic drug:  Saccharomyces boulardii Take 250 mg by mouth two (2) times a day. lidocaine 5 % Commonly known as:  Gianna Jassi Apply patch to the affected area for 12 hours a day and remove for 12 hours a day. losartan 25 mg tablet Commonly known as:  COZAAR Take  by mouth daily. neomycin-bacitracnZn-polymyxnB 3.5-400-5,000 mg-unit-unit Oipk ointment Commonly known as:  NEOSPORIN Apply 1 Packet to affected area as needed (apply small amount). nitrofurantoin 50 mg capsule Commonly known as:  MACRODANTIN  
 Take 50 mg by mouth daily. Indications: UTI PPX  
  
 omeprazole 40 mg capsule Commonly known as:  PRILOSEC Take 40 mg by mouth daily. pindolol 5 mg tablet Commonly known as:  VISKIN Take 2.5 mg by mouth two (2) times a day. Take 1/2 tablet by mouth at 2pm and 1/2 tab by mouth at 9pm  
  
 polyethylene glycol 17 gram packet Commonly known as:  Cammy Howell Take 1 Packet by mouth daily. Indications: CONSTIPATION  
  
 PROAIR HFA 90 mcg/actuation inhaler Generic drug:  albuterol Take 2 Puffs by inhalation every six (6) hours as needed for Wheezing. rivastigmine 4.6 mg/24 hr patch Commonly known as:  EXELON  
1 Patch by TransDERmal route daily. therapeutic multivitamin tablet Commonly known as:  [facility] Take 1 Tab by mouth daily. tolterodine ER 4 mg ER capsule Commonly known as:  Suella Parent Take 1 Cap by mouth daily. traMADol 50 mg tablet Commonly known as:  ULTRAM  
Take 1 Tab by mouth every eight (8) hours as needed for Pain. Max Daily Amount: 150 mg. VOLTAREN 1 % Gel Generic drug:  diclofenac Apply  to affected area as needed. Prescriptions Sent to Pharmacy Refills  
 tolterodine ER (DETROL LA) 4 mg ER capsule 11 Sig: Take 1 Cap by mouth daily. Class: Normal  
 Pharmacy: [address] [place] [address] 18, [address] #: [phone] Route: Oral  
  
We Performed the Following CULTURE, URINE [id number] CPT(R)] Introducing \[facility]\"" & [facility] SERVICES! Dear Shane Cruz: Thank you for requesting a [employer] account. Our records indicate that you already have an active [employer] account. You can access your account anytime at [email or web address] ([email or web address])/[email or web address] Did you know that you can access your hospital and ER discharge instructions at any time in [employer]? You can also review all of your test results from your hospital stay or ER visit. Additional Information If you have questions, please visit the Frequently Asked Questions section of the [employer] website at [email or web address] Remember, [employer] is NOT to be used for urgent needs. For medical emergencies, dial 911. Now available from your iPhone and Android! Please provide this summary of care documentation to your next provider. Your primary care clinician is listed as [address] [address], [facility]/s . If you have any questions after today's visit, please call [phone].

## 2018-05-09 NOTE — TELEPHONE ENCOUNTER
Ashley Pope [address] from [facility] called to give an update regarding stage 2 pressure sore on left lateral heel. She states that it is healing well and would like to continue using calazime barrier cream and foam and continue changing it twice per week. Advised that will be okay.

## 2018-05-09 NOTE — PROGRESS NOTES
HISTORY OF PRESENT ILLNESS  HPI  Isi Dejesus is a 80 y.o. Male with a history of HTN, Parkinsonian syndrome, pneumonia, overactive bladder, DDD, hyperlipidemia, DJD, PAT, major depressive disorder and anxiety, who presents at the office today for a hospital follow up. Pt went to [facility] ER for hypoxia on 5/4, but all exams were unremarkable. Pt presents with a wheelchair. Pt's pain in his left heel have improved. Pt states that he moved into a new assisted living location on 5/1. Pt complains of frequent urination. Pt was diagnosed with overactive bladder by Dr. Amalia Potter. Pt denies weakened stream.           Past Medical History:   Diagnosis Date    Blurry vision 1/19/2012    BPH (benign prostatic hyperplasia)     DDD (degenerative disc disease), lumbar 2/19/2010    DJD (degenerative joint disease) of cervical spine 2/19/2010    DU (duodenal ulcer) 9/1/1990    Hyperlipidemia     Hypertension     Other and unspecified hyperlipidemia 2/19/2010    Pacemaker     Paralysis agitans ([address] 2/19/2010    Parkinson disease ([address]     [facility]     Premature atrial beats     Reflux esophagitis 2/19/2010    Sick sinus syndrome ([address]     TIA (transient ischemic attack)      Past Surgical History:   Procedure Laterality Date    HX CATARACT REMOVAL  6/2012 and 7/2012    Both eyes    HX ORTHOPAEDIC  04/2015    tendon repair both knees    HX PACEMAKER  2008    bradycardia    HX VITRECTOMY  11/2011    TX REMVL PERM PM PLS GEN W/REPL PLSE GEN 2 LEAD SYS  3/13/2017          Current Outpatient Prescriptions on File Prior to Visit   Medication Sig Dispense Refill    losartan (COZAAR) 25 mg tablet Take  by mouth daily.  neomycin-bacitracnZn-polymyxnB (NEOSPORIN) 3.5-400-5,000 mg-unit-unit oipk ointment Apply 1 Packet to affected area as needed (apply small amount).  omeprazole (PRILOSEC) 40 mg capsule Take 40 mg by mouth daily.       nitrofurantoin (MACRODANTIN) 50 mg capsule Take 50 mg by mouth daily. Indications: UTI PPX      Saccharomyces boulardii (FLORASTOR) 250 mg capsule Take 250 mg by mouth two (2) times a day.  albuterol (PROAIR HFA) 90 mcg/actuation inhaler Take 2 Puffs by inhalation every six (6) hours as needed for Wheezing.  traMADol (ULTRAM) 50 mg tablet Take 1 Tab by mouth every eight (8) hours as needed for Pain. Max Daily Amount: 150 mg. 30 Tab 1    lidocaine (LIDODERM) 5 % Apply patch to the affected area for 12 hours a day and remove for 12 hours a day. 15 Each 0    aspirin 81 mg chewable tablet Take 81 mg by mouth daily.  diclofenac (VOLTAREN) 1 % gel Apply  to affected area as needed.  pindolol (VISKIN) 5 mg tablet Take 2.5 mg by mouth two (2) times a day. Take 1/2 tablet by mouth at 2pm and 1/2 tab by mouth at 9pm      carbidopa-levodopa (SINEMET)  mg per tablet Take 2 Tabs by mouth four (4) times daily. (This is given at 8:00, 11:00, 14:00, and 21:00). 240 Tab 0    polyethylene glycol (MIRALAX) 17 gram packet Take 1 Packet by mouth daily. Indications: CONSTIPATION 30 Packet 0    therapeutic multivitamin (THERAGRAN) tablet Take 1 Tab by mouth daily. 30 Tab 0    docusate sodium (COLACE) 100 mg capsule Take 1 Cap by mouth daily as needed for Constipation. 30 Cap 0    cycloSPORINE (RESTASIS) 0.05 % ophthalmic emulsion Administer 1 Drop to both eyes two (2) times a day. 60 Each 0    escitalopram oxalate (LEXAPRO) 20 mg tablet Take 1 Tab by mouth daily. 30 Tab 0    rivastigmine (EXELON) 4.6 mg/24 hr patch 1 Patch by TransDERmal route daily. 30 Patch 0    acetaminophen (MAPAP EXTRA STRENGTH) 500 mg tablet Take 1 Tab by mouth every six (6) hours as needed for Pain. 120 Tab 0     No current facility-administered medications on file prior to visit.       No Known Allergies  Family History   Problem Relation Age of Onset    Asthma Mother     Heart Disease Father      Social History     Social History    Marital status:  Spouse name: N/A    Number of children: N/A    Years of education: N/A     Social History Main Topics    Smoking status: Former Smoker     Types: Pipe     Quit date: 7/14/1945    Smokeless tobacco: Never Used    Alcohol use No      Comment: glass of wine every now and then    Drug use: No    Sexual activity: Not Asked     Other Topics Concern     Service Yes    Blood Transfusions No    Caffeine Concern No    Occupational Exposure No    Hobby Hazards No    Sleep Concern No    Stress Concern No    Weight Concern No    Special Diet No    Back Care No    Exercise No    Bike Helmet No    Seat Belt Yes    Self-Exams No     Social History Narrative             Review of Systems   Constitutional: Negative for chills, diaphoresis, fever, malaise/fatigue and weight loss. Eyes: Negative for blurred vision, double vision, pain and redness. Respiratory: Negative for cough, shortness of breath and wheezing. Cardiovascular: Negative for chest pain, palpitations, orthopnea, claudication, leg swelling and PND. Genitourinary: Positive for frequency. Skin: Negative for itching and rash. Neurological: Negative for dizziness, tingling, tremors, sensory change, speech change, focal weakness, seizures, loss of consciousness, weakness and headaches. Results for orders placed or performed during the hospital encounter of 05/04/18   URINE CULTURE HOLD SAMPLE   Result Value Ref Range    Urine culture hold        URINE ON HOLD IN MICROBIOLOGY DEPT FOR 3 DAYS. IF UNPRESERVED URINE IS SUBMITTED, IT CANNOT BE USED FOR ADDITIONAL TESTING AFTER 24 HRS, RECOLLECTION WILL BE REQUIRED.    CBC WITH AUTOMATED DIFF   Result Value Ref Range    WBC 5.3 4.1 - 11.1 K/uL    RBC 3.87 (L) 4.10 - 5.70 M/uL    HGB 10.8 (L) 12.1 - 17.0 g/dL    HCT 34.0 (L) 36.6 - 50.3 %    MCV 87.9 80.0 - 99.0 FL    MCH 27.9 26.0 - 34.0 PG    MCHC 31.8 30.0 - 36.5 g/dL    RDW 15.5 (H) 11.5 - 14.5 %    PLATELET 120 344 - 310 K/uL    MPV 10.5 8.9 - 12.9 FL    NRBC 0.0 0  WBC    ABSOLUTE NRBC 0.00 0.00 - 0.01 K/uL    NEUTROPHILS 58 32 - 75 %    LYMPHOCYTES 25 12 - 49 %    MONOCYTES 10 5 - 13 %    EOSINOPHILS 6 0 - 7 %    BASOPHILS 1 0 - 1 %    IMMATURE GRANULOCYTES 0 0.0 - 0.5 %    ABS. NEUTROPHILS 3.1 1.8 - 8.0 K/UL    ABS. LYMPHOCYTES 1.3 0.8 - 3.5 K/UL    ABS. MONOCYTES 0.5 0.0 - 1.0 K/UL    ABS. EOSINOPHILS 0.3 0.0 - 0.4 K/UL    ABS. BASOPHILS 0.0 0.0 - 0.1 K/UL    ABS. IMM. GRANS. 0.0 0.00 - 0.04 K/UL    DF AUTOMATED     METABOLIC PANEL, COMPREHENSIVE   Result Value Ref Range    Sodium 136 136 - 145 mmol/L    Potassium 4.3 3.5 - 5.1 mmol/L    Chloride 104 97 - 108 mmol/L    CO2 24 21 - 32 mmol/L    Anion gap 8 5 - 15 mmol/L    Glucose 93 65 - 100 mg/dL    BUN 19 6 - 20 MG/DL    Creatinine 0.90 0.70 - 1.30 MG/DL    BUN/Creatinine ratio 21 (H) 12 - 20      GFR est AA >60 >60 ml/min/1.73m2    GFR est non-AA >60 >60 ml/min/1.73m2    Calcium 8.3 (L) 8.5 - 10.1 MG/DL    Bilirubin, total 0.7 0.2 - 1.0 MG/DL    ALT (SGPT) 7 (L) 12 - 78 U/L    AST (SGOT) 19 15 - 37 U/L    Alk.  phosphatase 65 45 - 117 U/L    Protein, total 6.5 6.4 - 8.2 g/dL    Albumin 3.3 (L) 3.5 - 5.0 g/dL    Globulin 3.2 2.0 - 4.0 g/dL    A-G Ratio 1.0 (L) 1.1 - 2.2     URINALYSIS W/MICROSCOPIC   Result Value Ref Range    Color DARK YELLOW      Appearance CLEAR CLEAR      Specific gravity 1.025 1.003 - 1.030      pH (UA) 5.5 5.0 - 8.0      Protein NEGATIVE  NEG mg/dL    Glucose NEGATIVE  NEG mg/dL    Ketone TRACE (A) NEG mg/dL    Bilirubin NEGATIVE  NEG      Blood NEGATIVE  NEG      Urobilinogen 0.2 0.2 - 1.0 EU/dL    Nitrites NEGATIVE  NEG      Leukocyte Esterase SMALL (A) NEG      WBC 0-4 0 - 4 /hpf    RBC 0-5 0 - 5 /hpf    Epithelial cells FEW FEW /lpf    Bacteria 2+ (A) NEG /hpf    Hyaline cast 0-2 0 - 5 /lpf           Physical Exam  Visit Vitals    /82    Pulse 77    Temp 98.5 °F (36.9 °C)    Resp 18    SpO2 96%       Pt is alert and normal speech and appropriate responses to questions. Did at times have difficulty remembering recent events, but he is oriented to person, place and time, although not to the exact day, believing that today was 5/7. Pt is in a wheelchair and has contracted knees, which is chronic. Lungs: clear to auscultation bilaterally  Heart: regular rate and rhythm, S1, S2 normal, no murmur, click, rub or gallop  Extremities: extremities normal, atraumatic, no cyanosis or edema. Except for knees. Decreased ROM for both shoulders R>L, especially abduction. ASSESSMENT and PLAN    ICD-10-CM ICD-9-CM    1. Urinary frequency R35.0 788.41 CULTURE, URINE   2. Essential hypertension, difficult to control due to orthostatic hypotension I10 401.1    3. Hyperlipidemia LDL goal <130 E78.5 272.4    4. Generalized anxiety disorder F41.1 300.02    5. Overactive bladder- Dr. Schaefer Mention N32.81 596.51 tolterodine ER (DETROL LA) 4 mg ER capsule   6. NSVT (nonsustained ventricular tachycardia) ([facility]) I47.2 427.1     in 2012     7. Chronic right shoulder pain M25.511 719.41     G89.29 338.29      Diagnoses and all orders for this visit:    1. Urinary frequency  -     CULTURE, URINE    2. Essential hypertension, difficult to control due to orthostatic hypotension    3. Hyperlipidemia LDL goal <130    4. Generalized anxiety disorder    5. Overactive bladder- Dr. Schaefer Mention  -     tolterodine ER (DETROL LA) 4 mg ER capsule; Take 1 Cap by mouth daily. 6. NSVT (nonsustained ventricular tachycardia) ([facility]  Comments:  in 2012      7. Chronic right shoulder pain    Follow-up Disposition:  Return if low BP , right shouldr pain, or UTI/ urinary frequency are no better. reviewed medications and side effects in detail  Please call my office if there are any questions- [phone]. Discussed expected course/resolution/complications of diagnosis in detail with patient. Medication risks/benefits/costs/interactions/alternatives discussed with patient.    Pt was given an after visit summary which includes diagnoses, current medications & vitals. Pt expressed understanding with the diagnosis and plan. Patient to call if no better in 3 -4 days and prn new problems. Total 25 minutes,60 % counseling re:   Reviewed in detail the proper technique of checking the blood pressure- check it on an average day only, not on a stressful day, sitting, no exercise for at least 1 hour and not experiencing any new pain( chronic pain is OK). Patient encouraged to check BP sitting and standing at least once a month and to report these readings to me if > 140/ 90 on average , or if the standing BP is >  15 points lower than the sitting. Discussed the importance of continuing to monitor the BP and encouraged to obtain a cuff; discussed briefly how to use it properly. For both shoulders- continue ROM exercises 3-4 times a day- reviewed 4 separate exercises; after ROM better can start strngtheing, etc    For his bladder symptoms, will check urine culture and change his Detrol LA to 4 mg and will see if that helps his bladder symptoms. If it does not will change medication to something else  Also, discussed symptoms of concern that were noted today in the note above, treatment options( including doing nothing), when to follow up before recommended time frame. Also, answered all questions. .   This document was written by Paramjit Mott, as dictated by Zenaida Lombardo MD.  I have reviewed and agree with the above note and have made corrections where appropriate To Talley M.D.

## 2018-05-10 NOTE — PROGRESS NOTES
Steph Jaeger is a 80 y.o. male and presents for annual Medicare Wellness Visit. Problem List: Reviewed with patient and discussed risk factors. Patient Active Problem List   Diagnosis Code    Hyperlipemia E78.5    DU (duodenal ulcer) K26.9    Reflux esophagitis K21.0    DDD (degenerative disc disease), lumbar M51.36    DJD (degenerative joint disease) of cervical spine M47.812    Paralysis agitans ([facility] [place] Parkinsonian syndrome- Dr. Rosa Machado hypertension, difficult to control due to orthostatic hypotension I10    Sick sinus syndrome-pacemaker- I49.5    PAT (paroxysmal atrial tachycardia) ([facility]) I47.1    NSVT (nonsustained ventricular tachycardia) ([facility]) I47.2    Blurry vision-chronic, no change- neg w/u H53.8    Orthostatic hypotension I95.1    Mild cognitive impairment w/o memory loss G31.84    Major depressive disorder, single episode F32.9    Generalized anxiety disorder F41.1    Rupture quadriceps tendon- bilat--2014- UNABLE TO WALK AFTER THAT.  T85.517B    Overactive bladder- Dr. Nina Espinoza N32.81    Grief- wife of 61 years  2015 F43.20    Primary osteoarthritis of both knees M17.0    History of duodenal ulcer Z87.19    Aneurysm of iliac artery ([facility]- left 3.9 cm 2016 CT scan- no change I72.3    Pneumonia J18.9    ACP (advance care planning) Z71.89    Hemispheric carotid artery syndrome G45.1    Benign nodular prostatic hyperplasia with lower urinary tract symptoms- Dr. Nina Espinoza N40.1    Altered mental status R41.82    Recurrent depression ([facility] F33.9    Hyperlipidemia LDL goal <130 E78.5       Current medical providers:  Patient Care Team:  Adam Kee MD as PCP - Augustine Torres MD as Consulting Provider (Neurology)  Soni Thornton, RN as Ambulatory Care Navigator (Cardiology)  James Balderas MD as Physician (Cardiology)  Luz Read, RN as Ambulatory Care Navigator  Helen Fernandez, RN as Ambulatory Care Navigator  [facility] Brock Mendez MD as Physician (Urology)  Myla Rock, RN as Ambulatory Care Navigator  Paola Estevez as Care Manager  Steven Becerra RN as Transitional Nurse Navigator    PSH: Reviewed with patient  Past Surgical History:   Procedure Laterality Date    HX CATARACT REMOVAL  6/2012 and 7/2012    Both eyes    HX ORTHOPAEDIC  04/2015    tendon repair both knees    HX PACEMAKER  2008    bradycardia    HX VITRECTOMY  11/2011    OH REMVL PERM PM PLS GEN W/REPL PLSE GEN 2 LEAD SYS  3/13/2017             SH: Reviewed with patient  Social History   Substance Use Topics    Smoking status: Former Smoker     Types: Pipe     Quit date: 7/14/1945    Smokeless tobacco: Never Used    Alcohol use No      Comment: glass of wine every now and then       FH: Reviewed with patient  Family History   Problem Relation Age of Onset    Asthma Mother     Heart Disease Father        Medications/Allergies: Reviewed with patient  Current Outpatient Prescriptions on File Prior to Visit   Medication Sig Dispense Refill    losartan (COZAAR) 25 mg tablet Take  by mouth daily.  neomycin-bacitracnZn-polymyxnB (NEOSPORIN) 3.5-400-5,000 mg-unit-unit oipk ointment Apply 1 Packet to affected area as needed (apply small amount).  omeprazole (PRILOSEC) 40 mg capsule Take 40 mg by mouth daily.  nitrofurantoin (MACRODANTIN) 50 mg capsule Take 50 mg by mouth daily. Indications: UTI PPX      Saccharomyces boulardii (FLORASTOR) 250 mg capsule Take 250 mg by mouth two (2) times a day.  albuterol (PROAIR HFA) 90 mcg/actuation inhaler Take 2 Puffs by inhalation every six (6) hours as needed for Wheezing.  traMADol (ULTRAM) 50 mg tablet Take 1 Tab by mouth every eight (8) hours as needed for Pain. Max Daily Amount: 150 mg. 30 Tab 1    lidocaine (LIDODERM) 5 % Apply patch to the affected area for 12 hours a day and remove for 12 hours a day. 15 Each 0    aspirin 81 mg chewable tablet Take 81 mg by mouth daily.       diclofenac (VOLTAREN) 1 % gel Apply  to affected area as needed.  pindolol (VISKIN) 5 mg tablet Take 2.5 mg by mouth two (2) times a day. Take 1/2 tablet by mouth at 2pm and 1/2 tab by mouth at 9pm      carbidopa-levodopa (SINEMET)  mg per tablet Take 2 Tabs by mouth four (4) times daily. (This is given at 8:00, 11:00, 14:00, and 21:00). 240 Tab 0    polyethylene glycol (MIRALAX) 17 gram packet Take 1 Packet by mouth daily. Indications: CONSTIPATION 30 Packet 0    therapeutic multivitamin (THERAGRAN) tablet Take 1 Tab by mouth daily. 30 Tab 0    docusate sodium (COLACE) 100 mg capsule Take 1 Cap by mouth daily as needed for Constipation. 30 Cap 0    cycloSPORINE (RESTASIS) 0.05 % ophthalmic emulsion Administer 1 Drop to both eyes two (2) times a day. 60 Each 0    escitalopram oxalate (LEXAPRO) 20 mg tablet Take 1 Tab by mouth daily. 30 Tab 0    rivastigmine (EXELON) 4.6 mg/24 hr patch 1 Patch by TransDERmal route daily. 30 Patch 0    acetaminophen (MAPAP EXTRA STRENGTH) 500 mg tablet Take 1 Tab by mouth every six (6) hours as needed for Pain. 120 Tab 0     No current facility-administered medications on file prior to visit. No Known Allergies    Objective:  Visit Vitals    /82    Pulse 77    Temp 98.5 °F (36.9 °C)    Resp 18    SpO2 96%    There is no height or weight on file to calculate BMI.     Assessment of cognitive impairment: Alert and oriented x 2.5    Depression Screen:   PHQ over the last two weeks 4/30/2017   PHQ Not Done -   Little interest or pleasure in doing things Several days   Feeling down, depressed or hopeless Several days   Total Score PHQ 2 2   Trouble falling or staying asleep, or sleeping too much Not at all   Feeling tired or having little energy Not at all   Poor appetite or overeating Not at all   Feeling bad about yourself - or that you are a failure or have let yourself or your family down Not at all   Trouble concentrating on things such as school, work, reading or watching TV Not at all   Moving or speaking so slowly that other people could have noticed; or the opposite being so fidgety that others notice Not at all   Thoughts of being better off dead, or hurting yourself in some way Not at all   PHQ 9 Score 2   How difficult have these problems made it for you to do your work, take care of your home and get along with others Not difficult at all       Fall Risk Assessment:    Fall Risk Assessment, last 12 mths 4/26/2018   Able to walk? No   Fall in past 12 months? -   Fall with injury? -   Number of falls in past 12 months -   Fall Risk Score -       Functional Ability:   Does the patient exhibit a steady gait?  no   How long did it take the patient to get up and walk from a sitting position? Wheelchair bound except to transfer   Is the patient self reliant?  (ie can do own laundry, meals, household chores)  no     Does the patient handle his/her own medications?  no     Does the patient handle his/her own money? no     Is the patients home safe (ie good lighting, handrails on stairs and bath, etc.)? yes     Did you notice or did patient express any hearing difficulties? yes     Did you notice or did patient express any vision difficulties? Yes, chronic blurred vision- [facility]     Were distance and reading eye charts used? yes       Advance Care Planning:   Patient was offered the opportunity to discuss advance care planning:  yes     Does patient have an Advance Directive:  Yes. Advanced directive already completed and in the chart. No changes desired. If no, did you provide information on [facility] [employer]?   yes       Plan:      Orders Placed This Encounter    CULTURE, URINE    tolterodine ER (DETROL LA) 4 mg ER capsule       Health Maintenance   Topic Date Due    MEDICARE YEARLY EXAM  04/29/2018    Influenza Age 5 to Adult  08/01/2018    GLAUCOMA SCREENING Q2Y  08/26/2018    DTaP/Tdap/Td series (2 - Td) 01/31/2028    ZOSTER VACCINE AGE 60>  Addressed    Pneumococcal 65+ Low/Medium Risk  Addressed       *Patient verbalized understanding and agreement with the plan. A copy of the After Visit Summary with personalized health plan was given to the patient today. To [place] M.D

## 2018-05-10 NOTE — ACP (ADVANCE CARE PLANNING)
Advance Care Planning (ACP) Provider Note - Comprehensive     Date of ACP Conversation: 05/10/18  Persons included in Conversation:  patient  Length of ACP Conversation in minutes:  <16 minutes (Non-Billable)    Authorized Decision Maker (if patient is incapable of making informed decisions): This person is:  Healthcare Agent/Medical Power of  under Advance Directive          General ACP for ALL Patients with Decision Making Capacity:   Importance of advance care planning, including choosing a healthcare agent to communicate patient's healthcare decisions if patient lost the ability to make decisions, such as after a sudden illness or accident  Understanding of the healthcare agent role was assessed and information provided    Review of Existing Advance Directive:  Does this advance directive still reflect your preferences?   Yes (Provide new form/Refer for assistance in updating)    For Serious or Chronic Illness:  No known illness    Interventions Provided:  Recommended communicating the plan and making copies for the healthcare agent, personal physician, and others as appropriate (e.g., health system)  Recommended review of completed ACP document annually or upon change in health status

## 2018-05-11 ENCOUNTER — TELEPHONE (OUTPATIENT)
Dept: FAMILY MEDICINE CLINIC | Age: 83
End: 2018-05-11

## 2018-05-11 NOTE — TELEPHONE ENCOUNTER
Stephanie vinson/[facility]. Would like last note faxed over. (F) [phone] & (P) [phone].

## 2018-05-14 ENCOUNTER — TELEPHONE (OUTPATIENT)
Dept: FAMILY MEDICINE CLINIC | Age: 83
End: 2018-05-14

## 2018-05-14 NOTE — TELEPHONE ENCOUNTER
Kianna Lemus [facility]. She said patient had a fall this morning around 6am and he has an abrasion on R knee, the size of a quarter. She needs an order for a first aid treatment for knee. [phone].

## 2018-05-14 NOTE — TELEPHONE ENCOUNTER
Called and was unable to reach Friday [facility]. Got the voicemail. Will call back later.

## 2018-05-22 ENCOUNTER — TELEPHONE (OUTPATIENT)
Dept: FAMILY MEDICINE CLINIC | Age: 83
End: 2018-05-22

## 2018-05-22 NOTE — TELEPHONE ENCOUNTER
Called Norma simon at [facility]. She would like to know if the supplement medpass 2.0 can be switched to ensure or boost because the supplement medpass must be ordered every 4 days. Please advise. She states that patient had a fall on 5/14/18 and has a bruise on right knee. She states that patient is complaining of mid right sided pain in back that is a possible result of the fall. Please advise.

## 2018-05-22 NOTE — TELEPHONE ENCOUNTER
Calvin vinson/[facility]. [phone]. She said patient is complaining of Mid R Pain in the back. And also she wants to inquire about his supplement medpass 2.0.

## 2018-05-23 NOTE — TELEPHONE ENCOUNTER
Amber Arce from [employer] is calling, she states that she spoke with [employer] in regards to the patients supplement being changed, however she would like to know what to do about the patients pain. She states that the patient is having pain in his right lower side of his back due to a recent fall.     Best call back # for Amber Arce: [phone] or [phone]

## 2018-05-23 NOTE — TELEPHONE ENCOUNTER
Message from Dr. Mar Verma:  Samson Payne to change to ensure or boost- patient preference\"    Called Denisha Goodrich and left this message for [place].

## 2018-05-24 ENCOUNTER — TELEPHONE (OUTPATIENT)
Dept: FAMILY MEDICINE CLINIC | Age: 83
End: 2018-05-24

## 2018-05-25 ENCOUNTER — TELEPHONE (OUTPATIENT)
Dept: FAMILY MEDICINE CLINIC | Age: 83
End: 2018-05-25

## 2018-05-25 ENCOUNTER — OFFICE VISIT (OUTPATIENT)
Dept: FAMILY MEDICINE CLINIC | Age: 83
End: 2018-05-25

## 2018-05-25 DIAGNOSIS — F44.89 CONFUSIONAL STATE: ICD-10-CM

## 2018-05-25 DIAGNOSIS — I10 ESSENTIAL HYPERTENSION, BENIGN: ICD-10-CM

## 2018-05-25 DIAGNOSIS — F41.1 GENERALIZED ANXIETY DISORDER: ICD-10-CM

## 2018-05-25 DIAGNOSIS — S76.119S RUPTURE OF QUADRICEPS TENDON, UNSPECIFIED LATERALITY, SEQUELA: Chronic | ICD-10-CM

## 2018-05-25 DIAGNOSIS — R35.0 URINATION FREQUENCY: Primary | ICD-10-CM

## 2018-05-25 DIAGNOSIS — K21.00 REFLUX ESOPHAGITIS: ICD-10-CM

## 2018-05-25 DIAGNOSIS — M25.511 ACUTE PAIN OF RIGHT SHOULDER: ICD-10-CM

## 2018-05-25 DIAGNOSIS — F32.4 MAJOR DEPRESSIVE DISORDER WITH SINGLE EPISODE, IN PARTIAL REMISSION (HCC): ICD-10-CM

## 2018-05-25 NOTE — PROGRESS NOTES
HISTORY OF PRESENT ILLNESS  DARLING Minaya is a 80 y.o. Male with a history of HTN, pneumonia, DDD, hyperlipidemia with LDL goal <130, DJD, PAT, depression and anxiety, who presents at the office today with a friend for chronic pain. Pt presents with a wheelchair( his usual due to chronic contractures at the knees.) Pt had a fall about 2 weeks ago and the Nursing home at [facility] called just yesterday about ongoing pains from that fall( this was the first I heard about it and was asked to assess the situation( I told them that that could only be done at an office visit and this was the main reason for the office visit today.) According to the friend and the patient, most of this discomfort has resolved and they do not feel any additional intervention is necessary for that. Pt did improve after his ER visit on 5/4 for UTI, but his symptoms seem to be returning- confusion, increased sleepiness, etc. Pt's friend states that she is attempting to help by reminding him to drink more water and care for him, but note that she is not always present to care for him. He has not seen a urologist recently for these recurrent UTI episodes( no recent catheterization, etc.  Denies any unusual exertional chest pain or shortness of breath. No other recent hospitalizations or ER /Pt 1st type visits.            Past Medical History:   Diagnosis Date    Blurry vision 1/19/2012    BPH (benign prostatic hyperplasia)     DDD (degenerative disc disease), lumbar 2/19/2010    DJD (degenerative joint disease) of cervical spine 2/19/2010    DU (duodenal ulcer) 9/1/1990    Hyperlipidemia     Hypertension     Other and unspecified hyperlipidemia 2/19/2010    Pacemaker     Paralysis agitans ([address] 2/19/2010    Parkinson disease ([address]     PAT (paroxysmal atrial tachycardia) (HCC)     Premature atrial beats     Reflux esophagitis 2/19/2010    Sick sinus syndrome (HCC)     TIA (transient ischemic attack)      Past Surgical History:   Procedure Laterality Date    HX CATARACT REMOVAL  6/2012 and 7/2012    Both eyes    HX ORTHOPAEDIC  04/2015    tendon repair both knees    HX PACEMAKER  2008    bradycardia    HX VITRECTOMY  11/2011    FL REMVL PERM PM PLS GEN W/REPL PLSE GEN 2 LEAD SYS  3/13/2017          Current Outpatient Prescriptions on File Prior to Visit   Medication Sig Dispense Refill    tolterodine ER (DETROL LA) 4 mg ER capsule Take 1 Cap by mouth daily. 30 Cap 11    losartan (COZAAR) 25 mg tablet Take  by mouth daily.  neomycin-bacitracnZn-polymyxnB (NEOSPORIN) 3.5-400-5,000 mg-unit-unit oipk ointment Apply 1 Packet to affected area as needed (apply small amount).  omeprazole (PRILOSEC) 40 mg capsule Take 40 mg by mouth daily.  nitrofurantoin (MACRODANTIN) 50 mg capsule Take 50 mg by mouth daily. Indications: UTI PPX      Saccharomyces boulardii (FLORASTOR) 250 mg capsule Take 250 mg by mouth two (2) times a day.  albuterol (PROAIR HFA) 90 mcg/actuation inhaler Take 2 Puffs by inhalation every six (6) hours as needed for Wheezing.  traMADol (ULTRAM) 50 mg tablet Take 1 Tab by mouth every eight (8) hours as needed for Pain. Max Daily Amount: 150 mg. 30 Tab 1    lidocaine (LIDODERM) 5 % Apply patch to the affected area for 12 hours a day and remove for 12 hours a day. 15 Each 0    aspirin 81 mg chewable tablet Take 81 mg by mouth daily.  diclofenac (VOLTAREN) 1 % gel Apply  to affected area as needed.  pindolol (VISKIN) 5 mg tablet Take 2.5 mg by mouth two (2) times a day. Take 1/2 tablet by mouth at 2pm and 1/2 tab by mouth at 9pm      carbidopa-levodopa (SINEMET)  mg per tablet Take 2 Tabs by mouth four (4) times daily. (This is given at 8:00, 11:00, 14:00, and 21:00). 240 Tab 0    polyethylene glycol (MIRALAX) 17 gram packet Take 1 Packet by mouth daily. Indications: CONSTIPATION 30 Packet 0    therapeutic multivitamin (THERAGRAN) tablet Take 1 Tab by mouth daily.  30 Tab 0  docusate sodium (COLACE) 100 mg capsule Take 1 Cap by mouth daily as needed for Constipation. 30 Cap 0    cycloSPORINE (RESTASIS) 0.05 % ophthalmic emulsion Administer 1 Drop to both eyes two (2) times a day. 60 Each 0    escitalopram oxalate (LEXAPRO) 20 mg tablet Take 1 Tab by mouth daily. 30 Tab 0    rivastigmine (EXELON) 4.6 mg/24 hr patch 1 Patch by TransDERmal route daily. 30 Patch 0    acetaminophen (MAPAP EXTRA STRENGTH) 500 mg tablet Take 1 Tab by mouth every six (6) hours as needed for Pain. 120 Tab 0     No current facility-administered medications on file prior to visit. No Known Allergies  Family History   Problem Relation Age of Onset    Asthma Mother     Heart Disease Father      Social History     Social History    Marital status:      Spouse name: N/A    Number of children: N/A    Years of education: N/A     Social History Main Topics    Smoking status: Former Smoker     Types: Pipe     Quit date: 7/14/1945    Smokeless tobacco: Never Used    Alcohol use No      Comment: glass of wine every now and then   Trista He Drug use: No    Sexual activity: Not Asked     Other Topics Concern     Service Yes    Blood Transfusions No    Caffeine Concern No    Occupational Exposure No    Hobby Hazards No    Sleep Concern No    Stress Concern No    Weight Concern No    Special Diet No    Back Care No    Exercise No    Bike Helmet No    Seat Belt Yes    Self-Exams No     Social History Narrative             Review of Systems   Constitutional: Negative for chills, diaphoresis, fever, malaise/fatigue and weight loss. Eyes: Negative for blurred vision, double vision, pain and redness. Respiratory: Negative for cough, shortness of breath and wheezing. Cardiovascular: Negative for chest pain, palpitations, orthopnea, claudication, leg swelling and PND. Skin: Negative for itching and rash.    Neurological: Negative for dizziness, tingling, tremors, sensory change, speech change, focal weakness, seizures, loss of consciousness, weakness and headaches. Results for orders placed or performed during the hospital encounter of 05/04/18   URINE CULTURE HOLD SAMPLE   Result Value Ref Range    Urine culture hold        URINE ON HOLD IN MICROBIOLOGY DEPT FOR 3 DAYS. IF UNPRESERVED URINE IS SUBMITTED, IT CANNOT BE USED FOR ADDITIONAL TESTING AFTER 24 HRS, RECOLLECTION WILL BE REQUIRED. CBC WITH AUTOMATED DIFF   Result Value Ref Range    WBC 5.3 4.1 - 11.1 K/uL    RBC 3.87 (L) 4.10 - 5.70 M/uL    HGB 10.8 (L) 12.1 - 17.0 g/dL    HCT 34.0 (L) 36.6 - 50.3 %    MCV 87.9 80.0 - 99.0 FL    MCH 27.9 26.0 - 34.0 PG    MCHC 31.8 30.0 - 36.5 g/dL    RDW 15.5 (H) 11.5 - 14.5 %    PLATELET 099 881 - 035 K/uL    MPV 10.5 8.9 - 12.9 FL    NRBC 0.0 0  WBC    ABSOLUTE NRBC 0.00 0.00 - 0.01 K/uL    NEUTROPHILS 58 32 - 75 %    LYMPHOCYTES 25 12 - 49 %    MONOCYTES 10 5 - 13 %    EOSINOPHILS 6 0 - 7 %    BASOPHILS 1 0 - 1 %    IMMATURE GRANULOCYTES 0 0.0 - 0.5 %    ABS. NEUTROPHILS 3.1 1.8 - 8.0 K/UL    ABS. LYMPHOCYTES 1.3 0.8 - 3.5 K/UL    ABS. MONOCYTES 0.5 0.0 - 1.0 K/UL    ABS. EOSINOPHILS 0.3 0.0 - 0.4 K/UL    ABS. BASOPHILS 0.0 0.0 - 0.1 K/UL    ABS. IMM. GRANS. 0.0 0.00 - 0.04 K/UL    DF AUTOMATED     METABOLIC PANEL, COMPREHENSIVE   Result Value Ref Range    Sodium 136 136 - 145 mmol/L    Potassium 4.3 3.5 - 5.1 mmol/L    Chloride 104 97 - 108 mmol/L    CO2 24 21 - 32 mmol/L    Anion gap 8 5 - 15 mmol/L    Glucose 93 65 - 100 mg/dL    BUN 19 6 - 20 MG/DL    Creatinine 0.90 0.70 - 1.30 MG/DL    BUN/Creatinine ratio 21 (H) 12 - 20      GFR est AA >60 >60 ml/min/1.73m2    GFR est non-AA >60 >60 ml/min/1.73m2    Calcium 8.3 (L) 8.5 - 10.1 MG/DL    Bilirubin, total 0.7 0.2 - 1.0 MG/DL    ALT (SGPT) 7 (L) 12 - 78 U/L    AST (SGOT) 19 15 - 37 U/L    Alk.  phosphatase 65 45 - 117 U/L    Protein, total 6.5 6.4 - 8.2 g/dL    Albumin 3.3 (L) 3.5 - 5.0 g/dL    Globulin 3.2 2.0 - 4.0 g/dL    A-G Ratio 1.0 (L) 1.1 - 2.2     URINALYSIS W/MICROSCOPIC   Result Value Ref Range    Color DARK YELLOW      Appearance CLEAR CLEAR      Specific gravity 1.025 1.003 - 1.030      pH (UA) 5.5 5.0 - 8.0      Protein NEGATIVE  NEG mg/dL    Glucose NEGATIVE  NEG mg/dL    Ketone TRACE (A) NEG mg/dL    Bilirubin NEGATIVE  NEG      Blood NEGATIVE  NEG      Urobilinogen 0.2 0.2 - 1.0 EU/dL    Nitrites NEGATIVE  NEG      Leukocyte Esterase SMALL (A) NEG      WBC 0-4 0 - 4 /hpf    RBC 0-5 0 - 5 /hpf    Epithelial cells FEW FEW /lpf    Bacteria 2+ (A) NEG /hpf    Hyaline cast 0-2 0 - 5 /lpf         Physical Exam  Visit Vitals    /74 (BP 1 Location: Left arm, BP Patient Position: Sitting)    Pulse 81    Temp 97.5 °F (36.4 °C) (Oral)    Resp 18    Ht 6' 1\" (1.854 m)    SpO2 96%     Neck: supple, symmetrical, trachea midline, no adenopathy, thyroid: not enlarged, symmetric, no tenderness/mass/nodules, no carotid bruit and no JVD  Shoulders: Left is normal. Right has prominent acromion and the humerus appears to be posterior and not in place. Lungs: clear to auscultation bilaterally  Heart: regular rate and rhythm, S1, S2 normal, no murmur, click, rub or gallop  Neurologic: Grossly normal    Pt is in a wheelchair. Responds appropriately to voice, commands and responds appropriately, but does drift off to sleep occasionally. He tends to tilt his head to the right, but is able to move his head in all directions. ASSESSMENT and PLAN    ICD-10-CM ICD-9-CM    1. Urination frequency R35.0 788.41 CULTURE, URINE      URINALYSIS W/ RFLX MICROSCOPIC   2. Acute pain of right shoulder M25.511 719.41 REFERRAL TO ORTHOPEDIC SURGERY   3. Confusional state F44.89 298.9 CULTURE, URINE   4. Generalized anxiety disorder F41.1 300.02    5. Major depressive disorder with single episode, in partial remission ([facility] F32.4 296.25    6. Essential hypertension, difficult to control due to orthostatic hypotension I10 401.1    7.  Reflux esophagitis K21.0 530.11    8. Rupture of quadriceps tendon, unspecified laterality, sequela S76.119S 905.8     bilateral ruptured quadraceps tendon 2014- unable to walk after that     Diagnoses and all orders for this visit:    1. Urination frequency  -     CULTURE, URINE  -     URINALYSIS W/ RFLX MICROSCOPIC    2. Acute pain of right shoulder  -     REFERRAL TO ORTHOPEDIC SURGERY    3. Confusional state  -     CULTURE, URINE    4. Generalized anxiety disorder    5. Major depressive disorder with single episode, in partial remission ([facility]    6. Essential hypertension, difficult to control due to orthostatic hypotension    7. Reflux esophagitis    8. Rupture of quadriceps tendon, unspecified laterality, sequela  Comments:  bilateral ruptured quadraceps tendon 2014- unable to walk after that      Follow-up Disposition:  Return in about 6 months (around 11/25/2018), or if confusion or fever or UTI symptoms intensify, for F/U HTN and CHOL. reviewed medications and side effects in detail  Please call my office if there are any questions- [phone]. Discussed expected course/resolution/complications of diagnosis in detail with patient. Medication risks/benefits/costs/interactions/alternatives discussed with patient. Pt was given an after visit summary which includes diagnoses, current medications & vitals. Pt expressed understanding with the diagnosis and plan. Patient to call if no better in 3 -4 days and prn new problems. Checked urine for infection. follow up after that returns. He has seen urologist Dr. Pineda Perkins for OAB so that might explain the frequent urination but not the confusion. Because XR for shoulders in the past have been inaccurate for identifying dislocation, suggested that he see the orthopedist for potential right shoulder dislocation, referral made.      The Tomasa Koch [id number]) that accompanied him states that the fall and associated discomfort is resolving and is of no concern (this is the reason he made the appointment). This document was written by Roberta Severs, as dictated by Jim Raphael MD.  I have reviewed and agree with the above note and have made corrections where appropriate To Flowers M.D.

## 2018-05-25 NOTE — PROGRESS NOTES
Chief Complaint   Patient presents with    Pain (Chronic)     Back pain, right shoulder pain, and right knee pain      1. Have you been to the ER, urgent care clinic since your last visit? Hospitalized since your last visit? No    2. Have you seen or consulted any other health care providers outside of the [address] [place] [address] since your last visit? Include any pap smears or colon screening.  No

## 2018-05-25 NOTE — TELEPHONE ENCOUNTER
[facility]per[facility] Maldonado is calling on behalf of patient in regards to having an order sent over to their facility in regards to collecting the urine sample that was needed for his visit today 5/25/18. She is also requesting for an order to be sent that will allow patient to have alcohol in his room at their facility.      Best call back [phone]  Fax: [phone]

## 2018-05-25 NOTE — MR AVS SNAPSHOT
[facility] 
 
 
 [address] 
[phone] Patient: Lidia Carlisle MRN: [id number] HAC:[id number] Visit Information Date & Time Provider Department Dept. Phone Encounter #  
 5/25/2018 12:00 PM Bernardo Quiroga  [facility] [phone] [id number] Your Appointments 7/26/2018 10:30 AM  
PACEMAKER with [id number] ([facility] SCHEDULING) Appt Note: mdt ppi, rc, annual thresh/CL, see gilman; med threshold/rc/Gilman 1 yr b  
 [address] [place]  
[address] [facility]  
  
    
 7/26/2018 10:40 AM  
ESTABLISHED PATIENT with Joanna Robertson MD  
[facility] ([facility] Appt Note: med threshold/rc/Gilman 1 yr b  
 [address] [place]  
[address] [address] Upcoming Health Maintenance Date Due Influenza Age 5 to Adult 8/1/2018 GLAUCOMA SCREENING Q2Y 8/26/2018 MEDICARE YEARLY EXAM 5/10/2019 DTaP/Tdap/Td series (2 - Td) 1/31/2028 Allergies as of 5/25/2018  Review Complete On: 5/25/2018 By: Petra Barnes LPN No Known Allergies Current Immunizations  Reviewed on 3/14/2017 Name Date Influenza High Dose Vaccine PF 10/27/2015 Influenza Vaccine (Quad) PF 11/16/2017  4:19 PM  
 Influenza Vaccine Split 11/17/2011 TB Skin Test (PPD) Intradermal 3/25/2013 TD Vaccine 5/3/2012 Tdap 5/3/2012 ZZZ-RETIRED (DO NOT USE) Pneumococcal Vaccine (Unspecified Type) 4/20/2010 Not reviewed this visit You Were Diagnosed With   
  
 Codes Comments Urination frequency    -  Primary ICD-10-CM: R35.0 ICD-9-CM: 788.41 Acute pain of right shoulder     ICD-10-CM: M25.511 ICD-9-CM: 719.41 Vitals BP Pulse Temp Resp Height(growth percentile) SpO2  
 (!) 78/44 (BP 1 Location: Left arm, BP Patient Position: Sitting) 81 97.5 °F (36.4 °C) (Oral) 18 6' 1\" (1.854 m) 96% Smoking Status Former Smoker Vitals History Preferred Pharmacy Pharmacy Name Phone [facility] [phone] Your Updated Medication List  
  
   
This list is accurate as of 5/25/18  1:01 PM.  Always use your most recent med list.  
  
  
  
  
 acetaminophen 500 mg tablet Commonly known as:  MAPAP EXTRA STRENGTH Take 1 Tab by mouth every six (6) hours as needed for Pain. aspirin 81 mg chewable tablet Take 81 mg by mouth daily. carbidopa-levodopa  mg per tablet Commonly known as:  SINEMET Take 2 Tabs by mouth four (4) times daily. (This is given at 8:00, 11:00, 14:00, and 21:00). cycloSPORINE 0.05 % ophthalmic emulsion Commonly known as:  RESTASIS Administer 1 Drop to both eyes two (2) times a day. docusate sodium 100 mg capsule Commonly known as:  Tariq Raker Take 1 Cap by mouth daily as needed for Constipation. escitalopram oxalate 20 mg tablet Commonly known as:  [place]unn Amrita Take 1 Tab by mouth daily. FLORASTOR 250 mg capsule Generic drug:  Saccharomyces boulardii Take 250 mg by mouth two (2) times a day. lidocaine 5 % Commonly known as:  Nell Proud Apply patch to the affected area for 12 hours a day and remove for 12 hours a day. losartan 25 mg tablet Commonly known as:  COZAAR Take  by mouth daily. neomycin-bacitracnZn-polymyxnB 3.5-400-5,000 mg-unit-unit Oipk ointment Commonly known as:  NEOSPORIN Apply 1 Packet to affected area as needed (apply small amount). nitrofurantoin 50 mg capsule Commonly known as:  MACRODANTIN Take 50 mg by mouth daily. Indications: UTI PPX  
  
 omeprazole 40 mg capsule Commonly known as:  PRILOSEC  
 Take 40 mg by mouth daily. pindolol 5 mg tablet Commonly known as:  VISKIN Take 2.5 mg by mouth two (2) times a day. Take 1/2 tablet by mouth at 2pm and 1/2 tab by mouth at 9pm  
  
 polyethylene glycol 17 gram packet Commonly known as:  Cammy Howell Take 1 Packet by mouth daily. Indications: CONSTIPATION  
  
 PROAIR HFA 90 mcg/actuation inhaler Generic drug:  albuterol Take 2 Puffs by inhalation every six (6) hours as needed for Wheezing. rivastigmine 4.6 mg/24 hr patch Commonly known as:  EXELON  
1 Patch by TransDERmal route daily. therapeutic multivitamin tablet Commonly known as:  [facility] Take 1 Tab by mouth daily. tolterodine ER 4 mg ER capsule Commonly known as:  Suella Parent Take 1 Cap by mouth daily. traMADol 50 mg tablet Commonly known as:  ULTRAM  
Take 1 Tab by mouth every eight (8) hours as needed for Pain. Max Daily Amount: 150 mg. VOLTAREN 1 % Gel Generic drug:  diclofenac Apply  to affected area as needed. We Performed the Following CULTURE, URINE [id number] CPT(R)] REFERRAL TO ORTHOPEDIC SURGERY [REF62 Custom] Comments:  
 Right shoulder pain URINALYSIS W/ RFLX MICROSCOPIC [89829 CPT(R)] Referral Information Referral ID Referred By Referred To  
  
 [id number] [facility]   
   [address] [place], [address] Visits Status Start Date End Date 1 New Request 5/25/18 5/25/19 If your referral has a status of pending review or denied, additional information will be sent to support the outcome of this decision. Introducing [facility]! Dear Shane Cruz: Thank you for requesting a [employer] account. Our records indicate that you already have an active [employer] account. You can access your account anytime at [email or web address] Did you know that you can access your hospital and ER discharge instructions at any time in [employer]? You can also review all of your test results from your hospital stay or ER visit. Additional Information If you have questions, please visit the Frequently Asked Questions section of the [employer] website at [email or web address] Remember, [employer] is NOT to be used for urgent needs. For medical emergencies, dial 911. Now available from your iPhone and Android! Please provide this summary of care documentation to your next provider. Your primary care clinician is listed as [address] [address], [facility]/s . If you have any questions after today's visit, please call [phone].

## 2018-05-27 VITALS
HEIGHT: 73 IN | SYSTOLIC BLOOD PRESSURE: 110 MMHG | OXYGEN SATURATION: 96 % | HEART RATE: 81 BPM | TEMPERATURE: 97.5 F | DIASTOLIC BLOOD PRESSURE: 74 MMHG | RESPIRATION RATE: 18 BRPM

## 2018-05-29 ENCOUNTER — TELEPHONE (OUTPATIENT)
Dept: FAMILY MEDICINE CLINIC | Age: 83
End: 2018-05-29

## 2018-05-29 DIAGNOSIS — R21 RASH: Primary | ICD-10-CM

## 2018-05-29 NOTE — TELEPHONE ENCOUNTER
Alma Rosa Llamas from [facility] called. She wanted to give Dr. Bishop Short an update regarding patient. Patient's BP today was 85/57. She states that patient has not been himself today. He has been \"spaced out\" talking to his wife, who has already passed away.

## 2018-05-30 ENCOUNTER — TELEPHONE (OUTPATIENT)
Dept: FAMILY MEDICINE CLINIC | Age: 83
End: 2018-05-30

## 2018-05-30 NOTE — TELEPHONE ENCOUNTER
Called and left voicemail with verbal order per Dr. Jackie Mattson to extend speech therapy for 2 times per week for 1 additional week.

## 2018-05-30 NOTE — TELEPHONE ENCOUNTER
[address] [facility] Therapist w/[facility]. She said she needs a verbal order for patient to have extended speech therapy for 2 times a week for 1 more week. [phone].

## 2018-05-30 NOTE — TELEPHONE ENCOUNTER
Aaron Michelle from [facility] call and states that patient had a pulse ox reading today of 76%. After putting oxygen on him it went up to 100%. Aaron Michelle states that she is pushing fluids and wanted to give Dr. Mariana Doty this update.

## 2018-05-30 NOTE — TELEPHONE ENCOUNTER
Message from Dr. Charline Matute:  Juana Keene culture \" collected\"  On 5/25,but not back- this usually means there is no culture being done- We need a culture to know what bacteria is causing the infection; this is usually the cause of confusion, not a BP of 85 systolic. \"    Jose Nino and spoke with [facility]. She states that they never received a urine specimen. Our lab states that patient was not able to give sample and told the lab that he would return. Faxed orders to [facility] so they can obtain urine specimen and send out for a UA and UC.

## 2018-06-11 ENCOUNTER — TELEPHONE (OUTPATIENT)
Dept: FAMILY MEDICINE CLINIC | Age: 83
End: 2018-06-11

## 2018-06-11 NOTE — TELEPHONE ENCOUNTER
Jess from [facility] is calling stating that patient was suppose to be discharged today from that place, but he still has an open wound on his right heel .    She is requesting a call back form nurse and want to know if she can keep the patients for few days more    Her best call back : [phone]  Patient lats seen : May 25, 2018

## 2018-06-29 ENCOUNTER — TELEPHONE (OUTPATIENT)
Dept: FAMILY MEDICINE CLINIC | Age: 83
End: 2018-06-29

## 2018-06-29 NOTE — TELEPHONE ENCOUNTER
Sadi Fady (Cell [phone]) is calling on behalf of patient from [facility] in regards having order faxed over to them. She states that the fax machine is down is they are un able to reeve fax at this time. She is requesting to have order emailed to her. Order is for patient to start Physical and Speech Therapy. Email: Loffles (PT) Tahmina Donis ([address] [place] [address] Dr)  [email or web address]    Best call back [phone]  Fax: [phone]

## 2018-06-29 NOTE — TELEPHONE ENCOUNTER
Antwon Lunsford from/[facility]. [phone] She said Dr. Lakshmi Montoya was suppose to be signing and faxing over for request of PT & SP. The patient was discharged from [facility] and she was told it would be done that day and she never received it.  [phone]

## 2018-06-29 NOTE — TELEPHONE ENCOUNTER
Called [facility] and spoke to Geovanna Soliz. Advised to refax form so that another provider can sign off on it as Dr. Juana Lewis is not in the office.

## 2018-07-16 ENCOUNTER — APPOINTMENT (OUTPATIENT)
Dept: GENERAL RADIOLOGY | Age: 83
DRG: 683 | End: 2018-07-16
Attending: EMERGENCY MEDICINE
Payer: MEDICARE

## 2018-07-16 ENCOUNTER — APPOINTMENT (OUTPATIENT)
Dept: ULTRASOUND IMAGING | Age: 83
DRG: 683 | End: 2018-07-16
Attending: FAMILY MEDICINE
Payer: MEDICARE

## 2018-07-16 ENCOUNTER — HOSPITAL ENCOUNTER (INPATIENT)
Age: 83
LOS: 2 days | Discharge: HOME | DRG: 683 | End: 2018-07-18
Attending: EMERGENCY MEDICINE | Admitting: FAMILY MEDICINE
Payer: MEDICARE

## 2018-07-16 DIAGNOSIS — R06.02 SOB (SHORTNESS OF BREATH): Primary | ICD-10-CM

## 2018-07-16 DIAGNOSIS — N17.9 AKI (ACUTE KIDNEY INJURY) (HCC): ICD-10-CM

## 2018-07-16 LAB
ALBUMIN SERPL-MCNC: 3.7 G/DL (ref 3.5–5)
ALBUMIN/GLOB SERPL: 0.9 {RATIO} (ref 1.1–2.2)
ALP SERPL-CCNC: 88 U/L (ref 45–117)
ALT SERPL-CCNC: 17 U/L (ref 12–78)
ANION GAP SERPL CALC-SCNC: 9 MMOL/L (ref 5–15)
AST SERPL-CCNC: 117 U/L (ref 15–37)
ATRIAL RATE: 77 BPM
BASOPHILS # BLD: 0 K/UL (ref 0–0.1)
BASOPHILS NFR BLD: 1 % (ref 0–1)
BILIRUB SERPL-MCNC: 0.5 MG/DL (ref 0.2–1)
BNP SERPL-MCNC: 345 PG/ML (ref 0–450)
BUN SERPL-MCNC: 47 MG/DL (ref 6–20)
BUN/CREAT SERPL: 20 (ref 12–20)
CALCIUM SERPL-MCNC: 8.7 MG/DL (ref 8.5–10.1)
CALCULATED R AXIS, ECG10: -7 DEGREES
CALCULATED T AXIS, ECG11: 12 DEGREES
CHLORIDE SERPL-SCNC: 97 MMOL/L (ref 97–108)
CK MB CFR SERPL CALC: 1 % (ref 0–2.5)
CK MB SERPL-MCNC: 24 NG/ML (ref 5–25)
CK SERPL-CCNC: 2388 U/L (ref 39–308)
CO2 SERPL-SCNC: 31 MMOL/L (ref 21–32)
CREAT SERPL-MCNC: 2.33 MG/DL (ref 0.7–1.3)
DIAGNOSIS, 93000: NORMAL
DIFFERENTIAL METHOD BLD: ABNORMAL
EOSINOPHIL # BLD: 0.3 K/UL (ref 0–0.4)
EOSINOPHIL NFR BLD: 4 % (ref 0–7)
ERYTHROCYTE [DISTWIDTH] IN BLOOD BY AUTOMATED COUNT: 16.5 % (ref 11.5–14.5)
GLOBULIN SER CALC-MCNC: 4.1 G/DL (ref 2–4)
GLUCOSE SERPL-MCNC: 117 MG/DL (ref 65–100)
HCT VFR BLD AUTO: 39.7 % (ref 36.6–50.3)
HGB BLD-MCNC: 12.4 G/DL (ref 12.1–17)
IMM GRANULOCYTES # BLD: 0 K/UL (ref 0–0.04)
IMM GRANULOCYTES NFR BLD AUTO: 0 % (ref 0–0.5)
LYMPHOCYTES # BLD: 1 K/UL (ref 0.8–3.5)
LYMPHOCYTES NFR BLD: 14 % (ref 12–49)
MCH RBC QN AUTO: 28.8 PG (ref 26–34)
MCHC RBC AUTO-ENTMCNC: 31.2 G/DL (ref 30–36.5)
MCV RBC AUTO: 92.1 FL (ref 80–99)
MONOCYTES # BLD: 0.6 K/UL (ref 0–1)
MONOCYTES NFR BLD: 9 % (ref 5–13)
NEUTS SEG # BLD: 5.4 K/UL (ref 1.8–8)
NEUTS SEG NFR BLD: 73 % (ref 32–75)
NRBC # BLD: 0 K/UL (ref 0–0.01)
NRBC BLD-RTO: 0 PER 100 WBC
PLATELET # BLD AUTO: 193 K/UL (ref 150–400)
PMV BLD AUTO: 10.5 FL (ref 8.9–12.9)
POTASSIUM SERPL-SCNC: 4.6 MMOL/L (ref 3.5–5.1)
PROT SERPL-MCNC: 7.8 G/DL (ref 6.4–8.2)
Q-T INTERVAL, ECG07: 382 MS
QRS DURATION, ECG06: 88 MS
QTC CALCULATION (BEZET), ECG08: 440 MS
RBC # BLD AUTO: 4.31 M/UL (ref 4.1–5.7)
SODIUM SERPL-SCNC: 137 MMOL/L (ref 136–145)
TROPONIN I SERPL-MCNC: <0.05 NG/ML
VENTRICULAR RATE, ECG03: 80 BPM
WBC # BLD AUTO: 7.4 K/UL (ref 4.1–11.1)

## 2018-07-16 PROCEDURE — 82550 ASSAY OF CK (CPK): CPT | Performed by: EMERGENCY MEDICINE

## 2018-07-16 PROCEDURE — 65270000032 HC RM SEMIPRIVATE

## 2018-07-16 PROCEDURE — 96360 HYDRATION IV INFUSION INIT: CPT

## 2018-07-16 PROCEDURE — 76770 US EXAM ABDO BACK WALL COMP: CPT

## 2018-07-16 PROCEDURE — 74011250637 HC RX REV CODE- 250/637: Performed by: FAMILY MEDICINE

## 2018-07-16 PROCEDURE — 83880 ASSAY OF NATRIURETIC PEPTIDE: CPT | Performed by: EMERGENCY MEDICINE

## 2018-07-16 PROCEDURE — 99285 EMERGENCY DEPT VISIT HI MDM: CPT

## 2018-07-16 PROCEDURE — 80053 COMPREHEN METABOLIC PANEL: CPT | Performed by: EMERGENCY MEDICINE

## 2018-07-16 PROCEDURE — 74011250636 HC RX REV CODE- 250/636: Performed by: EMERGENCY MEDICINE

## 2018-07-16 PROCEDURE — 74011250636 HC RX REV CODE- 250/636: Performed by: FAMILY MEDICINE

## 2018-07-16 PROCEDURE — 85025 COMPLETE CBC W/AUTO DIFF WBC: CPT | Performed by: EMERGENCY MEDICINE

## 2018-07-16 PROCEDURE — 82553 CREATINE MB FRACTION: CPT | Performed by: EMERGENCY MEDICINE

## 2018-07-16 PROCEDURE — 93005 ELECTROCARDIOGRAM TRACING: CPT

## 2018-07-16 PROCEDURE — 96361 HYDRATE IV INFUSION ADD-ON: CPT

## 2018-07-16 PROCEDURE — 84484 ASSAY OF TROPONIN QUANT: CPT | Performed by: EMERGENCY MEDICINE

## 2018-07-16 PROCEDURE — 87086 URINE CULTURE/COLONY COUNT: CPT | Performed by: EMERGENCY MEDICINE

## 2018-07-16 PROCEDURE — 77030011943

## 2018-07-16 PROCEDURE — 71045 X-RAY EXAM CHEST 1 VIEW: CPT

## 2018-07-16 PROCEDURE — 36415 COLL VENOUS BLD VENIPUNCTURE: CPT | Performed by: EMERGENCY MEDICINE

## 2018-07-16 PROCEDURE — 81001 URINALYSIS AUTO W/SCOPE: CPT | Performed by: EMERGENCY MEDICINE

## 2018-07-16 RX ORDER — PINDOLOL 5 MG/1
2.5 TABLET ORAL 2 TIMES DAILY
Status: DISCONTINUED | OUTPATIENT
Start: 2018-07-16 | End: 2018-07-18 | Stop reason: HOSPADM

## 2018-07-16 RX ORDER — TRAMADOL HYDROCHLORIDE 50 MG/1
50 TABLET ORAL
Status: DISCONTINUED | OUTPATIENT
Start: 2018-07-16 | End: 2018-07-18 | Stop reason: HOSPADM

## 2018-07-16 RX ORDER — THERA TABS 400 MCG
1 TAB ORAL DAILY
Status: DISCONTINUED | OUTPATIENT
Start: 2018-07-17 | End: 2018-07-18 | Stop reason: HOSPADM

## 2018-07-16 RX ORDER — HYDRALAZINE HYDROCHLORIDE 20 MG/ML
10 INJECTION INTRAMUSCULAR; INTRAVENOUS
Status: DISCONTINUED | OUTPATIENT
Start: 2018-07-16 | End: 2018-07-18 | Stop reason: HOSPADM

## 2018-07-16 RX ORDER — POLYETHYLENE GLYCOL 3350 17 G/17G
17 POWDER, FOR SOLUTION ORAL DAILY
Status: DISCONTINUED | OUTPATIENT
Start: 2018-07-17 | End: 2018-07-18 | Stop reason: HOSPADM

## 2018-07-16 RX ORDER — LIDOCAINE 50 MG/G
1 PATCH TOPICAL EVERY 24 HOURS
Status: DISCONTINUED | OUTPATIENT
Start: 2018-07-16 | End: 2018-07-18 | Stop reason: HOSPADM

## 2018-07-16 RX ORDER — SODIUM CHLORIDE 0.9 % (FLUSH) 0.9 %
5-10 SYRINGE (ML) INJECTION EVERY 8 HOURS
Status: DISCONTINUED | OUTPATIENT
Start: 2018-07-16 | End: 2018-07-18 | Stop reason: HOSPADM

## 2018-07-16 RX ORDER — ACETAMINOPHEN 500 MG
500 TABLET ORAL
Status: DISCONTINUED | OUTPATIENT
Start: 2018-07-16 | End: 2018-07-18 | Stop reason: HOSPADM

## 2018-07-16 RX ORDER — PANTOPRAZOLE SODIUM 40 MG/1
40 TABLET, DELAYED RELEASE ORAL DAILY
Status: DISCONTINUED | OUTPATIENT
Start: 2018-07-17 | End: 2018-07-18 | Stop reason: HOSPADM

## 2018-07-16 RX ORDER — CARBIDOPA AND LEVODOPA 25; 100 MG/1; MG/1
2 TABLET ORAL 4 TIMES DAILY
Status: DISCONTINUED | OUTPATIENT
Start: 2018-07-16 | End: 2018-07-18 | Stop reason: HOSPADM

## 2018-07-16 RX ORDER — GUAIFENESIN 100 MG/5ML
81 LIQUID (ML) ORAL DAILY
Status: DISCONTINUED | OUTPATIENT
Start: 2018-07-17 | End: 2018-07-18 | Stop reason: HOSPADM

## 2018-07-16 RX ORDER — RIVASTIGMINE 4.6 MG/24H
1 PATCH, EXTENDED RELEASE TRANSDERMAL DAILY
Status: DISCONTINUED | OUTPATIENT
Start: 2018-07-17 | End: 2018-07-18 | Stop reason: HOSPADM

## 2018-07-16 RX ORDER — IPRATROPIUM BROMIDE AND ALBUTEROL SULFATE 2.5; .5 MG/3ML; MG/3ML
3 SOLUTION RESPIRATORY (INHALATION) EVERY 6 HOURS
COMMUNITY

## 2018-07-16 RX ORDER — ESCITALOPRAM OXALATE 10 MG/1
20 TABLET ORAL DAILY
Status: DISCONTINUED | OUTPATIENT
Start: 2018-07-17 | End: 2018-07-18 | Stop reason: HOSPADM

## 2018-07-16 RX ORDER — SODIUM CHLORIDE, SODIUM LACTATE, POTASSIUM CHLORIDE, CALCIUM CHLORIDE 600; 310; 30; 20 MG/100ML; MG/100ML; MG/100ML; MG/100ML
75 INJECTION, SOLUTION INTRAVENOUS CONTINUOUS
Status: DISCONTINUED | OUTPATIENT
Start: 2018-07-16 | End: 2018-07-17

## 2018-07-16 RX ORDER — HEPARIN SODIUM 5000 [USP'U]/ML
5000 INJECTION, SOLUTION INTRAVENOUS; SUBCUTANEOUS EVERY 8 HOURS
Status: DISCONTINUED | OUTPATIENT
Start: 2018-07-16 | End: 2018-07-18 | Stop reason: HOSPADM

## 2018-07-16 RX ORDER — OXYBUTYNIN CHLORIDE 5 MG/1
5 TABLET, EXTENDED RELEASE ORAL DAILY
Status: DISCONTINUED | OUTPATIENT
Start: 2018-07-17 | End: 2018-07-17

## 2018-07-16 RX ORDER — SODIUM CHLORIDE 0.9 % (FLUSH) 0.9 %
5-10 SYRINGE (ML) INJECTION AS NEEDED
Status: DISCONTINUED | OUTPATIENT
Start: 2018-07-16 | End: 2018-07-18 | Stop reason: HOSPADM

## 2018-07-16 RX ORDER — IPRATROPIUM BROMIDE AND ALBUTEROL SULFATE 2.5; .5 MG/3ML; MG/3ML
3 SOLUTION RESPIRATORY (INHALATION)
Status: DISCONTINUED | OUTPATIENT
Start: 2018-07-16 | End: 2018-07-18 | Stop reason: HOSPADM

## 2018-07-16 RX ORDER — DOCUSATE SODIUM 100 MG/1
100 CAPSULE, LIQUID FILLED ORAL
Status: DISCONTINUED | OUTPATIENT
Start: 2018-07-16 | End: 2018-07-18 | Stop reason: HOSPADM

## 2018-07-16 RX ORDER — SAME BUTANEDISULFONATE/BETAINE 400-600 MG
250 POWDER IN PACKET (EA) ORAL 2 TIMES DAILY
Status: DISCONTINUED | OUTPATIENT
Start: 2018-07-16 | End: 2018-07-18 | Stop reason: HOSPADM

## 2018-07-16 RX ADMIN — Medication 10 ML: at 22:48

## 2018-07-16 RX ADMIN — SODIUM CHLORIDE 500 ML: 900 INJECTION, SOLUTION INTRAVENOUS at 15:04

## 2018-07-16 RX ADMIN — CARBIDOPA AND LEVODOPA 2 TABLET: 25; 100 TABLET ORAL at 23:44

## 2018-07-16 RX ADMIN — RDII 250 MG CAPSULE 250 MG: at 23:44

## 2018-07-16 RX ADMIN — SODIUM CHLORIDE 1000 ML: 900 INJECTION, SOLUTION INTRAVENOUS at 16:33

## 2018-07-16 RX ADMIN — SODIUM CHLORIDE, SODIUM LACTATE, POTASSIUM CHLORIDE, AND CALCIUM CHLORIDE 75 ML/HR: 600; 310; 30; 20 INJECTION, SOLUTION INTRAVENOUS at 22:48

## 2018-07-16 RX ADMIN — PINDOLOL 2.5 MG: 5 TABLET ORAL at 23:44

## 2018-07-16 RX ADMIN — HEPARIN SODIUM 5000 UNITS: 5000 INJECTION INTRAVENOUS; SUBCUTANEOUS at 23:46

## 2018-07-16 NOTE — ED PROVIDER NOTES
HPI Comments: 80 y.o. male with past medical history significant for hyperlipidemia, hypertension, TIA, cardiac pacemaker who presents from [facility] via EMS for evaluation of possible left pneumothorax. Patient states he \"feels pretty good and is ready to go home\" and doesn't have any complaints. Patient reports mild intermittent left leg pain. Patient does not wear O2 at home. There are no other acute medical concerns at this time. EMS reports patient was sent in for altered mental status and shortness of breath. Social hx: Resides at [facility]  PCP: Mary Pappas MD    Full history, physical exam, and ROS unable to be obtained due to:  dementia. Note written by Perry Mcclure. Lilly Urrutia, as dictated by Angelique Farah MD 3:42 PM      The history is provided by the patient and the EMS personnel. The history is limited by the condition of the patient.         Past Medical History:   Diagnosis Date    Blurry vision 1/19/2012    BPH (benign prostatic hyperplasia)     DDD (degenerative disc disease), lumbar 2/19/2010    DJD (degenerative joint disease) of cervical spine 2/19/2010    DU (duodenal ulcer) 9/1/1990    Hyperlipidemia     Hypertension     Other and unspecified hyperlipidemia 2/19/2010    Pacemaker     Paralysis agitans ([facility] [address] 2/19/2010    Parkinson disease ([facility] [address]     PAT (paroxysmal atrial tachycardia) (HCC)     Premature atrial beats     Reflux esophagitis 2/19/2010    Sick sinus syndrome (HCC)     TIA (transient ischemic attack)        Past Surgical History:   Procedure Laterality Date    HX CATARACT REMOVAL  6/2012 and 7/2012    Both eyes    HX ORTHOPAEDIC  04/2015    tendon repair both knees    HX PACEMAKER  2008    bradycardia    HX VITRECTOMY  11/2011    HI REMVL PERM PM PLS GEN W/REPL PLSE GEN 2 LEAD SYS  3/13/2017              Family History:   Problem Relation Age of Onset    Asthma Mother     Heart Disease Father        Social History     Social History    Marital status:      Spouse name: N/A    Number of children: N/A    Years of education: N/A     Occupational History    Not on file. Social History Main Topics    Smoking status: Former Smoker     Types: Pipe     Quit date: 7/14/1945    Smokeless tobacco: Never Used    Alcohol use No      Comment: glass of wine every now and then    Drug use: No    Sexual activity: Not on file     Other Topics Concern     Service Yes    Blood Transfusions No    Caffeine Concern No    Occupational Exposure No    Hobby Hazards No    Sleep Concern No    Stress Concern No    Weight Concern No    Special Diet No    Back Care No    Exercise No    Bike Helmet No    Seat Belt Yes    Self-Exams No     Social History Narrative         ALLERGIES: Review of patient's allergies indicates no known allergies. Review of Systems   Unable to perform ROS: Dementia       Vitals:    07/16/18 1421   BP: 146/89   Pulse: 83   Resp: 16   Temp: 97.8 °F (36.6 °C)   SpO2: 99%   Weight: 81.6 kg (180 lb)   Height: 6' (1.829 m)            Physical Exam   Constitutional: He appears well-developed and well-nourished. No distress. HENT:   Head: Normocephalic and atraumatic. Eyes: Conjunctivae are normal.   Neck: Neck supple. No tracheal deviation present. Cardiovascular: Normal rate, regular rhythm and normal heart sounds. Pulmonary/Chest: Effort normal and breath sounds normal. No respiratory distress. He has no wheezes. He has no rales. 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 He exhibits no distension. There is no tenderness. Musculoskeletal: Normal range of motion. Chronically dislocated right shoulder. Bilateral lower extremities contractures. Neurological: He is alert. Disoriented    Skin: Skin is warm and dry. Rivastigmine patch on left arm   Psychiatric: He has a normal mood and affect. Nursing note and vitals reviewed. Note written by Epifania Curling.  Mariel Martinez as dictated by Opal Fraser MD 3:44 PM       MDM    44-year-old male presents with an unexplained episode of hypoxemia from his nursing facility where he was placed on oxygen and brought here. His hypoxemia resolved but his screening labs are consistent with possible prerenal GABI. No abnormalities on CXR. Fluid resuscitation started. Hospitalist was consulted for admission and will see the patient in the emergency department. Dr Cindy Nolan accepting. ED Course       Procedures    ED EKG interpretation:  Rhythm: atrial paced; and regular . Rate (approx.): 80; No ST/T wave abnormality. Note written by Wendy Badillo. London Moran, as dictated by Opal Fraser MD 3:25 PM    3:44 PM  Nasal cannula O2 turned off - sats still 100%.

## 2018-07-16 NOTE — PROGRESS NOTES
Admission Medication Reconciliation:    Information obtained from: [facility]    Significant PMH/Disease States:   Past Medical History:   Diagnosis Date   Frances Koch vision 1/19/2012    BPH (benign prostatic hyperplasia)     DDD (degenerative disc disease), lumbar 2/19/2010    DJD (degenerative joint disease) of cervical spine 2/19/2010    DU (duodenal ulcer) 9/1/1990    Hyperlipidemia     Hypertension     Other and unspecified hyperlipidemia 2/19/2010    Pacemaker     Paralysis agitans ([facility] [place] [address] 2/19/2010    Parkinson disease ([facility] [place] [address]     PAT [facility]     Premature atrial beats     Reflux esophagitis 2/19/2010    Sick sinus syndrome ([facility])     TIA (transient ischemic attack)        Chief Complaint for this Admission:  SOB x 1 week    Allergies:  Review of patient's allergies indicates no known allergies. Prior to Admission Medications:   Prior to Admission Medications   Prescriptions Last Dose Informant Patient Reported? Taking? Saccharomyces boulardii (FLORASTOR) 250 mg capsule 7/16/2018 at Unknown time  Yes Yes   Sig: Take 250 mg by mouth two (2) times a day. acetaminophen (MAPAP EXTRA STRENGTH) 500 mg tablet 7/9/2018 at Unknown time  No Yes   Sig: Take 1 Tab by mouth every six (6) hours as needed for Pain. albuterol (PROAIR HFA) 90 mcg/actuation inhaler 7/16/2018 at Unknown time  Yes Yes   Sig: Take 2 Puffs by inhalation every six (6) hours as needed for Wheezing. albuterol-ipratropium (DUO-NEB) 2.5 mg-0.5 mg/3 ml nebu 7/16/2018 at Unknown time  Yes Yes   Sig: 3 mL by Nebulization route every six (6) hours. aspirin 81 mg chewable tablet 7/16/2018 at Unknown time  Yes Yes   Sig: Take 81 mg by mouth daily. carbidopa-levodopa (SINEMET)  mg per tablet 7/16/2018 at 1100  No Yes   Sig: Take 2 Tabs by mouth four (4) times daily. (This is given at 8:00, 11:00, 14:00, and 21:00). Patient taking differently: Take 2 Tabs by mouth four (4) times daily.  TOTAL DOSE = 37.5-150 mg, given at 8:00, 11:00, 14:00, and 21:00). cycloSPORINE (RESTASIS) 0.05 % ophthalmic emulsion 2018 at Unknown time  No Yes   Sig: Administer 1 Drop to both eyes two (2) times a day. diclofenac (VOLTAREN) 1 % gel 2018 at Unknown time  Yes Yes   Sig: Apply  to affected area as needed for Other (Left shoulder). docusate sodium (COLACE) 100 mg capsule 2018 at Unknown time  No Yes   Sig: Take 1 Cap by mouth daily as needed for Constipation. escitalopram oxalate (LEXAPRO) 20 mg tablet 2018 at Unknown time  No Yes   Sig: Take 1 Tab by mouth daily. lidocaine (LIDODERM) 5 % Unknown at Unknown time  No No   Sig: Apply patch to the affected area for 12 hours a day and remove for 12 hours a day. losartan (COZAAR) 25 mg tablet 2018 at Unknown time  Yes Yes   Sig: Take  by mouth daily. neomycin-bacitracnZn-polymyxnB (NEOSPORIN) 3.5-400-5,000 mg-unit-unit oipk ointment Unknown at Unknown time  Yes No   Sig: Apply 1 Packet to affected area as needed (apply small amount). nitrofurantoin (MACRODANTIN) 50 mg capsule 2018 at Unknown time  Yes Yes   Sig: Take 50 mg by mouth daily. Indications: UTI prophylaxis   omeprazole (PRILOSEC) 40 mg capsule 2018 at Unknown time  Yes Yes   Sig: Take 40 mg by mouth daily. pindolol (VISKIN) 5 mg tablet 2018 at Unknown time  Yes Yes   Sig: Take 2.5 mg by mouth two (2) times a day. Take 2.5 mg at 2pm and 2.5 mg tab  at 9pm   polyethylene glycol (MIRALAX) 17 gram packet 2018 at Unknown time  No Yes   Sig: Take 1 Packet by mouth daily. Indications: CONSTIPATION   rivastigmine (EXELON) 4.6 mg/24 hr patch 2018 at Unknown time  No Yes   Si Patch by TransDERmal route daily. therapeutic multivitamin (THERAGRAN) tablet 2018 at Unknown time  No Yes   Sig: Take 1 Tab by mouth daily. tolterodine ER (DETROL LA) 4 mg ER capsule 2018 at Unknown time  No Yes   Sig: Take 1 Cap by mouth daily.    traMADol (ULTRAM) 50 mg tablet 7/16/2018 at Unknown time  No Yes   Sig: Take 1 Tab by mouth every eight (8) hours as needed for Pain. Max Daily Amount: 150 mg. Facility-Administered Medications: None         Comments/Recommendations: [facility], spoke with nurse who updated medication list. Confirmed that morning medications were administered. Note that Lidocaine patch not present on admission but Rivastigmine noted left arm. Allergies confirmed. Added:  1. [place]    Revised:  1. Sinemet: dose should be 37.5-150 mg (combined tabs) QID    Thank you for allowing me to participate in the care of your patient.     Opal HoffD, RN #[id number]  \

## 2018-07-16 NOTE — H&P
History and Physical  Primary Care Provider: Lamar Ennis MD    Subjective:     Westley Leblanc is a 80 y.o. male who is a resident of [address] [place] [address], was sent to the emergency room with concern of low oxygen level. I do not have the information of how low the oxygen was at the nursing home. Patient presents to the ED with non re breather mask. Patient denies any shortness of breath, cough or chest pain. He appears comfortable other than the frontal headache he is complaining. Upon evaluation in the ER, his chest x ray shows no acute pathology. Patient was taken off of oxygen and his oxygen saturation is 100%. On further evaluation, patient was noted to have renal failure with serum creatinine of 2.33. His last renal function 4 months ago shows normal creatinine. He denies any problem with PO intake, although it's hard to get complete history from the patient and not sure whether he has baseline dementia. He wears the brief at base line, but states he can go to the bathroom. In the ER, patient was administered IVF and hospitalist service requested to admit the patient for further evaluation. Review of Systems:  Further 10 point review of systems is benign. A comprehensive review of systems was negative except for that written in the History of Present Illness.      Past Medical History:   Diagnosis Date    Blurry vision 1/19/2012    BPH (benign prostatic hyperplasia)     DDD (degenerative disc disease), lumbar 2/19/2010    DJD (degenerative joint disease) of cervical spine 2/19/2010    DU (duodenal ulcer) 9/1/1990    Hyperlipidemia     Hypertension     Other and unspecified hyperlipidemia 2/19/2010    Pacemaker     Paralysis agitans ([address] 2/19/2010    Parkinson disease ([address]     PAT (paroxysmal atrial tachycardia) (HCC)     Premature atrial beats     Reflux esophagitis 2/19/2010    Sick sinus syndrome (HCC)     TIA (transient ischemic attack)       Past Surgical History:   Procedure Laterality Date    HX CATARACT REMOVAL  6/2012 and 7/2012    Both eyes    HX ORTHOPAEDIC  04/2015    tendon repair both knees    HX PACEMAKER  2008    bradycardia    HX VITRECTOMY  11/2011    LA REMVL PERM PM PLS GEN W/REPL PLSE GEN 2 LEAD SYS  3/13/2017          Prior to Admission medications    Medication Sig Start Date End Date Taking? Authorizing Provider   albuterol-ipratropium (DUO-NEB) 2.5 mg-0.5 mg/3 ml nebu 3 mL by Nebulization route every six (6) hours. Yes Historical Provider   tolterodine ER (DETROL LA) 4 mg ER capsule Take 1 Cap by mouth daily. 5/9/18  Yes Nicola Arriaga MD   losartan (COZAAR) 25 mg tablet Take  by mouth daily. Yes Historical Provider   omeprazole (PRILOSEC) 40 mg capsule Take 40 mg by mouth daily. Yes Historical Provider   nitrofurantoin (MACRODANTIN) 50 mg capsule Take 50 mg by mouth daily. Indications: UTI prophylaxis   Yes Historical Provider   Saccharomyces boulardii (FLORASTOR) 250 mg capsule Take 250 mg by mouth two (2) times a day. Yes Historical Provider   albuterol (PROAIR HFA) 90 mcg/actuation inhaler Take 2 Puffs by inhalation every six (6) hours as needed for Wheezing. Yes Historical Provider   traMADol (ULTRAM) 50 mg tablet Take 1 Tab by mouth every eight (8) hours as needed for Pain. Max Daily Amount: 150 mg. 3/9/18  Yes Nicola Arriaga MD   aspirin 81 mg chewable tablet Take 81 mg by mouth daily. Yes Historical Provider   diclofenac (VOLTAREN) 1 % gel Apply  to affected area as needed for Other (Left shoulder). Yes Historical Provider   pindolol (VISKIN) 5 mg tablet Take 2.5 mg by mouth two (2) times a day. Take 2.5 mg at 2pm and 2.5 mg tab  at 9pm   Yes Historical Provider   carbidopa-levodopa (SINEMET)  mg per tablet Take 2 Tabs by mouth four (4) times daily. (This is given at 8:00, 11:00, 14:00, and 21:00). Patient taking differently: Take 2 Tabs by mouth four (4) times daily. TOTAL DOSE = 37.5-150 mg, given at 8:00, 11:00, 14:00, and 21:00). 7/28/16  Yes Saman Whitney NP   polyethylene glycol (MIRALAX) 17 gram packet Take 1 Packet by mouth daily. Indications: CONSTIPATION 7/28/16  Yes Saman Whitney NP   therapeutic multivitamin [facility]) tablet Take 1 Tab by mouth daily. 7/28/16  Yes Saman Whitney NP   docusate sodium (COLACE) 100 mg capsule Take 1 Cap by mouth daily as needed for Constipation. 7/28/16  Yes Saman Whitney NP   cycloSPORINE (RESTASIS) 0.05 % ophthalmic emulsion Administer 1 Drop to both eyes two (2) times a day. 7/28/16  Yes Saman Whitney NP   escitalopram oxalate (LEXAPRO) 20 mg tablet Take 1 Tab by mouth daily. 7/28/16  Yes Saman Whitney NP   rivastigmine (EXELON) 4.6 mg/24 hr patch 1 Patch by TransDERmal route daily. 7/28/16  Yes Saman Whitney NP   acetaminophen (MAPAP EXTRA STRENGTH) 500 mg tablet Take 1 Tab by mouth every six (6) hours as needed for Pain. 7/28/16  Yes Saman Whitney NP   neomycin-bacitracnZn-polymyxnB (NEOSPORIN) 3.5-400-5,000 mg-unit-unit oipk ointment Apply 1 Packet to affected area as needed (apply small amount). Historical Provider   lidocaine (LIDODERM) 5 % Apply patch to the affected area for 12 hours a day and remove for 12 hours a day. 1/21/18   Harpal Gonzalez MD     No Known Allergies   Family History   Problem Relation Age of Onset    Asthma Mother     Heart Disease Father         SOCIAL HISTORY:  Patient resides at [address] [place] [address]. Patient ambulates with cane. Smoking history: Former smoker  Alcohol history: None    Objective:       Physical Exam:   Visit Vitals    /89 (BP 1 Location: Left arm, BP Patient Position: At rest)    Pulse 83    Temp 97.8 °F (36.6 °C)    Resp 16    Ht 6' (1.829 m)    Wt 81.6 kg (180 lb)    SpO2 99%    BMI 24.41 kg/m2     General:  Alert, cooperative, no distress, appears stated age. Head:  Normocephalic, without obvious abnormality, atraumatic. Eyes:  Conjunctivae/corneas clear. PERRL, EOMs intact.  Fundi benign   Ears:  Normal TMs and external ear canals both ears. Nose: Nares normal. Septum midline. Mucosa normal. No drainage or sinus tenderness. Throat: Lips, mucosa, and tongue normal. Teeth and gums normal.   Neck: Supple, symmetrical, trachea midline, no adenopathy, thyroid: no enlargement/tenderness/nodules, no carotid bruit and no JVD. Back:   Symmetric, no curvature. ROM normal. No CVA tenderness. Lungs:   Clear to auscultation bilaterally. Chest wall:  No tenderness or deformity. Heart:  Regular rate and rhythm, S1, S2 normal, no murmur, click, rub or gallop. Abdomen:   Soft, non-tender. Bowel sounds normal. No masses,  No organomegaly. Genitalia:  Deferred   Rectal:  Deferred   Extremities: Extremities normal, atraumatic, no cyanosis or edema. Pulses: 2+ and symmetric all extremities. Skin: Skin color, texture, turgor normal. No rashes or lesions   Lymph nodes: Cervical, supraclavicular, and axillary nodes normal.   Neurologic: CNII-XII intact. Normal strength, sensation and reflexes throughout. ECG:  normal EKG, normal sinus rhythm, unchanged from previous tracings     Data Review: All diagnostic labs and studies have been reviewed. Chest x-ray was negative for infiltrate, effusion, pneumothorax, or wide mediastinum. Assessment and Plan:     1. Acute renal failure  DDx - prerenal from dehydration, rhabdo, post obstructive. Will admit to tele, start IVF, check renal USG, place rivera for accurate intake and output. Follow renal function in AM. If not improved or worse, consider nephrology evaluation. 2. Rhabdomyolysis  No reported history of fall. IVF as above, repeat CK level and myoglobin level in AM.    3. HTN  Hold losartan for now due to acute renal failure. Continue pindolol, hydralazine prn for uncontrolled BP. 4. BPH  On detrol LA. Check renal USG to rule out bladder outlet obstruction. 5. Parkinson  On sinemet, will resume. 6. DVT prophylaxis  Heparin and SCDs.     Estimated LOS is 2 midnights.       Signed By: Severo Griffin, MD     July 16, 2018

## 2018-07-16 NOTE — ED TRIAGE NOTES
Brought in by EMS. Feeling poorly for a week. Short of breath and altered mental status. 3L per minute at [facility]. Chronic dislocated right shoulder. 15L on NRB now. 92% on this. Possible collapsed left lung.

## 2018-07-17 ENCOUNTER — APPOINTMENT (OUTPATIENT)
Dept: GENERAL RADIOLOGY | Age: 83
DRG: 683 | End: 2018-07-17
Attending: HOSPITALIST
Payer: MEDICARE

## 2018-07-17 LAB
ANION GAP SERPL CALC-SCNC: 10 MMOL/L (ref 5–15)
APPEARANCE UR: ABNORMAL
BACTERIA URNS QL MICRO: ABNORMAL /HPF
BILIRUB UR QL: NEGATIVE
BUN SERPL-MCNC: 43 MG/DL (ref 6–20)
BUN/CREAT SERPL: 28 (ref 12–20)
CALCIUM SERPL-MCNC: 8.2 MG/DL (ref 8.5–10.1)
CHLORIDE SERPL-SCNC: 103 MMOL/L (ref 97–108)
CK SERPL-CCNC: 2446 U/L (ref 39–308)
CO2 SERPL-SCNC: 27 MMOL/L (ref 21–32)
COLOR UR: ABNORMAL
CREAT SERPL-MCNC: 1.56 MG/DL (ref 0.7–1.3)
EPITH CASTS URNS QL MICRO: ABNORMAL /LPF
GLUCOSE SERPL-MCNC: 81 MG/DL (ref 65–100)
GLUCOSE UR STRIP.AUTO-MCNC: NEGATIVE MG/DL
HGB UR QL STRIP: ABNORMAL
KETONES UR QL STRIP.AUTO: NEGATIVE MG/DL
LEUKOCYTE ESTERASE UR QL STRIP.AUTO: ABNORMAL
MYOGLOBIN SERPL-MCNC: 1816 NG/ML (ref 16–116)
NITRITE UR QL STRIP.AUTO: NEGATIVE
PH UR STRIP: 5.5 [PH] (ref 5–8)
POTASSIUM SERPL-SCNC: 4.5 MMOL/L (ref 3.5–5.1)
PROT UR STRIP-MCNC: ABNORMAL MG/DL
RBC #/AREA URNS HPF: ABNORMAL /HPF (ref 0–5)
SODIUM SERPL-SCNC: 140 MMOL/L (ref 136–145)
SP GR UR REFRACTOMETRY: 1.02 (ref 1–1.03)
UA: UC IF INDICATED,UAUC: ABNORMAL
UROBILINOGEN UR QL STRIP.AUTO: 0.2 EU/DL (ref 0.2–1)
WBC URNS QL MICRO: ABNORMAL /HPF (ref 0–4)

## 2018-07-17 PROCEDURE — 94760 N-INVAS EAR/PLS OXIMETRY 1: CPT

## 2018-07-17 PROCEDURE — 77030020186 HC BOOT HL PROTCT SAGE -B

## 2018-07-17 PROCEDURE — G8978 MOBILITY CURRENT STATUS: HCPCS

## 2018-07-17 PROCEDURE — 36415 COLL VENOUS BLD VENIPUNCTURE: CPT | Performed by: FAMILY MEDICINE

## 2018-07-17 PROCEDURE — 73630 X-RAY EXAM OF FOOT: CPT

## 2018-07-17 PROCEDURE — 74011250636 HC RX REV CODE- 250/636: Performed by: HOSPITALIST

## 2018-07-17 PROCEDURE — G8979 MOBILITY GOAL STATUS: HCPCS

## 2018-07-17 PROCEDURE — 80048 BASIC METABOLIC PNL TOTAL CA: CPT | Performed by: FAMILY MEDICINE

## 2018-07-17 PROCEDURE — 82550 ASSAY OF CK (CPK): CPT | Performed by: FAMILY MEDICINE

## 2018-07-17 PROCEDURE — 65270000029 HC RM PRIVATE

## 2018-07-17 PROCEDURE — 83874 ASSAY OF MYOGLOBIN: CPT | Performed by: FAMILY MEDICINE

## 2018-07-17 PROCEDURE — 74011250636 HC RX REV CODE- 250/636: Performed by: FAMILY MEDICINE

## 2018-07-17 PROCEDURE — 92610 EVALUATE SWALLOWING FUNCTION: CPT

## 2018-07-17 PROCEDURE — 77010033678 HC OXYGEN DAILY

## 2018-07-17 PROCEDURE — G8980 MOBILITY D/C STATUS: HCPCS

## 2018-07-17 PROCEDURE — 74011250637 HC RX REV CODE- 250/637: Performed by: FAMILY MEDICINE

## 2018-07-17 PROCEDURE — 97161 PT EVAL LOW COMPLEX 20 MIN: CPT

## 2018-07-17 RX ORDER — TAMSULOSIN HYDROCHLORIDE 0.4 MG/1
0.4 CAPSULE ORAL DAILY
Status: DISCONTINUED | OUTPATIENT
Start: 2018-07-18 | End: 2018-07-18 | Stop reason: HOSPADM

## 2018-07-17 RX ORDER — FINASTERIDE 5 MG/1
5 TABLET, FILM COATED ORAL DAILY
Status: DISCONTINUED | OUTPATIENT
Start: 2018-07-18 | End: 2018-07-18 | Stop reason: HOSPADM

## 2018-07-17 RX ORDER — SODIUM CHLORIDE, SODIUM LACTATE, POTASSIUM CHLORIDE, CALCIUM CHLORIDE 600; 310; 30; 20 MG/100ML; MG/100ML; MG/100ML; MG/100ML
125 INJECTION, SOLUTION INTRAVENOUS CONTINUOUS
Status: DISPENSED | OUTPATIENT
Start: 2018-07-17 | End: 2018-07-18

## 2018-07-17 RX ADMIN — CARBIDOPA AND LEVODOPA 2 TABLET: 25; 100 TABLET ORAL at 22:50

## 2018-07-17 RX ADMIN — Medication 10 ML: at 22:44

## 2018-07-17 RX ADMIN — Medication 10 ML: at 07:12

## 2018-07-17 RX ADMIN — Medication 10 ML: at 14:58

## 2018-07-17 RX ADMIN — PINDOLOL 2.5 MG: 5 TABLET ORAL at 22:50

## 2018-07-17 RX ADMIN — PINDOLOL 2.5 MG: 5 TABLET ORAL at 14:52

## 2018-07-17 RX ADMIN — PANTOPRAZOLE SODIUM 40 MG: 40 TABLET, DELAYED RELEASE ORAL at 09:11

## 2018-07-17 RX ADMIN — OXYBUTYNIN CHLORIDE 5 MG: 5 TABLET, EXTENDED RELEASE ORAL at 09:11

## 2018-07-17 RX ADMIN — SODIUM CHLORIDE, SODIUM LACTATE, POTASSIUM CHLORIDE, AND CALCIUM CHLORIDE 125 ML/HR: 600; 310; 30; 20 INJECTION, SOLUTION INTRAVENOUS at 11:07

## 2018-07-17 RX ADMIN — CARBIDOPA AND LEVODOPA 2 TABLET: 25; 100 TABLET ORAL at 14:53

## 2018-07-17 RX ADMIN — CARBIDOPA AND LEVODOPA 2 TABLET: 25; 100 TABLET ORAL at 07:14

## 2018-07-17 RX ADMIN — THERA TABS 1 TABLET: TAB at 09:11

## 2018-07-17 RX ADMIN — RDII 250 MG CAPSULE 250 MG: at 17:55

## 2018-07-17 RX ADMIN — SODIUM CHLORIDE, SODIUM LACTATE, POTASSIUM CHLORIDE, AND CALCIUM CHLORIDE 125 ML/HR: 600; 310; 30; 20 INJECTION, SOLUTION INTRAVENOUS at 19:24

## 2018-07-17 RX ADMIN — HEPARIN SODIUM 5000 UNITS: 5000 INJECTION INTRAVENOUS; SUBCUTANEOUS at 17:55

## 2018-07-17 RX ADMIN — ASPIRIN 81 MG 81 MG: 81 TABLET ORAL at 09:11

## 2018-07-17 RX ADMIN — ESCITALOPRAM OXALATE 20 MG: 10 TABLET ORAL at 09:11

## 2018-07-17 RX ADMIN — HEPARIN SODIUM 5000 UNITS: 5000 INJECTION INTRAVENOUS; SUBCUTANEOUS at 09:10

## 2018-07-17 RX ADMIN — RDII 250 MG CAPSULE 250 MG: at 09:11

## 2018-07-17 RX ADMIN — CARBIDOPA AND LEVODOPA 2 TABLET: 25; 100 TABLET ORAL at 11:07

## 2018-07-17 NOTE — PROGRESS NOTES
PT Note:    New orders received and acknowledged. Patient was evaluated and discharged earlier today as he is total care at baseline. Patient reports non ambulatory x 2 years and using a lift for transfers to wheelchair. Case management is aware. Recommending return to LTC at discharge. Thank you.

## 2018-07-17 NOTE — ED NOTES
TRANSFER - OUT REPORT:    Verbal report given to LIZABETH Townsend(name) on Alee Odonnell  being transferred to [facility](unit) for routine progression of care       Report consisted of patients Situation, Background, Assessment and   Recommendations(SBAR). Information from the following report(s) SBAR, ED Summary, Procedure Summary, MAR and Recent Results was reviewed with the receiving nurse. Lines:       Opportunity for questions and clarification was provided.       Patient transported with:   [employer]

## 2018-07-17 NOTE — PROGRESS NOTES
Pt arrived in wet diaper. Bladder scanned for 235ml, straight cathed for urine labs. Pt was ordered 3-way cath, but do not have setup to run 3way. On call md said to d/c rivera order and continue to monitor pt.

## 2018-07-17 NOTE — PROGRESS NOTES
Problem: Falls - Risk of  Goal: *Absence of Falls  Document Oksana Fall Risk and appropriate interventions in the flowsheet.    Outcome: Progressing Towards Goal  Fall Risk Interventions:  Mobility Interventions: Bed/chair exit alarm    Mentation Interventions: Bed/chair exit alarm         Elimination Interventions: Bed/chair exit alarm

## 2018-07-17 NOTE — PROGRESS NOTES
Hospitalist Progress Note Gigi España MD 
Answering service: [phone] OR [phone] from in house phone Cell: [phone] Date of Service:  2018 NAME:  Alexandra Ewing :  1933 MRN:  [id number] Admission Summary:  
79 yo male from [address] [facility] admitted concerns for low oxygen. In ER , pt sating 100% on RA but found to have elevated Cr and CK. Interval history / Subjective:  
  Pt seen and examined Alert and awake Denies having sob, chest pain States had difficulty voiding time to time Assessment & Plan:  
 
GABI, appears due to possible Urinary retention 
- improved with catheterization and IVF 
- monitor Cr 
- Watch for retention 
- would d/c Ditropan at this time Rhabdomyolysis  
- unclear etiology 
- on IVF 
- recheck CK in AM  
 
BPH 
- start Proscar and Flomax Parkinson Disease - c/w Sinemet/Exelon patch HTN 
- BP okay right now  
- hold cozaar Code status: DNR 
DVT prophylaxis: Hep SQ Care Plan discussed with: Patient/Family and Nurse Disposition: SNF/LTC Hospital Problems  Date Reviewed: 2018 Codes Class Noted POA Acute renal failure (HCC) ICD-10-CM: N17.9 ICD-9-CM: 584.9  2018 Unknown Review of Systems: A comprehensive review of systems was negative except for that written in the HPI. Vital Signs:  
 Last 24hrs VS reviewed since prior progress note. Most recent are: 
Visit Vitals  /71 (BP 1 Location: Right arm, BP Patient Position: At rest)  Pulse 81  Temp 97.7 °F (36.5 °C)  Resp 16  
 Ht 6' (1.829 m)  Wt 81.6 kg (180 lb)  SpO2 97%  BMI 24.41 kg/m2 Intake/Output Summary (Last 24 hours) at 18 1443 Last data filed at 18 1236 Gross per 24 hour Intake              440 ml Output                0 ml Net              440 ml Physical Examination:  
 
 
     
Constitutional:  No acute distress, cooperative, pleasant   
ENT:  Oral mucous moist, oropharynx benign. Neck supple, Resp:  CTA bilaterally. CV:  Regular rhythm, normal rate, GI:  Soft, non distended, non tender. normoactive bowel sounds, Musculoskeletal:  No edema, warm, 2+ pulses throughout Neurologic:  Moves all extremities. AAOx3, Skin:  Good turgor, no rashes or ulcers Data Review:  
 Review and/or order of clinical lab test 
Review and/or order of tests in the radiology section of CPT Review and/or order of tests in the medicine section of CPT Labs:  
 
Recent Labs  
   07/16/18 
 1436 WBC  7.4 HGB  12.4 HCT  39.7 PLT  193 Recent Labs  
   07/17/18 
 0204  07/16/18 
 1436 NA  140  137  
K  4.5  4.6 CL  103  97 CO2  27  31 BUN  43*  47* CREA  1.56*  2.33* GLU  81  117* CA  8.2*  8.7 Recent Labs  
   07/16/18 
 1436 SGOT  117* ALT  17 AP  88 TBILI  0.5 TP  7.8 ALB  3.7 GLOB  4.1* No results for input(s): INR, PTP, APTT in the last 72 hours. No lab exists for component: INREXT No results for input(s): FE, TIBC, PSAT, FERR in the last 72 hours. Lab Results Component Value Date/Time Folate 32.6 (H) 10/30/2013 10:30 AM  
  
No results for input(s): PH, PCO2, PO2 in the last 72 hours. Recent Labs  
   07/17/18 
 0204  07/16/18 
 1436 CPK  2446*   --   
CKNDX   --   1.0  
TROIQ   --   <0.05 Lab Results Component Value Date/Time Cholesterol, total 122 06/27/2017 02:25 AM  
 HDL Cholesterol 47 06/27/2017 02:25 AM  
 LDL, calculated 65.4 06/27/2017 02:25 AM  
 Triglyceride 48 06/27/2017 02:25 AM  
 CHOL/HDL Ratio 2.6 06/27/2017 02:25 AM  
 
Lab Results Component Value Date/Time Glucose (POC) 85 03/22/2018 05:44 PM  
 Glucose (POC) 70 03/22/2018 12:07 PM  
 Glucose (POC) 96 11/15/2017 03:59 PM  
 Glucose (POC) 102 (H) 06/26/2017 07:02 PM  
 Glucose (POC) 97 08/23/2016 02:04 PM  
 
Lab Results Component Value Date/Time  Color YELLOW/STRAW 07/16/2018 11:17 PM  
 Appearance CLOUDY (A) 07/16/2018 11:17 PM  
 Specific gravity 1.016 07/16/2018 11:17 PM  
 pH (UA) 5.5 07/16/2018 11:17 PM  
 Protein TRACE (A) 07/16/2018 11:17 PM  
 Glucose NEGATIVE  07/16/2018 11:17 PM  
 Ketone NEGATIVE  07/16/2018 11:17 PM  
 Bilirubin NEGATIVE  07/16/2018 11:17 PM  
 Urobilinogen 0.2 07/16/2018 11:17 PM  
 Nitrites NEGATIVE  07/16/2018 11:17 PM  
 Leukocyte Esterase LARGE (A) 07/16/2018 11:17 PM  
 Epithelial cells FEW 07/16/2018 11:17 PM  
 Bacteria 1+ (A) 07/16/2018 11:17 PM  
 WBC  07/16/2018 11:17 PM  
 RBC 5-10 07/16/2018 11:17 PM  
 
 
 
Medications Reviewed:  
 
Current Facility-Administered Medications Medication Dose Route Frequency  lactated Ringers infusion  125 mL/hr IntraVENous CONTINUOUS  
 acetaminophen (TYLENOL) tablet 500 mg  500 mg Oral Q6H PRN  
 albuterol-ipratropium (DUO-NEB) 2.5 MG-0.5 MG/3 ML  3 mL Nebulization Q6H PRN  
 aspirin chewable tablet 81 mg  81 mg Oral DAILY  carbidopa-levodopa (SINEMET)  mg per tablet 2 Tab  2 Tab Oral QID  
 . PHARMACY TO SUBSTITUTE PER PROTOCOL (Reordered from: cycloSPORINE (RESTASIS) 0.05 % ophthalmic emulsion)    Per Protocol  . PHARMACY TO SUBSTITUTE PER PROTOCOL (Reordered from: diclofenac (VOLTAREN) 1 % gel)    Per Protocol  docusate sodium (COLACE) capsule 100 mg  100 mg Oral DAILY PRN  
 escitalopram oxalate (LEXAPRO) tablet 20 mg  20 mg Oral DAILY  lidocaine (LIDODERM) 5 % patch 1 Patch  1 Patch TransDERmal Q24H  
 neomycin-bacitracin-polymyxin (NEOSPORIN) ointment   Topical DAILY PRN  pantoprazole (PROTONIX) tablet 40 mg  40 mg Oral DAILY  pindolol (VISKIN) tablet 2.5 mg  2.5 mg Oral BID  polyethylene glycol (MIRALAX) packet 17 g  17 g Oral DAILY  rivastigmine (EXELON) 4.6 mg/24 hr patch 1 Patch  1 Patch TransDERmal DAILY  Saccharomyces boulardii (FLORASTOR) capsule 250 mg  250 mg Oral BID  therapeutic multivitamin (THERAGRAN) tablet 1 Tab  1 Tab Oral DAILY  oxybutynin chloride XL (DITROPAN XL) tablet 5 mg  5 mg Oral DAILY  traMADol (ULTRAM) tablet 50 mg  50 mg Oral Q6H PRN  
 hydrALAZINE (APRESOLINE) 20 mg/mL injection 10 mg  10 mg IntraVENous Q6H PRN  
 sodium chloride (NS) flush 5-10 mL  5-10 mL IntraVENous Q8H  
 sodium chloride (NS) flush 5-10 mL  5-10 mL IntraVENous PRN  
 heparin (porcine) injection 5,000 Units  5,000 Units SubCUTAneous Q8H  
 
______________________________________________________________________ EXPECTED LENGTH OF STAY: 3d 7h 
ACTUAL LENGTH OF STAY:          1 Gloria Chun MD

## 2018-07-17 NOTE — PROGRESS NOTES
Primary Nurse Lynne Mcfarlane RN and Kirsten Seth RN performed a dual skin assessment on this patient Impairment noted- see wound doc flow shee  Joey score is 16

## 2018-07-17 NOTE — PROGRESS NOTES
physical Therapy EVALUATION/DISCHARGE  Patient: Zachery Santizo (52 y.o. male)  Date: 7/17/2018  Primary Diagnosis: Acute renal failure [facility])        Precautions:   DNR, Fall  ASSESSMENT :  Based on the objective data described below, the patient presents with B knee flexion contractures and total care at baseline per patient report, admitted for acute renal failure. Patient states at baseline he occasionally performs sliding board transfers with assistance but also admits to facility using a lift to assist with transfers. He states once up wheelchair he is able to self propel as needed. He states he has been non ambulatory for a few years. Knees are contracted to approximately 90 degrees bilaterally, he states this has also been a few years. Patient overall mod A for supine to sit and max A for sit to supine. Sitting balance is fair while sitting briefly on EOB, fatigues quickly with static sitting. He states mobility is close to baseline. Has no acute skilled PT needs at this time. Return to LTC at discharge, will sign off. Skilled physical therapy is not indicated at this time. PLAN :  Discharge Recommendations: None  Further Equipment Recommendations for Discharge: None     SUBJECTIVE:   Patient stated My knees have been bent like this for a few years.     OBJECTIVE DATA SUMMARY:   HISTORY:    Past Medical History:   Diagnosis Date    Blurry vision 1/19/2012    BPH (benign prostatic hyperplasia)     DDD (degenerative disc disease), lumbar 2/19/2010    DJD (degenerative joint disease) of cervical spine 2/19/2010    DU (duodenal ulcer) 9/1/1990    Hyperlipidemia     Hypertension     Other and unspecified hyperlipidemia 2/19/2010    Pacemaker     Paralysis agitans ([facility] [address] 2/19/2010    Parkinson disease ([facility] [address]     PAT (paroxysmal atrial tachycardia) (HCC)     Premature atrial beats     Reflux esophagitis 2/19/2010    Sick sinus syndrome (HCC)     TIA (transient ischemic attack)      Past Surgical History:   Procedure Laterality Date    HX CATARACT REMOVAL  6/2012 and 7/2012    Both eyes    HX ORTHOPAEDIC  04/2015    tendon repair both knees    HX PACEMAKER  2008    bradycardia    HX VITRECTOMY  11/2011    MI REMVL PERM PM PLS GEN W/REPL PLSE GEN 2 LEAD SYS  3/13/2017          Prior Level of Function/Home Situation: total care at baseline from patient report  Personal factors and/or comorbidities impacting plan of care:     Home Situation  Home Environment: [address] [place] [address] Name: [facility]    EXAMINATION/PRESENTATION/DECISION MAKING:   Critical Behavior:  Neurologic State: Alert, Confused  Orientation Level: Oriented to person  Cognition: Decreased attention/concentration, Impulsive     Hearing: Auditory  Auditory Impairment: None  Skin:    Edema:   Range Of Motion:  AROM: Grossly decreased, non-functional (B LE's)           PROM: Grossly decreased, non-functional (B LE's, knee flexion contractures to ~90 degress bilaterally)           Strength:    Strength: Grossly decreased, non-functional (B LE's)                    Tone & Sensation:                                  Coordination:     Vision:      Functional Mobility:  Bed Mobility:     Supine to Sit: Moderate assistance;Assist x1  Sit to Supine: Maximum assistance;Assist x1     Transfers:                             Balance:   Sitting: Impaired  Sitting - Static: Fair (occasional)  Sitting - Dynamic: Poor (constant support)  Ambulation/Gait Training:                                                             Stairs: Therapeutic Exercises:       Functional Measure:  Functional Reach:    Completed:  [] standing       [x] seated           Average: 0       Functional Reach Test and G-code impairment scale:    For inches: Measure in cm and divide by 2.54  Percentage of Impairment CH    0%   CI    1-19% CJ    20-39% CK    40-59% CL    60-79% CM    80-99% CN     100%   Functional Reach  Score 15-25 cm 25 24 22-23 20-21 18-19 16-17 < 15   Functional Reach  Score 6-10 in 10      < 6        Functional reach: (standing)  Reaches £  6 in = High Fall Risk  Reaches 6-10 in = Moderate Fall Risk    Reaches ³  10 in = Low Fall Risk  Arya Rojas et al. Functional reach: a new clinical measure of balance. J [facility] [employer] Yasmeen Patel; 39: [id number]. Modified Functional Reach: (seated)  Age: Men: Women:   21-39 17.9\" 17.6\"   40-59 17.5\" 15.9\"   60-79 14.4\" 13.2\"   80-97 14.0\" 12.5\"       Hilary Frost. Forward and Lateral Sitting Functional Reach in Younger, Middle-aged, and Older Adults. J Geriatric Phys Ther. 2007; 30:43-48        G codes: In compliance with CMSs Claims Based Outcome Reporting, the following G-code set was chosen for this patient based on their primary functional limitation being treated: The outcome measure chosen to determine the severity of the functional limitation was the Functional Reach with a score of 0 inches which was correlated with the impairment scale. ? Mobility - Walking and Moving Around:     - CURRENT STATUS: CN - 100% impaired, limited or restricted    - GOAL STATUS: CN - 100% impaired, limited or restricted    - D/C STATUS:  CN - 100% impaired, limited or restricted            Pain:           Activity Tolerance:   Poor  Please refer to the flowsheet for vital signs taken during this treatment. After treatment:   []   Patient left in no apparent distress sitting up in chair  [x]   Patient left in no apparent distress in bed  [x]   Call bell left within reach  [x]   Nursing notified  []   Caregiver present  []   Bed alarm activated    COMMUNICATION/EDUCATION:   Communication/Collaboration:  [x]   Fall prevention education was provided and the patient/caregiver indicated understanding. [x]   Patient/family have participated as able and agree with findings and recommendations. []   Patient is unable to participate in plan of care at this time.   Findings and recommendations were discussed with: Registered Nurse    Thank you for this referral.  Hussein Woods, PT   Time Calculation: 13 mins

## 2018-07-17 NOTE — PROGRESS NOTES
Problem: Dysphagia (Adult)  Goal: *Acute Goals and Plan of Care (Insert Text)  Speech pathology goals initiated 7/17/2018   1. Patient will tolerate a dysphagia 3 diet / nectar thick liquids free of s/s aspiration within 7 days  Speech LAnguage Pathology bedside swallow evaluation  Patient: Jose Restrepo (43 y.o. male)  Date: 7/17/2018  Primary Diagnosis: Acute renal failure (HCC)        Precautions: aspiration  DNR, Fall    ASSESSMENT :  Based on the objective data described below, the patient presents with mild oral and suspected mod pharyngeal dysphagia. Mildly prolonged mastication of solids but complete oral clearance. Pharyngeal swallow initiation is suspected to be delayed and hyolaryngeal elevation/excursion reduced via palpation. No overt s/s aspiration with puree, solid or nectar thick liquids (baseline). Patient will benefit from skilled intervention to address the above impairments. Patients rehabilitation potential is considered to be Fair  Factors which may influence rehabilitation potential include:   [x]            None noted  []            Mental ability/status  []            Medical condition  []            Home/family situation and support systems  []            Safety awareness  []            Pain tolerance/management  []            Other:      PLAN :  Recommendations and Planned Interventions:  Dysphagia 3 (advanced soft diet)/ NECTAR THICK LIQUIDS (baseline from [employer]! ! )  Strict upright positioning with all PO  1:1 assistance with all PO  meds 1 at a time in bite of applesauce       Frequency/Duration: Patient will be followed by speech-language pathology 2 times a week to address goals. Discharge Recommendations: return to [address] [place] [address] St:   Patient stated So what do you think of me today?     OBJECTIVE:     Past Medical History:   Diagnosis Date    Blurry vision 1/19/2012    BPH (benign prostatic hyperplasia)     DDD (degenerative disc disease), lumbar 2/19/2010  DJD (degenerative joint disease) of cervical spine 2/19/2010    DU (duodenal ulcer) 9/1/1990    Hyperlipidemia     Hypertension     Other and unspecified hyperlipidemia 2/19/2010    Pacemaker     Paralysis agitans ([facility] [address] 2/19/2010    Parkinson disease ([facility] [address]     PAT (paroxysmal atrial tachycardia) (HCC)     Premature atrial beats     Reflux esophagitis 2/19/2010    Sick sinus syndrome ([facility] [address]     TIA (transient ischemic attack)      Past Surgical History:   Procedure Laterality Date    HX CATARACT REMOVAL  6/2012 and 7/2012    Both eyes    HX ORTHOPAEDIC  04/2015    tendon repair both knees    HX PACEMAKER  2008    bradycardia    HX VITRECTOMY  11/2011    [place] REMVL PERM PM PLS GEN W/REPL PLSE GEN 2 LEAD SYS  3/13/2017          Prior Level of Function/Home Situation:   Home Situation  Home Environment: [address] [place] [address] Name: [employer]  Diet prior to admission: regular/[place] at [employer]  Current Diet:  Regular/thin   Cognitive and Communication Status:  Neurologic State: Alert  Orientation Level: Oriented to person  Cognition: Follows commands  Perception: Appears intact  Perseveration: No perseveration noted  Safety/Judgement: Not assessed  Oral Assessment:  Oral Assessment  Labial: No impairment  Dentition: Natural  Oral Hygiene:  (clean, moist)  Lingual: No impairment  Velum: Unable to visualize  Mandible: No impairment  P.O.  Trials:  Patient Position:  (upright in bed)  Vocal quality prior to P.O.: No impairment  Consistency Presented: Solid;Puree;Nectar thick liquid  How Presented: Self-fed/presented;SLP-fed/presented;Spoon;Cup/sip;Straw     Bolus Acceptance: No impairment  Bolus Formation/Control: Impaired  Type of Impairment: Delayed;Mastication  Propulsion: Delayed (# of seconds)  Oral Residue: None  Initiation of Swallow: Delayed (# of seconds)  Laryngeal Elevation: Decreased  Aspiration Signs/Symptoms: None  Pharyngeal Phase Characteristics:  (suspect delay, reduced elevation/excursion via palpation)  Effective Modifications: None  Cues for Modifications: None       Oral Phase Severity: Mild  Pharyngeal Phase Severity : Moderate    NOMS:   The NOMS functional outcome measure was used to quantify this patient's level of swallowing impairment. Based on the NOMS, the patient was determined to be at level 5 for swallow function     G Codes: In compliance with CMSs Claims Based Outcome Reporting, the following G-code set was chosen for this patient based the use of the NOMS functional outcome to quantify this patient's level of swallowing impairment. Using the NOMS, the patient was determined to be at level 5 for swallow function which correlates with the CJ= 20-39% level of severity. Based on the objective assessment provided within this note, the current, goal, and discharge g-codes are as follows:    Swallow  Swallowing:   Swallow Current Status CJ= 20-39%   Swallow Goal Status CI= 1-19%      NOMS Swallowing Levels:  Level 1 (CN): NPO  Level 2 (CM): NPO but takes consistency in therapy  Level 3 (CL): Takes less than 50% of nutrition p.o. and continues with nonoral feedings; and/or safe with mod cues; and/or max diet restriction  Level 4 (CK): Safe swallow but needs mod cues; and/or mod diet restriction; and/or still requires some nonoral feeding/supplements  Level 5 (CJ): Safe swallow with min diet restriction; and/or needs min cues  Level 6 (CI): Independent with p.o.; rare cues; usually self cues; may need to avoid some foods or needs extra time  Level 7 ([address] [place] [address]): Independent for all p.o.  SYED (2003). National Outcomes Measurement System (NOMS): Adult Speech-Language Pathology User's Guide.        Pain:  Pain Scale 1: Numeric (0 - 10)  Pain Intensity 1: 0     After treatment:   []            Patient left in no apparent distress sitting up in chair  [x]            Patient left in no apparent distress in bed  [x]            Call bell left within reach  [x] Nursing notified  []            Caregiver present  []            Bed alarm activated    COMMUNICATION/EDUCATION:   The patients plan of care including recommendations, planned interventions, and recommended diet changes were discussed with: Registered Nurse. []            Posted safety precautions in patient's room. [x]            Patient/family have participated as able in goal setting and plan of care. [x]            Patient/family agree to work toward stated goals and plan of care. []            Patient understands intent and goals of therapy, but is neutral about his/her participation. []            Patient is unable to participate in goal setting and plan of care.     Thank you for this referral.  Svetlana Evans, SLP  Time Calculation: 17 mins

## 2018-07-17 NOTE — PROGRESS NOTES
Problem: Pressure Injury - Risk of  Goal: *Prevention of pressure injury  Document Joey Scale and appropriate interventions in the flowsheet.    Outcome: Progressing Towards Goal  Pressure Injury Interventions:       Moisture Interventions: Limit adult briefs    Activity Interventions: Pressure redistribution bed/mattress(bed type)    Mobility Interventions: Float heels, HOB 30 degrees or less    Nutrition Interventions: Document food/fluid/supplement intake

## 2018-07-18 VITALS
HEART RATE: 86 BPM | WEIGHT: 180 LBS | TEMPERATURE: 97.2 F | SYSTOLIC BLOOD PRESSURE: 133 MMHG | BODY MASS INDEX: 24.38 KG/M2 | DIASTOLIC BLOOD PRESSURE: 89 MMHG | HEIGHT: 72 IN | OXYGEN SATURATION: 96 % | RESPIRATION RATE: 16 BRPM

## 2018-07-18 LAB
ANION GAP SERPL CALC-SCNC: 9 MMOL/L (ref 5–15)
BACTERIA SPEC CULT: NORMAL
BUN SERPL-MCNC: 32 MG/DL (ref 6–20)
BUN/CREAT SERPL: 27 (ref 12–20)
CALCIUM SERPL-MCNC: 8.6 MG/DL (ref 8.5–10.1)
CC UR VC: NORMAL
CHLORIDE SERPL-SCNC: 104 MMOL/L (ref 97–108)
CK SERPL-CCNC: 1132 U/L (ref 39–308)
CO2 SERPL-SCNC: 26 MMOL/L (ref 21–32)
CREAT SERPL-MCNC: 1.18 MG/DL (ref 0.7–1.3)
GLUCOSE SERPL-MCNC: 88 MG/DL (ref 65–100)
POTASSIUM SERPL-SCNC: 4.7 MMOL/L (ref 3.5–5.1)
SERVICE CMNT-IMP: NORMAL
SODIUM SERPL-SCNC: 139 MMOL/L (ref 136–145)

## 2018-07-18 PROCEDURE — 36415 COLL VENOUS BLD VENIPUNCTURE: CPT | Performed by: HOSPITALIST

## 2018-07-18 PROCEDURE — 74011250637 HC RX REV CODE- 250/637: Performed by: HOSPITALIST

## 2018-07-18 PROCEDURE — 92526 ORAL FUNCTION THERAPY: CPT | Performed by: SPEECH-LANGUAGE PATHOLOGIST

## 2018-07-18 PROCEDURE — 74011250637 HC RX REV CODE- 250/637: Performed by: FAMILY MEDICINE

## 2018-07-18 PROCEDURE — 74011250636 HC RX REV CODE- 250/636: Performed by: FAMILY MEDICINE

## 2018-07-18 PROCEDURE — 82550 ASSAY OF CK (CPK): CPT | Performed by: HOSPITALIST

## 2018-07-18 PROCEDURE — 80048 BASIC METABOLIC PNL TOTAL CA: CPT | Performed by: HOSPITALIST

## 2018-07-18 RX ORDER — FINASTERIDE 5 MG/1
5 TABLET, FILM COATED ORAL DAILY
Qty: 30 TAB | Refills: 1 | Status: SHIPPED | OUTPATIENT
Start: 2018-07-19

## 2018-07-18 RX ORDER — TAMSULOSIN HYDROCHLORIDE 0.4 MG/1
0.4 CAPSULE ORAL DAILY
Qty: 30 CAP | Refills: 1 | Status: SHIPPED | OUTPATIENT
Start: 2018-07-19

## 2018-07-18 RX ADMIN — CARBIDOPA AND LEVODOPA 2 TABLET: 25; 100 TABLET ORAL at 12:14

## 2018-07-18 RX ADMIN — ASPIRIN 81 MG 81 MG: 81 TABLET ORAL at 09:03

## 2018-07-18 RX ADMIN — PANTOPRAZOLE SODIUM 40 MG: 40 TABLET, DELAYED RELEASE ORAL at 09:03

## 2018-07-18 RX ADMIN — TAMSULOSIN HYDROCHLORIDE 0.4 MG: 0.4 CAPSULE ORAL at 09:03

## 2018-07-18 RX ADMIN — RDII 250 MG CAPSULE 250 MG: at 09:02

## 2018-07-18 RX ADMIN — PINDOLOL 2.5 MG: 5 TABLET ORAL at 14:44

## 2018-07-18 RX ADMIN — FINASTERIDE 5 MG: 5 TABLET, FILM COATED ORAL at 09:03

## 2018-07-18 RX ADMIN — POLYETHYLENE GLYCOL 3350 17 G: 17 POWDER, FOR SOLUTION ORAL at 09:03

## 2018-07-18 RX ADMIN — THERA TABS 1 TABLET: TAB at 09:03

## 2018-07-18 RX ADMIN — ESCITALOPRAM OXALATE 20 MG: 10 TABLET ORAL at 09:02

## 2018-07-18 RX ADMIN — HEPARIN SODIUM 5000 UNITS: 5000 INJECTION INTRAVENOUS; SUBCUTANEOUS at 08:58

## 2018-07-18 RX ADMIN — CARBIDOPA AND LEVODOPA 2 TABLET: 25; 100 TABLET ORAL at 09:02

## 2018-07-18 NOTE — DISCHARGE SUMMARY
Discharge Summary     Patient:  Denny Homans       MRN: [id number]       YOB: 1933       Age: 80 y.o. Date of admission:  7/16/2018    Date of discharge:  7/18/2018    Primary care provider: Dr. Vania Snyder MD    Admitting provider:  Roderick Fiore MD    Discharging provider:  Marielos Moss MD - [phone]  If unavailable, call [phone] and ask the  to page the triage hospitalist.    Consultations  · IP CONSULT TO HOSPITALIST    Procedures  · * No surgery found *    Discharge destination: residential. The patient is stable for discharge. Admission diagnosis  · Acute renal failure [facility])    Current Discharge Medication List      START taking these medications    Details   finasteride (PROSCAR) 5 mg tablet Take 1 Tab by mouth daily. Qty: 30 Tab, Refills: 1      tamsulosin (FLOMAX) 0.4 mg capsule Take 1 Cap by mouth daily. Qty: 30 Cap, Refills: 1         CONTINUE these medications which have NOT CHANGED    Details   albuterol-ipratropium (DUO-NEB) 2.5 mg-0.5 mg/3 ml nebu 3 mL by Nebulization route every six (6) hours. tolterodine ER (DETROL LA) 4 mg ER capsule Take 1 Cap by mouth daily. Qty: 30 Cap, Refills: 11    Associated Diagnoses: Overactive bladder      losartan (COZAAR) 25 mg tablet Take  by mouth daily. omeprazole (PRILOSEC) 40 mg capsule Take 40 mg by mouth daily. nitrofurantoin (MACRODANTIN) 50 mg capsule Take 50 mg by mouth daily. Indications: UTI prophylaxis      Saccharomyces boulardii (FLORASTOR) 250 mg capsule Take 250 mg by mouth two (2) times a day. albuterol (PROAIR HFA) 90 mcg/actuation inhaler Take 2 Puffs by inhalation every six (6) hours as needed for Wheezing. traMADol (ULTRAM) 50 mg tablet Take 1 Tab by mouth every eight (8) hours as needed for Pain.  Max Daily Amount: 150 mg.  Qty: 30 Tab, Refills: 1    Associated Diagnoses: DDD (degenerative disc disease), lumbar      aspirin 81 mg chewable tablet Take 81 mg by mouth daily. diclofenac (VOLTAREN) 1 % gel Apply  to affected area as needed for Other (Left shoulder). pindolol (VISKIN) 5 mg tablet Take 2.5 mg by mouth two (2) times a day. Take 2.5 mg at 2pm and 2.5 mg tab  at 9pm      carbidopa-levodopa (SINEMET)  mg per tablet Take 2 Tabs by mouth four (4) times daily. (This is given at 8:00, 11:00, 14:00, and 21:00). Qty: 240 Tab, Refills: 0      polyethylene glycol (MIRALAX) 17 gram packet Take 1 Packet by mouth daily. Indications: CONSTIPATION  Qty: 30 Packet, Refills: 0      therapeutic multivitamin (THERAGRAN) tablet Take 1 Tab by mouth daily. Qty: 30 Tab, Refills: 0      docusate sodium (COLACE) 100 mg capsule Take 1 Cap by mouth daily as needed for Constipation. Qty: 30 Cap, Refills: 0      cycloSPORINE (RESTASIS) 0.05 % ophthalmic emulsion Administer 1 Drop to both eyes two (2) times a day. Qty: 60 Each, Refills: 0      escitalopram oxalate (LEXAPRO) 20 mg tablet Take 1 Tab by mouth daily. Qty: 30 Tab, Refills: 0      rivastigmine (EXELON) 4.6 mg/24 hr patch 1 Patch by TransDERmal route daily. Qty: 30 Patch, Refills: 0    Associated Diagnoses: Mild cognitive impairment      acetaminophen (MAPAP EXTRA STRENGTH) 500 mg tablet Take 1 Tab by mouth every six (6) hours as needed for Pain. Qty: 120 Tab, Refills: 0      neomycin-bacitracnZn-polymyxnB (NEOSPORIN) 3.5-400-5,000 mg-unit-unit oipk ointment Apply 1 Packet to affected area as needed (apply small amount). lidocaine (LIDODERM) 5 % Apply patch to the affected area for 12 hours a day and remove for 12 hours a day.   Qty: 15 Each, Refills: 0             Follow-up Information     Follow up With Details Comments Αμαλίας MD Edward In 1 week Discharge follow up  [address] [place] [address] Avenue  [phone]            Final discharge diagnoses and brief hospital course  Please also refer to the admission H&P for details on the presenting problem. 81 yo male from [address] admitted concerns for low oxygen. In ER , pt sating 100% on RA but found to have elevated Cr and CK.      GABI, appears due to possible Urinary retention  Resolved with IVF     Rhabdomyolysis   Improved with IVF     BPH  - start Proscar and Flomax     Parkinson Disease  - c/w Sinemet/Exelon patch     HTN  - resume Cozaar     Left Heel pain, due to bone spur  - tylenol PRN      FOLLOW-UP TESTS recommended:   - repeat labs in 1 week  - encourage Fluid intake     PENDING TEST RESULTS:  At the time of your discharge the following test results are still pending: NONE  Please make sure you review these results with your outpatient follow-up provider(s).     SYMPTOMS to watch for: chest pain, shortness of breath, fever, chills, nausea, vomiting, diarrhea, change in mentation, falling, weakness, bleeding.     DIET/what to eat:  DIET DYSPHAGIA ADVANCED SOFT (NDD3) 1 NECTAR; 2 GM NA (House Low NA     ACTIVITY:  Resume previous activity     WOUND CARE: none     EQUIPMENT needed:  none        Physical examination at discharge  Visit Vitals    /85 (BP 1 Location: Right arm, BP Patient Position: At rest)    Pulse 80    Temp 98.2 °F (36.8 °C)    Resp 16    Ht 6' (1.829 m)    Wt 81.6 kg (180 lb)    SpO2 94%    BMI 24.41 kg/m2     AO X 1-2   PERRLA  EOMI  Lung: CTA  CVS: RRR  ABd ;soft NT ND  Ext: no edema    Pertinent imaging studies:    NONE    Recent Labs      07/16/18   1436   WBC  7.4   HGB  12.4   HCT  39.7   PLT  193     Recent Labs      07/18/18   0326  07/17/18   0204  07/16/18   1436   NA  139  140  137   K  4.7  4.5  4.6   CL  104  103  97   CO2  26  27  31   BUN  32*  43*  47*   CREA  1.18  1.56*  2.33*   GLU  88  81  117*   CA  8.6  8.2*  8.7     Recent Labs      07/16/18   1436   SGOT  117*   AP  88   TP  7.8   ALB  3.7   GLOB  4.1*     No results for input(s): INR, PTP, APTT in the last 72 hours.     No lab exists for component: INREXT   No results for input(s): FE, TIBC, PSAT, FERR in the last 72 hours. No results for input(s): PH, PCO2, PO2 in the last 72 hours. Recent Labs      18   0326  18   0204  18   1436   CPK  1132*  2446*   --    CKMB   --    --   24.0*     No components found for: [place]    Chronic Diagnoses:    Problem List as of 2018  Date Reviewed: 2018          Codes Class Noted - Resolved    Acute renal failure (HCC) ICD-10-CM: N17.9  ICD-9-CM: 584.9  2018 - Present        Hyperlipidemia LDL goal <130 ICD-10-CM: E78.5  ICD-9-CM: 272.4  2018 - Present        Recurrent depression ([facility] [address] ICD-10-CM: F33.9  ICD-9-CM: 296.30  1/3/2018 - Present        Altered mental status ICD-10-CM: R41.82  ICD-9-CM: 780.97  2017 - Present        Benign nodular prostatic hyperplasia with lower urinary tract symptoms- Dr. Mirna Ramos: N40.1  ICD-9-CM: 600.10  2017 - Present        Hemispheric carotid artery syndrome ICD-10-CM: G45.1  ICD-9-CM: 435.8  2017 - Present        ACP (advance care planning) ICD-10-CM: Z71.89  ICD-9-CM: V65.49  2017 - Present    Overview Signed 5/10/2018  4:00 AM by Elisha Chino MD      Advanced directive already completed and in the chart. No changes desired. Pneumonia ICD-10-CM: J18.9  ICD-9-CM: 627  2017 - Present        Aneurysm of iliac artery (HCC)- left 3.9 cm  CT scan- no change ICD-10-CM: I72.3  ICD-9-CM: 442.2  2016 - Present        Grief- wife of 61 years  2015 ICD-10-CM: F43.20  ICD-9-CM: 309.0  2015 - Present        Primary osteoarthritis of both knees ICD-10-CM: M17.0  ICD-9-CM: 715.16  2015 - Present        Overactive bladder- Dr. 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  (Chronic) ICD-10-CM: N13.210M  ICD-9-CM: 844.8  2014 - Present        Mild cognitive impairment w/o memory loss ICD-10-CM: G31.84  ICD-9-CM: 331.83  11/19/2014 - Present        Major depressive disorder, single episode ICD-10-CM: F32.9  ICD-9-CM: 296.20  11/19/2014 - Present        Generalized anxiety disorder ICD-10-CM: F41.1  ICD-9-CM: 300.02  11/19/2014 - Present        Orthostatic hypotension ICD-10-CM: I95.1  ICD-9-CM: 458.0  9/28/2013 - Present        Blurry vision-chronic, no change- neg w/u ICD-10-CM: H53.8  ICD-9-CM: 368.8  7/9/2013 - Present        NSVT (nonsustained ventricular tachycardia) ([facility]) ICD-10-CM: I47.2  ICD-9-CM: 427.1  7/20/2012 - Present    Overview Signed 7/20/2012  9:56 AM by Wendy Whiting MD     2. 2.2012             PAT (paroxysmal atrial tachycardia) ([facility]) ICD-10-CM: I47.1  ICD-9-CM: 427.0  1/19/2012 - Present        Sick sinus syndrome-pacemaker-2008 ICD-10-CM: I49.5  ICD-9-CM: 427.81  10/28/2011 - Present        Essential hypertension, difficult to control due to orthostatic hypotension ICD-10-CM: I10  ICD-9-CM: 401.1  4/18/2011 - Present        Parkinsonian syndrome- Dr. Drew Caba ICD-10-CM: Tramaine Piresto  ICD-9-CM: 332.0  4/8/2011 - Present        Hyperlipemia ICD-10-CM: E78.5  ICD-9-CM: 272.4  2/19/2010 - Present        Reflux esophagitis ICD-10-CM: K21.0  ICD-9-CM: 530.11  2/19/2010 - Present        DDD (degenerative disc disease), lumbar ICD-10-CM: M51.36  ICD-9-CM: 722.52  2/19/2010 - Present        DJD (degenerative joint disease) of cervical spine ICD-10-CM: M47.812  ICD-9-CM: 721.0  2/19/2010 - Present    Overview Signed 1/21/2018  9:01 AM by Ishaan Quintanilla MD     Moderately severe in jan 2018 X ray             Paralysis agitans ([address] ICD-10-CM: Tramaine Carrington  ICD-9-CM: 332.0  2/19/2010 - Present        DU (duodenal ulcer) ICD-10-CM: K26.9  ICD-9-CM: 532.90  9/1/1990 - Present        History of duodenal ulcer ICD-10-CM: Z87.19  ICD-9-CM: V12.79  9/1/1990 - Present        RESOLVED: Hypotension ICD-10-CM: I95.9  ICD-9-CM: 458.9  3/22/2018 - 3/28/2018        RESOLVED: Dehydration ICD-10-CM: E86.0  ICD-9-CM: 276.51 11/12/2017 - 11/16/2017        RESOLVED: Agitation ICD-10-CM: R45.1  ICD-9-CM: 307.9  11/12/2017 - 11/16/2017        RESOLVED: Slurred speech ICD-10-CM: R47.81  ICD-9-CM: 784.59  6/26/2017 - 7/12/2017        RESOLVED: Altered mental status ICD-10-CM: R41.82  ICD-9-CM: 780.97  6/26/2017 - 7/12/2017        RESOLVED: Influenza A ICD-10-CM: J10.1  ICD-9-CM: 487.1  3/19/2017 - 7/12/2017        RESOLVED: Pacemaker end of life ICD-10-CM: N78.451  ICD-9-CM: V53.31  3/13/2017 - 5/9/2018    Overview Signed 3/13/2017  9:49 AM by Vani Irizarry MD     Generator change 3/13/2017             RESOLVED: Sepsis ([facility] ICD-10-CM: A41.9  ICD-9-CM: 038.9, 995.91  6/10/2016 - 7/28/2016        RESOLVED: Catheter-associated urinary tract infection ([facility] ICD-10-CM: T83.511A, N39.0  ICD-9-CM: 996.64, 599.0  4/15/2016 - 4/18/2016        RESOLVED: Metabolic encephalopathy CNY-29-AS: G93.41  ICD-9-CM: 348.31  4/15/2016 - 4/18/2016        RESOLVED: GABI (acute kidney injury) ([facility] ICD-10-CM: N17.9  ICD-9-CM: 584.9  4/15/2016 - 4/18/2016        RESOLVED: Hyponatremia ICD-10-CM: E87.1  ICD-9-CM: 276.1  4/15/2016 - 4/18/2016        RESOLVED: Mental status change ICD-10-CM: R41.82  ICD-9-CM: 780.97  10/30/2013 - 1/5/2016        RESOLVED: Pneumonia, organism unspecified(486) ICD-10-CM: J18.9  ICD-9-CM: 486  2/19/2012 - 1/5/2016        RESOLVED: Weakness generalized ICD-10-CM: R53.1  ICD-9-CM: 780.79  2/19/2012 - 2/22/2012        RESOLVED: PAT (paroxysmal atrial tachycardia) ([facility] ICD-10-CM: I47.1  ICD-9-CM: 427.0  1/19/2012 - 2/29/2012        RESOLVED: Premature atrial beats ICD-10-CM: I49.1  ICD-9-CM: 427.61  Unknown - 2/29/2012              Time spent on discharge related activities today greater than 30 minutes.       Signed:  Yaritza Cabrera MD                 Hospitalist, Internal Medicine      Cc: Jeannette Krabbe, MD

## 2018-07-18 NOTE — PROGRESS NOTES
CM reviewed chart and received discharge orders. Plan is to return to [facility]. Pt's daughter is in agreement. CM faxed AVS and prescriptions to [facility]. Envelope containing MARs, Kardex, AVS, discharge summary, DDNR, and prescriptions will be sent with pt. CM set up ambulance with [facility] for 3:00pm.  Nurse will call report.   SOBIA Thornton, ACM

## 2018-07-18 NOTE — PROGRESS NOTES
TRANSFER - OUT REPORT:    Verbal report given to Vianney(name) on Bulmaro Harvey  being transferred to [facility]   Report consisted of patients Situation, Background, Assessment and   Recommendations(SBAR). Information from the following report(s) SBAR, Kardex and MAR was reviewed with the receiving nurse. Lines:       Opportunity for questions and clarification was provided.

## 2018-07-18 NOTE — PROGRESS NOTES
Occupational Therapy  Orders received, chart reviewed, discussed with PT and CM, patient to return to prior AL, attempted evaluation patient declining self feeding trial with setup in bed and grooming task, stating they staff feeds him and does all his \"washing up\" Patient also then had tangential speech even with reorientation, patient telling therapist to leave and \"be safe driving\" also mumbling. Seems to be baseline, he needs total care at this time. CM stating he has been approved for return to [address] [place] [address]. Will sign off. [facility].  MS Moshe OTR/L

## 2018-07-18 NOTE — DISCHARGE INSTRUCTIONS
Please bring this form with you to show your primary care provider at your follow-up appointment. Primary care provider:  Dr. Muriel Leon MD    Discharging provider:  Milly Leger MD    You have been admitted to the hospital with the following diagnoses:  · Acute renal failure [facility])    FOLLOW-UP CARE RECOMMENDATIONS:    APPOINTMENTS:  Follow-up Information     Follow up With Details Comments Cass Guzmán MD In 1 week Discharge follow up  [id number] recommended:   - repeat labs in 1 week  - encourage Fluid intake    PENDING TEST RESULTS:  At the time of your discharge the following test results are still pending: NONE  Please make sure you review these results with your outpatient follow-up provider(s). SYMPTOMS to watch for: chest pain, shortness of breath, fever, chills, nausea, vomiting, diarrhea, change in mentation, falling, weakness, bleeding. DIET/what to eat:  DIET DYSPHAGIA ADVANCED SOFT (NDD3) 1 NECTAR; 2 GM NA (House Low NA    ACTIVITY:  Resume previous activity    WOUND CARE: none    EQUIPMENT needed:  none      What to do if new or unexpected symptoms occur? If you experience any of the above symptoms (or should other concerns or questions arise after discharge) please call your primary care physician. Return to the emergency room if you cannot get hold of your doctor. · It is very important that you keep your follow-up appointment(s). · Please bring discharge papers, medication list (and/or medication bottles) to your follow-up appointments for review by your outpatient provider(s). · Please check the list of medications and be sure it includes every medication (even non-prescription medications) that your provider wants you to take. · It is important that you take the medication exactly as they are prescribed.    · Keep your medication in the bottles provided by the pharmacist and keep a list of the medication names, dosages, and times to be taken in your wallet. · Do not take other medications without consulting your doctor. · If you have any questions about your medications or other instructions, please talk to your nurse or care provider before you leave the hospital.    I understand that if any problems occur once I am at home I am to contact my physician. These instructions were explained to me and I had the opportunity to ask questions.

## 2018-07-18 NOTE — PROGRESS NOTES
Spiritual Care Partner Volunteer visited patient in Rm 210 on 7/18/2018.   Documented by:  Chaplain Dyer MDiv, MS, [address] ([id number])

## 2018-07-18 NOTE — PROGRESS NOTES
NUTRITION   RD Screen      Pt seen for:      [x]  MST for wt loss []   MD Consult    []        Supplements  []   PO intake check   []        Food Allergies  []   Food Preferences/tolerances    []        Rescreen  []   Education    []        Diet order clarification []   Other   []  LOS                          Nutrition Prescription:     No changes or new recommendations at this time  Encourage adequate intake of meals and supplements    Assessment:   Information obtained from:   patient and RN         Pt admitted for low O2 stats at SNF. Pt is eating well but refusing many offers to change bedding, assist with self-care. Pt did feed himself and ate 100% of lunch. Refused meds today per RN. Pt with low wt from previous records would benefit from added ONS. Pt wouldn't take homemade milkshake offered & available at bedside today. Cultural, Denominational and ethnic food preferences:   [x]  None   []  Identified and addressed    Diet:  Mech Soft, Nec Thick     PO Intake: [x]  Good     [] Fair      []  Poor     Patient Vitals for the past 100 hrs:   % Diet Eaten   07/18/18 1246 95 %   07/18/18 0853 10 %   07/17/18 1236 95 %   07/17/18 0840 90 %       Wt Readings from Last 5 Encounters:   07/16/18 81.6 kg (180 lb)   05/04/18 86.9 kg (191 lb 9.3 oz)   03/24/18 84.5 kg (186 lb 4.6 oz)   02/23/18 93 kg (205 lb)   01/21/18 93 kg (205 lb 0.4 oz)   ]    Body mass index is 24.41 kg/(m^2). Skin: intact  BM: 7/18  ABD: WDL     Estimated Daily Nutrition Requirements:  Kcals/day: 1847 Kcals/day  Protein: 98 g (1.2 g/kg of est wt)  Fluid:  1 mL/kcal of po     Based On: [place] Jeor (AF 1.2)  Weight Used:  Other (comment) (estimated wt)    Weight Changes:   [x]   Loss (PTA wt down ~20# since January)  []   Gain  []   Stable    Nutrition Problems Identified  [x]     None  []     Specified food preferences   []     Dislikes supplements              []     Allergies  []     Difficulty chewing     []     Dentition   [] Nausea/Vomiting  []    Constipation  []    Diarrhea    Nutrition Diagnosis:      No nutrition diagnosis identified at this time    Intervention:   []    Obtained/adjusted food preferences/tolerances and/or snacks options   []    Dislikes supplements will try a substitution   []    Modify diet for food allergies  []    Adjust texture due to difficulty chewing   []    Encourage Fresh Fruit, Activia yogurt, fluid   []    Educated patient  [x]    Rescreen per screening protocol  [x]    Add Supplements    Goal: n/a    Monitoring/Evaluation:   Education & Discharge Needs  [] Nutrition related discharge needs addressed:   [x] Supplements (on d/c instruction &/or coupons provided)    [] Education   [x] No nutrition related discharge needs at this time    Rescreen: [x]  At Nutrition Risk          []  Not at Nutrition Risk, rescreen per screening protocol    Mer Parks MS, RD, CDE

## 2018-07-18 NOTE — PROGRESS NOTES
Problem: Dysphagia (Adult)  Goal: *Acute Goals and Plan of Care (Insert Text)  Speech pathology goals initiated 7/17/2018   1. Patient will tolerate a dysphagia 3 diet / nectar thick liquids free of s/s aspiration within 7 days   Speech language pathology dysphagia treatment  Patient: Arabella Mejía (45 y.o. male)  Date: 7/18/2018  Diagnosis: Acute renal failure ([facility] [address] <principal problem not specified>       Precautions:  DNR, Fall    ASSESSMENT:  Patient in bed, dry cough noted prior to PO trials. Patient accepted trials solids and nectar liquids. Mild oral dysphagia is characterized by mildly slow mastication and oral transit of solids. Moderate pharyngeal dysphagia is characterized by suspected pharyngeal swallow delay and reduced laryngeal elevation via palpation. Patient with delayed coughing with nectar liquids, solids and after all PO trials. Unable to determine if coughing related to PO therefore aspiration precautions remain warranted. Progression toward goals:  []         Improving appropriately and progressing toward goals  [x]         Improving slowly and progressing toward goals  []         Not making progress toward goals and plan of care will be adjusted     PLAN:  Recommendations and Planned Interventions:  Continue Dysphagia 3 diet  Nectar liquids  Sit up for all PO  Single sips  Patient continues to benefit from skilled intervention to address the above impairments. Continue treatment per established plan of care. Discharge Recommendations: To Be Determined     SUBJECTIVE:   Patient stated I like to use a straw with water. That way I can get more.     OBJECTIVE:   Cognitive and Communication Status:  Neurologic State: Alert, Confused  Orientation Level: Oriented to person  Cognition: Follows commands, Poor safety awareness  Perception: Appears intact  Perseveration: No perseveration noted  Safety/Judgement: Not assessed  Dysphagia Treatment:  Oral Assessment:  Oral Assessment  Labial: No impairment  Dentition: Intact  Oral Hygiene: Moist and clean  Lingual: No impairment  Velum: Unable to visualize  Mandible: No impairment  P.O. Trials:  Patient Position: Upright in bed  Vocal quality prior to P.O.: No impairment  Consistency Presented: Solid; Nectar thick liquid  How Presented: Self-fed/presented;SLP-fed/presented;Cup/sip     Bolus Acceptance: No impairment  Bolus Formation/Control: Impaired  Type of Impairment: Delayed;Mastication  Propulsion: Delayed (# of seconds)  Oral Residue: None  Initiation of Swallow: Delayed (# of seconds)  Laryngeal Elevation: Decreased;Weak  Aspiration Signs/Symptoms: Delayed cough/throat clear                Oral Phase Severity: Mild  Pharyngeal Phase Severity : Moderate    After treatment:   []              Patient left in no apparent distress sitting up in chair  [x]              Patient left in no apparent distress in bed  [x]              Call bell left within reach  [x]              Nursing notified  []              Caregiver present  []              Bed alarm activated    COMMUNICATION/EDUCATION:   Patient was educated regarding role of SLP, diet and liquid consistencies. Patient verbalized understanding. Further education warranted. The patients plan of care including recommendations, planned interventions, and recommended diet changes were discussed with:  []              Posted safety precautions in patient's room.     CAROL Marti  Time Calculation: 10 mins

## 2018-07-18 NOTE — PROGRESS NOTES
Problem: Pressure Injury - Risk of  Goal: *Prevention of pressure injury  Document Joey Scale and appropriate interventions in the flowsheet. Outcome: Progressing Towards Goal  Pressure Injury Interventions:       Moisture Interventions: Internal/External urinary devices    Activity Interventions: Pressure redistribution bed/mattress(bed type)    Mobility Interventions: HOB 30 degrees or less, Pressure redistribution bed/mattress (bed type)    Nutrition Interventions: Document food/fluid/supplement intake                    Problem: Falls - Risk of  Goal: *Absence of Falls  Document Oksana Fall Risk and appropriate interventions in the flowsheet.    Outcome: Progressing Towards Goal  Fall Risk Interventions:  Mobility Interventions: Bed/chair exit alarm    Mentation Interventions: Bed/chair exit alarm         Elimination Interventions: Bed/chair exit alarm

## 2018-07-18 NOTE — PROGRESS NOTES
Reason for Admission: Acute Renal Failure                    RRAT Score:   19               Do you (patient/family) have any concerns for transition/discharge? Plan for utilizing home health:   No      Likelihood of readmission?   low            Transition of Care Plan:    Pt is a resident at [facility] ([phone]). Plan will be for [facility] to [facility]. Pt will require ambulance transport. Pt's PCP is Dr. Annamarie Rcio. CM will continue to follow. SOBIA Morris, ACBERONICA    Care Management Interventions  PCP Verified by CM:  Yes  Last Visit to PCP: 05/25/18  Mode of Transport at Discharge: BLS ([facility])  Transition of Care Consult (CM Consult): Assisted Living  Discharge Durable Medical Equipment: No  Physical Therapy Consult: Yes  Occupational Therapy Consult: Yes  Speech Therapy Consult: Yes  Current Support Network: Assisted Living ([address])  Plan discussed with Pt/Family/Caregiver: Yes  Freedom of Choice Offered: Yes  [address] Provided?: No  Discharge Location  Discharge Placement: Assisted Living ([address])

## 2018-07-19 ENCOUNTER — PATIENT OUTREACH (OUTPATIENT)
Dept: FAMILY MEDICINE CLINIC | Age: 83
End: 2018-07-19

## 2018-07-19 NOTE — PROGRESS NOTES
Hospital Discharge Follow-Up      Date/Time:  2018 9:31 AM    Patient was admitted to [facility] on 18 and discharged on 18 for acute renal failure. The physician discharge summary was available at the time of outreach. Patient was contacted within one business days of discharge. Call placed to Cinda Mendez (HIPAA verified), left message RE: follow up SABRINA ENRIQUE appointment scheduled 18 at 11:00    Top Challenges reviewed with the provider   Acute renal failure  Repeat labs in 1 week-CK 1132, Myoglobin 1816  Encourage fluid intake  2GM sodium diet       Method of communication with provider :none    Inpatient RRAT score: 23  Was this a readmission? no   Patient stated reason for the readmission: n/a    Nurse Navigator (NN) contacted the family by telephone to perform post hospital discharge assessment. Verified name and  with family as identifiers. Provided introduction to self, and explanation of the Nurse Navigator role. NN spoke to Cinda Mendez, daughter (HIPAA verified) 18. Reports patient is doing better. Reports patient will follow up with Dr Sarah Escobar at [facility]. NN advised if she needs anything to contact the office. Cinda Mendez expressed understanding. She thanked me for calling. NN to cancel follow up appointment scheduled 18. Disease Specific:   N/A    Summary of patient's top problems:  1. Acute renal failure- Cr 1.56      Advance Care Planning:   Does patient have an Advance Directive:  reviewed and needs to be updated     Current Outpatient Prescriptions   Medication Sig    finasteride (PROSCAR) 5 mg tablet Take 1 Tab by mouth daily.  tamsulosin (FLOMAX) 0.4 mg capsule Take 1 Cap by mouth daily.  albuterol-ipratropium (DUO-NEB) 2.5 mg-0.5 mg/3 ml nebu 3 mL by Nebulization route every six (6) hours.  tolterodine ER (DETROL LA) 4 mg ER capsule Take 1 Cap by mouth daily.  losartan (COZAAR) 25 mg tablet Take  by mouth daily.     neomycin-bacitracnZn-polymyxnB (NEOSPORIN) 3.5-400-5,000 mg-unit-unit oipk ointment Apply 1 Packet to affected area as needed (apply small amount).  omeprazole (PRILOSEC) 40 mg capsule Take 40 mg by mouth daily.  nitrofurantoin (MACRODANTIN) 50 mg capsule Take 50 mg by mouth daily. Indications: UTI prophylaxis    Saccharomyces boulardii (FLORASTOR) 250 mg capsule Take 250 mg by mouth two (2) times a day.  albuterol (PROAIR HFA) 90 mcg/actuation inhaler Take 2 Puffs by inhalation every six (6) hours as needed for Wheezing.  traMADol (ULTRAM) 50 mg tablet Take 1 Tab by mouth every eight (8) hours as needed for Pain. Max Daily Amount: 150 mg.    lidocaine (LIDODERM) 5 % Apply patch to the affected area for 12 hours a day and remove for 12 hours a day.  aspirin 81 mg chewable tablet Take 81 mg by mouth daily.  diclofenac (VOLTAREN) 1 % gel Apply  to affected area as needed for Other (Left shoulder).  pindolol (VISKIN) 5 mg tablet Take 2.5 mg by mouth two (2) times a day. Take 2.5 mg at 2pm and 2.5 mg tab  at 9pm    carbidopa-levodopa (SINEMET)  mg per tablet Take 2 Tabs by mouth four (4) times daily. (This is given at 8:00, 11:00, 14:00, and 21:00). (Patient taking differently: Take 2 Tabs by mouth four (4) times daily. TOTAL DOSE = 37.5-150 mg, given at 8:00, 11:00, 14:00, and 21:00).)    polyethylene glycol (MIRALAX) 17 gram packet Take 1 Packet by mouth daily. Indications: CONSTIPATION    therapeutic multivitamin (THERAGRAN) tablet Take 1 Tab by mouth daily.  docusate sodium (COLACE) 100 mg capsule Take 1 Cap by mouth daily as needed for Constipation.  cycloSPORINE (RESTASIS) 0.05 % ophthalmic emulsion Administer 1 Drop to both eyes two (2) times a day.  escitalopram oxalate (LEXAPRO) 20 mg tablet Take 1 Tab by mouth daily.  rivastigmine (EXELON) 4.6 mg/24 hr patch 1 Patch by TransDERmal route daily.     acetaminophen (MAPAP EXTRA STRENGTH) 500 mg tablet Take 1 Tab by mouth every six (6) hours as needed for Pain. No current facility-administered medications for this visit. There are no discontinued medications. [facility] follow up appointment(s): Future Appointments  Date Time Provider Clementine Roca   7/26/2018 10:30 AM PAC[facility]ER[facility], [address]   7/26/2018 10:40 AM Ada Kang  [address]      Non-[facility] follow up appointment(s): Patient will be followed by Dr Dandre Samuels at [facility]        Patient [address] (pt-stated)            4/26/18-patient currently has DDNR on file. NN will discuss ACP during upcoming call or during office visit. sc       Pain free (pt-stated)            12/1/17- patient reports right shoulder pain 8/10 today. Reports morning nurse applies voltaren gel to right shoulder. Minor relief. Says he cant raise his arm above his shoulder. Reports difficulty using arm to cut up meat for meals; gets assistance from staff. Reports he rolled out of bed three nights ago landing on his right side. Reports sensitivity to that side. NN will follow up with staff.sc    12/6/17-continues to report discomfort to right shoulder. States pain is constant, but not severe. [facility] staff continues to apply volteran gel to shoulder with some relief. Reports has difficulty  lifting things with right arm. Norco was discontinued while hospitalized due to increase confusion. States he applys heating pad approximately 3-4 times per day with 30 minute intervals- temporary relief. Reviewed red flags, ie burns. sc    12/13/17-reports discomfort to right shoulder when raising his right arm. States staff is compliant with diclofenac gel. He states he will call this writer next week if no relief. XR right humerus completed 11/14/17 revealed normal right humerus. Sc    12/21/17-continues to report discomfort to right shoulder. States right shoulder hurts when raising his arm.  Reports [employer] staff continues compliance with application of diclofenac gel to shoulder with some relief. NN urged patient to follow up with provider. Patient authorized NN to contact daughter, Fredis Gruber for scheduling an appointment. Fredis Gruber reports patient fell approximately one week ago and \"could have strained his shoulder\". [facility] Appointment scheduled for 1/3/18. Fredis Gruber aware and reports she will inform [employer]. NN left voicemail message with patient to advise of appointment. sc    4/26/18-   -patient reports 5/10 pain to right shoulder. Endorses uses lidocaine patch and pain pill with     symptom relief   -patient to receive physical therapy. Patient will actively participate in therapy and perform      recommended exercises to increase range of motion. sc          Safety (pt-stated)            12/6/17-patient wheelchair bound. Was able to transfer independently, but with bilateral lower leg weakness and  right shoulder discomfort s/p fall approximately 10 weeks ago unable to transfer safely. Patient reports his wheelchair brakes needs to be replaced. States wheelchair was purchased from [employer]. NN contacted Diandra Diaz, rep from [employer], reports brakes were repaired on 12/1/17. Diandra Diaz transferred me to Kerri Padilla,  who states they have tried three different types of brakes: push to lock, pull to lock, and scissor mount unsuccessfully. Advised safety is a major concern. Inquired if there are any other options. Kerri Padilla stated he will contact Nuvia Griffin, , for any other options. NN to follow up in one week to check status. Patient will not transfer without assistance from [employer] staff.sc    12/13/17-reports someone from [employer] came out to inspect wheelchair; a part has been ordered. Patient acknowledged two people are required to transfer. Patient will not transfer independently due to the risk of falls. sc  12/21/17-denies falls. Reports compliance with assistance from [employer] staff for transfers. sc  4/26/18   -patient is wheelchair bound. NN reviewed with patient safety measures, ie requesting assistance   with transfers. Patient will not fall, sc. Other     Attends follow-up appointments as directed.             4/26/18-   -patient has [facility] appointment scheduled 5/3/18   -patient will attend appointment,sc       Free of infection            4/26/18-   -patient with left heel ulcer   -reviewed s/sx infection-warmth, redness, drainage, foul odor, fever    -patient will report symptoms immediately   - SN will evaluate and treat ulcer   -NN reviewed protein consumption to promote wound healing, sc

## 2019-04-25 ENCOUNTER — TELEPHONE (OUTPATIENT)
Dept: CARDIOLOGY CLINIC | Age: 84
End: 2019-04-25

## 2019-04-25 NOTE — TELEPHONE ENCOUNTER
Pt called asking when he will need to come in for a pacemaker check or when should he schedule a remote check.   Phone #[phone]  Thanks

## 2019-04-30 NOTE — TELEPHONE ENCOUNTER
A voicemail was left requesting a call back. He needs to be scheduled for an in clinic pacer check and to See Dr Mario Alberto Ramos. He hasn't been here since 2017. When he calls, just schedule him into the next available slot.

## 2019-08-27 NOTE — ED NOTES
Pt pulled out both IV's and took himself off all monitoring equipment. Reoriented pt and placed him back on monitor. coffee

## 2020-01-28 NOTE — ED TRIAGE NOTES
Patient arrived via EMS; patient presents with c/o sudden onset of confusion. Patient currently being treated for a UTI with Bactrim.
- - -

## 2020-06-22 NOTE — TELEPHONE ENCOUNTER
Guillermo Contreras @ [facility]  [phone]    Ligia Son is asking for a return call to discuss point of care and a new onset of pain after a fall for . Heidy Whalennd. No

## 2021-05-01 NOTE — TELEPHONE ENCOUNTER
Patient is medically cleared   The  prescription is up front for .   printed and reviewed by Dr Richy Hook

## 2021-10-25 NOTE — PROGRESS NOTES
Esmarch applied at 1239 by Dr Gardner   [employer] rep Shaver checked his pacer in ICU 10 and informed me that it has reached replacement time  Patient is ill in ICU so will wait till he is better

## 2022-11-02 NOTE — TELEPHONE ENCOUNTER
----- Message from Ivon Mendosa sent at 7/31/2017  3:38 PM EDT -----  Regarding: Dr. Rueda Listen  Pt requesting an acute appt due to crook in his neck on the R side. Pt stated, he is available Mondays,Wenesdays and Friday at anytime.    Best contact number [phone] - - -

## 2023-01-01 NOTE — PROGRESS NOTES
Patient was transferred to [facility] from ED with diagnosis of pneumonia. Through out the shift patient increasingly became SOB with increased confusion. Incentive spirometry, attempts to cough and deep breathe, and bumping the oxygen up to 6 liters were carried out and o2 sats remained in the mid 80's. MD notified and gave orders to d/c fluids, get a chest xray, give lasix, and for the patient to be put on bipap. Transfer orders were given, patient sent to ICU. 3